# Patient Record
Sex: FEMALE | Race: WHITE | NOT HISPANIC OR LATINO
[De-identification: names, ages, dates, MRNs, and addresses within clinical notes are randomized per-mention and may not be internally consistent; named-entity substitution may affect disease eponyms.]

---

## 2017-09-14 ENCOUNTER — RESULT REVIEW (OUTPATIENT)
Age: 77
End: 2017-09-14

## 2019-10-22 ENCOUNTER — LABORATORY RESULT (OUTPATIENT)
Age: 79
End: 2019-10-22

## 2019-10-22 ENCOUNTER — APPOINTMENT (OUTPATIENT)
Dept: DERMATOLOGY | Facility: CLINIC | Age: 79
End: 2019-10-22
Payer: MEDICARE

## 2019-10-22 VITALS
BODY MASS INDEX: 19.63 KG/M2 | DIASTOLIC BLOOD PRESSURE: 74 MMHG | WEIGHT: 115 LBS | SYSTOLIC BLOOD PRESSURE: 124 MMHG | HEIGHT: 64 IN

## 2019-10-22 DIAGNOSIS — Z78.9 OTHER SPECIFIED HEALTH STATUS: ICD-10-CM

## 2019-10-22 PROCEDURE — 11104 PUNCH BX SKIN SINGLE LESION: CPT

## 2019-10-22 PROCEDURE — 99203 OFFICE O/P NEW LOW 30 MIN: CPT | Mod: 25

## 2019-10-28 ENCOUNTER — APPOINTMENT (OUTPATIENT)
Dept: DERMATOLOGY | Facility: CLINIC | Age: 79
End: 2019-10-28

## 2019-11-05 ENCOUNTER — APPOINTMENT (OUTPATIENT)
Dept: DERMATOLOGY | Facility: CLINIC | Age: 79
End: 2019-11-05
Payer: MEDICARE

## 2019-11-05 DIAGNOSIS — L82.1 OTHER SEBORRHEIC KERATOSIS: ICD-10-CM

## 2019-11-05 PROCEDURE — 99213 OFFICE O/P EST LOW 20 MIN: CPT

## 2019-11-19 ENCOUNTER — APPOINTMENT (OUTPATIENT)
Dept: DERMATOLOGY | Facility: CLINIC | Age: 79
End: 2019-11-19
Payer: MEDICARE

## 2019-11-19 DIAGNOSIS — L91.8 OTHER HYPERTROPHIC DISORDERS OF THE SKIN: ICD-10-CM

## 2019-11-19 PROCEDURE — 17110 DESTRUCTION B9 LES UP TO 14: CPT

## 2019-11-19 PROCEDURE — 99214 OFFICE O/P EST MOD 30 MIN: CPT | Mod: 25

## 2019-12-19 ENCOUNTER — APPOINTMENT (OUTPATIENT)
Dept: DERMATOLOGY | Facility: CLINIC | Age: 79
End: 2019-12-19
Payer: MEDICARE

## 2019-12-19 PROCEDURE — 99213 OFFICE O/P EST LOW 20 MIN: CPT

## 2019-12-19 NOTE — HISTORY OF PRESENT ILLNESS
[FreeTextEntry1] : f/u scabies [de-identified] : 79F here for f/u scabies. Diagnosed at . s/p ivermectin and permethrin with improvement in her itch. Still has mild residual itch around the neck that is improving. Otherwise, no new, evolving, or symptomatic skin lesions.

## 2020-01-31 ENCOUNTER — INPATIENT (INPATIENT)
Facility: HOSPITAL | Age: 80
LOS: 9 days | Discharge: HOME | DRG: 834 | End: 2020-02-10
Payer: COMMERCIAL

## 2020-01-31 VITALS
RESPIRATION RATE: 18 BRPM | OXYGEN SATURATION: 95 % | WEIGHT: 117.95 LBS | HEIGHT: 64 IN | SYSTOLIC BLOOD PRESSURE: 146 MMHG | HEART RATE: 85 BPM | TEMPERATURE: 98 F | DIASTOLIC BLOOD PRESSURE: 78 MMHG

## 2020-01-31 DIAGNOSIS — Z90.710 ACQUIRED ABSENCE OF BOTH CERVIX AND UTERUS: Chronic | ICD-10-CM

## 2020-01-31 DIAGNOSIS — I10 ESSENTIAL (PRIMARY) HYPERTENSION: ICD-10-CM

## 2020-01-31 DIAGNOSIS — C50.919 MALIGNANT NEOPLASM OF UNSPECIFIED SITE OF UNSPECIFIED FEMALE BREAST: ICD-10-CM

## 2020-01-31 DIAGNOSIS — C95.00 ACUTE LEUKEMIA OF UNSPECIFIED CELL TYPE NOT HAVING ACHIEVED REMISSION: ICD-10-CM

## 2020-01-31 LAB
ALBUMIN SERPL ELPH-MCNC: 4 G/DL — SIGNIFICANT CHANGE UP (ref 3.3–5)
ALP SERPL-CCNC: 45 U/L — SIGNIFICANT CHANGE UP (ref 40–120)
ALT FLD-CCNC: 33 U/L — SIGNIFICANT CHANGE UP (ref 10–45)
ANION GAP SERPL CALC-SCNC: 12 MMOL/L — SIGNIFICANT CHANGE UP (ref 5–17)
APPEARANCE UR: CLEAR — SIGNIFICANT CHANGE UP
APTT BLD: 27.9 SEC — SIGNIFICANT CHANGE UP (ref 27.5–36.3)
AST SERPL-CCNC: 32 U/L — SIGNIFICANT CHANGE UP (ref 10–40)
BASOPHILS # BLD AUTO: 0 K/UL — SIGNIFICANT CHANGE UP (ref 0–0.2)
BASOPHILS NFR BLD AUTO: 0 % — SIGNIFICANT CHANGE UP (ref 0–2)
BILIRUB SERPL-MCNC: 0.2 MG/DL — SIGNIFICANT CHANGE UP (ref 0.2–1.2)
BILIRUB UR-MCNC: NEGATIVE — SIGNIFICANT CHANGE UP
BLD GP AB SCN SERPL QL: NEGATIVE — SIGNIFICANT CHANGE UP
BUN SERPL-MCNC: 16 MG/DL — SIGNIFICANT CHANGE UP (ref 7–23)
CALCIUM SERPL-MCNC: 9.9 MG/DL — SIGNIFICANT CHANGE UP (ref 8.4–10.5)
CHLORIDE SERPL-SCNC: 100 MMOL/L — SIGNIFICANT CHANGE UP (ref 96–108)
CO2 SERPL-SCNC: 28 MMOL/L — SIGNIFICANT CHANGE UP (ref 22–31)
COLOR SPEC: SIGNIFICANT CHANGE UP
CREAT SERPL-MCNC: 0.67 MG/DL — SIGNIFICANT CHANGE UP (ref 0.5–1.3)
DIFF PNL FLD: NEGATIVE — SIGNIFICANT CHANGE UP
EOSINOPHIL # BLD AUTO: 1.53 K/UL — HIGH (ref 0–0.5)
EOSINOPHIL NFR BLD AUTO: 6 % — SIGNIFICANT CHANGE UP (ref 0–6)
GAS PNL BLDV: SIGNIFICANT CHANGE UP
GLUCOSE SERPL-MCNC: 117 MG/DL — HIGH (ref 70–99)
GLUCOSE UR QL: NEGATIVE — SIGNIFICANT CHANGE UP
HCT VFR BLD CALC: 35.5 % — SIGNIFICANT CHANGE UP (ref 34.5–45)
HGB BLD-MCNC: 11.7 G/DL — SIGNIFICANT CHANGE UP (ref 11.5–15.5)
INR BLD: 1.2 RATIO — HIGH (ref 0.88–1.16)
KETONES UR-MCNC: SIGNIFICANT CHANGE UP
LDH SERPL L TO P-CCNC: 826 U/L — HIGH (ref 50–242)
LEUKOCYTE ESTERASE UR-ACNC: NEGATIVE — SIGNIFICANT CHANGE UP
LYMPHOCYTES # BLD AUTO: 2.04 K/UL — SIGNIFICANT CHANGE UP (ref 1–3.3)
LYMPHOCYTES # BLD AUTO: 8 % — LOW (ref 13–44)
MCHC RBC-ENTMCNC: 31.1 PG — SIGNIFICANT CHANGE UP (ref 27–34)
MCHC RBC-ENTMCNC: 33 GM/DL — SIGNIFICANT CHANGE UP (ref 32–36)
MCV RBC AUTO: 94.4 FL — SIGNIFICANT CHANGE UP (ref 80–100)
MONOCYTES # BLD AUTO: 1.78 K/UL — HIGH (ref 0–0.9)
MONOCYTES NFR BLD AUTO: 7 % — SIGNIFICANT CHANGE UP (ref 2–14)
NEUTROPHILS # BLD AUTO: 2.04 K/UL — SIGNIFICANT CHANGE UP (ref 1.8–7.4)
NEUTROPHILS NFR BLD AUTO: 8 % — LOW (ref 43–77)
NITRITE UR-MCNC: NEGATIVE — SIGNIFICANT CHANGE UP
PH UR: 7 — SIGNIFICANT CHANGE UP (ref 5–8)
PLATELET # BLD AUTO: 153 K/UL — SIGNIFICANT CHANGE UP (ref 150–400)
POTASSIUM SERPL-MCNC: 3.9 MMOL/L — SIGNIFICANT CHANGE UP (ref 3.5–5.3)
POTASSIUM SERPL-SCNC: 3.9 MMOL/L — SIGNIFICANT CHANGE UP (ref 3.5–5.3)
PROT SERPL-MCNC: 7 G/DL — SIGNIFICANT CHANGE UP (ref 6–8.3)
PROT UR-MCNC: NEGATIVE — SIGNIFICANT CHANGE UP
PROTHROM AB SERPL-ACNC: 13.8 SEC — HIGH (ref 10–12.9)
RBC # BLD: 3.76 M/UL — LOW (ref 3.8–5.2)
RBC # FLD: 18.1 % — HIGH (ref 10.3–14.5)
RH IG SCN BLD-IMP: POSITIVE — SIGNIFICANT CHANGE UP
RH IG SCN BLD-IMP: POSITIVE — SIGNIFICANT CHANGE UP
SODIUM SERPL-SCNC: 140 MMOL/L — SIGNIFICANT CHANGE UP (ref 135–145)
SP GR SPEC: 1.01 — SIGNIFICANT CHANGE UP (ref 1.01–1.02)
URATE SERPL-MCNC: 4.7 MG/DL — SIGNIFICANT CHANGE UP (ref 2.5–7)
UROBILINOGEN FLD QL: NEGATIVE — SIGNIFICANT CHANGE UP
WBC # BLD: 25.44 K/UL — HIGH (ref 3.8–10.5)
WBC # FLD AUTO: 25.44 K/UL — HIGH (ref 3.8–10.5)

## 2020-01-31 PROCEDURE — 88291 CYTO/MOLECULAR REPORT: CPT

## 2020-01-31 PROCEDURE — 71046 X-RAY EXAM CHEST 2 VIEWS: CPT | Mod: 26

## 2020-01-31 PROCEDURE — 99285 EMERGENCY DEPT VISIT HI MDM: CPT

## 2020-01-31 PROCEDURE — 93010 ELECTROCARDIOGRAM REPORT: CPT

## 2020-01-31 RX ORDER — ENOXAPARIN SODIUM 100 MG/ML
40 INJECTION SUBCUTANEOUS AT BEDTIME
Refills: 0 | Status: COMPLETED | OUTPATIENT
Start: 2020-01-31 | End: 2020-02-02

## 2020-01-31 RX ORDER — ACETAMINOPHEN 500 MG
650 TABLET ORAL EVERY 6 HOURS
Refills: 0 | Status: DISCONTINUED | OUTPATIENT
Start: 2020-01-31 | End: 2020-02-10

## 2020-01-31 RX ORDER — AMLODIPINE BESYLATE 2.5 MG/1
5 TABLET ORAL DAILY
Refills: 0 | Status: DISCONTINUED | OUTPATIENT
Start: 2020-01-31 | End: 2020-02-10

## 2020-01-31 RX ORDER — ALLOPURINOL 300 MG
300 TABLET ORAL AT BEDTIME
Refills: 0 | Status: DISCONTINUED | OUTPATIENT
Start: 2020-01-31 | End: 2020-02-10

## 2020-01-31 RX ORDER — SODIUM CHLORIDE 9 MG/ML
1000 INJECTION INTRAMUSCULAR; INTRAVENOUS; SUBCUTANEOUS
Refills: 0 | Status: DISCONTINUED | OUTPATIENT
Start: 2020-01-31 | End: 2020-02-03

## 2020-01-31 RX ORDER — SALIVA SUBSTITUTE COMB NO.11 351 MG
5 POWDER IN PACKET (EA) MUCOUS MEMBRANE
Refills: 0 | Status: DISCONTINUED | OUTPATIENT
Start: 2020-01-31 | End: 2020-02-10

## 2020-01-31 RX ORDER — TRAZODONE HCL 50 MG
150 TABLET ORAL AT BEDTIME
Refills: 0 | Status: DISCONTINUED | OUTPATIENT
Start: 2020-01-31 | End: 2020-02-10

## 2020-01-31 RX ADMIN — ENOXAPARIN SODIUM 40 MILLIGRAM(S): 100 INJECTION SUBCUTANEOUS at 21:35

## 2020-01-31 RX ADMIN — Medication 300 MILLIGRAM(S): at 21:34

## 2020-01-31 RX ADMIN — Medication 5 MILLILITER(S): at 18:34

## 2020-01-31 RX ADMIN — Medication 150 MILLIGRAM(S): at 21:35

## 2020-01-31 RX ADMIN — Medication 5 MILLILITER(S): at 21:35

## 2020-01-31 NOTE — H&P ADULT - HISTORY OF PRESENT ILLNESS
79 F with history of Breast cancer 12 years ago treated with tamoxifen (no chemotherapy), surgery and radiation sent in by oncologist Dr. Blank for rule out leukemia. Patient has been feeling generally fatigued and unwell. 79 F with history of Breast cancer 12 years ago treated with tamoxifen (no chemotherapy), s/p RT and lumpectomy, HTN and surgical hx of hysterectomy sent in by oncologist Dr. Blank for rule out leukemia. Patient has been feeling generally fatigued and sluggish since December. She states that she recently had a case of scabies that was treated at the end of December. Since then she was feeling more tired, and saw her PMD, who did some bloodwork and noticed blasts in her peripheral blood. Pt was told to see Dr. Blank for further workup. Dr. Blank did blood work again which again found blasts in the peripheral blood, at which point she sent pt to the ER for leukemia workup.     Patient otherwise feels well. She has not had any recent fevers, chills, nausea, vomiting, weight loss, chest pain, shortness of breath, abdominal pain, or leg swelling. She has had two days of night sweats, and some loss of appetite. She denies any family hx of malignancy, and her breast ca is in remission currently.

## 2020-01-31 NOTE — ED PROVIDER NOTE - CLINICAL SUMMARY MEDICAL DECISION MAKING FREE TEXT BOX
Attending MD Nicolas:  79F presenting for evaluation of fatigue x weeks, outpatient labs with luekocytosis Attending MD Nicolas:  79F presenting for evaluation of fatigue x weeks, outpatient labs with leukocytosis and 70% blasts, referred to ED for heme malignancy work up. Plan for heme consult, admission for further w/u.

## 2020-01-31 NOTE — ED ADULT NURSE NOTE - OBJECTIVE STATEMENT
78 y/o female a+ox3, denies pmhx, coming from home for fatigue and weakness x months. Pt seen by her PMD outpatient for reported persistent weakness and fatigue with occasional night sweats for past few months. Pt sent to ED by PMD for hemonc consult. Pt denies weight loss, chest pain or discomfort, headache, lightheadedness, dizziness, difficulty breathing, abdominal pain, hematuria, melena, n/v/d, fever or chills. IV established, labs obtained. Pt left in position of comfort, mask provided.  at bedside, will reassess

## 2020-01-31 NOTE — ED PROVIDER NOTE - OBJECTIVE STATEMENT
79 F hx BrCA tx 12y ago with tamoxifen, surgery, radiation, p/w several weeks of feeling generalized fatigue and unlweel. Saw her oncologist Dr Blank who did bloodwork and sent her in to be admitted for BM biopsy given concern for leukemia on outpt labs. Apart from generalized fatigue, not reporting significant pain, fever, dysuria, blood in stool or urine, gait abnormalities or HA.

## 2020-01-31 NOTE — H&P ADULT - ASSESSMENT
79 F with history of Breast cancer 12 years ago treated with tamoxifen (no chemotherapy), s/p RT and lumpectomy, HTN and surgical hx of hysterectomy sent in by oncologist Dr. Blank for rule out leukemia.

## 2020-01-31 NOTE — H&P ADULT - NSHPLABSRESULTS_GEN_ALL_CORE
11.7   25.44 )-----------( 153      ( 2020 12:41 )             35.5         140  |  100  |  16  ----------------------------<  117<H>  3.9   |  28  |  0.67    Ca    9.9      2020 12:41  Phos  3.2       Mg     2.3         TPro  7.0  /  Alb  4.0  /  TBili  0.2  /  DBili  x   /  AST  32  /  ALT  33  /  AlkPhos  45      CAPILLARY BLOOD GLUCOSE        PT/INR - ( 2020 12:41 )   PT: 13.8 sec;   INR: 1.20 ratio         PTT - ( 2020 12:41 )  PTT:27.9 sec  Urinalysis Basic - ( 2020 13:49 )    Color: Light Yellow / Appearance: Clear / S.012 / pH: x  Gluc: x / Ketone: Trace  / Bili: Negative / Urobili: Negative   Blood: x / Protein: Negative / Nitrite: Negative   Leuk Esterase: Negative / RBC: x / WBC x   Sq Epi: x / Non Sq Epi: x / Bacteria: x    Lactate Dehydrogenase, Serum (20 @ 12:41)    Lactate Dehydrogenase, Serum: 826 U/L  Uric Acid, Serum (20 @ 12:41)    Uric Acid, Serum: 4.7 mg/dL

## 2020-01-31 NOTE — H&P ADULT - PROBLEM SELECTOR PLAN 1
Pt with outpatient labs showing blasts in peripheral blood  On admission, pt has leukocytosis to 25K with 71% blasts with concern for AML  No associated anemia or thrombocytopenia at this time     Flow cytometry sent in ER   Peripheral smear reviewed  Start allopurinol 300mg daily   Please check Tumor lysis labs daily   IVF at 100cc/hour  Transfuse for hgb<7, platelets <10 (or <15 if febrile)  Will need bone marrow biopsy on Monday   Plan for PICC/mediport  Will order HIV, Hepatitis labs  MUGA ordered   Normal mental status, no neurological deficits, no concern for leuokostasis  Patient may qualify for [place] clinical trial- will need to discuss with research nurse and consent pt Pt with outpatient labs showing blasts in peripheral blood  On admission, pt has leukocytosis to 25K with 71% blasts with concern for AML  No associated anemia or thrombocytopenia at this time     Flow cytometry sent in ER   Peripheral smear reviewed- many blasts visualized, red cell and platelet morphology normal   Start allopurinol 300mg daily   Please check Tumor lysis labs daily   IVF at 100cc/hour  Transfuse for hgb<7, platelets <10 (or <15 if febrile)  Will need bone marrow biopsy on Monday   Plan for PICC/mediport  Will order HIV, Hepatitis labs  MUGA ordered   Normal mental status, no neurological deficits, no concern for leuokostasis  Patient may qualify for [place] clinical trial- will need to discuss with research nurse and consent pt  Pt's  Nic called this evening and updated on recent findings

## 2020-01-31 NOTE — H&P ADULT - NSHPSOCIALHISTORY_GEN_ALL_CORE
Pt is retired, used to work as a medical technician, lives at home with . No smoking history, drinks wine/liquor daily (1 glass), no drug history

## 2020-01-31 NOTE — H&P ADULT - NSHPREVIEWOFSYSTEMS_GEN_ALL_CORE
REVIEW OF SYSTEMS:    CONSTITUTIONAL: +fatigue, no fevers or chills   EYES/ENT: No visual changes;  No vertigo or throat pain   NECK: No pain or stiffness  RESPIRATORY: No cough, wheezing, hemoptysis; No shortness of breath  CARDIOVASCULAR: No chest pain or palpitations  GASTROINTESTINAL: No abdominal or epigastric pain. No nausea, vomiting, or hematemesis; No diarrhea or constipation. No melena or hematochezia.  GENITOURINARY: No dysuria, frequency or hematuria  NEUROLOGICAL: No numbness or weakness  SKIN: No itching, burning, rashes, or lesions   All other review of systems is negative unless indicated above.

## 2020-01-31 NOTE — H&P ADULT - PROBLEM SELECTOR PLAN 3
Remote history, treated with lumpectomy, RT and Tamoxifen, no chemotherapy  currently in remission   follows with Dr. Blank

## 2020-01-31 NOTE — ED PROVIDER NOTE - ATTENDING CONTRIBUTION TO CARE
Attending MD Nicolas:  I personally have seen and examined this patient.  Resident note reviewed and agree on plan of care and except where noted.  See HPI, PE, and MDM for details.

## 2020-01-31 NOTE — ED ADULT TRIAGE NOTE - CHIEF COMPLAINT QUOTE
weakness, tiredness x several months. Sent by hem/onc MD Blank to be seen by MD Flores. Concerned for Leukemia

## 2020-01-31 NOTE — H&P ADULT - NSHPPHYSICALEXAM_GEN_ALL_CORE
PHYSICAL EXAM:  GENERAL: NAD, well-groomed, well-developed  HEAD:  Atraumatic, Normocephalic  EYES: EOMI, PERRLA, conjunctiva and sclera clear  ENMT: No tonsillar erythema, exudates, or enlargement; Moist mucous membranes, Good dentition, No lesions  NECK: Supple, No JVD, Normal thyroid  CHEST/LUNG: Clear to percussion bilaterally; No rales, rhonchi, wheezing, or rubs  HEART: Regular rate and rhythm; No murmurs, rubs, or gallops  ABDOMEN: Soft, Nontender, Nondistended; Bowel sounds present  VASCULAR:  2+ Peripheral Pulses, No clubbing, cyanosis, or edema  LYMPH: No lymphadenopathy noted  SKIN: No rashes or lesions  NERVOUS SYSTEM:  Alert & Oriented X3, Good concentration; Motor Strength 5/5 B/L upper and lower extremities

## 2020-02-01 DIAGNOSIS — B99.9 UNSPECIFIED INFECTIOUS DISEASE: ICD-10-CM

## 2020-02-01 DIAGNOSIS — Z29.9 ENCOUNTER FOR PROPHYLACTIC MEASURES, UNSPECIFIED: ICD-10-CM

## 2020-02-01 LAB
ALBUMIN SERPL ELPH-MCNC: 3.2 G/DL — LOW (ref 3.3–5)
ALP SERPL-CCNC: 36 U/L — LOW (ref 40–120)
ALT FLD-CCNC: 26 U/L — SIGNIFICANT CHANGE UP (ref 10–45)
ANION GAP SERPL CALC-SCNC: 14 MMOL/L — SIGNIFICANT CHANGE UP (ref 5–17)
AST SERPL-CCNC: 24 U/L — SIGNIFICANT CHANGE UP (ref 10–40)
BASOPHILS # BLD AUTO: 0 K/UL — SIGNIFICANT CHANGE UP (ref 0–0.2)
BASOPHILS NFR BLD AUTO: 0 % — SIGNIFICANT CHANGE UP (ref 0–2)
BILIRUB SERPL-MCNC: 0.2 MG/DL — SIGNIFICANT CHANGE UP (ref 0.2–1.2)
BLASTS # FLD: 68 % — HIGH (ref 0–0)
BUN SERPL-MCNC: 11 MG/DL — SIGNIFICANT CHANGE UP (ref 7–23)
CALCIUM SERPL-MCNC: 8.3 MG/DL — LOW (ref 8.4–10.5)
CHLORIDE SERPL-SCNC: 107 MMOL/L — SIGNIFICANT CHANGE UP (ref 96–108)
CO2 SERPL-SCNC: 23 MMOL/L — SIGNIFICANT CHANGE UP (ref 22–31)
CREAT SERPL-MCNC: 0.59 MG/DL — SIGNIFICANT CHANGE UP (ref 0.5–1.3)
ELLIPTOCYTES BLD QL SMEAR: SLIGHT — SIGNIFICANT CHANGE UP
EOSINOPHIL # BLD AUTO: 2.04 K/UL — HIGH (ref 0–0.5)
EOSINOPHIL NFR BLD AUTO: 9 % — HIGH (ref 0–6)
GLUCOSE SERPL-MCNC: 95 MG/DL — SIGNIFICANT CHANGE UP (ref 70–99)
HAV IGM SER-ACNC: SIGNIFICANT CHANGE UP
HAV IGM SER-ACNC: SIGNIFICANT CHANGE UP
HBV CORE AB SER-ACNC: SIGNIFICANT CHANGE UP
HBV CORE IGM SER-ACNC: SIGNIFICANT CHANGE UP
HBV CORE IGM SER-ACNC: SIGNIFICANT CHANGE UP
HBV SURFACE AB SER-ACNC: SIGNIFICANT CHANGE UP
HBV SURFACE AG SER-ACNC: SIGNIFICANT CHANGE UP
HBV SURFACE AG SER-ACNC: SIGNIFICANT CHANGE UP
HCT VFR BLD CALC: 31.4 % — LOW (ref 34.5–45)
HCV AB S/CO SERPL IA: 0.15 S/CO — SIGNIFICANT CHANGE UP (ref 0–0.99)
HCV AB S/CO SERPL IA: 0.17 S/CO — SIGNIFICANT CHANGE UP (ref 0–0.99)
HCV AB SERPL-IMP: SIGNIFICANT CHANGE UP
HCV AB SERPL-IMP: SIGNIFICANT CHANGE UP
HGB BLD-MCNC: 10 G/DL — LOW (ref 11.5–15.5)
HIV 1+2 AB+HIV1 P24 AG SERPL QL IA: SIGNIFICANT CHANGE UP
LDH SERPL L TO P-CCNC: 684 U/L — HIGH (ref 50–242)
LG PLATELETS BLD QL AUTO: SLIGHT — SIGNIFICANT CHANGE UP
LYMPHOCYTES # BLD AUTO: 12 % — LOW (ref 13–44)
LYMPHOCYTES # BLD AUTO: 2.72 K/UL — SIGNIFICANT CHANGE UP (ref 1–3.3)
MANUAL SMEAR VERIFICATION: SIGNIFICANT CHANGE UP
MCHC RBC-ENTMCNC: 30.1 PG — SIGNIFICANT CHANGE UP (ref 27–34)
MCHC RBC-ENTMCNC: 31.8 GM/DL — LOW (ref 32–36)
MCV RBC AUTO: 94.6 FL — SIGNIFICANT CHANGE UP (ref 80–100)
MONOCYTES # BLD AUTO: 0.68 K/UL — SIGNIFICANT CHANGE UP (ref 0–0.9)
MONOCYTES NFR BLD AUTO: 3 % — SIGNIFICANT CHANGE UP (ref 2–14)
NEUTROPHILS # BLD AUTO: 1.81 K/UL — SIGNIFICANT CHANGE UP (ref 1.8–7.4)
NEUTROPHILS NFR BLD AUTO: 8 % — LOW (ref 43–77)
NRBC # BLD: 0 /100 — SIGNIFICANT CHANGE UP (ref 0–0)
PHOSPHATE SERPL-MCNC: 2.7 MG/DL — SIGNIFICANT CHANGE UP (ref 2.5–4.5)
PLAT MORPH BLD: ABNORMAL
PLATELET # BLD AUTO: 137 K/UL — LOW (ref 150–400)
POIKILOCYTOSIS BLD QL AUTO: SLIGHT — SIGNIFICANT CHANGE UP
POLYCHROMASIA BLD QL SMEAR: SLIGHT — SIGNIFICANT CHANGE UP
POTASSIUM SERPL-MCNC: 3.1 MMOL/L — LOW (ref 3.5–5.3)
POTASSIUM SERPL-SCNC: 3.1 MMOL/L — LOW (ref 3.5–5.3)
PROT SERPL-MCNC: 5.4 G/DL — LOW (ref 6–8.3)
RBC # BLD: 3.32 M/UL — LOW (ref 3.8–5.2)
RBC # FLD: 18 % — HIGH (ref 10.3–14.5)
RBC BLD AUTO: ABNORMAL
SMUDGE CELLS # BLD: PRESENT — SIGNIFICANT CHANGE UP
SODIUM SERPL-SCNC: 144 MMOL/L — SIGNIFICANT CHANGE UP (ref 135–145)
URATE SERPL-MCNC: 3.8 MG/DL — SIGNIFICANT CHANGE UP (ref 2.5–7)
WBC # BLD: 22.66 K/UL — HIGH (ref 3.8–10.5)
WBC # FLD AUTO: 22.66 K/UL — HIGH (ref 3.8–10.5)

## 2020-02-01 PROCEDURE — 99231 SBSQ HOSP IP/OBS SF/LOW 25: CPT

## 2020-02-01 RX ORDER — HYDROCHLOROTHIAZIDE 25 MG
12.5 TABLET ORAL DAILY
Refills: 0 | Status: DISCONTINUED | OUTPATIENT
Start: 2020-02-01 | End: 2020-02-10

## 2020-02-01 RX ORDER — POTASSIUM CHLORIDE 20 MEQ
40 PACKET (EA) ORAL EVERY 4 HOURS
Refills: 0 | Status: COMPLETED | OUTPATIENT
Start: 2020-02-01 | End: 2020-02-01

## 2020-02-01 RX ADMIN — Medication 300 MILLIGRAM(S): at 21:12

## 2020-02-01 RX ADMIN — SODIUM CHLORIDE 100 MILLILITER(S): 9 INJECTION INTRAMUSCULAR; INTRAVENOUS; SUBCUTANEOUS at 17:06

## 2020-02-01 RX ADMIN — Medication 150 MILLIGRAM(S): at 21:11

## 2020-02-01 RX ADMIN — Medication 5 MILLILITER(S): at 17:06

## 2020-02-01 RX ADMIN — AMLODIPINE BESYLATE 5 MILLIGRAM(S): 2.5 TABLET ORAL at 06:22

## 2020-02-01 RX ADMIN — Medication 5 MILLILITER(S): at 06:22

## 2020-02-01 RX ADMIN — Medication 5 MILLILITER(S): at 12:01

## 2020-02-01 RX ADMIN — ENOXAPARIN SODIUM 40 MILLIGRAM(S): 100 INJECTION SUBCUTANEOUS at 21:11

## 2020-02-01 RX ADMIN — Medication 40 MILLIEQUIVALENT(S): at 08:52

## 2020-02-01 RX ADMIN — Medication 5 MILLILITER(S): at 21:11

## 2020-02-01 RX ADMIN — Medication 12.5 MILLIGRAM(S): at 17:48

## 2020-02-01 RX ADMIN — Medication 40 MILLIEQUIVALENT(S): at 12:01

## 2020-02-01 NOTE — PROGRESS NOTE ADULT - SUBJECTIVE AND OBJECTIVE BOX
Diagnosis:    Protocol/Chemo Regimen:    Day:     Pt endorsed:    Review of Systems:     Pain scale:     Diet:     Allergies    No Known Allergies    Intolerances        ANTIMICROBIALS      HEME/ONC MEDICATIONS  enoxaparin Injectable 40 milliGRAM(s) SubCutaneous at bedtime      STANDING MEDICATIONS  allopurinol 300 milliGRAM(s) Oral at bedtime  amLODIPine   Tablet 5 milliGRAM(s) Oral daily  Biotene Dry Mouth Oral Rinse 5 milliLiter(s) Swish and Spit four times a day  potassium chloride    Tablet ER 40 milliEquivalent(s) Oral every 4 hours  sodium chloride 0.9%. 1000 milliLiter(s) IV Continuous <Continuous>  traZODone 150 milliGRAM(s) Oral at bedtime      PRN MEDICATIONS  acetaminophen   Tablet .. 650 milliGRAM(s) Oral every 6 hours PRN        Vital Signs Last 24 Hrs  T(C): 36.6 (01 Feb 2020 05:00), Max: 37.2 (31 Jan 2020 21:14)  T(F): 97.9 (01 Feb 2020 05:00), Max: 98.9 (31 Jan 2020 21:14)  HR: 74 (01 Feb 2020 05:00) (71 - 86)  BP: 166/65 (01 Feb 2020 05:00) (140/63 - 166/65)  BP(mean): --  RR: 18 (01 Feb 2020 05:00) (18 - 18)  SpO2: 94% (01 Feb 2020 05:00) (94% - 98%)    PHYSICAL EXAM  General: NAD  HEENT: clear oropharynx, anicteric sclera, pink conjunctiva  Neck: supple  CV: (+) S1/S2 RRR  Lungs: positive air movement b/l ant lungs, clear to auscultation, no wheezes, no rales  Abdomen: soft, non-tender, non-distended  Ext: no clubbing cyanosis or edema  Skin: no rashes and no petechiae  Neuro: alert and oriented X 3, no focal deficits  Central Line:     RECENT CULTURES:        LABS:                        10.0   22.66 )-----------( 137      ( 01 Feb 2020 07:10 )             31.4         Mean Cell Volume : 94.6 fl  Mean Cell Hemoglobin : 30.1 pg  Mean Cell Hemoglobin Concentration : 31.8 gm/dL  Auto Neutrophil # : x  Auto Lymphocyte # : x  Auto Monocyte # : x  Auto Eosinophil # : x  Auto Basophil # : x  Auto Neutrophil % : x  Auto Lymphocyte % : x  Auto Monocyte % : x  Auto Eosinophil % : x  Auto Basophil % : x      02-01    144  |  107  |  11  ----------------------------<  95  3.1<L>   |  23  |  0.59    Ca    8.3<L>      01 Feb 2020 07:10  Phos  2.7     02-01  Mg     2.3     01-31    TPro  5.4<L>  /  Alb  3.2<L>  /  TBili  0.2  /  DBili  x   /  AST  24  /  ALT  26  /  AlkPhos  36<L>  02-01      Mg --  Phos 2.7  Mg 2.3  Phos 3.2      PT/INR - ( 31 Jan 2020 12:41 )   PT: 13.8 sec;   INR: 1.20 ratio         PTT - ( 31 Jan 2020 12:41 )  PTT:27.9 sec      Uric Acid 3.8      Uric Acid 4.7        RADIOLOGY & ADDITIONAL STUDIES: Diagnosis: Acute leukemia	    Protocol/Chemo Regimen: NA    Day: NA    Pt endorsed: No complaints    Review of Systems: Denies nausea, vomiting, diarrhea, chest pain, shortness of breath, headache    Pain scale: denies    Diet: regular    Allergies    No Known Allergies    Intolerances        ANTIMICROBIALS      HEME/ONC MEDICATIONS  enoxaparin Injectable 40 milliGRAM(s) SubCutaneous at bedtime      STANDING MEDICATIONS  allopurinol 300 milliGRAM(s) Oral at bedtime  amLODIPine   Tablet 5 milliGRAM(s) Oral daily  Biotene Dry Mouth Oral Rinse 5 milliLiter(s) Swish and Spit four times a day  potassium chloride    Tablet ER 40 milliEquivalent(s) Oral every 4 hours  sodium chloride 0.9%. 1000 milliLiter(s) IV Continuous <Continuous>  traZODone 150 milliGRAM(s) Oral at bedtime      PRN MEDICATIONS  acetaminophen   Tablet .. 650 milliGRAM(s) Oral every 6 hours PRN        Vital Signs Last 24 Hrs  T(C): 36.6 (01 Feb 2020 05:00), Max: 37.2 (31 Jan 2020 21:14)  T(F): 97.9 (01 Feb 2020 05:00), Max: 98.9 (31 Jan 2020 21:14)  HR: 74 (01 Feb 2020 05:00) (71 - 86)  BP: 166/65 (01 Feb 2020 05:00) (140/63 - 166/65)  BP(mean): --  RR: 18 (01 Feb 2020 05:00) (18 - 18)  SpO2: 94% (01 Feb 2020 05:00) (94% - 98%)    PHYSICAL EXAM  General: NAD  HEENT: clear oropharynx,   CV: (+) S1/S2 RRR  Lungs: clear to auscultation, no wheezes, no rales  Abdomen: soft, non-tender, non-distended  Ext: no edema  Skin: no rashes and no petechiae  Neuro: alert and oriented X 3, no focal deficits  Central Line: PIV      LABS:                        10.0   22.66 )-----------( 137      ( 01 Feb 2020 07:10 )             31.4         Mean Cell Volume : 94.6 fl  Mean Cell Hemoglobin : 30.1 pg  Mean Cell Hemoglobin Concentration : 31.8 gm/dL  Auto Neutrophil # : x  Auto Lymphocyte # : x  Auto Monocyte # : x  Auto Eosinophil # : x  Auto Basophil # : x  Auto Neutrophil % : x  Auto Lymphocyte % : x  Auto Monocyte % : x  Auto Eosinophil % : x  Auto Basophil % : x      02-01    144  |  107  |  11  ----------------------------<  95  3.1<L>   |  23  |  0.59    Ca    8.3<L>      01 Feb 2020 07:10  Phos  2.7     02-01  Mg     2.3     01-31    TPro  5.4<L>  /  Alb  3.2<L>  /  TBili  0.2  /  DBili  x   /  AST  24  /  ALT  26  /  AlkPhos  36<L>  02-01      Mg --  Phos 2.7  Mg 2.3  Phos 3.2      PT/INR - ( 31 Jan 2020 12:41 )   PT: 13.8 sec;   INR: 1.20 ratio         PTT - ( 31 Jan 2020 12:41 )  PTT:27.9 sec      Uric Acid 3.8      Uric Acid 4.7        RADIOLOGY & ADDITIONAL STUDIES:

## 2020-02-01 NOTE — PROGRESS NOTE ADULT - PROBLEM SELECTOR PLAN 1
Pt with outpatient labs showing blasts in peripheral blood  On admission, pt has leukocytosis to 25K with 71% blasts with concern for AML  No associated anemia or thrombocytopenia at this time     Flow cytometry sent in ER   Peripheral smear reviewed- many blasts visualized, red cell and platelet morphology normal   Start allopurinol 300mg daily   Please check Tumor lysis labs daily   IVF at 100cc/hour  Transfuse for hgb<7, platelets <10 (or <15 if febrile)  Will need bone marrow biopsy on Monday   Plan for PICC/mediport  Will order HIV, Hepatitis labs  MUGA ordered   Normal mental status, no neurological deficits, no concern for leuokostasis  Patient may qualify for [place] clinical trial- will need to discuss with research nurse and consent pt  Pt's  Nic called this evening and updated on recent findings 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  No associated anemia or thrombocytopenia at this time     Flow cytometry sent in ER   Peripheral smear reviewed- many blasts visualized, red cell and platelet morphology normal   Allopurinol 300mg daily   TLS daily  IV hydration  blood and lytes PRN  BM bx Monday   Plan TLC monday  HIV (-)  Hepatitis (-)  FU MUGA   Patient may qualify for [place] clinical trial- will need to discuss with research nurse and consent pt  Pain control  antiemetics  mouthcare  HLA

## 2020-02-01 NOTE — CHART NOTE - NSCHARTNOTEFT_GEN_A_CORE
RN to PA- Patient voices jokes multiple times of suicidal ideation.  Pt was seen at bedside in NAD, asleep.   Patient is A&Ox4, and laughed and denied any suicidal ideation. She said she was joking and has no intention of hurting herself or wanting to die.   Patient has no marks of any scars or marking along wrists b/l.      Vital Signs Last 24 Hrs  T(C): 37.2 (31 Jan 2020 21:14), Max: 37.2 (31 Jan 2020 21:14)  T(F): 98.9 (31 Jan 2020 21:14), Max: 98.9 (31 Jan 2020 21:14)  HR: 71 (31 Jan 2020 21:14) (71 - 86)  BP: 140/63 (31 Jan 2020 21:14) (140/63 - 165/72)  BP(mean): --  RR: 18 (31 Jan 2020 21:14) (18 - 18)  SpO2: 95% (31 Jan 2020 21:14) (95% - 98%)          Assessment & Plan:  HPI:  79 F with history of Breast cancer 12 years ago treated with tamoxifen (no chemotherapy), s/p RT and lumpectomy, HTN and surgical hx of hysterectomy sent in by oncologist Dr. Blank for rule out leukemia.       Plan: Depression  - recommend psyche consult in AM  - nurses scoped room for any items that could be used for self harm  - c/w monitoring patient overnight for any self harm      SHIMA Gomez  [id number] RN to PA- Patient voices jokes multiple times of suicidal ideation.  Pt was seen at bedside in NAD, asleep.   Patient is A&Ox4, and laughed and denied any suicidal ideation. She said she was joking and has no intention of hurting herself or wanting to die.   Patient has no marks of any scars or marking along wrists b/l.      Vital Signs Last 24 Hrs  T(C): 37.2 (31 Jan 2020 21:14), Max: 37.2 (31 Jan 2020 21:14)  T(F): 98.9 (31 Jan 2020 21:14), Max: 98.9 (31 Jan 2020 21:14)  HR: 71 (31 Jan 2020 21:14) (71 - 86)  BP: 140/63 (31 Jan 2020 21:14) (140/63 - 165/72)  BP(mean): --  RR: 18 (31 Jan 2020 21:14) (18 - 18)  SpO2: 95% (31 Jan 2020 21:14) (95% - 98%)          Assessment & Plan:  HPI:  79 F with history of Breast cancer 12 years ago treated with tamoxifen (no chemotherapy), s/p RT and lumpectomy, HTN and surgical hx of hysterectomy sent in by oncologist Dr. Blank for rule out leukemia.       Plan: Depression  - recommend psyche consult in AM  - nurses scoped room for any items that could be used for self harm  - c/w monitoring patient overnight for any self harm  - f/u with day team in       SHIMA Gomez  [id number]

## 2020-02-01 NOTE — PROGRESS NOTE ADULT - PROBLEM SELECTOR PLAN 3
Remote history, treated with lumpectomy, RT and Tamoxifen, no chemotherapy  currently in remission   follows with Dr. Blnak

## 2020-02-02 LAB
ALBUMIN SERPL ELPH-MCNC: 3.3 G/DL — SIGNIFICANT CHANGE UP (ref 3.3–5)
ALP SERPL-CCNC: 36 U/L — LOW (ref 40–120)
ALT FLD-CCNC: 26 U/L — SIGNIFICANT CHANGE UP (ref 10–45)
ANION GAP SERPL CALC-SCNC: 12 MMOL/L — SIGNIFICANT CHANGE UP (ref 5–17)
ANISOCYTOSIS BLD QL: SLIGHT — SIGNIFICANT CHANGE UP
AST SERPL-CCNC: 24 U/L — SIGNIFICANT CHANGE UP (ref 10–40)
BASOPHILS # BLD AUTO: 0 K/UL — SIGNIFICANT CHANGE UP (ref 0–0.2)
BASOPHILS NFR BLD AUTO: 0 % — SIGNIFICANT CHANGE UP (ref 0–2)
BILIRUB SERPL-MCNC: 0.2 MG/DL — SIGNIFICANT CHANGE UP (ref 0.2–1.2)
BLASTS # FLD: 48 % — HIGH (ref 0–0)
BUN SERPL-MCNC: 11 MG/DL — SIGNIFICANT CHANGE UP (ref 7–23)
CALCIUM SERPL-MCNC: 8.5 MG/DL — SIGNIFICANT CHANGE UP (ref 8.4–10.5)
CHLORIDE SERPL-SCNC: 111 MMOL/L — HIGH (ref 96–108)
CO2 SERPL-SCNC: 23 MMOL/L — SIGNIFICANT CHANGE UP (ref 22–31)
CREAT SERPL-MCNC: 0.54 MG/DL — SIGNIFICANT CHANGE UP (ref 0.5–1.3)
DACRYOCYTES BLD QL SMEAR: SLIGHT — SIGNIFICANT CHANGE UP
ELLIPTOCYTES BLD QL SMEAR: SLIGHT — SIGNIFICANT CHANGE UP
EOSINOPHIL # BLD AUTO: 3.69 K/UL — HIGH (ref 0–0.5)
EOSINOPHIL NFR BLD AUTO: 14 % — HIGH (ref 0–6)
GLUCOSE SERPL-MCNC: 106 MG/DL — HIGH (ref 70–99)
HCT VFR BLD CALC: 30 % — LOW (ref 34.5–45)
HGB BLD-MCNC: 10 G/DL — LOW (ref 11.5–15.5)
LDH SERPL L TO P-CCNC: 754 U/L — HIGH (ref 50–242)
LG PLATELETS BLD QL AUTO: SLIGHT — SIGNIFICANT CHANGE UP
LYMPHOCYTES # BLD AUTO: 21 % — SIGNIFICANT CHANGE UP (ref 13–44)
LYMPHOCYTES # BLD AUTO: 5.53 K/UL — HIGH (ref 1–3.3)
MACROCYTES BLD QL: SLIGHT — SIGNIFICANT CHANGE UP
MAGNESIUM SERPL-MCNC: 2.2 MG/DL — SIGNIFICANT CHANGE UP (ref 1.6–2.6)
MANUAL SMEAR VERIFICATION: SIGNIFICANT CHANGE UP
MCHC RBC-ENTMCNC: 31.2 PG — SIGNIFICANT CHANGE UP (ref 27–34)
MCHC RBC-ENTMCNC: 33.3 GM/DL — SIGNIFICANT CHANGE UP (ref 32–36)
MCV RBC AUTO: 93.5 FL — SIGNIFICANT CHANGE UP (ref 80–100)
MICROCYTES BLD QL: SLIGHT — SIGNIFICANT CHANGE UP
MONOCYTES # BLD AUTO: 1.05 K/UL — HIGH (ref 0–0.9)
MONOCYTES NFR BLD AUTO: 4 % — SIGNIFICANT CHANGE UP (ref 2–14)
NEUTROPHILS # BLD AUTO: 3.43 K/UL — SIGNIFICANT CHANGE UP (ref 1.8–7.4)
NEUTROPHILS NFR BLD AUTO: 13 % — LOW (ref 43–77)
NRBC # BLD: 1 /100 — HIGH (ref 0–0)
PHOSPHATE SERPL-MCNC: 2.4 MG/DL — LOW (ref 2.5–4.5)
PLAT MORPH BLD: ABNORMAL
PLATELET # BLD AUTO: 121 K/UL — LOW (ref 150–400)
POIKILOCYTOSIS BLD QL AUTO: SLIGHT — SIGNIFICANT CHANGE UP
POLYCHROMASIA BLD QL SMEAR: SLIGHT — SIGNIFICANT CHANGE UP
POTASSIUM SERPL-MCNC: 3.6 MMOL/L — SIGNIFICANT CHANGE UP (ref 3.5–5.3)
POTASSIUM SERPL-SCNC: 3.6 MMOL/L — SIGNIFICANT CHANGE UP (ref 3.5–5.3)
PROT SERPL-MCNC: 5.6 G/DL — LOW (ref 6–8.3)
RBC # BLD: 3.21 M/UL — LOW (ref 3.8–5.2)
RBC # FLD: 18.3 % — HIGH (ref 10.3–14.5)
RBC BLD AUTO: ABNORMAL
SODIUM SERPL-SCNC: 146 MMOL/L — HIGH (ref 135–145)
URATE SERPL-MCNC: 2.7 MG/DL — SIGNIFICANT CHANGE UP (ref 2.5–7)
WBC # BLD: 26.35 K/UL — HIGH (ref 3.8–10.5)
WBC # FLD AUTO: 26.35 K/UL — HIGH (ref 3.8–10.5)

## 2020-02-02 PROCEDURE — 99231 SBSQ HOSP IP/OBS SF/LOW 25: CPT

## 2020-02-02 RX ORDER — POTASSIUM PHOSPHATE, MONOBASIC POTASSIUM PHOSPHATE, DIBASIC 236; 224 MG/ML; MG/ML
15 INJECTION, SOLUTION INTRAVENOUS ONCE
Refills: 0 | Status: COMPLETED | OUTPATIENT
Start: 2020-02-02 | End: 2020-02-02

## 2020-02-02 RX ADMIN — Medication 300 MILLIGRAM(S): at 21:13

## 2020-02-02 RX ADMIN — Medication 12.5 MILLIGRAM(S): at 06:04

## 2020-02-02 RX ADMIN — Medication 100 MILLIGRAM(S): at 14:41

## 2020-02-02 RX ADMIN — AMLODIPINE BESYLATE 5 MILLIGRAM(S): 2.5 TABLET ORAL at 06:04

## 2020-02-02 RX ADMIN — Medication 5 MILLILITER(S): at 06:04

## 2020-02-02 RX ADMIN — Medication 5 MILLILITER(S): at 21:13

## 2020-02-02 RX ADMIN — SODIUM CHLORIDE 100 MILLILITER(S): 9 INJECTION INTRAMUSCULAR; INTRAVENOUS; SUBCUTANEOUS at 06:04

## 2020-02-02 RX ADMIN — Medication 100 MILLIGRAM(S): at 21:14

## 2020-02-02 RX ADMIN — ENOXAPARIN SODIUM 40 MILLIGRAM(S): 100 INJECTION SUBCUTANEOUS at 21:12

## 2020-02-02 RX ADMIN — POTASSIUM PHOSPHATE, MONOBASIC POTASSIUM PHOSPHATE, DIBASIC 62.5 MILLIMOLE(S): 236; 224 INJECTION, SOLUTION INTRAVENOUS at 10:57

## 2020-02-02 RX ADMIN — Medication 5 MILLILITER(S): at 17:36

## 2020-02-02 RX ADMIN — Medication 5 MILLILITER(S): at 10:56

## 2020-02-02 RX ADMIN — SODIUM CHLORIDE 100 MILLILITER(S): 9 INJECTION INTRAMUSCULAR; INTRAVENOUS; SUBCUTANEOUS at 17:36

## 2020-02-02 RX ADMIN — Medication 100 MILLIGRAM(S): at 09:13

## 2020-02-02 RX ADMIN — Medication 150 MILLIGRAM(S): at 21:13

## 2020-02-02 NOTE — PROGRESS NOTE ADULT - PROBLEM SELECTOR PLAN 1
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  No associated anemia or thrombocytopenia at this time     Flow cytometry sent in ER   Peripheral smear reviewed- many blasts visualized, red cell and platelet morphology normal   Allopurinol 300mg daily   TLS daily  IV hydration  blood and lytes PRN  BM bx Monday   Plan TLC monday  HIV (-)  Hepatitis (-)  FU MUGA   Patient may qualify for [place] clinical trial- will need to discuss with research nurse and consent pt  Pain control  antiemetics  mouthcare  HLA 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  No associated anemia or thrombocytopenia at this time     Flow cytometry sent in ER   Peripheral smear reviewed- many blasts visualized, red cell and platelet morphology normal   Allopurinol 300mg daily   TLS daily  IV hydration  blood and lytes PRN  BM bx Monday   Plan TLC monday  HIV (-)  Hepatitis (-)  FU MUGA   Patient may qualify for [place] clinical trial- will need to discuss with research nurse and consent pt  Pain control  antiemetics  mouthcare  HLA 1/31

## 2020-02-02 NOTE — PROGRESS NOTE ADULT - SUBJECTIVE AND OBJECTIVE BOX
Diagnosis: Acute leukemia	    Protocol/Chemo Regimen: NA    Day: NA    Pt endorsed: No complaints    Review of Systems: Denies nausea, vomiting, diarrhea, chest pain, shortness of breath, headache    Pain scale: denies    Diet: regular    Allergies    No Known Allergies    Intolerances        ANTIMICROBIALS      HEME/ONC MEDICATIONS  enoxaparin Injectable 40 milliGRAM(s) SubCutaneous at bedtime      STANDING MEDICATIONS  allopurinol 300 milliGRAM(s) Oral at bedtime  amLODIPine   Tablet 5 milliGRAM(s) Oral daily  Biotene Dry Mouth Oral Rinse 5 milliLiter(s) Swish and Spit four times a day  hydrochlorothiazide 12.5 milliGRAM(s) Oral daily  sodium chloride 0.9%. 1000 milliLiter(s) IV Continuous <Continuous>  traZODone 150 milliGRAM(s) Oral at bedtime      PRN MEDICATIONS  acetaminophen   Tablet .. 650 milliGRAM(s) Oral every 6 hours PRN        Vital Signs Last 24 Hrs  T(C): 37.2 (02 Feb 2020 05:50), Max: 37.4 (01 Feb 2020 17:18)  T(F): 99 (02 Feb 2020 05:50), Max: 99.4 (01 Feb 2020 17:18)  HR: 73 (02 Feb 2020 05:50) (73 - 89)  BP: 149/70 (02 Feb 2020 05:50) (145/66 - 173/78)  BP(mean): --  RR: 18 (02 Feb 2020 05:50) (16 - 18)  SpO2: 99% (02 Feb 2020 05:50) (95% - 99%)      PHYSICAL EXAM  General: NAD  HEENT: clear oropharynx,   CV: (+) S1/S2 RRR  Lungs: clear to auscultation, no wheezes, no rales  Abdomen: soft, non-tender, non-distended  Ext: no edema  Skin: no rashes and no petechiae  Neuro: alert and oriented X 3, no focal deficits  Central Line: PIV    RECENT CULTURES:        LABS: Diagnosis: Acute leukemia	    Protocol/Chemo Regimen: NA    Day: NA    Pt endorsed: No complaints    Review of Systems: Denies nausea, vomiting, diarrhea, chest pain, shortness of breath, headache    Pain scale: denies    Diet: regular    Allergies    No Known Allergies    Intolerances        ANTIMICROBIALS      HEME/ONC MEDICATIONS  enoxaparin Injectable 40 milliGRAM(s) SubCutaneous at bedtime      STANDING MEDICATIONS  allopurinol 300 milliGRAM(s) Oral at bedtime  amLODIPine   Tablet 5 milliGRAM(s) Oral daily  Biotene Dry Mouth Oral Rinse 5 milliLiter(s) Swish and Spit four times a day  hydrochlorothiazide 12.5 milliGRAM(s) Oral daily  sodium chloride 0.9%. 1000 milliLiter(s) IV Continuous <Continuous>  traZODone 150 milliGRAM(s) Oral at bedtime      PRN MEDICATIONS  acetaminophen   Tablet .. 650 milliGRAM(s) Oral every 6 hours PRN        Vital Signs Last 24 Hrs  T(C): 37.2 (02 Feb 2020 05:50), Max: 37.4 (01 Feb 2020 17:18)  T(F): 99 (02 Feb 2020 05:50), Max: 99.4 (01 Feb 2020 17:18)  HR: 73 (02 Feb 2020 05:50) (73 - 89)  BP: 149/70 (02 Feb 2020 05:50) (145/66 - 173/78)  BP(mean): --  RR: 18 (02 Feb 2020 05:50) (16 - 18)  SpO2: 99% (02 Feb 2020 05:50) (95% - 99%)      PHYSICAL EXAM  General: NAD  HEENT: clear oropharynx,   CV: (+) S1/S2 RRR  Lungs: clear to auscultation, no wheezes, no rales  Abdomen: soft, non-tender, non-distended  Ext: no edema  Skin: no rashes and no petechiae  Neuro: alert and oriented X 3, no focal deficits  Central Line: PIV    RECENT CULTURES:    LABS:    Blood Cultures:                           10.0   26.35 )-----------( 121      ( 02 Feb 2020 07:45 )             30.0         Mean Cell Volume : 93.5 fl  Mean Cell Hemoglobin : 31.2 pg  Mean Cell Hemoglobin Concentration : 33.3 gm/dL  Auto Neutrophil # : 3.43 K/uL  Auto Lymphocyte # : 5.53 K/uL  Auto Monocyte # : 1.05 K/uL  Auto Eosinophil # : 3.69 K/uL  Auto Basophil # : 0.00 K/uL  Auto Neutrophil % : 13.0 %  Auto Lymphocyte % : 21.0 %  Auto Monocyte % : 4.0 %  Auto Eosinophil % : 14.0 %  Auto Basophil % : 0.0 %      02-02    146<H>  |  111<H>  |  11  ----------------------------<  106<H>  3.6   |  23  |  0.54    Ca    8.5      02 Feb 2020 07:45  Phos  2.4     02-02  Mg     2.2     02-02    TPro  5.6<L>  /  Alb  3.3  /  TBili  0.2  /  DBili  x   /  AST  24  /  ALT  26  /  AlkPhos  36<L>  02-02      Mg 2.2  Phos 2.4            Uric Acid 2.7

## 2020-02-03 ENCOUNTER — RESULT REVIEW (OUTPATIENT)
Age: 80
End: 2020-02-03

## 2020-02-03 LAB
ALBUMIN SERPL ELPH-MCNC: 3.2 G/DL — LOW (ref 3.3–5)
ALP SERPL-CCNC: 34 U/L — LOW (ref 40–120)
ALT FLD-CCNC: 28 U/L — SIGNIFICANT CHANGE UP (ref 10–45)
ANION GAP SERPL CALC-SCNC: 12 MMOL/L — SIGNIFICANT CHANGE UP (ref 5–17)
AST SERPL-CCNC: 26 U/L — SIGNIFICANT CHANGE UP (ref 10–40)
BASOPHILS # BLD AUTO: 0 K/UL — SIGNIFICANT CHANGE UP (ref 0–0.2)
BASOPHILS NFR BLD AUTO: 0 % — SIGNIFICANT CHANGE UP (ref 0–2)
BILIRUB SERPL-MCNC: 0.2 MG/DL — SIGNIFICANT CHANGE UP (ref 0.2–1.2)
BLASTS # FLD: 72 % — HIGH (ref 0–0)
BLD GP AB SCN SERPL QL: NEGATIVE — SIGNIFICANT CHANGE UP
BUN SERPL-MCNC: 14 MG/DL — SIGNIFICANT CHANGE UP (ref 7–23)
CALCIUM SERPL-MCNC: 8.5 MG/DL — SIGNIFICANT CHANGE UP (ref 8.4–10.5)
CHLORIDE SERPL-SCNC: 109 MMOL/L — HIGH (ref 96–108)
CHROM ANALY INTERPHASE BLD FISH-IMP: SIGNIFICANT CHANGE UP
CO2 SERPL-SCNC: 23 MMOL/L — SIGNIFICANT CHANGE UP (ref 22–31)
CREAT SERPL-MCNC: 0.63 MG/DL — SIGNIFICANT CHANGE UP (ref 0.5–1.3)
EOSINOPHIL # BLD AUTO: 1.29 K/UL — HIGH (ref 0–0.5)
EOSINOPHIL NFR BLD AUTO: 4 % — SIGNIFICANT CHANGE UP (ref 0–6)
FIBRINOGEN PPP-MCNC: 523 MG/DL — HIGH (ref 350–510)
GLUCOSE SERPL-MCNC: 102 MG/DL — HIGH (ref 70–99)
HCT VFR BLD CALC: 29.2 % — LOW (ref 34.5–45)
HGB BLD-MCNC: 9.8 G/DL — LOW (ref 11.5–15.5)
LDH SERPL L TO P-CCNC: 787 U/L — HIGH (ref 50–242)
LYMPHOCYTES # BLD AUTO: 2.25 K/UL — SIGNIFICANT CHANGE UP (ref 1–3.3)
LYMPHOCYTES # BLD AUTO: 7 % — LOW (ref 13–44)
MAGNESIUM SERPL-MCNC: 2.2 MG/DL — SIGNIFICANT CHANGE UP (ref 1.6–2.6)
MANUAL SMEAR VERIFICATION: SIGNIFICANT CHANGE UP
MCHC RBC-ENTMCNC: 31.3 PG — SIGNIFICANT CHANGE UP (ref 27–34)
MCHC RBC-ENTMCNC: 33.6 GM/DL — SIGNIFICANT CHANGE UP (ref 32–36)
MCV RBC AUTO: 93.3 FL — SIGNIFICANT CHANGE UP (ref 80–100)
MONOCYTES # BLD AUTO: 2.9 K/UL — HIGH (ref 0–0.9)
MONOCYTES NFR BLD AUTO: 9 % — SIGNIFICANT CHANGE UP (ref 2–14)
NEUTROPHILS # BLD AUTO: 1.93 K/UL — SIGNIFICANT CHANGE UP (ref 1.8–7.4)
NEUTROPHILS NFR BLD AUTO: 6 % — LOW (ref 43–77)
NRBC # BLD: 0 /100 — SIGNIFICANT CHANGE UP (ref 0–0)
PHOSPHATE SERPL-MCNC: 2.8 MG/DL — SIGNIFICANT CHANGE UP (ref 2.5–4.5)
PLAT MORPH BLD: NORMAL — SIGNIFICANT CHANGE UP
PLATELET # BLD AUTO: 137 K/UL — LOW (ref 150–400)
POTASSIUM SERPL-MCNC: 3.6 MMOL/L — SIGNIFICANT CHANGE UP (ref 3.5–5.3)
POTASSIUM SERPL-SCNC: 3.6 MMOL/L — SIGNIFICANT CHANGE UP (ref 3.5–5.3)
PROT SERPL-MCNC: 5.5 G/DL — LOW (ref 6–8.3)
RBC # BLD: 3.13 M/UL — LOW (ref 3.8–5.2)
RBC # FLD: 18.6 % — HIGH (ref 10.3–14.5)
RBC BLD AUTO: SIGNIFICANT CHANGE UP
RH IG SCN BLD-IMP: POSITIVE — SIGNIFICANT CHANGE UP
SODIUM SERPL-SCNC: 144 MMOL/L — SIGNIFICANT CHANGE UP (ref 135–145)
URATE SERPL-MCNC: 2.5 MG/DL — SIGNIFICANT CHANGE UP (ref 2.5–7)
VARIANT LYMPHS # BLD: 2 % — SIGNIFICANT CHANGE UP (ref 0–6)
WBC # BLD: 32.17 K/UL — HIGH (ref 3.8–10.5)
WBC # FLD AUTO: 32.17 K/UL — HIGH (ref 3.8–10.5)

## 2020-02-03 PROCEDURE — 88305 TISSUE EXAM BY PATHOLOGIST: CPT | Mod: 26

## 2020-02-03 PROCEDURE — 88319 ENZYME HISTOCHEMISTRY: CPT | Mod: 26

## 2020-02-03 PROCEDURE — 85097 BONE MARROW INTERPRETATION: CPT

## 2020-02-03 PROCEDURE — 88189 FLOWCYTOMETRY/READ 16 & >: CPT

## 2020-02-03 PROCEDURE — 38222 DX BONE MARROW BX & ASPIR: CPT | Mod: AS

## 2020-02-03 PROCEDURE — 88313 SPECIAL STAINS GROUP 2: CPT | Mod: 26

## 2020-02-03 PROCEDURE — 99222 1ST HOSP IP/OBS MODERATE 55: CPT

## 2020-02-03 PROCEDURE — 99233 SBSQ HOSP IP/OBS HIGH 50: CPT | Mod: GC

## 2020-02-03 PROCEDURE — 99223 1ST HOSP IP/OBS HIGH 75: CPT

## 2020-02-03 PROCEDURE — 78472 GATED HEART PLANAR SINGLE: CPT | Mod: 26

## 2020-02-03 PROCEDURE — 88291 CYTO/MOLECULAR REPORT: CPT

## 2020-02-03 RX ORDER — HYDROCORTISONE 1 %
1 OINTMENT (GRAM) TOPICAL
Refills: 0 | Status: DISCONTINUED | OUTPATIENT
Start: 2020-02-03 | End: 2020-02-04

## 2020-02-03 RX ORDER — SODIUM CHLORIDE 9 MG/ML
1000 INJECTION INTRAMUSCULAR; INTRAVENOUS; SUBCUTANEOUS
Refills: 0 | Status: DISCONTINUED | OUTPATIENT
Start: 2020-02-03 | End: 2020-02-07

## 2020-02-03 RX ADMIN — Medication 12.5 MILLIGRAM(S): at 05:15

## 2020-02-03 RX ADMIN — Medication 5 MILLILITER(S): at 21:12

## 2020-02-03 RX ADMIN — SODIUM CHLORIDE 75 MILLILITER(S): 9 INJECTION INTRAMUSCULAR; INTRAVENOUS; SUBCUTANEOUS at 14:02

## 2020-02-03 RX ADMIN — Medication 150 MILLIGRAM(S): at 21:12

## 2020-02-03 RX ADMIN — Medication 100 MILLIGRAM(S): at 21:12

## 2020-02-03 RX ADMIN — Medication 5 MILLILITER(S): at 05:17

## 2020-02-03 RX ADMIN — SODIUM CHLORIDE 75 MILLILITER(S): 9 INJECTION INTRAMUSCULAR; INTRAVENOUS; SUBCUTANEOUS at 17:00

## 2020-02-03 RX ADMIN — Medication 1 APPLICATION(S): at 17:00

## 2020-02-03 RX ADMIN — AMLODIPINE BESYLATE 5 MILLIGRAM(S): 2.5 TABLET ORAL at 05:15

## 2020-02-03 RX ADMIN — Medication 5 MILLILITER(S): at 11:06

## 2020-02-03 RX ADMIN — Medication 300 MILLIGRAM(S): at 21:12

## 2020-02-03 RX ADMIN — SODIUM CHLORIDE 100 MILLILITER(S): 9 INJECTION INTRAMUSCULAR; INTRAVENOUS; SUBCUTANEOUS at 05:15

## 2020-02-03 RX ADMIN — Medication 5 MILLILITER(S): at 17:00

## 2020-02-03 NOTE — CONSULT NOTE ADULT - ASSESSMENT
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   Treated with 2 doses of permethrin and 2 doses of ivermectin.    Anticipated to start chemotherapy  With pruritic rash on the chest and back  Not classic rash for scabies but given interval improvement with worsening reasonable to empirically cover for scabies with Permethrin/Ivermectin.  I would consider Dermatology consult    Overall, Rash, Scabies, AML    --Consider Dermatology Consult  --Can treat empirically with Permethrin 5% cream (from chin down to level of toes) and leave on for 8-14 hours (repeat treatment in 1 week)  --Would also add Ivermectin 200 ug/kg x1 followed by repeat dose in 1 week  --No absolute ID contraindication for PICC line placement into extremity.     I will sign off at this time. Please feel free to contact me with any further questions or concerns.    Isaih Sheets M.D.  [facility] Division of Infectious Disease  8AM-5PM: Pager Number [phone]  After Hours (or if no response): Please contact the Infectious Diseases Office at [phone]     The above assessment and plan were discussed with heme/onc NP

## 2020-02-03 NOTE — CHART NOTE - NSCHARTNOTEFT_GEN_A_CORE
pt has possible scabies  As per ID, suggested to have derm consult  Derm came and  stated pt does not have active scabies. She had scabies, s/p treatment in December, 2019.   No need for additional treatment or contact isolation per derm       Await PICC by RN at bedside, likely on 2/5

## 2020-02-03 NOTE — CONSULT NOTE ADULT - SUBJECTIVE AND OBJECTIVE BOX
Patient is a 79y old  Female who presents with a chief complaint of R/o acute leukemia (03 Feb 2020 07:26)      HPI:  79 F with history of Breast cancer 12 years ago treated with tamoxifen (no chemotherapy), s/p RT and lumpectomy, HTN and surgical hx of hysterectomy sent in by oncologist Dr. Blank for rule out leukemia. Patient has been feeling generally fatigued and sluggish since December. She states that she recently had a case of scabies that was treated at the end of December. Since then she was feeling more tired, and saw her PMD, who did some bloodwork and noticed blasts in her peripheral blood. Pt was told to see Dr. Blank for further workup. Dr. Blank did blood work again which again found blasts in the peripheral blood, at which point she sent pt to the ER for leukemia workup.     Patient otherwise feels well. She has not had any recent fevers, chills, nausea, vomiting, weight loss, chest pain, shortness of breath, abdominal pain, or leg swelling. She has had two days of night sweats, and some loss of appetite. She denies any family hx of malignancy, and her breast ca is in remission currently. (31 Jan 2020 15:18)     U/A on 1/31 with negative leukocyte esterase. HIV test negative      prior hospital charts reviewed [  ]  primary team notes reviewed [  ]  other consultant notes reviewed [  ]    PAST MEDICAL & SURGICAL HISTORY:  Hypertension  Breast cancer  S/P hysterectomy      Allergies  No Known Allergies    ANTIMICROBIALS (past 90 days)  MEDICATIONS  (STANDING):      ANTIMICROBIALS:      OTHER MEDS: MEDICATIONS  (STANDING):  acetaminophen   Tablet .. 650 every 6 hours PRN  allopurinol 300 at bedtime  amLODIPine   Tablet 5 daily  benzonatate 100 three times a day PRN  hydrochlorothiazide 12.5 daily  traZODone 150 at bedtime    SOCIAL HISTORY:   hx smoking  non-smoker    FAMILY HISTORY:  No pertinent family history in first degree relatives    REVIEW OF SYSTEMS  [  ] ROS unobtainable because:    [  ] All other systems negative except as noted below:	    Constitutional:  [ ] fever [ ] chills  [ ] weight loss  [ ] weakness  Skin:  [ ] rash [ ] phlebitis	  Eyes: [ ] icterus [ ] pain  [ ] discharge	  ENMT: [ ] sore throat  [ ] thrush [ ] ulcers [ ] exudates  Respiratory: [ ] dyspnea [ ] hemoptysis [ ] cough [ ] sputum	  Cardiovascular:  [ ] chest pain [ ] palpitations [ ] edema	  Gastrointestinal:  [ ] nausea [ ] vomiting [ ] diarrhea [ ] constipation [ ] pain	  Genitourinary:  [ ] dysuria [ ] frequency [ ] hematuria [ ] discharge [ ] flank pain  [ ] incontinence  Musculoskeletal:  [ ] myalgias [ ] arthralgias [ ] arthritis  [ ] back pain  Neurological:  [ ] headache [ ] seizures  [ ] confusion/altered mental status  Psychiatric:  [ ] anxiety [ ] depression	  Hematology/Lymphatics:  [ ] lymphadenopathy  Endocrine:  [ ] adrenal [ ] thyroid  Allergic/Immunologic:	 [ ] transplant [ ] seasonal    Vital Signs Last 24 Hrs  T(F): 98.7 (02-03-20 @ 13:35), Max: 100.2 (02-02-20 @ 17:45)  Vital Signs Last 24 Hrs  HR: 81 (02-03-20 @ 13:35) (78 - 85)  BP: 146/65 (02-03-20 @ 13:35) (117/65 - 162/72)  RR: 18 (02-03-20 @ 13:35)  SpO2: 95% (02-03-20 @ 13:35) (95% - 96%)  Wt(kg): --    PHYSICAL EXAMINATION:  General: Alert and Awake, NAD  HEENT: PERRL, EOMI, No subconjunctival hemorrhages, Oropharynx Clear, MMM  Neck: Supple, No DONNA  Cardiac: RRR, No M/R/G  Resp: CTAB, No Wh/Rh/Ra  Abdomen: NBS, NT/ND, No HSM, No rigidity or guarding  MSK: No LE edema. No stigmata of IE. No evidence of phlebitis. No evidence of synovitis.  : No feldman  Skin: No rashes or lesions. Skin is warm and dry to the touch.   Neuro: Alert and Awake. CN 2-12 Grossly intact. Moves all four extremities spontaneously.  Psych: Calm, Pleasant, Cooperative                          9.8    32.17 )-----------( 137      ( 03 Feb 2020 07:03 )             29.2     02-03    144  |  109<H>  |  14  ----------------------------<  102<H>  3.6   |  23  |  0.63    Ca    8.5      03 Feb 2020 07:03  Phos  2.8     02-03  Mg     2.2     02-03    TPro  5.5<L>  /  Alb  3.2<L>  /  TBili  0.2  /  DBili  x   /  AST  26  /  ALT  28  /  AlkPhos  34<L>  02-03    MICROBIOLOGY:    HIV-1/2 Antigen/Antibody Screen by CMIA (01.31.20 @ 21:32)    HIV-1/2 Combo Result: Nonreact: The HIV Ag/Ab Combo test performed screens for HIV-1 p24 antigen,  antibodies to HIV-1 (group M and group O), and antibodies to HIV-2. All  specimens repeatedly reactive will reflex to an HIV 1/2 antibody  confirmation and differentiation test. This assay detects p24 antigen  which may be present prior to the development of HIV antibodies,  therefore a reactive result with a negative HIV 1/2 AB Confirmation  should be followed up with HIV-1 RNA, HIV-2 RNA and repeat testing in 4-8  weeks. A nonreactive result does not preclude previous exposure to or  infection with HIV-1 or HIV-2. [facility] prohibits disclosure of this  result to any unauthorized party.    RADIOLOGY:  imaging below personally reviewed    EXAM:  XR CHEST PA LAT 2V                        PROCEDURE DATE:  01/31/2020    IMPRESSION: Clear lungs. Patient is a 79y old  Female who presents with a chief complaint of R/o acute leukemia (03 Feb 2020 07:26)    HPI:    79 F with history of Breast cancer 12 years ago treated with tamoxifen (no chemotherapy), s/p RT and lumpectomy, HTN and surgical hx of hysterectomy sent in by oncologist Dr. Blank for rule out leukemia. Patient has been feeling generally fatigued and sluggish since December. She states that she recently had a case of scabies that was treated at the end of December. Treated with 2 treatments of permethrin and 2 doses of ivermectin.  Since then she was feeling more tired, and saw her PMD, who did some bloodwork and noticed blasts in her peripheral blood. Pt was told to see Dr. Blank for further workup. Dr. Blank did blood work again which again found blasts in the peripheral blood, at which point she sent pt to the ER for leukemia workup.     Patient presented to [facility] and was afebrile, normotensive and not tachycardic. U/A on 1/31 with negative leukocyte esterase. HIV test negative. Notes persistent pruritis on the chest and neck and some spots on the back. Notes that skin lesions on back and chest improved from December after treatment    prior hospital charts reviewed [  ]  primary team notes reviewed [  ]  other consultant notes reviewed [  ]    PAST MEDICAL & SURGICAL HISTORY:  Hypertension  Breast cancer  S/P hysterectomy      Allergies  No Known Allergies    ANTIMICROBIALS (past 90 days)  MEDICATIONS  (STANDING):      ANTIMICROBIALS:      OTHER MEDS: MEDICATIONS  (STANDING):  acetaminophen   Tablet .. 650 every 6 hours PRN  allopurinol 300 at bedtime  amLODIPine   Tablet 5 daily  benzonatate 100 three times a day PRN  hydrochlorothiazide 12.5 daily  traZODone 150 at bedtime    SOCIAL HISTORY:   hx smoking  non-smoker    FAMILY HISTORY:  No pertinent family history in first degree relatives    REVIEW OF SYSTEMS  [  ] ROS unobtainable because:    [  ] All other systems negative except as noted below:	    Constitutional:  [ ] fever [ ] chills  [ ] weight loss  [ ] weakness  Skin:  [ ] rash [ ] phlebitis	  Eyes: [ ] icterus [ ] pain  [ ] discharge	  ENMT: [ ] sore throat  [ ] thrush [ ] ulcers [ ] exudates  Respiratory: [ ] dyspnea [ ] hemoptysis [ ] cough [ ] sputum	  Cardiovascular:  [ ] chest pain [ ] palpitations [ ] edema	  Gastrointestinal:  [ ] nausea [ ] vomiting [ ] diarrhea [ ] constipation [ ] pain	  Genitourinary:  [ ] dysuria [ ] frequency [ ] hematuria [ ] discharge [ ] flank pain  [ ] incontinence  Musculoskeletal:  [ ] myalgias [ ] arthralgias [ ] arthritis  [ ] back pain  Neurological:  [ ] headache [ ] seizures  [ ] confusion/altered mental status  Psychiatric:  [ ] anxiety [ ] depression	  Hematology/Lymphatics:  [ ] lymphadenopathy  Endocrine:  [ ] adrenal [ ] thyroid  Allergic/Immunologic:	 [ ] transplant [ ] seasonal    Vital Signs Last 24 Hrs  T(F): 98.7 (02-03-20 @ 13:35), Max: 100.2 (02-02-20 @ 17:45)  Vital Signs Last 24 Hrs  HR: 81 (02-03-20 @ 13:35) (78 - 85)  BP: 146/65 (02-03-20 @ 13:35) (117/65 - 162/72)  RR: 18 (02-03-20 @ 13:35)  SpO2: 95% (02-03-20 @ 13:35) (95% - 96%)  Wt(kg): --    PHYSICAL EXAMINATION:  General: Alert and Awake, NAD  HEENT: PERRL, EOMI, No subconjunctival hemorrhages, Oropharynx Clear, MMM  Neck: Supple, No DONNA  Cardiac: RRR, No M/R/G  Resp: CTAB, No Wh/Rh/Ra  Abdomen: NBS, NT/ND, No HSM, No rigidity or guarding  MSK: No LE edema. No stigmata of IE. No evidence of phlebitis. No evidence of synovitis.  : No feldman  Skin: No rashes or lesions. Skin is warm and dry to the touch.   Neuro: Alert and Awake. CN 2-12 Grossly intact. Moves all four extremities spontaneously.  Psych: Calm, Pleasant, Cooperative                          9.8    32.17 )-----------( 137      ( 03 Feb 2020 07:03 )             29.2     02-03    144  |  109<H>  |  14  ----------------------------<  102<H>  3.6   |  23  |  0.63    Ca    8.5      03 Feb 2020 07:03  Phos  2.8     02-03  Mg     2.2     02-03    TPro  5.5<L>  /  Alb  3.2<L>  /  TBili  0.2  /  DBili  x   /  AST  26  /  ALT  28  /  AlkPhos  34<L>  02-03    MICROBIOLOGY:    HIV-1/2 Antigen/Antibody Screen by CMIA (01.31.20 @ 21:32)    HIV-1/2 Combo Result: Nonreact: The HIV Ag/Ab Combo test performed screens for HIV-1 p24 antigen,  antibodies to HIV-1 (group M and group O), and antibodies to HIV-2. All  specimens repeatedly reactive will reflex to an HIV 1/2 antibody  confirmation and differentiation test. This assay detects p24 antigen  which may be present prior to the development of HIV antibodies,  therefore a reactive result with a negative HIV 1/2 AB Confirmation  should be followed up with HIV-1 RNA, HIV-2 RNA and repeat testing in 4-8  weeks. A nonreactive result does not preclude previous exposure to or  infection with HIV-1 or HIV-2. [facility] prohibits disclosure of this  result to any unauthorized party.    RADIOLOGY:  imaging below personally reviewed    EXAM:  XR CHEST PA LAT 2V                        PROCEDURE DATE:  01/31/2020    IMPRESSION: Clear lungs. Patient is a 79y old  Female who presents with a chief complaint of R/o acute leukemia (03 Feb 2020 07:26)    HPI:    79 F with history of Breast cancer 12 years ago treated with tamoxifen (no chemotherapy), s/p RT and lumpectomy, HTN and surgical hx of hysterectomy sent in by oncologist Dr. Blank for rule out leukemia. Patient has been feeling generally fatigued and sluggish since December. She states that she recently had a case of scabies that was treated at the end of December. Treated with 2 treatments of permethrin and 2 doses of ivermectin.  Since then she was feeling more tired, and saw her PMD, who did some bloodwork and noticed blasts in her peripheral blood. Pt was told to see Dr. Blank for further workup. Dr. Blank did blood work again which again found blasts in the peripheral blood, at which point she sent pt to the ER for leukemia workup.     Patient presented to [facility] and was afebrile, normotensive and not tachycardic. U/A on 1/31 with negative leukocyte esterase. HIV test negative. Notes persistent pruritis on the chest and neck and some spots on the back. Notes that skin lesions on back and chest improved from December after treatment with permethrin and ivermectin. Notes persistence of rash on chest and some spots on back.     prior hospital charts reviewed [ x ]  primary team notes reviewed [ x ]  other consultant notes reviewed [ x ]    PAST MEDICAL & SURGICAL HISTORY:  Hypertension  Breast cancer  S/P hysterectomy      Allergies  No Known Allergies    ANTIMICROBIALS (past 90 days)  MEDICATIONS  (STANDING):      ANTIMICROBIALS:      OTHER MEDS: MEDICATIONS  (STANDING):  acetaminophen   Tablet .. 650 every 6 hours PRN  allopurinol 300 at bedtime  amLODIPine   Tablet 5 daily  benzonatate 100 three times a day PRN  hydrochlorothiazide 12.5 daily  traZODone 150 at bedtime    SOCIAL HISTORY: no smoking. social etoh use. no drug use. last travel to [place] in 6/2019    FAMILY HISTORY:  No family history of malignancies    REVIEW OF SYSTEMS  [  ] ROS unobtainable because:    [ x ] All other systems negative except as noted below:	    Constitutional:  [ ] fever [ ] chills  [ ] weight loss  [ ] weakness  Skin:  [x ] rash [ ] phlebitis	  Eyes: [ ] icterus [ ] pain  [ ] discharge	  ENMT: [ ] sore throat  [ ] thrush [ ] ulcers [ ] exudates  Respiratory: [ ] dyspnea [ ] hemoptysis [ ] cough [ ] sputum	  Cardiovascular:  [ ] chest pain [ ] palpitations [ ] edema	  Gastrointestinal:  [ ] nausea [ ] vomiting [ ] diarrhea [ ] constipation [ ] pain	  Genitourinary:  [ ] dysuria [ ] frequency [ ] hematuria [ ] discharge [ ] flank pain  [ ] incontinence  Musculoskeletal:  [ ] myalgias [ ] arthralgias [ ] arthritis  [ ] back pain  Neurological:  [ ] headache [ ] seizures  [ ] confusion/altered mental status  Psychiatric:  [ ] anxiety [ ] depression	  Hematology/Lymphatics:  [ ] lymphadenopathy  Endocrine:  [ ] adrenal [ ] thyroid  Allergic/Immunologic:	 [ ] transplant [ ] seasonal    Vital Signs Last 24 Hrs  T(F): 98.7 (02-03-20 @ 13:35), Max: 100.2 (02-02-20 @ 17:45)  Vital Signs Last 24 Hrs  HR: 81 (02-03-20 @ 13:35) (78 - 85)  BP: 146/65 (02-03-20 @ 13:35) (117/65 - 162/72)  RR: 18 (02-03-20 @ 13:35)  SpO2: 95% (02-03-20 @ 13:35) (95% - 96%)  Wt(kg): --    PHYSICAL EXAMINATION:  General: Alert and Awake, NAD  HEENT: PERRL, EOMI, No subconjunctival hemorrhages, Oropharynx Clear, MMM  Neck: Supple, No DONNA  Cardiac: RRR, No M/R/G  Resp: CTAB, No Wh/Rh/Ra  Abdomen: NBS, NT/ND, No HSM, No rigidity or guarding  MSK: No LE edema. No stigmata of IE. No evidence of phlebitis. No evidence of synovitis.  : No feldman  Skin: papular rash on the chest and scattered lesions on the back. Skin is warm and dry to the touch.   Neuro: Alert and Awake. CN 2-12 Grossly intact. Moves all four extremities spontaneously.  Psych: Calm, Pleasant, Cooperative                          9.8    32.17 )-----------( 137      ( 03 Feb 2020 07:03 )             29.2     02-03    144  |  109<H>  |  14  ----------------------------<  102<H>  3.6   |  23  |  0.63    Ca    8.5      03 Feb 2020 07:03  Phos  2.8     02-03  Mg     2.2     02-03    TPro  5.5<L>  /  Alb  3.2<L>  /  TBili  0.2  /  DBili  x   /  AST  26  /  ALT  28  /  AlkPhos  34<L>  02-03    MICROBIOLOGY:    HIV-1/2 Antigen/Antibody Screen by CMIA (01.31.20 @ 21:32)    HIV-1/2 Combo Result: Nonreact: The HIV Ag/Ab Combo test performed screens for HIV-1 p24 antigen,  antibodies to HIV-1 (group M and group O), and antibodies to HIV-2. All  specimens repeatedly reactive will reflex to an HIV 1/2 antibody  confirmation and differentiation test. This assay detects p24 antigen  which may be present prior to the development of HIV antibodies,  therefore a reactive result with a negative HIV 1/2 AB Confirmation  should be followed up with HIV-1 RNA, HIV-2 RNA and repeat testing in 4-8  weeks. A nonreactive result does not preclude previous exposure to or  infection with HIV-1 or HIV-2. [facility] prohibits disclosure of this  result to any unauthorized party.    RADIOLOGY:  imaging below personally reviewed and visualized by me independently    EXAM:  XR CHEST PA LAT 2V                        PROCEDURE DATE:  01/31/2020    IMPRESSION: Clear lungs.

## 2020-02-03 NOTE — PROGRESS NOTE ADULT - ASSESSMENT
79 F with history of Breast cancer 12 years ago treated with tamoxifen (no chemotherapy), s/p RT and lumpectomy, HTN and surgical hx of hysterectomy. Pt was sent in by oncologist Dr. Blank for rule out leukemia. 79 F with history of Breast cancer 12 years ago treated with tamoxifen (no chemotherapy), s/p RT and lumpectomy, HTN and surgical hx of hysterectomy. Pt was sent in by oncologist Dr. Blank for rule out leukemia.  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

## 2020-02-03 NOTE — PROGRESS NOTE ADULT - PROBLEM SELECTOR PLAN 2
Not neutropenic. afebrile   pan culture if febrile Not neutropenic. afebrile   pan culture if febrile  Topical steroid for scabies rashes

## 2020-02-03 NOTE — PROGRESS NOTE ADULT - SUBJECTIVE AND OBJECTIVE BOX
Diagnosis: Acute leukemia	    Protocol/Chemo Regimen: NA    Day: NA    Pt endorsed: No complaints    Review of Systems: Denies nausea, vomiting, diarrhea, chest pain, shortness of breath, headache    Pain scale: denies    Diet: regular    Allergies: No Known Allergies      STANDING MEDICATIONS  allopurinol 300 milliGRAM(s) Oral at bedtime  amLODIPine   Tablet 5 milliGRAM(s) Oral daily  Biotene Dry Mouth Oral Rinse 5 milliLiter(s) Swish and Spit four times a day  hydrochlorothiazide 12.5 milliGRAM(s) Oral daily  sodium chloride 0.9%. 1000 milliLiter(s) IV Continuous <Continuous>  traZODone 150 milliGRAM(s) Oral at bedtime      PRN MEDICATIONS  acetaminophen   Tablet .. 650 milliGRAM(s) Oral every 6 hours PRN  benzonatate 100 milliGRAM(s) Oral three times a day PRN      Vital Signs Last 24 Hrs  T(C): 37.2 (03 Feb 2020 05:08), Max: 37.9 (02 Feb 2020 17:45)  T(F): 98.9 (03 Feb 2020 05:08), Max: 100.2 (02 Feb 2020 17:45)  HR: 78 (03 Feb 2020 05:08) (78 - 95)  BP: 157/65 (03 Feb 2020 05:08) (120/68 - 162/72)  BP(mean): --  RR: 18 (03 Feb 2020 05:08) (16 - 18)  SpO2: 96% (03 Feb 2020 05:08) (95% - 96%)      PHYSICAL EXAM  General: NAD  HEENT: clear oropharynx; no ulcer or erythema   CV: (+) S1/S2 RRR  Lungs: clear to auscultation, no wheezes, no rales  Abdomen: soft,+ BS, non-tender, non-distended  Ext: no edema  Skin: no rashes  Neuro: alert and oriented X 3  PIV: C/D/I        LABS:                        10.0   26.35 )-----------( 121      ( 02 Feb 2020 07:45 )             30.0         Mean Cell Volume : 93.5 fl  Mean Cell Hemoglobin : 31.2 pg  Mean Cell Hemoglobin Concentration : 33.3 gm/dL  Auto Neutrophil # : 3.43 K/uL  Auto Lymphocyte # : 5.53 K/uL  Auto Monocyte # : 1.05 K/uL  Auto Eosinophil # : 3.69 K/uL  Auto Basophil # : 0.00 K/uL  Auto Neutrophil % : 13.0 %  Auto Lymphocyte % : 21.0 %  Auto Monocyte % : 4.0 %  Auto Eosinophil % : 14.0 %  Auto Basophil % : 0.0 %      02-02    146<H>  |  111<H>  |  11  ----------------------------<  106<H>  3.6   |  23  |  0.54    Ca    8.5      02 Feb 2020 07:45  Phos  2.4     02-02  Mg     2.2     02-02    TPro  5.6<L>  /  Alb  3.3  /  TBili  0.2  /  DBili  x   /  AST  24  /  ALT  26  /  AlkPhos  36<L>  02-02      Mg 2.2  Phos 2.4            Uric Acid 2.7      RADIOLOGY & ADDITIONAL STUDIES:    < from: Xray Chest 2 Views PA/Lat (01.31.20 @ 12:17) >  IMPRESSION: Clear lungs. Diagnosis: Acute leukemia	    Protocol/Chemo Regimen: NA    Day: NA    Pt endorsed: No complaints    Review of Systems: Denies nausea, vomiting, diarrhea, chest pain, shortness of breath, headache    Pain scale: denies    Diet: regular    Allergies: No Known Allergies      STANDING MEDICATIONS  allopurinol 300 milliGRAM(s) Oral at bedtime  amLODIPine   Tablet 5 milliGRAM(s) Oral daily  Biotene Dry Mouth Oral Rinse 5 milliLiter(s) Swish and Spit four times a day  hydrochlorothiazide 12.5 milliGRAM(s) Oral daily  sodium chloride 0.9%. 1000 milliLiter(s) IV Continuous <Continuous>  traZODone 150 milliGRAM(s) Oral at bedtime      PRN MEDICATIONS  acetaminophen   Tablet .. 650 milliGRAM(s) Oral every 6 hours PRN  benzonatate 100 milliGRAM(s) Oral three times a day PRN      Vital Signs Last 24 Hrs  T(C): 37.2 (03 Feb 2020 05:08), Max: 37.9 (02 Feb 2020 17:45)  T(F): 98.9 (03 Feb 2020 05:08), Max: 100.2 (02 Feb 2020 17:45)  HR: 78 (03 Feb 2020 05:08) (78 - 95)  BP: 157/65 (03 Feb 2020 05:08) (120/68 - 162/72)  BP(mean): --  RR: 18 (03 Feb 2020 05:08) (16 - 18)  SpO2: 96% (03 Feb 2020 05:08) (95% - 96%)      PHYSICAL EXAM  General: NAD  HEENT: clear oropharynx; no ulcer or erythema   CV: (+) S1/S2 RRR  Lungs: clear to auscultation, no wheezes, no rales  Abdomen: soft,+ BS, non-tender, non-distended  Ext: no edema  Skin: no rashes  Neuro: alert and oriented X 3  PIV: C/D/I        LABS:                        10.0   26.35 )-----------( 121      ( 02 Feb 2020 07:45 )             30.0         Mean Cell Volume : 93.5 fl  Mean Cell Hemoglobin : 31.2 pg  Mean Cell Hemoglobin Concentration : 33.3 gm/dL  Auto Neutrophil # : 3.43 K/uL  Auto Lymphocyte # : 5.53 K/uL  Auto Monocyte # : 1.05 K/uL  Auto Eosinophil # : 3.69 K/uL  Auto Basophil # : 0.00 K/uL  Auto Neutrophil % : 13.0 %  Auto Lymphocyte % : 21.0 %  Auto Monocyte % : 4.0 %  Auto Eosinophil % : 14.0 %  Auto Basophil % : 0.0 %      02-02    146<H>  |  111<H>  |  11  ----------------------------<  106<H>  3.6   |  23  |  0.54    Ca    8.5      02 Feb 2020 07:45  Phos  2.4     02-02  Mg     2.2     02-02    TPro  5.6<L>  /  Alb  3.3  /  TBili  0.2  /  DBili  x   /  AST  24  /  ALT  26  /  AlkPhos  36<L>  02-02      Mg 2.2  Phos 2.4          Uric Acid 2.7      HIV-1/2 Antigen/Antibody Screen by [facility] (01.31.20 @ 21:32)    HIV-1/2 Combo Result: Nonreact    Acute Hepatitis Panel (02.01.20 @ 11:23)    Hepatitis C Virus Interpretation: Nonreact: Hepatitis C AB  S/CO Ratio                        Interpretation  < 1.00                                   Non-Reactive  1.00 - 4.99                         Weakly-Reactive  >= 5.00                                Reactive  Non-Reactive: A person witha non-reactive HCV antibody result is  considered uninfected.  No further action is needed unless recent  infection is suspected.  In these cases, consider repeat testing later to  detect seroconversion..  Weakly-Reactive: HCV antibody test is abnormal, HCV RNA Qualitative test  will follow.  Reactive: HCV antibody test is abnormal, HCV RNA Qualitative test will  follow.  Note: HCV antibody testing is performed on the Abbott  system.    Hepatitis C Virus S/CO Ratio: 0.15 S/CO    Hepatitis B Core IgM Antibody: Nonreact    Hepatitis B Surface Antigen: Nonreact    Hepatitis A IgM Antibody: Nonreact    Hepatitis B Surface Antibody (01.31.20 @ 21:41)    Hepatitis B Surface Antibody: Nonreact    Hepatitis B Core Antibody, Total (01.31.20 @ 21:41)    Hepatitis B Core Antibody, Total: Nonreact        RADIOLOGY & ADDITIONAL STUDIES:    < from: Xray Chest 2 Views PA/Lat (01.31.20 @ 12:17) >  IMPRESSION: Clear lungs. Diagnosis: Acute leukemia	    Protocol/Chemo Regimen: NA    Day: NA    Pt endorsed: itching rashes chest, under breast and back since 9/2019; fatigue; decreased appetite; chronic mild numbness left 2nd and 3rd finger     Review of Systems: Denies nausea, vomiting, diarrhea, chest pain, shortness of breath, headache    Pain scale: denies    Diet: regular enlive BID     Allergies: No Known Allergies      STANDING MEDICATIONS  allopurinol 300 milliGRAM(s) Oral at bedtime  amLODIPine   Tablet 5 milliGRAM(s) Oral daily  Biotene Dry Mouth Oral Rinse 5 milliLiter(s) Swish and Spit four times a day  hydrochlorothiazide 12.5 milliGRAM(s) Oral daily  sodium chloride 0.9%. 1000 milliLiter(s) IV Continuous <Continuous>  traZODone 150 milliGRAM(s) Oral at bedtime      PRN MEDICATIONS  acetaminophen   Tablet .. 650 milliGRAM(s) Oral every 6 hours PRN  benzonatate 100 milliGRAM(s) Oral three times a day PRN      Vital Signs Last 24 Hrs  T(C): 37.2 (03 Feb 2020 05:08), Max: 37.9 (02 Feb 2020 17:45)  T(F): 98.9 (03 Feb 2020 05:08), Max: 100.2 (02 Feb 2020 17:45)  HR: 78 (03 Feb 2020 05:08) (78 - 95)  BP: 157/65 (03 Feb 2020 05:08) (120/68 - 162/72)  BP(mean): --  RR: 18 (03 Feb 2020 05:08) (16 - 18)  SpO2: 96% (03 Feb 2020 05:08) (95% - 96%)      PHYSICAL EXAM  General: NAD  HEENT: clear oropharynx; no ulcer or erythema   CV: (+) S1/S2 RRR  Lungs: clear to auscultation, no wheezes, no rales  Abdomen: soft,+ BS, non-tender, non-distended  Ext: no edema  Skin: scattered erythematous raised rashes upper chest, under breast and back   Neuro: alert and oriented X 3  PIV: C/D/I      LABS:                        9.8    32.17 )-----------( 137      ( 03 Feb 2020 07:03 )             29.2         Mean Cell Volume : 93.3 fl  Mean Cell Hemoglobin : 31.3 pg  Mean Cell Hemoglobin Concentration : 33.6 gm/dL  Auto Neutrophil # : 1.93 K/uL  Auto Lymphocyte # : 2.25 K/uL  Auto Monocyte # : 2.90 K/uL  Auto Eosinophil # : 1.29 K/uL  Auto Basophil # : 0.00 K/uL  Auto Neutrophil % : 6.0 %  Auto Lymphocyte % : 7.0 %  Auto Monocyte % : 9.0 %  Auto Eosinophil % : 4.0 %  Auto Basophil % : 0.0 %      02-03    144  |  109<H>  |  14  ----------------------------<  102<H>  3.6   |  23  |  0.63    Ca    8.5      03 Feb 2020 07:03  Phos  2.8     02-03  Mg     2.2     02-03    TPro  5.5<L>  /  Alb  3.2<L>  /  TBili  0.2  /  DBili  x   /  AST  26  /  ALT  28  /  AlkPhos  34<L>  02-03      Uric Acid 2.5      HIV-1/2 Antigen/Antibody Screen by [facility] (01.31.20 @ 21:32)    HIV-1/2 Combo Result: Nonreact    Acute Hepatitis Panel (02.01.20 @ 11:23)    Hepatitis C Virus Interpretation: Nonreact: Hepatitis C AB  S/CO Ratio                        Interpretation  < 1.00                                   Non-Reactive  1.00 - 4.99                         Weakly-Reactive  >= 5.00                                Reactive  Non-Reactive: A person witha non-reactive HCV antibody result is  considered uninfected.  No further action is needed unless recent  infection is suspected.  In these cases, consider repeat testing later to  detect seroconversion..  Weakly-Reactive: HCV antibody test is abnormal, HCV RNA Qualitative test  will follow.  Reactive: HCV antibody test is abnormal, HCV RNA Qualitative test will  follow.  Note: HCV antibody testing is performed on the Abbott  system.    Hepatitis C Virus S/CO Ratio: 0.15 S/CO    Hepatitis B Core IgM Antibody: Nonreact    Hepatitis B Surface Antigen: Nonreact    Hepatitis A IgM Antibody: Nonreact    Hepatitis B Surface Antibody (01.31.20 @ 21:41)    Hepatitis B Surface Antibody: Nonreact    Hepatitis B Core Antibody, Total (01.31.20 @ 21:41)    Hepatitis B Core Antibody, Total: Nonreact        RADIOLOGY & ADDITIONAL STUDIES:    < from: NM MUGA Scan (02.03.20 @ 08:47) >  IMPRESSION: Normal gated blood pool study.  Normal resting left ventricular ejection fraction of 70 % and normal right and left ventricular wall motion.      < from: Xray Chest 2 Views PA/Lat (01.31.20 @ 12:17) >  IMPRESSION: Clear lungs.

## 2020-02-03 NOTE — CHART NOTE - NSCHARTNOTEFT_GEN_A_CORE
Hematology/Oncology Procedure Note    Bone Marrow Aspiration/Biopsy    Indication:    Bone marrow aspiration and biopsy procedure description, risks, and benefits were discussed in detail with the patient.  All questions were answered.  Informed consent was obtained and time-out performed.      The area of the right posterior iliac crest was prepped and draped using sterile technique. Local anesthetic with  2% Lidocaine.    Bone marrow aspiration and biopsy  was performed using sterile technique  by DR Greco with my assist and supervision. Specimens were obtained.    The procedure was well tolerated and no local bleeding or other complications were observed.  Pressure was applied to the procedure site and a wound dressing was placed.  The patient and nursing staff were advised that the patient is to lie flat for 30 minutes post procedure. Tylenol may be used if no contraindications for pain at the procedure site.

## 2020-02-03 NOTE — PROGRESS NOTE ADULT - PROBLEM SELECTOR PLAN 1
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  No associated anemia or thrombocytopenia at this time     Flow cytometry sent in ER   Peripheral smear reviewed- many blasts visualized, red cell and platelet morphology normal   Allopurinol 300mg daily   TLS daily  IV hydration  Monitor CBC/diff/lytes, transfuse and or replete lytes PRN  Monitor weight and I and O's, diuresis PRN   BM bx Monday   Plan TLC Monday  HIV (-)  Hepatitis (-)  FU MUGA   Patient may qualify for [place] clinical trial- will need to discuss with research nurse and consent pt  Pain control  [employer]iem[employer]  mouth care  HLA 1/31 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  No associated anemia or thrombocytopenia at this time     PB flow cytometry on 1/30 reveal myeloblasts 72%, HLA-DR, CD33, 34 +,  c/w AML,   Peripheral smear reviewed- many blasts visualized, red cell and platelet morphology normal   Allopurinol 300mg daily   TLS daily  IV hydration  Monitor CBC/diff/lytes, transfuse and or replete lytes PRN  Monitor weight and I and O's, diuresis PRN   BM bx Monday   Plan IR port, pending date   HIV (-)  Hepatitis (-)  MUGA 70%  Plan Dacogen with Veneloclax   [employer]  mouth care  HLA sent on 1/31

## 2020-02-03 NOTE — CONSULT NOTE ADULT - ASSESSMENT
Orders in, please book labs and EPP   1) Eczema  -No evidence of scabies or other infestation. No cutaneous contraindication to chemotherapy.  -start clobetasol ointment BID to affected area for 1-2 weeks. 1) Eczema  -No evidence of scabies or other infestation. No cutaneous contraindication to chemotherapy.  -start clobetasol ointment BID to affected area for 1-2 weeks.  [place] emollient use reviewed  Will continue to follow with you

## 2020-02-03 NOTE — CONSULT NOTE ADULT - SUBJECTIVE AND OBJECTIVE BOX
HPI:    79 F with history of Breast cancer 12 years ago treated with tamoxifen (no chemotherapy), s/p RT and lumpectomy, HTN and surgical hx of hysterectomy sent in by oncologist Dr. Blank for rule out leukemia. Patient has been feeling generally fatigued and sluggish since December. She states that she recently had a case of scabies that was treated at the end of December. Since then she was feeling more tired, and saw her PMD, who did some bloodwork and noticed blasts in her peripheral blood. Pt was told to see Dr. Blank for further workup. Dr. Blank did blood work again which again found blasts in the peripheral blood, at which point she sent pt to the ER for leukemia workup.     Dermatology is consulted for somewhat itchy pink rash on chest and back x uncertain duration. Would like us to r/o scabies. Patient is not on any antibiotics.     PAST MEDICAL & SURGICAL HISTORY:  Hypertension  Breast cancer  S/P hysterectomy      Review of Systems:  Constitutional: denies fevers, chills  HEENT: odynophagia or dysphagia  Cardiovascular: denies palpitations  Respiratory: denies SOB, wheezing  Gastrointestinal: denies N/V/D, abdominal pain  : denies dysuria  MSK: denies weakness, joint pain  Skin: denies new rashes or masses unless otherwise specified in hpi    MEDICATIONS  (STANDING):  allopurinol 300 milliGRAM(s) Oral at bedtime  amLODIPine   Tablet 5 milliGRAM(s) Oral daily  Biotene Dry Mouth Oral Rinse 5 milliLiter(s) Swish and Spit four times a day  hydrochlorothiazide 12.5 milliGRAM(s) Oral daily  hydrocortisone 2.5% Lotion 1 Application(s) Topical two times a day  sodium chloride 0.9%. 1000 milliLiter(s) (75 mL/Hr) IV Continuous <Continuous>  traZODone 150 milliGRAM(s) Oral at bedtime    MEDICATIONS  (PRN):  acetaminophen   Tablet .. 650 milliGRAM(s) Oral every 6 hours PRN Temp greater or equal to 38C (100.4F), Mild Pain (1 - 3)  benzonatate 100 milliGRAM(s) Oral three times a day PRN Cough      Allergies    No Known Allergies    Intolerances        SOCIAL HISTORY: denies drug use    FAMILY HISTORY:  No pertinent family history in first degree relatives      Vital Signs Last 24 Hrs  T(C): 37.1 (03 Feb 2020 13:35), Max: 37.9 (02 Feb 2020 17:45)  T(F): 98.7 (03 Feb 2020 13:35), Max: 100.2 (02 Feb 2020 17:45)  HR: 81 (03 Feb 2020 13:35) (78 - 85)  BP: 146/65 (03 Feb 2020 13:35) (117/65 - 162/72)  BP(mean): --  RR: 18 (03 Feb 2020 13:35) (16 - 18)  SpO2: 95% (03 Feb 2020 13:35) (95% - 96%)    Physical Exam:  The patient was alert and oriented X 3, well nourished, and in no apparent distress. Oropharynx showed no ulcerations. There was no visible lymphadenopathy. Conjunctiva were non-injected. There was no clubbing or edema of extremities.    The scalp, hair, face, eyebrows, lips, oropharynx , neck, chest, back, buttocks, extremities X 4, hands, feet, nails were examined. There was no hyperhidrosis or bromhidrosis.     The following lesions are noted:     PE: Scattered eczematous papules and plaques on upper chest and back    LABS:                        9.8    32.17 )-----------( 137      ( 03 Feb 2020 07:03 )             29.2     02-03    144  |  109<H>  |  14  ----------------------------<  102<H>  3.6   |  23  |  0.63    Ca    8.5      03 Feb 2020 07:03  Phos  2.8     02-03  Mg     2.2     02-03    TPro  5.5<L>  /  Alb  3.2<L>  /  TBili  0.2  /  DBili  x   /  AST  26  /  ALT  28  /  AlkPhos  34<L>  02-03      RADIOLOGY & ADDITIONAL STUDIES: no relevant studies HPI:    79 F with history of Breast cancer 12 years ago treated with tamoxifen (no chemotherapy), s/p RT and lumpectomy, HTN and surgical hx of hysterectomy sent in by oncologist Dr. Blank for rule out leukemia. Patient has been feeling generally fatigued and sluggish since December. She states that she recently had a case of scabies that was treated at the end of December. Since then she was feeling more tired, and saw her PMD, who did some bloodwork and noticed blasts in her peripheral blood. Pt was told to see Dr. Blank for further workup. Dr. Blank did blood work again which again found blasts in the peripheral blood, at which point she sent pt to the ER for leukemia workup.     Dermatology is consulted for somewhat itchy pink rash on chest and back x 4 days. Would like us to r/o scabies. Patient is not on any antibiotics.     PAST MEDICAL & SURGICAL HISTORY:  Hypertension  Breast cancer  S/P hysterectomy      Review of Systems:  Constitutional: denies fevers, chills  HEENT: odynophagia or dysphagia  Cardiovascular: denies palpitations  Respiratory: denies SOB, wheezing  Gastrointestinal: denies N/V/D, abdominal pain  : denies dysuria  MSK: denies weakness, joint pain  Skin: denies new rashes or masses unless otherwise specified in hpi    MEDICATIONS  (STANDING):  allopurinol 300 milliGRAM(s) Oral at bedtime  amLODIPine   Tablet 5 milliGRAM(s) Oral daily  Biotene Dry Mouth Oral Rinse 5 milliLiter(s) Swish and Spit four times a day  hydrochlorothiazide 12.5 milliGRAM(s) Oral daily  hydrocortisone 2.5% Lotion 1 Application(s) Topical two times a day  sodium chloride 0.9%. 1000 milliLiter(s) (75 mL/Hr) IV Continuous <Continuous>  traZODone 150 milliGRAM(s) Oral at bedtime    MEDICATIONS  (PRN):  acetaminophen   Tablet .. 650 milliGRAM(s) Oral every 6 hours PRN Temp greater or equal to 38C (100.4F), Mild Pain (1 - 3)  benzonatate 100 milliGRAM(s) Oral three times a day PRN Cough      Allergies    No Known Allergies    Intolerances        SOCIAL HISTORY: denies drug use    FAMILY HISTORY:  No pertinent family history in first degree relatives      Vital Signs Last 24 Hrs  T(C): 37.1 (03 Feb 2020 13:35), Max: 37.9 (02 Feb 2020 17:45)  T(F): 98.7 (03 Feb 2020 13:35), Max: 100.2 (02 Feb 2020 17:45)  HR: 81 (03 Feb 2020 13:35) (78 - 85)  BP: 146/65 (03 Feb 2020 13:35) (117/65 - 162/72)  BP(mean): --  RR: 18 (03 Feb 2020 13:35) (16 - 18)  SpO2: 95% (03 Feb 2020 13:35) (95% - 96%)    Physical Exam:  The patient was alert and oriented X 3, well nourished, and in no apparent distress. Oropharynx showed no ulcerations. There was no visible lymphadenopathy. Conjunctiva were non-injected. There was no clubbing or edema of extremities.    The scalp, hair, face, eyebrows, lips, oropharynx , neck, chest, back, buttocks, extremities X 4, hands, feet, nails were examined. There was no hyperhidrosis or bromhidrosis.     The following lesions are noted:     PE: Scattered eczematous papules and plaques on upper chest and back  Axilla, webspaces, and abdomen clear    LABS:                        9.8    32.17 )-----------( 137      ( 03 Feb 2020 07:03 )             29.2     02-03    144  |  109<H>  |  14  ----------------------------<  102<H>  3.6   |  23  |  0.63    Ca    8.5      03 Feb 2020 07:03  Phos  2.8     02-03  Mg     2.2     02-03    TPro  5.5<L>  /  Alb  3.2<L>  /  TBili  0.2  /  DBili  x   /  AST  26  /  ALT  28  /  AlkPhos  34<L>  02-03      RADIOLOGY & ADDITIONAL STUDIES: no relevant studies

## 2020-02-04 DIAGNOSIS — R74.0 NONSPECIFIC ELEVATION OF LEVELS OF TRANSAMINASE AND LACTIC ACID DEHYDROGENASE [LDH]: ICD-10-CM

## 2020-02-04 PROBLEM — C50.919 MALIGNANT NEOPLASM OF UNSPECIFIED SITE OF UNSPECIFIED FEMALE BREAST: Chronic | Status: ACTIVE | Noted: 2020-01-31

## 2020-02-04 PROBLEM — I10 ESSENTIAL (PRIMARY) HYPERTENSION: Chronic | Status: ACTIVE | Noted: 2020-01-31

## 2020-02-04 LAB
ALBUMIN SERPL ELPH-MCNC: 3.1 G/DL — LOW (ref 3.3–5)
ALBUMIN SERPL ELPH-MCNC: 3.4 G/DL — SIGNIFICANT CHANGE UP (ref 3.3–5)
ALP SERPL-CCNC: 39 U/L — LOW (ref 40–120)
ALP SERPL-CCNC: 41 U/L — SIGNIFICANT CHANGE UP (ref 40–120)
ALT FLD-CCNC: 57 U/L — HIGH (ref 10–45)
ALT FLD-CCNC: 61 U/L — HIGH (ref 10–45)
ANION GAP SERPL CALC-SCNC: 10 MMOL/L — SIGNIFICANT CHANGE UP (ref 5–17)
ANION GAP SERPL CALC-SCNC: 11 MMOL/L — SIGNIFICANT CHANGE UP (ref 5–17)
AST SERPL-CCNC: 49 U/L — HIGH (ref 10–40)
AST SERPL-CCNC: 53 U/L — HIGH (ref 10–40)
BASOPHILS # BLD AUTO: 0 K/UL — SIGNIFICANT CHANGE UP (ref 0–0.2)
BASOPHILS NFR BLD AUTO: 0 % — SIGNIFICANT CHANGE UP (ref 0–2)
BILIRUB SERPL-MCNC: 0.2 MG/DL — SIGNIFICANT CHANGE UP (ref 0.2–1.2)
BILIRUB SERPL-MCNC: 0.2 MG/DL — SIGNIFICANT CHANGE UP (ref 0.2–1.2)
BLASTS # FLD: 74 % — HIGH (ref 0–0)
BUN SERPL-MCNC: 14 MG/DL — SIGNIFICANT CHANGE UP (ref 7–23)
BUN SERPL-MCNC: 19 MG/DL — SIGNIFICANT CHANGE UP (ref 7–23)
CALCIUM SERPL-MCNC: 8.6 MG/DL — SIGNIFICANT CHANGE UP (ref 8.4–10.5)
CALCIUM SERPL-MCNC: 8.7 MG/DL — SIGNIFICANT CHANGE UP (ref 8.4–10.5)
CHLORIDE SERPL-SCNC: 106 MMOL/L — SIGNIFICANT CHANGE UP (ref 96–108)
CHLORIDE SERPL-SCNC: 113 MMOL/L — HIGH (ref 96–108)
CO2 SERPL-SCNC: 22 MMOL/L — SIGNIFICANT CHANGE UP (ref 22–31)
CO2 SERPL-SCNC: 26 MMOL/L — SIGNIFICANT CHANGE UP (ref 22–31)
CREAT SERPL-MCNC: 0.58 MG/DL — SIGNIFICANT CHANGE UP (ref 0.5–1.3)
CREAT SERPL-MCNC: 0.71 MG/DL — SIGNIFICANT CHANGE UP (ref 0.5–1.3)
EOSINOPHIL # BLD AUTO: 0.91 K/UL — HIGH (ref 0–0.5)
EOSINOPHIL NFR BLD AUTO: 3 % — SIGNIFICANT CHANGE UP (ref 0–6)
G6PD RBC-CCNC: 14.1 U/G HGB — SIGNIFICANT CHANGE UP (ref 7–20.5)
GLUCOSE SERPL-MCNC: 107 MG/DL — HIGH (ref 70–99)
GLUCOSE SERPL-MCNC: 117 MG/DL — HIGH (ref 70–99)
HCT VFR BLD CALC: 31.7 % — LOW (ref 34.5–45)
HEMATOPATHOLOGY REPORT: SIGNIFICANT CHANGE UP
HGB BLD-MCNC: 10.3 G/DL — LOW (ref 11.5–15.5)
HLX FLT3 FINAL REPORT: SIGNIFICANT CHANGE UP
LDH SERPL L TO P-CCNC: 947 U/L — HIGH (ref 50–242)
LDH SERPL L TO P-CCNC: 966 U/L — HIGH (ref 50–242)
LYMPHOCYTES # BLD AUTO: 13 % — SIGNIFICANT CHANGE UP (ref 13–44)
LYMPHOCYTES # BLD AUTO: 3.93 K/UL — HIGH (ref 1–3.3)
MAGNESIUM SERPL-MCNC: 2.2 MG/DL — SIGNIFICANT CHANGE UP (ref 1.6–2.6)
MAGNESIUM SERPL-MCNC: 2.3 MG/DL — SIGNIFICANT CHANGE UP (ref 1.6–2.6)
MANUAL SMEAR VERIFICATION: SIGNIFICANT CHANGE UP
MCHC RBC-ENTMCNC: 30.7 PG — SIGNIFICANT CHANGE UP (ref 27–34)
MCHC RBC-ENTMCNC: 32.5 GM/DL — SIGNIFICANT CHANGE UP (ref 32–36)
MCV RBC AUTO: 94.3 FL — SIGNIFICANT CHANGE UP (ref 80–100)
MONOCYTES # BLD AUTO: 1.81 K/UL — HIGH (ref 0–0.9)
MONOCYTES NFR BLD AUTO: 6 % — SIGNIFICANT CHANGE UP (ref 2–14)
NEUTROPHILS # BLD AUTO: 1.21 K/UL — LOW (ref 1.8–7.4)
NEUTROPHILS NFR BLD AUTO: 4 % — LOW (ref 43–77)
NRBC # BLD: 0 /100 — SIGNIFICANT CHANGE UP (ref 0–0)
PHOSPHATE SERPL-MCNC: 3.1 MG/DL — SIGNIFICANT CHANGE UP (ref 2.5–4.5)
PHOSPHATE SERPL-MCNC: 4.2 MG/DL — SIGNIFICANT CHANGE UP (ref 2.5–4.5)
PLAT MORPH BLD: NORMAL — SIGNIFICANT CHANGE UP
PLATELET # BLD AUTO: 142 K/UL — LOW (ref 150–400)
POTASSIUM SERPL-MCNC: 3.8 MMOL/L — SIGNIFICANT CHANGE UP (ref 3.5–5.3)
POTASSIUM SERPL-MCNC: 4.3 MMOL/L — SIGNIFICANT CHANGE UP (ref 3.5–5.3)
POTASSIUM SERPL-SCNC: 3.8 MMOL/L — SIGNIFICANT CHANGE UP (ref 3.5–5.3)
POTASSIUM SERPL-SCNC: 4.3 MMOL/L — SIGNIFICANT CHANGE UP (ref 3.5–5.3)
PROT SERPL-MCNC: 5.7 G/DL — LOW (ref 6–8.3)
PROT SERPL-MCNC: 5.9 G/DL — LOW (ref 6–8.3)
RBC # BLD: 3.36 M/UL — LOW (ref 3.8–5.2)
RBC # FLD: 18.4 % — HIGH (ref 10.3–14.5)
RBC BLD AUTO: SIGNIFICANT CHANGE UP
SODIUM SERPL-SCNC: 142 MMOL/L — SIGNIFICANT CHANGE UP (ref 135–145)
SODIUM SERPL-SCNC: 146 MMOL/L — HIGH (ref 135–145)
TM INTERPRETATION: SIGNIFICANT CHANGE UP
URATE SERPL-MCNC: 2.2 MG/DL — LOW (ref 2.5–7)
URATE SERPL-MCNC: 2.8 MG/DL — SIGNIFICANT CHANGE UP (ref 2.5–7)
WBC # BLD: 30.23 K/UL — HIGH (ref 3.8–10.5)
WBC # FLD AUTO: 30.23 K/UL — HIGH (ref 3.8–10.5)

## 2020-02-04 PROCEDURE — 99232 SBSQ HOSP IP/OBS MODERATE 35: CPT | Mod: GC

## 2020-02-04 PROCEDURE — 71045 X-RAY EXAM CHEST 1 VIEW: CPT | Mod: 26

## 2020-02-04 RX ORDER — ONDANSETRON 8 MG/1
1 TABLET, FILM COATED ORAL
Qty: 90 | Refills: 0
Start: 2020-02-04

## 2020-02-04 RX ORDER — CHLORHEXIDINE GLUCONATE 213 G/1000ML
1 SOLUTION TOPICAL
Refills: 0 | Status: DISCONTINUED | OUTPATIENT
Start: 2020-02-04 | End: 2020-02-10

## 2020-02-04 RX ORDER — ONDANSETRON 8 MG/1
8 TABLET, FILM COATED ORAL EVERY 24 HOURS
Refills: 0 | Status: COMPLETED | OUTPATIENT
Start: 2020-02-04 | End: 2020-02-08

## 2020-02-04 RX ORDER — VENETOCLAX 100 MG/1
4 TABLET, FILM COATED ORAL
Qty: 120 | Refills: 3
Start: 2020-02-04 | End: 2020-06-02

## 2020-02-04 RX ORDER — SODIUM CHLORIDE 9 MG/ML
10 INJECTION INTRAMUSCULAR; INTRAVENOUS; SUBCUTANEOUS
Refills: 0 | Status: DISCONTINUED | OUTPATIENT
Start: 2020-02-04 | End: 2020-02-10

## 2020-02-04 RX ORDER — VENETOCLAX 100 MG/1
100 TABLET, FILM COATED ORAL ONCE
Refills: 0 | Status: COMPLETED | OUTPATIENT
Start: 2020-02-05 | End: 2020-02-05

## 2020-02-04 RX ORDER — ENOXAPARIN SODIUM 100 MG/ML
40 INJECTION SUBCUTANEOUS AT BEDTIME
Refills: 0 | Status: DISCONTINUED | OUTPATIENT
Start: 2020-02-04 | End: 2020-02-05

## 2020-02-04 RX ORDER — VENETOCLAX 100 MG/1
50 TABLET, FILM COATED ORAL ONCE
Refills: 0 | Status: COMPLETED | OUTPATIENT
Start: 2020-02-04 | End: 2020-02-04

## 2020-02-04 RX ORDER — VENETOCLAX 100 MG/1
400 TABLET, FILM COATED ORAL
Refills: 0 | Status: DISCONTINUED | OUTPATIENT
Start: 2020-02-07 | End: 2020-02-07

## 2020-02-04 RX ORDER — VENETOCLAX 100 MG/1
400 TABLET, FILM COATED ORAL ONCE
Refills: 0 | Status: COMPLETED | OUTPATIENT
Start: 2020-02-06 | End: 2020-02-06

## 2020-02-04 RX ORDER — METOCLOPRAMIDE HCL 10 MG
1 TABLET ORAL
Qty: 120 | Refills: 3
Start: 2020-02-04 | End: 2020-06-02

## 2020-02-04 RX ORDER — DECITABINE 50 MG/20ML
31 INJECTION, POWDER, LYOPHILIZED, FOR SOLUTION INTRAVENOUS EVERY 24 HOURS
Refills: 0 | Status: COMPLETED | OUTPATIENT
Start: 2020-02-04 | End: 2020-02-08

## 2020-02-04 RX ORDER — ALLOPURINOL 300 MG
1 TABLET ORAL
Qty: 30 | Refills: 0
Start: 2020-02-04 | End: 2020-03-04

## 2020-02-04 RX ADMIN — Medication 1 APPLICATION(S): at 05:57

## 2020-02-04 RX ADMIN — VENETOCLAX 50 MILLIGRAM(S): 100 TABLET, FILM COATED ORAL at 18:03

## 2020-02-04 RX ADMIN — ONDANSETRON 8 MILLIGRAM(S): 8 TABLET, FILM COATED ORAL at 16:30

## 2020-02-04 RX ADMIN — SODIUM CHLORIDE 75 MILLILITER(S): 9 INJECTION INTRAMUSCULAR; INTRAVENOUS; SUBCUTANEOUS at 13:53

## 2020-02-04 RX ADMIN — Medication 5 MILLILITER(S): at 13:53

## 2020-02-04 RX ADMIN — Medication 5 MILLILITER(S): at 18:04

## 2020-02-04 RX ADMIN — Medication 1 APPLICATION(S): at 18:19

## 2020-02-04 RX ADMIN — Medication 150 MILLIGRAM(S): at 21:32

## 2020-02-04 RX ADMIN — CHLORHEXIDINE GLUCONATE 1 APPLICATION(S): 213 SOLUTION TOPICAL at 06:00

## 2020-02-04 RX ADMIN — Medication 300 MILLIGRAM(S): at 21:32

## 2020-02-04 RX ADMIN — Medication 100 MILLIGRAM(S): at 06:02

## 2020-02-04 RX ADMIN — ENOXAPARIN SODIUM 40 MILLIGRAM(S): 100 INJECTION SUBCUTANEOUS at 21:32

## 2020-02-04 RX ADMIN — DECITABINE 56.2 MILLIGRAM(S): 50 INJECTION, POWDER, LYOPHILIZED, FOR SOLUTION INTRAVENOUS at 16:32

## 2020-02-04 RX ADMIN — AMLODIPINE BESYLATE 5 MILLIGRAM(S): 2.5 TABLET ORAL at 05:56

## 2020-02-04 RX ADMIN — Medication 5 MILLILITER(S): at 05:56

## 2020-02-04 RX ADMIN — SODIUM CHLORIDE 75 MILLILITER(S): 9 INJECTION INTRAMUSCULAR; INTRAVENOUS; SUBCUTANEOUS at 05:57

## 2020-02-04 RX ADMIN — Medication 5 MILLILITER(S): at 21:32

## 2020-02-04 RX ADMIN — Medication 12.5 MILLIGRAM(S): at 05:56

## 2020-02-04 NOTE — ADVANCED PRACTICE NURSE CONSULT - ASSESSMENT
Arrived to patient's room, with pt A&Ox4, in bed, awake, with no c/o. R. DL DL PICC assessed and remains WDL. No redness, bleeding, swelling present. Flushed with no difficulty and positive blood return present. Today's labs reviewed by Dr. Lindsey and team. Pt. was premedicated with 8mg zofran IVP as per chemo order. Dacogen 20mg/m2 = 31mg IV started at 16:32 infusing over 1 hour.

## 2020-02-04 NOTE — PROGRESS NOTE ADULT - ASSESSMENT
79 F with history of Breast cancer 12 years ago treated with tamoxifen (no chemotherapy), s/p RT and lumpectomy, HTN and surgical hx of hysterectomy. Pt was sent in by oncologist Dr. Blank for rule out leukemia.  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

## 2020-02-04 NOTE — PROGRESS NOTE ADULT - PROBLEM SELECTOR PLAN 6
VTE ppx until plt <50   after access obtained.  Encourage ambulation             Contact info [phone] VTE ppx until plt <50   Encourage ambulation             Contact info [phone]

## 2020-02-04 NOTE — PROGRESS NOTE ADULT - PROBLEM SELECTOR PLAN 5
VTE ppx until plt <50   after access obtained.  Encourage ambulation             Contact info [id number] Remote history, treated with lumpectomy, RT and Tamoxifen, no chemotherapy  currently in remission   follows with Dr. Blank

## 2020-02-04 NOTE — PROGRESS NOTE ADULT - SUBJECTIVE AND OBJECTIVE BOX
Diagnosis: Acute leukemia	    Protocol/Chemo Regimen: NA    Day: NA    Pt endorsed: itching rashes chest, under breast and back since 9/2019; fatigue; decreased appetite; chronic mild numbness left 2nd and 3rd finger     Review of Systems: Denies nausea, vomiting, diarrhea, chest pain, shortness of breath, headache    Pain scale: denies    Diet: regular, Enlive BID     Allergies: No Known Allergies      STANDING MEDICATIONS  allopurinol 300 milliGRAM(s) Oral at bedtime  amLODIPine   Tablet 5 milliGRAM(s) Oral daily  Biotene Dry Mouth Oral Rinse 5 milliLiter(s) Swish and Spit four times a day  hydrochlorothiazide 12.5 milliGRAM(s) Oral daily  hydrocortisone 2.5% Lotion 1 Application(s) Topical two times a day  sodium chloride 0.9%. 1000 milliLiter(s) IV Continuous <Continuous>  traZODone 150 milliGRAM(s) Oral at bedtime      PRN MEDICATIONS  acetaminophen   Tablet .. 650 milliGRAM(s) Oral every 6 hours PRN  benzonatate 100 milliGRAM(s) Oral three times a day PRN      Vital Signs Last 24 Hrs  T(C): 37.4 (04 Feb 2020 04:41), Max: 37.4 (04 Feb 2020 04:41)  T(F): 99.4 (04 Feb 2020 04:41), Max: 99.4 (04 Feb 2020 04:41)  HR: 80 (04 Feb 2020 04:41) (80 - 86)  BP: 154/69 (04 Feb 2020 04:41) (117/65 - 157/70)  BP(mean): --  RR: 18 (04 Feb 2020 04:41) (18 - 18)  SpO2: 93% (04 Feb 2020 04:41) (93% - 96%)      PHYSICAL EXAM  General: NAD  HEENT: clear oropharynx; no ulcer or erythema   CV: (+) S1/S2 RRR  Lungs: clear to auscultation, no wheezes, no rales  Abdomen: soft,+ BS, non-tender, non-distended  Ext: no edema  Skin: scattered erythematous raised rashes upper chest, under breast and back   Neuro: alert and oriented X 3  PIV: C/D/I      LABS:                        10.3   x     )-----------( 142      ( 04 Feb 2020 06:36 )             31.7         Mean Cell Volume : 94.3 fl  Mean Cell Hemoglobin : 30.7 pg  Mean Cell Hemoglobin Concentration : 32.5 gm/dL  Auto Neutrophil # : x  Auto Lymphocyte # : x  Auto Monocyte # : x  Auto Eosinophil # : x  Auto Basophil # : x  Auto Neutrophil % : x  Auto Lymphocyte % : x  Auto Monocyte % : x  Auto Eosinophil % : x  Auto Basophil % : x      02-03    144  |  109<H>  |  14  ----------------------------<  102<H>  3.6   |  23  |  0.63    Ca    8.5      03 Feb 2020 07:03  Phos  2.8     02-03  Mg     2.2     02-03    TPro  5.5<L>  /  Alb  3.2<L>  /  TBili  0.2  /  DBili  x   /  AST  26  /  ALT  28  /  AlkPhos  34<L>  02-03        RADIOLOGY & ADDITIONAL STUDIES:    < from: NM MUGA Scan (02.03.20 @ 08:47) >  IMPRESSION: Normal gated blood pool study.  Normal resting left ventricular ejection fraction of 70 % and normal right and left ventricular wall motion.    < from: Xray Chest 2 Views PA/Lat (01.31.20 @ 12:17) >  IMPRESSION: Clear lungs. Diagnosis: Acute leukemia	    Protocol/Chemo Regimen: NA    Day: NA    Pt endorsed: mild  itching rashes chest, under breast and back since 9/2019; fatigue; decreased appetite; chronic mild numbness left 2nd and 3rd finger;  dry cough since 1/31; chronic stanley pain     Review of Systems: Denies nausea, vomiting, diarrhea, chest pain, shortness of breath, headache    Pain scale: 2/10 back     Diet: regular, Enlive BID     Allergies: No Known Allergies      STANDING MEDICATIONS  allopurinol 300 milliGRAM(s) Oral at bedtime  amLODIPine   Tablet 5 milliGRAM(s) Oral daily  Biotene Dry Mouth Oral Rinse 5 milliLiter(s) Swish and Spit four times a day  hydrochlorothiazide 12.5 milliGRAM(s) Oral daily  hydrocortisone 2.5% Lotion 1 Application(s) Topical two times a day  sodium chloride 0.9%. 1000 milliLiter(s) IV Continuous <Continuous>  traZODone 150 milliGRAM(s) Oral at bedtime      PRN MEDICATIONS  acetaminophen   Tablet .. 650 milliGRAM(s) Oral every 6 hours PRN  benzonatate 100 milliGRAM(s) Oral three times a day PRN      Vital Signs Last 24 Hrs  T(C): 37.4 (04 Feb 2020 04:41), Max: 37.4 (04 Feb 2020 04:41)  T(F): 99.4 (04 Feb 2020 04:41), Max: 99.4 (04 Feb 2020 04:41)  HR: 80 (04 Feb 2020 04:41) (80 - 86)  BP: 154/69 (04 Feb 2020 04:41) (117/65 - 157/70)  BP(mean): --  RR: 18 (04 Feb 2020 04:41) (18 - 18)  SpO2: 93% (04 Feb 2020 04:41) (93% - 96%)      PHYSICAL EXAM  General: NAD  HEENT: clear oropharynx; no ulcer or erythema   CV: (+) S1/S2 RRR  Lungs: clear to auscultation, no wheezes, no rales  Abdomen: soft,+ BS, non-tender, non-distended  Ext: no edema  Skin: scattered erythematous raised rashes upper chest, under abd, upper arms and upper back   Neuro: alert and oriented X 3  PIV: C/D/I      LABS:                        10.3   30.23 )-----------( 142      ( 04 Feb 2020 06:36 )             31.7         Mean Cell Volume : 94.3 fl  Mean Cell Hemoglobin : 30.7 pg  Mean Cell Hemoglobin Concentration : 32.5 gm/dL  Auto Neutrophil # : 1.21 K/uL  Auto Lymphocyte # : 3.93 K/uL  Auto Monocyte # : 1.81 K/uL  Auto Eosinophil # : 0.91 K/uL  Auto Basophil # : 0.00 K/uL  Auto Neutrophil % : 4.0 %  Auto Lymphocyte % : 13.0 %  Auto Monocyte % : 6.0 %  Auto Eosinophil % : 3.0 %  Auto Basophil % : 0.0 %      02-04    146<H>  |  113<H>  |  14  ----------------------------<  107<H>  3.8   |  22  |  0.58    Ca    8.7      04 Feb 2020 06:36  Phos  3.1     02-04  Mg     2.3     02-04    TPro  5.9<L>  /  Alb  3.4  /  TBili  0.2  /  DBili  x   /  AST  49<H>  /  ALT  57<H>  /  AlkPhos  39<L>  02-04        Uric Acid 2.2    Cytogenetics Test (01.31.20 @ 14:21)    FISH Interpretation:   Fluorescence in situ Hybridization (FISH) Report  _______________________________________________________________    Lab Accession Number: [id number]  Indication: Leukemia  Date Collected: 01/31/2020 13:00  Date Received: 01/31/2020 14:21  Date Reported: 02/03/2020 14:50  Specimen Type: Peripheral Blood Smear  Test Requested: FISH Analysis  ________________________________________________________________  FISH Result: NORMAL FISH - PML/YIFAN        RADIOLOGY & ADDITIONAL STUDIES:    < from: NM MUGA Scan (02.03.20 @ 08:47) >  IMPRESSION: Normal gated blood pool study.  Normal resting left ventricular ejection fraction of 70 % and normal right and left ventricular wall motion.    < from: Xray Chest 2 Views PA/Lat (01.31.20 @ 12:17) >  IMPRESSION: Clear lungs. Diagnosis: AML 	    Protocol/Chemo Regimen: Dacogen/Venetoclax    Day: 1    Pt endorsed: mild  itching rashes chest, under breast and back since 9/2019; fatigue; decreased appetite; chronic mild numbness left 2nd and 3rd finger;  dry cough since 1/31; chronic back pain     Review of Systems: Denies nausea, vomiting, diarrhea, chest pain, shortness of breath, headache    Pain scale: 2/10 back     Diet: regular, Enlive BID     Allergies: No Known Allergies      STANDING MEDICATIONS  allopurinol 300 milliGRAM(s) Oral at bedtime  amLODIPine   Tablet 5 milliGRAM(s) Oral daily  Biotene Dry Mouth Oral Rinse 5 milliLiter(s) Swish and Spit four times a day  hydrochlorothiazide 12.5 milliGRAM(s) Oral daily  hydrocortisone 2.5% Lotion 1 Application(s) Topical two times a day  sodium chloride 0.9%. 1000 milliLiter(s) IV Continuous <Continuous>  traZODone 150 milliGRAM(s) Oral at bedtime      PRN MEDICATIONS  acetaminophen   Tablet .. 650 milliGRAM(s) Oral every 6 hours PRN  benzonatate 100 milliGRAM(s) Oral three times a day PRN      Vital Signs Last 24 Hrs  T(C): 37.4 (04 Feb 2020 04:41), Max: 37.4 (04 Feb 2020 04:41)  T(F): 99.4 (04 Feb 2020 04:41), Max: 99.4 (04 Feb 2020 04:41)  HR: 80 (04 Feb 2020 04:41) (80 - 86)  BP: 154/69 (04 Feb 2020 04:41) (117/65 - 157/70)  BP(mean): --  RR: 18 (04 Feb 2020 04:41) (18 - 18)  SpO2: 93% (04 Feb 2020 04:41) (93% - 96%)      PHYSICAL EXAM  General: NAD  HEENT: clear oropharynx; no ulcer or erythema   CV: (+) S1/S2 RRR  Lungs: clear to auscultation, no wheezes, no rales  Abdomen: soft,+ BS, non-tender, non-distended  Ext: no edema  Skin: scattered erythematous raised rashes upper chest, under abd, upper arms and upper back   Neuro: alert and oriented X 3  PIV: C/D/I      LABS:                        10.3   30.23 )-----------( 142      ( 04 Feb 2020 06:36 )             31.7         Mean Cell Volume : 94.3 fl  Mean Cell Hemoglobin : 30.7 pg  Mean Cell Hemoglobin Concentration : 32.5 gm/dL  Auto Neutrophil # : 1.21 K/uL  Auto Lymphocyte # : 3.93 K/uL  Auto Monocyte # : 1.81 K/uL  Auto Eosinophil # : 0.91 K/uL  Auto Basophil # : 0.00 K/uL  Auto Neutrophil % : 4.0 %  Auto Lymphocyte % : 13.0 %  Auto Monocyte % : 6.0 %  Auto Eosinophil % : 3.0 %  Auto Basophil % : 0.0 %      02-04    146<H>  |  113<H>  |  14  ----------------------------<  107<H>  3.8   |  22  |  0.58    Ca    8.7      04 Feb 2020 06:36  Phos  3.1     02-04  Mg     2.3     02-04    TPro  5.9<L>  /  Alb  3.4  /  TBili  0.2  /  DBili  x   /  AST  49<H>  /  ALT  57<H>  /  AlkPhos  39<L>  02-04        Uric Acid 2.2      Cytogenetics Test (01.31.20 @ 14:21)    FISH Interpretation:   Fluorescence in situ Hybridization (FISH) Report  _______________________________________________________________    Lab Accession Number: [id number]  Indication: Leukemia  Date Collected: 01/31/2020 13:00  Date Received: 01/31/2020 14:21  Date Reported: 02/03/2020 14:50  Specimen Type: Peripheral Blood Smear  Test Requested: FISH Analysis  ________________________________________________________________  FISH Result: NORMAL FISH - PML/YIFAN      RADIOLOGY & ADDITIONAL STUDIES:    < from: Xray Chest 1 View- PORTABLE-Urgent (02.04.20 @ 11:48) >  Left PICC tip in the SVC.  The lungs are clear. There is no pleural effusion.   There is no pneumothorax.  The cardiac silhouette size cannot be accurately assessed on this radiograph.  There is no acute abnormality of the visualized osseous structures.  IMPRESSION:  Clear lungs.     < from: NM MUGA Scan (02.03.20 @ 08:47) >  IMPRESSION: Normal gated blood pool study.  Normal resting left ventricular ejection fraction of 70 % and normal right and left ventricular wall motion.

## 2020-02-04 NOTE — PROGRESS NOTE ADULT - PROBLEM SELECTOR PLAN 1
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  No associated anemia or thrombocytopenia at this time     PB flow cytometry on 1/30 reveal myeloblasts 72%, HLA-DR, CD33, 34 +,  c/w AML,   Peripheral smear reviewed- many blasts visualized, red cell and platelet morphology normal   Allopurinol 300mg daily   TLS daily  IV hydration  Monitor CBC/diff/lytes, transfuse and or replete lytes PRN  Monitor weight and I and O's, diuresis PRN   BM bx on 2/3, fu result    Plan PICC by RN at bedside   HIV (-)  Hepatitis (-)  MUGA 70%  Plan Dacogen with Venetoclax  antiemetics  mouth care  HLA sent on 1/31 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  No associated anemia or thrombocytopenia at this time     Peripheral smear reviewed- many blasts visualized, red cell and platelet morphology normal   PB flow cytometry on 1/30 reveal myeloblasts 72%, HLA-DR, CD33, 34 +,  c/w AML,   PB FISH normal   Allopurinol 300mg daily   TLS daily  IV hydration  Monitor CBC/diff/lytes, transfuse and or replete lytes PRN  Monitor weight and I and O's, diuresis PRN   BM bx on 2/3, fu result    Plan PICC by RN at bedside   HIV (-)  Hepatitis (-)  MUGA 70%  Plan Dacogen with Venetoclax  antiemetics  mouth care  HLA sent on 1/31 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  No associated anemia or thrombocytopenia at this time     Peripheral smear reviewed- many blasts visualized, red cell and platelet morphology normal   PB flow cytometry on 1/30 reveal myeloblasts 72%, HLA-DR, CD33, 34 +,  c/w AML,   PB FISH normal   Allopurinol 300mg daily   TLS q8h once chemo started   IV hydration  Monitor CBC/diff/lytes, transfuse and or replete lytes PRN  Monitor weight and I and O's, diuresis PRN   BM bx on 2/3, fu result    s/p PICC by RN at bedside, placement confirmed by CXR   HIV (-)  Hepatitis (-)  G6PD normal   MUGA 70%  Plan Dacogen with Venetoclax today  antiemetics  mouth care  HLA sent on 1/31

## 2020-02-04 NOTE — PROGRESS NOTE ADULT - PROBLEM SELECTOR PLAN 2
Not neutropenic. afebrile   pan culture if febrile  Topical steroid for itching  rashes Not neutropenic. afebrile   pan culture if febrile  Topical steroid for itching  rashes/eczema Not neutropenic. afebrile   pan culture if febrile  clobetasol oint BID  for itching  rashes/eczema

## 2020-02-04 NOTE — PROGRESS NOTE ADULT - ATTENDING COMMENTS
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 Dacogen/Venetoclax. Risks/benefits d/w pt, will give Dacogen/Venetoclax with Venetoclax titration and monitoring for tumor lysis syndrome  - BM bx done on 2/3 -f/u cytogenetics and [facility]  -monitor for tumor lysis  -monitor for fevers  -PICC line placed 2/3 -will need home care for it

## 2020-02-05 ENCOUNTER — TRANSCRIPTION ENCOUNTER (OUTPATIENT)
Age: 80
End: 2020-02-05

## 2020-02-05 DIAGNOSIS — R79.1 ABNORMAL COAGULATION PROFILE: ICD-10-CM

## 2020-02-05 DIAGNOSIS — R07.89 OTHER CHEST PAIN: ICD-10-CM

## 2020-02-05 LAB
ALBUMIN SERPL ELPH-MCNC: 2.9 G/DL — LOW (ref 3.3–5)
ALBUMIN SERPL ELPH-MCNC: 3 G/DL — LOW (ref 3.3–5)
ALBUMIN SERPL ELPH-MCNC: 3.2 G/DL — LOW (ref 3.3–5)
ALP SERPL-CCNC: 37 U/L — LOW (ref 40–120)
ALP SERPL-CCNC: 37 U/L — LOW (ref 40–120)
ALP SERPL-CCNC: 39 U/L — LOW (ref 40–120)
ALT FLD-CCNC: 51 U/L — HIGH (ref 10–45)
ALT FLD-CCNC: 56 U/L — HIGH (ref 10–45)
ALT FLD-CCNC: 57 U/L — HIGH (ref 10–45)
ANION GAP SERPL CALC-SCNC: 14 MMOL/L — SIGNIFICANT CHANGE UP (ref 5–17)
ANION GAP SERPL CALC-SCNC: 9 MMOL/L — SIGNIFICANT CHANGE UP (ref 5–17)
ANION GAP SERPL CALC-SCNC: 9 MMOL/L — SIGNIFICANT CHANGE UP (ref 5–17)
APTT BLD: 37.9 SEC — HIGH (ref 27.5–36.3)
AST SERPL-CCNC: 50 U/L — HIGH (ref 10–40)
AST SERPL-CCNC: 58 U/L — HIGH (ref 10–40)
AST SERPL-CCNC: 69 U/L — HIGH (ref 10–40)
BASOPHILS # BLD AUTO: 0 K/UL — SIGNIFICANT CHANGE UP (ref 0–0.2)
BASOPHILS NFR BLD AUTO: 0 % — SIGNIFICANT CHANGE UP (ref 0–2)
BILIRUB SERPL-MCNC: 0.2 MG/DL — SIGNIFICANT CHANGE UP (ref 0.2–1.2)
BILIRUB SERPL-MCNC: 0.3 MG/DL — SIGNIFICANT CHANGE UP (ref 0.2–1.2)
BILIRUB SERPL-MCNC: 0.3 MG/DL — SIGNIFICANT CHANGE UP (ref 0.2–1.2)
BUN SERPL-MCNC: 18 MG/DL — SIGNIFICANT CHANGE UP (ref 7–23)
BUN SERPL-MCNC: 21 MG/DL — SIGNIFICANT CHANGE UP (ref 7–23)
BUN SERPL-MCNC: 22 MG/DL — SIGNIFICANT CHANGE UP (ref 7–23)
CALCIUM SERPL-MCNC: 8 MG/DL — LOW (ref 8.4–10.5)
CALCIUM SERPL-MCNC: 8.4 MG/DL — SIGNIFICANT CHANGE UP (ref 8.4–10.5)
CALCIUM SERPL-MCNC: 8.5 MG/DL — SIGNIFICANT CHANGE UP (ref 8.4–10.5)
CHLORIDE SERPL-SCNC: 105 MMOL/L — SIGNIFICANT CHANGE UP (ref 96–108)
CHLORIDE SERPL-SCNC: 108 MMOL/L — SIGNIFICANT CHANGE UP (ref 96–108)
CHLORIDE SERPL-SCNC: 110 MMOL/L — HIGH (ref 96–108)
CHROM ANALY OVERALL INTERP SPEC-IMP: SIGNIFICANT CHANGE UP
CK MB CFR SERPL CALC: 1.9 NG/ML — SIGNIFICANT CHANGE UP (ref 0–3.8)
CK SERPL-CCNC: 64 U/L — SIGNIFICANT CHANGE UP (ref 25–170)
CO2 SERPL-SCNC: 24 MMOL/L — SIGNIFICANT CHANGE UP (ref 22–31)
CO2 SERPL-SCNC: 25 MMOL/L — SIGNIFICANT CHANGE UP (ref 22–31)
CO2 SERPL-SCNC: 26 MMOL/L — SIGNIFICANT CHANGE UP (ref 22–31)
CREAT SERPL-MCNC: 0.6 MG/DL — SIGNIFICANT CHANGE UP (ref 0.5–1.3)
CREAT SERPL-MCNC: 0.66 MG/DL — SIGNIFICANT CHANGE UP (ref 0.5–1.3)
CREAT SERPL-MCNC: 0.7 MG/DL — SIGNIFICANT CHANGE UP (ref 0.5–1.3)
D DIMER BLD IA.RAPID-MCNC: HIGH NG/ML DDU
EOSINOPHIL # BLD AUTO: 1.15 K/UL — HIGH (ref 0–0.5)
EOSINOPHIL NFR BLD AUTO: 16 % — HIGH (ref 0–6)
FIBRINOGEN PPP-MCNC: 466 MG/DL — SIGNIFICANT CHANGE UP (ref 350–510)
GLUCOSE SERPL-MCNC: 120 MG/DL — HIGH (ref 70–99)
GLUCOSE SERPL-MCNC: 126 MG/DL — HIGH (ref 70–99)
GLUCOSE SERPL-MCNC: 159 MG/DL — HIGH (ref 70–99)
HCT VFR BLD CALC: 23.8 % — LOW (ref 34.5–45)
HCT VFR BLD CALC: 25.9 % — LOW (ref 34.5–45)
HCT VFR BLD CALC: 26.5 % — LOW (ref 34.5–45)
HCT VFR BLD CALC: 29 % — LOW (ref 34.5–45)
HGB BLD-MCNC: 7.8 G/DL — LOW (ref 11.5–15.5)
HGB BLD-MCNC: 8.4 G/DL — LOW (ref 11.5–15.5)
HGB BLD-MCNC: 8.6 G/DL — LOW (ref 11.5–15.5)
HGB BLD-MCNC: 9.5 G/DL — LOW (ref 11.5–15.5)
INR BLD: 1.59 RATIO — HIGH (ref 0.88–1.16)
LDH SERPL L TO P-CCNC: 1383 U/L — HIGH (ref 50–242)
LDH SERPL L TO P-CCNC: 1672 U/L — HIGH (ref 50–242)
LDH SERPL L TO P-CCNC: 2025 U/L — HIGH (ref 50–242)
LYMPHOCYTES # BLD AUTO: 1.94 K/UL — SIGNIFICANT CHANGE UP (ref 1–3.3)
LYMPHOCYTES # BLD AUTO: 27 % — SIGNIFICANT CHANGE UP (ref 13–44)
MAGNESIUM SERPL-MCNC: 2.3 MG/DL — SIGNIFICANT CHANGE UP (ref 1.6–2.6)
MCHC RBC-ENTMCNC: 30.2 PG — SIGNIFICANT CHANGE UP (ref 27–34)
MCHC RBC-ENTMCNC: 30.4 PG — SIGNIFICANT CHANGE UP (ref 27–34)
MCHC RBC-ENTMCNC: 30.6 PG — SIGNIFICANT CHANGE UP (ref 27–34)
MCHC RBC-ENTMCNC: 30.7 PG — SIGNIFICANT CHANGE UP (ref 27–34)
MCHC RBC-ENTMCNC: 32.4 GM/DL — SIGNIFICANT CHANGE UP (ref 32–36)
MCHC RBC-ENTMCNC: 32.5 GM/DL — SIGNIFICANT CHANGE UP (ref 32–36)
MCHC RBC-ENTMCNC: 32.8 GM/DL — SIGNIFICANT CHANGE UP (ref 32–36)
MCHC RBC-ENTMCNC: 32.8 GM/DL — SIGNIFICANT CHANGE UP (ref 32–36)
MCV RBC AUTO: 93 FL — SIGNIFICANT CHANGE UP (ref 80–100)
MCV RBC AUTO: 93.5 FL — SIGNIFICANT CHANGE UP (ref 80–100)
MCV RBC AUTO: 93.7 FL — SIGNIFICANT CHANGE UP (ref 80–100)
MCV RBC AUTO: 93.8 FL — SIGNIFICANT CHANGE UP (ref 80–100)
MONOCYTES # BLD AUTO: 0.5 K/UL — SIGNIFICANT CHANGE UP (ref 0–0.9)
MONOCYTES NFR BLD AUTO: 7 % — SIGNIFICANT CHANGE UP (ref 2–14)
NEUTROPHILS # BLD AUTO: 1.15 K/UL — LOW (ref 1.8–7.4)
NEUTROPHILS NFR BLD AUTO: 16 % — LOW (ref 43–77)
NRBC # BLD: 0 /100 WBCS — SIGNIFICANT CHANGE UP (ref 0–0)
PHOSPHATE SERPL-MCNC: 3.8 MG/DL — SIGNIFICANT CHANGE UP (ref 2.5–4.5)
PHOSPHATE SERPL-MCNC: 4.2 MG/DL — SIGNIFICANT CHANGE UP (ref 2.5–4.5)
PHOSPHATE SERPL-MCNC: 4.4 MG/DL — SIGNIFICANT CHANGE UP (ref 2.5–4.5)
PLATELET # BLD AUTO: 117 K/UL — LOW (ref 150–400)
PLATELET # BLD AUTO: 121 K/UL — LOW (ref 150–400)
PLATELET # BLD AUTO: 144 K/UL — LOW (ref 150–400)
POTASSIUM SERPL-MCNC: 3.3 MMOL/L — LOW (ref 3.5–5.3)
POTASSIUM SERPL-MCNC: 3.8 MMOL/L — SIGNIFICANT CHANGE UP (ref 3.5–5.3)
POTASSIUM SERPL-MCNC: 4 MMOL/L — SIGNIFICANT CHANGE UP (ref 3.5–5.3)
POTASSIUM SERPL-SCNC: 3.3 MMOL/L — LOW (ref 3.5–5.3)
POTASSIUM SERPL-SCNC: 3.8 MMOL/L — SIGNIFICANT CHANGE UP (ref 3.5–5.3)
POTASSIUM SERPL-SCNC: 4 MMOL/L — SIGNIFICANT CHANGE UP (ref 3.5–5.3)
PROT SERPL-MCNC: 5 G/DL — LOW (ref 6–8.3)
PROT SERPL-MCNC: 5.1 G/DL — LOW (ref 6–8.3)
PROT SERPL-MCNC: 5.5 G/DL — LOW (ref 6–8.3)
PROTHROM AB SERPL-ACNC: 18.6 SEC — HIGH (ref 10–12.9)
RAPID RVP RESULT: SIGNIFICANT CHANGE UP
RBC # BLD: 2.54 M/UL — LOW (ref 3.8–5.2)
RBC # BLD: 2.76 M/UL — LOW (ref 3.8–5.2)
RBC # BLD: 2.85 M/UL — LOW (ref 3.8–5.2)
RBC # BLD: 3.1 M/UL — LOW (ref 3.8–5.2)
RBC # FLD: 18.1 % — HIGH (ref 10.3–14.5)
RBC # FLD: 18.3 % — HIGH (ref 10.3–14.5)
RBC # FLD: 18.4 % — HIGH (ref 10.3–14.5)
RBC # FLD: 18.5 % — HIGH (ref 10.3–14.5)
SODIUM SERPL-SCNC: 143 MMOL/L — SIGNIFICANT CHANGE UP (ref 135–145)
SODIUM SERPL-SCNC: 143 MMOL/L — SIGNIFICANT CHANGE UP (ref 135–145)
SODIUM SERPL-SCNC: 144 MMOL/L — SIGNIFICANT CHANGE UP (ref 135–145)
TROPONIN T, HIGH SENSITIVITY RESULT: 18 NG/L — SIGNIFICANT CHANGE UP (ref 0–51)
URATE SERPL-MCNC: 2.7 MG/DL — SIGNIFICANT CHANGE UP (ref 2.5–7)
URATE SERPL-MCNC: 3.9 MG/DL — SIGNIFICANT CHANGE UP (ref 2.5–7)
URATE SERPL-MCNC: 4 MG/DL — SIGNIFICANT CHANGE UP (ref 2.5–7)
WBC # BLD: 23.39 K/UL — HIGH (ref 3.8–10.5)
WBC # BLD: 5.35 K/UL — SIGNIFICANT CHANGE UP (ref 3.8–10.5)
WBC # BLD: 7.19 K/UL — SIGNIFICANT CHANGE UP (ref 3.8–10.5)
WBC # BLD: 9.83 K/UL — SIGNIFICANT CHANGE UP (ref 3.8–10.5)
WBC # FLD AUTO: 23.39 K/UL — HIGH (ref 3.8–10.5)
WBC # FLD AUTO: 5.35 K/UL — SIGNIFICANT CHANGE UP (ref 3.8–10.5)
WBC # FLD AUTO: 7.19 K/UL — SIGNIFICANT CHANGE UP (ref 3.8–10.5)
WBC # FLD AUTO: 9.83 K/UL — SIGNIFICANT CHANGE UP (ref 3.8–10.5)

## 2020-02-05 PROCEDURE — 93010 ELECTROCARDIOGRAM REPORT: CPT

## 2020-02-05 PROCEDURE — 71045 X-RAY EXAM CHEST 1 VIEW: CPT | Mod: 26

## 2020-02-05 PROCEDURE — 99232 SBSQ HOSP IP/OBS MODERATE 35: CPT | Mod: GC

## 2020-02-05 RX ORDER — ENOXAPARIN SODIUM 100 MG/ML
40 INJECTION SUBCUTANEOUS AT BEDTIME
Refills: 0 | Status: DISCONTINUED | OUTPATIENT
Start: 2020-02-05 | End: 2020-02-07

## 2020-02-05 RX ORDER — AMLODIPINE BESYLATE 2.5 MG/1
1 TABLET ORAL
Qty: 0 | Refills: 0 | DISCHARGE
Start: 2020-02-05

## 2020-02-05 RX ORDER — SENNA PLUS 8.6 MG/1
1 TABLET ORAL ONCE
Refills: 0 | Status: COMPLETED | OUTPATIENT
Start: 2020-02-05 | End: 2020-02-05

## 2020-02-05 RX ORDER — LORATADINE 10 MG/1
10 TABLET ORAL DAILY
Refills: 0 | Status: DISCONTINUED | OUTPATIENT
Start: 2020-02-05 | End: 2020-02-10

## 2020-02-05 RX ORDER — PHYTONADIONE (VIT K1) 5 MG
10 TABLET ORAL DAILY
Refills: 0 | Status: COMPLETED | OUTPATIENT
Start: 2020-02-05 | End: 2020-02-07

## 2020-02-05 RX ORDER — AMLODIPINE BESYLATE 2.5 MG/1
1 TABLET ORAL
Qty: 0 | Refills: 0 | DISCHARGE

## 2020-02-05 RX ORDER — TRAZODONE HCL 50 MG
1 TABLET ORAL
Qty: 0 | Refills: 0 | DISCHARGE

## 2020-02-05 RX ORDER — TRAZODONE HCL 50 MG
1 TABLET ORAL
Qty: 0 | Refills: 0 | DISCHARGE
Start: 2020-02-05

## 2020-02-05 RX ADMIN — Medication 100 MILLIGRAM(S): at 21:56

## 2020-02-05 RX ADMIN — VENETOCLAX 100 MILLIGRAM(S): 100 TABLET, FILM COATED ORAL at 16:57

## 2020-02-05 RX ADMIN — Medication 5 MILLILITER(S): at 06:03

## 2020-02-05 RX ADMIN — Medication 10 MILLIGRAM(S): at 12:46

## 2020-02-05 RX ADMIN — Medication 100 MILLIGRAM(S): at 13:18

## 2020-02-05 RX ADMIN — Medication 1 APPLICATION(S): at 06:03

## 2020-02-05 RX ADMIN — ENOXAPARIN SODIUM 40 MILLIGRAM(S): 100 INJECTION SUBCUTANEOUS at 21:57

## 2020-02-05 RX ADMIN — LORATADINE 10 MILLIGRAM(S): 10 TABLET ORAL at 12:44

## 2020-02-05 RX ADMIN — CHLORHEXIDINE GLUCONATE 1 APPLICATION(S): 213 SOLUTION TOPICAL at 06:00

## 2020-02-05 RX ADMIN — Medication 5 MILLILITER(S): at 12:43

## 2020-02-05 RX ADMIN — Medication 5 MILLIGRAM(S): at 21:57

## 2020-02-05 RX ADMIN — ONDANSETRON 8 MILLIGRAM(S): 8 TABLET, FILM COATED ORAL at 16:35

## 2020-02-05 RX ADMIN — Medication 150 MILLIGRAM(S): at 21:57

## 2020-02-05 RX ADMIN — Medication 5 MILLILITER(S): at 17:01

## 2020-02-05 RX ADMIN — DECITABINE 56.2 MILLIGRAM(S): 50 INJECTION, POWDER, LYOPHILIZED, FOR SOLUTION INTRAVENOUS at 17:13

## 2020-02-05 RX ADMIN — Medication 100 MILLIGRAM(S): at 07:43

## 2020-02-05 RX ADMIN — Medication 1 APPLICATION(S): at 17:00

## 2020-02-05 RX ADMIN — Medication 300 MILLIGRAM(S): at 21:58

## 2020-02-05 RX ADMIN — SENNA PLUS 1 TABLET(S): 8.6 TABLET ORAL at 21:56

## 2020-02-05 RX ADMIN — Medication 100 MILLIGRAM(S): at 00:35

## 2020-02-05 RX ADMIN — SODIUM CHLORIDE 75 MILLILITER(S): 9 INJECTION INTRAMUSCULAR; INTRAVENOUS; SUBCUTANEOUS at 10:19

## 2020-02-05 RX ADMIN — AMLODIPINE BESYLATE 5 MILLIGRAM(S): 2.5 TABLET ORAL at 10:17

## 2020-02-05 RX ADMIN — Medication 12.5 MILLIGRAM(S): at 06:02

## 2020-02-05 NOTE — PROGRESS NOTE ADULT - PROBLEM SELECTOR PLAN 5
c/o chest pressure overnight in setting of cough  EKG, ST with old LBBB, no acute changes  cardiac enzyme  negative  Continue tessalon pearls for cough

## 2020-02-05 NOTE — DISCHARGE NOTE PROVIDER - CARE PROVIDER_API CALL
Itzel Lindsey)  Hematology; Internal Medicine; Medical Oncology  [facility] Medicine[facility] On[facility], [address] [place] [address]  Phone: [phone]  Fax: [phone]  Follow Up Time:

## 2020-02-05 NOTE — DISCHARGE NOTE PROVIDER - NSDCMRMEDTOKEN_GEN_ALL_CORE_FT
alendronate 70 mg oral tablet: 1 tab(s) orally once a week  allopurinol 300 mg oral tablet: 1 tab(s) orally once a day (at bedtime) duration per provider   amLODIPine 5 mg oral tablet: 1 tab(s) orally once a day  ondansetron 8 mg oral tablet: 1 tab(s) orally 3 times a day, As Needed nausea  power picc heparin 10 units per ml 3: 3 milliliter(s) intravenously once a day   power PICC with normal saline 10 ml in each lumen weekly : 10 milliliter(s) intravenous once a week   Reglan 10 mg oral tablet: 1 tab(s) orally every 6 hours, As Needed nausea   traZODone 150 mg oral tablet: 1 tab(s) orally once a day (at bedtime)  venetoclax 100 mg oral tablet: 4 tab(s) orally once a day alendronate 70 mg oral tablet: 1 tab(s) orally once a week  allopurinol 300 mg oral tablet: 1 tab(s) orally once a day (at bedtime) duration per provider   amLODIPine 5 mg oral tablet: 1 tab(s) orally once a day  clobetasol 0.05% topical ointment: 1 application topically 2 times a day  ondansetron 8 mg oral tablet: 1 tab(s) orally 3 times a day, As Needed nausea  power picc heparin 10 units per ml 3: 3 milliliter(s) intravenously once a day   power PICC with normal saline 10 ml in each lumen weekly : 10 milliliter(s) intravenous once a week   Reglan 10 mg oral tablet: 1 tab(s) orally every 6 hours, As Needed nausea   traZODone 150 mg oral tablet: 1 tab(s) orally once a day (at bedtime)  venetoclax 100 mg oral tablet: 4 tab(s) orally once a day alendronate 70 mg oral tablet: 1 tab(s) orally once a week  allopurinol 300 mg oral tablet: 1 tab(s) orally once a day (at bedtime) duration per provider   amLODIPine 5 mg oral tablet: 1 tab(s) orally once a day  clobetasol 0.05% topical ointment: 1 application topically 2 times a day  fluconazole 200 mg oral tablet: 1 tab(s) orally once a day  hydroCHLOROthiazide 12.5 mg oral capsule: 1 cap(s) orally once a day  levoFLOXacin 500 mg oral tablet: 1 tab(s) orally every 24 hours  ondansetron 8 mg oral tablet: 1 tab(s) orally 3 times a day, As Needed nausea  power picc heparin 10 units per ml 3: 3 milliliter(s) intravenously once a day   power PICC with normal saline 10 ml in each lumen weekly : 10 milliliter(s) intravenous once a week   Reglan 10 mg oral tablet: 1 tab(s) orally every 6 hours, As Needed nausea   traZODone 150 mg oral tablet: 1 tab(s) orally once a day (at bedtime)  venetoclax 100 mg oral tablet: 4 tab(s) orally once a day alendronate 70 mg oral tablet: 1 tab(s) orally once a week  allopurinol 300 mg oral tablet: 1 tab(s) orally once a day (at bedtime) duration per provider   amLODIPine 5 mg oral tablet: 1 tab(s) orally once a day  clobetasol 0.05% topical ointment: 1 application topically 2 times a day  fluconazole 200 mg oral tablet: 1 tab(s) orally once a day MDD:1  hydroCHLOROthiazide 12.5 mg oral capsule: 1 cap(s) orally once a day  levoFLOXacin 500 mg oral tablet: 1 tab(s) orally every 24 hours MDD:1  ondansetron 8 mg oral tablet: 1 tab(s) orally 3 times a day, As Needed nausea  power picc heparin 10 units per ml 3: 3 milliliter(s) intravenously once a day   power PICC with normal saline 10 ml in each lumen weekly : 10 milliliter(s) intravenous once a week   Reglan 10 mg oral tablet: 1 tab(s) orally every 6 hours, As Needed nausea   traZODone 150 mg oral tablet: 1 tab(s) orally once a day (at bedtime)  venetoclax 100 mg oral tablet: 4 tab(s) orally once a day alendronate 70 mg oral tablet: 1 tab(s) orally once a week  allopurinol 300 mg oral tablet: 1 tab(s) orally once a day (at bedtime) duration per provider   amLODIPine 5 mg oral tablet: 1 tab(s) orally once a day  clobetasol 0.05% topical ointment: 1 application topically 2 times a day  fluconazole 200 mg oral tablet: 1 tab(s) orally once a day MDD:1  hydroCHLOROthiazide 12.5 mg oral capsule: 1 cap(s) orally once a day  levoFLOXacin 500 mg oral tablet: 1 tab(s) orally every 24 hours MDD:1  ondansetron 8 mg oral tablet: 1 tab(s) orally 3 times a day, As Needed nausea  power picc heparin 10 units per ml 3: 3 milliliter(s) intravenously once a day   power PICC with normal saline 10 ml in each lumen weekly : 10 milliliter(s) intravenous once a week   Reglan 10 mg oral tablet: 1 tab(s) orally every 6 hours, As Needed nausea   traZODone 150 mg oral tablet: 1 tab(s) orally once a day (at bedtime)  venetoclax 100 mg oral tablet: 2 tab(s) orally once a day

## 2020-02-05 NOTE — ADVANCED PRACTICE NURSE CONSULT - REASON FOR CONSULT
Chemotherapy Notes :                                                                                                                                                                                                                                                                                                     Day 2/5 Dacogen IV

## 2020-02-05 NOTE — PROGRESS NOTE ADULT - SUBJECTIVE AND OBJECTIVE BOX
Diagnosis: AML 	    Protocol/Chemo Regimen: Dacogen/Venetoclax    Day: 2    Pt endorsed:     Review of Systems: Denies nausea, vomiting, diarrhea, chest pain, shortness of breath, headache    Pain scale:     Diet: regular, Enlive BID     Allergies: No Known Allergies    ANTIMICROBIALS      HEME/ONC MEDICATIONS  decitabine IVPB (eMAR) 31 milliGRAM(s) IV Intermittent every 24 hours  enoxaparin Injectable 40 milliGRAM(s) SubCutaneous at bedtime  venetoclax 100 milliGRAM(s) Oral once      STANDING MEDICATIONS  allopurinol 300 milliGRAM(s) Oral at bedtime  amLODIPine   Tablet 5 milliGRAM(s) Oral daily  Biotene Dry Mouth Oral Rinse 5 milliLiter(s) Swish and Spit four times a day  chlorhexidine 2% Cloths 1 Application(s) Topical <User Schedule>  clobetasol 0.05% Ointment 1 Application(s) Topical two times a day  hydrochlorothiazide 12.5 milliGRAM(s) Oral daily  ondansetron Injectable 8 milliGRAM(s) IV Push every 24 hours  sodium chloride 0.9%. 1000 milliLiter(s) IV Continuous <Continuous>  traZODone 150 milliGRAM(s) Oral at bedtime      PRN MEDICATIONS  acetaminophen   Tablet .. 650 milliGRAM(s) Oral every 6 hours PRN  benzonatate 100 milliGRAM(s) Oral three times a day PRN  sodium chloride 0.9% lock flush 10 milliLiter(s) IV Push every 1 hour PRN        Vital Signs Last 24 Hrs  T(C): 36.5 (05 Feb 2020 05:48), Max: 37.9 (04 Feb 2020 21:36)  T(F): 97.7 (05 Feb 2020 05:48), Max: 100.2 (04 Feb 2020 21:36)  HR: 67 (05 Feb 2020 05:48) (67 - 99)  BP: 105/61 (05 Feb 2020 05:48) (105/61 - 141/69)  BP(mean): --  RR: 18 (05 Feb 2020 05:48) (18 - 18)  SpO2: 91% (05 Feb 2020 05:48) (91% - 95%)    PHYSICAL EXAM  General: NAD  HEENT: PERRLA, EOMOI, clear oropharynx, anicteric sclera, pink conjunctiva  Neck: supple  CV: (+) S1/S2 RRR  Lungs: clear to auscultation, no wheezes or rales  Abdomen: soft, non-tender, non-distended (+) BS  Ext: no clubbing, cyanosis or edema  Skin: no rashes and no petechiae  Neuro: alert and oriented X 3, no focal deficits  Central Line: PICC c/d/i       LABS:                        9.5    23.39 )-----------( 144      ( 05 Feb 2020 00:29 )             29.0         Mean Cell Volume : 93.5 fl  Mean Cell Hemoglobin : 30.6 pg  Mean Cell Hemoglobin Concentration : 32.8 gm/dL  Auto Neutrophil # : x  Auto Lymphocyte # : x  Auto Monocyte # : x  Auto Eosinophil # : x  Auto Basophil # : x  Auto Neutrophil % : x  Auto Lymphocyte % : x  Auto Monocyte % : x  Auto Eosinophil % : x  Auto Basophil % : x      02-05    144  |  110<H>  |  18  ----------------------------<  120<H>  4.0   |  25  |  0.70    Ca    8.0<L>      05 Feb 2020 06:48  Phos  4.4     02-05  Mg     2.3     02-05    TPro  5.0<L>  /  Alb  3.0<L>  /  TBili  0.2  /  DBili  x   /  AST  58<H>  /  ALT  57<H>  /  AlkPhos  37<L>  02-05      Mg 2.3  Phos 4.4  Mg 2.2  Phos 4.2          LDH --  Uric Acid 2.7      Uric Acid 2.8 Diagnosis: AML 	    Protocol/Chemo Regimen: Dacogen/Venetoclax    Day: 2    Pt endorsed: continues to have itching, better with cream     Review of Systems: Denies nausea, vomiting, diarrhea, chest pain, shortness of breath, headache    Pain scale:     Diet: regular, Enlive BID     Allergies: No Known Allergies    ANTIMICROBIALS      HEME/ONC MEDICATIONS  decitabine IVPB (eMAR) 31 milliGRAM(s) IV Intermittent every 24 hours  enoxaparin Injectable 40 milliGRAM(s) SubCutaneous at bedtime  venetoclax 100 milliGRAM(s) Oral once      STANDING MEDICATIONS  allopurinol 300 milliGRAM(s) Oral at bedtime  amLODIPine   Tablet 5 milliGRAM(s) Oral daily  Biotene Dry Mouth Oral Rinse 5 milliLiter(s) Swish and Spit four times a day  chlorhexidine 2% Cloths 1 Application(s) Topical <User Schedule>  clobetasol 0.05% Ointment 1 Application(s) Topical two times a day  hydrochlorothiazide 12.5 milliGRAM(s) Oral daily  ondansetron Injectable 8 milliGRAM(s) IV Push every 24 hours  sodium chloride 0.9%. 1000 milliLiter(s) IV Continuous <Continuous>  traZODone 150 milliGRAM(s) Oral at bedtime      PRN MEDICATIONS  acetaminophen   Tablet .. 650 milliGRAM(s) Oral every 6 hours PRN  benzonatate 100 milliGRAM(s) Oral three times a day PRN  sodium chloride 0.9% lock flush 10 milliLiter(s) IV Push every 1 hour PRN        Vital Signs Last 24 Hrs  T(C): 36.5 (05 Feb 2020 05:48), Max: 37.9 (04 Feb 2020 21:36)  T(F): 97.7 (05 Feb 2020 05:48), Max: 100.2 (04 Feb 2020 21:36)  HR: 67 (05 Feb 2020 05:48) (67 - 99)  BP: 105/61 (05 Feb 2020 05:48) (105/61 - 141/69)  BP(mean): --  RR: 18 (05 Feb 2020 05:48) (18 - 18)  SpO2: 91% (05 Feb 2020 05:48) (91% - 95%)    PHYSICAL EXAM  General: NAD  HEENT: PERRLA, EOMOI, clear oropharynx, anicteric sclera, pink conjunctiva  Neck: supple  CV: (+) S1/S2 RRR  Lungs: clear to auscultation, no wheezes or rales  Abdomen: soft, non-tender, non-distended (+) BS  Ext: no clubbing, cyanosis or edema  Skin: scattered raised rash to upper chest, under abd, upper arms and upper back   Neuro: alert and oriented X 3, no focal deficits  Central Line: PICC c/d/i       LABS:                        9.5    23.39 )-----------( 144      ( 05 Feb 2020 00:29 )             29.0         Mean Cell Volume : 93.5 fl  Mean Cell Hemoglobin : 30.6 pg  Mean Cell Hemoglobin Concentration : 32.8 gm/dL  Auto Neutrophil # : x  Auto Lymphocyte # : x  Auto Monocyte # : x  Auto Eosinophil # : x  Auto Basophil # : x  Auto Neutrophil % : x  Auto Lymphocyte % : x  Auto Monocyte % : x  Auto Eosinophil % : x  Auto Basophil % : x      02-05    144  |  110<H>  |  18  ----------------------------<  120<H>  4.0   |  25  |  0.70    Ca    8.0<L>      05 Feb 2020 06:48  Phos  4.4     02-05  Mg     2.3     02-05    TPro  5.0<L>  /  Alb  3.0<L>  /  TBili  0.2  /  DBili  x   /  AST  58<H>  /  ALT  57<H>  /  AlkPhos  37<L>  02-05      Mg 2.3  Phos 4.4  Mg 2.2  Phos 4.2          LDH --  Uric Acid 2.7      Uric Acid 2.8

## 2020-02-05 NOTE — PROGRESS NOTE ADULT - ASSESSMENT
79 F with history of Breast cancer 12 years ago treated with tamoxifen (no chemotherapy), s/p RT and lumpectomy, HTN and surgical hx of hysterectomy. Pt was sent in by oncologist Dr. Blank for rule out leukemia.  PB flow cytometry on 1/30 reveal myeloblasts 72%, HLA-DR, CD33, 34 +,  c/w AML, FLT 3+.

## 2020-02-05 NOTE — PROGRESS NOTE ADULT - PROBLEM SELECTOR PLAN 6
Remote history, treated with lumpectomy, RT and Tamoxifen, no chemotherapy  currently in remission   follows with Dr. Blank Vitamin K 10mg x 3 days (2/5-2/7)  Monitor

## 2020-02-05 NOTE — CHART NOTE - NSCHARTNOTEFT_GEN_A_CORE
CC: Chest pressure    HPI: Asked by RN to evaluate patient for chest pressure. Patient seen and examined at the bedside. Patient states chest pressure occurs with an intermittent dry cough that has been present since admission (01/31/2020). Denies any chest pain or palpitations.. Denies fevers/chills, weakness, diaphoresis, headaches, dizziness, syncope, chest pain, palpitations, dyspnea, abdominal pain, N/V/D.     Vital Signs Last 24 Hrs  T(C): 37.9 (04 Feb 2020 21:36), Max: 37.9 (04 Feb 2020 21:36)  T(F): 100.2 (04 Feb 2020 21:36), Max: 100.2 (04 Feb 2020 21:36)  HR: 80 (04 Feb 2020 21:36) (69 - 85)  BP: 123/76 (04 Feb 2020 21:36) (119/67 - 154/69)  RR: 18 (04 Feb 2020 21:36) (18 - 18)  SpO2: 93% (04 Feb 2020 21:36) (93% - 95%)    PHYSICAL EXAM:  General: NAD, A&Ox3  Respiratory: Lungs clear to auscultation bilaterally. No wheezes, rales, rhonchi. Normal respiratory effort.   Cardiovascular: S1, S2 present. Regular rate and rhythm. No murmurs, rubs, or gallops  Gastrointestinal: BS x4 normoactive. Soft, non-tender, non-distended.   Extremities: 2+ peripheral pulses. No edema, cyanosis.    A/P  HPI:  79 F with history of Breast cancer 12 years ago treated with tamoxifen (no chemotherapy), s/p RT and lumpectomy, HTN and surgical hx of hysterectomy sent in by oncologist Dr. Blank for rule out leukemia. Patient has been feeling generally fatigued and sluggish since December. She states that she recently had a case of scabies that was treated at the end of December. Since then she was feeling more tired, and saw her PMD, who did some bloodwork and noticed blasts in her peripheral blood. Pt was told to see Dr. Blank for further workup. Dr. Blank did blood work again which again found blasts in the peripheral blood, at which point she sent pt to the ER for leukemia workup.     Patient otherwise feels well. She has not had any recent fevers, chills, nausea, vomiting, weight loss, chest pain, shortness of breath, abdominal pain, or leg swelling. She has had two days of night sweats, and some loss of appetite. She denies any family hx of malignancy, and her breast ca is in remission currently. (31 Jan 2020 15:18)      #  -Repeat vitals were stable  -Discussed with RN  -Will continue to monitor  -Will endorse to AM team      Cristina Ferris PA-C  # CC: Chest pressure    HPI: Asked by RN to evaluate patient for chest pressure. Patient seen and examined at the bedside. Patient states chest pressure occurs with an intermittent dry cough that has been present since admission (01/31/2020). Chest pressure is mainly located in the mid-sternal region, and denies any radiation to the neck, jaws, or back. Denies any chest pain, palpitations, dyspnea, or paresthesias. Denies fevers/chills, weakness, diaphoresis, headaches, dizziness, syncope, abdominal pain, N/V/D.     Vital Signs Last 24 Hrs  T(C): 37.9 (04 Feb 2020 21:36), Max: 37.9 (04 Feb 2020 21:36)  T(F): 100.2 (04 Feb 2020 21:36), Max: 100.2 (04 Feb 2020 21:36)  HR: 80 (04 Feb 2020 21:36) (69 - 85)  BP: 123/76 (04 Feb 2020 21:36) (119/67 - 154/69)  RR: 18 (04 Feb 2020 21:36) (18 - 18)  SpO2: 93% (04 Feb 2020 21:36) (93% - 95%)    PHYSICAL EXAM:  General: NAD, A&Ox3  Respiratory: Lungs clear to auscultation bilaterally. No wheezes, rales, rhonchi. Normal respiratory effort.   Cardiovascular: S1, S2 present. Regular rate and rhythm. No murmurs, rubs, or gallops  Gastrointestinal: BS x4 normoactive. Soft, non-tender, non-distended.   Extremities: 2+ peripheral pulses. No edema, cyanosis.    A/P  79 F with history of Breast cancer 12 years ago treated with tamoxifen (no chemotherapy), s/p RT and lumpectomy, HTN and surgical hx of hysterectomy. Pt was sent in by oncologist Dr. Blank for rule out leukemia.  PB flow cytometry on 1/30 reveal myeloblasts 72%, HLA-DR, CD33, 34 +,  c/w AML,   Now, patient with chest pressure that occurs with an intermittent dry cough that has been present since admission (01/31/2020). Chest pressure is mainly located in the mid-sternal region, and denies any radiation to the neck, jaws, or back. Denies any chest pain, palpitations, dyspnea, or palpitations.     #Chest pressure, likely 2/2 cough  -Repeat vitals were stable  -STAT EKG shows sinus tachycardia (HR: 101) with chronic left bundle branch block. No acute changes when compared with previous EKGs  -F/u STAT cardiac enzymes  -F/u STAT RVP    -Tessalon perle 100 mg PO given as scheduled   -Discussed with RN  -Will continue to monitor  -Will endorse to AM team      ELEANOR BecerraC  #[id number] CC: Chest pressure    HPI: Asked by RN to evaluate patient for chest pressure. Patient seen and examined at the bedside. Patient states chest pressure occurs with an intermittent dry cough that has been present since admission (01/31/2020). Chest pressure is mainly located in the mid-sternal region, and denies any radiation to the neck, jaws, or back. Denies any chest pain, palpitations, dyspnea, or paresthesias. Denies fevers/chills, weakness, diaphoresis, headaches, dizziness, syncope, abdominal pain, N/V/D.     Vital Signs Last 24 Hrs  T(C): 37.9 (04 Feb 2020 21:36), Max: 37.9 (04 Feb 2020 21:36)  T(F): 100.2 (04 Feb 2020 21:36), Max: 100.2 (04 Feb 2020 21:36)  HR: 80 (04 Feb 2020 21:36) (69 - 85)  BP: 123/76 (04 Feb 2020 21:36) (119/67 - 154/69)  RR: 18 (04 Feb 2020 21:36) (18 - 18)  SpO2: 93% (04 Feb 2020 21:36) (93% - 95%)    PHYSICAL EXAM:  General: NAD, A&Ox3  Respiratory: Lungs clear to auscultation bilaterally. No wheezes, rales, rhonchi. Normal respiratory effort.   Cardiovascular: S1, S2 present. Regular rate and rhythm. No murmurs, rubs, or gallops  Gastrointestinal: BS x4 normoactive. Soft, non-tender, non-distended.   Extremities: 2+ peripheral pulses. No edema, cyanosis.    A/P  79 F with history of Breast cancer 12 years ago treated with tamoxifen (no chemotherapy), s/p RT and lumpectomy, HTN and surgical hx of hysterectomy. Pt was sent in by oncologist Dr. Blank for rule out leukemia.  PB flow cytometry on 1/30 reveal myeloblasts 72%, HLA-DR, CD33, 34 +,  c/w AML,   Now, patient with chest pressure that occurs with an intermittent dry cough that has been present since admission (01/31/2020). Chest pressure is mainly located in the mid-sternal region, and denies any radiation to the neck, jaws, or back. Denies any chest pain, palpitations, dyspnea, or palpitations.     #Chest pressure, likely 2/2 cough  -Repeat vitals were stable  -STAT EKG shows sinus tachycardia (HR: 101) with chronic left bundle branch block. No acute changes when compared with previous EKGs  -F/u STAT cardiac enzymes  -F/u STAT RVP    -Tessalon perle 100 mg PO given as scheduled   -Discussed with RN  -Will continue to monitor  -Will endorse to AM team      Cristina Ferris PA-C  #[id number]        **ADDENDUM @ 01:23**  CARDIAC MARKERS ( 04 Feb 2020 22:54 )  x     / x     / 64 U/L / x     / 1.9 ng/mL    -Troponins neg (18)   -Cardiac markers WNL  -Will continue to monitor  -Will endorse to AM team      Cristina Ferris PA-C  #[id number] CC: Chest pressure    HPI: Asked by RN to evaluate patient for chest pressure. Patient seen and examined at the bedside. Patient states chest pressure occurs with an intermittent dry cough that has been present since admission (01/31/2020). Chest pressure is mainly located in the mid-sternal region, and denies any radiation to the neck, jaws, or back. Denies any chest pain, palpitations, dyspnea, or paresthesias. Denies fevers/chills, weakness, diaphoresis, headaches, dizziness, syncope, abdominal pain, N/V/D.     Vital Signs Last 24 Hrs  T(C): 37.9 (04 Feb 2020 21:36), Max: 37.9 (04 Feb 2020 21:36)  T(F): 100.2 (04 Feb 2020 21:36), Max: 100.2 (04 Feb 2020 21:36)  HR: 80 (04 Feb 2020 21:36) (69 - 85)  BP: 123/76 (04 Feb 2020 21:36) (119/67 - 154/69)  RR: 18 (04 Feb 2020 21:36) (18 - 18)  SpO2: 93% (04 Feb 2020 21:36) (93% - 95%)    PHYSICAL EXAM:  General: NAD, A&Ox3  Respiratory: Lungs clear to auscultation bilaterally. No wheezes, rales, rhonchi. Normal respiratory effort.   Cardiovascular: S1, S2 present. Regular rate and rhythm. No murmurs, rubs, or gallops  Gastrointestinal: BS x4 normoactive. Soft, non-tender, non-distended.   Extremities: 2+ peripheral pulses. No edema, cyanosis.    A/P  79 F with history of Breast cancer 12 years ago treated with tamoxifen (no chemotherapy), s/p RT and lumpectomy, HTN and surgical hx of hysterectomy. Pt was sent in by oncologist Dr. Blank for rule out leukemia.  PB flow cytometry on 1/30 reveal myeloblasts 72%, HLA-DR, CD33, 34 +,  c/w AML,   Now, patient with chest pressure that occurs with an intermittent dry cough that has been present since admission (01/31/2020). Chest pressure is mainly located in the mid-sternal region, and denies any radiation to the neck, jaws, or back. Denies any chest pain, palpitations, dyspnea, or palpitations.     #Chest pressure, likely 2/2 cough  -Repeat vitals were stable  -CXR (02/04) shows clear lungs  -STAT EKG shows sinus tachycardia (HR: 101) with chronic left bundle branch block. No acute changes when compared with previous EKGs  -F/u STAT cardiac enzymes  -F/u STAT RVP    -Tessalon perle 100 mg PO given as scheduled   -Discussed with RN  -Will continue to monitor  -Will endorse to AM team      Cristina Ferris PA-C  #[id number]        **ADDENDUM @ 01:23**  CARDIAC MARKERS ( 04 Feb 2020 22:54 )  x     / x     / 64 U/L / x     / 1.9 ng/mL    -Troponins neg (18)   -Cardiac markers WNL  -Will continue to monitor  -Will endorse to AM team      Cristina Ferris PA-C  #[id number]

## 2020-02-05 NOTE — DISCHARGE NOTE PROVIDER - NSDCFUSCHEDAPPT_GEN_ALL_CORE_FT
ALEX STOCK ; 02/11/2020 ; [facility] ALEX STOCK ; 02/11/2020 ; [facility]  ALEX STOCK ; 02/11/2020 ; [facility]  ALEX STOCK ; 02/11/2020 ; [facility]  ALEX STOCK ; 02/14/2020 ; [facility]

## 2020-02-05 NOTE — PROGRESS NOTE ADULT - ATTENDING COMMENTS
78yo F with newly dx'ed AML (CD33+), here to start treatment    -d/w pt options of myeloablative chemo vs. Dacogen/Venetoclax. Risks/benefits d/w pt, will give Dacogen/Venetoclax with Venetoclax titration and monitoring for tumor lysis syndrome  - BM bx done on 2/3 -f/u cytogenetics and [facility]  -monitor for tumor lysis  -monitor for fevers  -PICC line placed 2/3 -will need home care for it 78yo F with newly dx'ed AML (CD33+, FLT3 +), started Dacogen/Venetoclax C1 day +1    -Dacogen/Venetoclax given with Venetoclax titration and monitoring for tumor lysis syndrome  - BM bx done on 2/3 -f/u cytogenetics and [facility]  -monitor for tumor lysis  -monitor for fevers  -PICC line placed 2/3  -d/c home on 2/8 if stable, with outpt f/u

## 2020-02-05 NOTE — PROGRESS NOTE ADULT - PROBLEM SELECTOR PLAN 1
Continue Dacogen and venotoclax  PB flow cytometry on 1/30 reveal myeloblasts 72%, HLA-DR, CD33, 34 +,  c/w AML,   PB FISH normal   Allopurinol 300mg daily   TLS q8h   IV hydration  Monitor CBC/diff/lytes, transfuse and or replete lytes PRN  Monitor weight and I and O's, diuresis PRN   BM bx on 2/3, fu result    MUGA 70%  antiemetics  mouth care  HLA sent on 1/31

## 2020-02-05 NOTE — DISCHARGE NOTE PROVIDER - NSDCCPCAREPLAN_GEN_ALL_CORE_FT
PRINCIPAL DISCHARGE DIAGNOSIS  Diagnosis: Acute leukemia  Assessment and Plan of Treatment: Maintain counts, remain infection free. Notify MD or report to ER for fever greater or equal to 100.4, persistant nausea,vomiting, diarrhea, bleeding

## 2020-02-05 NOTE — PROGRESS NOTE ADULT - PROBLEM SELECTOR PLAN 7
VTE ppx until plt <50   Encourage ambulation             Contact info [id number] Remote history, treated with lumpectomy, RT and Tamoxifen, no chemotherapy  currently in remission   follows with Dr. Blank

## 2020-02-05 NOTE — ADVANCED PRACTICE NURSE CONSULT - ASSESSMENT
Patient's alert & oriented x 4,resting in bed,denies any  discomfort,,verbalized understanding re- chemo TX.,labs today checked & reviewed by Dr. Lindsey during rounds,W/ L arm  DL PICC,dressing intact,redness & swelling noted on site ,seen by PICC line Nurse,dressing changed ,flushed ports w/ NS ,w/ + blood return,flushes easily,w/ order  to Primary Nurse to go ahead w/ IVF & chemo TX.,Zofran 8 mg IVP given,chemo TX. checked & verified w/ other RN, Decitabine 20 mg/m2=31 mg IV to infuse over 1 hour given @ 1713 pm.Primary Nurse aware re- chemo TX.,will follow.

## 2020-02-05 NOTE — DISCHARGE NOTE PROVIDER - NSDCFUADDAPPT_GEN_ALL_CORE_FT
To [facility] on Tuesday 2/11 at 10:30am to see Dr. Lindsey  To [facility] on Tuesday 2/11 for platelet check and possible platelet transfusion at _____  To [facility] on Friday 2/14 for platelet check and possible transfusion at ______ To [facility] on Tuesday 2/11 at 10:30am to see Dr. Lindsey  To [facility] on Tuesday 2/11 for platelet check and possible platelet transfusion at 11:30 am.  To [facility] on Friday 2/14 for platelet check and possible transfusion at 3:30 PM.

## 2020-02-05 NOTE — DISCHARGE NOTE PROVIDER - HOSPITAL COURSE
79 F with history of Breast cancer 12 years ago treated with tamoxifen (no chemotherapy), s/p RT and lumpectomy, HTN and surgical hx of hysterectomy. Pt was sent in by oncologist Dr. Blank for rule out leukemia.  PB flow cytometry on 1/30 reveal myeloblasts 72%, HLA-DR, CD33, 34 +,  c/w AML, FLT 3+.      Tolerated chemotherapy, follow up appointments made. 79 F with history of Breast cancer 12 years ago treated with tamoxifen (no chemotherapy), s/p RT and lumpectomy, HTN and surgical hx of hysterectomy. Pt was sent in by oncologist Dr. Blank for rule out leukemia.  PB flow cytometry on 1/30 reveal myeloblasts 72%, HLA-DR, CD33, 34 +,  Bone marrow bx c/w AML, FLT 3+.      On 2/4, pt started chemo with Dacogen/Venetoclax(with Venetoclax titration).Tumor lysis syndrome labs were monitored.     Pt became neutropenic on 2/6, Levaquin orally added empirically.     Tolerated chemotherapy, follow up appointments made. 79 F with history of Breast cancer 12 years ago treated with tamoxifen (no chemotherapy), s/p RT and lumpectomy, HTN and surgical hx of hysterectomy. Pt was sent in by oncologist Dr. Blank for rule out leukemia.  PB flow cytometry on 1/30 reveal myeloblasts 72%, HLA-DR, CD33, 34 +,  Bone marrow bx c/w AML, FLT 3+.      On 2/4, pt started chemo with Dacogen/Venetoclax(with Venetoclax titration).Tumor lysis syndrome labs were monitored. Pt became neutropenic on 2/6, Levaquin orally added empirically.     Tolerated chemotherapy, follow up appointments made.

## 2020-02-06 ENCOUNTER — OUTPATIENT (OUTPATIENT)
Dept: OUTPATIENT SERVICES | Facility: HOSPITAL | Age: 80
LOS: 1 days | Discharge: HOME | End: 2020-02-06

## 2020-02-06 ENCOUNTER — TRANSCRIPTION ENCOUNTER (OUTPATIENT)
Age: 80
End: 2020-02-06

## 2020-02-06 DIAGNOSIS — C92.00 ACUTE MYELOBLASTIC LEUKEMIA, NOT HAVING ACHIEVED REMISSION: ICD-10-CM

## 2020-02-06 DIAGNOSIS — Z90.710 ACQUIRED ABSENCE OF BOTH CERVIX AND UTERUS: Chronic | ICD-10-CM

## 2020-02-06 LAB
ALBUMIN SERPL ELPH-MCNC: 3 G/DL — LOW (ref 3.3–5)
ALBUMIN SERPL ELPH-MCNC: 3.1 G/DL — LOW (ref 3.3–5)
ALP SERPL-CCNC: 34 U/L — LOW (ref 40–120)
ALP SERPL-CCNC: 35 U/L — LOW (ref 40–120)
ALT FLD-CCNC: 43 U/L — SIGNIFICANT CHANGE UP (ref 10–45)
ALT FLD-CCNC: 50 U/L — HIGH (ref 10–45)
ANION GAP SERPL CALC-SCNC: 12 MMOL/L — SIGNIFICANT CHANGE UP (ref 5–17)
ANION GAP SERPL CALC-SCNC: 12 MMOL/L — SIGNIFICANT CHANGE UP (ref 5–17)
ANION GAP SERPL CALC-SCNC: 7 MMOL/L — SIGNIFICANT CHANGE UP (ref 5–17)
APTT BLD: 29.4 SEC — SIGNIFICANT CHANGE UP (ref 27.5–36.3)
AST SERPL-CCNC: 30 U/L — SIGNIFICANT CHANGE UP (ref 10–40)
AST SERPL-CCNC: 42 U/L — HIGH (ref 10–40)
BASE EXCESS BLDV CALC-SCNC: 3.1 MMOL/L — HIGH (ref -2–2)
BASOPHILS # BLD AUTO: 0 K/UL — SIGNIFICANT CHANGE UP (ref 0–0.2)
BASOPHILS NFR BLD AUTO: 0 % — SIGNIFICANT CHANGE UP (ref 0–2)
BILIRUB SERPL-MCNC: 0.3 MG/DL — SIGNIFICANT CHANGE UP (ref 0.2–1.2)
BILIRUB SERPL-MCNC: 0.3 MG/DL — SIGNIFICANT CHANGE UP (ref 0.2–1.2)
BLD GP AB SCN SERPL QL: NEGATIVE — SIGNIFICANT CHANGE UP
BUN SERPL-MCNC: 16 MG/DL — SIGNIFICANT CHANGE UP (ref 7–23)
BUN SERPL-MCNC: 16 MG/DL — SIGNIFICANT CHANGE UP (ref 7–23)
BUN SERPL-MCNC: 17 MG/DL — SIGNIFICANT CHANGE UP (ref 7–23)
CA-I SERPL-SCNC: 1.19 MMOL/L — SIGNIFICANT CHANGE UP (ref 1.12–1.3)
CALCIUM SERPL-MCNC: 8.1 MG/DL — LOW (ref 8.4–10.5)
CALCIUM SERPL-MCNC: 8.2 MG/DL — LOW (ref 8.4–10.5)
CALCIUM SERPL-MCNC: 8.7 MG/DL — SIGNIFICANT CHANGE UP (ref 8.4–10.5)
CHLORIDE BLDV-SCNC: 104 MMOL/L — SIGNIFICANT CHANGE UP (ref 96–108)
CHLORIDE SERPL-SCNC: 105 MMOL/L — SIGNIFICANT CHANGE UP (ref 96–108)
CHLORIDE SERPL-SCNC: 106 MMOL/L — SIGNIFICANT CHANGE UP (ref 96–108)
CHLORIDE SERPL-SCNC: 110 MMOL/L — HIGH (ref 96–108)
CK MB CFR SERPL CALC: 3.1 NG/ML — SIGNIFICANT CHANGE UP (ref 0–3.8)
CK SERPL-CCNC: 68 U/L — SIGNIFICANT CHANGE UP (ref 25–170)
CO2 BLDV-SCNC: 28 MMOL/L — SIGNIFICANT CHANGE UP (ref 22–30)
CO2 SERPL-SCNC: 25 MMOL/L — SIGNIFICANT CHANGE UP (ref 22–31)
CO2 SERPL-SCNC: 25 MMOL/L — SIGNIFICANT CHANGE UP (ref 22–31)
CO2 SERPL-SCNC: 27 MMOL/L — SIGNIFICANT CHANGE UP (ref 22–31)
CREAT SERPL-MCNC: 0.51 MG/DL — SIGNIFICANT CHANGE UP (ref 0.5–1.3)
CREAT SERPL-MCNC: 0.55 MG/DL — SIGNIFICANT CHANGE UP (ref 0.5–1.3)
CREAT SERPL-MCNC: 0.62 MG/DL — SIGNIFICANT CHANGE UP (ref 0.5–1.3)
D DIMER BLD IA.RAPID-MCNC: HIGH NG/ML DDU
EOSINOPHIL # BLD AUTO: 0.23 K/UL — SIGNIFICANT CHANGE UP (ref 0–0.5)
EOSINOPHIL NFR BLD AUTO: 7 % — HIGH (ref 0–6)
FIBRINOGEN PPP-MCNC: 448 MG/DL — SIGNIFICANT CHANGE UP (ref 350–510)
GAS PNL BLDV: 139 MMOL/L — SIGNIFICANT CHANGE UP (ref 135–145)
GAS PNL BLDV: SIGNIFICANT CHANGE UP
GAS PNL BLDV: SIGNIFICANT CHANGE UP
GLUCOSE BLDV-MCNC: 110 MG/DL — HIGH (ref 70–99)
GLUCOSE SERPL-MCNC: 105 MG/DL — HIGH (ref 70–99)
GLUCOSE SERPL-MCNC: 112 MG/DL — HIGH (ref 70–99)
GLUCOSE SERPL-MCNC: 151 MG/DL — HIGH (ref 70–99)
HCO3 BLDV-SCNC: 27 MMOL/L — SIGNIFICANT CHANGE UP (ref 21–29)
HCT VFR BLD CALC: 21.5 % — LOW (ref 34.5–45)
HCT VFR BLD CALC: 24.3 % — LOW (ref 34.5–45)
HCT VFR BLD CALC: 24.3 % — LOW (ref 34.5–45)
HCT VFR BLDA CALC: 23 % — LOW (ref 39–50)
HGB BLD CALC-MCNC: 7.4 G/DL — LOW (ref 11.5–15.5)
HGB BLD-MCNC: 7.1 G/DL — LOW (ref 11.5–15.5)
HGB BLD-MCNC: 7.9 G/DL — LOW (ref 11.5–15.5)
HGB BLD-MCNC: 8 G/DL — LOW (ref 11.5–15.5)
INR BLD: 1.24 RATIO — HIGH (ref 0.88–1.16)
LACTATE BLDV-MCNC: 0.7 MMOL/L — SIGNIFICANT CHANGE UP (ref 0.7–2)
LDH SERPL L TO P-CCNC: 1427 U/L — HIGH (ref 50–242)
LYMPHOCYTES # BLD AUTO: 1.1 K/UL — SIGNIFICANT CHANGE UP (ref 1–3.3)
LYMPHOCYTES # BLD AUTO: 33 % — SIGNIFICANT CHANGE UP (ref 13–44)
MAGNESIUM SERPL-MCNC: 2.1 MG/DL — SIGNIFICANT CHANGE UP (ref 1.6–2.6)
MAGNESIUM SERPL-MCNC: 2.2 MG/DL — SIGNIFICANT CHANGE UP (ref 1.6–2.6)
MAGNESIUM SERPL-MCNC: 2.4 MG/DL — SIGNIFICANT CHANGE UP (ref 1.6–2.6)
MCHC RBC-ENTMCNC: 30.5 PG — SIGNIFICANT CHANGE UP (ref 27–34)
MCHC RBC-ENTMCNC: 31 PG — SIGNIFICANT CHANGE UP (ref 27–34)
MCHC RBC-ENTMCNC: 31.1 PG — SIGNIFICANT CHANGE UP (ref 27–34)
MCHC RBC-ENTMCNC: 32.5 GM/DL — SIGNIFICANT CHANGE UP (ref 32–36)
MCHC RBC-ENTMCNC: 32.9 GM/DL — SIGNIFICANT CHANGE UP (ref 32–36)
MCHC RBC-ENTMCNC: 33 GM/DL — SIGNIFICANT CHANGE UP (ref 32–36)
MCV RBC AUTO: 93.8 FL — SIGNIFICANT CHANGE UP (ref 80–100)
MCV RBC AUTO: 93.9 FL — SIGNIFICANT CHANGE UP (ref 80–100)
MCV RBC AUTO: 94.6 FL — SIGNIFICANT CHANGE UP (ref 80–100)
MONOCYTES # BLD AUTO: 0.47 K/UL — SIGNIFICANT CHANGE UP (ref 0–0.9)
MONOCYTES NFR BLD AUTO: 14 % — SIGNIFICANT CHANGE UP (ref 2–14)
NEUTROPHILS # BLD AUTO: 0.67 K/UL — LOW (ref 1.8–7.4)
NEUTROPHILS NFR BLD AUTO: 20 % — LOW (ref 43–77)
NRBC # BLD: 0 /100 WBCS — SIGNIFICANT CHANGE UP (ref 0–0)
NRBC # BLD: 0 /100 WBCS — SIGNIFICANT CHANGE UP (ref 0–0)
OTHER CELLS CSF MANUAL: 10 ML/DL — LOW (ref 18–22)
PCO2 BLDV: 41 MMHG — SIGNIFICANT CHANGE UP (ref 35–50)
PH BLDV: 7.43 — SIGNIFICANT CHANGE UP (ref 7.35–7.45)
PHOSPHATE SERPL-MCNC: 3.4 MG/DL — SIGNIFICANT CHANGE UP (ref 2.5–4.5)
PHOSPHATE SERPL-MCNC: 3.4 MG/DL — SIGNIFICANT CHANGE UP (ref 2.5–4.5)
PHOSPHATE SERPL-MCNC: 3.6 MG/DL — SIGNIFICANT CHANGE UP (ref 2.5–4.5)
PLATELET # BLD AUTO: 67 K/UL — LOW (ref 150–400)
PLATELET # BLD AUTO: 69 K/UL — LOW (ref 150–400)
PLATELET # BLD AUTO: 80 K/UL — LOW (ref 150–400)
PLATELET # BLD AUTO: 98 K/UL — LOW (ref 150–400)
PO2 BLDV: 89 MMHG — HIGH (ref 25–45)
POTASSIUM BLDV-SCNC: 3.5 MMOL/L — SIGNIFICANT CHANGE UP (ref 3.5–5.3)
POTASSIUM SERPL-MCNC: 3.6 MMOL/L — SIGNIFICANT CHANGE UP (ref 3.5–5.3)
POTASSIUM SERPL-MCNC: 3.6 MMOL/L — SIGNIFICANT CHANGE UP (ref 3.5–5.3)
POTASSIUM SERPL-MCNC: 3.7 MMOL/L — SIGNIFICANT CHANGE UP (ref 3.5–5.3)
POTASSIUM SERPL-SCNC: 3.6 MMOL/L — SIGNIFICANT CHANGE UP (ref 3.5–5.3)
POTASSIUM SERPL-SCNC: 3.6 MMOL/L — SIGNIFICANT CHANGE UP (ref 3.5–5.3)
POTASSIUM SERPL-SCNC: 3.7 MMOL/L — SIGNIFICANT CHANGE UP (ref 3.5–5.3)
PROT SERPL-MCNC: 5 G/DL — LOW (ref 6–8.3)
PROT SERPL-MCNC: 5.4 G/DL — LOW (ref 6–8.3)
PROTHROM AB SERPL-ACNC: 14.2 SEC — HIGH (ref 10–12.9)
RBC # BLD: 2.29 M/UL — LOW (ref 3.8–5.2)
RBC # BLD: 2.57 M/UL — LOW (ref 3.8–5.2)
RBC # BLD: 2.59 M/UL — LOW (ref 3.8–5.2)
RBC # FLD: 17.9 % — HIGH (ref 10.3–14.5)
RBC # FLD: 18.1 % — HIGH (ref 10.3–14.5)
RBC # FLD: 18.6 % — HIGH (ref 10.3–14.5)
RH IG SCN BLD-IMP: POSITIVE — SIGNIFICANT CHANGE UP
SAO2 % BLDV: 98 % — HIGH (ref 67–88)
SODIUM SERPL-SCNC: 142 MMOL/L — SIGNIFICANT CHANGE UP (ref 135–145)
SODIUM SERPL-SCNC: 143 MMOL/L — SIGNIFICANT CHANGE UP (ref 135–145)
SODIUM SERPL-SCNC: 144 MMOL/L — SIGNIFICANT CHANGE UP (ref 135–145)
TROPONIN T, HIGH SENSITIVITY RESULT: 22 NG/L — SIGNIFICANT CHANGE UP (ref 0–51)
URATE SERPL-MCNC: 2.9 MG/DL — SIGNIFICANT CHANGE UP (ref 2.5–7)
URATE SERPL-MCNC: 3.2 MG/DL — SIGNIFICANT CHANGE UP (ref 2.5–7)
WBC # BLD: 1.72 K/UL — LOW (ref 3.8–10.5)
WBC # BLD: 2.72 K/UL — LOW (ref 3.8–10.5)
WBC # BLD: 3.33 K/UL — LOW (ref 3.8–10.5)
WBC # FLD AUTO: 1.72 K/UL — LOW (ref 3.8–10.5)
WBC # FLD AUTO: 2.72 K/UL — LOW (ref 3.8–10.5)
WBC # FLD AUTO: 3.33 K/UL — LOW (ref 3.8–10.5)

## 2020-02-06 PROCEDURE — 99232 SBSQ HOSP IP/OBS MODERATE 35: CPT | Mod: GC

## 2020-02-06 PROCEDURE — 93010 ELECTROCARDIOGRAM REPORT: CPT

## 2020-02-06 PROCEDURE — 71045 X-RAY EXAM CHEST 1 VIEW: CPT | Mod: 26

## 2020-02-06 RX ORDER — SENNA PLUS 8.6 MG/1
2 TABLET ORAL AT BEDTIME
Refills: 0 | Status: DISCONTINUED | OUTPATIENT
Start: 2020-02-06 | End: 2020-02-10

## 2020-02-06 RX ORDER — FLUTICASONE PROPIONATE 50 MCG
1 SPRAY, SUSPENSION NASAL
Refills: 0 | Status: DISCONTINUED | OUTPATIENT
Start: 2020-02-06 | End: 2020-02-10

## 2020-02-06 RX ORDER — POLYETHYLENE GLYCOL 3350 17 G/17G
17 POWDER, FOR SOLUTION ORAL DAILY
Refills: 0 | Status: DISCONTINUED | OUTPATIENT
Start: 2020-02-06 | End: 2020-02-10

## 2020-02-06 RX ADMIN — POLYETHYLENE GLYCOL 3350 17 GRAM(S): 17 POWDER, FOR SOLUTION ORAL at 12:01

## 2020-02-06 RX ADMIN — SENNA PLUS 2 TABLET(S): 8.6 TABLET ORAL at 21:43

## 2020-02-06 RX ADMIN — CHLORHEXIDINE GLUCONATE 1 APPLICATION(S): 213 SOLUTION TOPICAL at 06:43

## 2020-02-06 RX ADMIN — Medication 5 MILLILITER(S): at 00:00

## 2020-02-06 RX ADMIN — Medication 5 MILLILITER(S): at 05:59

## 2020-02-06 RX ADMIN — Medication 300 MILLIGRAM(S): at 21:31

## 2020-02-06 RX ADMIN — SODIUM CHLORIDE 75 MILLILITER(S): 9 INJECTION INTRAMUSCULAR; INTRAVENOUS; SUBCUTANEOUS at 12:01

## 2020-02-06 RX ADMIN — Medication 5 MILLILITER(S): at 12:00

## 2020-02-06 RX ADMIN — LORATADINE 10 MILLIGRAM(S): 10 TABLET ORAL at 06:00

## 2020-02-06 RX ADMIN — Medication 5 MILLILITER(S): at 17:48

## 2020-02-06 RX ADMIN — ONDANSETRON 8 MILLIGRAM(S): 8 TABLET, FILM COATED ORAL at 15:50

## 2020-02-06 RX ADMIN — Medication 12.5 MILLIGRAM(S): at 05:59

## 2020-02-06 RX ADMIN — AMLODIPINE BESYLATE 5 MILLIGRAM(S): 2.5 TABLET ORAL at 06:00

## 2020-02-06 RX ADMIN — Medication 1 APPLICATION(S): at 17:49

## 2020-02-06 RX ADMIN — DECITABINE 56.2 MILLIGRAM(S): 50 INJECTION, POWDER, LYOPHILIZED, FOR SOLUTION INTRAVENOUS at 16:26

## 2020-02-06 RX ADMIN — Medication 1 SPRAY(S): at 17:48

## 2020-02-06 RX ADMIN — Medication 100 MILLIGRAM(S): at 14:01

## 2020-02-06 RX ADMIN — Medication 150 MILLIGRAM(S): at 21:31

## 2020-02-06 RX ADMIN — Medication 10 MILLIGRAM(S): at 12:01

## 2020-02-06 RX ADMIN — VENETOCLAX 400 MILLIGRAM(S): 100 TABLET, FILM COATED ORAL at 12:01

## 2020-02-06 RX ADMIN — ENOXAPARIN SODIUM 40 MILLIGRAM(S): 100 INJECTION SUBCUTANEOUS at 21:31

## 2020-02-06 RX ADMIN — Medication 1 APPLICATION(S): at 05:59

## 2020-02-06 RX ADMIN — Medication 100 MILLIGRAM(S): at 21:31

## 2020-02-06 NOTE — PROGRESS NOTE ADULT - SUBJECTIVE AND OBJECTIVE BOX
Diagnosis: AML 	    Protocol/Chemo Regimen: Dacogen/Venetoclax    Day: 3    Pt endorsed: continues to have itching, better with cream     Review of Systems: Denies nausea, vomiting, diarrhea, chest pain, shortness of breath, headache    Pain scale:     Diet: regular, Enlive BID     Allergies: No Known Allergies    HEME/ONC MEDICATIONS  decitabine IVPB (eMAR) 31 milliGRAM(s) IV Intermittent every 24 hours  enoxaparin Injectable 40 milliGRAM(s) SubCutaneous at bedtime  venetoclax 400 milliGRAM(s) Oral once      STANDING MEDICATIONS  allopurinol 300 milliGRAM(s) Oral at bedtime  amLODIPine   Tablet 5 milliGRAM(s) Oral daily  benzonatate 100 milliGRAM(s) Oral three times a day  Biotene Dry Mouth Oral Rinse 5 milliLiter(s) Swish and Spit four times a day  chlorhexidine 2% Cloths 1 Application(s) Topical <User Schedule>  clobetasol 0.05% Ointment 1 Application(s) Topical two times a day  hydrochlorothiazide 12.5 milliGRAM(s) Oral daily  ondansetron Injectable 8 milliGRAM(s) IV Push every 24 hours  phytonadione   Solution 10 milliGRAM(s) Oral daily  sodium chloride 0.9%. 1000 milliLiter(s) IV Continuous <Continuous>  traZODone 150 milliGRAM(s) Oral at bedtime      PRN MEDICATIONS  acetaminophen   Tablet .. 650 milliGRAM(s) Oral every 6 hours PRN  loratadine 10 milliGRAM(s) Oral daily PRN  sodium chloride 0.9% lock flush 10 milliLiter(s) IV Push every 1 hour PRN      Vital Signs Last 24 Hrs  T(C): 37.4 (06 Feb 2020 05:56), Max: 37.9 (05 Feb 2020 09:50)  T(F): 99.3 (06 Feb 2020 05:56), Max: 100.2 (05 Feb 2020 09:50)  HR: 80 (06 Feb 2020 05:56) (70 - 80)  BP: 150/69 (06 Feb 2020 05:56) (120/56 - 150/69)  BP(mean): --  RR: 18 (06 Feb 2020 05:56) (18 - 18)  SpO2: 96% (06 Feb 2020 05:56) (92% - 96%)      PHYSICAL EXAM  General: NAD  HEENT: clear oropharynx, anicteric sclera. no ulcer or erythema  Neck: no palpable LN or masses   CV: (+) S1/S2 RRR  Lungs: clear to auscultation, no wheezes or rales  Abdomen: soft, non-tender, non-distended (+) BS  Ext: no  edema  Skin: scattered raised rash to upper chest, under abd, upper arms and upper back   Neuro: alert and oriented X 3,  Central Line: PICC c/d/i         LABS:                        8.0    3.33  )-----------( x        ( 06 Feb 2020 06:58 )             24.3         Mean Cell Volume : 94.6 fl  Mean Cell Hemoglobin : 31.1 pg  Mean Cell Hemoglobin Concentration : 32.9 gm/dL  Auto Neutrophil # : x  Auto Lymphocyte # : x  Auto Monocyte # : x  Auto Eosinophil # : x  Auto Basophil # : x  Auto Neutrophil % : x  Auto Lymphocyte % : x  Auto Monocyte % : x  Auto Eosinophil % : x  Auto Basophil % : x      02-05    143  |  108  |  22  ----------------------------<  126<H>  3.8   |  26  |  0.66    Ca    8.4      05 Feb 2020 22:18  Phos  3.8     02-05  Mg     2.3     02-05    TPro  5.1<L>  /  Alb  2.9<L>  /  TBili  0.3  /  DBili  x   /  AST  50<H>  /  ALT  51<H>  /  AlkPhos  37<L>  02-05      Mg 2.3  Phos 3.8  Mg 2.3  Phos 4.2      PT/INR - ( 05 Feb 2020 06:48 )   PT: 18.6 sec;   INR: 1.59 ratio         PTT - ( 05 Feb 2020 06:48 )  PTT:37.9 sec    LDH 1672  Uric Acid 4.0    LDH 2025  Uric Acid 3.9        FLT3 ITD MUTATION ANALYSIS (02.03.20 @ 14:02)    HLX FLT3 Final Report:   FLT3 ITD Mutation Analysis Final Report    Accession Number: [id number]  Date Collected: 02/03/2020 11:00  Date Received: 02/03/2020 14:02  Date Reported: 02/04/2020 16:09    Specimen: Bone Marrow  _________________________________________________________    RESULTS:POSITIVE    Cytogenetics Test (02.03.20 @ 13:24)    Chromosome Interpretation:   Chromosome Analysis (ONC) Report  _______________________________________________________________  Accession Number: [id number]  Indication: Rule out AML  Date Collected: 02/03/2020 11:00  Date Received: 02/03/2020 13:24  Date Reported: 02/05/2020 17:39  Specimen Type: Bone Marrow  Test Requested: Chromosome Analysis  ________________________________________________________________  Metaphases Counted:           20  Culture Duration:                   24 and 48 hours  Metaphases Analyzed:        20  Number of Cultures:              2  Metaphases Karyotyped:      4  Banding Technique:             GTG  Band Resolution:                  300 - 350  Preparation Quality:             Adequate  ________________________________________________________________  Result: Normal female karyotype    Karyotype: 46,XX20  ________________________________________________________________  Findings and Interpretation:  No consistent numerical or structural chromosome abnormalities were observed inthe cells analyzed.    Hematopathology Report (02.03.20 @ 12:34)    Hematopathology Report:   ACCESSION No:  [id number]  Final Diagnosis  1, 2. Bone marrow biopsy and bone marrow aspirate  - Acute myeloid leukemia    See note and description.    Diagnostic note:  Megakarocytes are normal in number with dysplastic morphology.  Correlation with cytogenetic/FISH/molecular studies is necessary  for definitive classification.  Comprehensive report with results of pending ancillary studies to  follow.    Dr. Lindsey was notified of the diagnosis on 02/04/20.    Ancillary studies  Bone marrow aspirate iron stain: No spicules are present to  evaluate for iron stores and there are insufficient nRBC to  evaluate for ring sideroblasts.    Flow cytometry:  Myeloblasts (77% of cells), positive for HLA-DR,  partial CD34, , CD33, CD13, partial CD15, CD7, , CD38,  CD71; negative for CD4, CD11b, CD56.    Cytochemistry: Myeloperoxidase positive    Cytogenetics:  Pending    FISH ([id number], peripheral blood, 1/31/2020  NORMAL FISH - PML/YIFAN  Probe(s) and Location(s): PML/YIFAN (15q24.1/17q21.2)    Molecular  FLT3 ITD Mutation Analysis Final Report: POSITIVE    Bone Marrow Aspirate Differential: (200 Cells).  Type            %    Normal*  Blast                85%  0-3  Neutrophil and  Precursors        5%   33-63  Eosinophil           6%   1-5  Basophil        0%   0-1  Pronormoblast        1%   0-2  Normoblast           2%   15-25  Monocyte        0%   0-2  Lymphocyte           0%   10-15  Plasma cell          1%   0-1  *Adult Range    < from: 12 Lead ECG (01.31.20 @ 12:33) >  QTC Calculation(Bezet) 488 ms  Diagnosis Line NORMAL SINUS RHYTHM  LEFT AXIS DEVIATION  LEFT BUNDLE BRANCH BLOCK      RADIOLOGY & ADDITIONAL STUDIES:    < from: Xray Chest 1 View- PORTABLE-Urgent (02.04.20 @ 11:48) >  Left PICC tip in the SVC.  The lungs are clear. There is no pleural effusion.   There is no pneumothorax.  The cardiac silhouette size cannot be accurately assessed on this radiograph.  There is no acute abnormality of the visualized osseous structures. Diagnosis: AML 	    Protocol/Chemo Regimen: Dacogen/Venetoclax    Day: 3    Pt endorsed: post induced dry cough better on Claritin;  rashes, better with cream; mild hard stool today; chronic numbness of fingers     Review of Systems: Denies nausea, vomiting, diarrhea, chest pain, shortness of breath, headache    Pain scale:     Diet: regular, Enlive BID     Allergies: No Known Allergies    HEME/ONC MEDICATIONS  decitabine IVPB (eMAR) 31 milliGRAM(s) IV Intermittent every 24 hours  enoxaparin Injectable 40 milliGRAM(s) SubCutaneous at bedtime  venetoclax 400 milliGRAM(s) Oral once      STANDING MEDICATIONS  allopurinol 300 milliGRAM(s) Oral at bedtime  amLODIPine   Tablet 5 milliGRAM(s) Oral daily  benzonatate 100 milliGRAM(s) Oral three times a day  Biotene Dry Mouth Oral Rinse 5 milliLiter(s) Swish and Spit four times a day  chlorhexidine 2% Cloths 1 Application(s) Topical <User Schedule>  clobetasol 0.05% Ointment 1 Application(s) Topical two times a day  hydrochlorothiazide 12.5 milliGRAM(s) Oral daily  ondansetron Injectable 8 milliGRAM(s) IV Push every 24 hours  phytonadione   Solution 10 milliGRAM(s) Oral daily  sodium chloride 0.9%. 1000 milliLiter(s) IV Continuous <Continuous>  traZODone 150 milliGRAM(s) Oral at bedtime  Levaquin 500 mg po qd       PRN MEDICATIONS  acetaminophen   Tablet .. 650 milliGRAM(s) Oral every 6 hours PRN  loratadine 10 milliGRAM(s) Oral daily PRN  sodium chloride 0.9% lock flush 10 milliLiter(s) IV Push every 1 hour PRN      Vital Signs Last 24 Hrs  T(C): 37.4 (06 Feb 2020 05:56), Max: 37.9 (05 Feb 2020 09:50)  T(F): 99.3 (06 Feb 2020 05:56), Max: 100.2 (05 Feb 2020 09:50)  HR: 80 (06 Feb 2020 05:56) (70 - 80)  BP: 150/69 (06 Feb 2020 05:56) (120/56 - 150/69)  BP(mean): --  RR: 18 (06 Feb 2020 05:56) (18 - 18)  SpO2: 96% (06 Feb 2020 05:56) (92% - 96%)      PHYSICAL EXAM  General: NAD  HEENT: clear oropharynx, anicteric sclera. no ulcer or erythema  Neck: no palpable LN or masses   CV: (+) S1/S2 RRR  Lungs: clear to auscultation, no wheezes or rales  Abdomen: soft, non-tender, non-distended (+) BS  Ext: no  edema  Skin: scattered raised rash to upper chest, under abd, upper arms and upper back   Neuro: alert and oriented X 3,  Central Line: PICC c/d/i         LABS:                        8.0    3.33  )-----------( 69       ( 06 Feb 2020 06:58 )             24.3         Mean Cell Volume : 94.6 fl  Mean Cell Hemoglobin : 31.1 pg  Mean Cell Hemoglobin Concentration : 32.9 gm/dL  Auto Neutrophil # : 0.67 K/uL  Auto Lymphocyte # : 1.10 K/uL  Auto Monocyte # : 0.47 K/uL  Auto Eosinophil # : 0.23 K/uL  Auto Basophil # : 0.00 K/uL  Auto Neutrophil % : 20.0 %  Auto Lymphocyte % : 33.0 %  Auto Monocyte % : 14.0 %  Auto Eosinophil % : 7.0 %  Auto Basophil % : 0.0 %      02-06    144  |  110<H>  |  17  ----------------------------<  105<H>  3.7   |  27  |  0.62    Ca    8.2<L>      06 Feb 2020 07:04  Phos  3.4     02-06  Mg     2.4     02-06    TPro  5.0<L>  /  Alb  3.0<L>  /  TBili  0.3  /  DBili  x   /  AST  42<H>  /  ALT  50<H>  /  AlkPhos  34<L>  02-06    PT/INR - ( 06 Feb 2020 07:05 )   PT: 14.2 sec;   INR: 1.24 ratio         PTT - ( 06 Feb 2020 07:05 )  PTT:29.4 sec    LDH 1427  Uric Acid 3.2    Rapid Respiratory Viral Panel (02.05.20 @ 00:53)    [facility]    FLT3 ITD MUTATION ANALYSIS (02.03.20 @ 14:02)    HLX FLT3 Final Report:   FLT3 ITD Mutation Analysis Final Report    Accession Number: [id number]  Date Collected: 02/03/2020 11:00  Date Received: 02/03/2020 14:02  Date Reported: 02/04/2020 16:09    Specimen: Bone Marrow  _________________________________________________________    RESULTS:POSITIVE    Cytogenetics Test (02.03.20 @ 13:24)    Chromosome Interpretation:   Chromosome Analysis (ONC) Report  _______________________________________________________________  Accession Number: [id number]  Indication: Rule out AML  Date Collected: 02/03/2020 11:00  Date Received: 02/03/2020 13:24  Date Reported: 02/05/2020 17:39  Specimen Type: Bone Marrow  Test Requested: Chromosome Analysis  ________________________________________________________________  Metaphases Counted:           20  Culture Duration:                   24 and 48 hours  Metaphases Analyzed:        20  Number of Cultures:              2  Metaphases Karyotyped:      4  Banding Technique:             GTG  Band Resolution:                  300 - 350  Preparation Quality:             Adequate  ________________________________________________________________  Result: Normal female karyotype    Karyotype: 46,XX20  ________________________________________________________________  Findings and Interpretation:  No consistent numerical or structural chromosome abnormalities were observed inthe cells analyzed.    Hematopathology Report (02.03.20 @ 12:34)    Hematopathology Report:   ACCESSION No:  [id number]  Final Diagnosis  1, 2. Bone marrow biopsy and bone marrow aspirate  - Acute myeloid leukemia    See note and description.    Diagnostic note:  Megakarocytes are normal in number with dysplastic morphology.  Correlation with cytogenetic/FISH/molecular studies is necessary  for definitive classification.  Comprehensive report with results of pending ancillary studies to  follow.    Dr. Lindsey was notified of the diagnosis on 02/04/20.    Ancillary studies  Bone marrow aspirate iron stain: No spicules are present to  evaluate for iron stores and there are insufficient nRBC to  evaluate for ring sideroblasts.    Flow cytometry:  Myeloblasts (77% of cells), positive for HLA-DR,  partial CD34, , CD33, CD13, partial CD15, CD7, , CD38,  CD71; negative for CD4, CD11b, CD56.    Cytochemistry: Myeloperoxidase positive    Cytogenetics:  Pending    FISH ([id number], peripheral blood, 1/31/2020  NORMAL FISH - PML/YIFAN  Probe(s) and Location(s): PML/YIFAN (15q24.1/17q21.2)    Molecular  FLT3 ITD Mutation Analysis Final Report: POSITIVE    Bone Marrow Aspirate Differential: (200 Cells).  Type            %    Normal*  Blast                85%  0-3  Neutrophil and  Precursors        5%   33-63  Eosinophil           6%   1-5  Basophil        0%   0-1  Pronormoblast        1%   0-2  Normoblast           2%   15-25  Monocyte        0%   0-2  Lymphocyte           0%   10-15  Plasma cell          1%   0-1  *Adult Range    < from: 12 Lead ECG (01.31.20 @ 12:33) >  QTC Calculation(Bezet) 488 ms  Diagnosis Line NORMAL SINUS RHYTHM  LEFT AXIS DEVIATION  LEFT BUNDLE BRANCH BLOCK      RADIOLOGY & ADDITIONAL STUDIES:    < from: Xray Chest 1 View- PORTABLE-Urgent (02.04.20 @ 11:48) >  Left PICC tip in the SVC.  The lungs are clear. There is no pleural effusion.   There is no pneumothorax.  The cardiac silhouette size cannot be accurately assessed on this radiograph.  There is no acute abnormality of the visualized osseous structures.

## 2020-02-06 NOTE — PROGRESS NOTE ADULT - ATTENDING COMMENTS
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 Start Levofloxacin  -PICC line placed 2/3  -d/c home on 2/10 after Venetoclax dosing, with outpt f/u

## 2020-02-06 NOTE — PROGRESS NOTE ADULT - PROBLEM SELECTOR PLAN 1
Continue Dacogen and venotoclax  PB flow cytometry on 1/30 reveal myeloblasts 72%, HLA-DR, CD33, 34 +,  c/w AML,   PB FISH normal   BM bx on 2/3 c/w AML, FLT3+  Allopurinol 300mg daily   IV hydration  Monitor CBC/diff/lytes, TLS q12H,  transfuse and or replete lytes PRN  Monitor weight and I and O's, diuresis PRN   antiemetics, mouth care  HLA sent on 1/31 Continue Dacogen and venotoclax  PB flow cytometry on 1/30 reveal myeloblasts 72%, HLA-DR, CD33, 34 +,  c/w AML,   PB FISH normal   BM bx on 2/3 c/w AML, FLT3+  Allopurinol 300mg daily   IV hydration  Monitor CBC/diff/lytes, TLS q12H,  transfuse and or replete lytes PRN  Monitor weight and I and O's, diuresis PRN   antiemetics, mouth care  HLA sent on 1/31  Plan dc home on Monday 2/10

## 2020-02-06 NOTE — PROVIDER CONTACT NOTE (OTHER) - ASSESSMENT
Call from patient. Patient states that for past week she is sleeping all the time and oversleeping for work, doesn't hear her alarms. Goes to bed around 9pm and gets up around 1pm. Yesterday noticed that her hands are swollen an can't feel knuckles.  Also,
Have her come in and talk with Dr. Raymon Spivey about what is going on. When you are awake, go for a brisk walk, get exercise, get moving, drink lots of water. Eat healthy meals, avoid alcohol or other sedatives.    If the swelling gets a lot worse or you the numb
Patient advised. Verbalized understanding.  Made appt with Dr Fiona Bassett for 7/17    Future Appointments  Date Time Provider Bijal Woodall   7/17/2018 1:00 PM Elisabeth Raphael [facility] [place]
Pt is always tired and is getting to much sleep at the same time. Pt Hasn't  taken her sleeping meds in 3 months. She  Has also noticed that her feet are swollen and her knuckles hurt.  Please call back
Vital signs stable. Room swept and found clean.
pt a&o4, VSS, neuro check performed and WDL. pt complains of waking up with tingling in her left hand. Pt has PICC line in Left arm. Pt says "they hit my nerve when they placed the PICC so I think it is that".  Tingling does not radiate. PICC site WDL. patient able to life arms with no problem. Has sensation in left hand
vitals stable

## 2020-02-06 NOTE — PROGRESS NOTE ADULT - PROBLEM SELECTOR PLAN 2
Not neutropenic. afebrile   pan culture if febrile  clobetasol oint BID  for itching  rashes/eczema neutropenic. afebrile   pan culture if febrile  clobetasol oint BID  for itching  rashes/eczema Neutropenic. afebrile   2/6 added Levaquin empirically  pan culture if febrile  If fever, switch Levaquin to Cefepime   clobetasol oint BID  for itching  rashes/eczema  Added Flonase for post nasal drip

## 2020-02-06 NOTE — DISCHARGE NOTE NURSING/CASE MANAGEMENT/SOCIAL WORK - PATIENT PORTAL LINK FT
You can access the FollowMyHealth Patient Portal offered by [facility] by registering at the following website: [email or web address]. By joining [facility]’s FollowMyHealth portal, you will also be able to view your health information using other applications (apps) compatible with our system.

## 2020-02-06 NOTE — PROGRESS NOTE ADULT - ASSESSMENT
79 F with history of Breast cancer 12 years ago treated with tamoxifen (no chemotherapy), s/p RT and lumpectomy, HTN and surgical hx of hysterectomy. Pt was sent in by oncologist Dr. Blank for rule out leukemia.  PB flow cytometry on 1/30 reveal myeloblasts 72%, HLA-DR, CD33, 34 +,  bone marrow bx c/w AML, FLT 3+.

## 2020-02-06 NOTE — DIETITIAN INITIAL EVALUATION ADULT. - ADD RECOMMEND
1. Continue to encourage po intake and obtain/honor food preferences as able-RD provided alternative cold menu items list and reviewed ordering procedure, 2. Monitor PO intake, diet tolerance, weight trends, labs, and skin integrity

## 2020-02-06 NOTE — PROGRESS NOTE ADULT - PROBLEM SELECTOR PLAN 8
VTE ppx until plt <50   Encourage ambulation             Contact info [id number] VTE ppx until plt <50   Encourage ambulation   2/6 added Miralax for constipation             Contact info [id number]

## 2020-02-06 NOTE — ADVANCED PRACTICE NURSE CONSULT - ASSESSMENT
Patient's alert & oriented x 4,resting in bed,denies any  discomfort,,verbalized understanding re- chemo TX.,labs today checked & reviewed by Dr. Lindsey during rounds,W/ L arm  DL PICC,dressing intact,redness & swelling noted on site ,w/ + blood return,flushes easily,,Zofran 8 mg IVP given,chemo TX. checked & verified w/ other RN, Decitabine 20 mg/m2=31 mg IV to infuse over 1 hour given @ 1626 pm.Primary Nurse aware re- chemo TX.,will follow.

## 2020-02-06 NOTE — DIETITIAN INITIAL EVALUATION ADULT. - PROBLEM SELECTOR PLAN 1
Pt with outpatient labs showing blasts in peripheral blood  On admission, pt has leukocytosis to 25K with 71% blasts with concern for AML  No associated anemia or thrombocytopenia at this time     Flow cytometry sent in ER   Peripheral smear reviewed- many blasts visualized, red cell and platelet morphology normal   Start allopurinol 300mg daily   Please check Tumor lysis labs daily   IVF at 100cc/hour  Transfuse for hgb<7, platelets <10 (or <15 if febrile)  Will need bone marrow biopsy on Monday   Plan for PICC/mediport  Will order HIV, Hepatitis labs  MUGA ordered   Normal mental status, no neurological deficits, no concern for leuokostasis  Patient may qualify for [employer] clinical trial- will need to discuss with research nurse and consent pt  Pt's  Nic called this evening and updated on recent findings

## 2020-02-06 NOTE — DISCHARGE NOTE NURSING/CASE MANAGEMENT/SOCIAL WORK - NSDCFUADDAPPT_GEN_ALL_CORE_FT
To [facility] on Tuesday 2/11 at 10:30am to see Dr. Lindsey  To [facility] on Tuesday 2/11 for platelet check and possible platelet transfusion at _____  To [facility] on Friday 2/14 for platelet check and possible transfusion at ______

## 2020-02-06 NOTE — DISCHARGE NOTE NURSING/CASE MANAGEMENT/SOCIAL WORK - NSDCPEWEB_GEN_ALL_CORE
[facility] for Tobacco Control website --- [email or web address] --- [email or web address]

## 2020-02-06 NOTE — CHART NOTE - NSCHARTNOTEFT_GEN_A_CORE
Called by RN to evaluate Pt. hypoxia  .     .  Pt seen and evaluated at bedside.  Denies headache, dizziness, visual disturbance, chest pain, cough, SOB, palpitations, abdominal pain, N/V/D , dysuria.   Offers no complaints at present time.      Vital Signs Last 24 Hrs  T(C): 37.4 (06 Feb 2020 22:09), Max: 38 (06 Feb 2020 17:43)  T(F): 99.3 (06 Feb 2020 22:09), Max: 100.4 (06 Feb 2020 17:43)  HR: 79 (06 Feb 2020 22:32) (76 - 82)  BP: 109/62 (06 Feb 2020 22:32) (100/62 - 150/69)  BP(mean): --  RR: 18 (06 Feb 2020 22:32) (18 - 19)  SpO2: 94% (06 Feb 2020 22:32) (90% - 96%)    Labs:                        7.9    2.72  )-----------( 80       ( 06 Feb 2020 16:20 )             24.3       02-06    142  |  105  |  16  ----------------------------<  151<H>  3.6   |  25  |  0.51    Ca    8.7      06 Feb 2020 16:20  Phos  3.6     02-06  Mg     2.2     02-06    TPro  5.4<L>  /  Alb  3.1<L>  /  TBili  0.3  /  DBili  x   /  AST  30  /  ALT  43  /  AlkPhos  35<L>  02-06      Antimicrobials:  MEDICATIONS  (STANDING):  allopurinol 300 milliGRAM(s) Oral at bedtime  amLODIPine   Tablet 5 milliGRAM(s) Oral daily  benzonatate 100 milliGRAM(s) Oral three times a day  Biotene Dry Mouth Oral Rinse 5 milliLiter(s) Swish and Spit four times a day  chlorhexidine 2% Cloths 1 Application(s) Topical <User Schedule>  clobetasol 0.05% Ointment 1 Application(s) Topical two times a day  decitabine IVPB (eMAR) 31 milliGRAM(s) IV Intermittent every 24 hours  enoxaparin Injectable 40 milliGRAM(s) SubCutaneous at bedtime  fluticasone propionate 50 MICROgram(s)/spray Nasal Spray 1 Spray(s) Both Nostrils two times a day  hydrochlorothiazide 12.5 milliGRAM(s) Oral daily  levoFLOXacin  Tablet 500 milliGRAM(s) Oral every 24 hours  ondansetron Injectable 8 milliGRAM(s) IV Push every 24 hours  phytonadione   Solution 10 milliGRAM(s) Oral daily  polyethylene glycol 3350 17 Gram(s) Oral daily  senna 2 Tablet(s) Oral at bedtime  sodium chloride 0.9%. 1000 milliLiter(s) (75 mL/Hr) IV Continuous <Continuous>  traZODone 150 milliGRAM(s) Oral at bedtime        PE:    General: Alert & Oriented x 3, non toxic, in no acute distress  Neuro: non focal  Cardiac: S1, S2, tachycardic  Pulm: Lungs CTA  GI: Abd soft, NT/ND, + bowel sounds x 4 quadrants  Ext: no edema, + pedal pulses BL  Skin: intact      AP:     1.  Neutropenic fever       - continue with current antimicrobials       - continue antipyretic/cooling measures      - continue IV hydration      - BC x 2      - UA/CS      - f/u panculture results      - cxr      - continue to closely monitor      - f/u with primary team in AM    Angelina Chang, ANP x Called by RN to evaluate Pt. hypoxia  .     .  Pt seen and evaluated at bedside.  Denies headache, dizziness, visual disturbance, chest pain, cough, SOB, palpitations, abdominal pain, N/V/D , dysuria.   Offers no complaints at present time.      Vital Signs Last 24 Hrs  T(C): 37.4 (06 Feb 2020 22:09), Max: 38 (06 Feb 2020 17:43)  T(F): 99.3 (06 Feb 2020 22:09), Max: 100.4 (06 Feb 2020 17:43)  HR: 79 (06 Feb 2020 22:32) (76 - 82)  BP: 109/62 (06 Feb 2020 22:32) (100/62 - 150/69)  BP(mean): --  RR: 18 (06 Feb 2020 22:32) (18 - 19)  SpO2: 94% (06 Feb 2020 22:32) (90% - 96%)    Labs:                        7.9    2.72  )-----------( 80       ( 06 Feb 2020 16:20 )             24.3       02-06    142  |  105  |  16  ----------------------------<  151<H>  3.6   |  25  |  0.51    Ca    8.7      06 Feb 2020 16:20  Phos  3.6     02-06  Mg     2.2     02-06    TPro  5.4<L>  /  Alb  3.1<L>  /  TBili  0.3  /  DBili  x   /  AST  30  /  ALT  43  /  AlkPhos  35<L>  02-06      Antimicrobials:  MEDICATIONS  (STANDING):  allopurinol 300 milliGRAM(s) Oral at bedtime  amLODIPine   Tablet 5 milliGRAM(s) Oral daily  benzonatate 100 milliGRAM(s) Oral three times a day  Biotene Dry Mouth Oral Rinse 5 milliLiter(s) Swish and Spit four times a day  chlorhexidine 2% Cloths 1 Application(s) Topical <User Schedule>  clobetasol 0.05% Ointment 1 Application(s) Topical two times a day  decitabine IVPB (eMAR) 31 milliGRAM(s) IV Intermittent every 24 hours  enoxaparin Injectable 40 milliGRAM(s) SubCutaneous at bedtime  fluticasone propionate 50 MICROgram(s)/spray Nasal Spray 1 Spray(s) Both Nostrils two times a day  hydrochlorothiazide 12.5 milliGRAM(s) Oral daily  levoFLOXacin  Tablet 500 milliGRAM(s) Oral every 24 hours  ondansetron Injectable 8 milliGRAM(s) IV Push every 24 hours  phytonadione   Solution 10 milliGRAM(s) Oral daily  polyethylene glycol 3350 17 Gram(s) Oral daily  senna 2 Tablet(s) Oral at bedtime  sodium chloride 0.9%. 1000 milliLiter(s) (75 mL/Hr) IV Continuous <Continuous>  traZODone 150 milliGRAM(s) Oral at bedtime        PE:    General: Alert & Oriented x 3, non toxic, in no acute distress  Neuro: non focal  Cardiac: S1, S2, tachycardic  Pulm: Lungs CTA  GI: Abd soft, NT/ND, + bowel sounds x 4 quadrants  Ext: no edema, + pedal pulses BL  Skin: intact    0200 - spoke to radiology - no acute changes compared to previous cxr.  AP:     1.  Neutropenic fever       - continue with current antimicrobials       - continue antipyretic/cooling measures      - continue IV hydration      - BC x 2      - UA/CS      - f/u panculture results      - cxr      - continue to closely monitor      - f/u with primary team in AM    AUGUSTINE Beltrán x Called by RN to evaluate Pt. for O2 sat=89% found while taking  vital signs.  Pt seen and evaluated at bedside.  A&O x 3, non-toxic in no acute distress.  Pt. states "I feel a little short of breath.  It started earlier in the day.".  Denies headache, dizziness, visual disturbance, chest pain, cough, palpitations, abdominal pain, N/V/D , dysuria.   Denies hx of respiratory illness. Endorses cardiac hx of left bundle branch block.      Vital Signs Last 24 Hrs  T(C): 37.4 (06 Feb 2020 22:09), Max: 38 (06 Feb 2020 17:43)  T(F): 99.3 (06 Feb 2020 22:09), Max: 100.4 (06 Feb 2020 17:43)  HR: 79 (06 Feb 2020 22:32) (76 - 82)  BP: 109/62 (06 Feb 2020 22:32) (100/62 - 150/69)  BP(mean): --  RR: 18 (06 Feb 2020 22:32) (18 - 19)  SpO2: 94% (06 Feb 2020 22:32) (90% - 96%)    Labs:                        7.9    2.72  )-----------( 80       ( 06 Feb 2020 16:20 )             24.3       02-06    142  |  105  |  16  ----------------------------<  151<H>  3.6   |  25  |  0.51    Ca    8.7      06 Feb 2020 16:20  Phos  3.6     02-06  Mg     2.2     02-06    TPro  5.4<L>  /  Alb  3.1<L>  /  TBili  0.3  /  DBili  x   /  AST  30  /  ALT  43  /  AlkPhos  35<L>  02-06    Chest xray 2/5/2020 IMPRESSION:    Clear lungs.     PICC line in expected location        MEDICATIONS  (STANDING):  allopurinol 300 milliGRAM(s) Oral at bedtime  amLODIPine   Tablet 5 milliGRAM(s) Oral daily  benzonatate 100 milliGRAM(s) Oral three times a day  Biotene Dry Mouth Oral Rinse 5 milliLiter(s) Swish and Spit four times a day  chlorhexidine 2% Cloths 1 Application(s) Topical <User Schedule>  clobetasol 0.05% Ointment 1 Application(s) Topical two times a day  decitabine IVPB (eMAR) 31 milliGRAM(s) IV Intermittent every 24 hours  enoxaparin Injectable 40 milliGRAM(s) SubCutaneous at bedtime  fluticasone propionate 50 MICROgram(s)/spray Nasal Spray 1 Spray(s) Both Nostrils two times a day  hydrochlorothiazide 12.5 milliGRAM(s) Oral daily  levoFLOXacin  Tablet 500 milliGRAM(s) Oral every 24 hours  ondansetron Injectable 8 milliGRAM(s) IV Push every 24 hours  phytonadione   Solution 10 milliGRAM(s) Oral daily  polyethylene glycol 3350 17 Gram(s) Oral daily  senna 2 Tablet(s) Oral at bedtime  sodium chloride 0.9%. 1000 milliLiter(s) (75 mL/Hr) IV Continuous <Continuous>  traZODone 150 milliGRAM(s) Oral at bedtime        PE:    General: Alert & Oriented x 3, non toxic, in no acute distress  Neuro: non focal  Cardiac: S1, S2, RRR  Pulm: Lungs CTA  GI: Abd soft, NT/ND, + bowel sounds x 4 quadrants  Ext: no edema, + pedal pulses BL  Skin: intact    AP: 79 F with history of Breast cancer 12 years ago treated with tamoxifen (no chemotherapy), s/p RT and lumpectomy, HTN and surgical hx of hysterectomy. Pt was sent in by oncologist Dr. Blank for rule out leukemia.  PB flow cytometry on 1/30 reveal myeloblasts 72%, HLA-DR, CD33, 34 +,  bone marrow bx c/w AML, FLT 3+.  Now with hypoxia.    1.    Hypoxia       -o2 2l nc        - EKG -stat  - no acute changes compared to previous       - chest xray urgent - spoke to radiology.  Preliminary reading "no acute changes compared to previous cxr".      -  Cardiac enzymes - WNL      - CBC, BMP, MG , Phos      - VBG        - continue to closely monitor      - f/u with primary team in     Angelina Chang, ANP x [phone]

## 2020-02-06 NOTE — DIETITIAN INITIAL EVALUATION ADULT. - REASON INDICATOR FOR ASSESSMENT
Pt seen for length of stay, information obtained from patient.     Pt is a 79 year old female with PMH of breast cancer (in remission) and HTN now with newly diagnosed FLT3 + AML receiving treatment with dacogen and venetoclax

## 2020-02-06 NOTE — ADVANCED PRACTICE NURSE CONSULT - REASON FOR CONSULT
Chemotherapy Notes :                                                                                                                                                                                                                                                                                                     Day 3/5 Dacogen IV

## 2020-02-06 NOTE — DIETITIAN INITIAL EVALUATION ADULT. - ENERGY INTAKE
Pt reports intake had been decreased for the first few days of admission however pt stated her appetite has improved over the past couple of days and has been able to consume >75% of meals, pt will supplement with ensure enlive 1 daily/Good (>75%)

## 2020-02-06 NOTE — PROVIDER CONTACT NOTE (OTHER) - ACTION/TREATMENT ORDERED:
SHIMA Ferris notified. Examined pt at bedside. Will continue to monitor
PA went to speak with Pt in person
Psych eval, possible 1:1, awaiting assessment

## 2020-02-06 NOTE — DISCHARGE NOTE NURSING/CASE MANAGEMENT/SOCIAL WORK - NSDCPEEMAIL_GEN_ALL_CORE
[facility] email [email or web address]

## 2020-02-06 NOTE — PROGRESS NOTE ADULT - PROBLEM SELECTOR PLAN 5
2/4 c/o chest pressure  in setting of cough  EKG, ST with old LBBB, no acute changes  2/4 cardiac enzyme  negative  Continue tessalon pearls for cough

## 2020-02-06 NOTE — DIETITIAN INITIAL EVALUATION ADULT. - PHYSICAL APPEARANCE
No overt signs of muscle mass or body fat loss/well nourished/other (specify) Ht: 5'4", Wt: 117.9 lbs, BMI: 20.2 kg/m2, IBW: 120 lbs +/- 10%, %IBW: 98%  No edema, Skin intact per nursing flowsheets

## 2020-02-07 DIAGNOSIS — L30.9 DERMATITIS, UNSPECIFIED: ICD-10-CM

## 2020-02-07 DIAGNOSIS — C92.00 ACUTE MYELOBLASTIC LEUKEMIA, NOT HAVING ACHIEVED REMISSION: ICD-10-CM

## 2020-02-07 DIAGNOSIS — R09.02 HYPOXEMIA: ICD-10-CM

## 2020-02-07 LAB
ALBUMIN SERPL ELPH-MCNC: 2.8 G/DL — LOW (ref 3.3–5)
ALBUMIN SERPL ELPH-MCNC: 2.9 G/DL — LOW (ref 3.3–5)
ALP SERPL-CCNC: 33 U/L — LOW (ref 40–120)
ALP SERPL-CCNC: 34 U/L — LOW (ref 40–120)
ALT FLD-CCNC: 38 U/L — SIGNIFICANT CHANGE UP (ref 10–45)
ALT FLD-CCNC: 38 U/L — SIGNIFICANT CHANGE UP (ref 10–45)
ANION GAP SERPL CALC-SCNC: 10 MMOL/L — SIGNIFICANT CHANGE UP (ref 5–17)
ANION GAP SERPL CALC-SCNC: 8 MMOL/L — SIGNIFICANT CHANGE UP (ref 5–17)
APTT BLD: 31.2 SEC — SIGNIFICANT CHANGE UP (ref 27.5–36.3)
AST SERPL-CCNC: 22 U/L — SIGNIFICANT CHANGE UP (ref 10–40)
AST SERPL-CCNC: 25 U/L — SIGNIFICANT CHANGE UP (ref 10–40)
BASOPHILS # BLD AUTO: 0 K/UL — SIGNIFICANT CHANGE UP (ref 0–0.2)
BASOPHILS NFR BLD AUTO: 0 % — SIGNIFICANT CHANGE UP (ref 0–2)
BILIRUB SERPL-MCNC: 0.4 MG/DL — SIGNIFICANT CHANGE UP (ref 0.2–1.2)
BILIRUB SERPL-MCNC: 0.5 MG/DL — SIGNIFICANT CHANGE UP (ref 0.2–1.2)
BUN SERPL-MCNC: 12 MG/DL — SIGNIFICANT CHANGE UP (ref 7–23)
BUN SERPL-MCNC: 16 MG/DL — SIGNIFICANT CHANGE UP (ref 7–23)
CALCIUM SERPL-MCNC: 8.1 MG/DL — LOW (ref 8.4–10.5)
CALCIUM SERPL-MCNC: 8.1 MG/DL — LOW (ref 8.4–10.5)
CHLORIDE SERPL-SCNC: 104 MMOL/L — SIGNIFICANT CHANGE UP (ref 96–108)
CHLORIDE SERPL-SCNC: 109 MMOL/L — HIGH (ref 96–108)
CO2 SERPL-SCNC: 26 MMOL/L — SIGNIFICANT CHANGE UP (ref 22–31)
CO2 SERPL-SCNC: 26 MMOL/L — SIGNIFICANT CHANGE UP (ref 22–31)
CREAT SERPL-MCNC: 0.49 MG/DL — LOW (ref 0.5–1.3)
CREAT SERPL-MCNC: 0.5 MG/DL — SIGNIFICANT CHANGE UP (ref 0.5–1.3)
D DIMER BLD IA.RAPID-MCNC: 1145 NG/ML DDU — HIGH
EOSINOPHIL # BLD AUTO: 0.2 K/UL — SIGNIFICANT CHANGE UP (ref 0–0.5)
EOSINOPHIL NFR BLD AUTO: 20 % — HIGH (ref 0–6)
FIBRINOGEN PPP-MCNC: 444 MG/DL — SIGNIFICANT CHANGE UP (ref 350–510)
GLUCOSE SERPL-MCNC: 101 MG/DL — HIGH (ref 70–99)
GLUCOSE SERPL-MCNC: 107 MG/DL — HIGH (ref 70–99)
HCT VFR BLD CALC: 22.7 % — LOW (ref 34.5–45)
HCT VFR BLD CALC: 25.7 % — LOW (ref 34.5–45)
HGB BLD-MCNC: 7.5 G/DL — LOW (ref 11.5–15.5)
HGB BLD-MCNC: 8.6 G/DL — LOW (ref 11.5–15.5)
INR BLD: 1.25 RATIO — HIGH (ref 0.88–1.16)
LDH SERPL L TO P-CCNC: 964 U/L — HIGH (ref 50–242)
LYMPHOCYTES # BLD AUTO: 0.38 K/UL — LOW (ref 1–3.3)
LYMPHOCYTES # BLD AUTO: 38 % — SIGNIFICANT CHANGE UP (ref 13–44)
MAGNESIUM SERPL-MCNC: 2 MG/DL — SIGNIFICANT CHANGE UP (ref 1.6–2.6)
MAGNESIUM SERPL-MCNC: 2.3 MG/DL — SIGNIFICANT CHANGE UP (ref 1.6–2.6)
MCHC RBC-ENTMCNC: 30.7 PG — SIGNIFICANT CHANGE UP (ref 27–34)
MCHC RBC-ENTMCNC: 31.4 PG — SIGNIFICANT CHANGE UP (ref 27–34)
MCHC RBC-ENTMCNC: 33 GM/DL — SIGNIFICANT CHANGE UP (ref 32–36)
MCHC RBC-ENTMCNC: 33.5 GM/DL — SIGNIFICANT CHANGE UP (ref 32–36)
MCV RBC AUTO: 91.8 FL — SIGNIFICANT CHANGE UP (ref 80–100)
MCV RBC AUTO: 95 FL — SIGNIFICANT CHANGE UP (ref 80–100)
MONOCYTES # BLD AUTO: 0.1 K/UL — SIGNIFICANT CHANGE UP (ref 0–0.9)
MONOCYTES NFR BLD AUTO: 10 % — SIGNIFICANT CHANGE UP (ref 2–14)
NEUTROPHILS # BLD AUTO: 0.2 K/UL — LOW (ref 1.8–7.4)
NEUTROPHILS NFR BLD AUTO: 20 % — LOW (ref 43–77)
NRBC # BLD: 0 /100 WBCS — SIGNIFICANT CHANGE UP (ref 0–0)
PHOSPHATE SERPL-MCNC: 3.2 MG/DL — SIGNIFICANT CHANGE UP (ref 2.5–4.5)
PHOSPHATE SERPL-MCNC: 4.6 MG/DL — HIGH (ref 2.5–4.5)
PLATELET # BLD AUTO: 50 K/UL — LOW (ref 150–400)
PLATELET # BLD AUTO: 54 K/UL — LOW (ref 150–400)
POTASSIUM SERPL-MCNC: 3.6 MMOL/L — SIGNIFICANT CHANGE UP (ref 3.5–5.3)
POTASSIUM SERPL-MCNC: 3.9 MMOL/L — SIGNIFICANT CHANGE UP (ref 3.5–5.3)
POTASSIUM SERPL-SCNC: 3.6 MMOL/L — SIGNIFICANT CHANGE UP (ref 3.5–5.3)
POTASSIUM SERPL-SCNC: 3.9 MMOL/L — SIGNIFICANT CHANGE UP (ref 3.5–5.3)
PROT SERPL-MCNC: 4.7 G/DL — LOW (ref 6–8.3)
PROT SERPL-MCNC: 5.1 G/DL — LOW (ref 6–8.3)
PROTHROM AB SERPL-ACNC: 14.5 SEC — HIGH (ref 10–12.9)
RBC # BLD: 2.39 M/UL — LOW (ref 3.8–5.2)
RBC # BLD: 2.8 M/UL — LOW (ref 3.8–5.2)
RBC # FLD: 17.2 % — HIGH (ref 10.3–14.5)
RBC # FLD: 17.9 % — HIGH (ref 10.3–14.5)
SODIUM SERPL-SCNC: 140 MMOL/L — SIGNIFICANT CHANGE UP (ref 135–145)
SODIUM SERPL-SCNC: 143 MMOL/L — SIGNIFICANT CHANGE UP (ref 135–145)
URATE SERPL-MCNC: 2.4 MG/DL — LOW (ref 2.5–7)
URATE SERPL-MCNC: 2.5 MG/DL — SIGNIFICANT CHANGE UP (ref 2.5–7)
WBC # BLD: 1.01 K/UL — CRITICAL LOW (ref 3.8–10.5)
WBC # BLD: 1.17 K/UL — LOW (ref 3.8–10.5)
WBC # FLD AUTO: 1.01 K/UL — CRITICAL LOW (ref 3.8–10.5)
WBC # FLD AUTO: 1.17 K/UL — LOW (ref 3.8–10.5)

## 2020-02-07 PROCEDURE — 99231 SBSQ HOSP IP/OBS SF/LOW 25: CPT | Mod: GC

## 2020-02-07 RX ORDER — VENETOCLAX 100 MG/1
200 TABLET, FILM COATED ORAL
Refills: 0 | Status: DISCONTINUED | OUTPATIENT
Start: 2020-02-07 | End: 2020-02-10

## 2020-02-07 RX ORDER — CALCIUM ACETATE 667 MG
667 TABLET ORAL
Refills: 0 | Status: COMPLETED | OUTPATIENT
Start: 2020-02-07 | End: 2020-02-07

## 2020-02-07 RX ORDER — FUROSEMIDE 40 MG
20 TABLET ORAL ONCE
Refills: 0 | Status: COMPLETED | OUTPATIENT
Start: 2020-02-07 | End: 2020-02-07

## 2020-02-07 RX ORDER — PSYLLIUM SEED (WITH DEXTROSE)
1 POWDER (GRAM) ORAL DAILY
Refills: 0 | Status: DISCONTINUED | OUTPATIENT
Start: 2020-02-07 | End: 2020-02-10

## 2020-02-07 RX ORDER — FLUCONAZOLE 150 MG/1
200 TABLET ORAL DAILY
Refills: 0 | Status: DISCONTINUED | OUTPATIENT
Start: 2020-02-07 | End: 2020-02-10

## 2020-02-07 RX ORDER — SODIUM CHLORIDE 9 MG/ML
1000 INJECTION INTRAMUSCULAR; INTRAVENOUS; SUBCUTANEOUS
Refills: 0 | Status: DISCONTINUED | OUTPATIENT
Start: 2020-02-07 | End: 2020-02-10

## 2020-02-07 RX ADMIN — Medication 10 MILLIGRAM(S): at 11:39

## 2020-02-07 RX ADMIN — Medication 1 PACKET(S): at 11:39

## 2020-02-07 RX ADMIN — Medication 12.5 MILLIGRAM(S): at 05:18

## 2020-02-07 RX ADMIN — Medication 1 SPRAY(S): at 17:09

## 2020-02-07 RX ADMIN — Medication 300 MILLIGRAM(S): at 21:40

## 2020-02-07 RX ADMIN — CHLORHEXIDINE GLUCONATE 1 APPLICATION(S): 213 SOLUTION TOPICAL at 11:38

## 2020-02-07 RX ADMIN — SODIUM CHLORIDE 50 MILLILITER(S): 9 INJECTION INTRAMUSCULAR; INTRAVENOUS; SUBCUTANEOUS at 18:56

## 2020-02-07 RX ADMIN — AMLODIPINE BESYLATE 5 MILLIGRAM(S): 2.5 TABLET ORAL at 05:18

## 2020-02-07 RX ADMIN — ONDANSETRON 8 MILLIGRAM(S): 8 TABLET, FILM COATED ORAL at 15:31

## 2020-02-07 RX ADMIN — Medication 5 MILLILITER(S): at 11:38

## 2020-02-07 RX ADMIN — Medication 100 MILLIGRAM(S): at 21:40

## 2020-02-07 RX ADMIN — Medication 5 MILLILITER(S): at 17:09

## 2020-02-07 RX ADMIN — Medication 1 APPLICATION(S): at 05:18

## 2020-02-07 RX ADMIN — Medication 1 APPLICATION(S): at 17:10

## 2020-02-07 RX ADMIN — Medication 5 MILLILITER(S): at 05:18

## 2020-02-07 RX ADMIN — Medication 1 SPRAY(S): at 05:18

## 2020-02-07 RX ADMIN — Medication 5 MILLILITER(S): at 23:23

## 2020-02-07 RX ADMIN — Medication 150 MILLIGRAM(S): at 21:40

## 2020-02-07 RX ADMIN — FLUCONAZOLE 200 MILLIGRAM(S): 150 TABLET ORAL at 11:38

## 2020-02-07 RX ADMIN — Medication 100 MILLIGRAM(S): at 13:48

## 2020-02-07 RX ADMIN — SODIUM CHLORIDE 75 MILLILITER(S): 9 INJECTION INTRAMUSCULAR; INTRAVENOUS; SUBCUTANEOUS at 05:19

## 2020-02-07 RX ADMIN — Medication 20 MILLIGRAM(S): at 13:48

## 2020-02-07 RX ADMIN — Medication 667 MILLIGRAM(S): at 18:55

## 2020-02-07 RX ADMIN — DECITABINE 56.2 MILLIGRAM(S): 50 INJECTION, POWDER, LYOPHILIZED, FOR SOLUTION INTRAVENOUS at 16:00

## 2020-02-07 RX ADMIN — Medication 100 MILLIGRAM(S): at 05:17

## 2020-02-07 RX ADMIN — VENETOCLAX 200 MILLIGRAM(S): 100 TABLET, FILM COATED ORAL at 11:40

## 2020-02-07 NOTE — PROGRESS NOTE ADULT - PROBLEM SELECTOR PLAN 8
VTE ppx until plt <50   Encourage ambulation   2/6 added Miralax for constipation             Contact info [phone]

## 2020-02-07 NOTE — ADVANCED PRACTICE NURSE CONSULT - ASSESSMENT
Patient's alert & oriented x 4,resting in bed,denies any  discomfort,,verbalized understanding re- chemo TX.,labs today checked & reviewed by Dr. Lindsey during rounds,W/ L arm  DL PICC,dressing intact,redness & swelling noted on site ,w/ + blood return,flushes easily,,Zofran 8 mg IVP given,chemo TX. checked & verified w/ other RN, Decitabine 20 mg/m2=31 mg IV to infuse over 1 hour given @ 1600 pm.Primary Nurse aware re- chemo TX.,will follow.

## 2020-02-07 NOTE — PROGRESS NOTE ADULT - ATTENDING COMMENTS
80yo F with newly dx'ed AML (CD33+, FLT3 +), started Dacogen/Venetoclax C1 day +2    -Dacogen/Venetoclax given with Venetoclax titration and monitoring for tumor lysis syndrome  - BM bx done on 2/3 -f/u cytogenetics and [facility] [place] [facility]  -monitor for tumor lysis  -monitor for fevers. Start Levofloxacin  -PICC line placed 2/3  -d/c home on 2/10 after Venetoclax dosing, with outpt f/u 80yo F with newly dx'ed AML (CD33+, FLT3 +), started Dacogen/Venetoclax C1 day +3    -Dacogen/Venetoclax given with Venetoclax titration and monitoring for tumor lysis syndrome. Starting full dose Venetoclax today, 400mg po daily. NOT on antifungals yet  -pt had SOB last night -weight increased, will diurese and monitor. CXR -pulm edema  - BM bx done on 2/3 -f/u cytogenetics and [facility]  -monitor for tumor lysis  -monitor for fevers. Cont Levofloxacin  -monitor counts, transfuse PRN. May need transfusion before home d/c  -PICC line placed 2/3  -d/c home on 2/10 after Venetoclax dosing, has outpt f/u on 2/11 80yo F with newly dx'ed AML (CD33+, FLT3 +), started Dacogen/Venetoclax C1 day +3    -Dacogen/Venetoclax given with Venetoclax titration and monitoring for tumor lysis syndrome. Supposed to start full dose Venetoclax today, 400mg po daily, BUT pt's ANC<500, thus will start antifungal Diflucan 200mg po daily and CAP Venetoclax at 200mg po daily  -pt had SOB last night -weight increased, will diurese and monitor. CXR -pulm edema  - BM bx done on 2/3 -f/u cytogenetics and [facility]  -monitor for tumor lysis  -monitor for fevers. Cont Levofloxacin. Add Diflucan today  -monitor counts, transfuse PRN. May need transfusion before home d/c  -PICC line placed 2/3  -d/c home on 2/10 after Venetoclax dosing, has outpt f/u on 2/11

## 2020-02-07 NOTE — PROGRESS NOTE ADULT - PROBLEM SELECTOR PLAN 5
2/4 c/o chest pressure  in setting of cough  EKG, ST with old LBBB, no acute changes  2/4 cardiac enzyme  negative  Continue tessalon pearls for cough Remote history, treated with lumpectomy, RT and Tamoxifen, no chemotherapy  currently in remission   follows with Dr. Blank Currently in remission, no longer taking Tamoxifen Continue Amlodipine and HCTZ  Monitor BP

## 2020-02-07 NOTE — PROGRESS NOTE ADULT - PROBLEM SELECTOR PLAN 1
Continue Dacogen and venotoclax  PB flow cytometry on 1/30 reveal myeloblasts 72%, HLA-DR, CD33, 34 +,  c/w AML,   PB FISH normal   BM bx on 2/3 c/w AML, FLT3+  Allopurinol 300mg daily   IV hydration  Monitor CBC/diff/lytes, TLS q12H,  transfuse and or replete lytes PRN  Monitor weight and I and O's, diuresis PRN   antiemetics, mouth care  HLA sent on 1/31  Plan dc home on Monday 2/10 FLT 3 (+)  Continue Cycle 1 of Dacogen/Venetoclax (dose capped at 200mg 2/2 Azole)  Monitor CBC/Lytes and transfuse/replete PRN  TLS labs q 12  Strict Is and Os/Daily weights/Mouth Care  Allopurinol   IVF   Antiemetics FLT 3 (+)  Continue Cycle 1 of Dacogen/Venetoclax (dose capped at 200mg 2/2 Azole)  Monitor CBC/Lytes and transfuse/replete PRN  Anemia: 1 unit PRBCs  TLS labs q 12  Strict Is and Os/Daily weights/Mouth Care  Allopurinol   IVF   Antiemetics

## 2020-02-07 NOTE — ADVANCED PRACTICE NURSE CONSULT - REASON FOR CONSULT
Chemotherapy Notes :                                                                                                                                                                                                                                                                                                     Day 4/5 Dacogen IV

## 2020-02-07 NOTE — PROGRESS NOTE ADULT - SUBJECTIVE AND OBJECTIVE BOX
Diagnosis:    Protocol/Chemo Regimen:    Day:     Pt endorsed:    Review of Systems:     Pain scale:     Diet:     Allergies    No Known Allergies    Intolerances        ANTIMICROBIALS  levoFLOXacin  Tablet 500 milliGRAM(s) Oral every 24 hours      HEME/ONC MEDICATIONS  decitabine IVPB (eMAR) 31 milliGRAM(s) IV Intermittent every 24 hours  enoxaparin Injectable 40 milliGRAM(s) SubCutaneous at bedtime  venetoclax 400 milliGRAM(s) Oral <User Schedule>      STANDING MEDICATIONS  allopurinol 300 milliGRAM(s) Oral at bedtime  amLODIPine   Tablet 5 milliGRAM(s) Oral daily  benzonatate 100 milliGRAM(s) Oral three times a day  Biotene Dry Mouth Oral Rinse 5 milliLiter(s) Swish and Spit four times a day  chlorhexidine 2% Cloths 1 Application(s) Topical <User Schedule>  clobetasol 0.05% Ointment 1 Application(s) Topical two times a day  fluticasone propionate 50 MICROgram(s)/spray Nasal Spray 1 Spray(s) Both Nostrils two times a day  hydrochlorothiazide 12.5 milliGRAM(s) Oral daily  ondansetron Injectable 8 milliGRAM(s) IV Push every 24 hours  phytonadione   Solution 10 milliGRAM(s) Oral daily  polyethylene glycol 3350 17 Gram(s) Oral daily  psyllium Powder 1 Packet(s) Oral daily  senna 2 Tablet(s) Oral at bedtime  sodium chloride 0.9%. 1000 milliLiter(s) IV Continuous <Continuous>  traZODone 150 milliGRAM(s) Oral at bedtime      PRN MEDICATIONS  acetaminophen   Tablet .. 650 milliGRAM(s) Oral every 6 hours PRN  loratadine 10 milliGRAM(s) Oral daily PRN  sodium chloride 0.9% lock flush 10 milliLiter(s) IV Push every 1 hour PRN        Vital Signs Last 24 Hrs  T(C): 36.9 (07 Feb 2020 05:14), Max: 38 (06 Feb 2020 17:43)  T(F): 98.4 (07 Feb 2020 05:14), Max: 100.4 (06 Feb 2020 17:43)  HR: 72 (07 Feb 2020 05:14) (72 - 82)  BP: 125/64 (07 Feb 2020 05:14) (100/62 - 140/77)  BP(mean): --  RR: 18 (07 Feb 2020 05:14) (18 - 19)  SpO2: 93% (07 Feb 2020 05:14) (90% - 94%)    PHYSICAL EXAM  General: NAD  HEENT: PERRLA, EOMI, clear oropharynx  Neck: supple  CV: (+) S1/S2 RRR  Lungs: clear to auscultation, no wheezes or rales  Abdomen: soft, non-tender, non-distended (+) BS  Ext: no edema  Skin: no rashes   Neuro: alert and oriented X 3, no focal deficits  Central Line:     RECENT CULTURES:        LABS:                        7.5    1.01  )-----------( 50       ( 07 Feb 2020 06:57 )             22.7         Mean Cell Volume : 95.0 fl  Mean Cell Hemoglobin : 31.4 pg  Mean Cell Hemoglobin Concentration : 33.0 gm/dL  Auto Neutrophil # : x  Auto Lymphocyte # : x  Auto Monocyte # : x  Auto Eosinophil # : x  Auto Basophil # : x  Auto Neutrophil % : x  Auto Lymphocyte % : x  Auto Monocyte % : x  Auto Eosinophil % : x  Auto Basophil % : x      02-07    143  |  109<H>  |  12  ----------------------------<  107<H>  3.6   |  26  |  0.50    Ca    8.1<L>      07 Feb 2020 06:57  Phos  3.2     02-07  Mg     2.3     02-07    TPro  4.7<L>  /  Alb  2.8<L>  /  TBili  0.4  /  DBili  x   /  AST  25  /  ALT  38  /  AlkPhos  33<L>  02-07      Mg 2.3  Phos 3.2      PT/INR - ( 07 Feb 2020 06:57 )   PT: 14.5 sec;   INR: 1.25 ratio         PTT - ( 07 Feb 2020 06:57 )  PTT:31.2 sec      Uric Acid 2.5        RADIOLOGY & ADDITIONAL STUDIES: Diagnosis: AML 	FLT 3 (+)     Protocol/Chemo Regimen: Cycle 1 of Dacogen/Venetoclax (dose capped at 200mg 2/2 Azole)    Day: 4    Pt endorsed: had an episode of shortness of breath overnight but feeling better this am    Review of Systems: Patient denies headache, dizziness, visual changes, chest pain, palpitations, abdominal pain, nausea, vomiting, diarrhea or dysuria.    Pain scale: 0    Diet: Regular    Allergies: No Known Allergies    ANTIMICROBIALS  levoFLOXacin  Tablet 500 milliGRAM(s) Oral every 24 hours      HEME/ONC MEDICATIONS  decitabine IVPB (eMAR) 31 milliGRAM(s) IV Intermittent every 24 hours  enoxaparin Injectable 40 milliGRAM(s) SubCutaneous at bedtime  venetoclax 400 milliGRAM(s) Oral <User Schedule>      STANDING MEDICATIONS  allopurinol 300 milliGRAM(s) Oral at bedtime  amLODIPine   Tablet 5 milliGRAM(s) Oral daily  benzonatate 100 milliGRAM(s) Oral three times a day  Biotene Dry Mouth Oral Rinse 5 milliLiter(s) Swish and Spit four times a day  chlorhexidine 2% Cloths 1 Application(s) Topical <User Schedule>  clobetasol 0.05% Ointment 1 Application(s) Topical two times a day  fluticasone propionate 50 MICROgram(s)/spray Nasal Spray 1 Spray(s) Both Nostrils two times a day  hydrochlorothiazide 12.5 milliGRAM(s) Oral daily  ondansetron Injectable 8 milliGRAM(s) IV Push every 24 hours  phytonadione   Solution 10 milliGRAM(s) Oral daily  polyethylene glycol 3350 17 Gram(s) Oral daily  psyllium Powder 1 Packet(s) Oral daily  senna 2 Tablet(s) Oral at bedtime  sodium chloride 0.9%. 1000 milliLiter(s) IV Continuous <Continuous>  traZODone 150 milliGRAM(s) Oral at bedtime      PRN MEDICATIONS  acetaminophen   Tablet .. 650 milliGRAM(s) Oral every 6 hours PRN  loratadine 10 milliGRAM(s) Oral daily PRN  sodium chloride 0.9% lock flush 10 milliLiter(s) IV Push every 1 hour PRN        Vital Signs Last 24 Hrs  T(C): 36.9 (07 Feb 2020 05:14), Max: 38 (06 Feb 2020 17:43)  T(F): 98.4 (07 Feb 2020 05:14), Max: 100.4 (06 Feb 2020 17:43)  HR: 72 (07 Feb 2020 05:14) (72 - 82)  BP: 125/64 (07 Feb 2020 05:14) (100/62 - 140/77)  BP(mean): --  RR: 18 (07 Feb 2020 05:14) (18 - 19)  SpO2: 93% (07 Feb 2020 05:14) (90% - 94%)      PHYSICAL EXAM  General: NAD  HEENT: PERRLA, EOMI, clear oropharynx  Neck: supple  CV: (+) S1/S2 RRR  Lungs: clear to auscultation, no wheezes or rales  Abdomen: soft, non-tender, non-distended (+) BS  Ext: no edema  Neuro: alert and oriented X 3, no focal deficits  Central Line: C/D/I        LABS:                        7.5    1.01  )-----------( 50       ( 07 Feb 2020 06:57 )             22.7         Mean Cell Volume : 95.0 fl  Mean Cell Hemoglobin : 31.4 pg  Mean Cell Hemoglobin Concentration : 33.0 gm/dL  Auto Neutrophil # : x  Auto Lymphocyte # : x  Auto Monocyte # : x  Auto Eosinophil # : x  Auto Basophil # : x  Auto Neutrophil % : x  Auto Lymphocyte % : x  Auto Monocyte % : x  Auto Eosinophil % : x  Auto Basophil % : x      02-07    143  |  109<H>  |  12  ----------------------------<  107<H>  3.6   |  26  |  0.50    Ca    8.1<L>      07 Feb 2020 06:57  Phos  3.2     02-07  Mg     2.3     02-07    TPro  4.7<L>  /  Alb  2.8<L>  /  TBili  0.4  /  DBili  x   /  AST  25  /  ALT  38  /  AlkPhos  33<L>  02-07      Mg 2.3  Phos 3.2      PT/INR - ( 07 Feb 2020 06:57 )   PT: 14.5 sec;   INR: 1.25 ratio         PTT - ( 07 Feb 2020 06:57 )  PTT:31.2 sec      Uric Acid 2.5        RADIOLOGY & ADDITIONAL STUDIES:    EXAM:  XR CHEST PORTABLE URGENT 1V                        PROCEDURE DATE:  02/06/2020    Impression: Unchanged trace left effusion and mild pulmonary edema

## 2020-02-07 NOTE — PROGRESS NOTE ADULT - PROBLEM SELECTOR PLAN 3
Monitor  mild AST/ALT elevation Continue with home medication of Amlodipine  Monitor BP Continue Clobetasol oint BID for 1-2 weeks (d/c between 2/11 and 2/18)  Derm following Overnight patient had desat down to 89/90% normalizing on 2L NC  Cause likely multifactorial and due to mild pulmonary edema and L effusion seen on CXR with increased weight since admission coupled with anemia  Given 1 unit PRBCs with Lasix after and then will repeat O2 sat and oxygen requirements

## 2020-02-07 NOTE — PROGRESS NOTE ADULT - PROBLEM SELECTOR PLAN 7
Remote history, treated with lumpectomy, RT and Tamoxifen, no chemotherapy  currently in remission   follows with Dr. Blank No pharmacologic ppx 2/2 thrombocytopenia  Encourage ambulation     Contact Information [phone]

## 2020-02-07 NOTE — PROGRESS NOTE ADULT - PROBLEM SELECTOR PLAN 2
Neutropenic. afebrile   2/6 added Levaquin empirically  pan culture if febrile  If fever, switch Levaquin to Cefepime   clobetasol oint BID  for itching  rashes/eczema  Added Flonase for post nasal drip The patient is neutropenic., afebrile   If febrile Pan Cx, CXR and change Levaquin to Cefepime  Continue Levaquin and Diflucan

## 2020-02-07 NOTE — PROGRESS NOTE ADULT - PROBLEM SELECTOR PLAN 4
Continue with home medication of Amlodipine  Monitor BP Vitamin K 10mg x 3 days (2/5-2/7)  Monitor Continue Amlodipine and HCTZ  Monitor BP Continue Clobetasol oint BID for 1-2 weeks (d/c between 2/11 and 2/18)  Derm following

## 2020-02-07 NOTE — PROGRESS NOTE ADULT - ASSESSMENT
79 F with history of Breast cancer 12 years ago treated with tamoxifen (no chemotherapy), s/p RT and lumpectomy, HTN and surgical hx of hysterectomy. Pt was sent in by oncologist Dr. Blank for rule out leukemia.  PB flow cytometry on 1/30 reveal myeloblasts 72%, HLA-DR, CD33, 34 +,  bone marrow bx c/w AML, FLT 3+. This is a 80 yo F with PMHx of Breast CA (dx 12 years ago treated with Tamoxifen, lumpectomy and RT), HTN admitted for management of newly diagnosed AML currently receiving Cycle 1 of Dacogen/Venetoclax (dose capped at 200mg 2/2 Azole). The patients hospital course has been complicated by episode of hypoxemia likely 2/2 anemia and volume overload). The patient has pancytopenia 2/2 disease and/or disease.

## 2020-02-07 NOTE — PROGRESS NOTE ADULT - PROBLEM SELECTOR PLAN 6
Vitamin K 10mg x 3 days (2/5-2/7)  Monitor VTE ppx until plt <50   Encourage ambulation   2/6 added Miralax for constipation             Contact info [id number] No pharmacologic ppx 2/2 thrombocytopenia  Encourage ambulation     Contact Information [phone] Currently in remission, no longer taking Tamoxifen

## 2020-02-08 LAB
ALBUMIN SERPL ELPH-MCNC: 2.7 G/DL — LOW (ref 3.3–5)
ALP SERPL-CCNC: 32 U/L — LOW (ref 40–120)
ALT FLD-CCNC: 36 U/L — SIGNIFICANT CHANGE UP (ref 10–45)
ANION GAP SERPL CALC-SCNC: 8 MMOL/L — SIGNIFICANT CHANGE UP (ref 5–17)
AST SERPL-CCNC: 22 U/L — SIGNIFICANT CHANGE UP (ref 10–40)
BASOPHILS # BLD AUTO: 0 K/UL — SIGNIFICANT CHANGE UP (ref 0–0.2)
BASOPHILS NFR BLD AUTO: 0 % — SIGNIFICANT CHANGE UP (ref 0–2)
BILIRUB SERPL-MCNC: 0.6 MG/DL — SIGNIFICANT CHANGE UP (ref 0.2–1.2)
BUN SERPL-MCNC: 13 MG/DL — SIGNIFICANT CHANGE UP (ref 7–23)
CALCIUM SERPL-MCNC: 8 MG/DL — LOW (ref 8.4–10.5)
CHLORIDE SERPL-SCNC: 107 MMOL/L — SIGNIFICANT CHANGE UP (ref 96–108)
CO2 SERPL-SCNC: 28 MMOL/L — SIGNIFICANT CHANGE UP (ref 22–31)
CREAT SERPL-MCNC: 0.55 MG/DL — SIGNIFICANT CHANGE UP (ref 0.5–1.3)
EOSINOPHIL # BLD AUTO: 0 K/UL — SIGNIFICANT CHANGE UP (ref 0–0.5)
EOSINOPHIL NFR BLD AUTO: 0 % — SIGNIFICANT CHANGE UP (ref 0–6)
GLUCOSE SERPL-MCNC: 98 MG/DL — SIGNIFICANT CHANGE UP (ref 70–99)
HCT VFR BLD CALC: 25.8 % — LOW (ref 34.5–45)
HGB BLD-MCNC: 8.5 G/DL — LOW (ref 11.5–15.5)
IMM GRANULOCYTES NFR BLD AUTO: 6.1 % — HIGH (ref 0–1.5)
LDH SERPL L TO P-CCNC: 711 U/L — HIGH (ref 50–242)
LYMPHOCYTES # BLD AUTO: 0.48 K/UL — LOW (ref 1–3.3)
LYMPHOCYTES # BLD AUTO: 48.5 % — HIGH (ref 13–44)
MAGNESIUM SERPL-MCNC: 2.2 MG/DL — SIGNIFICANT CHANGE UP (ref 1.6–2.6)
MCHC RBC-ENTMCNC: 30.4 PG — SIGNIFICANT CHANGE UP (ref 27–34)
MCHC RBC-ENTMCNC: 32.9 GM/DL — SIGNIFICANT CHANGE UP (ref 32–36)
MCV RBC AUTO: 92.1 FL — SIGNIFICANT CHANGE UP (ref 80–100)
MONOCYTES # BLD AUTO: 0.1 K/UL — SIGNIFICANT CHANGE UP (ref 0–0.9)
MONOCYTES NFR BLD AUTO: 10.1 % — SIGNIFICANT CHANGE UP (ref 2–14)
NEUTROPHILS # BLD AUTO: 0.35 K/UL — LOW (ref 1.8–7.4)
NEUTROPHILS NFR BLD AUTO: 35.3 % — LOW (ref 43–77)
NRBC # BLD: 0 /100 WBCS — SIGNIFICANT CHANGE UP (ref 0–0)
PHOSPHATE SERPL-MCNC: 3.4 MG/DL — SIGNIFICANT CHANGE UP (ref 2.5–4.5)
PLATELET # BLD AUTO: 44 K/UL — LOW (ref 150–400)
POTASSIUM SERPL-MCNC: 3.6 MMOL/L — SIGNIFICANT CHANGE UP (ref 3.5–5.3)
POTASSIUM SERPL-SCNC: 3.6 MMOL/L — SIGNIFICANT CHANGE UP (ref 3.5–5.3)
PROT SERPL-MCNC: 4.9 G/DL — LOW (ref 6–8.3)
RBC # BLD: 2.8 M/UL — LOW (ref 3.8–5.2)
RBC # FLD: 17.2 % — HIGH (ref 10.3–14.5)
SODIUM SERPL-SCNC: 143 MMOL/L — SIGNIFICANT CHANGE UP (ref 135–145)
URATE SERPL-MCNC: 2.2 MG/DL — LOW (ref 2.5–7)
WBC # BLD: 0.99 K/UL — CRITICAL LOW (ref 3.8–10.5)
WBC # FLD AUTO: 0.99 K/UL — CRITICAL LOW (ref 3.8–10.5)

## 2020-02-08 PROCEDURE — 99232 SBSQ HOSP IP/OBS MODERATE 35: CPT

## 2020-02-08 RX ADMIN — SENNA PLUS 2 TABLET(S): 8.6 TABLET ORAL at 22:19

## 2020-02-08 RX ADMIN — Medication 300 MILLIGRAM(S): at 22:18

## 2020-02-08 RX ADMIN — Medication 12.5 MILLIGRAM(S): at 06:04

## 2020-02-08 RX ADMIN — Medication 5 MILLILITER(S): at 12:03

## 2020-02-08 RX ADMIN — SODIUM CHLORIDE 50 MILLILITER(S): 9 INJECTION INTRAMUSCULAR; INTRAVENOUS; SUBCUTANEOUS at 06:04

## 2020-02-08 RX ADMIN — Medication 100 MILLIGRAM(S): at 22:18

## 2020-02-08 RX ADMIN — ONDANSETRON 8 MILLIGRAM(S): 8 TABLET, FILM COATED ORAL at 15:34

## 2020-02-08 RX ADMIN — FLUCONAZOLE 200 MILLIGRAM(S): 150 TABLET ORAL at 12:03

## 2020-02-08 RX ADMIN — SODIUM CHLORIDE 50 MILLILITER(S): 9 INJECTION INTRAMUSCULAR; INTRAVENOUS; SUBCUTANEOUS at 17:30

## 2020-02-08 RX ADMIN — Medication 100 MILLIGRAM(S): at 06:04

## 2020-02-08 RX ADMIN — AMLODIPINE BESYLATE 5 MILLIGRAM(S): 2.5 TABLET ORAL at 06:04

## 2020-02-08 RX ADMIN — Medication 150 MILLIGRAM(S): at 22:18

## 2020-02-08 RX ADMIN — Medication 5 MILLILITER(S): at 23:03

## 2020-02-08 RX ADMIN — DECITABINE 56.2 MILLIGRAM(S): 50 INJECTION, POWDER, LYOPHILIZED, FOR SOLUTION INTRAVENOUS at 16:14

## 2020-02-08 RX ADMIN — Medication 1 APPLICATION(S): at 06:05

## 2020-02-08 RX ADMIN — Medication 5 MILLILITER(S): at 06:05

## 2020-02-08 RX ADMIN — Medication 100 MILLIGRAM(S): at 15:34

## 2020-02-08 RX ADMIN — VENETOCLAX 200 MILLIGRAM(S): 100 TABLET, FILM COATED ORAL at 12:03

## 2020-02-08 RX ADMIN — Medication 1 SPRAY(S): at 17:30

## 2020-02-08 RX ADMIN — Medication 5 MILLILITER(S): at 17:30

## 2020-02-08 RX ADMIN — Medication 1 SPRAY(S): at 06:05

## 2020-02-08 RX ADMIN — CHLORHEXIDINE GLUCONATE 1 APPLICATION(S): 213 SOLUTION TOPICAL at 12:03

## 2020-02-08 NOTE — PROGRESS NOTE ADULT - PROBLEM SELECTOR PLAN 2
The patient is neutropenic, afebrile   If febrile Pan Cx, CXR and change Levaquin to Cefepime  Continue Levaquin and Diflucan

## 2020-02-08 NOTE — PROGRESS NOTE ADULT - SUBJECTIVE AND OBJECTIVE BOX
Diagnosis: AML 	FLT 3 (+)     Protocol/Chemo Regimen: Cycle 1 of Dacogen/Venetoclax (dose capped at 200mg 2/2 Azole)    Day: 5    Pt endorsed: poor appetite    Review of Systems: Patient denies headache, dizziness, visual changes, chest pain, palpitations, abdominal pain, nausea, vomiting, diarrhea or dysuria.    Pain scale: 0    Diet: Regular    Allergies: No Known Allergies    ANTIMICROBIALS  levoFLOXacin  Tablet 500 milliGRAM(s) Oral every 24 hours      HEME/ONC MEDICATIONS  decitabine IVPB (eMAR) 31 milliGRAM(s) IV Intermittent every 24 hours  enoxaparin Injectable 40 milliGRAM(s) SubCutaneous at bedtime  venetoclax 400 milliGRAM(s) Oral <User Schedule>      STANDING MEDICATIONS  allopurinol 300 milliGRAM(s) Oral at bedtime  amLODIPine   Tablet 5 milliGRAM(s) Oral daily  benzonatate 100 milliGRAM(s) Oral three times a day  Biotene Dry Mouth Oral Rinse 5 milliLiter(s) Swish and Spit four times a day  chlorhexidine 2% Cloths 1 Application(s) Topical <User Schedule>  clobetasol 0.05% Ointment 1 Application(s) Topical two times a day  fluticasone propionate 50 MICROgram(s)/spray Nasal Spray 1 Spray(s) Both Nostrils two times a day  hydrochlorothiazide 12.5 milliGRAM(s) Oral daily  ondansetron Injectable 8 milliGRAM(s) IV Push every 24 hours  phytonadione   Solution 10 milliGRAM(s) Oral daily  polyethylene glycol 3350 17 Gram(s) Oral daily  psyllium Powder 1 Packet(s) Oral daily  senna 2 Tablet(s) Oral at bedtime  sodium chloride 0.9%. 1000 milliLiter(s) IV Continuous <Continuous>  traZODone 150 milliGRAM(s) Oral at bedtime      PRN MEDICATIONS  acetaminophen   Tablet .. 650 milliGRAM(s) Oral every 6 hours PRN  loratadine 10 milliGRAM(s) Oral daily PRN  sodium chloride 0.9% lock flush 10 milliLiter(s) IV Push every 1 hour PRN      Vital Signs Last 24 Hrs  T(C): 37.3 (08 Feb 2020 09:28), Max: 37.6 (08 Feb 2020 01:42)  T(F): 99.1 (08 Feb 2020 09:28), Max: 99.6 (08 Feb 2020 01:42)  HR: 85 (08 Feb 2020 11:22) (73 - 85)  BP: 120/66 (08 Feb 2020 09:28) (120/66 - 143/80)  BP(mean): --  RR: 18 (08 Feb 2020 09:28) (18 - 18)  SpO2: 94% (08 Feb 2020 11:22) (94% - 98%)      PHYSICAL EXAM  General: NAD  HEENT: PERRLA, EOMI, clear oropharynx  Neck: supple  CV: (+) S1/S2 RRR  Lungs: clear to auscultation, no wheezes or rales  Abdomen: soft, non-tender, non-distended (+) BS  Ext: no edema  Neuro: alert and oriented X 3, no focal deficits  Central Line: C/D/I        LABS:                                 8.5    0.99  )-----------( 44       ( 08 Feb 2020 06:36 )             25.8         Mean Cell Volume : 92.1 fl  Mean Cell Hemoglobin : 30.4 pg  Mean Cell Hemoglobin Concentration : 32.9 gm/dL  Auto Neutrophil # : 0.35 K/uL  Auto Lymphocyte # : 0.48 K/uL  Auto Monocyte # : 0.10 K/uL  Auto Eosinophil # : 0.00 K/uL  Auto Basophil # : 0.00 K/uL  Auto Neutrophil % : 35.3 %  Auto Lymphocyte % : 48.5 %  Auto Monocyte % : 10.1 %  Auto Eosinophil % : 0.0 %  Auto Basophil % : 0.0 %      02-08    143  |  107  |  13  ----------------------------<  98  3.6   |  28  |  0.55    Ca    8.0<L>      08 Feb 2020 06:36  Phos  3.4     02-08  Mg     2.2     02-08    TPro  4.9<L>  /  Alb  2.7<L>  /  TBili  0.6  /  DBili  x   /  AST  22  /  ALT  36  /  AlkPhos  32<L>  02-08      Mg 2.2  Phos 3.4      Uric Acid 2.2      PT/INR - ( 07 Feb 2020 06:57 )   PT: 14.5 sec;   INR: 1.25 ratio         PTT - ( 07 Feb 2020 06:57 )  PTT:31.2 sec      RADIOLOGY & ADDITIONAL STUDIES:    EXAM:  XR CHEST PORTABLE URGENT 1V                        PROCEDURE DATE:  02/06/2020    Impression: Unchanged trace left effusion and mild pulmonary edema

## 2020-02-08 NOTE — PROGRESS NOTE ADULT - PROBLEM SELECTOR PLAN 7
No pharmacologic ppx 2/2 thrombocytopenia  Encourage ambulation     Contact Information [phone]

## 2020-02-08 NOTE — PROGRESS NOTE ADULT - ASSESSMENT
This is a 78 yo F with PMHx of Breast CA (dx 12 years ago treated with Tamoxifen, lumpectomy and RT), HTN admitted for management of newly diagnosed AML currently receiving Cycle 1 of Dacogen/Venetoclax (dose capped at 200mg 2/2 Azole). The patients hospital course has been complicated by episode of hypoxemia likely 2/2 anemia and volume overload). The patient has pancytopenia 2/2 disease and/or disease.

## 2020-02-08 NOTE — PROGRESS NOTE ADULT - ATTENDING COMMENTS
80yo F with newly dx'ed AML (CD33+, FLT3 +), started Dacogen/Venetoclax C1 day +3    -Dacogen/Venetoclax given with Venetoclax titration and monitoring for tumor lysis syndrome. Supposed to start full dose Venetoclax today, 400mg po daily, BUT pt's ANC<500, thus will start antifungal Diflucan 200mg po daily and CAP Venetoclax at 200mg po daily  -pt had SOB last night -weight increased, will diurese and monitor. CXR -pulm edema  - BM bx done on 2/3 -f/u cytogenetics and [facility]  -monitor for tumor lysis  -monitor for fevers. Cont Levofloxacin. Add Diflucan today  -monitor counts, transfuse PRN. May need transfusion before home d/c  -PICC line placed 2/3  -d/c home on 2/10 after Venetoclax dosing, has outpt f/u on 2/11 80yo F with newly dx'ed AML (CD33+, FLT3 +), started Dacogen/Venetoclax C1 day +5    -Dacogen/Venetoclax given with Venetoclax titration and monitoring for tumor lysis syndrome. Supposed to start full dose Venetoclax today, 400mg po daily, BUT pt's ANC<500, thus will start antifungal Diflucan 200mg po daily and CAP Venetoclax at 200mg po daily  -pt had SOB last night -weight increased, will diurese and monitor. CXR -pulm edema  - BM bx done on 2/3 -f/u cytogenetics and [facility]  -monitor for tumor lysis  -monitor for fevers. Cont Levofloxacin. Add Diflucan today  -monitor counts, receives transfusions PRN. May receive transfusion before home d/c, depending on labs  -PICC line placed 2/3  -d/c home on 2/10 after Venetoclax dosing, has outpt f/u on 2/11

## 2020-02-08 NOTE — PROGRESS NOTE ADULT - PROBLEM SELECTOR PLAN 1
FLT 3 (+)  Continue Cycle 1 of Dacogen/Venetoclax (dose capped at 200mg 2/2 Azole)  Monitor CBC/Lytes and transfuse/replete PRN  TLS labs q 12  Strict Is and Os/Daily weights/Mouth Care  Allopurinol   IVF   Antiemetics FLT 3 (+)  Continue Cycle 1 of Dacogen/Venetoclax (dose capped at 200mg 2/2 Azole)  Monitor CBC/Lytes and transfuse/replete PRN  Strict Is and Os/Daily weights/Mouth Care  Allopurinol   IVF   Antiemetics

## 2020-02-08 NOTE — PHYSICAL THERAPY INITIAL EVALUATION ADULT - PERTINENT HX OF CURRENT PROBLEM, REHAB EVAL
79 F with history of Breast cancer 12 years ago treated with tamoxifen (no chemotherapy), s/p RT and lumpectomy, HTN and surgical hx of hysterectomy sent in by oncologist Dr. Blank for rule out leukemia. Lab shows decreased h/h, CXR: Unchanged trace left effusion and mild pulmonary edema.

## 2020-02-08 NOTE — ADVANCED PRACTICE NURSE CONSULT - ASSESSMENT
Labs today WBC .99 HGB 8.5 HCT 25.8 PLTS 44 CR. 0.55  TBILI. 0.6Pt found in bed, alert and oriented x4, v/s stable afebrile, n/c offered.  Right picc line in place. Site without redness or swelling. Lumen previously accessed with + blood return flushes well with NS. Pt premedicated with  Zofran 8 mg IVSS prior to chemotherapy. Chemotherapy verified by 2 RNs prior to administration. At 1618 treated with dacogen 20mg/m2= 31mg ivss over 1 hour. Pt remains in bed with n/c offered. Primary RN aware of treatment plan.

## 2020-02-08 NOTE — PROGRESS NOTE ADULT - PROBLEM SELECTOR PLAN 3
2/7 HAd episode of hypoxia and desat down to 89/90% normalizing on 2L NC  Cause likely multifactorial and due to mild pulmonary edema and L effusion seen on CXR with increased weight since admission coupled with anemia  Given 1 unit PRBCs with Lasix after and then will repeat O2 sat and oxygen requirements now resolved and patient ambulating on RA

## 2020-02-09 LAB
ALBUMIN SERPL ELPH-MCNC: 2.8 G/DL — LOW (ref 3.3–5)
ALP SERPL-CCNC: 34 U/L — LOW (ref 40–120)
ALT FLD-CCNC: 51 U/L — HIGH (ref 10–45)
ANION GAP SERPL CALC-SCNC: 9 MMOL/L — SIGNIFICANT CHANGE UP (ref 5–17)
AST SERPL-CCNC: 35 U/L — SIGNIFICANT CHANGE UP (ref 10–40)
BASOPHILS # BLD AUTO: 0 K/UL — SIGNIFICANT CHANGE UP (ref 0–0.2)
BASOPHILS NFR BLD AUTO: 0 % — SIGNIFICANT CHANGE UP (ref 0–2)
BILIRUB SERPL-MCNC: 0.5 MG/DL — SIGNIFICANT CHANGE UP (ref 0.2–1.2)
BUN SERPL-MCNC: 13 MG/DL — SIGNIFICANT CHANGE UP (ref 7–23)
CALCIUM SERPL-MCNC: 8.3 MG/DL — LOW (ref 8.4–10.5)
CHLORIDE SERPL-SCNC: 106 MMOL/L — SIGNIFICANT CHANGE UP (ref 96–108)
CO2 SERPL-SCNC: 26 MMOL/L — SIGNIFICANT CHANGE UP (ref 22–31)
CREAT SERPL-MCNC: 0.58 MG/DL — SIGNIFICANT CHANGE UP (ref 0.5–1.3)
EOSINOPHIL # BLD AUTO: 0.2 K/UL — SIGNIFICANT CHANGE UP (ref 0–0.5)
EOSINOPHIL NFR BLD AUTO: 27.4 % — HIGH (ref 0–6)
GLUCOSE SERPL-MCNC: 119 MG/DL — HIGH (ref 70–99)
HCT VFR BLD CALC: 25.3 % — LOW (ref 34.5–45)
HGB BLD-MCNC: 8.5 G/DL — LOW (ref 11.5–15.5)
IMM GRANULOCYTES NFR BLD AUTO: 4.1 % — HIGH (ref 0–1.5)
LDH SERPL L TO P-CCNC: 598 U/L — HIGH (ref 50–242)
LYMPHOCYTES # BLD AUTO: 0.26 K/UL — LOW (ref 1–3.3)
LYMPHOCYTES # BLD AUTO: 35.6 % — SIGNIFICANT CHANGE UP (ref 13–44)
MAGNESIUM SERPL-MCNC: 2.3 MG/DL — SIGNIFICANT CHANGE UP (ref 1.6–2.6)
MCHC RBC-ENTMCNC: 31 PG — SIGNIFICANT CHANGE UP (ref 27–34)
MCHC RBC-ENTMCNC: 33.6 GM/DL — SIGNIFICANT CHANGE UP (ref 32–36)
MCV RBC AUTO: 92.3 FL — SIGNIFICANT CHANGE UP (ref 80–100)
MONOCYTES # BLD AUTO: 0.05 K/UL — SIGNIFICANT CHANGE UP (ref 0–0.9)
MONOCYTES NFR BLD AUTO: 6.8 % — SIGNIFICANT CHANGE UP (ref 2–14)
NEUTROPHILS # BLD AUTO: 0.19 K/UL — LOW (ref 1.8–7.4)
NEUTROPHILS NFR BLD AUTO: 26.1 % — LOW (ref 43–77)
NRBC # BLD: 1 /100 WBCS — HIGH (ref 0–0)
PHOSPHATE SERPL-MCNC: 3 MG/DL — SIGNIFICANT CHANGE UP (ref 2.5–4.5)
PLATELET # BLD AUTO: 36 K/UL — LOW (ref 150–400)
POTASSIUM SERPL-MCNC: 3.6 MMOL/L — SIGNIFICANT CHANGE UP (ref 3.5–5.3)
POTASSIUM SERPL-SCNC: 3.6 MMOL/L — SIGNIFICANT CHANGE UP (ref 3.5–5.3)
PROT SERPL-MCNC: 4.9 G/DL — LOW (ref 6–8.3)
RBC # BLD: 2.74 M/UL — LOW (ref 3.8–5.2)
RBC # FLD: 16.5 % — HIGH (ref 10.3–14.5)
SODIUM SERPL-SCNC: 141 MMOL/L — SIGNIFICANT CHANGE UP (ref 135–145)
URATE SERPL-MCNC: 2.1 MG/DL — LOW (ref 2.5–7)
WBC # BLD: 0.73 K/UL — CRITICAL LOW (ref 3.8–10.5)
WBC # FLD AUTO: 0.73 K/UL — CRITICAL LOW (ref 3.8–10.5)

## 2020-02-09 PROCEDURE — 99231 SBSQ HOSP IP/OBS SF/LOW 25: CPT

## 2020-02-09 RX ADMIN — FLUCONAZOLE 200 MILLIGRAM(S): 150 TABLET ORAL at 12:20

## 2020-02-09 RX ADMIN — Medication 1 APPLICATION(S): at 18:25

## 2020-02-09 RX ADMIN — Medication 100 MILLIGRAM(S): at 06:27

## 2020-02-09 RX ADMIN — Medication 5 MILLILITER(S): at 12:19

## 2020-02-09 RX ADMIN — Medication 1 SPRAY(S): at 18:25

## 2020-02-09 RX ADMIN — Medication 300 MILLIGRAM(S): at 22:22

## 2020-02-09 RX ADMIN — Medication 150 MILLIGRAM(S): at 22:22

## 2020-02-09 RX ADMIN — Medication 100 MILLIGRAM(S): at 18:24

## 2020-02-09 RX ADMIN — Medication 1 APPLICATION(S): at 06:28

## 2020-02-09 RX ADMIN — Medication 1 SPRAY(S): at 06:27

## 2020-02-09 RX ADMIN — CHLORHEXIDINE GLUCONATE 1 APPLICATION(S): 213 SOLUTION TOPICAL at 06:26

## 2020-02-09 RX ADMIN — Medication 100 MILLIGRAM(S): at 23:25

## 2020-02-09 RX ADMIN — AMLODIPINE BESYLATE 5 MILLIGRAM(S): 2.5 TABLET ORAL at 06:27

## 2020-02-09 RX ADMIN — SENNA PLUS 2 TABLET(S): 8.6 TABLET ORAL at 22:21

## 2020-02-09 RX ADMIN — VENETOCLAX 200 MILLIGRAM(S): 100 TABLET, FILM COATED ORAL at 12:20

## 2020-02-09 RX ADMIN — Medication 5 MILLILITER(S): at 06:27

## 2020-02-09 RX ADMIN — Medication 12.5 MILLIGRAM(S): at 06:27

## 2020-02-09 RX ADMIN — Medication 5 MILLILITER(S): at 23:26

## 2020-02-09 RX ADMIN — SODIUM CHLORIDE 50 MILLILITER(S): 9 INJECTION INTRAMUSCULAR; INTRAVENOUS; SUBCUTANEOUS at 06:26

## 2020-02-09 RX ADMIN — Medication 5 MILLILITER(S): at 18:25

## 2020-02-09 NOTE — PROGRESS NOTE ADULT - ASSESSMENT
This is a 80 yo F with PMHx of Breast CA (dx 12 years ago treated with Tamoxifen, lumpectomy and RT), HTN admitted for management of newly diagnosed AML now status post Cycle 1 of Dacogen x 5 days with continuation of daily Venetoclax (dose capped at 200mg 2/2 Azole). The patients hospital course has been complicated by episode of hypoxemia likely 2/2 anemia and volume overload. The patient has pancytopenia 2/2 chemotherapy and/or disease.

## 2020-02-09 NOTE — PROGRESS NOTE ADULT - PROBLEM SELECTOR PLAN 3
2/7 HAd episode of hypoxia and desat down to 89/90% normalizing on 2L NC  Cause likely multifactorial and due to mild pulmonary edema and L effusion seen on CXR with increased weight since admission coupled with anemia  Given 1 unit PRBCs with Lasix after and then will repeat O2 sat and oxygen requirements now resolved and patient ambulating on RA 2/7 Patient had episode of hypoxia and desat down to 89/90% normalizing on 2L NC  Cause likely multifactorial and due to mild pulmonary edema and L effusion seen on CXR with increased weight since admission coupled with anemia  Given 1 unit PRBCs with Lasix after and then will repeat O2 sat and oxygen requirements now resolved and patient ambulating on RA

## 2020-02-09 NOTE — PROGRESS NOTE ADULT - ATTENDING COMMENTS
80yo F with newly dx'ed AML (CD33+, FLT3 +), started Dacogen/Venetoclax C1 day +5    -Dacogen/Venetoclax given with Venetoclax titration and monitoring for tumor lysis syndrome. Supposed to start full dose Venetoclax today, 400mg po daily, BUT pt's ANC<500, thus will start antifungal Diflucan 200mg po daily and CAP Venetoclax at 200mg po daily  -pt had SOB last night -weight increased, will diurese and monitor. CXR -pulm edema  - BM bx done on 2/3 -f/u cytogenetics and [facility]  -monitor for tumor lysis  -monitor for fevers. Cont Levofloxacin. Add Diflucan today  -monitor counts, receives transfusions PRN. May receive transfusion before home d/c, depending on labs  -PICC line placed 2/3  -d/c home on 2/10 after Venetoclax dosing, has outpt f/u on 2/11 78yo F with newly dx'ed AML (CD33+, FLT3 +), started Dacogen/Venetoclax C1 day +6    -Dacogen/Venetoclax given with Venetoclax titration and monitoring for tumor lysis syndrome. Supposed to start full dose Venetoclax today, 400mg po daily, BUT pt's ANC<500, thus will start antifungal Diflucan 200mg po daily and CAP Venetoclax at 200mg po daily  -pt had SOB last night -weight increased, will diurese and monitor. CXR -pulm edema  - BM bx done on 2/3 -f/u cytogenetics and [facility]  -monitor for tumor lysis  -monitor for fevers. Cont Levofloxacin. Add Diflucan today  -monitor counts, receives transfusions PRN. May receive transfusion before home d/c, depending on labs  -PICC line placed 2/3  -d/c home on 2/10 after Venetoclax dosing, has outpt f/u on 2/11

## 2020-02-09 NOTE — PROGRESS NOTE ADULT - PROBLEM SELECTOR PLAN 7
No pharmacologic ppx 2/2 thrombocytopenia  Encourage ambulation     Contact Information [phone]

## 2020-02-09 NOTE — PROGRESS NOTE ADULT - PROBLEM SELECTOR PLAN 1
FLT 3 (+)  Status post Cycle 1 of Dacogen x 5 days with continuation of daily Venetoclax (dose capped at 200mg 2/2 Azole)  Monitor CBC/Lytes and transfuse/replete PRN  Strict Is and Os/Daily weights/Mouth Care  Allopurinol   IVF   Antiemetics

## 2020-02-10 ENCOUNTER — OUTPATIENT (OUTPATIENT)
Dept: OUTPATIENT SERVICES | Facility: HOSPITAL | Age: 80
LOS: 1 days | End: 2020-02-10
Payer: COMMERCIAL

## 2020-02-10 VITALS
SYSTOLIC BLOOD PRESSURE: 125 MMHG | DIASTOLIC BLOOD PRESSURE: 77 MMHG | OXYGEN SATURATION: 98 % | TEMPERATURE: 98 F | HEART RATE: 75 BPM | RESPIRATION RATE: 18 BRPM

## 2020-02-10 DIAGNOSIS — Z90.710 ACQUIRED ABSENCE OF BOTH CERVIX AND UTERUS: Chronic | ICD-10-CM

## 2020-02-10 DIAGNOSIS — C92.00 ACUTE MYELOBLASTIC LEUKEMIA, NOT HAVING ACHIEVED REMISSION: ICD-10-CM

## 2020-02-10 LAB
ALBUMIN SERPL ELPH-MCNC: 2.9 G/DL — LOW (ref 3.3–5)
ALP SERPL-CCNC: 37 U/L — LOW (ref 40–120)
ALT FLD-CCNC: 46 U/L — HIGH (ref 10–45)
ANION GAP SERPL CALC-SCNC: 9 MMOL/L — SIGNIFICANT CHANGE UP (ref 5–17)
APTT BLD: 30.4 SEC — SIGNIFICANT CHANGE UP (ref 27.5–36.3)
AST SERPL-CCNC: 25 U/L — SIGNIFICANT CHANGE UP (ref 10–40)
BASOPHILS # BLD AUTO: 0 K/UL — SIGNIFICANT CHANGE UP (ref 0–0.2)
BASOPHILS NFR BLD AUTO: 0 % — SIGNIFICANT CHANGE UP (ref 0–2)
BILIRUB SERPL-MCNC: 0.5 MG/DL — SIGNIFICANT CHANGE UP (ref 0.2–1.2)
BLD GP AB SCN SERPL QL: NEGATIVE — SIGNIFICANT CHANGE UP
BUN SERPL-MCNC: 10 MG/DL — SIGNIFICANT CHANGE UP (ref 7–23)
CALCIUM SERPL-MCNC: 8.5 MG/DL — SIGNIFICANT CHANGE UP (ref 8.4–10.5)
CHLORIDE SERPL-SCNC: 110 MMOL/L — HIGH (ref 96–108)
CO2 SERPL-SCNC: 25 MMOL/L — SIGNIFICANT CHANGE UP (ref 22–31)
CREAT SERPL-MCNC: 0.56 MG/DL — SIGNIFICANT CHANGE UP (ref 0.5–1.3)
EOSINOPHIL # BLD AUTO: 0.01 K/UL — SIGNIFICANT CHANGE UP (ref 0–0.5)
EOSINOPHIL NFR BLD AUTO: 1.7 % — SIGNIFICANT CHANGE UP (ref 0–6)
FIBRINOGEN PPP-MCNC: 531 MG/DL — HIGH (ref 350–510)
GLUCOSE SERPL-MCNC: 100 MG/DL — HIGH (ref 70–99)
HCT VFR BLD CALC: 25.8 % — LOW (ref 34.5–45)
HGB BLD-MCNC: 8.4 G/DL — LOW (ref 11.5–15.5)
IMM GRANULOCYTES NFR BLD AUTO: 1.7 % — HIGH (ref 0–1.5)
INR BLD: 1.25 RATIO — HIGH (ref 0.88–1.16)
LDH SERPL L TO P-CCNC: 541 U/L — HIGH (ref 50–242)
LYMPHOCYTES # BLD AUTO: 0.25 K/UL — LOW (ref 1–3.3)
LYMPHOCYTES # BLD AUTO: 42.4 % — SIGNIFICANT CHANGE UP (ref 13–44)
MAGNESIUM SERPL-MCNC: 2.3 MG/DL — SIGNIFICANT CHANGE UP (ref 1.6–2.6)
MCHC RBC-ENTMCNC: 30.7 PG — SIGNIFICANT CHANGE UP (ref 27–34)
MCHC RBC-ENTMCNC: 32.6 GM/DL — SIGNIFICANT CHANGE UP (ref 32–36)
MCV RBC AUTO: 94.2 FL — SIGNIFICANT CHANGE UP (ref 80–100)
MONOCYTES # BLD AUTO: 0.02 K/UL — SIGNIFICANT CHANGE UP (ref 0–0.9)
MONOCYTES NFR BLD AUTO: 3.4 % — SIGNIFICANT CHANGE UP (ref 2–14)
NEUTROPHILS # BLD AUTO: 0.3 K/UL — LOW (ref 1.8–7.4)
NEUTROPHILS NFR BLD AUTO: 50.8 % — SIGNIFICANT CHANGE UP (ref 43–77)
NRBC # BLD: 0 /100 WBCS — SIGNIFICANT CHANGE UP (ref 0–0)
PHOSPHATE SERPL-MCNC: 3.1 MG/DL — SIGNIFICANT CHANGE UP (ref 2.5–4.5)
PLATELET # BLD AUTO: 37 K/UL — LOW (ref 150–400)
POTASSIUM SERPL-MCNC: 3.6 MMOL/L — SIGNIFICANT CHANGE UP (ref 3.5–5.3)
POTASSIUM SERPL-SCNC: 3.6 MMOL/L — SIGNIFICANT CHANGE UP (ref 3.5–5.3)
PROT SERPL-MCNC: 5.4 G/DL — LOW (ref 6–8.3)
PROTHROM AB SERPL-ACNC: 14.3 SEC — HIGH (ref 10–12.9)
RBC # BLD: 2.74 M/UL — LOW (ref 3.8–5.2)
RBC # FLD: 16.1 % — HIGH (ref 10.3–14.5)
RH IG SCN BLD-IMP: POSITIVE — SIGNIFICANT CHANGE UP
SODIUM SERPL-SCNC: 144 MMOL/L — SIGNIFICANT CHANGE UP (ref 135–145)
URATE SERPL-MCNC: 2 MG/DL — LOW (ref 2.5–7)
WBC # BLD: 0.59 K/UL — CRITICAL LOW (ref 3.8–10.5)
WBC # FLD AUTO: 0.59 K/UL — CRITICAL LOW (ref 3.8–10.5)

## 2020-02-10 PROCEDURE — 84132 ASSAY OF SERUM POTASSIUM: CPT

## 2020-02-10 PROCEDURE — 71046 X-RAY EXAM CHEST 2 VIEWS: CPT

## 2020-02-10 PROCEDURE — 87389 HIV-1 AG W/HIV-1&-2 AB AG IA: CPT

## 2020-02-10 PROCEDURE — 71045 X-RAY EXAM CHEST 1 VIEW: CPT

## 2020-02-10 PROCEDURE — 88237 TISSUE CULTURE BONE MARROW: CPT

## 2020-02-10 PROCEDURE — 82955 ASSAY OF G6PD ENZYME: CPT

## 2020-02-10 PROCEDURE — 82550 ASSAY OF CK (CPK): CPT

## 2020-02-10 PROCEDURE — 88185 FLOWCYTOMETRY/TC ADD-ON: CPT

## 2020-02-10 PROCEDURE — 82947 ASSAY GLUCOSE BLOOD QUANT: CPT

## 2020-02-10 PROCEDURE — 97162 PT EVAL MOD COMPLEX 30 MIN: CPT

## 2020-02-10 PROCEDURE — 82803 BLOOD GASES ANY COMBINATION: CPT

## 2020-02-10 PROCEDURE — 88184 FLOWCYTOMETRY/ TC 1 MARKER: CPT

## 2020-02-10 PROCEDURE — 36430 TRANSFUSION BLD/BLD COMPNT: CPT

## 2020-02-10 PROCEDURE — 84484 ASSAY OF TROPONIN QUANT: CPT

## 2020-02-10 PROCEDURE — 85610 PROTHROMBIN TIME: CPT

## 2020-02-10 PROCEDURE — 80074 ACUTE HEPATITIS PANEL: CPT

## 2020-02-10 PROCEDURE — 85379 FIBRIN DEGRADATION QUANT: CPT

## 2020-02-10 PROCEDURE — 93005 ELECTROCARDIOGRAM TRACING: CPT

## 2020-02-10 PROCEDURE — 87581 M.PNEUMON DNA AMP PROBE: CPT

## 2020-02-10 PROCEDURE — 81373 HLA I TYPING 1 LOCUS LR: CPT

## 2020-02-10 PROCEDURE — 88264 CHROMOSOME ANALYSIS 20-25: CPT

## 2020-02-10 PROCEDURE — 82553 CREATINE MB FRACTION: CPT

## 2020-02-10 PROCEDURE — 85097 BONE MARROW INTERPRETATION: CPT

## 2020-02-10 PROCEDURE — 85014 HEMATOCRIT: CPT

## 2020-02-10 PROCEDURE — 81003 URINALYSIS AUTO W/O SCOPE: CPT

## 2020-02-10 PROCEDURE — 88319 ENZYME HISTOCHEMISTRY: CPT

## 2020-02-10 PROCEDURE — 86706 HEP B SURFACE ANTIBODY: CPT

## 2020-02-10 PROCEDURE — 87633 RESP VIRUS 12-25 TARGETS: CPT

## 2020-02-10 PROCEDURE — 85027 COMPLETE CBC AUTOMATED: CPT

## 2020-02-10 PROCEDURE — 81379 HLA I TYPING COMPLETE HR: CPT

## 2020-02-10 PROCEDURE — 83605 ASSAY OF LACTIC ACID: CPT

## 2020-02-10 PROCEDURE — 85730 THROMBOPLASTIN TIME PARTIAL: CPT

## 2020-02-10 PROCEDURE — A9560: CPT

## 2020-02-10 PROCEDURE — 84100 ASSAY OF PHOSPHORUS: CPT

## 2020-02-10 PROCEDURE — 83615 LACTATE (LD) (LDH) ENZYME: CPT

## 2020-02-10 PROCEDURE — 81382 HLA II TYPING 1 LOC HR: CPT

## 2020-02-10 PROCEDURE — 86900 BLOOD TYPING SEROLOGIC ABO: CPT

## 2020-02-10 PROCEDURE — 87798 DETECT AGENT NOS DNA AMP: CPT

## 2020-02-10 PROCEDURE — 88271 CYTOGENETICS DNA PROBE: CPT

## 2020-02-10 PROCEDURE — 86901 BLOOD TYPING SEROLOGIC RH(D): CPT

## 2020-02-10 PROCEDURE — 99285 EMERGENCY DEPT VISIT HI MDM: CPT

## 2020-02-10 PROCEDURE — 88313 SPECIAL STAINS GROUP 2: CPT

## 2020-02-10 PROCEDURE — 86923 COMPATIBILITY TEST ELECTRIC: CPT

## 2020-02-10 PROCEDURE — 78472 GATED HEART PLANAR SINGLE: CPT

## 2020-02-10 PROCEDURE — 88275 CYTOGENETICS 100-300: CPT

## 2020-02-10 PROCEDURE — 86704 HEP B CORE ANTIBODY TOTAL: CPT

## 2020-02-10 PROCEDURE — 84295 ASSAY OF SERUM SODIUM: CPT

## 2020-02-10 PROCEDURE — 84550 ASSAY OF BLOOD/URIC ACID: CPT

## 2020-02-10 PROCEDURE — 80048 BASIC METABOLIC PNL TOTAL CA: CPT

## 2020-02-10 PROCEDURE — 86850 RBC ANTIBODY SCREEN: CPT

## 2020-02-10 PROCEDURE — 87486 CHLMYD PNEUM DNA AMP PROBE: CPT

## 2020-02-10 PROCEDURE — 82435 ASSAY OF BLOOD CHLORIDE: CPT

## 2020-02-10 PROCEDURE — P9040: CPT

## 2020-02-10 PROCEDURE — 99238 HOSP IP/OBS DSCHRG MGMT 30/<: CPT

## 2020-02-10 PROCEDURE — 36415 COLL VENOUS BLD VENIPUNCTURE: CPT

## 2020-02-10 PROCEDURE — 85384 FIBRINOGEN ACTIVITY: CPT

## 2020-02-10 PROCEDURE — 88305 TISSUE EXAM BY PATHOLOGIST: CPT

## 2020-02-10 PROCEDURE — 88280 CHROMOSOME KARYOTYPE STUDY: CPT

## 2020-02-10 PROCEDURE — 80053 COMPREHEN METABOLIC PANEL: CPT

## 2020-02-10 PROCEDURE — 81245 FLT3 GENE: CPT

## 2020-02-10 PROCEDURE — C1751: CPT

## 2020-02-10 PROCEDURE — 83735 ASSAY OF MAGNESIUM: CPT

## 2020-02-10 PROCEDURE — 82330 ASSAY OF CALCIUM: CPT

## 2020-02-10 PROCEDURE — 87205 SMEAR GRAM STAIN: CPT

## 2020-02-10 PROCEDURE — 94640 AIRWAY INHALATION TREATMENT: CPT

## 2020-02-10 PROCEDURE — 36569 INSJ PICC 5 YR+ W/O IMAGING: CPT

## 2020-02-10 RX ORDER — PHYTONADIONE (VIT K1) 5 MG
10 TABLET ORAL ONCE
Refills: 0 | Status: COMPLETED | OUTPATIENT
Start: 2020-02-10 | End: 2020-02-10

## 2020-02-10 RX ORDER — FLUCONAZOLE 150 MG/1
1 TABLET ORAL
Qty: 30 | Refills: 0
Start: 2020-02-10 | End: 2020-03-10

## 2020-02-10 RX ORDER — VENETOCLAX 100 MG/1
2 TABLET, FILM COATED ORAL
Qty: 60 | Refills: 3
Start: 2020-02-10 | End: 2020-06-08

## 2020-02-10 RX ORDER — FLUCONAZOLE 150 MG/1
1 TABLET ORAL
Qty: 0 | Refills: 0 | DISCHARGE
Start: 2020-02-10

## 2020-02-10 RX ADMIN — FLUCONAZOLE 200 MILLIGRAM(S): 150 TABLET ORAL at 11:52

## 2020-02-10 RX ADMIN — VENETOCLAX 200 MILLIGRAM(S): 100 TABLET, FILM COATED ORAL at 11:52

## 2020-02-10 RX ADMIN — AMLODIPINE BESYLATE 5 MILLIGRAM(S): 2.5 TABLET ORAL at 06:10

## 2020-02-10 RX ADMIN — CHLORHEXIDINE GLUCONATE 1 APPLICATION(S): 213 SOLUTION TOPICAL at 06:10

## 2020-02-10 RX ADMIN — Medication 1 APPLICATION(S): at 06:12

## 2020-02-10 RX ADMIN — Medication 1 SPRAY(S): at 06:10

## 2020-02-10 RX ADMIN — SODIUM CHLORIDE 50 MILLILITER(S): 9 INJECTION INTRAMUSCULAR; INTRAVENOUS; SUBCUTANEOUS at 06:11

## 2020-02-10 RX ADMIN — Medication 12.5 MILLIGRAM(S): at 06:10

## 2020-02-10 RX ADMIN — Medication 5 MILLILITER(S): at 11:52

## 2020-02-10 RX ADMIN — Medication 100 MILLIGRAM(S): at 06:10

## 2020-02-10 RX ADMIN — Medication 10 MILLIGRAM(S): at 09:20

## 2020-02-10 RX ADMIN — Medication 5 MILLILITER(S): at 06:10

## 2020-02-10 NOTE — PROGRESS NOTE ADULT - PROBLEM SELECTOR PLAN 7
No pharmacologic ppx 2/2 thrombocytopenia  Encourage ambulation     Contact Information [phone]

## 2020-02-10 NOTE — PROGRESS NOTE ADULT - PROBLEM SELECTOR PLAN 3
2/7 Patient had episode of hypoxia and desat down to 89/90% normalizing on 2L NC  Cause likely multifactorial and due to mild pulmonary edema and L effusion seen on CXR with increased weight since admission coupled with anemia  Given 1 unit PRBCs with Lasix after and then will repeat O2 sat and oxygen requirements now resolved and patient ambulating on RA

## 2020-02-10 NOTE — PROGRESS NOTE ADULT - SUBJECTIVE AND OBJECTIVE BOX
Diagnosis:    Protocol/Chemo Regimen:    Day:     Pt endorsed:    Review of Systems:     Pain scale:     Diet:     Allergies    No Known Allergies    Intolerances        ANTIMICROBIALS  fluconAZOLE   Tablet 200 milliGRAM(s) Oral daily  levoFLOXacin  Tablet 500 milliGRAM(s) Oral every 24 hours      HEME/ONC MEDICATIONS  venetoclax 200 milliGRAM(s) Oral <User Schedule>      STANDING MEDICATIONS  allopurinol 300 milliGRAM(s) Oral at bedtime  amLODIPine   Tablet 5 milliGRAM(s) Oral daily  benzonatate 100 milliGRAM(s) Oral three times a day  Biotene Dry Mouth Oral Rinse 5 milliLiter(s) Swish and Spit four times a day  chlorhexidine 2% Cloths 1 Application(s) Topical <User Schedule>  clobetasol 0.05% Ointment 1 Application(s) Topical two times a day  fluticasone propionate 50 MICROgram(s)/spray Nasal Spray 1 Spray(s) Both Nostrils two times a day  hydrochlorothiazide 12.5 milliGRAM(s) Oral daily  polyethylene glycol 3350 17 Gram(s) Oral daily  psyllium Powder 1 Packet(s) Oral daily  senna 2 Tablet(s) Oral at bedtime  sodium chloride 0.9%. 1000 milliLiter(s) IV Continuous <Continuous>  traZODone 150 milliGRAM(s) Oral at bedtime      PRN MEDICATIONS  acetaminophen   Tablet .. 650 milliGRAM(s) Oral every 6 hours PRN  loratadine 10 milliGRAM(s) Oral daily PRN  sodium chloride 0.9% lock flush 10 milliLiter(s) IV Push every 1 hour PRN        Vital Signs Last 24 Hrs  T(C): 37.3 (10 Feb 2020 05:54), Max: 37.8 (09 Feb 2020 13:14)  T(F): 99.2 (10 Feb 2020 05:54), Max: 100 (09 Feb 2020 13:14)  HR: 74 (10 Feb 2020 05:54) (74 - 76)  BP: 123/58 (10 Feb 2020 05:54) (117/75 - 144/75)  BP(mean): --  RR: 18 (10 Feb 2020 05:54) (18 - 18)  SpO2: 94% (10 Feb 2020 05:54) (94% - 97%)    PHYSICAL EXAM  General: NAD  HEENT: PERRLA, EOMOI, clear oropharynx, anicteric sclera, pink conjunctiva  Neck: supple  CV: (+) S1/S2 RRR  Lungs: clear to auscultation, no wheezes or rales  Abdomen: soft, non-tender, non-distended (+) BS  Ext: no clubbing, cyanosis or edema  Skin: no rashes and no petechiae  Neuro: alert and oriented X 3, no focal deficits  Central Line:     RECENT CULTURES:        LABS:                        8.4    x     )-----------( x        ( 10 Feb 2020 07:07 )             25.8         Mean Cell Volume : 94.2 fl  Mean Cell Hemoglobin : 30.7 pg  Mean Cell Hemoglobin Concentration : 32.6 gm/dL  Auto Neutrophil # : x  Auto Lymphocyte # : x  Auto Monocyte # : x  Auto Eosinophil # : x  Auto Basophil # : x  Auto Neutrophil % : x  Auto Lymphocyte % : x  Auto Monocyte % : x  Auto Eosinophil % : x  Auto Basophil % : x      02-09    141  |  106  |  13  ----------------------------<  119<H>  3.6   |  26  |  0.58    Ca    8.3<L>      09 Feb 2020 06:36  Phos  3.0     02-09  Mg     2.3     02-09    TPro  4.9<L>  /  Alb  2.8<L>  /  TBili  0.5  /  DBili  x   /  AST  35  /  ALT  51<H>  /  AlkPhos  34<L>  02-09                  RADIOLOGY & ADDITIONAL STUDIES: Diagnosis: AML 	FLT 3 (+)     Protocol/Chemo Regimen: Status post Cycle 1 of Dacogen x 5 days with continuation of daily Venetoclax (dose capped at 200mg 2/2 Azole)    Day: 7    Pt endorsed:  No complaints reported this morning.    Review of Systems: Patient denies headache, dizziness, visual changes, chest pain, palpitations, abdominal pain, nausea, vomiting, diarrhea or dysuria.    Pain scale: 0    Diet: Regular    Allergies: No Known Allergies    ANTIMICROBIALS  fluconAZOLE   Tablet 200 milliGRAM(s) Oral daily  levoFLOXacin  Tablet 500 milliGRAM(s) Oral every 24 hours    HEME/ONC MEDICATIONS  venetoclax 200 milliGRAM(s) Oral <User Schedule>    STANDING MEDICATIONS  allopurinol 300 milliGRAM(s) Oral at bedtime  amLODIPine   Tablet 5 milliGRAM(s) Oral daily  benzonatate 100 milliGRAM(s) Oral three times a day  Biotene Dry Mouth Oral Rinse 5 milliLiter(s) Swish and Spit four times a day  chlorhexidine 2% Cloths 1 Application(s) Topical <User Schedule>  clobetasol 0.05% Ointment 1 Application(s) Topical two times a day  fluticasone propionate 50 MICROgram(s)/spray Nasal Spray 1 Spray(s) Both Nostrils two times a day  hydrochlorothiazide 12.5 milliGRAM(s) Oral daily  polyethylene glycol 3350 17 Gram(s) Oral daily  psyllium Powder 1 Packet(s) Oral daily  senna 2 Tablet(s) Oral at bedtime  sodium chloride 0.9%. 1000 milliLiter(s) IV Continuous <Continuous>  traZODone 150 milliGRAM(s) Oral at bedtime    PRN MEDICATIONS  acetaminophen   Tablet .. 650 milliGRAM(s) Oral every 6 hours PRN  loratadine 10 milliGRAM(s) Oral daily PRN  sodium chloride 0.9% lock flush 10 milliLiter(s) IV Push every 1 hour PRN    Vital Signs Last 24 Hrs  T(C): 37.3 (10 Feb 2020 05:54), Max: 37.8 (09 Feb 2020 13:14)  T(F): 99.2 (10 Feb 2020 05:54), Max: 100 (09 Feb 2020 13:14)  HR: 74 (10 Feb 2020 05:54) (74 - 76)  BP: 123/58 (10 Feb 2020 05:54) (117/75 - 144/75)  BP(mean): --  RR: 18 (10 Feb 2020 05:54) (18 - 18)  SpO2: 94% (10 Feb 2020 05:54) (94% - 97%)    PHYSICAL EXAM  General: NAD  HEENT: PERRLA, EOMI, clear oropharynx  Neck: supple  CV: (+) S1/S2 RRR  Lungs: clear to auscultation, no wheezes or rales  Abdomen: soft, non-tender, non-distended (+) BS  Ext: no edema  Neuro: alert and oriented X 3, no focal deficits  Central Line: C/D/I    LABS:                8.4    0.59  )-----------( 37       ( 10 Feb 2020 07:07 )             25.8     Mean Cell Volume : 94.2 fl  Mean Cell Hemoglobin : 30.7 pg  Mean Cell Hemoglobin Concentration : 32.6 gm/dL  Auto Neutrophil # : 0.30 K/uL  Auto Lymphocyte # : 0.25 K/uL  Auto Monocyte # : 0.02 K/uL  Auto Eosinophil # : 0.01 K/uL  Auto Basophil # : 0.00 K/uL  Auto Neutrophil % : 50.8 %  Auto Lymphocyte % : 42.4 %  Auto Monocyte % : 3.4 %  Auto Eosinophil % : 1.7 %  Auto Basophil % : 0.0 %    02-10    144  |  110<H>  |  10  ----------------------------<  100<H>  3.6   |  25  |  0.56    Ca    8.5      10 Feb 2020 07:07  Phos  3.1     02-10  Mg     2.3     02-10    TPro  5.4<L>  /  Alb  2.9<L>  /  TBili  0.5  /  DBili  x   /  AST  25  /  ALT  46<H>  /  AlkPhos  37<L>  02-10  Mg 2.3  Phos 3.1  PT/INR - ( 10 Feb 2020 07:07 )   PT: 14.3 sec;   INR: 1.25 ratio    PTT - ( 10 Feb 2020 07:07 )  PTT:30.4 sec    Uric Acid 2.0    RADIOLOGY & ADDITIONAL STUDIES:  Xray Chest 1 View- PORTABLE-Urgent (02.06.20 @ 23:49) >  Unchanged trace left effusion and mild pulmonary edema

## 2020-02-10 NOTE — PROGRESS NOTE ADULT - ATTENDING COMMENTS
80yo F with newly dx'ed AML (CD33+, FLT3 +), started Dacogen/Venetoclax C1 day +6    -Dacogen/Venetoclax given with Venetoclax titration and monitoring for tumor lysis syndrome. Supposed to start full dose Venetoclax today, 400mg po daily, BUT pt's ANC<500, thus will start antifungal Diflucan 200mg po daily and CAP Venetoclax at 200mg po daily  -pt had SOB last night -weight increased, will diurese and monitor. CXR -pulm edema  - BM bx done on 2/3 -f/u cytogenetics and [facility]  -monitor for tumor lysis  -monitor for fevers. Cont Levofloxacin. Add Diflucan today  -monitor counts, receives transfusions PRN. May receive transfusion before home d/c, depending on labs  -PICC line placed 2/3  -d/c home on 2/10 after Venetoclax dosing, has outpt f/u on 2/11 78yo F with newly dx'ed AML (CD33+, FLT3 +), started Dacogen/Venetoclax C1 day +7    -Dacogen/Venetoclax given with Venetoclax titration and monitoring for tumor lysis syndrome. On antifungal Diflucan 200mg po daily and CAP Venetoclax at 200mg po daily  - BM bx done on 2/3 -f/u cytogenetics and [facility]  -monitor for tumor lysis  -monitor for fevers. Cont Levofloxacin. Add Diflucan today  -monitor counts, receives transfusions PRN. May receive transfusion before home d/c, depending on labs  -PICC line placed 2/3  -d/c home on 2/10 after Venetoclax dosing, has outpt f/u on 2/11

## 2020-02-10 NOTE — PROGRESS NOTE ADULT - PROBLEM SELECTOR PLAN 1
FLT 3 (+)  Status post Cycle 1 of Dacogen x 5 days with continuation of daily Venetoclax (dose capped at 200mg 2/2 Azole)  Monitor CBC/Lytes and transfuse/replete PRN  Strict Is and Os/Daily weights/Mouth Care  Allopurinol   IVF   Antiemetics FLT 3 (+)  Status post Cycle 1 of Dacogen x 5 days with continuation of daily Venetoclax (dose capped at 200mg 2/2 Azole)  Monitor CBC/Lytes and transfuse/replete PRN  Strict Is and Os/Daily weights/Mouth Care  Allopurinol   IVF   Antiemetics  Discharge home today.

## 2020-02-10 NOTE — PROGRESS NOTE ADULT - ASSESSMENT
This is a 78 yo F with PMHx of Breast CA (dx 12 years ago treated with Tamoxifen, lumpectomy and RT), HTN admitted for management of newly diagnosed AML now status post Cycle 1 of Dacogen x 5 days with continuation of daily Venetoclax (dose capped at 200mg 2/2 Azole). The patients hospital course has been complicated by episode of hypoxemia likely 2/2 anemia and volume overload. The patient has pancytopenia 2/2 chemotherapy and/or disease.

## 2020-02-11 ENCOUNTER — RESULT REVIEW (OUTPATIENT)
Age: 80
End: 2020-02-11

## 2020-02-11 ENCOUNTER — APPOINTMENT (OUTPATIENT)
Dept: HEMATOLOGY ONCOLOGY | Facility: CLINIC | Age: 80
End: 2020-02-11
Payer: MEDICARE

## 2020-02-11 ENCOUNTER — APPOINTMENT (OUTPATIENT)
Dept: INFUSION THERAPY | Facility: HOSPITAL | Age: 80
End: 2020-02-11

## 2020-02-11 VITALS
SYSTOLIC BLOOD PRESSURE: 170 MMHG | HEART RATE: 82 BPM | DIASTOLIC BLOOD PRESSURE: 94 MMHG | BODY MASS INDEX: 20.2 KG/M2 | TEMPERATURE: 97.8 F | OXYGEN SATURATION: 99 % | RESPIRATION RATE: 16 BRPM | HEIGHT: 64.96 IN | WEIGHT: 121.25 LBS

## 2020-02-11 DIAGNOSIS — M81.8 OTHER OSTEOPOROSIS W/OUT CURRENT PATHOLOGICAL FRACTURE: ICD-10-CM

## 2020-02-11 DIAGNOSIS — Z87.898 PERSONAL HISTORY OF OTHER SPECIFIED CONDITIONS: ICD-10-CM

## 2020-02-11 DIAGNOSIS — Z87.2 PERSONAL HISTORY OF DISEASES OF THE SKIN AND SUBCUTANEOUS TISSUE: ICD-10-CM

## 2020-02-11 DIAGNOSIS — Z85.3 PERSONAL HISTORY OF MALIGNANT NEOPLASM OF BREAST: ICD-10-CM

## 2020-02-11 DIAGNOSIS — Z86.19 PERSONAL HISTORY OF OTHER INFECTIOUS AND PARASITIC DISEASES: ICD-10-CM

## 2020-02-11 DIAGNOSIS — Z86.018 PERSONAL HISTORY OF OTHER BENIGN NEOPLASM: ICD-10-CM

## 2020-02-11 LAB
BASOPHILS # BLD AUTO: 0 K/UL — SIGNIFICANT CHANGE UP (ref 0–0.2)
EOSINOPHIL # BLD AUTO: 0.1 K/UL — SIGNIFICANT CHANGE UP (ref 0–0.5)
EOSINOPHIL NFR BLD AUTO: 16 % — HIGH (ref 0–6)
HCT VFR BLD CALC: 30 % — LOW (ref 34.5–45)
HGB BLD-MCNC: 10.2 G/DL — LOW (ref 11.5–15.5)
LYMPHOCYTES # BLD AUTO: 0.6 K/UL — LOW (ref 1–3.3)
LYMPHOCYTES # BLD AUTO: 51 % — HIGH (ref 13–44)
MCHC RBC-ENTMCNC: 32.3 PG — SIGNIFICANT CHANGE UP (ref 27–34)
MCHC RBC-ENTMCNC: 33.8 G/DL — SIGNIFICANT CHANGE UP (ref 32–36)
MCV RBC AUTO: 95.3 FL — SIGNIFICANT CHANGE UP (ref 80–100)
MONOCYTES # BLD AUTO: 0 K/UL — SIGNIFICANT CHANGE UP (ref 0–0.9)
MONOCYTES NFR BLD AUTO: 2 % — SIGNIFICANT CHANGE UP (ref 2–14)
NEUTROPHILS # BLD AUTO: 0.4 K/UL — LOW (ref 1.8–7.4)
NEUTROPHILS NFR BLD AUTO: 31 % — LOW (ref 43–77)
PLAT MORPH BLD: NORMAL — SIGNIFICANT CHANGE UP
PLATELET # BLD AUTO: 48 K/UL — LOW (ref 150–400)
RBC # BLD: 3.15 M/UL — LOW (ref 3.8–5.2)
RBC # FLD: 15.5 % — HIGH (ref 10.3–14.5)
RBC BLD AUTO: SIGNIFICANT CHANGE UP
WBC # BLD: 1.1 K/UL — LOW (ref 3.8–10.5)
WBC # FLD AUTO: 1.1 K/UL — LOW (ref 3.8–10.5)

## 2020-02-11 PROCEDURE — 99215 OFFICE O/P EST HI 40 MIN: CPT

## 2020-02-11 RX ORDER — PERMETHRIN 50 MG/G
5 CREAM TOPICAL
Qty: 2 | Refills: 1 | Status: DISCONTINUED | COMMUNITY
Start: 2019-11-19 | End: 2020-02-11

## 2020-02-11 RX ORDER — AMLODIPINE BESYLATE AND BENAZEPRIL HYDROCHLORIDE 10; 20 MG/1; MG/1
10-20 CAPSULE ORAL
Refills: 0 | Status: DISCONTINUED | COMMUNITY
End: 2020-02-11

## 2020-02-11 RX ORDER — CLOBETASOL PROPIONATE 0.5 MG/G
0.05 OINTMENT TOPICAL
Qty: 1 | Refills: 1 | Status: DISCONTINUED | COMMUNITY
Start: 2019-11-05 | End: 2020-02-11

## 2020-02-11 RX ORDER — ALENDRONATE SODIUM 70 MG/1
70 TABLET ORAL
Refills: 0 | Status: DISCONTINUED | COMMUNITY
Start: 2020-02-11 | End: 2020-02-11

## 2020-02-11 RX ORDER — TRIAMCINOLONE ACETONIDE 1 MG/G
0.1 OINTMENT TOPICAL
Qty: 1 | Refills: 1 | Status: DISCONTINUED | COMMUNITY
Start: 2019-11-19 | End: 2020-02-11

## 2020-02-11 RX ORDER — ATENOLOL 50 MG/1
TABLET ORAL
Refills: 0 | Status: DISCONTINUED | COMMUNITY
End: 2020-02-11

## 2020-02-11 RX ORDER — ASPIRIN 325 MG/1
TABLET, FILM COATED ORAL
Refills: 0 | Status: DISCONTINUED | COMMUNITY
End: 2020-02-11

## 2020-02-11 RX ORDER — CETIRIZINE HYDROCHLORIDE 5 MG/1
5 TABLET ORAL
Qty: 30 | Refills: 2 | Status: DISCONTINUED | COMMUNITY
Start: 2019-11-05 | End: 2020-02-11

## 2020-02-11 RX ORDER — PREDNISONE 20 MG/1
20 TABLET ORAL DAILY
Qty: 28 | Refills: 0 | Status: DISCONTINUED | COMMUNITY
Start: 2019-11-05 | End: 2020-02-11

## 2020-02-11 RX ORDER — IVERMECTIN 3 MG/1
3 TABLET ORAL
Qty: 18 | Refills: 0 | Status: DISCONTINUED | COMMUNITY
Start: 2019-11-19 | End: 2020-02-11

## 2020-02-11 NOTE — RESULTS/DATA
[FreeTextEntry1] : Today's CBC (On 2/11/20) wbc 1.1  Hb 10.2 plt 48\par \par The peripheral smear was reviewed. It showed decreased neutrophils, no blasts, dysplastic neutrophils. Red cells normocytic. Platelets -large platelets noted\par \par The previous medical records were reviewed\par LABS\par On 2/10/20 wbc 0.59 Hb 8.4 plt 37\par On 1/31/20 wbc 25.4 with 74% blasts, Hb 11.7 plt 153\par \par PATHOLOGY \par ON 2/3/20 BM bx\par Final Diagnosis\par 1, 2. Bone marrow biopsy and bone marrow aspirate\par - Acute myeloid leukemia\par \par See note and description.\par \par Diagnostic note:\par Megakarocytes are normal in number with dysplastic morphology.\par Correlation with cytogenetic/FISH/molecular studies is necessary\par for definitive classification.\par Comprehensive report with results of pending ancillary studies to\par follow.\par \par Dr. Lindsey was notified of the diagnosis on 02/04/20.\par \par Ancillary studies\par Bone marrow aspirate iron stain: No spicules are present to\par evaluate for iron stores and there are insufficient nRBC to\par evaluate for ring sideroblasts.\par \par Flow cytometry:  Myeloblasts (77% of cells), positive for HLA-DR,\par partial CD34, , CD33, CD13, partial CD15, CD7, , CD38,\par CD71; negative for CD4, CD11b, CD56.\par \par Cytochemistry: Myeloperoxidase positive\par \par Cytogenetics:  Pending\par \par FISH ([id number], peripheral blood, 1/31/2020\par NORMAL FISH - PML/YIFAN\par Probe(s) and Location(s): PML/YIFAN (15q24.1/17q21.2)\par \par Molecular\par FLT3 ITD Mutation Analysis Final Report: POSITIVE\par \par

## 2020-02-11 NOTE — HISTORY OF PRESENT ILLNESS
[de-identified] : Ms. 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 She has a history of Breast cancer 12 years ago treated with tamoxifen (no chemotherapy), s/p RT and lumpectomy, HTN and surgical hx of hysterectomy, was sent in by oncologist Dr. Blank on 1/31/20 for rule out leukemia. Patient had been feeling generally fatigued and sluggish since December 2019. She was feeling more tired, and saw her PMD, who did some blood work and noticed blasts in her peripheral blood. Pt was told to see Dr. Blank for further workup. Dr. Blank did blood work again which again found blasts in the peripheral blood, at which point she sent pt to the ER for leukemia workup. 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 She was started on 2/4/20 on Cycle 1 of Dacogen + Venetoclax (50 mg on day 1, 100 mg on day 2, 200 mg on day 3 and onward when starting Diflucan). She tolerated it well other than some SOB -CXR on 2/7/20 : mild pulmonary edema and L pleural effusion, treated with diuretics. She was discharged home on 2/11/20. \par  [de-identified] : The patient is here for AML newly dx'ed follow up -s/p C1 Dacogen/Venetoclax day 1 =2/4/20.

## 2020-02-11 NOTE — CONSULT LETTER
[Dear  ___] : Dear  [unfilled], [Consult Letter:] : I had the pleasure of evaluating your patient, [unfilled]. [Please see my note below.] : Please see my note below. [Sincerely,] : Sincerely, [Consult Closing:] : Thank you very much for allowing me to participate in the care of this patient.  If you have any questions, please do not hesitate to contact me. [DrMichael  ___] : Dr. PINTO

## 2020-02-11 NOTE — PROGRESS NOTE ADULT - PROBLEM SELECTOR PLAN 1
FLT 3 (+)  Status post Cycle 1 of Dacogen x 5 days with continuation of daily Venetoclax (dose capped at 200mg 2/2 Azole)  Monitor CBC/Lytes and transfuse/replete PRN  Strict Is and Os/Daily weights/Mouth Care  Allopurinol   IVF   Antiemetics  Discharge home today.

## 2020-02-11 NOTE — PROGRESS NOTE ADULT - SUBJECTIVE AND OBJECTIVE BOX
Diagnosis:    Protocol/Chemo Regimen:    Day:     Pt endorsed:    Review of Systems:     Pain scale:     Diet:     Allergies    No Known Allergies    Intolerances        ANTIMICROBIALS      HEME/ONC MEDICATIONS      STANDING MEDICATIONS      PRN MEDICATIONS        Vital Signs Last 24 Hrs  T(C): 36.6 (10 Feb 2020 13:34), Max: 37 (10 Feb 2020 09:18)  T(F): 97.8 (10 Feb 2020 13:34), Max: 98.6 (10 Feb 2020 09:18)  HR: 75 (10 Feb 2020 13:34) (75 - 76)  BP: 125/77 (10 Feb 2020 13:34) (116/54 - 125/77)  BP(mean): --  RR: 18 (10 Feb 2020 13:34) (18 - 18)  SpO2: 98% (10 Feb 2020 13:34) (95% - 98%)    PHYSICAL EXAM  General: NAD  HEENT: PERRLA, EOMOI, clear oropharynx, anicteric sclera, pink conjunctiva  Neck: supple  CV: (+) S1/S2 RRR  Lungs: clear to auscultation, no wheezes or rales  Abdomen: soft, non-tender, non-distended (+) BS  Ext: no clubbing, cyanosis or edema  Skin: no rashes and no petechiae  Neuro: alert and oriented X 3, no focal deficits  Central Line:     RECENT CULTURES:        LABS:                        8.4    0.59  )-----------( 37       ( 10 Feb 2020 07:07 )             25.8         Mean Cell Volume : 94.2 fl  Mean Cell Hemoglobin : 30.7 pg  Mean Cell Hemoglobin Concentration : 32.6 gm/dL  Auto Neutrophil # : 0.30 K/uL  Auto Lymphocyte # : 0.25 K/uL  Auto Monocyte # : 0.02 K/uL  Auto Eosinophil # : 0.01 K/uL  Auto Basophil # : 0.00 K/uL  Auto Neutrophil % : 50.8 %  Auto Lymphocyte % : 42.4 %  Auto Monocyte % : 3.4 %  Auto Eosinophil % : 1.7 %  Auto Basophil % : 0.0 %      02-10    144  |  110<H>  |  10  ----------------------------<  100<H>  3.6   |  25  |  0.56    Ca    8.5      10 Feb 2020 07:07  Phos  3.1     02-10  Mg     2.3     02-10    TPro  5.4<L>  /  Alb  2.9<L>  /  TBili  0.5  /  DBili  x   /  AST  25  /  ALT  46<H>  /  AlkPhos  37<L>  02-10          PT/INR - ( 10 Feb 2020 07:07 )   PT: 14.3 sec;   INR: 1.25 ratio         PTT - ( 10 Feb 2020 07:07 )  PTT:30.4 sec        RADIOLOGY & ADDITIONAL STUDIES:

## 2020-02-11 NOTE — PROGRESS NOTE ADULT - PROBLEM SELECTOR PLAN 7
No pharmacologic ppx 2/2 thrombocytopenia  Encourage ambulation     Contact Information [phone]

## 2020-02-11 NOTE — PROGRESS NOTE ADULT - REASON FOR ADMISSION
R/o acute leukemia

## 2020-02-11 NOTE — PROGRESS NOTE ADULT - ATTENDING COMMENTS
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 On antifungal Diflucan 200mg po daily and CAP Venetoclax at 200mg po daily  - BM bx done on 2/3 -f/u cytogenetics and [facility]  -monitor for tumor lysis  -monitor for fevers. Cont Levofloxacin. Add Diflucan today  -monitor counts, receives transfusions PRN. May receive transfusion before home d/c, depending on labs  -PICC line placed 2/3  -d/c home on 2/10 after Venetoclax dosing, has outpt f/u on 2/11

## 2020-02-11 NOTE — PROGRESS NOTE ADULT - PROBLEM SELECTOR PROBLEM 2
Infectious disease
Hypertension
Hypertension
Infectious disease

## 2020-02-11 NOTE — PHYSICAL EXAM
[Ambulatory and capable of all self care but unable to carry out any work activities] : Status 2- Ambulatory and capable of all self care but unable to carry out any work activities. Up and about more than 50% of waking hours [Normal] : grossly intact [Ulcers] : no ulcers [de-identified] : L PICC line -no inflammation

## 2020-02-11 NOTE — REVIEW OF SYSTEMS
[Negative] : Heme/Lymph [Night Sweats] : night sweats [Fatigue] : fatigue [Recent Change In Weight] : ~T recent weight change [Incontinence] : incontinence [Joint Pain] : joint pain [Anxiety] : anxiety [Fever] : no fever [Eye Pain] : no eye pain [Chills] : no chills [Red Eyes] : eyes not red [Dry Eyes] : no dryness of the eyes [Vision Problems] : no vision problems [Dysphagia] : no dysphagia [Nosebleeds] : no nosebleeds [Loss of Hearing] : no loss of hearing [Hoarseness] : no hoarseness [Odynophagia] : no odynophagia [Mucosal Pain] : no mucosal pain [Chest Pain] : no chest pain [Leg Claudication] : no intermittent leg claudication [Palpitations] : no palpitations [Lower Ext Edema] : no lower extremity edema [Shortness Of Breath] : no shortness of breath [Wheezing] : no wheezing [Cough] : no cough [Abdominal Pain] : no abdominal pain [SOB on Exertion] : no shortness of breath during exertion [Vomiting] : no vomiting [Constipation] : no constipation [Diarrhea] : no diarrhea [Vaginal Discharge] : no vaginal discharge [Dysuria] : no dysuria [Dysmenorrhea/Abn Vaginal Bleeding] : no dysmenorrhea/abnormal vaginal bleeding [Muscle Pain] : no muscle pain [Joint Stiffness] : no joint stiffness [Skin Rash] : no skin rash [Skin Wound] : no skin wound [Confused] : no confusion [Dizziness] : no dizziness [Suicidal] : not suicidal [Fainting] : no fainting [Difficulty Walking] : no difficulty walking [Proptosis] : no proptosis [Insomnia] : no insomnia [Hot Flashes] : no hot flashes [Muscle Weakness] : no muscle weakness [Easy Bleeding] : no tendency for easy bleeding [Deepening Of The Voice] : no deepening of the voice [Easy Bruising] : no tendency for easy bruising [Swollen Glands] : no swollen glands [FreeTextEntry7] : no BRBPR. Colonoscopy -done 1 yr ago, normal [FreeTextEntry6] : can go up 1 flight of stairs [FreeTextEntry2] : intermittent night sweats. Gained 2 lbs; usually maintains weight [de-identified] : no panic attacks [FreeTextEntry9] : lower back pain chronic -s/p compressed vertebrae [FreeTextEntry8] : no PAP -s/p LUCITA. Mammogram March 2020 -normal [FreeTextEntry1] : seasonal allergies

## 2020-02-11 NOTE — ASSESSMENT
[FreeTextEntry1] : 80 yo F with hx breast CA s/p XRT/tamoxifen, now with AML normal cytogenetics FLT-3 ITD positive\par s/p C1 Dacogen/Venetoclax starting 2/4/20\par \par -pt finished 5 days Dacogen 20mg/m2 IV -next C2 due to start on 3/3/20 -will schedule.\par \par -Venetoclax daily, dose 200mg given patient is taking antifungal Diflucan 200mg po daily which increases Venetoclax level -will continue for at least 21 days, then check BM bx to assess if Venetoclax can be interrupted (as long as blasts<5% in marrow). BM bx to be scheduled with NP for 2/21 or  2/24/20 -no need to check cytogenetics/FISH\par \par -pancytopenia likely due to chemotherapy -cont prophylaxis Levaquin and Diflucan as long as ANC<=500\par \par -platelet transfusions 2x/wk to be scheduled for Tue/Fridays while counts are low. Patient instructed to go to ED if febrile given need for IV antibiotics for neutropenic fever\par \par -BM bx done on 2/3/20 showed normal cytogenetics. [facility] molecular studies in progress (expected to result on 2/18/20). In house assay for FLT-3 ITD positive, which confers an adverse prognosis in AML - d/w patient and family BMT transplant -pt active prior to admission, good PS status; she is not interested at this time\par \par -discussed with patient diagnosis, prognosis and plan of care and answered all of their questions\par \par -follow up after BM bx

## 2020-02-12 LAB — CHROM ANALY INTERPHASE BLD FISH-IMP: SIGNIFICANT CHANGE UP

## 2020-02-14 ENCOUNTER — APPOINTMENT (OUTPATIENT)
Dept: INFUSION THERAPY | Facility: HOSPITAL | Age: 80
End: 2020-02-14

## 2020-02-18 ENCOUNTER — RESULT REVIEW (OUTPATIENT)
Age: 80
End: 2020-02-18

## 2020-02-18 ENCOUNTER — APPOINTMENT (OUTPATIENT)
Dept: INFUSION THERAPY | Facility: HOSPITAL | Age: 80
End: 2020-02-18

## 2020-02-18 LAB
HCT VFR BLD CALC: 26.9 % — LOW (ref 34.5–45)
HGB BLD-MCNC: 9.3 G/DL — LOW (ref 11.5–15.5)
MCHC RBC-ENTMCNC: 32.7 PG — SIGNIFICANT CHANGE UP (ref 27–34)
MCHC RBC-ENTMCNC: 34.5 G/DL — SIGNIFICANT CHANGE UP (ref 32–36)
MCV RBC AUTO: 95 FL — SIGNIFICANT CHANGE UP (ref 80–100)
PLATELET # BLD AUTO: 24 K/UL — LOW (ref 150–400)
RBC # BLD: 2.83 M/UL — LOW (ref 3.8–5.2)
RBC # FLD: 14.8 % — HIGH (ref 10.3–14.5)
WBC # BLD: 0.4 K/UL — CRITICAL LOW (ref 3.8–10.5)
WBC # FLD AUTO: 0.4 K/UL — CRITICAL LOW (ref 3.8–10.5)

## 2020-02-21 ENCOUNTER — RESULT REVIEW (OUTPATIENT)
Age: 80
End: 2020-02-21

## 2020-02-21 ENCOUNTER — APPOINTMENT (OUTPATIENT)
Dept: INFUSION THERAPY | Facility: HOSPITAL | Age: 80
End: 2020-02-21

## 2020-02-21 DIAGNOSIS — D64.89 OTHER SPECIFIED ANEMIAS: ICD-10-CM

## 2020-02-21 LAB
HCT VFR BLD CALC: 25.1 % — LOW (ref 34.5–45)
HGB BLD-MCNC: 8.7 G/DL — LOW (ref 11.5–15.5)
MCHC RBC-ENTMCNC: 32.9 PG — SIGNIFICANT CHANGE UP (ref 27–34)
MCHC RBC-ENTMCNC: 34.6 G/DL — SIGNIFICANT CHANGE UP (ref 32–36)
MCV RBC AUTO: 94.9 FL — SIGNIFICANT CHANGE UP (ref 80–100)
PLATELET # BLD AUTO: 21 K/UL — LOW (ref 150–400)
RBC # BLD: 2.65 M/UL — LOW (ref 3.8–5.2)
RBC # FLD: 14.7 % — HIGH (ref 10.3–14.5)
WBC # BLD: 0.4 K/UL — CRITICAL LOW (ref 3.8–10.5)
WBC # FLD AUTO: 0.4 K/UL — CRITICAL LOW (ref 3.8–10.5)

## 2020-02-25 ENCOUNTER — RESULT REVIEW (OUTPATIENT)
Age: 80
End: 2020-02-25

## 2020-02-25 ENCOUNTER — APPOINTMENT (OUTPATIENT)
Dept: INFUSION THERAPY | Facility: HOSPITAL | Age: 80
End: 2020-02-25
Payer: MEDICARE

## 2020-02-25 LAB
BLD GP AB SCN SERPL QL: NEGATIVE — SIGNIFICANT CHANGE UP
DATE OF TRANSF RX: SIGNIFICANT CHANGE UP
HCT VFR BLD CALC: 23.3 % — LOW (ref 34.5–45)
HGB BLD-MCNC: 7.9 G/DL — LOW (ref 11.5–15.5)
MCHC RBC-ENTMCNC: 32.4 PG — SIGNIFICANT CHANGE UP (ref 27–34)
MCHC RBC-ENTMCNC: 34 G/DL — SIGNIFICANT CHANGE UP (ref 32–36)
MCV RBC AUTO: 95.2 FL — SIGNIFICANT CHANGE UP (ref 80–100)
PLATELET # BLD AUTO: 20 K/UL — CRITICAL LOW (ref 150–400)
RBC # BLD: 2.45 M/UL — LOW (ref 3.8–5.2)
RBC # FLD: 14.8 % — HIGH (ref 10.3–14.5)
RH IG SCN BLD-IMP: POSITIVE — SIGNIFICANT CHANGE UP
WBC # BLD: 0.5 K/UL — CRITICAL LOW (ref 3.8–10.5)
WBC # FLD AUTO: 0.5 K/UL — CRITICAL LOW (ref 3.8–10.5)

## 2020-02-25 PROCEDURE — 99213 OFFICE O/P EST LOW 20 MIN: CPT

## 2020-02-25 PROCEDURE — 86078 PHYS BLOOD BANK SERV REACTJ: CPT

## 2020-02-26 ENCOUNTER — APPOINTMENT (OUTPATIENT)
Dept: INFUSION THERAPY | Facility: HOSPITAL | Age: 80
End: 2020-02-26

## 2020-02-27 DIAGNOSIS — Z51.89 ENCOUNTER FOR OTHER SPECIFIED AFTERCARE: ICD-10-CM

## 2020-02-28 ENCOUNTER — APPOINTMENT (OUTPATIENT)
Dept: INFUSION THERAPY | Facility: HOSPITAL | Age: 80
End: 2020-02-28

## 2020-02-28 ENCOUNTER — RESULT REVIEW (OUTPATIENT)
Age: 80
End: 2020-02-28

## 2020-02-28 LAB
HCT VFR BLD CALC: 31.5 % — LOW (ref 34.5–45)
HGB BLD-MCNC: 11.1 G/DL — LOW (ref 11.5–15.5)
MCHC RBC-ENTMCNC: 31.8 PG — SIGNIFICANT CHANGE UP (ref 27–34)
MCHC RBC-ENTMCNC: 35.3 G/DL — SIGNIFICANT CHANGE UP (ref 32–36)
MCV RBC AUTO: 90.1 FL — SIGNIFICANT CHANGE UP (ref 80–100)
PLATELET # BLD AUTO: 25 K/UL — LOW (ref 150–400)
RBC # BLD: 3.5 M/UL — LOW (ref 3.8–5.2)
RBC # FLD: 16.6 % — HIGH (ref 10.3–14.5)
WBC # BLD: 0.5 K/UL — CRITICAL LOW (ref 3.8–10.5)
WBC # FLD AUTO: 0.5 K/UL — CRITICAL LOW (ref 3.8–10.5)

## 2020-03-02 ENCOUNTER — RESULT REVIEW (OUTPATIENT)
Age: 80
End: 2020-03-02

## 2020-03-02 ENCOUNTER — APPOINTMENT (OUTPATIENT)
Dept: HEMATOLOGY ONCOLOGY | Facility: CLINIC | Age: 80
End: 2020-03-02
Payer: MEDICARE

## 2020-03-02 ENCOUNTER — APPOINTMENT (OUTPATIENT)
Dept: INFUSION THERAPY | Facility: HOSPITAL | Age: 80
End: 2020-03-02

## 2020-03-02 ENCOUNTER — LABORATORY RESULT (OUTPATIENT)
Age: 80
End: 2020-03-02

## 2020-03-02 VITALS
TEMPERATURE: 98.5 F | DIASTOLIC BLOOD PRESSURE: 82 MMHG | RESPIRATION RATE: 19 BRPM | BODY MASS INDEX: 20.28 KG/M2 | WEIGHT: 121.69 LBS | HEART RATE: 122 BPM | SYSTOLIC BLOOD PRESSURE: 143 MMHG | OXYGEN SATURATION: 91 %

## 2020-03-02 VITALS — DIASTOLIC BLOOD PRESSURE: 87 MMHG | HEART RATE: 83 BPM | OXYGEN SATURATION: 96 % | SYSTOLIC BLOOD PRESSURE: 147 MMHG

## 2020-03-02 LAB
HCT VFR BLD CALC: 28.1 % — LOW (ref 34.5–45)
HGB BLD-MCNC: 9.8 G/DL — LOW (ref 11.5–15.5)
MCHC RBC-ENTMCNC: 31.7 PG — SIGNIFICANT CHANGE UP (ref 27–34)
MCHC RBC-ENTMCNC: 35 G/DL — SIGNIFICANT CHANGE UP (ref 32–36)
MCV RBC AUTO: 90.5 FL — SIGNIFICANT CHANGE UP (ref 80–100)
PLATELET # BLD AUTO: 9 K/UL — CRITICAL LOW (ref 150–400)
RBC # BLD: 3.1 M/UL — LOW (ref 3.8–5.2)
RBC # FLD: 16.3 % — HIGH (ref 10.3–14.5)
WBC # BLD: 0.5 K/UL — CRITICAL LOW (ref 3.8–10.5)
WBC # FLD AUTO: 0.5 K/UL — CRITICAL LOW (ref 3.8–10.5)

## 2020-03-02 PROCEDURE — 85097 BONE MARROW INTERPRETATION: CPT

## 2020-03-02 PROCEDURE — 88360 TUMOR IMMUNOHISTOCHEM/MANUAL: CPT | Mod: 26

## 2020-03-02 PROCEDURE — 38222 DX BONE MARROW BX & ASPIR: CPT | Mod: LT

## 2020-03-02 PROCEDURE — 88341 IMHCHEM/IMCYTCHM EA ADD ANTB: CPT | Mod: 26,59

## 2020-03-02 PROCEDURE — 88342 IMHCHEM/IMCYTCHM 1ST ANTB: CPT | Mod: 26,59

## 2020-03-02 PROCEDURE — 86900 BLOOD TYPING SEROLOGIC ABO: CPT

## 2020-03-02 PROCEDURE — 86850 RBC ANTIBODY SCREEN: CPT

## 2020-03-02 PROCEDURE — 88341 IMHCHEM/IMCYTCHM EA ADD ANTB: CPT

## 2020-03-02 PROCEDURE — 88305 TISSUE EXAM BY PATHOLOGIST: CPT | Mod: 26

## 2020-03-02 PROCEDURE — 88313 SPECIAL STAINS GROUP 2: CPT | Mod: 26

## 2020-03-02 PROCEDURE — 86923 COMPATIBILITY TEST ELECTRIC: CPT

## 2020-03-02 PROCEDURE — 88305 TISSUE EXAM BY PATHOLOGIST: CPT

## 2020-03-02 PROCEDURE — 88313 SPECIAL STAINS GROUP 2: CPT

## 2020-03-02 PROCEDURE — 88360 TUMOR IMMUNOHISTOCHEM/MANUAL: CPT

## 2020-03-02 PROCEDURE — 88342 IMHCHEM/IMCYTCHM 1ST ANTB: CPT

## 2020-03-02 PROCEDURE — 86901 BLOOD TYPING SEROLOGIC RH(D): CPT

## 2020-03-02 NOTE — REASON FOR VISIT
[Bone Marrow Biopsy] : bone marrow biopsy [Bone Marrow Aspiration] : bone marrow aspiration [Spouse] : spouse [FreeTextEntry2] : 80 yo femal w/ AML FLT-3 ITD positive s/p C1 Dacoge/Venetoclax, BMBx to assess if Venetoclax can be interrupted.  [TWNoteComboBox1] : [place] citizen

## 2020-03-02 NOTE — PROCEDURE
[Bone Marrow Biopsy] : bone marrow biopsy [Patient] : the patient [Patient identification verified] : patient identification verified [Procedure verified and consent obtained] : procedure verified and consent obtained [Correct positioning] : correct positioning [Prone] : prone [The left posterior iliac crest was prepped with betadine and draped, using sterile technique.] : The left posterior iliac crest was prepped with betadine and draped, using sterile technique. [] : The patient was instructed to remove the bandage the following AM. The patient may bathe. Acetaminophen may be taken for discomfort, as per package directions.If there are any other problems, the patient was instructed to call the office. The patient verbalized understanding, and is aware of the office contact numbers. [FreeTextEntry1] : 80 yo femal w/ AML FLT-3 ITD positive s/p C1 Dacoge/Venetoclax, BMBx to assess if Venetoclax can be interrupted.  [FreeTextEntry2] : CBC prior to procedure\par WBC 0.5 \par Hgb  9.8   Hct 28.1\par Plt 9\par BM Bx and aspiration was performed by SHIMA Wright. Patient c/o involuntary shivering, vital sign stable. Pt denied any other symptoms, after close observation for 10 minutes she stopped shivering. Plt today is low 9, plate transfusion is planned after the BMBx. \par

## 2020-03-03 ENCOUNTER — LABORATORY RESULT (OUTPATIENT)
Age: 80
End: 2020-03-03

## 2020-03-03 ENCOUNTER — RESULT REVIEW (OUTPATIENT)
Age: 80
End: 2020-03-03

## 2020-03-03 ENCOUNTER — APPOINTMENT (OUTPATIENT)
Dept: INFUSION THERAPY | Facility: HOSPITAL | Age: 80
End: 2020-03-03

## 2020-03-03 DIAGNOSIS — R11.2 NAUSEA WITH VOMITING, UNSPECIFIED: ICD-10-CM

## 2020-03-03 LAB
HCT VFR BLD CALC: 25.8 % — LOW (ref 34.5–45)
HGB BLD-MCNC: 8.8 G/DL — LOW (ref 11.5–15.5)
MCHC RBC-ENTMCNC: 31.4 PG — SIGNIFICANT CHANGE UP (ref 27–34)
MCHC RBC-ENTMCNC: 34.2 GM/DL — SIGNIFICANT CHANGE UP (ref 32–36)
MCV RBC AUTO: 91.9 FL — SIGNIFICANT CHANGE UP (ref 80–100)
PLATELET # BLD AUTO: 58 K/UL — LOW (ref 150–400)
RBC # BLD: 2.81 M/UL — LOW (ref 3.8–5.2)
RBC # FLD: 16.1 % — HIGH (ref 10.3–14.5)
WBC # BLD: 0.39 K/UL — CRITICAL LOW (ref 3.8–10.5)
WBC # FLD AUTO: 0.39 K/UL — CRITICAL LOW (ref 3.8–10.5)

## 2020-03-04 ENCOUNTER — RESULT REVIEW (OUTPATIENT)
Age: 80
End: 2020-03-04

## 2020-03-04 ENCOUNTER — APPOINTMENT (OUTPATIENT)
Dept: INFUSION THERAPY | Facility: HOSPITAL | Age: 80
End: 2020-03-04

## 2020-03-04 LAB
BASOPHILS # BLD AUTO: 0 K/UL — SIGNIFICANT CHANGE UP (ref 0–0.2)
BASOPHILS NFR BLD AUTO: 0 % — SIGNIFICANT CHANGE UP (ref 0–2)
EOSINOPHIL # BLD AUTO: 0 K/UL — SIGNIFICANT CHANGE UP (ref 0–0.5)
EOSINOPHIL NFR BLD AUTO: 0 % — SIGNIFICANT CHANGE UP (ref 0–6)
HCT VFR BLD CALC: 25.2 % — LOW (ref 34.5–45)
HGB BLD-MCNC: 8.8 G/DL — LOW (ref 11.5–15.5)
LYMPHOCYTES # BLD AUTO: 0.55 K/UL — LOW (ref 1–3.3)
LYMPHOCYTES # BLD AUTO: 90 % — HIGH (ref 13–44)
MCHC RBC-ENTMCNC: 30.9 PG — SIGNIFICANT CHANGE UP (ref 27–34)
MCHC RBC-ENTMCNC: 34.9 GM/DL — SIGNIFICANT CHANGE UP (ref 32–36)
MCV RBC AUTO: 88.4 FL — SIGNIFICANT CHANGE UP (ref 80–100)
MONOCYTES # BLD AUTO: 0 K/UL — SIGNIFICANT CHANGE UP (ref 0–0.9)
MONOCYTES NFR BLD AUTO: 0 % — LOW (ref 2–14)
NEUTROPHILS # BLD AUTO: 0.06 K/UL — LOW (ref 1.8–7.4)
NEUTROPHILS NFR BLD AUTO: 10 % — LOW (ref 43–77)
NRBC # BLD: 0 /100 WBCS — SIGNIFICANT CHANGE UP (ref 0–0)
NRBC # BLD: 4 — SIGNIFICANT CHANGE UP
NRBC # BLD: SIGNIFICANT CHANGE UP /100 WBCS (ref 0–0)
PLAT MORPH BLD: NORMAL — SIGNIFICANT CHANGE UP
PLATELET # BLD AUTO: 42 K/UL — LOW (ref 150–400)
RBC # BLD: 2.85 M/UL — LOW (ref 3.8–5.2)
RBC # FLD: 16.3 % — HIGH (ref 10.3–14.5)
RBC BLD AUTO: SIGNIFICANT CHANGE UP
WBC # BLD: 0.61 K/UL — CRITICAL LOW (ref 3.8–10.5)
WBC # FLD AUTO: 0.61 K/UL — CRITICAL LOW (ref 3.8–10.5)

## 2020-03-05 ENCOUNTER — APPOINTMENT (OUTPATIENT)
Dept: HEMATOLOGY ONCOLOGY | Facility: CLINIC | Age: 80
End: 2020-03-05

## 2020-03-05 ENCOUNTER — APPOINTMENT (OUTPATIENT)
Dept: INFUSION THERAPY | Facility: HOSPITAL | Age: 80
End: 2020-03-05

## 2020-03-05 DIAGNOSIS — Z51.11 ENCOUNTER FOR ANTINEOPLASTIC CHEMOTHERAPY: ICD-10-CM

## 2020-03-05 LAB — HEMATOPATHOLOGY REPORT: SIGNIFICANT CHANGE UP

## 2020-03-06 ENCOUNTER — APPOINTMENT (OUTPATIENT)
Dept: INFUSION THERAPY | Facility: HOSPITAL | Age: 80
End: 2020-03-06

## 2020-03-07 ENCOUNTER — APPOINTMENT (OUTPATIENT)
Dept: INFUSION THERAPY | Facility: HOSPITAL | Age: 80
End: 2020-03-07

## 2020-03-07 ENCOUNTER — OUTPATIENT (OUTPATIENT)
Dept: OUTPATIENT SERVICES | Facility: HOSPITAL | Age: 80
LOS: 1 days | Discharge: HOME | End: 2020-03-07

## 2020-03-07 DIAGNOSIS — Z90.710 ACQUIRED ABSENCE OF BOTH CERVIX AND UTERUS: Chronic | ICD-10-CM

## 2020-03-11 ENCOUNTER — APPOINTMENT (OUTPATIENT)
Dept: INFUSION THERAPY | Facility: HOSPITAL | Age: 80
End: 2020-03-11

## 2020-03-11 ENCOUNTER — APPOINTMENT (OUTPATIENT)
Dept: HEMATOLOGY ONCOLOGY | Facility: CLINIC | Age: 80
End: 2020-03-11
Payer: MEDICARE

## 2020-03-11 ENCOUNTER — RESULT REVIEW (OUTPATIENT)
Age: 80
End: 2020-03-11

## 2020-03-11 ENCOUNTER — OUTPATIENT (OUTPATIENT)
Dept: OUTPATIENT SERVICES | Facility: HOSPITAL | Age: 80
LOS: 1 days | End: 2020-03-11
Payer: COMMERCIAL

## 2020-03-11 VITALS
HEART RATE: 79 BPM | BODY MASS INDEX: 19.65 KG/M2 | SYSTOLIC BLOOD PRESSURE: 162 MMHG | DIASTOLIC BLOOD PRESSURE: 86 MMHG | RESPIRATION RATE: 17 BRPM | WEIGHT: 117.95 LBS | TEMPERATURE: 97.7 F | OXYGEN SATURATION: 100 %

## 2020-03-11 DIAGNOSIS — C92.00 ACUTE MYELOBLASTIC LEUKEMIA, NOT HAVING ACHIEVED REMISSION: ICD-10-CM

## 2020-03-11 DIAGNOSIS — Z90.710 ACQUIRED ABSENCE OF BOTH CERVIX AND UTERUS: Chronic | ICD-10-CM

## 2020-03-11 LAB
BLD GP AB SCN SERPL QL: NEGATIVE — SIGNIFICANT CHANGE UP
HCT VFR BLD CALC: 23.1 % — LOW (ref 34.5–45)
HGB BLD-MCNC: 7.8 G/DL — LOW (ref 11.5–15.5)
MCHC RBC-ENTMCNC: 30.7 PG — SIGNIFICANT CHANGE UP (ref 27–34)
MCHC RBC-ENTMCNC: 33.8 GM/DL — SIGNIFICANT CHANGE UP (ref 32–36)
MCV RBC AUTO: 90.9 FL — SIGNIFICANT CHANGE UP (ref 80–100)
NRBC # BLD: 0 /100 WBCS — SIGNIFICANT CHANGE UP (ref 0–0)
PLATELET # BLD AUTO: 9 K/UL — CRITICAL LOW (ref 150–400)
PLATELET # BLD MANUAL: 43 K/UL — LOW (ref 150–400)
RBC # BLD: 2.54 M/UL — LOW (ref 3.8–5.2)
RBC # FLD: 16.4 % — HIGH (ref 10.3–14.5)
RH IG SCN BLD-IMP: POSITIVE — SIGNIFICANT CHANGE UP
WBC # BLD: 0.29 K/UL — CRITICAL LOW (ref 3.8–10.5)
WBC # FLD AUTO: 0.29 K/UL — CRITICAL LOW (ref 3.8–10.5)

## 2020-03-11 PROCEDURE — 99214 OFFICE O/P EST MOD 30 MIN: CPT

## 2020-03-11 NOTE — CONSULT LETTER
[Dear  ___] : Dear  [unfilled], [Consult Letter:] : I had the pleasure of evaluating your patient, [unfilled]. [Please see my note below.] : Please see my note below. [Consult Closing:] : Thank you very much for allowing me to participate in the care of this patient.  If you have any questions, please do not hesitate to contact me. [Sincerely,] : Sincerely, [DrMichael  ___] : Dr. PINTO

## 2020-03-12 DIAGNOSIS — Z51.89 ENCOUNTER FOR OTHER SPECIFIED AFTERCARE: ICD-10-CM

## 2020-03-12 DIAGNOSIS — D64.9 ANEMIA, UNSPECIFIED: ICD-10-CM

## 2020-03-12 DIAGNOSIS — C92.00 ACUTE MYELOBLASTIC LEUKEMIA, NOT HAVING ACHIEVED REMISSION: ICD-10-CM

## 2020-03-12 PROCEDURE — 86900 BLOOD TYPING SEROLOGIC ABO: CPT

## 2020-03-12 PROCEDURE — 86923 COMPATIBILITY TEST ELECTRIC: CPT

## 2020-03-12 PROCEDURE — 86901 BLOOD TYPING SEROLOGIC RH(D): CPT

## 2020-03-12 PROCEDURE — 86850 RBC ANTIBODY SCREEN: CPT

## 2020-03-12 NOTE — ASSESSMENT
[FreeTextEntry1] : 78 yo F with hx breast CA s/p XRT/tamoxifen, now with AML normal cytogenetics FLT-3 ITD positive\par s/p C1 Dacogen/Venetoclax starting 2/4/20\par \par -pt finished 5 days Dacogen 20mg/m2 IV -had 1 days of C2.\par \par -Venetoclax daily, dose 200mg given patient is taking antifungal Diflucan 200mg po daily which increases Venetoclax level -will continue for at least 21 days, then check BM bx to assess if Venetoclax can be interrupted (as long as blasts<5% in marrow). BM bx to be scheduled with NP for 2/21 or  2/24/20 -no need to check cytogenetics/FISH\par \par -pancytopenia likely due to chemotherapy -cont prophylaxis Levaquin and Diflucan as long as ANC<=500.  Scripts sent.  Pt advised that they can dc Allopurinol. \par \par -platelet transfusions 3x/wk to be scheduled for Mon/Tue/Fridays while counts are low. Patient instructed to go to ED if febrile given need for IV antibiotics for neutropenic fever\par \par -BM bx done on 2/3/20 showed normal cytogenetics. [facility] molecular studies in progress (expected to result on 2/18/20). In house assay for FLT-3 ITD positive, which confers an adverse prognosis in AML - d/w patient and family BMT transplant -pt active prior to admission, good PS status; she is not interested at this time\par \par -pt had repeat BM bx on 3/2/20 which showed- \par Predominantly hypocellular fibrotic-appearing biopsy with chemotherapeutic effect, focal erythropoiesis, markedly\par diminished myelopoiesis, normal megakaryocyte number with enlarged morphology, and increased iron stores, and acellular aspirate (history of AML, s/p treatment with dacogen and venetoclox).  Due to hypocellular marrow, pt advised to hold Dacogen and Venetoclax until BM recovery.  Will monitor.\par \par -Plts 9 today, Hgb 7.8.  Pt set up for plt transfusion today and PRBC's tomorrow.  Type and cross ordered.\par \par -platelet transfusions 3x/wk to be scheduled while counts are low. Patient instructed to go to ED if febrile given need for IV antibiotics for neutropenic fever.\par \par -pt has follow up with Dr Lindsey in one week.\par  \par

## 2020-03-12 NOTE — REVIEW OF SYSTEMS
[Fatigue] : fatigue [Recent Change In Weight] : ~T recent weight change [Incontinence] : incontinence [Joint Pain] : joint pain [Anxiety] : anxiety [Negative] : Heme/Lymph [SOB on Exertion] : shortness of breath during exertion [Fever] : no fever [Chills] : no chills [Night Sweats] : no night sweats [Eye Pain] : no eye pain [Red Eyes] : eyes not red [Dry Eyes] : no dryness of the eyes [Vision Problems] : no vision problems [Dysphagia] : no dysphagia [Loss of Hearing] : no loss of hearing [Nosebleeds] : no nosebleeds [Hoarseness] : no hoarseness [Odynophagia] : no odynophagia [Mucosal Pain] : no mucosal pain [Chest Pain] : no chest pain [Palpitations] : no palpitations [Leg Claudication] : no intermittent leg claudication [Lower Ext Edema] : no lower extremity edema [Shortness Of Breath] : no shortness of breath [Wheezing] : no wheezing [Cough] : no cough [Abdominal Pain] : no abdominal pain [Vomiting] : no vomiting [Constipation] : no constipation [Diarrhea] : no diarrhea [Dysuria] : no dysuria [Vaginal Discharge] : no vaginal discharge [Dysmenorrhea/Abn Vaginal Bleeding] : no dysmenorrhea/abnormal vaginal bleeding [Joint Stiffness] : no joint stiffness [Muscle Pain] : no muscle pain [Skin Rash] : no skin rash [Skin Wound] : no skin wound [Confused] : no confusion [Dizziness] : no dizziness [Fainting] : no fainting [Difficulty Walking] : no difficulty walking [Suicidal] : not suicidal [Insomnia] : no insomnia [Proptosis] : no proptosis [Hot Flashes] : no hot flashes [Muscle Weakness] : no muscle weakness [Deepening Of The Voice] : no deepening of the voice [Easy Bleeding] : no tendency for easy bleeding [Easy Bruising] : no tendency for easy bruising [Swollen Glands] : no swollen glands [FreeTextEntry2] : lost 4 lbs [FreeTextEntry6] : can go up 1 flight of stairs [FreeTextEntry7] : no BRBPR. Colonoscopy -done 1 yr ago, normal [FreeTextEntry8] : no PAP -s/p LUCITA. Mammogram March 2020 -normal [FreeTextEntry9] : lower back pain chronic -s/p compressed vertebrae [de-identified] : no panic attacks [FreeTextEntry1] : seasonal allergies

## 2020-03-12 NOTE — RESULTS/DATA
[FreeTextEntry1] : Today's CBC (3/11/19) wbc 0.3 Hb 7.8 plt 9\par \par (On 2/11/20) wbc 1.1  Hb 10.2 plt 48\par \par The peripheral smear was reviewed. It showed decreased neutrophils, no blasts, dysplastic neutrophils. Red cells normocytic. Platelets -large platelets noted\par \par The previous medical records were reviewed\par LABS\par On 2/10/20 wbc 0.59 Hb 8.4 plt 37\par On 1/31/20 wbc 25.4 with 74% blasts, Hb 11.7 plt 153\par \par PATHOLOGY \par \par BM bx done on \par ON 2/3/20 BM bx\par Final Diagnosis\par 1, 2. Bone marrow biopsy and bone marrow aspirate\par - Acute myeloid leukemia\par \par See note and description.\par \par Diagnostic note:\par Megakarocytes are normal in number with dysplastic morphology.\par Correlation with cytogenetic/FISH/molecular studies is necessary\par for definitive classification.\par Comprehensive report with results of pending ancillary studies to\par follow.\par \par Dr. Lindsey was notified of the diagnosis on 02/04/20.\par \par Ancillary studies\par Bone marrow aspirate iron stain: No spicules are present to\par evaluate for iron stores and there are insufficient nRBC to\par evaluate for ring sideroblasts.\par \par Flow cytometry:  Myeloblasts (77% of cells), positive for HLA-DR,\par partial CD34, , CD33, CD13, partial CD15, CD7, , CD38,\par CD71; negative for CD4, CD11b, CD56.\par \par Cytochemistry: Myeloperoxidase positive\par \par Cytogenetics:  Pending\par \par FISH ([id number], peripheral blood, 1/31/2020\par NORMAL FISH - PML/YIFAN\par Probe(s) and Location(s): PML/YIFAN (15q24.1/17q21.2)\par \par Molecular\par FLT3 ITD Mutation Analysis Final Report: POSITIVE\par \par

## 2020-03-12 NOTE — HISTORY OF PRESENT ILLNESS
[de-identified] : Ms. 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 She has a history of Breast cancer 12 years ago treated with tamoxifen (no chemotherapy), s/p RT and lumpectomy, HTN and surgical hx of hysterectomy, was sent in by oncologist Dr. Blank on 1/31/20 for rule out leukemia. Patient had been feeling generally fatigued and sluggish since December 2019. She was feeling more tired, and saw her PMD, who did some blood work and noticed blasts in her peripheral blood. Pt was told to see Dr. Blank for further workup. Dr. Blank did blood work again which again found blasts in the peripheral blood, at which point she sent pt to the ER for leukemia workup. 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 She was started on 2/4/20 on Cycle 1 of Dacogen + Venetoclax (50 mg on day 1, 100 mg on day 2, 200 mg on day 3 and onward when starting Diflucan). She tolerated it well other than some SOB -CXR on 2/7/20 : mild pulmonary edema and L pleural effusion, treated with diuretics. She was discharged home on 2/11/20. \par  [de-identified] : The patient is here for AML newly dx'ed follow up -s/p C1 Dacogen/Venetoclax day 1 =2/4/20.   \par \par Pt reports fatigue, weakness and WALTERS.  Has been holding Dacogen and Venetoclax since 3/4, received only C2 day 1.   Pt denies fevers, chills, night sweats, any bleeding episodes.  Lost 4 pounds.

## 2020-03-12 NOTE — PHYSICAL EXAM
[Ambulatory and capable of all self care but unable to carry out any work activities] : Status 2- Ambulatory and capable of all self care but unable to carry out any work activities. Up and about more than 50% of waking hours [Normal] : affect appropriate [Ulcers] : no ulcers [de-identified] : L PICC line -no inflammation

## 2020-03-13 ENCOUNTER — RESULT REVIEW (OUTPATIENT)
Age: 80
End: 2020-03-13

## 2020-03-13 ENCOUNTER — APPOINTMENT (OUTPATIENT)
Dept: INFUSION THERAPY | Facility: HOSPITAL | Age: 80
End: 2020-03-13

## 2020-03-13 LAB
HCT VFR BLD CALC: 21.1 % — LOW (ref 34.5–45)
HGB BLD-MCNC: 7.2 G/DL — LOW (ref 11.5–15.5)
MCHC RBC-ENTMCNC: 30.3 PG — SIGNIFICANT CHANGE UP (ref 27–34)
MCHC RBC-ENTMCNC: 34.1 GM/DL — SIGNIFICANT CHANGE UP (ref 32–36)
MCV RBC AUTO: 88.7 FL — SIGNIFICANT CHANGE UP (ref 80–100)
NRBC # BLD: 0 /100 WBCS — SIGNIFICANT CHANGE UP (ref 0–0)
PLATELET # BLD AUTO: 29 K/UL — LOW (ref 150–400)
RBC # BLD: 2.38 M/UL — LOW (ref 3.8–5.2)
RBC # FLD: 15.6 % — HIGH (ref 10.3–14.5)
WBC # BLD: 0.27 K/UL — CRITICAL LOW (ref 3.8–10.5)
WBC # FLD AUTO: 0.27 K/UL — CRITICAL LOW (ref 3.8–10.5)

## 2020-03-15 DIAGNOSIS — R50.9 FEVER, UNSPECIFIED: ICD-10-CM

## 2020-03-16 ENCOUNTER — RESULT REVIEW (OUTPATIENT)
Age: 80
End: 2020-03-16

## 2020-03-16 ENCOUNTER — LABORATORY RESULT (OUTPATIENT)
Age: 80
End: 2020-03-16

## 2020-03-16 ENCOUNTER — TRANSCRIPTION ENCOUNTER (OUTPATIENT)
Age: 80
End: 2020-03-16

## 2020-03-16 ENCOUNTER — APPOINTMENT (OUTPATIENT)
Dept: INFUSION THERAPY | Facility: HOSPITAL | Age: 80
End: 2020-03-16

## 2020-03-16 LAB
ANISOCYTOSIS BLD QL: SLIGHT — SIGNIFICANT CHANGE UP
BASOPHILS # BLD AUTO: 0 K/UL — SIGNIFICANT CHANGE UP (ref 0–0.2)
BASOPHILS NFR BLD AUTO: 0 % — SIGNIFICANT CHANGE UP (ref 0–2)
EOSINOPHIL # BLD AUTO: 0.01 K/UL — SIGNIFICANT CHANGE UP (ref 0–0.5)
EOSINOPHIL NFR BLD AUTO: 1 % — SIGNIFICANT CHANGE UP (ref 0–6)
HCT VFR BLD CALC: 27.4 % — LOW (ref 34.5–45)
HGB BLD-MCNC: 9.5 G/DL — LOW (ref 11.5–15.5)
LYMPHOCYTES # BLD AUTO: 0.35 K/UL — LOW (ref 1–3.3)
LYMPHOCYTES # BLD AUTO: 37 % — SIGNIFICANT CHANGE UP (ref 13–44)
MACROCYTES BLD QL: SLIGHT — SIGNIFICANT CHANGE UP
MCHC RBC-ENTMCNC: 30.3 PG — SIGNIFICANT CHANGE UP (ref 27–34)
MCHC RBC-ENTMCNC: 34.7 GM/DL — SIGNIFICANT CHANGE UP (ref 32–36)
MCV RBC AUTO: 87.3 FL — SIGNIFICANT CHANGE UP (ref 80–100)
MICROCYTES BLD QL: SLIGHT — SIGNIFICANT CHANGE UP
MONOCYTES # BLD AUTO: 0.07 K/UL — SIGNIFICANT CHANGE UP (ref 0–0.9)
MONOCYTES NFR BLD AUTO: 7 % — SIGNIFICANT CHANGE UP (ref 2–14)
NEUTROPHILS # BLD AUTO: 0.52 K/UL — LOW (ref 1.8–7.4)
NEUTROPHILS NFR BLD AUTO: 55 % — SIGNIFICANT CHANGE UP (ref 43–77)
NRBC # BLD: 0 — SIGNIFICANT CHANGE UP
NRBC # BLD: SIGNIFICANT CHANGE UP /100 WBCS (ref 0–0)
PLAT MORPH BLD: NORMAL — SIGNIFICANT CHANGE UP
PLATELET # BLD AUTO: 41 K/UL — LOW (ref 150–400)
RBC # BLD: 3.14 M/UL — LOW (ref 3.8–5.2)
RBC # FLD: 15.8 % — HIGH (ref 10.3–14.5)
RBC BLD AUTO: ABNORMAL
WBC # BLD: 0.95 K/UL — CRITICAL LOW (ref 3.8–10.5)
WBC # FLD AUTO: 0.95 K/UL — CRITICAL LOW (ref 3.8–10.5)

## 2020-03-17 ENCOUNTER — APPOINTMENT (OUTPATIENT)
Dept: HEMATOLOGY ONCOLOGY | Facility: CLINIC | Age: 80
End: 2020-03-17
Payer: MEDICARE

## 2020-03-17 ENCOUNTER — RESULT REVIEW (OUTPATIENT)
Age: 80
End: 2020-03-17

## 2020-03-17 VITALS
SYSTOLIC BLOOD PRESSURE: 166 MMHG | HEART RATE: 73 BPM | TEMPERATURE: 98.2 F | DIASTOLIC BLOOD PRESSURE: 125 MMHG | OXYGEN SATURATION: 98 % | RESPIRATION RATE: 15 BRPM | WEIGHT: 119.05 LBS | BODY MASS INDEX: 19.83 KG/M2

## 2020-03-17 VITALS — DIASTOLIC BLOOD PRESSURE: 93 MMHG | SYSTOLIC BLOOD PRESSURE: 154 MMHG

## 2020-03-17 LAB
ANISOCYTOSIS BLD QL: SLIGHT — SIGNIFICANT CHANGE UP
BASOPHILS # BLD AUTO: 0 K/UL — SIGNIFICANT CHANGE UP (ref 0–0.2)
BASOPHILS NFR BLD AUTO: 0 % — SIGNIFICANT CHANGE UP (ref 0–2)
EOSINOPHIL # BLD AUTO: 0.02 K/UL — SIGNIFICANT CHANGE UP (ref 0–0.5)
EOSINOPHIL NFR BLD AUTO: 2 % — SIGNIFICANT CHANGE UP (ref 0–6)
HCT VFR BLD CALC: 30.8 % — LOW (ref 34.5–45)
HGB BLD-MCNC: 10.2 G/DL — LOW (ref 11.5–15.5)
LYMPHOCYTES # BLD AUTO: 0.27 K/UL — LOW (ref 1–3.3)
LYMPHOCYTES # BLD AUTO: 32 % — SIGNIFICANT CHANGE UP (ref 13–44)
MACROCYTES BLD QL: SLIGHT — SIGNIFICANT CHANGE UP
MCHC RBC-ENTMCNC: 30.1 PG — SIGNIFICANT CHANGE UP (ref 27–34)
MCHC RBC-ENTMCNC: 33.1 GM/DL — SIGNIFICANT CHANGE UP (ref 32–36)
MCV RBC AUTO: 90.9 FL — SIGNIFICANT CHANGE UP (ref 80–100)
MICROCYTES BLD QL: SLIGHT — SIGNIFICANT CHANGE UP
MONOCYTES # BLD AUTO: 0.03 K/UL — SIGNIFICANT CHANGE UP (ref 0–0.9)
MONOCYTES NFR BLD AUTO: 4 % — SIGNIFICANT CHANGE UP (ref 2–14)
NEUTROPHILS # BLD AUTO: 0.53 K/UL — LOW (ref 1.8–7.4)
NEUTROPHILS NFR BLD AUTO: 62 % — SIGNIFICANT CHANGE UP (ref 43–77)
NRBC # BLD: 0 — SIGNIFICANT CHANGE UP
NRBC # BLD: SIGNIFICANT CHANGE UP /100 WBCS (ref 0–0)
PLAT MORPH BLD: NORMAL — SIGNIFICANT CHANGE UP
PLATELET # BLD AUTO: 57 K/UL — LOW (ref 150–400)
RBC # BLD: 3.39 M/UL — LOW (ref 3.8–5.2)
RBC # FLD: 16 % — HIGH (ref 10.3–14.5)
RBC BLD AUTO: ABNORMAL
TOXIC GRANULES BLD QL SMEAR: PRESENT — SIGNIFICANT CHANGE UP
WBC # BLD: 0.85 K/UL — CRITICAL LOW (ref 3.8–10.5)
WBC # FLD AUTO: 0.85 K/UL — CRITICAL LOW (ref 3.8–10.5)

## 2020-03-17 PROCEDURE — 99214 OFFICE O/P EST MOD 30 MIN: CPT

## 2020-03-18 NOTE — HISTORY OF PRESENT ILLNESS
[de-identified] : Ms. 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 She has a history of Breast cancer 12 years ago treated with tamoxifen (no chemotherapy), s/p RT and lumpectomy, HTN and surgical hx of hysterectomy, was sent in by oncologist Dr. Blank on 1/31/20 for rule out leukemia. Patient had been feeling generally fatigued and sluggish since December 2019. She was feeling more tired, and saw her PMD, who did some blood work and noticed blasts in her peripheral blood. Pt was told to see Dr. Blank for further workup. Dr. Blank did blood work again which again found blasts in the peripheral blood, at which point she sent pt to the ER for leukemia workup. 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 She was started on 2/4/20 on Cycle 1 of Dacogen + Venetoclax (50 mg on day 1, 100 mg on day 2, 200 mg on day 3 and onward when starting Diflucan). She tolerated it well other than some SOB -CXR on 2/7/20 : mild pulmonary edema and L pleural effusion, treated with diuretics. She was discharged home on 2/11/20. \par  [de-identified] : The patient is here for AML newly dx'ed follow up -s/p C1 Dacogen/Venetoclax day 1 =2/4/20.   \par \par Pt reports fatigue, weakness.  Has been holding Dacogen and Venetoclax since 3/4, received only C2 day 1.   Pt denies chills, night sweats, any bleeding episodes.  .  Had fever of 100.4 over weekend for one occurence-took Tylenol with no return of fever.   Reports fair appetite, also constipation.

## 2020-03-18 NOTE — PHYSICAL EXAM
[Ambulatory and capable of all self care but unable to carry out any work activities] : Status 2- Ambulatory and capable of all self care but unable to carry out any work activities. Up and about more than 50% of waking hours [Normal] : affect appropriate [Ulcers] : no ulcers [de-identified] : L PICC line -no inflammation

## 2020-03-18 NOTE — RESULTS/DATA
[FreeTextEntry1] : Today's CBC (3/17/20) wbc 0.85 Hb 10.2  plt 57\par \par 3/11/20) wbc 0.3 Hb 7.8 plt 9\par (On 2/11/20) wbc 1.1  Hb 10.2 plt 48\par \par The peripheral smear was reviewed. It showed decreased neutrophils, no blasts, dysplastic neutrophils. Red cells normocytic. Platelets -large platelets noted\par \par The previous medical records were reviewed\par LABS\par On 2/10/20 wbc 0.59 Hb 8.4 plt 37\par On 1/31/20 wbc 25.4 with 74% blasts, Hb 11.7 plt 153\par \par PATHOLOGY \par \par BM bx done on \par ON 2/3/20 BM bx\par Final Diagnosis\par 1, 2. Bone marrow biopsy and bone marrow aspirate\par - Acute myeloid leukemia\par \par See note and description.\par \par Diagnostic note:\par Megakarocytes are normal in number with dysplastic morphology.\par Correlation with cytogenetic/FISH/molecular studies is necessary\par for definitive classification.\par Comprehensive report with results of pending ancillary studies to\par follow.\par \par Dr. Lindsey was notified of the diagnosis on 02/04/20.\par \par Ancillary studies\par Bone marrow aspirate iron stain: No spicules are present to\par evaluate for iron stores and there are insufficient nRBC to\par evaluate for ring sideroblasts.\par \par Flow cytometry:  Myeloblasts (77% of cells), positive for HLA-DR,\par partial CD34, , CD33, CD13, partial CD15, CD7, , CD38,\par CD71; negative for CD4, CD11b, CD56.\par \par Cytochemistry: Myeloperoxidase positive\par \par Cytogenetics:  Pending\par \par FISH ([id number], peripheral blood, 1/31/2020\par NORMAL FISH - PML/YIFAN\par Probe(s) and Location(s): PML/YIFAN (15q24.1/17q21.2)\par \par Molecular\par FLT3 ITD Mutation Analysis Final Report: POSITIVE\par \par

## 2020-03-18 NOTE — REVIEW OF SYSTEMS
[Fatigue] : fatigue [Recent Change In Weight] : ~T recent weight change [SOB on Exertion] : shortness of breath during exertion [Incontinence] : incontinence [Joint Pain] : joint pain [Anxiety] : anxiety [Negative] : Heme/Lymph [Constipation] : constipation [Fever] : no fever [Chills] : no chills [Night Sweats] : no night sweats [Eye Pain] : no eye pain [Red Eyes] : eyes not red [Dry Eyes] : no dryness of the eyes [Vision Problems] : no vision problems [Dysphagia] : no dysphagia [Loss of Hearing] : no loss of hearing [Nosebleeds] : no nosebleeds [Hoarseness] : no hoarseness [Odynophagia] : no odynophagia [Mucosal Pain] : no mucosal pain [Chest Pain] : no chest pain [Palpitations] : no palpitations [Leg Claudication] : no intermittent leg claudication [Lower Ext Edema] : no lower extremity edema [Shortness Of Breath] : no shortness of breath [Wheezing] : no wheezing [Cough] : no cough [Abdominal Pain] : no abdominal pain [Vomiting] : no vomiting [Diarrhea] : no diarrhea [Dysuria] : no dysuria [Vaginal Discharge] : no vaginal discharge [Dysmenorrhea/Abn Vaginal Bleeding] : no dysmenorrhea/abnormal vaginal bleeding [Joint Stiffness] : no joint stiffness [Muscle Pain] : no muscle pain [Skin Rash] : no skin rash [Skin Wound] : no skin wound [Confused] : no confusion [Dizziness] : no dizziness [Fainting] : no fainting [Difficulty Walking] : no difficulty walking [Suicidal] : not suicidal [Insomnia] : no insomnia [Proptosis] : no proptosis [Hot Flashes] : no hot flashes [Muscle Weakness] : no muscle weakness [Deepening Of The Voice] : no deepening of the voice [Easy Bleeding] : no tendency for easy bleeding [Easy Bruising] : no tendency for easy bruising [Swollen Glands] : no swollen glands [FreeTextEntry2] : weight is now stable, had fever Saturday night of 100.4 [FreeTextEntry6] : can go up 1 flight of stairs [FreeTextEntry7] : no BRBPR. Colonoscopy -done 1 yr ago, normal; constipation [FreeTextEntry8] : no PAP -s/p LUCITA. Mammogram March 2020 -normal [FreeTextEntry9] : lower back pain chronic -s/p compressed vertebrae [de-identified] : no panic attacks [FreeTextEntry1] : seasonal allergies

## 2020-03-18 NOTE — ASSESSMENT
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 BM bx to be scheduled with NP for 2/21 or  2/24/20 -no need to check cytogenetics/FISH\par \par -pancytopenia likely due to chemotherapy -cont prophylaxis Levaquin and Diflucan as long as ANC<=500.  Scripts sent.  Pt advised that she can dc Allopurinol. \par \par -BM bx done on 2/3/20 showed normal cytogenetics. [facility] molecular studies in progress (expected to result on 2/18/20). In house assay for FLT-3 ITD positive, which confers an adverse prognosis in AML - d/w patient and family BMT transplant -pt active prior to admission, good PS status; she is not interested at this time\par \par -pt had repeat BM bx on 3/2/20 which showed- \par Predominantly hypocellular fibrotic-appearing biopsy with chemotherapeutic effect, focal erythropoiesis, markedly\par diminished myelopoiesis, normal megakaryocyte number with enlarged morphology, and increased iron stores, and acellular aspirate (history of AML, s/p treatment with dacogen and venetoclox).  Due to hypocellular marrow, pt advised to hold Dacogen and Venetoclax until BM recovery.  Will monitor.\par \par -platelet transfusions 3x/wk to be scheduled while counts are low. Patient instructed to go to ED if febrile given need for IV antibiotics for neutropenic fever.\par \par -constipation-pt taking Mirallax and Colace once a day.  Advised pt to increase Colace to 3 x daily and add Senokot.  Also to increase fluid intake.\par \par -follow up in one week.\par  \par \par \par

## 2020-03-19 ENCOUNTER — RESULT REVIEW (OUTPATIENT)
Age: 80
End: 2020-03-19

## 2020-03-19 ENCOUNTER — APPOINTMENT (OUTPATIENT)
Dept: INFUSION THERAPY | Facility: HOSPITAL | Age: 80
End: 2020-03-19

## 2020-03-19 LAB
BASOPHILS # BLD AUTO: 0 K/UL — SIGNIFICANT CHANGE UP (ref 0–0.2)
BASOPHILS NFR BLD AUTO: 0 % — SIGNIFICANT CHANGE UP (ref 0–2)
EOSINOPHIL # BLD AUTO: 0 K/UL — SIGNIFICANT CHANGE UP (ref 0–0.5)
EOSINOPHIL NFR BLD AUTO: 0 % — SIGNIFICANT CHANGE UP (ref 0–6)
HCT VFR BLD CALC: 29.1 % — LOW (ref 34.5–45)
HGB BLD-MCNC: 9.7 G/DL — LOW (ref 11.5–15.5)
LYMPHOCYTES # BLD AUTO: 0.48 K/UL — LOW (ref 1–3.3)
LYMPHOCYTES # BLD AUTO: 33 % — SIGNIFICANT CHANGE UP (ref 13–44)
MCHC RBC-ENTMCNC: 30.2 PG — SIGNIFICANT CHANGE UP (ref 27–34)
MCHC RBC-ENTMCNC: 33.3 GM/DL — SIGNIFICANT CHANGE UP (ref 32–36)
MCV RBC AUTO: 90.7 FL — SIGNIFICANT CHANGE UP (ref 80–100)
MONOCYTES # BLD AUTO: 0.12 K/UL — SIGNIFICANT CHANGE UP (ref 0–0.9)
MONOCYTES NFR BLD AUTO: 8 % — SIGNIFICANT CHANGE UP (ref 2–14)
NEUTROPHILS # BLD AUTO: 0.86 K/UL — LOW (ref 1.8–7.4)
NEUTROPHILS NFR BLD AUTO: 59 % — SIGNIFICANT CHANGE UP (ref 43–77)
NRBC # BLD: 0 — SIGNIFICANT CHANGE UP
NRBC # BLD: SIGNIFICANT CHANGE UP /100 WBCS (ref 0–0)
PLAT MORPH BLD: NORMAL — SIGNIFICANT CHANGE UP
PLATELET # BLD AUTO: 148 K/UL — LOW (ref 150–400)
RBC # BLD: 3.21 M/UL — LOW (ref 3.8–5.2)
RBC # FLD: 16.2 % — HIGH (ref 10.3–14.5)
RBC BLD AUTO: SIGNIFICANT CHANGE UP
WBC # BLD: 1.45 K/UL — LOW (ref 3.8–10.5)
WBC # FLD AUTO: 1.45 K/UL — LOW (ref 3.8–10.5)

## 2020-03-23 ENCOUNTER — RESULT REVIEW (OUTPATIENT)
Age: 80
End: 2020-03-23

## 2020-03-23 ENCOUNTER — APPOINTMENT (OUTPATIENT)
Dept: INFUSION THERAPY | Facility: HOSPITAL | Age: 80
End: 2020-03-23

## 2020-03-23 LAB
BACTERIA BLD CULT: NORMAL
BASOPHILS # BLD AUTO: 0 K/UL — SIGNIFICANT CHANGE UP (ref 0–0.2)
BASOPHILS NFR BLD AUTO: 0 % — SIGNIFICANT CHANGE UP (ref 0–2)
EOSINOPHIL # BLD AUTO: 0.02 K/UL — SIGNIFICANT CHANGE UP (ref 0–0.5)
EOSINOPHIL NFR BLD AUTO: 1 % — SIGNIFICANT CHANGE UP (ref 0–6)
HCT VFR BLD CALC: 30.1 % — LOW (ref 34.5–45)
HGB BLD-MCNC: 10.4 G/DL — LOW (ref 11.5–15.5)
LYMPHOCYTES # BLD AUTO: 0.29 K/UL — LOW (ref 1–3.3)
LYMPHOCYTES # BLD AUTO: 17 % — SIGNIFICANT CHANGE UP (ref 13–44)
MCHC RBC-ENTMCNC: 30.9 PG — SIGNIFICANT CHANGE UP (ref 27–34)
MCHC RBC-ENTMCNC: 34.6 GM/DL — SIGNIFICANT CHANGE UP (ref 32–36)
MCV RBC AUTO: 89.3 FL — SIGNIFICANT CHANGE UP (ref 80–100)
MONOCYTES # BLD AUTO: 0.12 K/UL — SIGNIFICANT CHANGE UP (ref 0–0.9)
MONOCYTES NFR BLD AUTO: 7 % — SIGNIFICANT CHANGE UP (ref 2–14)
NEUTROPHILS # BLD AUTO: 1.28 K/UL — LOW (ref 1.8–7.4)
NEUTROPHILS NFR BLD AUTO: 74 % — SIGNIFICANT CHANGE UP (ref 43–77)
NEUTS BAND # BLD: 1 % — SIGNIFICANT CHANGE UP (ref 0–8)
NRBC # BLD: 0 — SIGNIFICANT CHANGE UP
NRBC # BLD: SIGNIFICANT CHANGE UP /100 WBCS (ref 0–0)
PLAT MORPH BLD: NORMAL — SIGNIFICANT CHANGE UP
PLATELET # BLD AUTO: 467 K/UL — HIGH (ref 150–400)
RBC # BLD: 3.37 M/UL — LOW (ref 3.8–5.2)
RBC # FLD: 16.9 % — HIGH (ref 10.3–14.5)
RBC BLD AUTO: SIGNIFICANT CHANGE UP
WBC # BLD: 1.7 K/UL — LOW (ref 3.8–10.5)
WBC # FLD AUTO: 1.7 K/UL — LOW (ref 3.8–10.5)

## 2020-03-26 ENCOUNTER — APPOINTMENT (OUTPATIENT)
Dept: INFUSION THERAPY | Facility: HOSPITAL | Age: 80
End: 2020-03-26

## 2020-03-31 ENCOUNTER — LABORATORY RESULT (OUTPATIENT)
Age: 80
End: 2020-03-31

## 2020-03-31 ENCOUNTER — RESULT REVIEW (OUTPATIENT)
Age: 80
End: 2020-03-31

## 2020-03-31 ENCOUNTER — APPOINTMENT (OUTPATIENT)
Dept: HEMATOLOGY ONCOLOGY | Facility: CLINIC | Age: 80
End: 2020-03-31
Payer: MEDICARE

## 2020-03-31 ENCOUNTER — APPOINTMENT (OUTPATIENT)
Dept: INFUSION THERAPY | Facility: HOSPITAL | Age: 80
End: 2020-03-31

## 2020-03-31 VITALS
TEMPERATURE: 98 F | RESPIRATION RATE: 14 BRPM | SYSTOLIC BLOOD PRESSURE: 112 MMHG | OXYGEN SATURATION: 98 % | HEART RATE: 74 BPM | WEIGHT: 117.95 LBS | DIASTOLIC BLOOD PRESSURE: 72 MMHG | BODY MASS INDEX: 19.65 KG/M2

## 2020-03-31 LAB
ALBUMIN SERPL ELPH-MCNC: 4 G/DL
ALP BLD-CCNC: 54 U/L
ALT SERPL-CCNC: 19 U/L
ANION GAP SERPL CALC-SCNC: 13 MMOL/L
AST SERPL-CCNC: 20 U/L
BASOPHILS # BLD AUTO: 0.02 K/UL — SIGNIFICANT CHANGE UP (ref 0–0.2)
BASOPHILS NFR BLD AUTO: 0.5 % — SIGNIFICANT CHANGE UP (ref 0–2)
BILIRUB SERPL-MCNC: 0.3 MG/DL
BUN SERPL-MCNC: 20 MG/DL
CALCIUM SERPL-MCNC: 9.9 MG/DL
CHLORIDE SERPL-SCNC: 104 MMOL/L
CO2 SERPL-SCNC: 27 MMOL/L
CREAT SERPL-MCNC: 0.68 MG/DL
EOSINOPHIL # BLD AUTO: 0.11 K/UL — SIGNIFICANT CHANGE UP (ref 0–0.5)
EOSINOPHIL NFR BLD AUTO: 2.6 % — SIGNIFICANT CHANGE UP (ref 0–6)
GLUCOSE SERPL-MCNC: 89 MG/DL
HCT VFR BLD CALC: 37.5 % — SIGNIFICANT CHANGE UP (ref 34.5–45)
HGB BLD-MCNC: 11.8 G/DL — SIGNIFICANT CHANGE UP (ref 11.5–15.5)
IMM GRANULOCYTES NFR BLD AUTO: 0.5 % — SIGNIFICANT CHANGE UP (ref 0–1.5)
LDH SERPL-CCNC: 201 U/L
LYMPHOCYTES # BLD AUTO: 0.84 K/UL — LOW (ref 1–3.3)
LYMPHOCYTES # BLD AUTO: 19.7 % — SIGNIFICANT CHANGE UP (ref 13–44)
MCHC RBC-ENTMCNC: 29.7 PG — SIGNIFICANT CHANGE UP (ref 27–34)
MCHC RBC-ENTMCNC: 31.5 GM/DL — LOW (ref 32–36)
MCV RBC AUTO: 94.5 FL — SIGNIFICANT CHANGE UP (ref 80–100)
MONOCYTES # BLD AUTO: 0.45 K/UL — SIGNIFICANT CHANGE UP (ref 0–0.9)
MONOCYTES NFR BLD AUTO: 10.5 % — SIGNIFICANT CHANGE UP (ref 2–14)
NEUTROPHILS # BLD AUTO: 2.83 K/UL — SIGNIFICANT CHANGE UP (ref 1.8–7.4)
NEUTROPHILS NFR BLD AUTO: 66.2 % — SIGNIFICANT CHANGE UP (ref 43–77)
NRBC # BLD: 0 /100 WBCS — SIGNIFICANT CHANGE UP (ref 0–0)
PLATELET # BLD AUTO: 395 K/UL — SIGNIFICANT CHANGE UP (ref 150–400)
POTASSIUM SERPL-SCNC: 4 MMOL/L
PROT SERPL-MCNC: 6.4 G/DL
RBC # BLD: 3.97 M/UL — SIGNIFICANT CHANGE UP (ref 3.8–5.2)
RBC # FLD: 17.9 % — HIGH (ref 10.3–14.5)
SODIUM SERPL-SCNC: 143 MMOL/L
WBC # BLD: 4.27 K/UL — SIGNIFICANT CHANGE UP (ref 3.8–10.5)
WBC # FLD AUTO: 4.27 K/UL — SIGNIFICANT CHANGE UP (ref 3.8–10.5)

## 2020-03-31 PROCEDURE — 99214 OFFICE O/P EST MOD 30 MIN: CPT

## 2020-03-31 RX ORDER — ALLOPURINOL 300 MG/1
300 TABLET ORAL DAILY
Refills: 0 | Status: DISCONTINUED | COMMUNITY
Start: 2020-02-11 | End: 2020-03-31

## 2020-03-31 RX ORDER — LEVOFLOXACIN 500 MG/1
500 TABLET, FILM COATED ORAL
Qty: 60 | Refills: 0 | Status: DISCONTINUED | COMMUNITY
Start: 2020-02-11 | End: 2020-03-31

## 2020-03-31 NOTE — RESULTS/DATA
[FreeTextEntry1] : Today's CBC (On 3/31/20) wbc 4. 3 Hb 11.8 plt 395 ANC 2800\par \par On 3/17/20) wbc 0.85 Hb 10.2  plt 57\par On 3/11/20) wbc 0.3 Hb 7.8 plt 9\par (On 2/11/20) wbc 1.1  Hb 10.2 plt 48\par On 2/10/20 wbc 0.59 Hb 8.4 plt 37\par On 1/31/20 wbc 25.4 with 74% blasts, Hb 11.7 plt 153\par \par PATHOLOGY \par \par BM bx done on \par ON 2/3/20 BM bx\par Final Diagnosis\par 1, 2. Bone marrow biopsy and bone marrow aspirate\par - Acute myeloid leukemia\par \par See note and description.\par \par Diagnostic note:\par Megakarocytes are normal in number with dysplastic morphology.\par Correlation with cytogenetic/FISH/molecular studies is necessary\par for definitive classification.\par Comprehensive report with results of pending ancillary studies to\par follow.\par \par Dr. Lindsey was notified of the diagnosis on 02/04/20.\par \par Ancillary studies\par Bone marrow aspirate iron stain: No spicules are present to\par evaluate for iron stores and there are insufficient nRBC to\par evaluate for ring sideroblasts.\par \par Flow cytometry:  Myeloblasts (77% of cells), positive for HLA-DR,\par partial CD34, , CD33, CD13, partial CD15, CD7, , CD38,\par CD71; negative for CD4, CD11b, CD56.\par \par Cytochemistry: Myeloperoxidase positive\par \par Cytogenetics:  Pending\par \par FISH ([id number], peripheral blood, 1/31/2020\par NORMAL FISH - PML/YIFAN\par Probe(s) and Location(s): PML/YIFAN (15q24.1/17q21.2)\par \par Molecular\par FLT3 ITD Mutation Analysis Final Report: POSITIVE\par \par

## 2020-03-31 NOTE — REVIEW OF SYSTEMS
[Fatigue] : fatigue [Incontinence] : incontinence [Joint Pain] : joint pain [Anxiety] : anxiety [Negative] : Heme/Lymph [Fever] : no fever [Chills] : no chills [Night Sweats] : no night sweats [Recent Change In Weight] : ~T no recent weight change [Eye Pain] : no eye pain [Red Eyes] : eyes not red [Dry Eyes] : no dryness of the eyes [Vision Problems] : no vision problems [Dysphagia] : no dysphagia [Loss of Hearing] : no loss of hearing [Nosebleeds] : no nosebleeds [Hoarseness] : no hoarseness [Odynophagia] : no odynophagia [Mucosal Pain] : no mucosal pain [Chest Pain] : no chest pain [Palpitations] : no palpitations [Leg Claudication] : no intermittent leg claudication [Lower Ext Edema] : no lower extremity edema [Shortness Of Breath] : no shortness of breath [Wheezing] : no wheezing [Cough] : no cough [SOB on Exertion] : no shortness of breath during exertion [Abdominal Pain] : no abdominal pain [Vomiting] : no vomiting [Constipation] : no constipation [Diarrhea] : no diarrhea [Dysuria] : no dysuria [Vaginal Discharge] : no vaginal discharge [Dysmenorrhea/Abn Vaginal Bleeding] : no dysmenorrhea/abnormal vaginal bleeding [Joint Stiffness] : no joint stiffness [Muscle Pain] : no muscle pain [Skin Rash] : no skin rash [Skin Wound] : no skin wound [Confused] : no confusion [Dizziness] : no dizziness [Fainting] : no fainting [Difficulty Walking] : no difficulty walking [Suicidal] : not suicidal [Insomnia] : no insomnia [Proptosis] : no proptosis [Hot Flashes] : no hot flashes [Muscle Weakness] : no muscle weakness [Deepening Of The Voice] : no deepening of the voice [Easy Bleeding] : no tendency for easy bleeding [Easy Bruising] : no tendency for easy bruising [Swollen Glands] : no swollen glands [FreeTextEntry6] : can go up 1 flight of stairs [FreeTextEntry7] : no BRBPR. Colonoscopy -done 1 yr ago, normal; constipation [FreeTextEntry8] : no PAP -s/p LUCITA. Mammogram March 2020 -normal [FreeTextEntry9] : lower back pain chronic -s/p compressed vertebrae [de-identified] : no panic attacks [FreeTextEntry1] : seasonal allergies

## 2020-03-31 NOTE — HISTORY OF PRESENT ILLNESS
[de-identified] : Ms. 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 She has a history of Breast cancer 12 years ago treated with tamoxifen (no chemotherapy), s/p RT and lumpectomy, HTN and surgical hx of hysterectomy, was sent in by oncologist Dr. Blank on 1/31/20 for rule out leukemia. Patient had been feeling generally fatigued and sluggish since December 2019. She was feeling more tired, and saw her PMD, who did some blood work and noticed blasts in her peripheral blood. Pt was told to see Dr. Blank for further workup. Dr. Blank did blood work again which again found blasts in the peripheral blood, at which point she sent pt to the ER for leukemia workup. 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 She was started on 2/4/20 on Cycle 1 of Dacogen + Venetoclax (50 mg on day 1, 100 mg on day 2, 200 mg on day 3 and onward when starting Diflucan). She tolerated it well other than some SOB -CXR on 2/7/20 : mild pulmonary edema and L pleural effusion, treated with diuretics. She was discharged home on 2/11/20. \par  [de-identified] : The patient is here for AML newly dx'ed follow up -s/p C1 Dacogen/Venetoclax day 1 =2/4/20.   She had a BM bx to eval response on 3/2/20 -showed no blasts. Pt given C2 days 1-3 on 3/2, 3/4 and 3/5, after which her Venetoclax was held for count recovery. She has no fevers, no cough, no back pains. c/o fatigue and mental 'fogginess'. \par

## 2020-03-31 NOTE — ASSESSMENT
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 Counts are good, but given risk of neutropenia post chemo and increased morbidity/mortality if pt contracts COVID-19 during that time, will monitor CBC closely and delay tx for the next 2 weeks. Will check CBC On 4/8; if counts are dropping, may resume Dacogen/Venetoclax that week. If counts stable, will start C3 Dacogen + Venetoclax (10 days) on 4/13/20\par \par -Venetoclax daily, dose 200mg to be for 10 days starting day 1 of C3\par \par -hold Levaquin and Diflucan given ANC>1000; restart when ANC<1000\par \par -BM bx done on 2/3/20 showed normal cytogenetics. [facility] molecular studies in progress (expected to result on 2/18/20). In house assay for FLT-3 ITD positive, which confers an adverse prognosis in AML - d/w patient and family BMT transplant -pt active prior to admission, good PS status; she is not interested at this time\par \par -pt had repeat BM bx on 3/2/20 which showed- \par Predominantly hypocellular fibrotic-appearing biopsy with chemotherapeutic effect, focal erythropoiesis, markedly\par diminished myelopoiesis, normal megakaryocyte number with enlarged morphology, and increased iron stores, and acellular aspirate (history of AML, s/p treatment with dacogen and venetoclox).  Due to hypocellular marrow, pt  Dacogen and Venetoclax held until BM recovery.  \par \par -pt has PICC line; would change to Port after COVID-19 incidence decreases and will do it when counts are recovered\par \par -follow up on 4/20, will need to get possible plt 2x/wk, Mon/Thurs\par \par

## 2020-03-31 NOTE — PHYSICAL EXAM
[Normal] : no JVD, no calf tenderness, venous stasis changes, varices [Ulcers] : no ulcers [de-identified] : L PICC line -no inflammation

## 2020-04-01 ENCOUNTER — APPOINTMENT (OUTPATIENT)
Dept: INFUSION THERAPY | Facility: HOSPITAL | Age: 80
End: 2020-04-01

## 2020-04-02 ENCOUNTER — OUTPATIENT (OUTPATIENT)
Dept: OUTPATIENT SERVICES | Facility: HOSPITAL | Age: 80
LOS: 1 days | Discharge: HOME | End: 2020-04-02

## 2020-04-02 ENCOUNTER — APPOINTMENT (OUTPATIENT)
Dept: INFUSION THERAPY | Facility: HOSPITAL | Age: 80
End: 2020-04-02

## 2020-04-02 DIAGNOSIS — Z90.710 ACQUIRED ABSENCE OF BOTH CERVIX AND UTERUS: Chronic | ICD-10-CM

## 2020-04-02 DIAGNOSIS — C92.00 ACUTE MYELOBLASTIC LEUKEMIA, NOT HAVING ACHIEVED REMISSION: ICD-10-CM

## 2020-04-03 ENCOUNTER — APPOINTMENT (OUTPATIENT)
Dept: INFUSION THERAPY | Facility: HOSPITAL | Age: 80
End: 2020-04-03

## 2020-04-04 ENCOUNTER — APPOINTMENT (OUTPATIENT)
Dept: INFUSION THERAPY | Facility: HOSPITAL | Age: 80
End: 2020-04-04

## 2020-04-06 ENCOUNTER — RESULT REVIEW (OUTPATIENT)
Age: 80
End: 2020-04-06

## 2020-04-06 ENCOUNTER — APPOINTMENT (OUTPATIENT)
Dept: HEMATOLOGY ONCOLOGY | Facility: CLINIC | Age: 80
End: 2020-04-06

## 2020-04-06 LAB
BASOPHILS # BLD AUTO: 0.03 K/UL — SIGNIFICANT CHANGE UP (ref 0–0.2)
BASOPHILS NFR BLD AUTO: 0.5 % — SIGNIFICANT CHANGE UP (ref 0–2)
EOSINOPHIL # BLD AUTO: 0.59 K/UL — HIGH (ref 0–0.5)
EOSINOPHIL NFR BLD AUTO: 9.9 % — HIGH (ref 0–6)
HCT VFR BLD CALC: 35.2 % — SIGNIFICANT CHANGE UP (ref 34.5–45)
HGB BLD-MCNC: 11.8 G/DL — SIGNIFICANT CHANGE UP (ref 11.5–15.5)
IMM GRANULOCYTES NFR BLD AUTO: 1 % — SIGNIFICANT CHANGE UP (ref 0–1.5)
LYMPHOCYTES # BLD AUTO: 0.87 K/UL — LOW (ref 1–3.3)
LYMPHOCYTES # BLD AUTO: 14.6 % — SIGNIFICANT CHANGE UP (ref 13–44)
MCHC RBC-ENTMCNC: 30.1 PG — SIGNIFICANT CHANGE UP (ref 27–34)
MCHC RBC-ENTMCNC: 33.5 GM/DL — SIGNIFICANT CHANGE UP (ref 32–36)
MCV RBC AUTO: 89.8 FL — SIGNIFICANT CHANGE UP (ref 80–100)
MONOCYTES # BLD AUTO: 0.66 K/UL — SIGNIFICANT CHANGE UP (ref 0–0.9)
MONOCYTES NFR BLD AUTO: 11.1 % — SIGNIFICANT CHANGE UP (ref 2–14)
NEUTROPHILS # BLD AUTO: 3.75 K/UL — SIGNIFICANT CHANGE UP (ref 1.8–7.4)
NEUTROPHILS NFR BLD AUTO: 62.9 % — SIGNIFICANT CHANGE UP (ref 43–77)
NRBC # BLD: 0 /100 WBCS — SIGNIFICANT CHANGE UP (ref 0–0)
PLATELET # BLD AUTO: 193 K/UL — SIGNIFICANT CHANGE UP (ref 150–400)
RBC # BLD: 3.92 M/UL — SIGNIFICANT CHANGE UP (ref 3.8–5.2)
RBC # FLD: 17.2 % — HIGH (ref 10.3–14.5)
WBC # BLD: 5.96 K/UL — SIGNIFICANT CHANGE UP (ref 3.8–10.5)
WBC # FLD AUTO: 5.96 K/UL — SIGNIFICANT CHANGE UP (ref 3.8–10.5)

## 2020-04-08 ENCOUNTER — RESULT REVIEW (OUTPATIENT)
Age: 80
End: 2020-04-08

## 2020-04-08 ENCOUNTER — APPOINTMENT (OUTPATIENT)
Dept: HEMATOLOGY ONCOLOGY | Facility: CLINIC | Age: 80
End: 2020-04-08

## 2020-04-08 LAB
BASOPHILS # BLD AUTO: 0.03 K/UL — SIGNIFICANT CHANGE UP (ref 0–0.2)
BASOPHILS NFR BLD AUTO: 0.6 % — SIGNIFICANT CHANGE UP (ref 0–2)
EOSINOPHIL # BLD AUTO: 0.89 K/UL — HIGH (ref 0–0.5)
EOSINOPHIL NFR BLD AUTO: 17.8 % — HIGH (ref 0–6)
HCT VFR BLD CALC: 37.4 % — SIGNIFICANT CHANGE UP (ref 34.5–45)
HGB BLD-MCNC: 12.2 G/DL — SIGNIFICANT CHANGE UP (ref 11.5–15.5)
IMM GRANULOCYTES NFR BLD AUTO: 1.2 % — SIGNIFICANT CHANGE UP (ref 0–1.5)
LYMPHOCYTES # BLD AUTO: 0.89 K/UL — LOW (ref 1–3.3)
LYMPHOCYTES # BLD AUTO: 17.8 % — SIGNIFICANT CHANGE UP (ref 13–44)
MCHC RBC-ENTMCNC: 30.3 PG — SIGNIFICANT CHANGE UP (ref 27–34)
MCHC RBC-ENTMCNC: 32.6 GM/DL — SIGNIFICANT CHANGE UP (ref 32–36)
MCV RBC AUTO: 93 FL — SIGNIFICANT CHANGE UP (ref 80–100)
MONOCYTES # BLD AUTO: 0.59 K/UL — SIGNIFICANT CHANGE UP (ref 0–0.9)
MONOCYTES NFR BLD AUTO: 11.8 % — SIGNIFICANT CHANGE UP (ref 2–14)
NEUTROPHILS # BLD AUTO: 2.53 K/UL — SIGNIFICANT CHANGE UP (ref 1.8–7.4)
NEUTROPHILS NFR BLD AUTO: 50.8 % — SIGNIFICANT CHANGE UP (ref 43–77)
NRBC # BLD: 0 /100 WBCS — SIGNIFICANT CHANGE UP (ref 0–0)
PLATELET # BLD AUTO: 182 K/UL — SIGNIFICANT CHANGE UP (ref 150–400)
RBC # BLD: 4.02 M/UL — SIGNIFICANT CHANGE UP (ref 3.8–5.2)
RBC # FLD: 17.2 % — HIGH (ref 10.3–14.5)
WBC # BLD: 4.99 K/UL — SIGNIFICANT CHANGE UP (ref 3.8–10.5)
WBC # FLD AUTO: 4.99 K/UL — SIGNIFICANT CHANGE UP (ref 3.8–10.5)

## 2020-04-13 ENCOUNTER — LABORATORY RESULT (OUTPATIENT)
Age: 80
End: 2020-04-13

## 2020-04-13 ENCOUNTER — RESULT REVIEW (OUTPATIENT)
Age: 80
End: 2020-04-13

## 2020-04-13 ENCOUNTER — APPOINTMENT (OUTPATIENT)
Dept: INFUSION THERAPY | Facility: HOSPITAL | Age: 80
End: 2020-04-13

## 2020-04-13 LAB
BASOPHILS # BLD AUTO: 0.08 K/UL — SIGNIFICANT CHANGE UP (ref 0–0.2)
BASOPHILS NFR BLD AUTO: 1 % — SIGNIFICANT CHANGE UP (ref 0–2)
EOSINOPHIL # BLD AUTO: 1.43 K/UL — HIGH (ref 0–0.5)
EOSINOPHIL NFR BLD AUTO: 17 % — HIGH (ref 0–6)
HCT VFR BLD CALC: 36.8 % — SIGNIFICANT CHANGE UP (ref 34.5–45)
HGB BLD-MCNC: 12.5 G/DL — SIGNIFICANT CHANGE UP (ref 11.5–15.5)
LYMPHOCYTES # BLD AUTO: 1.01 K/UL — SIGNIFICANT CHANGE UP (ref 1–3.3)
LYMPHOCYTES # BLD AUTO: 12 % — LOW (ref 13–44)
MCHC RBC-ENTMCNC: 30.8 PG — SIGNIFICANT CHANGE UP (ref 27–34)
MCHC RBC-ENTMCNC: 34 GM/DL — SIGNIFICANT CHANGE UP (ref 32–36)
MCV RBC AUTO: 90.6 FL — SIGNIFICANT CHANGE UP (ref 80–100)
MONOCYTES # BLD AUTO: 0.42 K/UL — SIGNIFICANT CHANGE UP (ref 0–0.9)
MONOCYTES NFR BLD AUTO: 5 % — SIGNIFICANT CHANGE UP (ref 2–14)
NEUTROPHILS # BLD AUTO: 5.45 K/UL — SIGNIFICANT CHANGE UP (ref 1.8–7.4)
NEUTROPHILS NFR BLD AUTO: 65 % — SIGNIFICANT CHANGE UP (ref 43–77)
NRBC # BLD: 0 /100 — SIGNIFICANT CHANGE UP (ref 0–0)
NRBC # BLD: SIGNIFICANT CHANGE UP /100 WBCS (ref 0–0)
PLAT MORPH BLD: NORMAL — SIGNIFICANT CHANGE UP
PLATELET # BLD AUTO: 158 K/UL — SIGNIFICANT CHANGE UP (ref 150–400)
RBC # BLD: 4.06 M/UL — SIGNIFICANT CHANGE UP (ref 3.8–5.2)
RBC # FLD: 16.9 % — HIGH (ref 10.3–14.5)
RBC BLD AUTO: SIGNIFICANT CHANGE UP
WBC # BLD: 8.39 K/UL — SIGNIFICANT CHANGE UP (ref 3.8–10.5)
WBC # FLD AUTO: 8.39 K/UL — SIGNIFICANT CHANGE UP (ref 3.8–10.5)

## 2020-04-14 ENCOUNTER — APPOINTMENT (OUTPATIENT)
Dept: INFUSION THERAPY | Facility: HOSPITAL | Age: 80
End: 2020-04-14

## 2020-04-14 DIAGNOSIS — Z51.11 ENCOUNTER FOR ANTINEOPLASTIC CHEMOTHERAPY: ICD-10-CM

## 2020-04-15 ENCOUNTER — RESULT REVIEW (OUTPATIENT)
Age: 80
End: 2020-04-15

## 2020-04-15 ENCOUNTER — APPOINTMENT (OUTPATIENT)
Dept: INFUSION THERAPY | Facility: HOSPITAL | Age: 80
End: 2020-04-15

## 2020-04-15 LAB
BASOPHILS # BLD AUTO: 0.03 K/UL — SIGNIFICANT CHANGE UP (ref 0–0.2)
BASOPHILS NFR BLD AUTO: 0.4 % — SIGNIFICANT CHANGE UP (ref 0–2)
EOSINOPHIL # BLD AUTO: 1.67 K/UL — HIGH (ref 0–0.5)
EOSINOPHIL NFR BLD AUTO: 21.5 % — HIGH (ref 0–6)
HCT VFR BLD CALC: 37.8 % — SIGNIFICANT CHANGE UP (ref 34.5–45)
HGB BLD-MCNC: 12.3 G/DL — SIGNIFICANT CHANGE UP (ref 11.5–15.5)
IMM GRANULOCYTES NFR BLD AUTO: 0.5 % — SIGNIFICANT CHANGE UP (ref 0–1.5)
LYMPHOCYTES # BLD AUTO: 0.88 K/UL — LOW (ref 1–3.3)
LYMPHOCYTES # BLD AUTO: 11.3 % — LOW (ref 13–44)
MCHC RBC-ENTMCNC: 30.1 PG — SIGNIFICANT CHANGE UP (ref 27–34)
MCHC RBC-ENTMCNC: 32.5 GM/DL — SIGNIFICANT CHANGE UP (ref 32–36)
MCV RBC AUTO: 92.6 FL — SIGNIFICANT CHANGE UP (ref 80–100)
MONOCYTES # BLD AUTO: 0.48 K/UL — SIGNIFICANT CHANGE UP (ref 0–0.9)
MONOCYTES NFR BLD AUTO: 6.2 % — SIGNIFICANT CHANGE UP (ref 2–14)
NEUTROPHILS # BLD AUTO: 4.68 K/UL — SIGNIFICANT CHANGE UP (ref 1.8–7.4)
NEUTROPHILS NFR BLD AUTO: 60.1 % — SIGNIFICANT CHANGE UP (ref 43–77)
NRBC # BLD: 0 /100 WBCS — SIGNIFICANT CHANGE UP (ref 0–0)
PLATELET # BLD AUTO: 149 K/UL — LOW (ref 150–400)
RBC # BLD: 4.08 M/UL — SIGNIFICANT CHANGE UP (ref 3.8–5.2)
RBC # FLD: 17 % — HIGH (ref 10.3–14.5)
WBC # BLD: 7.78 K/UL — SIGNIFICANT CHANGE UP (ref 3.8–10.5)
WBC # FLD AUTO: 7.78 K/UL — SIGNIFICANT CHANGE UP (ref 3.8–10.5)

## 2020-04-16 ENCOUNTER — APPOINTMENT (OUTPATIENT)
Dept: INFUSION THERAPY | Facility: HOSPITAL | Age: 80
End: 2020-04-16

## 2020-04-16 DIAGNOSIS — R21 RASH AND OTHER NONSPECIFIC SKIN ERUPTION: ICD-10-CM

## 2020-04-16 RX ORDER — HYDROCORTISONE 10 MG/G
1 CREAM TOPICAL
Qty: 1 | Refills: 0 | Status: COMPLETED | COMMUNITY
Start: 2020-04-16 | End: 2020-04-17

## 2020-04-17 ENCOUNTER — APPOINTMENT (OUTPATIENT)
Dept: INFUSION THERAPY | Facility: HOSPITAL | Age: 80
End: 2020-04-17

## 2020-04-17 ENCOUNTER — OUTPATIENT (OUTPATIENT)
Dept: OUTPATIENT SERVICES | Facility: HOSPITAL | Age: 80
LOS: 1 days | End: 2020-04-17

## 2020-04-17 DIAGNOSIS — Z90.710 ACQUIRED ABSENCE OF BOTH CERVIX AND UTERUS: Chronic | ICD-10-CM

## 2020-04-17 DIAGNOSIS — C92.00 ACUTE MYELOBLASTIC LEUKEMIA, NOT HAVING ACHIEVED REMISSION: ICD-10-CM

## 2020-04-20 ENCOUNTER — RESULT REVIEW (OUTPATIENT)
Age: 80
End: 2020-04-20

## 2020-04-20 ENCOUNTER — APPOINTMENT (OUTPATIENT)
Dept: HEMATOLOGY ONCOLOGY | Facility: CLINIC | Age: 80
End: 2020-04-20
Payer: MEDICARE

## 2020-04-20 ENCOUNTER — APPOINTMENT (OUTPATIENT)
Dept: INFUSION THERAPY | Facility: HOSPITAL | Age: 80
End: 2020-04-20

## 2020-04-20 LAB
BASOPHILS # BLD AUTO: 0.01 K/UL — SIGNIFICANT CHANGE UP (ref 0–0.2)
BASOPHILS NFR BLD AUTO: 0.2 % — SIGNIFICANT CHANGE UP (ref 0–2)
EOSINOPHIL # BLD AUTO: 0.91 K/UL — HIGH (ref 0–0.5)
EOSINOPHIL NFR BLD AUTO: 17 % — HIGH (ref 0–6)
HCT VFR BLD CALC: 35.2 % — SIGNIFICANT CHANGE UP (ref 34.5–45)
HGB BLD-MCNC: 11.7 G/DL — SIGNIFICANT CHANGE UP (ref 11.5–15.5)
IMM GRANULOCYTES NFR BLD AUTO: 1.5 % — SIGNIFICANT CHANGE UP (ref 0–1.5)
LYMPHOCYTES # BLD AUTO: 0.72 K/UL — LOW (ref 1–3.3)
LYMPHOCYTES # BLD AUTO: 13.4 % — SIGNIFICANT CHANGE UP (ref 13–44)
MCHC RBC-ENTMCNC: 30.7 PG — SIGNIFICANT CHANGE UP (ref 27–34)
MCHC RBC-ENTMCNC: 33.2 GM/DL — SIGNIFICANT CHANGE UP (ref 32–36)
MCV RBC AUTO: 92.4 FL — SIGNIFICANT CHANGE UP (ref 80–100)
MONOCYTES # BLD AUTO: 0.24 K/UL — SIGNIFICANT CHANGE UP (ref 0–0.9)
MONOCYTES NFR BLD AUTO: 4.5 % — SIGNIFICANT CHANGE UP (ref 2–14)
NEUTROPHILS # BLD AUTO: 3.4 K/UL — SIGNIFICANT CHANGE UP (ref 1.8–7.4)
NEUTROPHILS NFR BLD AUTO: 63.4 % — SIGNIFICANT CHANGE UP (ref 43–77)
NRBC # BLD: 0 /100 WBCS — SIGNIFICANT CHANGE UP (ref 0–0)
PLATELET # BLD AUTO: 124 K/UL — LOW (ref 150–400)
RBC # BLD: 3.81 M/UL — SIGNIFICANT CHANGE UP (ref 3.8–5.2)
RBC # FLD: 16.6 % — HIGH (ref 10.3–14.5)
WBC # BLD: 5.36 K/UL — SIGNIFICANT CHANGE UP (ref 3.8–10.5)
WBC # FLD AUTO: 5.36 K/UL — SIGNIFICANT CHANGE UP (ref 3.8–10.5)

## 2020-04-20 PROCEDURE — 99214 OFFICE O/P EST MOD 30 MIN: CPT | Mod: 95

## 2020-04-20 NOTE — RESULTS/DATA
[FreeTextEntry1] : Today's CBC (4/15/20) wbc 7.78 Hb 12.3 plt  149 ANC 4680\par \par (On 3/31/20) wbc 4. 3 Hb 11.8 plt 395 ANC 2800\par On 3/17/20) wbc 0.85 Hb 10.2  plt 57\par On 3/11/20) wbc 0.3 Hb 7.8 plt 9\par (On 2/11/20) wbc 1.1  Hb 10.2 plt 48\par On 2/10/20 wbc 0.59 Hb 8.4 plt 37\par On 1/31/20 wbc 25.4 with 74% blasts, Hb 11.7 plt 153\par \par PATHOLOGY \par \par BM bx done on \par ON 2/3/20 BM bx\par Final Diagnosis\par 1, 2. Bone marrow biopsy and bone marrow aspirate\par - Acute myeloid leukemia\par \par See note and description.\par \par Diagnostic note:\par Megakarocytes are normal in number with dysplastic morphology.\par Correlation with cytogenetic/FISH/molecular studies is necessary\par for definitive classification.\par Comprehensive report with results of pending ancillary studies to\par follow.\par \par Dr. Lindsey was notified of the diagnosis on 02/04/20.\par \par Ancillary studies\par Bone marrow aspirate iron stain: No spicules are present to\par evaluate for iron stores and there are insufficient nRBC to\par evaluate for ring sideroblasts.\par \par Flow cytometry:  Myeloblasts (77% of cells), positive for HLA-DR,\par partial CD34, , CD33, CD13, partial CD15, CD7, , CD38,\par CD71; negative for CD4, CD11b, CD56.\par \par Cytochemistry: Myeloperoxidase positive\par \par Cytogenetics:  Pending\par \par FISH ([id number], peripheral blood, 1/31/2020\par NORMAL FISH - PML/YIFAN\par Probe(s) and Location(s): PML/YIFAN (15q24.1/17q21.2)\par \par Molecular\par FLT3 ITD Mutation Analysis Final Report: POSITIVE\par \par

## 2020-04-20 NOTE — REVIEW OF SYSTEMS
[Fever] : no fever [Chills] : no chills [Night Sweats] : no night sweats [Fatigue] : no fatigue [Recent Change In Weight] : ~T no recent weight change [Eye Pain] : no eye pain [Red Eyes] : eyes not red [Dry Eyes] : no dryness of the eyes [Vision Problems] : no vision problems [Dysphagia] : no dysphagia [Loss of Hearing] : no loss of hearing [Nosebleeds] : no nosebleeds [Hoarseness] : no hoarseness [Odynophagia] : no odynophagia [Mucosal Pain] : no mucosal pain [Chest Pain] : no chest pain [Palpitations] : no palpitations [Leg Claudication] : no intermittent leg claudication [Lower Ext Edema] : no lower extremity edema [Shortness Of Breath] : no shortness of breath [Cough] : no cough [Wheezing] : no wheezing [Abdominal Pain] : no abdominal pain [SOB on Exertion] : no shortness of breath during exertion [Vomiting] : no vomiting [Dysuria] : no dysuria [Constipation] : no constipation [Diarrhea] : no diarrhea [Vaginal Discharge] : no vaginal discharge [Dysmenorrhea/Abn Vaginal Bleeding] : no dysmenorrhea/abnormal vaginal bleeding [Joint Stiffness] : no joint stiffness [Skin Wound] : no skin wound [Skin Rash] : no skin rash [Muscle Pain] : no muscle pain [Dizziness] : no dizziness [Confused] : no confusion [Suicidal] : not suicidal [Insomnia] : no insomnia [Difficulty Walking] : no difficulty walking [Fainting] : no fainting [Hot Flashes] : no hot flashes [Muscle Weakness] : no muscle weakness [Proptosis] : no proptosis [Easy Bruising] : no tendency for easy bruising [Deepening Of The Voice] : no deepening of the voice [Easy Bleeding] : no tendency for easy bleeding [Swollen Glands] : no swollen glands [FreeTextEntry7] : no BRBPR. Colonoscopy -done 1 yr ago, normal; constipation but moving bowels [FreeTextEntry6] : can go up 1 flight of stairs [FreeTextEntry8] : no PAP -s/p LUCITA. Mammogram March 2020 -normal [de-identified] : no panic attacks [FreeTextEntry9] : lower back pain chronic -s/p compressed vertebrae [FreeTextEntry1] : seasonal allergies

## 2020-04-20 NOTE — HISTORY OF PRESENT ILLNESS
[FreeTextEntry2] : Esmer Muñoz [de-identified] : Ms. 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 She has a history of Breast cancer 12 years ago treated with tamoxifen (no chemotherapy), s/p RT and lumpectomy, HTN and surgical hx of hysterectomy, was sent in by oncologist Dr. Blank on 1/31/20 for rule out leukemia. Patient had been feeling generally fatigued and sluggish since December 2019. She was feeling more tired, and saw her PMD, who did some blood work and noticed blasts in her peripheral blood. Pt was told to see Dr. Blank for further workup. Dr. Blank did blood work again which again found blasts in the peripheral blood, at which point she sent pt to the ER for leukemia workup. 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 She was started on 2/4/20 on Cycle 1 of Dacogen + Venetoclax (50 mg on day 1, 100 mg on day 2, 200 mg on day 3 and onward when starting Diflucan). She tolerated it well other than some SOB -CXR on 2/7/20 : mild pulmonary edema and L pleural effusion, treated with diuretics. She was discharged home on 2/11/20. \par  [de-identified] : The patient is here for AML follow up.    She had a BM bx to eval response on 3/2/20 -showed no blasts. Pt given C2 days 1-3 on 3/2, 3/4 and 3/5, after which her Venetoclax was held for count recovery. She has no fevers, no cough, no back pains.  Denies N/V,  constipation.  Pt c/o mental 'fogginess'.  S/p C3 Dacogen/Venetoclax day 1 =4/13/20; is taking Venetoclax.  \par \par Verbal consent obtained from pt for Telehealth visit today.    \par

## 2020-04-20 NOTE — ASSESSMENT
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 Counts are good, but given risk of neutropenia post chemo and increased morbidity/mortality if pt contracts COVID-19 during that time, will monitor CBC closely and delay tx for the next 2 weeks. Will check CBC On 4/8; if counts are dropping, may resume Dacogen/Venetoclax that week. If counts stable, will start C3 Dacogen + Venetoclax (10 days) on 4/13/20\par \par -Venetoclax daily, dose 200mg to be for 10 days starting day 1 of C3\par \par -hold Levaquin and Diflucan given ANC>1000; restart when ANC<1000\par \par -BM bx done on 2/3/20 showed normal cytogenetics. [facility] molecular studies in progress (expected to result on 2/18/20). In house assay for FLT-3 ITD positive, which confers an adverse prognosis in AML - d/w patient and family BMT transplant -pt active prior to admission, good PS status; she is not interested at this time\par \par -pt had repeat BM bx on 3/2/20 which showed- \par Predominantly hypocellular fibrotic-appearing biopsy with chemotherapeutic effect, focal erythropoiesis, markedly\par diminished myelopoiesis, normal megakaryocyte number with enlarged morphology, and increased iron stores, and acellular aspirate (history of AML, s/p treatment with dacogen and venetoclox).  Due to hypocellular marrow, pt  Dacogen and Venetoclax held until BM recovery.  \par \par -pt has PICC line; would change to Port after COVID-19 incidence decreases and will do it when counts are recovered\par \par -will need to get possible plt 2x/wk, Mon/Thurs-appts made\par \par -advised pt of importance of proper precautions during COVID pandemic \par \par

## 2020-04-23 ENCOUNTER — RESULT REVIEW (OUTPATIENT)
Age: 80
End: 2020-04-23

## 2020-04-23 ENCOUNTER — APPOINTMENT (OUTPATIENT)
Dept: INFUSION THERAPY | Facility: HOSPITAL | Age: 80
End: 2020-04-23

## 2020-04-23 DIAGNOSIS — T82.598A OTHER MECHANICAL COMPLICATION OF OTHER CARDIAC AND VASCULAR DEVICES AND IMPLANTS, INITIAL ENCOUNTER: ICD-10-CM

## 2020-04-23 LAB
BASOPHILS # BLD AUTO: 0 K/UL — SIGNIFICANT CHANGE UP (ref 0–0.2)
BASOPHILS NFR BLD AUTO: 0 % — SIGNIFICANT CHANGE UP (ref 0–2)
EOSINOPHIL # BLD AUTO: 0.32 K/UL — SIGNIFICANT CHANGE UP (ref 0–0.5)
EOSINOPHIL NFR BLD AUTO: 7 % — HIGH (ref 0–6)
HCT VFR BLD CALC: 33.7 % — LOW (ref 34.5–45)
HGB BLD-MCNC: 11.2 G/DL — LOW (ref 11.5–15.5)
LYMPHOCYTES # BLD AUTO: 0.72 K/UL — LOW (ref 1–3.3)
LYMPHOCYTES # BLD AUTO: 16 % — SIGNIFICANT CHANGE UP (ref 13–44)
MCHC RBC-ENTMCNC: 30.7 PG — SIGNIFICANT CHANGE UP (ref 27–34)
MCHC RBC-ENTMCNC: 33.2 GM/DL — SIGNIFICANT CHANGE UP (ref 32–36)
MCV RBC AUTO: 92.3 FL — SIGNIFICANT CHANGE UP (ref 80–100)
MONOCYTES # BLD AUTO: 0.09 K/UL — SIGNIFICANT CHANGE UP (ref 0–0.9)
MONOCYTES NFR BLD AUTO: 2 % — SIGNIFICANT CHANGE UP (ref 2–14)
NEUTROPHILS # BLD AUTO: 3.38 K/UL — SIGNIFICANT CHANGE UP (ref 1.8–7.4)
NEUTROPHILS NFR BLD AUTO: 75 % — SIGNIFICANT CHANGE UP (ref 43–77)
NRBC # BLD: 0 /100 — SIGNIFICANT CHANGE UP (ref 0–0)
NRBC # BLD: SIGNIFICANT CHANGE UP /100 WBCS (ref 0–0)
PLAT MORPH BLD: NORMAL — SIGNIFICANT CHANGE UP
PLATELET # BLD AUTO: 93 K/UL — LOW (ref 150–400)
RBC # BLD: 3.65 M/UL — LOW (ref 3.8–5.2)
RBC # FLD: 16.4 % — HIGH (ref 10.3–14.5)
RBC BLD AUTO: SIGNIFICANT CHANGE UP
WBC # BLD: 4.5 K/UL — SIGNIFICANT CHANGE UP (ref 3.8–10.5)
WBC # FLD AUTO: 4.5 K/UL — SIGNIFICANT CHANGE UP (ref 3.8–10.5)

## 2020-04-27 ENCOUNTER — RESULT REVIEW (OUTPATIENT)
Age: 80
End: 2020-04-27

## 2020-04-27 ENCOUNTER — APPOINTMENT (OUTPATIENT)
Dept: INFUSION THERAPY | Facility: HOSPITAL | Age: 80
End: 2020-04-27

## 2020-04-27 LAB
BASOPHILS # BLD AUTO: 0.01 K/UL — SIGNIFICANT CHANGE UP (ref 0–0.2)
BASOPHILS NFR BLD AUTO: 0.5 % — SIGNIFICANT CHANGE UP (ref 0–2)
EOSINOPHIL # BLD AUTO: 0.14 K/UL — SIGNIFICANT CHANGE UP (ref 0–0.5)
EOSINOPHIL NFR BLD AUTO: 7 % — HIGH (ref 0–6)
HCT VFR BLD CALC: 34.2 % — LOW (ref 34.5–45)
HGB BLD-MCNC: 11.4 G/DL — LOW (ref 11.5–15.5)
IMM GRANULOCYTES NFR BLD AUTO: 4 % — HIGH (ref 0–1.5)
LYMPHOCYTES # BLD AUTO: 0.56 K/UL — LOW (ref 1–3.3)
LYMPHOCYTES # BLD AUTO: 28 % — SIGNIFICANT CHANGE UP (ref 13–44)
MCHC RBC-ENTMCNC: 30.9 PG — SIGNIFICANT CHANGE UP (ref 27–34)
MCHC RBC-ENTMCNC: 33.3 GM/DL — SIGNIFICANT CHANGE UP (ref 32–36)
MCV RBC AUTO: 92.7 FL — SIGNIFICANT CHANGE UP (ref 80–100)
MONOCYTES # BLD AUTO: 0.16 K/UL — SIGNIFICANT CHANGE UP (ref 0–0.9)
MONOCYTES NFR BLD AUTO: 8 % — SIGNIFICANT CHANGE UP (ref 2–14)
NEUTROPHILS # BLD AUTO: 1.05 K/UL — LOW (ref 1.8–7.4)
NEUTROPHILS NFR BLD AUTO: 52.5 % — SIGNIFICANT CHANGE UP (ref 43–77)
NRBC # BLD: 0 /100 WBCS — SIGNIFICANT CHANGE UP (ref 0–0)
PLATELET # BLD AUTO: 78 K/UL — LOW (ref 150–400)
RBC # BLD: 3.69 M/UL — LOW (ref 3.8–5.2)
RBC # FLD: 17.2 % — HIGH (ref 10.3–14.5)
WBC # BLD: 2 K/UL — LOW (ref 3.8–10.5)
WBC # FLD AUTO: 2 K/UL — LOW (ref 3.8–10.5)

## 2020-04-30 ENCOUNTER — APPOINTMENT (OUTPATIENT)
Dept: INFUSION THERAPY | Facility: HOSPITAL | Age: 80
End: 2020-04-30

## 2020-04-30 ENCOUNTER — RESULT REVIEW (OUTPATIENT)
Age: 80
End: 2020-04-30

## 2020-04-30 LAB
BASOPHILS # BLD AUTO: 0 K/UL — SIGNIFICANT CHANGE UP (ref 0–0.2)
BASOPHILS NFR BLD AUTO: 0 % — SIGNIFICANT CHANGE UP (ref 0–2)
EOSINOPHIL # BLD AUTO: 0.1 K/UL — SIGNIFICANT CHANGE UP (ref 0–0.5)
EOSINOPHIL NFR BLD AUTO: 6 % — SIGNIFICANT CHANGE UP (ref 0–6)
HCT VFR BLD CALC: 35.4 % — SIGNIFICANT CHANGE UP (ref 34.5–45)
HGB BLD-MCNC: 11.6 G/DL — SIGNIFICANT CHANGE UP (ref 11.5–15.5)
LYMPHOCYTES # BLD AUTO: 0.54 K/UL — LOW (ref 1–3.3)
LYMPHOCYTES # BLD AUTO: 31 % — SIGNIFICANT CHANGE UP (ref 13–44)
MCHC RBC-ENTMCNC: 30.5 PG — SIGNIFICANT CHANGE UP (ref 27–34)
MCHC RBC-ENTMCNC: 32.8 GM/DL — SIGNIFICANT CHANGE UP (ref 32–36)
MCV RBC AUTO: 93.2 FL — SIGNIFICANT CHANGE UP (ref 80–100)
MONOCYTES # BLD AUTO: 0.22 K/UL — SIGNIFICANT CHANGE UP (ref 0–0.9)
MONOCYTES NFR BLD AUTO: 13 % — SIGNIFICANT CHANGE UP (ref 2–14)
NEUTROPHILS # BLD AUTO: 0.87 K/UL — LOW (ref 1.8–7.4)
NEUTROPHILS NFR BLD AUTO: 50 % — SIGNIFICANT CHANGE UP (ref 43–77)
NRBC # BLD: 1 /100 — HIGH (ref 0–0)
NRBC # BLD: SIGNIFICANT CHANGE UP /100 WBCS (ref 0–0)
PLAT MORPH BLD: NORMAL — SIGNIFICANT CHANGE UP
PLATELET # BLD AUTO: 87 K/UL — LOW (ref 150–400)
RBC # BLD: 3.8 M/UL — SIGNIFICANT CHANGE UP (ref 3.8–5.2)
RBC # FLD: 17.6 % — HIGH (ref 10.3–14.5)
RBC BLD AUTO: SIGNIFICANT CHANGE UP
WBC # BLD: 1.73 K/UL — LOW (ref 3.8–10.5)
WBC # FLD AUTO: 1.73 K/UL — LOW (ref 3.8–10.5)

## 2020-05-07 ENCOUNTER — OUTPATIENT (OUTPATIENT)
Dept: OUTPATIENT SERVICES | Facility: HOSPITAL | Age: 80
LOS: 1 days | Discharge: HOME | End: 2020-05-07

## 2020-05-07 DIAGNOSIS — Z90.710 ACQUIRED ABSENCE OF BOTH CERVIX AND UTERUS: Chronic | ICD-10-CM

## 2020-05-07 DIAGNOSIS — C92.00 ACUTE MYELOBLASTIC LEUKEMIA, NOT HAVING ACHIEVED REMISSION: ICD-10-CM

## 2020-05-12 ENCOUNTER — APPOINTMENT (OUTPATIENT)
Dept: HEMATOLOGY ONCOLOGY | Facility: CLINIC | Age: 80
End: 2020-05-12
Payer: MEDICARE

## 2020-05-12 ENCOUNTER — RESULT REVIEW (OUTPATIENT)
Age: 80
End: 2020-05-12

## 2020-05-12 VITALS
BODY MASS INDEX: 19.65 KG/M2 | OXYGEN SATURATION: 98 % | TEMPERATURE: 98 F | HEART RATE: 71 BPM | SYSTOLIC BLOOD PRESSURE: 157 MMHG | WEIGHT: 117.95 LBS | RESPIRATION RATE: 14 BRPM | DIASTOLIC BLOOD PRESSURE: 98 MMHG

## 2020-05-12 LAB
BASOPHILS # BLD AUTO: 0 K/UL — SIGNIFICANT CHANGE UP (ref 0–0.2)
BASOPHILS NFR BLD AUTO: 0 % — SIGNIFICANT CHANGE UP (ref 0–2)
EOSINOPHIL # BLD AUTO: 0.02 K/UL — SIGNIFICANT CHANGE UP (ref 0–0.5)
EOSINOPHIL NFR BLD AUTO: 2 % — SIGNIFICANT CHANGE UP (ref 0–6)
HCT VFR BLD CALC: 39.6 % — SIGNIFICANT CHANGE UP (ref 34.5–45)
HGB BLD-MCNC: 13.1 G/DL — SIGNIFICANT CHANGE UP (ref 11.5–15.5)
LYMPHOCYTES # BLD AUTO: 0.64 K/UL — LOW (ref 1–3.3)
LYMPHOCYTES # BLD AUTO: 58 % — HIGH (ref 13–44)
MCHC RBC-ENTMCNC: 31.3 PG — SIGNIFICANT CHANGE UP (ref 27–34)
MCHC RBC-ENTMCNC: 33.1 GM/DL — SIGNIFICANT CHANGE UP (ref 32–36)
MCV RBC AUTO: 94.7 FL — SIGNIFICANT CHANGE UP (ref 80–100)
MONOCYTES # BLD AUTO: 0.15 K/UL — SIGNIFICANT CHANGE UP (ref 0–0.9)
MONOCYTES NFR BLD AUTO: 14 % — SIGNIFICANT CHANGE UP (ref 2–14)
NEUTROPHILS # BLD AUTO: 0.29 K/UL — LOW (ref 1.8–7.4)
NEUTROPHILS NFR BLD AUTO: 26 % — LOW (ref 43–77)
NRBC # BLD: 0 /100 — SIGNIFICANT CHANGE UP (ref 0–0)
NRBC # BLD: SIGNIFICANT CHANGE UP /100 WBCS (ref 0–0)
PLAT MORPH BLD: NORMAL — SIGNIFICANT CHANGE UP
PLATELET # BLD AUTO: 211 K/UL — SIGNIFICANT CHANGE UP (ref 150–400)
RBC # BLD: 4.18 M/UL — SIGNIFICANT CHANGE UP (ref 3.8–5.2)
RBC # FLD: 17.2 % — HIGH (ref 10.3–14.5)
RBC BLD AUTO: SIGNIFICANT CHANGE UP
WBC # BLD: 1.1 K/UL — LOW (ref 3.8–10.5)
WBC # FLD AUTO: 1.1 K/UL — LOW (ref 3.8–10.5)

## 2020-05-12 PROCEDURE — 99214 OFFICE O/P EST MOD 30 MIN: CPT

## 2020-05-12 NOTE — PHYSICAL EXAM
[Restricted in physically strenuous activity but ambulatory and able to carry out work of a light or sedentary nature] : Status 1- Restricted in physically strenuous activity but ambulatory and able to carry out work of a light or sedentary nature, e.g., light house work, office work [Ulcers] : no ulcers [Mucositis] : no mucositis [Thrush] : no thrush [Normal] : grossly intact [de-identified] : L PICC line -no inflammation

## 2020-05-12 NOTE — HISTORY OF PRESENT ILLNESS
[Medical Office: ([facility])___] : at the medical office located in  [Home] : at home, [unfilled] , at the time of the visit. [Patient] : the patient [Spouse] : spouse [Self] : self [FreeTextEntry2] : Esmer Muñoz [de-identified] : Ms. 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 She has a history of Breast cancer 12 years ago treated with tamoxifen (no chemotherapy), s/p RT and lumpectomy, HTN and surgical hx of hysterectomy, was sent in by oncologist Dr. Blank on 1/31/20 for rule out leukemia. Patient had been feeling generally fatigued and sluggish since December 2019. She was feeling more tired, and saw her PMD, who did some blood work and noticed blasts in her peripheral blood. Pt was told to see Dr. Blank for further workup. Dr. Blank did blood work again which again found blasts in the peripheral blood, at which point she sent pt to the ER for leukemia workup. 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 She was started on 2/4/20 on Cycle 1 of Dacogen + Venetoclax (50 mg on day 1, 100 mg on day 2, 200 mg on day 3 and onward when starting Diflucan). She tolerated it well other than some SOB -CXR on 2/7/20 : mild pulmonary edema and L pleural effusion, treated with diuretics. She was discharged home on 2/11/20. \par  [de-identified] : The patient is here for AML follow up.    She had a BM bx to eval response on 3/2/20 -showed no blasts. Pt given C2 days 1-3 on 3/2, 3/4 and 3/5, after which her Venetoclax was held for count recovery. S/p C3 Dacogen/Venetoclax day 1 =4/13/20; is taking Venetoclax.  \par \par The patient was due for chemotherapy starting today, 5/11/20. She feels well, has no complaints. She lives with her , Nerissa. They are both well, no complaints, they have not been sick. \par

## 2020-05-12 NOTE — RESULTS/DATA
[FreeTextEntry1] : Today's CBC (On 5/12/20) wbc 1.1  Hb 13.1plt 211\par \par On 4/15/20) wbc 7.78 Hb 12.3 plt  149 ANC 4680\par On 3/31/20) wbc 4. 3 Hb 11.8 plt 395 ANC 2800\par On 3/17/20) wbc 0.85 Hb 10.2  plt 57\par On 3/11/20) wbc 0.3 Hb 7.8 plt 9\par (On 2/11/20) wbc 1.1  Hb 10.2 plt 48\par On 2/10/20 wbc 0.59 Hb 8.4 plt 37\par On 1/31/20 wbc 25.4 with 74% blasts, Hb 11.7 plt 153\par \par PATHOLOGY \par \par BM bx done on \par ON 2/3/20 BM bx\par Final Diagnosis\par 1, 2. Bone marrow biopsy and bone marrow aspirate\par - Acute myeloid leukemia\par \par See note and description.\par \par Diagnostic note:\par Megakarocytes are normal in number with dysplastic morphology.\par Correlation with cytogenetic/FISH/molecular studies is necessary\par for definitive classification.\par Comprehensive report with results of pending ancillary studies to\par follow.\par \par Dr. Lindsey was notified of the diagnosis on 02/04/20.\par \par Ancillary studies\par Bone marrow aspirate iron stain: No spicules are present to\par evaluate for iron stores and there are insufficient nRBC to\par evaluate for ring sideroblasts.\par \par Flow cytometry:  Myeloblasts (77% of cells), positive for HLA-DR,\par partial CD34, , CD33, CD13, partial CD15, CD7, , CD38,\par CD71; negative for CD4, CD11b, CD56.\par \par Cytochemistry: Myeloperoxidase positive\par \par Cytogenetics:  Pending\par \par FISH ([id number], peripheral blood, 1/31/2020\par NORMAL FISH - PML/YIFAN\par Probe(s) and Location(s): PML/YIFAN (15q24.1/17q21.2)\par \par Molecular\par FLT3 ITD Mutation Analysis Final Report: POSITIVE\par \par

## 2020-05-12 NOTE — ASSESSMENT
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 May also give OncPro if counts continue to drop, will check CBC on 4th day of treatment\par \par -Venetoclax daily, dose 200mg to be for 10 days starting day 1 of C4\par \par -cont Levaquin and Diflucan given ANC<1000\par \par -BM bx done on 2/3/20 showed normal cytogenetics. [facility] molecular studies in progress (expected to result on 2/18/20). In house assay for FLT-3 ITD positive, which confers an adverse prognosis in AML - d/w patient and family BMT transplant -pt active prior to admission, good PS status; she is not interested at this time\par \par -pt has PICC line; would change to Port after COVID-19 incidence decreases and will do it when counts are recovered\par \par -platelet transfusions q2 wks -to [facility] scheduled for possible plt on 6/1 and 6/29\par \par -advised pt of importance of proper precautions during COVID pandemic \par \par -follow up via tele visit in 4 wks with Nya Chavez\par \par \par

## 2020-05-12 NOTE — CONSULT LETTER
[Please see my note below.] : Please see my note below. [Consult Closing:] : Thank you very much for allowing me to participate in the care of this patient.  If you have any questions, please do not hesitate to contact me. [Sincerely,] : Sincerely, [DrMichael  ___] : Dr. PINTO

## 2020-05-12 NOTE — REVIEW OF SYSTEMS
[Incontinence] : incontinence [Joint Pain] : joint pain [Anxiety] : anxiety [Negative] : Heme/Lymph [Fever] : no fever [Chills] : no chills [Night Sweats] : no night sweats [Fatigue] : no fatigue [Recent Change In Weight] : ~T no recent weight change [Eye Pain] : no eye pain [Red Eyes] : eyes not red [Vision Problems] : no vision problems [Dry Eyes] : no dryness of the eyes [Dysphagia] : no dysphagia [Loss of Hearing] : no loss of hearing [Nosebleeds] : no nosebleeds [Hoarseness] : no hoarseness [Odynophagia] : no odynophagia [Mucosal Pain] : no mucosal pain [Chest Pain] : no chest pain [Leg Claudication] : no intermittent leg claudication [Palpitations] : no palpitations [Shortness Of Breath] : no shortness of breath [Lower Ext Edema] : no lower extremity edema [Wheezing] : no wheezing [Cough] : no cough [Abdominal Pain] : no abdominal pain [Vomiting] : no vomiting [SOB on Exertion] : no shortness of breath during exertion [Constipation] : no constipation [Diarrhea] : no diarrhea [Dysuria] : no dysuria [Vaginal Discharge] : no vaginal discharge [Muscle Pain] : no muscle pain [Dysmenorrhea/Abn Vaginal Bleeding] : no dysmenorrhea/abnormal vaginal bleeding [Joint Stiffness] : no joint stiffness [Skin Rash] : no skin rash [Skin Wound] : no skin wound [Confused] : no confusion [Dizziness] : no dizziness [Difficulty Walking] : no difficulty walking [Fainting] : no fainting [Suicidal] : not suicidal [Insomnia] : no insomnia [Proptosis] : no proptosis [Hot Flashes] : no hot flashes [Muscle Weakness] : no muscle weakness [Deepening Of The Voice] : no deepening of the voice [Swollen Glands] : no swollen glands [Easy Bleeding] : no tendency for easy bleeding [Easy Bruising] : no tendency for easy bruising [FreeTextEntry6] : can go up 1 flight of stairs [FreeTextEntry7] : no BRBPR. Colonoscopy -done 1 yr ago, normal; constipation but moving bowels [FreeTextEntry8] : no PAP -s/p LUCITA. Mammogram March 2020 -normal [FreeTextEntry9] : lower back pain chronic -s/p compressed vertebrae [de-identified] : no panic attacks [FreeTextEntry1] : seasonal allergies

## 2020-05-18 ENCOUNTER — LABORATORY RESULT (OUTPATIENT)
Age: 80
End: 2020-05-18

## 2020-05-18 ENCOUNTER — RESULT REVIEW (OUTPATIENT)
Age: 80
End: 2020-05-18

## 2020-05-18 ENCOUNTER — APPOINTMENT (OUTPATIENT)
Dept: INFUSION THERAPY | Facility: HOSPITAL | Age: 80
End: 2020-05-18

## 2020-05-18 LAB
BASOPHILS # BLD AUTO: 0.01 K/UL — SIGNIFICANT CHANGE UP (ref 0–0.2)
BASOPHILS NFR BLD AUTO: 0.3 % — SIGNIFICANT CHANGE UP (ref 0–2)
EOSINOPHIL # BLD AUTO: 0.08 K/UL — SIGNIFICANT CHANGE UP (ref 0–0.5)
EOSINOPHIL NFR BLD AUTO: 2.2 % — SIGNIFICANT CHANGE UP (ref 0–6)
HCT VFR BLD CALC: 39.4 % — SIGNIFICANT CHANGE UP (ref 34.5–45)
HGB BLD-MCNC: 13.1 G/DL — SIGNIFICANT CHANGE UP (ref 11.5–15.5)
IMM GRANULOCYTES NFR BLD AUTO: 3.3 % — HIGH (ref 0–1.5)
LYMPHOCYTES # BLD AUTO: 0.7 K/UL — LOW (ref 1–3.3)
LYMPHOCYTES # BLD AUTO: 19.3 % — SIGNIFICANT CHANGE UP (ref 13–44)
MCHC RBC-ENTMCNC: 31.1 PG — SIGNIFICANT CHANGE UP (ref 27–34)
MCHC RBC-ENTMCNC: 33.2 GM/DL — SIGNIFICANT CHANGE UP (ref 32–36)
MCV RBC AUTO: 93.6 FL — SIGNIFICANT CHANGE UP (ref 80–100)
MONOCYTES # BLD AUTO: 0.54 K/UL — SIGNIFICANT CHANGE UP (ref 0–0.9)
MONOCYTES NFR BLD AUTO: 14.9 % — HIGH (ref 2–14)
NEUTROPHILS # BLD AUTO: 2.17 K/UL — SIGNIFICANT CHANGE UP (ref 1.8–7.4)
NEUTROPHILS NFR BLD AUTO: 60 % — SIGNIFICANT CHANGE UP (ref 43–77)
NRBC # BLD: 0 /100 WBCS — SIGNIFICANT CHANGE UP (ref 0–0)
PLATELET # BLD AUTO: 205 K/UL — SIGNIFICANT CHANGE UP (ref 150–400)
RBC # BLD: 4.21 M/UL — SIGNIFICANT CHANGE UP (ref 3.8–5.2)
RBC # FLD: 16.3 % — HIGH (ref 10.3–14.5)
WBC # BLD: 3.62 K/UL — LOW (ref 3.8–10.5)
WBC # FLD AUTO: 3.62 K/UL — LOW (ref 3.8–10.5)

## 2020-05-19 ENCOUNTER — APPOINTMENT (OUTPATIENT)
Dept: INFUSION THERAPY | Facility: HOSPITAL | Age: 80
End: 2020-05-19

## 2020-05-19 DIAGNOSIS — Z51.11 ENCOUNTER FOR ANTINEOPLASTIC CHEMOTHERAPY: ICD-10-CM

## 2020-05-20 ENCOUNTER — APPOINTMENT (OUTPATIENT)
Dept: INFUSION THERAPY | Facility: HOSPITAL | Age: 80
End: 2020-05-20

## 2020-05-21 ENCOUNTER — RESULT REVIEW (OUTPATIENT)
Age: 80
End: 2020-05-21

## 2020-05-21 ENCOUNTER — APPOINTMENT (OUTPATIENT)
Dept: INFUSION THERAPY | Facility: HOSPITAL | Age: 80
End: 2020-05-21

## 2020-05-21 LAB
BASOPHILS # BLD AUTO: 0 K/UL — SIGNIFICANT CHANGE UP (ref 0–0.2)
BASOPHILS NFR BLD AUTO: 0 % — SIGNIFICANT CHANGE UP (ref 0–2)
EOSINOPHIL # BLD AUTO: 0.07 K/UL — SIGNIFICANT CHANGE UP (ref 0–0.5)
EOSINOPHIL NFR BLD AUTO: 2.1 % — SIGNIFICANT CHANGE UP (ref 0–6)
HCT VFR BLD CALC: 37.5 % — SIGNIFICANT CHANGE UP (ref 34.5–45)
HGB BLD-MCNC: 12.1 G/DL — SIGNIFICANT CHANGE UP (ref 11.5–15.5)
IMM GRANULOCYTES NFR BLD AUTO: 0.6 % — SIGNIFICANT CHANGE UP (ref 0–1.5)
LYMPHOCYTES # BLD AUTO: 0.62 K/UL — LOW (ref 1–3.3)
LYMPHOCYTES # BLD AUTO: 18.8 % — SIGNIFICANT CHANGE UP (ref 13–44)
MCHC RBC-ENTMCNC: 30.7 PG — SIGNIFICANT CHANGE UP (ref 27–34)
MCHC RBC-ENTMCNC: 32.3 GM/DL — SIGNIFICANT CHANGE UP (ref 32–36)
MCV RBC AUTO: 95.2 FL — SIGNIFICANT CHANGE UP (ref 80–100)
MONOCYTES # BLD AUTO: 0.28 K/UL — SIGNIFICANT CHANGE UP (ref 0–0.9)
MONOCYTES NFR BLD AUTO: 8.5 % — SIGNIFICANT CHANGE UP (ref 2–14)
NEUTROPHILS # BLD AUTO: 2.3 K/UL — SIGNIFICANT CHANGE UP (ref 1.8–7.4)
NEUTROPHILS NFR BLD AUTO: 70 % — SIGNIFICANT CHANGE UP (ref 43–77)
NRBC # BLD: 0 /100 WBCS — SIGNIFICANT CHANGE UP (ref 0–0)
PLATELET # BLD AUTO: 160 K/UL — SIGNIFICANT CHANGE UP (ref 150–400)
RBC # BLD: 3.94 M/UL — SIGNIFICANT CHANGE UP (ref 3.8–5.2)
RBC # FLD: 16.2 % — HIGH (ref 10.3–14.5)
WBC # BLD: 3.29 K/UL — LOW (ref 3.8–10.5)
WBC # FLD AUTO: 3.29 K/UL — LOW (ref 3.8–10.5)

## 2020-05-22 ENCOUNTER — APPOINTMENT (OUTPATIENT)
Dept: INFUSION THERAPY | Facility: HOSPITAL | Age: 80
End: 2020-05-22

## 2020-05-26 ENCOUNTER — APPOINTMENT (OUTPATIENT)
Dept: HEMATOLOGY ONCOLOGY | Facility: CLINIC | Age: 80
End: 2020-05-26
Payer: MEDICARE

## 2020-05-26 PROCEDURE — 99202 OFFICE O/P NEW SF 15 MIN: CPT | Mod: 95

## 2020-05-26 RX ORDER — LEVOFLOXACIN 500 MG/1
500 TABLET, FILM COATED ORAL
Qty: 60 | Refills: 0 | Status: DISCONTINUED | COMMUNITY
Start: 2020-03-11 | End: 2020-05-26

## 2020-05-26 RX ORDER — FLUCONAZOLE 200 MG/1
200 TABLET ORAL
Qty: 60 | Refills: 0 | Status: DISCONTINUED | COMMUNITY
Start: 2020-03-11 | End: 2020-05-26

## 2020-05-26 RX ORDER — LEVOFLOXACIN 500 MG/1
500 TABLET, FILM COATED ORAL
Qty: 30 | Refills: 4 | Status: DISCONTINUED | COMMUNITY
Start: 2020-04-30 | End: 2020-05-26

## 2020-05-26 NOTE — ASSESSMENT
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 \par \par -resumed Dacogen/Venetoclax C4 (10 days Venetoclax) on 5/18/20.\par \par -Venetoclax daily, dosed at 200mg  for 10 days starting day 1 of C4\par \par -asked them to HOLD Levaquin and Diflucan given ANC>1000\par \par -BM bx done on 2/3/20 showed normal cytogenetics. [facility] molecular studies in progress (expected to result on 2/18/20). In house assay for FLT-3 ITD positive, which confers an adverse prognosis in AML - d/w patient and family BMT transplant -pt active prior to admission, good PS status; she is not interested at this time\par \par -pt has PICC line; would change to Port after COVID-19 incidence decreases and will do it when counts are recovered\par \par -platelet transfusions q2 wks -to be scheduled for possible plt on 6/1 and 6/29\par \par -advised pt of importance of proper precautions during COVID pandemic \par \par -follow up monthly\par \par \par

## 2020-05-26 NOTE — CONSULT LETTER
[Please see my note below.] : Please see my note below. [Consult Closing:] : Thank you very much for allowing me to participate in the care of this patient.  If you have any questions, please do not hesitate to contact me. [DrMichael  ___] : Dr. PINTO [Sincerely,] : Sincerely,

## 2020-05-26 NOTE — HISTORY OF PRESENT ILLNESS
[Home] : at home, [unfilled] , at the time of the visit. [Medical Office: ([facility])___] : at the medical office located in  [Verbal consent obtained from patient] : the patient, [unfilled] [de-identified] : Ms. 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 She has a history of Breast cancer 12 years ago treated with tamoxifen (no chemotherapy), s/p RT and lumpectomy, HTN and surgical hx of hysterectomy, was sent in by oncologist Dr. Blank on 1/31/20 for rule out leukemia. Patient had been feeling generally fatigued and sluggish since December 2019. She was feeling more tired, and saw her PMD, who did some blood work and noticed blasts in her peripheral blood. Pt was told to see Dr. Blank for further workup. Dr. Blank did blood work again which again found blasts in the peripheral blood, at which point she sent pt to the ER for leukemia workup. 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 She was started on 2/4/20 on Cycle 1 of Dacogen + Venetoclax (50 mg on day 1, 100 mg on day 2, 200 mg on day 3 and onward when starting Diflucan). She tolerated it well other than some SOB -CXR on 2/7/20 : mild pulmonary edema and L pleural effusion, treated with diuretics. She was discharged home on 2/11/20. \par  [de-identified] : The patient is here for AML follow up.    She had a BM bx to eval response on 3/2/20 -showed no blasts. Pt given C2 days 1-3 on 3/2, 3/4 and 3/5, after which her Venetoclax was held for count recovery. S/p C3 Dacogen/Venetoclax day 1 =4/13/20; is taking Venetoclax.  \par \par Patient seen via Telehealth today in order to minimize risk of nash COVID-19 infection. Verbal consent given on 5/26/20 at 1pm by patient. She lives with her , Nerissa. They are both well, no complaints, they have not been sick. She was given C4 day 1 Dacogen/Venetoclax starting on 5/18/20. Blood counts have been normal. She reports she felt very tired yesterday, but feels better today. She has tolerated the chemo other than fatigue. She is finishing Venetoclax 10 days tomorrow. \par

## 2020-05-26 NOTE — REVIEW OF SYSTEMS
[Incontinence] : incontinence [Joint Pain] : joint pain [Anxiety] : anxiety [Negative] : Endocrine [Fever] : no fever [Chills] : no chills [Night Sweats] : no night sweats [Recent Change In Weight] : ~T no recent weight change [Fatigue] : no fatigue [Eye Pain] : no eye pain [Dry Eyes] : no dryness of the eyes [Red Eyes] : eyes not red [Vision Problems] : no vision problems [Dysphagia] : no dysphagia [Loss of Hearing] : no loss of hearing [Nosebleeds] : no nosebleeds [Hoarseness] : no hoarseness [Odynophagia] : no odynophagia [Chest Pain] : no chest pain [Mucosal Pain] : no mucosal pain [Palpitations] : no palpitations [Leg Claudication] : no intermittent leg claudication [Lower Ext Edema] : no lower extremity edema [Shortness Of Breath] : no shortness of breath [Cough] : no cough [Wheezing] : no wheezing [SOB on Exertion] : no shortness of breath during exertion [Vomiting] : no vomiting [Abdominal Pain] : no abdominal pain [Diarrhea] : no diarrhea [Constipation] : no constipation [Vaginal Discharge] : no vaginal discharge [Dysuria] : no dysuria [Joint Stiffness] : no joint stiffness [Muscle Pain] : no muscle pain [Dysmenorrhea/Abn Vaginal Bleeding] : no dysmenorrhea/abnormal vaginal bleeding [Skin Rash] : no skin rash [Confused] : no confusion [Skin Wound] : no skin wound [Dizziness] : no dizziness [Difficulty Walking] : no difficulty walking [Fainting] : no fainting [Insomnia] : no insomnia [Suicidal] : not suicidal [Proptosis] : no proptosis [Muscle Weakness] : no muscle weakness [Hot Flashes] : no hot flashes [Deepening Of The Voice] : no deepening of the voice [Easy Bruising] : no tendency for easy bruising [Easy Bleeding] : no tendency for easy bleeding [Swollen Glands] : no swollen glands [FreeTextEntry6] : can go up 1 flight of stairs [FreeTextEntry8] : no PAP -s/p LUCITA. Mammogram March 2020 -normal [FreeTextEntry9] : lower back pain chronic -s/p compressed vertebrae [FreeTextEntry7] : no BRBPR. Colonoscopy -done 1 yr ago, normal; constipation but moving bowels [de-identified] : no panic attacks [FreeTextEntry1] : seasonal allergies

## 2020-05-26 NOTE — RESULTS/DATA
[FreeTextEntry1] : On 5/12/20) wbc 1.1  Hb 13.1plt 211\par On 4/15/20) wbc 7.78 Hb 12.3 plt  149 ANC 4680\par On 3/31/20) wbc 4. 3 Hb 11.8 plt 395 ANC 2800\par On 3/17/20) wbc 0.85 Hb 10.2  plt 57\par On 3/11/20) wbc 0.3 Hb 7.8 plt 9\par (On 2/11/20) wbc 1.1  Hb 10.2 plt 48\par On 2/10/20 wbc 0.59 Hb 8.4 plt 37\par On 1/31/20 wbc 25.4 with 74% blasts, Hb 11.7 plt 153\par \par PATHOLOGY \par \par BM bx done on \par ON 2/3/20 BM bx\par Final Diagnosis\par 1, 2. Bone marrow biopsy and bone marrow aspirate\par - Acute myeloid leukemia\par \par See note and description.\par \par Diagnostic note:\par Megakarocytes are normal in number with dysplastic morphology.\par Correlation with cytogenetic/FISH/molecular studies is necessary\par for definitive classification.\par Comprehensive report with results of pending ancillary studies to\par follow.\par \par Dr. Lindsey was notified of the diagnosis on 02/04/20.\par \par Ancillary studies\par Bone marrow aspirate iron stain: No spicules are present to\par evaluate for iron stores and there are insufficient nRBC to\par evaluate for ring sideroblasts.\par \par Flow cytometry:  Myeloblasts (77% of cells), positive for HLA-DR,\par partial CD34, , CD33, CD13, partial CD15, CD7, , CD38,\par CD71; negative for CD4, CD11b, CD56.\par \par Cytochemistry: Myeloperoxidase positive\par \par Cytogenetics:  Pending\par \par FISH ([id number], peripheral blood, 1/31/2020\par NORMAL FISH - PML/YIFAN\par Probe(s) and Location(s): PML/YIFAN (15q24.1/17q21.2)\par \par Molecular\par FLT3 ITD Mutation Analysis Final Report: POSITIVE\par \par

## 2020-05-28 ENCOUNTER — OUTPATIENT (OUTPATIENT)
Dept: OUTPATIENT SERVICES | Facility: HOSPITAL | Age: 80
LOS: 1 days | End: 2020-05-28

## 2020-05-28 DIAGNOSIS — Z90.710 ACQUIRED ABSENCE OF BOTH CERVIX AND UTERUS: Chronic | ICD-10-CM

## 2020-05-28 DIAGNOSIS — C92.00 ACUTE MYELOBLASTIC LEUKEMIA, NOT HAVING ACHIEVED REMISSION: ICD-10-CM

## 2020-05-30 DIAGNOSIS — Z01.818 ENCOUNTER FOR OTHER PREPROCEDURAL EXAMINATION: ICD-10-CM

## 2020-05-31 ENCOUNTER — APPOINTMENT (OUTPATIENT)
Dept: DISASTER EMERGENCY | Facility: CLINIC | Age: 80
End: 2020-05-31

## 2020-05-31 LAB — SARS-COV-2 N GENE NPH QL NAA+PROBE: NOT DETECTED

## 2020-06-01 ENCOUNTER — APPOINTMENT (OUTPATIENT)
Dept: INFUSION THERAPY | Facility: HOSPITAL | Age: 80
End: 2020-06-01

## 2020-06-01 ENCOUNTER — RESULT REVIEW (OUTPATIENT)
Age: 80
End: 2020-06-01

## 2020-06-01 LAB
BASOPHILS # BLD AUTO: 0 K/UL — SIGNIFICANT CHANGE UP (ref 0–0.2)
BASOPHILS NFR BLD AUTO: 0 % — SIGNIFICANT CHANGE UP (ref 0–2)
EOSINOPHIL # BLD AUTO: 0.08 K/UL — SIGNIFICANT CHANGE UP (ref 0–0.5)
EOSINOPHIL NFR BLD AUTO: 4 % — SIGNIFICANT CHANGE UP (ref 0–6)
HCT VFR BLD CALC: 37.9 % — SIGNIFICANT CHANGE UP (ref 34.5–45)
HGB BLD-MCNC: 13.1 G/DL — SIGNIFICANT CHANGE UP (ref 11.5–15.5)
LYMPHOCYTES # BLD AUTO: 0.68 K/UL — LOW (ref 1–3.3)
LYMPHOCYTES # BLD AUTO: 32 % — SIGNIFICANT CHANGE UP (ref 13–44)
MCHC RBC-ENTMCNC: 31.3 PG — SIGNIFICANT CHANGE UP (ref 27–34)
MCHC RBC-ENTMCNC: 34.6 GM/DL — SIGNIFICANT CHANGE UP (ref 32–36)
MCV RBC AUTO: 90.7 FL — SIGNIFICANT CHANGE UP (ref 80–100)
MONOCYTES # BLD AUTO: 0.21 K/UL — SIGNIFICANT CHANGE UP (ref 0–0.9)
MONOCYTES NFR BLD AUTO: 10 % — SIGNIFICANT CHANGE UP (ref 2–14)
NEUTROPHILS # BLD AUTO: 1.14 K/UL — LOW (ref 1.8–7.4)
NEUTROPHILS NFR BLD AUTO: 54 % — SIGNIFICANT CHANGE UP (ref 43–77)
NRBC # BLD: 0 /100 — SIGNIFICANT CHANGE UP (ref 0–0)
NRBC # BLD: SIGNIFICANT CHANGE UP /100 WBCS (ref 0–0)
PLAT MORPH BLD: NORMAL — SIGNIFICANT CHANGE UP
PLATELET # BLD AUTO: 65 K/UL — LOW (ref 150–400)
RBC # BLD: 4.18 M/UL — SIGNIFICANT CHANGE UP (ref 3.8–5.2)
RBC # FLD: 15.9 % — HIGH (ref 10.3–14.5)
RBC BLD AUTO: SIGNIFICANT CHANGE UP
WBC # BLD: 2.12 K/UL — LOW (ref 3.8–10.5)
WBC # FLD AUTO: 2.12 K/UL — LOW (ref 3.8–10.5)

## 2020-06-09 ENCOUNTER — OUTPATIENT (OUTPATIENT)
Dept: OUTPATIENT SERVICES | Facility: HOSPITAL | Age: 80
LOS: 1 days | Discharge: HOME | End: 2020-06-09

## 2020-06-09 DIAGNOSIS — Z90.710 ACQUIRED ABSENCE OF BOTH CERVIX AND UTERUS: Chronic | ICD-10-CM

## 2020-06-09 DIAGNOSIS — C92.00 ACUTE MYELOBLASTIC LEUKEMIA, NOT HAVING ACHIEVED REMISSION: ICD-10-CM

## 2020-06-10 ENCOUNTER — APPOINTMENT (OUTPATIENT)
Dept: HEMATOLOGY ONCOLOGY | Facility: CLINIC | Age: 80
End: 2020-06-10

## 2020-06-15 ENCOUNTER — RESULT REVIEW (OUTPATIENT)
Age: 80
End: 2020-06-15

## 2020-06-15 ENCOUNTER — LABORATORY RESULT (OUTPATIENT)
Age: 80
End: 2020-06-15

## 2020-06-15 ENCOUNTER — APPOINTMENT (OUTPATIENT)
Dept: HEMATOLOGY ONCOLOGY | Facility: CLINIC | Age: 80
End: 2020-06-15
Payer: MEDICARE

## 2020-06-15 ENCOUNTER — APPOINTMENT (OUTPATIENT)
Dept: INFUSION THERAPY | Facility: HOSPITAL | Age: 80
End: 2020-06-15

## 2020-06-15 VITALS
BODY MASS INDEX: 19.98 KG/M2 | WEIGHT: 119.93 LBS | TEMPERATURE: 99.5 F | SYSTOLIC BLOOD PRESSURE: 157 MMHG | DIASTOLIC BLOOD PRESSURE: 87 MMHG | HEART RATE: 64 BPM | RESPIRATION RATE: 16 BRPM | OXYGEN SATURATION: 98 %

## 2020-06-15 LAB
BASOPHILS # BLD AUTO: 0 K/UL — SIGNIFICANT CHANGE UP (ref 0–0.2)
BASOPHILS NFR BLD AUTO: 0 % — SIGNIFICANT CHANGE UP (ref 0–2)
EOSINOPHIL # BLD AUTO: 0 K/UL — SIGNIFICANT CHANGE UP (ref 0–0.5)
EOSINOPHIL NFR BLD AUTO: 0 % — SIGNIFICANT CHANGE UP (ref 0–6)
HCT VFR BLD CALC: 38.7 % — SIGNIFICANT CHANGE UP (ref 34.5–45)
HGB BLD-MCNC: 13.1 G/DL — SIGNIFICANT CHANGE UP (ref 11.5–15.5)
LYMPHOCYTES # BLD AUTO: 0.63 K/UL — LOW (ref 1–3.3)
LYMPHOCYTES # BLD AUTO: 64 % — HIGH (ref 13–44)
MCHC RBC-ENTMCNC: 31.3 PG — SIGNIFICANT CHANGE UP (ref 27–34)
MCHC RBC-ENTMCNC: 33.9 GM/DL — SIGNIFICANT CHANGE UP (ref 32–36)
MCV RBC AUTO: 92.4 FL — SIGNIFICANT CHANGE UP (ref 80–100)
MONOCYTES # BLD AUTO: 0.08 K/UL — SIGNIFICANT CHANGE UP (ref 0–0.9)
MONOCYTES NFR BLD AUTO: 8 % — SIGNIFICANT CHANGE UP (ref 2–14)
NEUTROPHILS # BLD AUTO: 0.28 K/UL — LOW (ref 1.8–7.4)
NEUTROPHILS NFR BLD AUTO: 28 % — LOW (ref 43–77)
NRBC # BLD: 0 /100 — SIGNIFICANT CHANGE UP (ref 0–0)
NRBC # BLD: SIGNIFICANT CHANGE UP /100 WBCS (ref 0–0)
PLAT MORPH BLD: NORMAL — SIGNIFICANT CHANGE UP
PLATELET # BLD AUTO: 171 K/UL — SIGNIFICANT CHANGE UP (ref 150–400)
RBC # BLD: 4.19 M/UL — SIGNIFICANT CHANGE UP (ref 3.8–5.2)
RBC # FLD: 16.6 % — HIGH (ref 10.3–14.5)
RBC BLD AUTO: SIGNIFICANT CHANGE UP
WBC # BLD: 0.99 K/UL — CRITICAL LOW (ref 3.8–10.5)
WBC # FLD AUTO: 0.99 K/UL — CRITICAL LOW (ref 3.8–10.5)

## 2020-06-15 PROCEDURE — 99214 OFFICE O/P EST MOD 30 MIN: CPT

## 2020-06-16 ENCOUNTER — APPOINTMENT (OUTPATIENT)
Dept: INFUSION THERAPY | Facility: HOSPITAL | Age: 80
End: 2020-06-16

## 2020-06-17 ENCOUNTER — APPOINTMENT (OUTPATIENT)
Dept: INFUSION THERAPY | Facility: HOSPITAL | Age: 80
End: 2020-06-17

## 2020-06-18 ENCOUNTER — APPOINTMENT (OUTPATIENT)
Dept: INFUSION THERAPY | Facility: HOSPITAL | Age: 80
End: 2020-06-18

## 2020-06-19 ENCOUNTER — APPOINTMENT (OUTPATIENT)
Dept: INFUSION THERAPY | Facility: HOSPITAL | Age: 80
End: 2020-06-19

## 2020-06-22 ENCOUNTER — LABORATORY RESULT (OUTPATIENT)
Age: 80
End: 2020-06-22

## 2020-06-22 ENCOUNTER — APPOINTMENT (OUTPATIENT)
Dept: HEMATOLOGY ONCOLOGY | Facility: CLINIC | Age: 80
End: 2020-06-22

## 2020-06-22 ENCOUNTER — RESULT REVIEW (OUTPATIENT)
Age: 80
End: 2020-06-22

## 2020-06-22 ENCOUNTER — APPOINTMENT (OUTPATIENT)
Dept: INFUSION THERAPY | Facility: HOSPITAL | Age: 80
End: 2020-06-22

## 2020-06-22 LAB
BASOPHILS # BLD AUTO: 0.01 K/UL — SIGNIFICANT CHANGE UP (ref 0–0.2)
BASOPHILS NFR BLD AUTO: 0.3 % — SIGNIFICANT CHANGE UP (ref 0–2)
EOSINOPHIL # BLD AUTO: 0.07 K/UL — SIGNIFICANT CHANGE UP (ref 0–0.5)
EOSINOPHIL NFR BLD AUTO: 2.4 % — SIGNIFICANT CHANGE UP (ref 0–6)
HCT VFR BLD CALC: 41.2 % — SIGNIFICANT CHANGE UP (ref 34.5–45)
HGB BLD-MCNC: 14.3 G/DL — SIGNIFICANT CHANGE UP (ref 11.5–15.5)
IMM GRANULOCYTES NFR BLD AUTO: 1.7 % — HIGH (ref 0–1.5)
LYMPHOCYTES # BLD AUTO: 0.82 K/UL — LOW (ref 1–3.3)
LYMPHOCYTES # BLD AUTO: 28.1 % — SIGNIFICANT CHANGE UP (ref 13–44)
MCHC RBC-ENTMCNC: 31 PG — SIGNIFICANT CHANGE UP (ref 27–34)
MCHC RBC-ENTMCNC: 34.7 GM/DL — SIGNIFICANT CHANGE UP (ref 32–36)
MCV RBC AUTO: 89.2 FL — SIGNIFICANT CHANGE UP (ref 80–100)
MONOCYTES # BLD AUTO: 0.52 K/UL — SIGNIFICANT CHANGE UP (ref 0–0.9)
MONOCYTES NFR BLD AUTO: 17.8 % — HIGH (ref 2–14)
NEUTROPHILS # BLD AUTO: 1.45 K/UL — LOW (ref 1.8–7.4)
NEUTROPHILS NFR BLD AUTO: 49.7 % — SIGNIFICANT CHANGE UP (ref 43–77)
NRBC # BLD: 1 /100 WBCS — HIGH (ref 0–0)
PLATELET # BLD AUTO: 147 K/UL — LOW (ref 150–400)
RBC # BLD: 4.62 M/UL — SIGNIFICANT CHANGE UP (ref 3.8–5.2)
RBC # FLD: 16.4 % — HIGH (ref 10.3–14.5)
WBC # BLD: 2.92 K/UL — LOW (ref 3.8–10.5)
WBC # FLD AUTO: 2.92 K/UL — LOW (ref 3.8–10.5)

## 2020-06-23 ENCOUNTER — APPOINTMENT (OUTPATIENT)
Dept: INFUSION THERAPY | Facility: HOSPITAL | Age: 80
End: 2020-06-23

## 2020-06-23 DIAGNOSIS — Z51.11 ENCOUNTER FOR ANTINEOPLASTIC CHEMOTHERAPY: ICD-10-CM

## 2020-06-24 ENCOUNTER — APPOINTMENT (OUTPATIENT)
Dept: INFUSION THERAPY | Facility: HOSPITAL | Age: 80
End: 2020-06-24

## 2020-06-25 ENCOUNTER — RESULT REVIEW (OUTPATIENT)
Age: 80
End: 2020-06-25

## 2020-06-25 ENCOUNTER — APPOINTMENT (OUTPATIENT)
Dept: INFUSION THERAPY | Facility: HOSPITAL | Age: 80
End: 2020-06-25

## 2020-06-25 LAB
ANISOCYTOSIS BLD QL: SLIGHT — SIGNIFICANT CHANGE UP
BASOPHILS # BLD AUTO: 0 K/UL — SIGNIFICANT CHANGE UP (ref 0–0.2)
BASOPHILS NFR BLD AUTO: 0 % — SIGNIFICANT CHANGE UP (ref 0–2)
ELLIPTOCYTES BLD QL SMEAR: SLIGHT — SIGNIFICANT CHANGE UP
EOSINOPHIL # BLD AUTO: 0.17 K/UL — SIGNIFICANT CHANGE UP (ref 0–0.5)
EOSINOPHIL NFR BLD AUTO: 5 % — SIGNIFICANT CHANGE UP (ref 0–6)
HCT VFR BLD CALC: 37.8 % — SIGNIFICANT CHANGE UP (ref 34.5–45)
HGB BLD-MCNC: 13.2 G/DL — SIGNIFICANT CHANGE UP (ref 11.5–15.5)
HYPOCHROMIA BLD QL: SLIGHT — SIGNIFICANT CHANGE UP
LYMPHOCYTES # BLD AUTO: 0.67 K/UL — LOW (ref 1–3.3)
LYMPHOCYTES # BLD AUTO: 20 % — SIGNIFICANT CHANGE UP (ref 13–44)
MACROCYTES BLD QL: SLIGHT — SIGNIFICANT CHANGE UP
MCHC RBC-ENTMCNC: 31.4 PG — SIGNIFICANT CHANGE UP (ref 27–34)
MCHC RBC-ENTMCNC: 34.9 GM/DL — SIGNIFICANT CHANGE UP (ref 32–36)
MCV RBC AUTO: 89.8 FL — SIGNIFICANT CHANGE UP (ref 80–100)
MICROCYTES BLD QL: SLIGHT — SIGNIFICANT CHANGE UP
MONOCYTES # BLD AUTO: 0.4 K/UL — SIGNIFICANT CHANGE UP (ref 0–0.9)
MONOCYTES NFR BLD AUTO: 12 % — SIGNIFICANT CHANGE UP (ref 2–14)
NEUTROPHILS # BLD AUTO: 2.12 K/UL — SIGNIFICANT CHANGE UP (ref 1.8–7.4)
NEUTROPHILS NFR BLD AUTO: 58 % — SIGNIFICANT CHANGE UP (ref 43–77)
NEUTS BAND # BLD: 5 % — SIGNIFICANT CHANGE UP (ref 0–8)
NRBC # BLD: 0 /100 — SIGNIFICANT CHANGE UP (ref 0–0)
NRBC # BLD: SIGNIFICANT CHANGE UP /100 WBCS (ref 0–0)
PLAT MORPH BLD: NORMAL — SIGNIFICANT CHANGE UP
PLATELET # BLD AUTO: 103 K/UL — LOW (ref 150–400)
POIKILOCYTOSIS BLD QL AUTO: SLIGHT — SIGNIFICANT CHANGE UP
POLYCHROMASIA BLD QL SMEAR: SLIGHT — SIGNIFICANT CHANGE UP
RBC # BLD: 4.21 M/UL — SIGNIFICANT CHANGE UP (ref 3.8–5.2)
RBC # FLD: 16.4 % — HIGH (ref 10.3–14.5)
RBC BLD AUTO: ABNORMAL
WBC # BLD: 3.37 K/UL — LOW (ref 3.8–10.5)
WBC # FLD AUTO: 3.37 K/UL — LOW (ref 3.8–10.5)

## 2020-06-26 ENCOUNTER — APPOINTMENT (OUTPATIENT)
Dept: INFUSION THERAPY | Facility: HOSPITAL | Age: 80
End: 2020-06-26

## 2020-06-29 ENCOUNTER — RESULT REVIEW (OUTPATIENT)
Age: 80
End: 2020-06-29

## 2020-06-29 ENCOUNTER — APPOINTMENT (OUTPATIENT)
Dept: INFUSION THERAPY | Facility: HOSPITAL | Age: 80
End: 2020-06-29

## 2020-06-29 ENCOUNTER — OUTPATIENT (OUTPATIENT)
Dept: OUTPATIENT SERVICES | Facility: HOSPITAL | Age: 80
LOS: 1 days | End: 2020-06-29

## 2020-06-29 DIAGNOSIS — C92.00 ACUTE MYELOBLASTIC LEUKEMIA, NOT HAVING ACHIEVED REMISSION: ICD-10-CM

## 2020-06-29 DIAGNOSIS — Z90.710 ACQUIRED ABSENCE OF BOTH CERVIX AND UTERUS: Chronic | ICD-10-CM

## 2020-06-29 LAB
BASOPHILS # BLD AUTO: 0 K/UL — SIGNIFICANT CHANGE UP (ref 0–0.2)
BASOPHILS NFR BLD AUTO: 0 % — SIGNIFICANT CHANGE UP (ref 0–2)
EOSINOPHIL # BLD AUTO: 0.03 K/UL — SIGNIFICANT CHANGE UP (ref 0–0.5)
EOSINOPHIL NFR BLD AUTO: 1 % — SIGNIFICANT CHANGE UP (ref 0–6)
HCT VFR BLD CALC: 38.3 % — SIGNIFICANT CHANGE UP (ref 34.5–45)
HGB BLD-MCNC: 13 G/DL — SIGNIFICANT CHANGE UP (ref 11.5–15.5)
IMM GRANULOCYTES NFR BLD AUTO: 0.6 % — SIGNIFICANT CHANGE UP (ref 0–1.5)
LYMPHOCYTES # BLD AUTO: 0.65 K/UL — LOW (ref 1–3.3)
LYMPHOCYTES # BLD AUTO: 21 % — SIGNIFICANT CHANGE UP (ref 13–44)
MCHC RBC-ENTMCNC: 31 PG — SIGNIFICANT CHANGE UP (ref 27–34)
MCHC RBC-ENTMCNC: 33.9 GM/DL — SIGNIFICANT CHANGE UP (ref 32–36)
MCV RBC AUTO: 91.4 FL — SIGNIFICANT CHANGE UP (ref 80–100)
MONOCYTES # BLD AUTO: 0.19 K/UL — SIGNIFICANT CHANGE UP (ref 0–0.9)
MONOCYTES NFR BLD AUTO: 6.1 % — SIGNIFICANT CHANGE UP (ref 2–14)
NEUTROPHILS # BLD AUTO: 2.2 K/UL — SIGNIFICANT CHANGE UP (ref 1.8–7.4)
NEUTROPHILS NFR BLD AUTO: 71.3 % — SIGNIFICANT CHANGE UP (ref 43–77)
NRBC # BLD: 0 /100 WBCS — SIGNIFICANT CHANGE UP (ref 0–0)
PLATELET # BLD AUTO: 73 K/UL — LOW (ref 150–400)
RBC # BLD: 4.19 M/UL — SIGNIFICANT CHANGE UP (ref 3.8–5.2)
RBC # FLD: 16.7 % — HIGH (ref 10.3–14.5)
WBC # BLD: 3.09 K/UL — LOW (ref 3.8–10.5)
WBC # FLD AUTO: 3.09 K/UL — LOW (ref 3.8–10.5)

## 2020-07-11 ENCOUNTER — OUTPATIENT (OUTPATIENT)
Dept: OUTPATIENT SERVICES | Facility: HOSPITAL | Age: 80
LOS: 1 days | Discharge: HOME | End: 2020-07-11

## 2020-07-11 DIAGNOSIS — Z90.710 ACQUIRED ABSENCE OF BOTH CERVIX AND UTERUS: Chronic | ICD-10-CM

## 2020-07-11 DIAGNOSIS — C92.00 ACUTE MYELOBLASTIC LEUKEMIA, NOT HAVING ACHIEVED REMISSION: ICD-10-CM

## 2020-07-14 NOTE — ASSESSMENT
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  Treatment will be postponed for one week-to start on 6/22.  Pt to restart Levaquin today.\par \par -BM bx done on 2/3/20 showed normal cytogenetics. In house assay for FLT-3 ITD positive, which confers an adverse prognosis in AML - d/w patient and family BMT transplant -pt active prior to admission, good PS status; she is not interested at this time\par \par -pt has PICC line; would change to Port after COVID-19 incidence decreases and will do it when counts are recovered\par \par -platelet transfusions q2 wks -to be scheduled for possible plt on 6/1 and 6/29\par \par -advised pt of importance of proper precautions during COVID pandemic \par \par -pt very emotional today-states she doesn't know if she wants to continue treatment-emotional support provided.  Pt does not want referral to support services at this time.\par \par -follow up monthly\par \par \par

## 2020-07-14 NOTE — RESULTS/DATA
[FreeTextEntry1] : On 6/15/20 wbc 0.99 Hgb 13.1  plt 171\par \par On 5/12/20) wbc 1.1  Hb 13.1plt 211\par On 4/15/20) wbc 7.78 Hb 12.3 plt  149 ANC 4680\par On 3/31/20) wbc 4. 3 Hb 11.8 plt 395 ANC 2800\par On 3/17/20) wbc 0.85 Hb 10.2  plt 57\par On 3/11/20) wbc 0.3 Hb 7.8 plt 9\par (On 2/11/20) wbc 1.1  Hb 10.2 plt 48\par On 2/10/20 wbc 0.59 Hb 8.4 plt 37\par On 1/31/20 wbc 25.4 with 74% blasts, Hb 11.7 plt 153\par \par PATHOLOGY \par \par BM bx done on \par ON 2/3/20 BM bx\par Final Diagnosis\par 1, 2. Bone marrow biopsy and bone marrow aspirate\par - Acute myeloid leukemia\par \par See note and description.\par \par Diagnostic note:\par Megakarocytes are normal in number with dysplastic morphology.\par Correlation with cytogenetic/FISH/molecular studies is necessary\par for definitive classification.\par Comprehensive report with results of pending ancillary studies to\par follow.\par \par Dr. Lindsey was notified of the diagnosis on 02/04/20.\par \par Ancillary studies\par Bone marrow aspirate iron stain: No spicules are present to\par evaluate for iron stores and there are insufficient nRBC to\par evaluate for ring sideroblasts.\par \par Flow cytometry:  Myeloblasts (77% of cells), positive for HLA-DR,\par partial CD34, , CD33, CD13, partial CD15, CD7, , CD38,\par CD71; negative for CD4, CD11b, CD56.\par \par Cytochemistry: Myeloperoxidase positive\par \par Cytogenetics:  Pending\par \par FISH ([id number], peripheral blood, 1/31/2020\par NORMAL FISH - PML/YIFAN\par Probe(s) and Location(s): PML/YIFAN (15q24.1/17q21.2)\par \par Molecular\par FLT3 ITD Mutation Analysis Final Report: POSITIVE\par \par

## 2020-07-14 NOTE — REVIEW OF SYSTEMS
[Chills] : no chills [Fever] : no fever [Recent Change In Weight] : ~T no recent weight change [Night Sweats] : no night sweats [Eye Pain] : no eye pain [Red Eyes] : eyes not red [Vision Problems] : no vision problems [Dysphagia] : no dysphagia [Dry Eyes] : no dryness of the eyes [Loss of Hearing] : no loss of hearing [Nosebleeds] : no nosebleeds [Odynophagia] : no odynophagia [Hoarseness] : no hoarseness [Mucosal Pain] : no mucosal pain [Chest Pain] : no chest pain [Palpitations] : no palpitations [Shortness Of Breath] : no shortness of breath [Lower Ext Edema] : no lower extremity edema [Leg Claudication] : no intermittent leg claudication [Cough] : no cough [Wheezing] : no wheezing [SOB on Exertion] : no shortness of breath during exertion [Abdominal Pain] : no abdominal pain [Constipation] : no constipation [Diarrhea] : no diarrhea [Vomiting] : no vomiting [Vaginal Discharge] : no vaginal discharge [Dysuria] : no dysuria [Joint Stiffness] : no joint stiffness [Dysmenorrhea/Abn Vaginal Bleeding] : no dysmenorrhea/abnormal vaginal bleeding [Muscle Pain] : no muscle pain [Skin Rash] : no skin rash [Skin Wound] : no skin wound [Dizziness] : no dizziness [Confused] : no confusion [Difficulty Walking] : no difficulty walking [Fainting] : no fainting [Proptosis] : no proptosis [Suicidal] : not suicidal [Insomnia] : no insomnia [Deepening Of The Voice] : no deepening of the voice [Muscle Weakness] : no muscle weakness [Hot Flashes] : no hot flashes [Easy Bleeding] : no tendency for easy bleeding [Easy Bruising] : no tendency for easy bruising [Swollen Glands] : no swollen glands [FreeTextEntry7] : no BRBPR. Colonoscopy -done 1 yr ago, normal; constipation but moving bowels [FreeTextEntry6] : can go up 1 flight of stairs [FreeTextEntry9] : lower back pain chronic -s/p compressed vertebrae [FreeTextEntry8] : no PAP -s/p LUCITA. Mammogram March 2020 -normal [FreeTextEntry1] : seasonal allergies [de-identified] : no panic attacks

## 2020-07-14 NOTE — HISTORY OF PRESENT ILLNESS
[de-identified] : Ms. 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 She has a history of Breast cancer 12 years ago treated with tamoxifen (no chemotherapy), s/p RT and lumpectomy, HTN and surgical hx of hysterectomy, was sent in by oncologist Dr. Blank on 1/31/20 for rule out leukemia. Patient had been feeling generally fatigued and sluggish since December 2019. She was feeling more tired, and saw her PMD, who did some blood work and noticed blasts in her peripheral blood. Pt was told to see Dr. Blank for further workup. Dr. Blank did blood work again which again found blasts in the peripheral blood, at which point she sent pt to the ER for leukemia workup. 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 She was started on 2/4/20 on Cycle 1 of Dacogen + Venetoclax (50 mg on day 1, 100 mg on day 2, 200 mg on day 3 and onward when starting Diflucan). She tolerated it well other than some SOB -CXR on 2/7/20 : mild pulmonary edema and L pleural effusion, treated with diuretics. She was discharged home on 2/11/20. \par  [de-identified] : The patient is here for AML follow up.    She had a BM bx to eval response on 3/2/20 -showed no blasts. Pt given C2 days 1-3 on 3/2, 3/4 and 3/5, after which her Venetoclax was held for count recovery. S/p C3 Dacogen/Venetoclax day 1 =4/13/20; is taking Venetoclax.  \par \par Patient seen via Telehealth today in order to minimize risk of nash COVID-19 infection. Verbal consent given on 5/26/20 at 1pm by patient. She lives with her , Nerissa. They are both well, no complaints, they have not been sick. She was given C4 day 1 Dacogen/Venetoclax starting on 5/18/20. Blood counts have been normal. She reports she felt very tired yesterday, but feels better today. She has tolerated the chemo other than fatigue. She is finishing Venetoclax 10 days tomorrow.   \par \par Pt states she is very tired for last 2 days.  Denies fevers, chills, night sweats.  Pt also reports feeling depressed.   \par

## 2020-07-15 ENCOUNTER — RESULT REVIEW (OUTPATIENT)
Age: 80
End: 2020-07-15

## 2020-07-15 ENCOUNTER — APPOINTMENT (OUTPATIENT)
Dept: HEMATOLOGY ONCOLOGY | Facility: CLINIC | Age: 80
End: 2020-07-15
Payer: MEDICARE

## 2020-07-15 VITALS
BODY MASS INDEX: 20.64 KG/M2 | TEMPERATURE: 98.9 F | WEIGHT: 123.9 LBS | DIASTOLIC BLOOD PRESSURE: 73 MMHG | RESPIRATION RATE: 18 BRPM | SYSTOLIC BLOOD PRESSURE: 138 MMHG | OXYGEN SATURATION: 97 % | HEART RATE: 64 BPM

## 2020-07-15 DIAGNOSIS — G47.00 INSOMNIA, UNSPECIFIED: ICD-10-CM

## 2020-07-15 LAB
ALBUMIN SERPL ELPH-MCNC: 4.1 G/DL
ALP BLD-CCNC: 57 U/L
ALT SERPL-CCNC: 34 U/L
ANION GAP SERPL CALC-SCNC: 11 MMOL/L
AST SERPL-CCNC: 30 U/L
BASOPHILS # BLD AUTO: 0 K/UL — SIGNIFICANT CHANGE UP (ref 0–0.2)
BASOPHILS NFR BLD AUTO: 0 % — SIGNIFICANT CHANGE UP (ref 0–2)
BILIRUB SERPL-MCNC: 0.3 MG/DL
BUN SERPL-MCNC: 20 MG/DL
CALCIUM SERPL-MCNC: 9.4 MG/DL
CHLORIDE SERPL-SCNC: 100 MMOL/L
CO2 SERPL-SCNC: 32 MMOL/L
CREAT SERPL-MCNC: 0.81 MG/DL
EOSINOPHIL # BLD AUTO: 0 K/UL — SIGNIFICANT CHANGE UP (ref 0–0.5)
EOSINOPHIL NFR BLD AUTO: 0 % — SIGNIFICANT CHANGE UP (ref 0–6)
GLUCOSE SERPL-MCNC: 97 MG/DL
HCT VFR BLD CALC: 35.9 % — SIGNIFICANT CHANGE UP (ref 34.5–45)
HGB BLD-MCNC: 11.9 G/DL — SIGNIFICANT CHANGE UP (ref 11.5–15.5)
LDH SERPL-CCNC: 167 U/L
LYMPHOCYTES # BLD AUTO: 0.53 K/UL — LOW (ref 1–3.3)
LYMPHOCYTES # BLD AUTO: 82 % — HIGH (ref 13–44)
MCHC RBC-ENTMCNC: 31.2 PG — SIGNIFICANT CHANGE UP (ref 27–34)
MCHC RBC-ENTMCNC: 33.1 GM/DL — SIGNIFICANT CHANGE UP (ref 32–36)
MCV RBC AUTO: 94 FL — SIGNIFICANT CHANGE UP (ref 80–100)
MONOCYTES # BLD AUTO: 0.01 K/UL — SIGNIFICANT CHANGE UP (ref 0–0.9)
MONOCYTES NFR BLD AUTO: 2 % — SIGNIFICANT CHANGE UP (ref 2–14)
NEUTROPHILS # BLD AUTO: 0.1 K/UL — LOW (ref 1.8–7.4)
NEUTROPHILS NFR BLD AUTO: 16 % — LOW (ref 43–77)
NRBC # BLD: 0 /100 — SIGNIFICANT CHANGE UP (ref 0–0)
NRBC # BLD: SIGNIFICANT CHANGE UP /100 WBCS (ref 0–0)
PLAT MORPH BLD: NORMAL — SIGNIFICANT CHANGE UP
PLATELET # BLD AUTO: 87 K/UL — LOW (ref 150–400)
POTASSIUM SERPL-SCNC: 3.8 MMOL/L
PROT SERPL-MCNC: 6.4 G/DL
RBC # BLD: 3.82 M/UL — SIGNIFICANT CHANGE UP (ref 3.8–5.2)
RBC # FLD: 17.7 % — HIGH (ref 10.3–14.5)
RBC BLD AUTO: SIGNIFICANT CHANGE UP
SODIUM SERPL-SCNC: 143 MMOL/L
WBC # BLD: 0.65 K/UL — CRITICAL LOW (ref 3.8–10.5)
WBC # FLD AUTO: 0.65 K/UL — CRITICAL LOW (ref 3.8–10.5)

## 2020-07-15 PROCEDURE — 99215 OFFICE O/P EST HI 40 MIN: CPT

## 2020-07-15 RX ORDER — TRAZODONE HYDROCHLORIDE 150 MG/1
150 TABLET ORAL AT BEDTIME
Qty: 30 | Refills: 3 | Status: ACTIVE | COMMUNITY
Start: 1900-01-01 | End: 1900-01-01

## 2020-07-15 NOTE — CONSULT LETTER
[Consult Closing:] : Thank you very much for allowing me to participate in the care of this patient.  If you have any questions, please do not hesitate to contact me. [Please see my note below.] : Please see my note below. [Sincerely,] : Sincerely, [DrMichael  ___] : Dr. PINTO

## 2020-07-15 NOTE — PHYSICAL EXAM
[Restricted in physically strenuous activity but ambulatory and able to carry out work of a light or sedentary nature] : Status 1- Restricted in physically strenuous activity but ambulatory and able to carry out work of a light or sedentary nature, e.g., light house work, office work [Normal] : affect appropriate [Mucositis] : no mucositis [Ulcers] : no ulcers [Thrush] : no thrush [de-identified] : L PICC -no inflammation

## 2020-07-15 NOTE — ASSESSMENT
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 Counts are low -so will give chemo q5wks, schedule C6 to start on 7/27/20. Will also give OncPro on day 5 of chemo\par \par -pt needs COVID PCR testing to be done next week prior to treatment scheduled for 7/27\par \par -Venetoclax daily, dosed at 200mg  for 10 days \par \par -ANC<1000 -will continue Levaquin for prophylaxis.  \par \par -BM bx done on 2/3/20 showed normal cytogenetics. In house assay for FLT-3 ITD positive, which confers an adverse prognosis in AML - d/w patient and family BMT transplant -pt active prior to admission, good PS status; she is not interested at this time\par \par -pt has PICC line; will arrange [facility] for 7/28; will get Pt/INR and aPTT on 7/27 when pt comes in for chemo\par \par -follow up monthly; next appointment will be BM bx with NP to assess disease status -check cytogenetics/FISh for AML and [facility] molecular studies\par \par \par

## 2020-07-15 NOTE — REVIEW OF SYSTEMS
[Fatigue] : fatigue [Incontinence] : incontinence [Joint Pain] : joint pain [Anxiety] : anxiety [Negative] : Heme/Lymph [Fever] : no fever [Chills] : no chills [Night Sweats] : no night sweats [Recent Change In Weight] : ~T no recent weight change [Red Eyes] : eyes not red [Eye Pain] : no eye pain [Dysphagia] : no dysphagia [Dry Eyes] : no dryness of the eyes [Vision Problems] : no vision problems [Nosebleeds] : no nosebleeds [Loss of Hearing] : no loss of hearing [Hoarseness] : no hoarseness [Mucosal Pain] : no mucosal pain [Odynophagia] : no odynophagia [Palpitations] : no palpitations [Chest Pain] : no chest pain [Leg Claudication] : no intermittent leg claudication [Shortness Of Breath] : no shortness of breath [Lower Ext Edema] : no lower extremity edema [SOB on Exertion] : no shortness of breath during exertion [Cough] : no cough [Wheezing] : no wheezing [Abdominal Pain] : no abdominal pain [Vomiting] : no vomiting [Constipation] : no constipation [Diarrhea] : no diarrhea [Dysuria] : no dysuria [Vaginal Discharge] : no vaginal discharge [Dysmenorrhea/Abn Vaginal Bleeding] : no dysmenorrhea/abnormal vaginal bleeding [Joint Stiffness] : no joint stiffness [Muscle Pain] : no muscle pain [Skin Rash] : no skin rash [Skin Wound] : no skin wound [Confused] : no confusion [Fainting] : no fainting [Dizziness] : no dizziness [Difficulty Walking] : no difficulty walking [Suicidal] : not suicidal [Insomnia] : no insomnia [Proptosis] : no proptosis [Muscle Weakness] : no muscle weakness [Deepening Of The Voice] : no deepening of the voice [Hot Flashes] : no hot flashes [Easy Bleeding] : no tendency for easy bleeding [Easy Bruising] : no tendency for easy bruising [Swollen Glands] : no swollen glands [FreeTextEntry8] : no PAP -s/p LUCITA. Mammogram March 2020 -normal [FreeTextEntry6] : can go up 1 flight of stairs [FreeTextEntry7] : no BRBPR. Colonoscopy -done 1 yr ago, normal; constipation but moving bowels [de-identified] : no panic attacks [FreeTextEntry9] : lower back pain chronic -s/p compressed vertebrae [FreeTextEntry1] : seasonal allergies

## 2020-07-15 NOTE — RESULTS/DATA
[FreeTextEntry1] : On 6/15/20 wbc 0.99 Hgb 13.1  plt 171\par \par On 5/12/20) wbc 1.1  Hb 13.1plt 211\par On 4/15/20) wbc 7.78 Hb 12.3 plt  149 ANC 4680\par On 3/31/20) wbc 4. 3 Hb 11.8 plt 395 ANC 2800\par On 3/17/20) wbc 0.85 Hb 10.2  plt 57\par On 3/11/20) wbc 0.3 Hb 7.8 plt 9\par (On 2/11/20) wbc 1.1  Hb 10.2 plt 48\par On 2/10/20 wbc 0.59 Hb 8.4 plt 37\par On 1/31/20 wbc 25.4 with 74% blasts, Hb 11.7 plt 153\par \par \par \par

## 2020-07-15 NOTE — HISTORY OF PRESENT ILLNESS
[de-identified] : Ms. 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 She has a history of Breast cancer 12 years ago treated with tamoxifen (no chemotherapy), s/p RT and lumpectomy, HTN and surgical hx of hysterectomy, was sent in by oncologist Dr. Blank on 1/31/20 for rule out leukemia. Patient had been feeling generally fatigued and sluggish since December 2019. She was feeling more tired, and saw her PMD, who did some blood work and noticed blasts in her peripheral blood. Pt was told to see Dr. Blank for further workup. Dr. Blank did blood work again which again found blasts in the peripheral blood, at which point she sent pt to the ER for leukemia workup. 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 She was started on 2/4/20 on Cycle 1 of Dacogen + Venetoclax (50 mg on day 1, 100 mg on day 2, 200 mg on day 3 and onward when starting Diflucan). She tolerated it well other than some SOB -CXR on 2/7/20 : mild pulmonary edema and L pleural effusion, treated with diuretics. She was discharged home on 2/11/20. \par  [de-identified] : The patient is here for AML follow up.    She had a BM bx to eval response on 3/2/20 -showed no blasts. Pt given C2 days 1-3 on 3/2, 3/4 and 3/5, after which her Venetoclax was held for count recovery. \par \par C5 day 1 Dacogen/Venetoclax was given starting on 6/22/20. She is here for f/u. She has fatigue, but it generally is at the end of the day. She is still somewhat depressed, but according to his  (who is on the phone), she has been ok. No fevers, no cough. COVID negative -last done 5/31/20.

## 2020-07-20 ENCOUNTER — APPOINTMENT (OUTPATIENT)
Dept: DISASTER EMERGENCY | Facility: CLINIC | Age: 80
End: 2020-07-20

## 2020-07-25 ENCOUNTER — LABORATORY RESULT (OUTPATIENT)
Age: 80
End: 2020-07-25

## 2020-07-25 ENCOUNTER — APPOINTMENT (OUTPATIENT)
Dept: DISASTER EMERGENCY | Facility: CLINIC | Age: 80
End: 2020-07-25

## 2020-07-27 ENCOUNTER — RESULT REVIEW (OUTPATIENT)
Age: 80
End: 2020-07-27

## 2020-07-27 ENCOUNTER — LABORATORY RESULT (OUTPATIENT)
Age: 80
End: 2020-07-27

## 2020-07-27 ENCOUNTER — APPOINTMENT (OUTPATIENT)
Dept: INFUSION THERAPY | Facility: HOSPITAL | Age: 80
End: 2020-07-27

## 2020-07-27 LAB
BASOPHILS # BLD AUTO: 0.02 K/UL — SIGNIFICANT CHANGE UP (ref 0–0.2)
BASOPHILS NFR BLD AUTO: 0.7 % — SIGNIFICANT CHANGE UP (ref 0–2)
EOSINOPHIL # BLD AUTO: 0.08 K/UL — SIGNIFICANT CHANGE UP (ref 0–0.5)
EOSINOPHIL NFR BLD AUTO: 2.6 % — SIGNIFICANT CHANGE UP (ref 0–6)
HCT VFR BLD CALC: 36.1 % — SIGNIFICANT CHANGE UP (ref 34.5–45)
HGB BLD-MCNC: 12.3 G/DL — SIGNIFICANT CHANGE UP (ref 11.5–15.5)
IMM GRANULOCYTES NFR BLD AUTO: 3.6 % — HIGH (ref 0–1.5)
LYMPHOCYTES # BLD AUTO: 0.76 K/UL — LOW (ref 1–3.3)
LYMPHOCYTES # BLD AUTO: 24.8 % — SIGNIFICANT CHANGE UP (ref 13–44)
MCHC RBC-ENTMCNC: 31.8 PG — SIGNIFICANT CHANGE UP (ref 27–34)
MCHC RBC-ENTMCNC: 34.1 GM/DL — SIGNIFICANT CHANGE UP (ref 32–36)
MCV RBC AUTO: 93.3 FL — SIGNIFICANT CHANGE UP (ref 80–100)
MONOCYTES # BLD AUTO: 0.62 K/UL — SIGNIFICANT CHANGE UP (ref 0–0.9)
MONOCYTES NFR BLD AUTO: 20.3 % — HIGH (ref 2–14)
NEUTROPHILS # BLD AUTO: 1.47 K/UL — LOW (ref 1.8–7.4)
NEUTROPHILS NFR BLD AUTO: 48 % — SIGNIFICANT CHANGE UP (ref 43–77)
NRBC # BLD: 0 /100 WBCS — SIGNIFICANT CHANGE UP (ref 0–0)
PLATELET # BLD AUTO: 145 K/UL — LOW (ref 150–400)
RBC # BLD: 3.87 M/UL — SIGNIFICANT CHANGE UP (ref 3.8–5.2)
RBC # FLD: 18.5 % — HIGH (ref 10.3–14.5)
WBC # BLD: 3.06 K/UL — LOW (ref 3.8–10.5)
WBC # FLD AUTO: 3.06 K/UL — LOW (ref 3.8–10.5)

## 2020-07-28 ENCOUNTER — APPOINTMENT (OUTPATIENT)
Dept: INFUSION THERAPY | Facility: HOSPITAL | Age: 80
End: 2020-07-28

## 2020-07-28 ENCOUNTER — RESULT REVIEW (OUTPATIENT)
Age: 80
End: 2020-07-28

## 2020-07-28 ENCOUNTER — OUTPATIENT (OUTPATIENT)
Dept: OUTPATIENT SERVICES | Facility: HOSPITAL | Age: 80
LOS: 1 days | End: 2020-07-28
Payer: COMMERCIAL

## 2020-07-28 DIAGNOSIS — D61.810 ANTINEOPLASTIC CHEMOTHERAPY INDUCED PANCYTOPENIA: ICD-10-CM

## 2020-07-28 DIAGNOSIS — R11.2 NAUSEA WITH VOMITING, UNSPECIFIED: ICD-10-CM

## 2020-07-28 DIAGNOSIS — Z90.710 ACQUIRED ABSENCE OF BOTH CERVIX AND UTERUS: Chronic | ICD-10-CM

## 2020-07-28 DIAGNOSIS — Z51.11 ENCOUNTER FOR ANTINEOPLASTIC CHEMOTHERAPY: ICD-10-CM

## 2020-07-28 DIAGNOSIS — T45.1X5A ADVERSE EFFECT OF ANTINEOPLASTIC AND IMMUNOSUPPRESSIVE DRUGS, INITIAL ENCOUNTER: ICD-10-CM

## 2020-07-28 DIAGNOSIS — C95.00 ACUTE LEUKEMIA OF UNSPECIFIED CELL TYPE NOT HAVING ACHIEVED REMISSION: ICD-10-CM

## 2020-07-28 PROCEDURE — 36561 INSERT TUNNELED CV CATH: CPT

## 2020-07-28 PROCEDURE — 77001 FLUOROGUIDE FOR VEIN DEVICE: CPT

## 2020-07-28 PROCEDURE — C1769: CPT

## 2020-07-28 PROCEDURE — C1894: CPT

## 2020-07-28 PROCEDURE — 76937 US GUIDE VASCULAR ACCESS: CPT

## 2020-07-28 PROCEDURE — 77001 FLUOROGUIDE FOR VEIN DEVICE: CPT | Mod: 26

## 2020-07-28 PROCEDURE — C1788: CPT

## 2020-07-28 PROCEDURE — 76937 US GUIDE VASCULAR ACCESS: CPT | Mod: 26

## 2020-07-28 NOTE — PRE-ANESTHESIA EVALUATION ADULT - NSANTHPMHFT_GEN_ALL_CORE
80F hx of HTN, breast CA 12 years ago tx w/ tamoxifen + lumpectomy +RT, newly dx w/ AML. L sided PICC line.

## 2020-07-29 ENCOUNTER — APPOINTMENT (OUTPATIENT)
Dept: INFUSION THERAPY | Facility: HOSPITAL | Age: 80
End: 2020-07-29

## 2020-07-30 ENCOUNTER — RESULT REVIEW (OUTPATIENT)
Age: 80
End: 2020-07-30

## 2020-07-30 ENCOUNTER — APPOINTMENT (OUTPATIENT)
Dept: INFUSION THERAPY | Facility: HOSPITAL | Age: 80
End: 2020-07-30

## 2020-07-30 LAB
BASOPHILS # BLD AUTO: 0.01 K/UL — SIGNIFICANT CHANGE UP (ref 0–0.2)
BASOPHILS NFR BLD AUTO: 0.3 % — SIGNIFICANT CHANGE UP (ref 0–2)
EOSINOPHIL # BLD AUTO: 0.06 K/UL — SIGNIFICANT CHANGE UP (ref 0–0.5)
EOSINOPHIL NFR BLD AUTO: 1.9 % — SIGNIFICANT CHANGE UP (ref 0–6)
HCT VFR BLD CALC: 34.6 % — SIGNIFICANT CHANGE UP (ref 34.5–45)
HGB BLD-MCNC: 11.7 G/DL — SIGNIFICANT CHANGE UP (ref 11.5–15.5)
IMM GRANULOCYTES NFR BLD AUTO: 1.2 % — SIGNIFICANT CHANGE UP (ref 0–1.5)
LYMPHOCYTES # BLD AUTO: 0.71 K/UL — LOW (ref 1–3.3)
LYMPHOCYTES # BLD AUTO: 22.1 % — SIGNIFICANT CHANGE UP (ref 13–44)
MCHC RBC-ENTMCNC: 31.8 PG — SIGNIFICANT CHANGE UP (ref 27–34)
MCHC RBC-ENTMCNC: 33.8 GM/DL — SIGNIFICANT CHANGE UP (ref 32–36)
MCV RBC AUTO: 94 FL — SIGNIFICANT CHANGE UP (ref 80–100)
MONOCYTES # BLD AUTO: 0.43 K/UL — SIGNIFICANT CHANGE UP (ref 0–0.9)
MONOCYTES NFR BLD AUTO: 13.4 % — SIGNIFICANT CHANGE UP (ref 2–14)
NEUTROPHILS # BLD AUTO: 1.96 K/UL — SIGNIFICANT CHANGE UP (ref 1.8–7.4)
NEUTROPHILS NFR BLD AUTO: 61.1 % — SIGNIFICANT CHANGE UP (ref 43–77)
NRBC # BLD: 0 /100 WBCS — SIGNIFICANT CHANGE UP (ref 0–0)
PLATELET # BLD AUTO: 113 K/UL — LOW (ref 150–400)
RBC # BLD: 3.68 M/UL — LOW (ref 3.8–5.2)
RBC # FLD: 18.7 % — HIGH (ref 10.3–14.5)
WBC # BLD: 3.21 K/UL — LOW (ref 3.8–10.5)
WBC # FLD AUTO: 3.21 K/UL — LOW (ref 3.8–10.5)

## 2020-07-31 ENCOUNTER — APPOINTMENT (OUTPATIENT)
Dept: INFUSION THERAPY | Facility: HOSPITAL | Age: 80
End: 2020-07-31

## 2020-08-10 DIAGNOSIS — Z45.2 ENCOUNTER FOR ADJUSTMENT AND MANAGEMENT OF VASCULAR ACCESS DEVICE: ICD-10-CM

## 2020-08-10 DIAGNOSIS — C85.90 NON-HODGKIN LYMPHOMA, UNSPECIFIED, UNSPECIFIED SITE: ICD-10-CM

## 2020-08-11 ENCOUNTER — RESULT REVIEW (OUTPATIENT)
Age: 80
End: 2020-08-11

## 2020-08-11 ENCOUNTER — OUTPATIENT (OUTPATIENT)
Dept: OUTPATIENT SERVICES | Facility: HOSPITAL | Age: 80
LOS: 1 days | End: 2020-08-11
Payer: COMMERCIAL

## 2020-08-11 ENCOUNTER — APPOINTMENT (OUTPATIENT)
Dept: HEMATOLOGY ONCOLOGY | Facility: CLINIC | Age: 80
End: 2020-08-11

## 2020-08-11 DIAGNOSIS — C92.00 ACUTE MYELOBLASTIC LEUKEMIA, NOT HAVING ACHIEVED REMISSION: ICD-10-CM

## 2020-08-11 DIAGNOSIS — Z90.710 ACQUIRED ABSENCE OF BOTH CERVIX AND UTERUS: Chronic | ICD-10-CM

## 2020-08-11 DIAGNOSIS — Z00.00 ENCOUNTER FOR GENERAL ADULT MEDICAL EXAMINATION W/OUT ABNORMAL FINDINGS: ICD-10-CM

## 2020-08-11 LAB
ANISOCYTOSIS BLD QL: SLIGHT — SIGNIFICANT CHANGE UP
BASOPHILS # BLD AUTO: 0 K/UL — SIGNIFICANT CHANGE UP (ref 0–0.2)
BASOPHILS NFR BLD AUTO: 0 % — SIGNIFICANT CHANGE UP (ref 0–2)
BLD GP AB SCN SERPL QL: NEGATIVE — SIGNIFICANT CHANGE UP
ELLIPTOCYTES BLD QL SMEAR: SLIGHT — SIGNIFICANT CHANGE UP
EOSINOPHIL # BLD AUTO: 0 K/UL — SIGNIFICANT CHANGE UP (ref 0–0.5)
EOSINOPHIL NFR BLD AUTO: 0 % — SIGNIFICANT CHANGE UP (ref 0–6)
HCT VFR BLD CALC: 29.9 % — LOW (ref 34.5–45)
HGB BLD-MCNC: 10.1 G/DL — LOW (ref 11.5–15.5)
HYPOCHROMIA BLD QL: SLIGHT — SIGNIFICANT CHANGE UP
LYMPHOCYTES # BLD AUTO: 0.46 K/UL — LOW (ref 1–3.3)
LYMPHOCYTES # BLD AUTO: 70 % — HIGH (ref 13–44)
MACROCYTES BLD QL: SLIGHT — SIGNIFICANT CHANGE UP
MCHC RBC-ENTMCNC: 32.6 PG — SIGNIFICANT CHANGE UP (ref 27–34)
MCHC RBC-ENTMCNC: 33.8 GM/DL — SIGNIFICANT CHANGE UP (ref 32–36)
MCV RBC AUTO: 96.5 FL — SIGNIFICANT CHANGE UP (ref 80–100)
MICROCYTES BLD QL: SLIGHT — SIGNIFICANT CHANGE UP
MONOCYTES # BLD AUTO: 0.01 K/UL — SIGNIFICANT CHANGE UP (ref 0–0.9)
MONOCYTES NFR BLD AUTO: 2 % — SIGNIFICANT CHANGE UP (ref 2–14)
NEUTROPHILS # BLD AUTO: 0.18 K/UL — LOW (ref 1.8–7.4)
NEUTROPHILS NFR BLD AUTO: 28 % — LOW (ref 43–77)
NRBC # BLD: 0 /100 — SIGNIFICANT CHANGE UP (ref 0–0)
NRBC # BLD: SIGNIFICANT CHANGE UP /100 WBCS (ref 0–0)
PLAT MORPH BLD: NORMAL — SIGNIFICANT CHANGE UP
PLATELET # BLD AUTO: 17 K/UL — CRITICAL LOW (ref 150–400)
POIKILOCYTOSIS BLD QL AUTO: SLIGHT — SIGNIFICANT CHANGE UP
POLYCHROMASIA BLD QL SMEAR: SLIGHT — SIGNIFICANT CHANGE UP
RBC # BLD: 3.1 M/UL — LOW (ref 3.8–5.2)
RBC # FLD: 18.9 % — HIGH (ref 10.3–14.5)
RBC BLD AUTO: ABNORMAL
RH IG SCN BLD-IMP: POSITIVE — SIGNIFICANT CHANGE UP
WBC # BLD: 0.66 K/UL — CRITICAL LOW (ref 3.8–10.5)
WBC # FLD AUTO: 0.66 K/UL — CRITICAL LOW (ref 3.8–10.5)

## 2020-08-11 PROCEDURE — 86850 RBC ANTIBODY SCREEN: CPT

## 2020-08-11 PROCEDURE — 86901 BLOOD TYPING SEROLOGIC RH(D): CPT

## 2020-08-11 PROCEDURE — 86900 BLOOD TYPING SEROLOGIC ABO: CPT

## 2020-08-12 ENCOUNTER — OUTPATIENT (OUTPATIENT)
Dept: OUTPATIENT SERVICES | Facility: HOSPITAL | Age: 80
LOS: 1 days | Discharge: HOME | End: 2020-08-12

## 2020-08-12 ENCOUNTER — APPOINTMENT (OUTPATIENT)
Dept: INFUSION THERAPY | Facility: HOSPITAL | Age: 80
End: 2020-08-12

## 2020-08-12 DIAGNOSIS — C92.00 ACUTE MYELOBLASTIC LEUKEMIA, NOT HAVING ACHIEVED REMISSION: ICD-10-CM

## 2020-08-12 DIAGNOSIS — Z90.710 ACQUIRED ABSENCE OF BOTH CERVIX AND UTERUS: Chronic | ICD-10-CM

## 2020-08-12 LAB
ALBUMIN SERPL ELPH-MCNC: 4.1 G/DL
ALP BLD-CCNC: 61 U/L
ALT SERPL-CCNC: 17 U/L
ANION GAP SERPL CALC-SCNC: 11 MMOL/L
AST SERPL-CCNC: 14 U/L
BILIRUB SERPL-MCNC: 0.4 MG/DL
BUN SERPL-MCNC: 21 MG/DL
CALCIUM SERPL-MCNC: 9.4 MG/DL
CHLORIDE SERPL-SCNC: 102 MMOL/L
CO2 SERPL-SCNC: 30 MMOL/L
CREAT SERPL-MCNC: 0.81 MG/DL
GLUCOSE SERPL-MCNC: 96 MG/DL
LDH SERPL-CCNC: 133 U/L
MAGNESIUM SERPL-MCNC: 2.4 MG/DL
PHOSPHATE SERPL-MCNC: 3.5 MG/DL
POTASSIUM SERPL-SCNC: 3.9 MMOL/L
PROT SERPL-MCNC: 6.6 G/DL
SODIUM SERPL-SCNC: 143 MMOL/L
URATE SERPL-MCNC: 4.1 MG/DL

## 2020-08-13 ENCOUNTER — RESULT REVIEW (OUTPATIENT)
Age: 80
End: 2020-08-13

## 2020-08-13 ENCOUNTER — APPOINTMENT (OUTPATIENT)
Dept: INFUSION THERAPY | Facility: HOSPITAL | Age: 80
End: 2020-08-13

## 2020-08-13 LAB
ANISOCYTOSIS BLD QL: SLIGHT — SIGNIFICANT CHANGE UP
BASOPHILS # BLD AUTO: 0 K/UL — SIGNIFICANT CHANGE UP (ref 0–0.2)
BASOPHILS NFR BLD AUTO: 0 % — SIGNIFICANT CHANGE UP (ref 0–2)
ELLIPTOCYTES BLD QL SMEAR: SLIGHT — SIGNIFICANT CHANGE UP
EOSINOPHIL # BLD AUTO: 0 K/UL — SIGNIFICANT CHANGE UP (ref 0–0.5)
EOSINOPHIL NFR BLD AUTO: 0 % — SIGNIFICANT CHANGE UP (ref 0–6)
HCT VFR BLD CALC: 28.1 % — LOW (ref 34.5–45)
HGB BLD-MCNC: 9.6 G/DL — LOW (ref 11.5–15.5)
HYPOCHROMIA BLD QL: SLIGHT — SIGNIFICANT CHANGE UP
LYMPHOCYTES # BLD AUTO: 0.58 K/UL — LOW (ref 1–3.3)
LYMPHOCYTES # BLD AUTO: 74 % — HIGH (ref 13–44)
MACROCYTES BLD QL: SLIGHT — SIGNIFICANT CHANGE UP
MCHC RBC-ENTMCNC: 32.4 PG — SIGNIFICANT CHANGE UP (ref 27–34)
MCHC RBC-ENTMCNC: 34.2 GM/DL — SIGNIFICANT CHANGE UP (ref 32–36)
MCV RBC AUTO: 94.9 FL — SIGNIFICANT CHANGE UP (ref 80–100)
MICROCYTES BLD QL: SLIGHT — SIGNIFICANT CHANGE UP
MONOCYTES # BLD AUTO: 0.03 K/UL — SIGNIFICANT CHANGE UP (ref 0–0.9)
MONOCYTES NFR BLD AUTO: 4 % — SIGNIFICANT CHANGE UP (ref 2–14)
NEUTROPHILS # BLD AUTO: 0.17 K/UL — LOW (ref 1.8–7.4)
NEUTROPHILS NFR BLD AUTO: 22 % — LOW (ref 43–77)
NRBC # BLD: 0 /100 — SIGNIFICANT CHANGE UP (ref 0–0)
NRBC # BLD: SIGNIFICANT CHANGE UP /100 WBCS (ref 0–0)
PLAT MORPH BLD: NORMAL — SIGNIFICANT CHANGE UP
PLATELET # BLD AUTO: 25 K/UL — LOW (ref 150–400)
POIKILOCYTOSIS BLD QL AUTO: SLIGHT — SIGNIFICANT CHANGE UP
POLYCHROMASIA BLD QL SMEAR: SLIGHT — SIGNIFICANT CHANGE UP
RBC # BLD: 2.96 M/UL — LOW (ref 3.8–5.2)
RBC # FLD: 18.5 % — HIGH (ref 10.3–14.5)
RBC BLD AUTO: ABNORMAL
SARS-COV-2 N GENE NPH QL NAA+PROBE: NOT DETECTED
WBC # BLD: 0.78 K/UL — CRITICAL LOW (ref 3.8–10.5)
WBC # FLD AUTO: 0.78 K/UL — CRITICAL LOW (ref 3.8–10.5)

## 2020-08-17 ENCOUNTER — RESULT REVIEW (OUTPATIENT)
Age: 80
End: 2020-08-17

## 2020-08-17 ENCOUNTER — APPOINTMENT (OUTPATIENT)
Dept: INFUSION THERAPY | Facility: HOSPITAL | Age: 80
End: 2020-08-17

## 2020-08-17 LAB
ANISOCYTOSIS BLD QL: SLIGHT — SIGNIFICANT CHANGE UP
BASOPHILS # BLD AUTO: 0 K/UL — SIGNIFICANT CHANGE UP (ref 0–0.2)
BASOPHILS NFR BLD AUTO: 0 % — SIGNIFICANT CHANGE UP (ref 0–2)
EOSINOPHIL # BLD AUTO: 0 K/UL — SIGNIFICANT CHANGE UP (ref 0–0.5)
EOSINOPHIL NFR BLD AUTO: 0 % — SIGNIFICANT CHANGE UP (ref 0–6)
HCT VFR BLD CALC: 29.2 % — LOW (ref 34.5–45)
HGB BLD-MCNC: 9.9 G/DL — LOW (ref 11.5–15.5)
HYPOCHROMIA BLD QL: SLIGHT — SIGNIFICANT CHANGE UP
LYMPHOCYTES # BLD AUTO: 0.7 K/UL — LOW (ref 1–3.3)
LYMPHOCYTES # BLD AUTO: 84 % — HIGH (ref 13–44)
MACROCYTES BLD QL: SLIGHT — SIGNIFICANT CHANGE UP
MCHC RBC-ENTMCNC: 32.9 PG — SIGNIFICANT CHANGE UP (ref 27–34)
MCHC RBC-ENTMCNC: 33.9 GM/DL — SIGNIFICANT CHANGE UP (ref 32–36)
MCV RBC AUTO: 97 FL — SIGNIFICANT CHANGE UP (ref 80–100)
MICROCYTES BLD QL: SLIGHT — SIGNIFICANT CHANGE UP
MONOCYTES # BLD AUTO: 0.02 K/UL — SIGNIFICANT CHANGE UP (ref 0–0.9)
MONOCYTES NFR BLD AUTO: 2 % — SIGNIFICANT CHANGE UP (ref 2–14)
NEUTROPHILS # BLD AUTO: 0.12 K/UL — LOW (ref 1.8–7.4)
NEUTROPHILS NFR BLD AUTO: 14 % — LOW (ref 43–77)
NRBC # BLD: 0 /100 — SIGNIFICANT CHANGE UP (ref 0–0)
NRBC # BLD: SIGNIFICANT CHANGE UP /100 WBCS (ref 0–0)
OVALOCYTES BLD QL SMEAR: SLIGHT — SIGNIFICANT CHANGE UP
PLAT MORPH BLD: NORMAL — SIGNIFICANT CHANGE UP
PLATELET # BLD AUTO: 66 K/UL — LOW (ref 150–400)
POIKILOCYTOSIS BLD QL AUTO: SLIGHT — SIGNIFICANT CHANGE UP
POLYCHROMASIA BLD QL SMEAR: SLIGHT — SIGNIFICANT CHANGE UP
RBC # BLD: 3.01 M/UL — LOW (ref 3.8–5.2)
RBC # FLD: 19.8 % — HIGH (ref 10.3–14.5)
RBC BLD AUTO: ABNORMAL
WBC # BLD: 0.83 K/UL — CRITICAL LOW (ref 3.8–10.5)
WBC # FLD AUTO: 0.83 K/UL — CRITICAL LOW (ref 3.8–10.5)

## 2020-08-20 ENCOUNTER — RESULT REVIEW (OUTPATIENT)
Age: 80
End: 2020-08-20

## 2020-08-20 ENCOUNTER — APPOINTMENT (OUTPATIENT)
Dept: INFUSION THERAPY | Facility: HOSPITAL | Age: 80
End: 2020-08-20

## 2020-08-20 LAB
ANISOCYTOSIS BLD QL: SLIGHT — SIGNIFICANT CHANGE UP
BASOPHILS # BLD AUTO: 0 K/UL — SIGNIFICANT CHANGE UP (ref 0–0.2)
BASOPHILS NFR BLD AUTO: 0 % — SIGNIFICANT CHANGE UP (ref 0–2)
EOSINOPHIL # BLD AUTO: 0.03 K/UL — SIGNIFICANT CHANGE UP (ref 0–0.5)
EOSINOPHIL NFR BLD AUTO: 4 % — SIGNIFICANT CHANGE UP (ref 0–6)
HCT VFR BLD CALC: 27.5 % — LOW (ref 34.5–45)
HGB BLD-MCNC: 9.5 G/DL — LOW (ref 11.5–15.5)
HYPOCHROMIA BLD QL: SLIGHT — SIGNIFICANT CHANGE UP
LYMPHOCYTES # BLD AUTO: 0.54 K/UL — LOW (ref 1–3.3)
LYMPHOCYTES # BLD AUTO: 62 % — HIGH (ref 13–44)
MACROCYTES BLD QL: SLIGHT — SIGNIFICANT CHANGE UP
MCHC RBC-ENTMCNC: 33.8 PG — SIGNIFICANT CHANGE UP (ref 27–34)
MCHC RBC-ENTMCNC: 34.5 GM/DL — SIGNIFICANT CHANGE UP (ref 32–36)
MCV RBC AUTO: 97.9 FL — SIGNIFICANT CHANGE UP (ref 80–100)
MICROCYTES BLD QL: SLIGHT — SIGNIFICANT CHANGE UP
MONOCYTES # BLD AUTO: 0.03 K/UL — SIGNIFICANT CHANGE UP (ref 0–0.9)
MONOCYTES NFR BLD AUTO: 4 % — SIGNIFICANT CHANGE UP (ref 2–14)
NEUTROPHILS # BLD AUTO: 0.26 K/UL — LOW (ref 1.8–7.4)
NEUTROPHILS NFR BLD AUTO: 30 % — LOW (ref 43–77)
NRBC # BLD: 0 /100 — SIGNIFICANT CHANGE UP (ref 0–0)
NRBC # BLD: SIGNIFICANT CHANGE UP /100 WBCS (ref 0–0)
PLAT MORPH BLD: NORMAL — SIGNIFICANT CHANGE UP
PLATELET # BLD AUTO: 130 K/UL — LOW (ref 150–400)
POIKILOCYTOSIS BLD QL AUTO: SLIGHT — SIGNIFICANT CHANGE UP
POLYCHROMASIA BLD QL SMEAR: SLIGHT — SIGNIFICANT CHANGE UP
RBC # BLD: 2.81 M/UL — LOW (ref 3.8–5.2)
RBC # FLD: 20.3 % — HIGH (ref 10.3–14.5)
RBC BLD AUTO: ABNORMAL
WBC # BLD: 0.87 K/UL — CRITICAL LOW (ref 3.8–10.5)
WBC # FLD AUTO: 0.87 K/UL — CRITICAL LOW (ref 3.8–10.5)

## 2020-08-24 ENCOUNTER — RESULT REVIEW (OUTPATIENT)
Age: 80
End: 2020-08-24

## 2020-08-24 ENCOUNTER — APPOINTMENT (OUTPATIENT)
Dept: INFUSION THERAPY | Facility: HOSPITAL | Age: 80
End: 2020-08-24

## 2020-08-24 LAB
ANISOCYTOSIS BLD QL: SLIGHT — SIGNIFICANT CHANGE UP
BASOPHILS # BLD AUTO: 0.01 K/UL — SIGNIFICANT CHANGE UP (ref 0–0.2)
BASOPHILS NFR BLD AUTO: 1 % — SIGNIFICANT CHANGE UP (ref 0–2)
ELLIPTOCYTES BLD QL SMEAR: SLIGHT — SIGNIFICANT CHANGE UP
EOSINOPHIL # BLD AUTO: 0 K/UL — SIGNIFICANT CHANGE UP (ref 0–0.5)
EOSINOPHIL NFR BLD AUTO: 0 % — SIGNIFICANT CHANGE UP (ref 0–6)
HCT VFR BLD CALC: 29.6 % — LOW (ref 34.5–45)
HGB BLD-MCNC: 9.8 G/DL — LOW (ref 11.5–15.5)
HYPOCHROMIA BLD QL: SLIGHT — SIGNIFICANT CHANGE UP
LYMPHOCYTES # BLD AUTO: 0.56 K/UL — LOW (ref 1–3.3)
LYMPHOCYTES # BLD AUTO: 40 % — SIGNIFICANT CHANGE UP (ref 13–44)
MACROCYTES BLD QL: SLIGHT — SIGNIFICANT CHANGE UP
MCHC RBC-ENTMCNC: 33.1 GM/DL — SIGNIFICANT CHANGE UP (ref 32–36)
MCHC RBC-ENTMCNC: 33.2 PG — SIGNIFICANT CHANGE UP (ref 27–34)
MCV RBC AUTO: 100.3 FL — HIGH (ref 80–100)
MICROCYTES BLD QL: SLIGHT — SIGNIFICANT CHANGE UP
MONOCYTES # BLD AUTO: 0.04 K/UL — SIGNIFICANT CHANGE UP (ref 0–0.9)
MONOCYTES NFR BLD AUTO: 3 % — SIGNIFICANT CHANGE UP (ref 2–14)
NEUTROPHILS # BLD AUTO: 0.78 K/UL — LOW (ref 1.8–7.4)
NEUTROPHILS NFR BLD AUTO: 56 % — SIGNIFICANT CHANGE UP (ref 43–77)
NRBC # BLD: 0 /100 — SIGNIFICANT CHANGE UP (ref 0–0)
NRBC # BLD: SIGNIFICANT CHANGE UP /100 WBCS (ref 0–0)
PLAT MORPH BLD: NORMAL — SIGNIFICANT CHANGE UP
PLATELET # BLD AUTO: 181 K/UL — SIGNIFICANT CHANGE UP (ref 150–400)
POIKILOCYTOSIS BLD QL AUTO: SLIGHT — SIGNIFICANT CHANGE UP
POLYCHROMASIA BLD QL SMEAR: SLIGHT — SIGNIFICANT CHANGE UP
RBC # BLD: 2.95 M/UL — LOW (ref 3.8–5.2)
RBC # FLD: 20.8 % — HIGH (ref 10.3–14.5)
RBC BLD AUTO: ABNORMAL
SCHISTOCYTES BLD QL AUTO: SLIGHT — SIGNIFICANT CHANGE UP
WBC # BLD: 1.4 K/UL — LOW (ref 3.8–10.5)
WBC # FLD AUTO: 1.4 K/UL — LOW (ref 3.8–10.5)

## 2020-08-31 ENCOUNTER — LABORATORY RESULT (OUTPATIENT)
Age: 80
End: 2020-08-31

## 2020-08-31 ENCOUNTER — RESULT REVIEW (OUTPATIENT)
Age: 80
End: 2020-08-31

## 2020-08-31 ENCOUNTER — APPOINTMENT (OUTPATIENT)
Dept: INFUSION THERAPY | Facility: HOSPITAL | Age: 80
End: 2020-08-31

## 2020-08-31 DIAGNOSIS — Z51.11 ENCOUNTER FOR ANTINEOPLASTIC CHEMOTHERAPY: ICD-10-CM

## 2020-08-31 LAB
BASOPHILS # BLD AUTO: 0.02 K/UL — SIGNIFICANT CHANGE UP (ref 0–0.2)
BASOPHILS NFR BLD AUTO: 0.6 % — SIGNIFICANT CHANGE UP (ref 0–2)
EOSINOPHIL # BLD AUTO: 0.06 K/UL — SIGNIFICANT CHANGE UP (ref 0–0.5)
EOSINOPHIL NFR BLD AUTO: 1.8 % — SIGNIFICANT CHANGE UP (ref 0–6)
HCT VFR BLD CALC: 34.7 % — SIGNIFICANT CHANGE UP (ref 34.5–45)
HGB BLD-MCNC: 12 G/DL — SIGNIFICANT CHANGE UP (ref 11.5–15.5)
IMM GRANULOCYTES NFR BLD AUTO: 1.2 % — SIGNIFICANT CHANGE UP (ref 0–1.5)
LYMPHOCYTES # BLD AUTO: 0.95 K/UL — LOW (ref 1–3.3)
LYMPHOCYTES # BLD AUTO: 27.9 % — SIGNIFICANT CHANGE UP (ref 13–44)
MCHC RBC-ENTMCNC: 33.5 PG — SIGNIFICANT CHANGE UP (ref 27–34)
MCHC RBC-ENTMCNC: 34.6 GM/DL — SIGNIFICANT CHANGE UP (ref 32–36)
MCV RBC AUTO: 96.9 FL — SIGNIFICANT CHANGE UP (ref 80–100)
MONOCYTES # BLD AUTO: 0.49 K/UL — SIGNIFICANT CHANGE UP (ref 0–0.9)
MONOCYTES NFR BLD AUTO: 14.4 % — HIGH (ref 2–14)
NEUTROPHILS # BLD AUTO: 1.85 K/UL — SIGNIFICANT CHANGE UP (ref 1.8–7.4)
NEUTROPHILS NFR BLD AUTO: 54.1 % — SIGNIFICANT CHANGE UP (ref 43–77)
NRBC # BLD: 0 /100 WBCS — SIGNIFICANT CHANGE UP (ref 0–0)
PLATELET # BLD AUTO: 149 K/UL — LOW (ref 150–400)
RBC # BLD: 3.58 M/UL — LOW (ref 3.8–5.2)
RBC # FLD: 19.5 % — HIGH (ref 10.3–14.5)
WBC # BLD: 3.41 K/UL — LOW (ref 3.8–10.5)
WBC # FLD AUTO: 3.41 K/UL — LOW (ref 3.8–10.5)

## 2020-09-01 ENCOUNTER — APPOINTMENT (OUTPATIENT)
Dept: INFUSION THERAPY | Facility: HOSPITAL | Age: 80
End: 2020-09-01

## 2020-09-02 ENCOUNTER — APPOINTMENT (OUTPATIENT)
Dept: INFUSION THERAPY | Facility: HOSPITAL | Age: 80
End: 2020-09-02

## 2020-09-03 ENCOUNTER — RESULT REVIEW (OUTPATIENT)
Age: 80
End: 2020-09-03

## 2020-09-03 ENCOUNTER — APPOINTMENT (OUTPATIENT)
Dept: INFUSION THERAPY | Facility: HOSPITAL | Age: 80
End: 2020-09-03

## 2020-09-03 LAB
BASOPHILS # BLD AUTO: 0.01 K/UL — SIGNIFICANT CHANGE UP (ref 0–0.2)
BASOPHILS NFR BLD AUTO: 0.3 % — SIGNIFICANT CHANGE UP (ref 0–2)
EOSINOPHIL # BLD AUTO: 0.06 K/UL — SIGNIFICANT CHANGE UP (ref 0–0.5)
EOSINOPHIL NFR BLD AUTO: 1.9 % — SIGNIFICANT CHANGE UP (ref 0–6)
HCT VFR BLD CALC: 31.9 % — LOW (ref 34.5–45)
HGB BLD-MCNC: 10.7 G/DL — LOW (ref 11.5–15.5)
IMM GRANULOCYTES NFR BLD AUTO: 0.6 % — SIGNIFICANT CHANGE UP (ref 0–1.5)
LYMPHOCYTES # BLD AUTO: 0.73 K/UL — LOW (ref 1–3.3)
LYMPHOCYTES # BLD AUTO: 22.8 % — SIGNIFICANT CHANGE UP (ref 13–44)
MCHC RBC-ENTMCNC: 33.5 GM/DL — SIGNIFICANT CHANGE UP (ref 32–36)
MCHC RBC-ENTMCNC: 34 PG — SIGNIFICANT CHANGE UP (ref 27–34)
MCV RBC AUTO: 101.3 FL — HIGH (ref 80–100)
MONOCYTES # BLD AUTO: 0.35 K/UL — SIGNIFICANT CHANGE UP (ref 0–0.9)
MONOCYTES NFR BLD AUTO: 10.9 % — SIGNIFICANT CHANGE UP (ref 2–14)
NEUTROPHILS # BLD AUTO: 2.03 K/UL — SIGNIFICANT CHANGE UP (ref 1.8–7.4)
NEUTROPHILS NFR BLD AUTO: 63.5 % — SIGNIFICANT CHANGE UP (ref 43–77)
NRBC # BLD: 0 /100 WBCS — SIGNIFICANT CHANGE UP (ref 0–0)
PLATELET # BLD AUTO: 111 K/UL — LOW (ref 150–400)
RBC # BLD: 3.15 M/UL — LOW (ref 3.8–5.2)
RBC # FLD: 19.6 % — HIGH (ref 10.3–14.5)
WBC # BLD: 3.2 K/UL — LOW (ref 3.8–10.5)
WBC # FLD AUTO: 3.2 K/UL — LOW (ref 3.8–10.5)

## 2020-09-04 ENCOUNTER — APPOINTMENT (OUTPATIENT)
Dept: HEMATOLOGY ONCOLOGY | Facility: CLINIC | Age: 80
End: 2020-09-04
Payer: MEDICARE

## 2020-09-04 ENCOUNTER — APPOINTMENT (OUTPATIENT)
Dept: INFUSION THERAPY | Facility: HOSPITAL | Age: 80
End: 2020-09-04

## 2020-09-04 DIAGNOSIS — T78.40XA ALLERGY, UNSPECIFIED, INITIAL ENCOUNTER: ICD-10-CM

## 2020-09-04 PROCEDURE — 99214 OFFICE O/P EST MOD 30 MIN: CPT

## 2020-09-06 DIAGNOSIS — Z51.89 ENCOUNTER FOR OTHER SPECIFIED AFTERCARE: ICD-10-CM

## 2020-09-10 PROBLEM — T78.40XA HYPERSENSITIVITY REACTION, INITIAL ENCOUNTER: Status: ACTIVE | Noted: 2020-09-10

## 2020-09-22 ENCOUNTER — OUTPATIENT (OUTPATIENT)
Dept: OUTPATIENT SERVICES | Facility: HOSPITAL | Age: 80
LOS: 1 days | Discharge: HOME | End: 2020-09-22

## 2020-09-22 DIAGNOSIS — C92.00 ACUTE MYELOBLASTIC LEUKEMIA, NOT HAVING ACHIEVED REMISSION: ICD-10-CM

## 2020-09-22 DIAGNOSIS — Z90.710 ACQUIRED ABSENCE OF BOTH CERVIX AND UTERUS: Chronic | ICD-10-CM

## 2020-09-25 ENCOUNTER — APPOINTMENT (OUTPATIENT)
Dept: INFUSION THERAPY | Facility: HOSPITAL | Age: 80
End: 2020-09-25

## 2020-09-25 ENCOUNTER — APPOINTMENT (OUTPATIENT)
Dept: DISASTER EMERGENCY | Facility: CLINIC | Age: 80
End: 2020-09-25

## 2020-09-28 ENCOUNTER — RESULT REVIEW (OUTPATIENT)
Age: 80
End: 2020-09-28

## 2020-09-28 ENCOUNTER — LABORATORY RESULT (OUTPATIENT)
Age: 80
End: 2020-09-28

## 2020-09-28 ENCOUNTER — APPOINTMENT (OUTPATIENT)
Dept: INFUSION THERAPY | Facility: HOSPITAL | Age: 80
End: 2020-09-28

## 2020-09-28 ENCOUNTER — APPOINTMENT (OUTPATIENT)
Dept: HEMATOLOGY ONCOLOGY | Facility: CLINIC | Age: 80
End: 2020-09-28
Payer: MEDICARE

## 2020-09-28 VITALS
HEART RATE: 79 BPM | BODY MASS INDEX: 21.19 KG/M2 | HEIGHT: 64.57 IN | WEIGHT: 125.64 LBS | RESPIRATION RATE: 16 BRPM | TEMPERATURE: 98.1 F | OXYGEN SATURATION: 96 % | SYSTOLIC BLOOD PRESSURE: 130 MMHG | DIASTOLIC BLOOD PRESSURE: 76 MMHG

## 2020-09-28 LAB
ALBUMIN SERPL ELPH-MCNC: 4 G/DL
ALP BLD-CCNC: 59 U/L
ALT SERPL-CCNC: 10 U/L
ANION GAP SERPL CALC-SCNC: 14 MMOL/L
AST SERPL-CCNC: 17 U/L
BASOPHILS # BLD AUTO: 0.02 K/UL — SIGNIFICANT CHANGE UP (ref 0–0.2)
BASOPHILS NFR BLD AUTO: 1 % — SIGNIFICANT CHANGE UP (ref 0–2)
BILIRUB SERPL-MCNC: 0.3 MG/DL
BUN SERPL-MCNC: 20 MG/DL
CALCIUM SERPL-MCNC: 9.3 MG/DL
CHLORIDE SERPL-SCNC: 103 MMOL/L
CO2 SERPL-SCNC: 28 MMOL/L
CREAT SERPL-MCNC: 0.63 MG/DL
EOSINOPHIL # BLD AUTO: 0.03 K/UL — SIGNIFICANT CHANGE UP (ref 0–0.5)
EOSINOPHIL NFR BLD AUTO: 2 % — SIGNIFICANT CHANGE UP (ref 0–6)
GLUCOSE SERPL-MCNC: 105 MG/DL
HCT VFR BLD CALC: 30.9 % — LOW (ref 34.5–45)
HGB BLD-MCNC: 10 G/DL — LOW (ref 11.5–15.5)
LDH SERPL-CCNC: 186 U/L
LYMPHOCYTES # BLD AUTO: 0.75 K/UL — LOW (ref 1–3.3)
LYMPHOCYTES # BLD AUTO: 48 % — HIGH (ref 13–44)
MAGNESIUM SERPL-MCNC: 2.3 MG/DL
MCHC RBC-ENTMCNC: 32.4 G/DL — SIGNIFICANT CHANGE UP (ref 32–36)
MCHC RBC-ENTMCNC: 34.7 PG — HIGH (ref 27–34)
MCV RBC AUTO: 107.3 FL — HIGH (ref 80–100)
MONOCYTES # BLD AUTO: 0.05 K/UL — SIGNIFICANT CHANGE UP (ref 0–0.9)
MONOCYTES NFR BLD AUTO: 3 % — SIGNIFICANT CHANGE UP (ref 2–14)
NEUTROPHILS # BLD AUTO: 0.72 K/UL — LOW (ref 1.8–7.4)
NEUTROPHILS NFR BLD AUTO: 46 % — SIGNIFICANT CHANGE UP (ref 43–77)
NRBC # BLD: 0 /100 — SIGNIFICANT CHANGE UP (ref 0–0)
NRBC # BLD: SIGNIFICANT CHANGE UP /100 WBCS (ref 0–0)
PHOSPHATE SERPL-MCNC: 3.1 MG/DL
PLAT MORPH BLD: NORMAL — SIGNIFICANT CHANGE UP
PLATELET # BLD AUTO: 169 K/UL — SIGNIFICANT CHANGE UP (ref 150–400)
POTASSIUM SERPL-SCNC: 3.6 MMOL/L
PROT SERPL-MCNC: 6.1 G/DL
RBC # BLD: 2.88 M/UL — LOW (ref 3.8–5.2)
RBC # FLD: 19 % — HIGH (ref 10.3–14.5)
RBC BLD AUTO: SIGNIFICANT CHANGE UP
SARS-COV-2 N GENE NPH QL NAA+PROBE: NOT DETECTED
SODIUM SERPL-SCNC: 144 MMOL/L
TOXIC GRANULES BLD QL SMEAR: PRESENT — SIGNIFICANT CHANGE UP
URATE SERPL-MCNC: 4.6 MG/DL
WBC # BLD: 1.56 K/UL — LOW (ref 3.8–10.5)
WBC # FLD AUTO: 1.56 K/UL — LOW (ref 3.8–10.5)

## 2020-09-28 PROCEDURE — 99214 OFFICE O/P EST MOD 30 MIN: CPT

## 2020-09-28 NOTE — RESULTS/DATA
[FreeTextEntry1] : Today's CBC (On 9/28/20) wbc 1.56  Hb 10 plt 169\par \par On 6/15/20 wbc 0.99 Hgb 13.1  plt 171\par On 5/12/20) wbc 1.1  Hb 13.1plt 211\par On 4/15/20) wbc 7.78 Hb 12.3 plt  149 ANC 4680\par On 3/31/20) wbc 4. 3 Hb 11.8 plt 395 ANC 2800\par On 3/17/20) wbc 0.85 Hb 10.2  plt 57\par On 3/11/20) wbc 0.3 Hb 7.8 plt 9\par (On 2/11/20) wbc 1.1  Hb 10.2 plt 48\par On 2/10/20 wbc 0.59 Hb 8.4 plt 37\par On 1/31/20 wbc 25.4 with 74% blasts, Hb 11.7 plt 153\par \par \par \par

## 2020-09-28 NOTE — REVIEW OF SYSTEMS
[Fatigue] : fatigue [Incontinence] : incontinence [Joint Pain] : joint pain [Anxiety] : anxiety [Negative] : Heme/Lymph [Fever] : no fever [Chills] : no chills [Night Sweats] : no night sweats [Recent Change In Weight] : ~T no recent weight change [Eye Pain] : no eye pain [Red Eyes] : eyes not red [Dry Eyes] : no dryness of the eyes [Vision Problems] : no vision problems [Dysphagia] : no dysphagia [Loss of Hearing] : no loss of hearing [Nosebleeds] : no nosebleeds [Hoarseness] : no hoarseness [Odynophagia] : no odynophagia [Mucosal Pain] : no mucosal pain [Chest Pain] : no chest pain [Palpitations] : no palpitations [Leg Claudication] : no intermittent leg claudication [Lower Ext Edema] : no lower extremity edema [Shortness Of Breath] : no shortness of breath [Wheezing] : no wheezing [Cough] : no cough [SOB on Exertion] : no shortness of breath during exertion [Abdominal Pain] : no abdominal pain [Vomiting] : no vomiting [Constipation] : no constipation [Diarrhea] : no diarrhea [Dysuria] : no dysuria [Vaginal Discharge] : no vaginal discharge [Dysmenorrhea/Abn Vaginal Bleeding] : no dysmenorrhea/abnormal vaginal bleeding [Joint Stiffness] : no joint stiffness [Muscle Pain] : no muscle pain [Skin Rash] : no skin rash [Skin Wound] : no skin wound [Confused] : no confusion [Dizziness] : no dizziness [Fainting] : no fainting [Difficulty Walking] : no difficulty walking [Suicidal] : not suicidal [Insomnia] : no insomnia [Proptosis] : no proptosis [Hot Flashes] : no hot flashes [Muscle Weakness] : no muscle weakness [Deepening Of The Voice] : no deepening of the voice [Easy Bleeding] : no tendency for easy bleeding [Easy Bruising] : no tendency for easy bruising [Swollen Glands] : no swollen glands [FreeTextEntry6] : can go up 1 flight of stairs [FreeTextEntry7] : no BRBPR. Colonoscopy -done 1 yr ago, normal; constipation but moving bowels [FreeTextEntry8] : no PAP -s/p LUCITA. Mammogram March 2020 -normal [FreeTextEntry9] : lower back pain chronic -s/p compressed vertebrae [de-identified] : no panic attacks [FreeTextEntry1] : seasonal allergies

## 2020-09-28 NOTE — HISTORY OF PRESENT ILLNESS
[de-identified] : Ms. 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 She has a history of Breast cancer 12 years ago treated with tamoxifen (no chemotherapy), s/p RT and lumpectomy, HTN and surgical hx of hysterectomy, was sent in by oncologist Dr. Blank on 1/31/20 for rule out leukemia. Patient had been feeling generally fatigued and sluggish since December 2019. She was feeling more tired, and saw her PMD, who did some blood work and noticed blasts in her peripheral blood. Pt was told to see Dr. Blank for further workup. Dr. Blank did blood work again which again found blasts in the peripheral blood, at which point she sent pt to the ER for leukemia workup. 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 She was started on 2/4/20 on Cycle 1 of Dacogen + Venetoclax (50 mg on day 1, 100 mg on day 2, 200 mg on day 3 and onward when starting Diflucan). She tolerated it well other than some SOB -CXR on 2/7/20 : mild pulmonary edema and L pleural effusion, treated with diuretics. She was discharged home on 2/11/20. \par  [de-identified] : The patient is here for AML follow up.    She had a BM bx to eval response on 3/2/20 -showed no blasts. Pt given C2 days 1-3 on 3/2, 3/4 and 3/5, after which her Venetoclax was held for count recovery. \par \par C7 day 1 Dacogen/Venetoclax was given starting on 8/31/20. She is here for f/u. She has fatigue, but it generally is at the end of the day. No fevers. She was supposed to start C8 Dacogen/Venetoclax

## 2020-09-28 NOTE — ASSESSMENT
[FreeTextEntry1] : 79 yo F with hx breast CA s/p XRT/tamoxifen, now with AML normal cytogenetics FLT-3 ITD positive\par s/p C1 Dacogen/Venetoclax starting 2/4/20 --> BM bx 3/2/20 showed NO blasts\par \par -Dacogen/Venetoclax C8 (10 days Venetoclax) was scheduled for today, but she will need to do q5wks, along with OncPro (day 5). ANC <1000 today -chemo rescheduled to start on 10/5. C9 to start on 11/9/20. Pt is on Levaquin/Fluconazole, continue for now, refills sent\par \par -Venetoclax daily, dosed at 200mg  for 10 days \par \par -ANC<1000 -will continue Levaquin/Fluconazole for prophylaxis.  \par \par -BM bx done on 2/3/20 showed normal cytogenetics. In house assay for FLT-3 ITD positive, which confers an adverse prognosis in AML - d/w patient and family BMT transplant -pt active prior to admission, good PS status; she is not interested at this time\par \par -patient has Port -looks good\par \par -follow up monthly; patient was supposed  to have BM bx q6 months, will schedule with NP to assess disease status -check cytogenetics/FISh for AML and Bioreference molecular studies. Patient informed today that I will be leaving [facility], and my office with call to facilitate transfer of care and follow up appointments with Dr. Goldberg\par \par \par \par

## 2020-10-05 ENCOUNTER — LABORATORY RESULT (OUTPATIENT)
Age: 80
End: 2020-10-05

## 2020-10-05 ENCOUNTER — RESULT REVIEW (OUTPATIENT)
Age: 80
End: 2020-10-05

## 2020-10-05 ENCOUNTER — APPOINTMENT (OUTPATIENT)
Dept: INFUSION THERAPY | Facility: HOSPITAL | Age: 80
End: 2020-10-05

## 2020-10-05 DIAGNOSIS — Z51.11 ENCOUNTER FOR ANTINEOPLASTIC CHEMOTHERAPY: ICD-10-CM

## 2020-10-05 LAB
BASOPHILS # BLD AUTO: 0.01 K/UL — SIGNIFICANT CHANGE UP (ref 0–0.2)
BASOPHILS NFR BLD AUTO: 0.4 % — SIGNIFICANT CHANGE UP (ref 0–2)
EOSINOPHIL # BLD AUTO: 0.04 K/UL — SIGNIFICANT CHANGE UP (ref 0–0.5)
EOSINOPHIL NFR BLD AUTO: 1.6 % — SIGNIFICANT CHANGE UP (ref 0–6)
HCT VFR BLD CALC: 33.1 % — LOW (ref 34.5–45)
HGB BLD-MCNC: 11 G/DL — LOW (ref 11.5–15.5)
IMM GRANULOCYTES NFR BLD AUTO: 1.6 % — HIGH (ref 0–1.5)
LYMPHOCYTES # BLD AUTO: 0.76 K/UL — LOW (ref 1–3.3)
LYMPHOCYTES # BLD AUTO: 31.3 % — SIGNIFICANT CHANGE UP (ref 13–44)
MCHC RBC-ENTMCNC: 33.2 G/DL — SIGNIFICANT CHANGE UP (ref 32–36)
MCHC RBC-ENTMCNC: 34.7 PG — HIGH (ref 27–34)
MCV RBC AUTO: 104.4 FL — HIGH (ref 80–100)
MONOCYTES # BLD AUTO: 0.43 K/UL — SIGNIFICANT CHANGE UP (ref 0–0.9)
MONOCYTES NFR BLD AUTO: 17.7 % — HIGH (ref 2–14)
NEUTROPHILS # BLD AUTO: 1.15 K/UL — LOW (ref 1.8–7.4)
NEUTROPHILS NFR BLD AUTO: 47.4 % — SIGNIFICANT CHANGE UP (ref 43–77)
NRBC # BLD: 0 /100 WBCS — SIGNIFICANT CHANGE UP (ref 0–0)
PLATELET # BLD AUTO: 141 K/UL — LOW (ref 150–400)
RBC # BLD: 3.17 M/UL — LOW (ref 3.8–5.2)
RBC # FLD: 18.3 % — HIGH (ref 10.3–14.5)
WBC # BLD: 2.43 K/UL — LOW (ref 3.8–10.5)
WBC # FLD AUTO: 2.43 K/UL — LOW (ref 3.8–10.5)

## 2020-10-06 ENCOUNTER — APPOINTMENT (OUTPATIENT)
Dept: INFUSION THERAPY | Facility: HOSPITAL | Age: 80
End: 2020-10-06

## 2020-10-07 ENCOUNTER — APPOINTMENT (OUTPATIENT)
Dept: INFUSION THERAPY | Facility: HOSPITAL | Age: 80
End: 2020-10-07

## 2020-10-08 ENCOUNTER — RESULT REVIEW (OUTPATIENT)
Age: 80
End: 2020-10-08

## 2020-10-08 ENCOUNTER — APPOINTMENT (OUTPATIENT)
Dept: INFUSION THERAPY | Facility: HOSPITAL | Age: 80
End: 2020-10-08

## 2020-10-08 LAB
BASOPHILS # BLD AUTO: 0 K/UL — SIGNIFICANT CHANGE UP (ref 0–0.2)
BASOPHILS NFR BLD AUTO: 0 % — SIGNIFICANT CHANGE UP (ref 0–2)
EOSINOPHIL # BLD AUTO: 0 K/UL — SIGNIFICANT CHANGE UP (ref 0–0.5)
EOSINOPHIL NFR BLD AUTO: 0 % — SIGNIFICANT CHANGE UP (ref 0–6)
HCT VFR BLD CALC: 35.2 % — SIGNIFICANT CHANGE UP (ref 34.5–45)
HGB BLD-MCNC: 11.5 G/DL — SIGNIFICANT CHANGE UP (ref 11.5–15.5)
LYMPHOCYTES # BLD AUTO: 0.85 K/UL — LOW (ref 1–3.3)
LYMPHOCYTES # BLD AUTO: 23 % — SIGNIFICANT CHANGE UP (ref 13–44)
MCHC RBC-ENTMCNC: 32.7 G/DL — SIGNIFICANT CHANGE UP (ref 32–36)
MCHC RBC-ENTMCNC: 34.1 PG — HIGH (ref 27–34)
MCV RBC AUTO: 104.5 FL — HIGH (ref 80–100)
MONOCYTES # BLD AUTO: 0.41 K/UL — SIGNIFICANT CHANGE UP (ref 0–0.9)
MONOCYTES NFR BLD AUTO: 11 % — SIGNIFICANT CHANGE UP (ref 2–14)
NEUTROPHILS # BLD AUTO: 2.44 K/UL — SIGNIFICANT CHANGE UP (ref 1.8–7.4)
NEUTROPHILS NFR BLD AUTO: 66 % — SIGNIFICANT CHANGE UP (ref 43–77)
NRBC # BLD: 1 /100 — HIGH (ref 0–0)
NRBC # BLD: SIGNIFICANT CHANGE UP /100 WBCS (ref 0–0)
PLAT MORPH BLD: NORMAL — SIGNIFICANT CHANGE UP
PLATELET # BLD AUTO: 135 K/UL — LOW (ref 150–400)
RBC # BLD: 3.37 M/UL — LOW (ref 3.8–5.2)
RBC # FLD: 18.5 % — HIGH (ref 10.3–14.5)
RBC BLD AUTO: SIGNIFICANT CHANGE UP
WBC # BLD: 3.69 K/UL — LOW (ref 3.8–10.5)
WBC # FLD AUTO: 3.69 K/UL — LOW (ref 3.8–10.5)

## 2020-10-09 ENCOUNTER — APPOINTMENT (OUTPATIENT)
Dept: INFUSION THERAPY | Facility: HOSPITAL | Age: 80
End: 2020-10-09

## 2020-10-13 ENCOUNTER — OUTPATIENT (OUTPATIENT)
Dept: OUTPATIENT SERVICES | Facility: HOSPITAL | Age: 80
LOS: 1 days | End: 2020-10-13
Payer: COMMERCIAL

## 2020-10-13 DIAGNOSIS — Z90.710 ACQUIRED ABSENCE OF BOTH CERVIX AND UTERUS: Chronic | ICD-10-CM

## 2020-10-13 DIAGNOSIS — D64.9 ANEMIA, UNSPECIFIED: ICD-10-CM

## 2020-10-14 ENCOUNTER — RESULT REVIEW (OUTPATIENT)
Age: 80
End: 2020-10-14

## 2020-10-14 ENCOUNTER — APPOINTMENT (OUTPATIENT)
Dept: HEMATOLOGY ONCOLOGY | Facility: CLINIC | Age: 80
End: 2020-10-14
Payer: MEDICARE

## 2020-10-14 VITALS
TEMPERATURE: 97.4 F | HEART RATE: 81 BPM | WEIGHT: 126 LBS | SYSTOLIC BLOOD PRESSURE: 135 MMHG | OXYGEN SATURATION: 97 % | BODY MASS INDEX: 21.25 KG/M2 | RESPIRATION RATE: 16 BRPM | DIASTOLIC BLOOD PRESSURE: 76 MMHG

## 2020-10-14 LAB
BASOPHILS # BLD AUTO: 0 K/UL — SIGNIFICANT CHANGE UP (ref 0–0.2)
BASOPHILS NFR BLD AUTO: 0 % — SIGNIFICANT CHANGE UP (ref 0–2)
DACRYOCYTES BLD QL SMEAR: SLIGHT — SIGNIFICANT CHANGE UP
EOSINOPHIL # BLD AUTO: 0 K/UL — SIGNIFICANT CHANGE UP (ref 0–0.5)
EOSINOPHIL NFR BLD AUTO: 0 % — SIGNIFICANT CHANGE UP (ref 0–6)
HCT VFR BLD CALC: 33.2 % — LOW (ref 34.5–45)
HGB BLD-MCNC: 10.9 G/DL — LOW (ref 11.5–15.5)
LYMPHOCYTES # BLD AUTO: 0.39 K/UL — LOW (ref 1–3.3)
LYMPHOCYTES # BLD AUTO: 9 % — LOW (ref 13–44)
MCHC RBC-ENTMCNC: 32.8 G/DL — SIGNIFICANT CHANGE UP (ref 32–36)
MCHC RBC-ENTMCNC: 34.9 PG — HIGH (ref 27–34)
MCV RBC AUTO: 106.4 FL — HIGH (ref 80–100)
MONOCYTES # BLD AUTO: 0.66 K/UL — SIGNIFICANT CHANGE UP (ref 0–0.9)
MONOCYTES NFR BLD AUTO: 15 % — HIGH (ref 2–14)
NEUTROPHILS # BLD AUTO: 3.32 K/UL — SIGNIFICANT CHANGE UP (ref 1.8–7.4)
NEUTROPHILS NFR BLD AUTO: 76 % — SIGNIFICANT CHANGE UP (ref 43–77)
NRBC # BLD: 0 /100 — SIGNIFICANT CHANGE UP (ref 0–0)
NRBC # BLD: SIGNIFICANT CHANGE UP /100 WBCS (ref 0–0)
OVALOCYTES BLD QL SMEAR: SLIGHT — SIGNIFICANT CHANGE UP
PLAT MORPH BLD: NORMAL — SIGNIFICANT CHANGE UP
PLATELET # BLD AUTO: 43 K/UL — LOW (ref 150–400)
RBC # BLD: 3.12 M/UL — LOW (ref 3.8–5.2)
RBC # FLD: 18.2 % — HIGH (ref 10.3–14.5)
RBC BLD AUTO: ABNORMAL
WBC # BLD: 4.37 K/UL — SIGNIFICANT CHANGE UP (ref 3.8–10.5)
WBC # FLD AUTO: 4.37 K/UL — SIGNIFICANT CHANGE UP (ref 3.8–10.5)

## 2020-10-14 PROCEDURE — 38222 DX BONE MARROW BX & ASPIR: CPT | Mod: RT

## 2020-10-14 PROCEDURE — 88313 SPECIAL STAINS GROUP 2: CPT | Mod: 26

## 2020-10-14 PROCEDURE — 88189 FLOWCYTOMETRY/READ 16 & >: CPT | Mod: 59

## 2020-10-14 PROCEDURE — 88341 IMHCHEM/IMCYTCHM EA ADD ANTB: CPT | Mod: 26

## 2020-10-14 PROCEDURE — 85097 BONE MARROW INTERPRETATION: CPT

## 2020-10-14 PROCEDURE — 88342 IMHCHEM/IMCYTCHM 1ST ANTB: CPT | Mod: 26

## 2020-10-14 PROCEDURE — 88305 TISSUE EXAM BY PATHOLOGIST: CPT | Mod: 26

## 2020-10-14 RX ORDER — LEVOFLOXACIN 500 MG/1
500 TABLET, FILM COATED ORAL
Qty: 30 | Refills: 4 | Status: DISCONTINUED | COMMUNITY
Start: 2020-07-15 | End: 2020-10-14

## 2020-10-14 RX ORDER — FLUCONAZOLE 200 MG/1
200 TABLET ORAL
Qty: 30 | Refills: 4 | Status: DISCONTINUED | COMMUNITY
Start: 2020-02-11 | End: 2020-10-14

## 2020-10-14 NOTE — REASON FOR VISIT
[Bone Marrow Biopsy] : bone marrow biopsy [Bone Marrow Aspiration] : bone marrow aspiration [FreeTextEntry2] : AML s/p C8 of Dacogen and Venclexta to evaluate for response

## 2020-10-14 NOTE — PROCEDURE
[Bone Marrow Biopsy] : bone marrow biopsy [Bone Marrow Aspiration] : bone marrow aspiration  [Patient] : the patient [Verbal Consent Obtained] : verbal consent was obtained prior to the procedure [Patient identification verified] : patient identification verified [Procedure verified and consent obtained] : procedure verified and consent obtained [Laterality verified and correct site marked] : laterality verified and correct site marked [Correct positioning] : correct positioning [Correct implant and/ or special equipment obtained] : correct impact and/ or special equipment obtained [Prone] : prone [Superior iliac spine was identified] : the superior iliac spine was identified. [Lidocaine was injected and into the periosteum overlying the site.] : Lidocaine was injected and into the periosteum overlying the site. [Aspirate] : aspirate [Cytogenetics] : cytogenetics [FISH] : FISH [PCR] : PCR [Biopsy] : biopsy [Flow Cytometry] : flow cytometry [Cultures] : ~T culture ~C was performed [] : The patient was instructed to remove the bandage the following AM. The patient may bathe. Acetaminophen may be taken for discomfort, as per package directions.If there are any other problems, the patient was instructed to call the office. The patient verbalized understanding, and is aware of the office contact numbers. [Right] : site: right [The right posterior iliac crest was prepped with betadine and draped, using sterile technique.] : The right posterior iliac crest was prepped with betadine and draped, using sterile technique. [FreeTextEntry1] : AML s/p C8 of Dacogen and Venclexta to evaluate for response [FreeTextEntry2] : WBC: 4.37\par Hgb: 10.9\par Hct: 33.2\par PLT: 43K\par \par In addition to above specimens, Bioreference and FLT 3 molecular studies requested. Bone marrow biopsy and aspiration completed. Pt tolerate procedure well.

## 2020-10-15 ENCOUNTER — RESULT REVIEW (OUTPATIENT)
Age: 80
End: 2020-10-15

## 2020-10-15 DIAGNOSIS — Z51.89 ENCOUNTER FOR OTHER SPECIFIED AFTERCARE: ICD-10-CM

## 2020-10-17 ENCOUNTER — RESULT REVIEW (OUTPATIENT)
Age: 80
End: 2020-10-17

## 2020-10-17 ENCOUNTER — APPOINTMENT (OUTPATIENT)
Dept: INFUSION THERAPY | Facility: HOSPITAL | Age: 80
End: 2020-10-17

## 2020-10-17 LAB
BASOPHILS # BLD AUTO: 0 K/UL — SIGNIFICANT CHANGE UP (ref 0–0.2)
BASOPHILS NFR BLD AUTO: 0 % — SIGNIFICANT CHANGE UP (ref 0–2)
EOSINOPHIL # BLD AUTO: 0.01 K/UL — SIGNIFICANT CHANGE UP (ref 0–0.5)
EOSINOPHIL NFR BLD AUTO: 1 % — SIGNIFICANT CHANGE UP (ref 0–6)
HCT VFR BLD CALC: 30.7 % — LOW (ref 34.5–45)
HGB BLD-MCNC: 10.4 G/DL — LOW (ref 11.5–15.5)
LYMPHOCYTES # BLD AUTO: 0.61 K/UL — LOW (ref 1–3.3)
LYMPHOCYTES # BLD AUTO: 43 % — SIGNIFICANT CHANGE UP (ref 13–44)
MCHC RBC-ENTMCNC: 33.9 G/DL — SIGNIFICANT CHANGE UP (ref 32–36)
MCHC RBC-ENTMCNC: 34.9 PG — HIGH (ref 27–34)
MCV RBC AUTO: 103 FL — HIGH (ref 80–100)
MONOCYTES # BLD AUTO: 0.17 K/UL — SIGNIFICANT CHANGE UP (ref 0–0.9)
MONOCYTES NFR BLD AUTO: 12 % — SIGNIFICANT CHANGE UP (ref 2–14)
NEUTROPHILS # BLD AUTO: 0.61 K/UL — LOW (ref 1.8–7.4)
NEUTROPHILS NFR BLD AUTO: 43 % — SIGNIFICANT CHANGE UP (ref 43–77)
NRBC # BLD: 0 /100 — SIGNIFICANT CHANGE UP (ref 0–0)
NRBC # BLD: SIGNIFICANT CHANGE UP /100 WBCS (ref 0–0)
PLAT MORPH BLD: NORMAL — SIGNIFICANT CHANGE UP
PLATELET # BLD AUTO: 19 K/UL — CRITICAL LOW (ref 150–400)
PLATELET # BLD MANUAL: 63 K/UL — LOW (ref 150–400)
RBC # BLD: 2.98 M/UL — LOW (ref 3.8–5.2)
RBC # FLD: 17.6 % — HIGH (ref 10.3–14.5)
RBC BLD AUTO: SIGNIFICANT CHANGE UP
VARIANT LYMPHS # BLD: 1 % — SIGNIFICANT CHANGE UP (ref 0–6)
WBC # BLD: 1.43 K/UL — LOW (ref 3.8–10.5)
WBC # FLD AUTO: 1.43 K/UL — LOW (ref 3.8–10.5)

## 2020-10-17 PROCEDURE — 87205 SMEAR GRAM STAIN: CPT

## 2020-10-17 PROCEDURE — 88313 SPECIAL STAINS GROUP 2: CPT

## 2020-10-17 PROCEDURE — 85097 BONE MARROW INTERPRETATION: CPT

## 2020-10-17 PROCEDURE — 88237 TISSUE CULTURE BONE MARROW: CPT

## 2020-10-17 PROCEDURE — 86901 BLOOD TYPING SEROLOGIC RH(D): CPT

## 2020-10-17 PROCEDURE — 86850 RBC ANTIBODY SCREEN: CPT

## 2020-10-17 PROCEDURE — 88305 TISSUE EXAM BY PATHOLOGIST: CPT

## 2020-10-17 PROCEDURE — 81245 FLT3 GENE: CPT

## 2020-10-17 PROCEDURE — 88184 FLOWCYTOMETRY/ TC 1 MARKER: CPT

## 2020-10-17 PROCEDURE — 88185 FLOWCYTOMETRY/TC ADD-ON: CPT

## 2020-10-17 PROCEDURE — 88341 IMHCHEM/IMCYTCHM EA ADD ANTB: CPT

## 2020-10-17 PROCEDURE — 88264 CHROMOSOME ANALYSIS 20-25: CPT

## 2020-10-17 PROCEDURE — 88280 CHROMOSOME KARYOTYPE STUDY: CPT

## 2020-10-17 PROCEDURE — 86900 BLOOD TYPING SEROLOGIC ABO: CPT

## 2020-10-19 DIAGNOSIS — R11.2 NAUSEA WITH VOMITING, UNSPECIFIED: ICD-10-CM

## 2020-10-19 LAB — HLX FLT3 FINAL REPORT: SIGNIFICANT CHANGE UP

## 2020-10-20 ENCOUNTER — RESULT REVIEW (OUTPATIENT)
Age: 80
End: 2020-10-20

## 2020-10-20 ENCOUNTER — APPOINTMENT (OUTPATIENT)
Dept: INFUSION THERAPY | Facility: HOSPITAL | Age: 80
End: 2020-10-20

## 2020-10-20 LAB
BASOPHILS # BLD AUTO: 0 K/UL — SIGNIFICANT CHANGE UP (ref 0–0.2)
BASOPHILS NFR BLD AUTO: 0 % — SIGNIFICANT CHANGE UP (ref 0–2)
EOSINOPHIL # BLD AUTO: 0.01 K/UL — SIGNIFICANT CHANGE UP (ref 0–0.5)
EOSINOPHIL NFR BLD AUTO: 1 % — SIGNIFICANT CHANGE UP (ref 0–6)
HCT VFR BLD CALC: 30 % — LOW (ref 34.5–45)
HGB BLD-MCNC: 10.5 G/DL — LOW (ref 11.5–15.5)
LYMPHOCYTES # BLD AUTO: 0.62 K/UL — LOW (ref 1–3.3)
LYMPHOCYTES # BLD AUTO: 76 % — HIGH (ref 13–44)
MCHC RBC-ENTMCNC: 35 G/DL — SIGNIFICANT CHANGE UP (ref 32–36)
MCHC RBC-ENTMCNC: 35.4 PG — HIGH (ref 27–34)
MCV RBC AUTO: 101 FL — HIGH (ref 80–100)
MONOCYTES # BLD AUTO: 0.02 K/UL — SIGNIFICANT CHANGE UP (ref 0–0.9)
MONOCYTES NFR BLD AUTO: 2 % — SIGNIFICANT CHANGE UP (ref 2–14)
MYELOCYTES NFR BLD: 1 % — HIGH (ref 0–0)
NEUTROPHILS # BLD AUTO: 0.15 K/UL — LOW (ref 1.8–7.4)
NEUTROPHILS NFR BLD AUTO: 18 % — LOW (ref 43–77)
NRBC # BLD: 0 /100 — SIGNIFICANT CHANGE UP (ref 0–0)
NRBC # BLD: SIGNIFICANT CHANGE UP /100 WBCS (ref 0–0)
PLAT MORPH BLD: NORMAL — SIGNIFICANT CHANGE UP
PLATELET # BLD AUTO: 30 K/UL — LOW (ref 150–400)
RBC # BLD: 2.97 M/UL — LOW (ref 3.8–5.2)
RBC # FLD: 17.8 % — HIGH (ref 10.3–14.5)
RBC BLD AUTO: SIGNIFICANT CHANGE UP
VARIANT LYMPHS # BLD: 2 % — SIGNIFICANT CHANGE UP (ref 0–6)
WBC # BLD: 0.81 K/UL — CRITICAL LOW (ref 3.8–10.5)
WBC # FLD AUTO: 0.81 K/UL — CRITICAL LOW (ref 3.8–10.5)

## 2020-10-21 ENCOUNTER — OUTPATIENT (OUTPATIENT)
Dept: OUTPATIENT SERVICES | Facility: HOSPITAL | Age: 80
LOS: 1 days | Discharge: HOME | End: 2020-10-21

## 2020-10-21 DIAGNOSIS — C91.00 ACUTE LYMPHOBLASTIC LEUKEMIA NOT HAVING ACHIEVED REMISSION: ICD-10-CM

## 2020-10-21 DIAGNOSIS — Z90.710 ACQUIRED ABSENCE OF BOTH CERVIX AND UTERUS: Chronic | ICD-10-CM

## 2020-10-21 LAB
SARS-COV-2 RNA SPEC QL NAA+PROBE: SIGNIFICANT CHANGE UP
TM INTERPRETATION: SIGNIFICANT CHANGE UP

## 2020-10-23 ENCOUNTER — RESULT REVIEW (OUTPATIENT)
Age: 80
End: 2020-10-23

## 2020-10-23 ENCOUNTER — APPOINTMENT (OUTPATIENT)
Dept: INFUSION THERAPY | Facility: HOSPITAL | Age: 80
End: 2020-10-23

## 2020-10-23 LAB
ANISOCYTOSIS BLD QL: SLIGHT — SIGNIFICANT CHANGE UP
BASOPHILS # BLD AUTO: 0.01 K/UL — SIGNIFICANT CHANGE UP (ref 0–0.2)
BASOPHILS NFR BLD AUTO: 1 % — SIGNIFICANT CHANGE UP (ref 0–2)
EOSINOPHIL # BLD AUTO: 0.01 K/UL — SIGNIFICANT CHANGE UP (ref 0–0.5)
EOSINOPHIL NFR BLD AUTO: 2 % — SIGNIFICANT CHANGE UP (ref 0–6)
HCT VFR BLD CALC: 31 % — LOW (ref 34.5–45)
HGB BLD-MCNC: 10.4 G/DL — LOW (ref 11.5–15.5)
LYMPHOCYTES # BLD AUTO: 0.5 K/UL — LOW (ref 1–3.3)
LYMPHOCYTES # BLD AUTO: 80 % — HIGH (ref 13–44)
MCHC RBC-ENTMCNC: 33.5 G/DL — SIGNIFICANT CHANGE UP (ref 32–36)
MCHC RBC-ENTMCNC: 34.6 PG — HIGH (ref 27–34)
MCV RBC AUTO: 103 FL — HIGH (ref 80–100)
MONOCYTES # BLD AUTO: 0.03 K/UL — SIGNIFICANT CHANGE UP (ref 0–0.9)
MONOCYTES NFR BLD AUTO: 4 % — SIGNIFICANT CHANGE UP (ref 2–14)
NEUTROPHILS # BLD AUTO: 0.08 K/UL — LOW (ref 1.8–7.4)
NEUTROPHILS NFR BLD AUTO: 13 % — LOW (ref 43–77)
NRBC # BLD: 1 /100 — HIGH (ref 0–0)
NRBC # BLD: SIGNIFICANT CHANGE UP /100 WBCS (ref 0–0)
PLAT MORPH BLD: NORMAL — SIGNIFICANT CHANGE UP
PLATELET # BLD AUTO: 30 K/UL — LOW (ref 150–400)
RBC # BLD: 3.01 M/UL — LOW (ref 3.8–5.2)
RBC # FLD: 18.1 % — HIGH (ref 10.3–14.5)
RBC BLD AUTO: SIGNIFICANT CHANGE UP
WBC # BLD: 0.63 K/UL — CRITICAL LOW (ref 3.8–10.5)
WBC # FLD AUTO: 0.63 K/UL — CRITICAL LOW (ref 3.8–10.5)

## 2020-10-28 ENCOUNTER — RESULT REVIEW (OUTPATIENT)
Age: 80
End: 2020-10-28

## 2020-10-28 ENCOUNTER — APPOINTMENT (OUTPATIENT)
Dept: INFUSION THERAPY | Facility: HOSPITAL | Age: 80
End: 2020-10-28

## 2020-10-28 LAB
ANISOCYTOSIS BLD QL: SLIGHT — SIGNIFICANT CHANGE UP
BASOPHILS # BLD AUTO: 0 K/UL — SIGNIFICANT CHANGE UP (ref 0–0.2)
BASOPHILS NFR BLD AUTO: 0 % — SIGNIFICANT CHANGE UP (ref 0–2)
CHROM ANALY OVERALL INTERP SPEC-IMP: SIGNIFICANT CHANGE UP
EOSINOPHIL # BLD AUTO: 0.02 K/UL — SIGNIFICANT CHANGE UP (ref 0–0.5)
EOSINOPHIL NFR BLD AUTO: 2 % — SIGNIFICANT CHANGE UP (ref 0–6)
HCT VFR BLD CALC: 30.2 % — LOW (ref 34.5–45)
HGB BLD-MCNC: 10.2 G/DL — LOW (ref 11.5–15.5)
LYMPHOCYTES # BLD AUTO: 0.49 K/UL — LOW (ref 1–3.3)
LYMPHOCYTES # BLD AUTO: 60 % — HIGH (ref 13–44)
MCHC RBC-ENTMCNC: 33.8 G/DL — SIGNIFICANT CHANGE UP (ref 32–36)
MCHC RBC-ENTMCNC: 34.9 PG — HIGH (ref 27–34)
MCV RBC AUTO: 103.4 FL — HIGH (ref 80–100)
MONOCYTES # BLD AUTO: 0.09 K/UL — SIGNIFICANT CHANGE UP (ref 0–0.9)
MONOCYTES NFR BLD AUTO: 11 % — SIGNIFICANT CHANGE UP (ref 2–14)
NEUTROPHILS # BLD AUTO: 0.19 K/UL — LOW (ref 1.8–7.4)
NEUTROPHILS NFR BLD AUTO: 23 % — LOW (ref 43–77)
NRBC # BLD: 0 /100 — SIGNIFICANT CHANGE UP (ref 0–0)
NRBC # BLD: SIGNIFICANT CHANGE UP /100 WBCS (ref 0–0)
PLAT MORPH BLD: NORMAL — SIGNIFICANT CHANGE UP
PLATELET # BLD AUTO: 86 K/UL — LOW (ref 150–400)
RBC # BLD: 2.92 M/UL — LOW (ref 3.8–5.2)
RBC # FLD: 18.1 % — HIGH (ref 10.3–14.5)
RBC BLD AUTO: SIGNIFICANT CHANGE UP
VARIANT LYMPHS # BLD: 4 % — SIGNIFICANT CHANGE UP (ref 0–6)
WBC # BLD: 0.81 K/UL — CRITICAL LOW (ref 3.8–10.5)
WBC # FLD AUTO: 0.81 K/UL — CRITICAL LOW (ref 3.8–10.5)

## 2020-11-06 ENCOUNTER — RESULT REVIEW (OUTPATIENT)
Age: 80
End: 2020-11-06

## 2020-11-06 ENCOUNTER — APPOINTMENT (OUTPATIENT)
Dept: HEMATOLOGY ONCOLOGY | Facility: CLINIC | Age: 80
End: 2020-11-06
Payer: MEDICARE

## 2020-11-06 VITALS
BODY MASS INDEX: 21.53 KG/M2 | WEIGHT: 126.1 LBS | HEART RATE: 75 BPM | RESPIRATION RATE: 16 BRPM | TEMPERATURE: 97.4 F | DIASTOLIC BLOOD PRESSURE: 81 MMHG | SYSTOLIC BLOOD PRESSURE: 144 MMHG | OXYGEN SATURATION: 99 % | HEIGHT: 64.17 IN

## 2020-11-06 LAB
BASOPHILS # BLD AUTO: 0.02 K/UL — SIGNIFICANT CHANGE UP (ref 0–0.2)
BASOPHILS NFR BLD AUTO: 1 % — SIGNIFICANT CHANGE UP (ref 0–2)
EOSINOPHIL # BLD AUTO: 0.02 K/UL — SIGNIFICANT CHANGE UP (ref 0–0.5)
EOSINOPHIL NFR BLD AUTO: 1 % — SIGNIFICANT CHANGE UP (ref 0–6)
HCT VFR BLD CALC: 32.8 % — LOW (ref 34.5–45)
HGB BLD-MCNC: 10.7 G/DL — LOW (ref 11.5–15.5)
LYMPHOCYTES # BLD AUTO: 0.53 K/UL — LOW (ref 1–3.3)
LYMPHOCYTES # BLD AUTO: 23 % — SIGNIFICANT CHANGE UP (ref 13–44)
MCHC RBC-ENTMCNC: 32.6 G/DL — SIGNIFICANT CHANGE UP (ref 32–36)
MCHC RBC-ENTMCNC: 33.9 PG — SIGNIFICANT CHANGE UP (ref 27–34)
MCV RBC AUTO: 103.8 FL — HIGH (ref 80–100)
MONOCYTES # BLD AUTO: 0.18 K/UL — SIGNIFICANT CHANGE UP (ref 0–0.9)
MONOCYTES NFR BLD AUTO: 8 % — SIGNIFICANT CHANGE UP (ref 2–14)
NEUTROPHILS # BLD AUTO: 1.53 K/UL — LOW (ref 1.8–7.4)
NEUTROPHILS NFR BLD AUTO: 66 % — SIGNIFICANT CHANGE UP (ref 43–77)
NEUTS BAND # BLD: 1 % — SIGNIFICANT CHANGE UP (ref 0–8)
NRBC # BLD: 0 /100 — SIGNIFICANT CHANGE UP (ref 0–0)
NRBC # BLD: SIGNIFICANT CHANGE UP /100 WBCS (ref 0–0)
PLAT MORPH BLD: NORMAL — SIGNIFICANT CHANGE UP
PLATELET # BLD AUTO: 151 K/UL — SIGNIFICANT CHANGE UP (ref 150–400)
RBC # BLD: 3.16 M/UL — LOW (ref 3.8–5.2)
RBC # FLD: 17.9 % — HIGH (ref 10.3–14.5)
RBC BLD AUTO: SIGNIFICANT CHANGE UP
WBC # BLD: 2.29 K/UL — LOW (ref 3.8–10.5)
WBC # FLD AUTO: 2.29 K/UL — LOW (ref 3.8–10.5)

## 2020-11-06 PROCEDURE — 99072 ADDL SUPL MATRL&STAF TM PHE: CPT

## 2020-11-06 PROCEDURE — 99215 OFFICE O/P EST HI 40 MIN: CPT

## 2020-11-06 NOTE — HISTORY OF PRESENT ILLNESS
[Disease:__________________________] : Disease: [unfilled] [Therapy: ___] : Therapy: [unfilled] [Cycle: ___] : Cycle: [unfilled] [de-identified] : Ms. 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 She has a history of Breast cancer 12 years ago treated with tamoxifen (no chemotherapy), s/p RT and lumpectomy, HTN and surgical hx of hysterectomy, was sent in by oncologist Dr. Blank on 1/31/20 for rule out leukemia. Patient had been feeling generally fatigued and sluggish since December 2019. She was feeling more tired, and saw her PMD, who did some blood work and noticed blasts in her peripheral blood. Pt was told to see Dr. Blank for further workup. Dr. Blank did blood work again which again found blasts in the peripheral blood, at which point she sent pt to the ER for leukemia workup. 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 She was started on 2/4/20 on Cycle 1 of Dacogen + Venetoclax (50 mg on day 1, 100 mg on day 2, 200 mg on day 3 and onward when starting Diflucan). She tolerated it well other than some SOB -CXR on 2/7/20 : mild pulmonary edema and L pleural effusion, treated with diuretics. She was discharged home on 2/11/20. \par \par She had a BM bx to eval response on 3/2/20 -showed no blasts. Pt given C2 days 1-3 on 3/2, 3/4 and 3/5, after which her Venetoclax was held for count recovery. She has now completed up through cycle 8 of dacogen/Venclexta (10 days each cycle). Repeat bone marrow biopsy showing no evidence of leukemia and hypocellular trilineage hematopoiesis with 10% bone marrow cellularity. Venclexta held since then. \par  [de-identified] : Disseminated [de-identified] : 1, 2. Bone marrow biopsy and bone marrow aspirate\par - Acute myeloid leukemia with mutated NPM1\par - FLT 3-ITD positive\par \par Diagnostic Note:\par Megakarocytes are normal in number with dysplastic morphology.\par Please note findings of a normal female karyotype, negative MDS\par FISH panel. and somatic mutations of FLT3 FLT3-ITD\par ([id number]), [id number]*12, and CEBPA F106fs*1.\par Based on the additional findings, the classification of AML has\par changed to acute myeloid leukemia with mutated NPM1.\par  [de-identified] : Patient appears extremely anxious in the office today. She kept repeating throughout interview today that she has "had enough of this" and she "doesn't think she can do it anymore". On questioning, she reports that she thinks she is depressed and that she no longer enjoys doing things she typically does because of her situation. She reports diffuse body aches, most severe and bothersome in her lower back where she knows she has a pinched nerve. Ambulation is fine. She reports she took Aleve this AM with good relief, but is weary of taking it consistently. She reports persistent fatigue especially on exertion, but no dyspnea or chest pain. Appetite is good.

## 2020-11-06 NOTE — RESULTS/DATA
[FreeTextEntry1] : On 11/6/20 wbc 2.29 Hb 10.7, Plt 151\par On 10/28/20 wbc 0.81, Hb 10.2, Plt 86\par On 9/28/20 wbc 1.56  Hb 10 plt 169\par On 6/15/20 wbc 0.99 Hgb 13.1  plt 171\par On 5/12/20) wbc 1.1  Hb 13.1plt 211\par On 4/15/20) wbc 7.78 Hb 12.3 plt  149 ANC 4680\par On 3/31/20) wbc 4. 3 Hb 11.8 plt 395 ANC 2800\par On 3/17/20) wbc 0.85 Hb 10.2  plt 57\par On 3/11/20) wbc 0.3 Hb 7.8 plt 9\par (On 2/11/20) wbc 1.1  Hb 10.2 plt 48\par On 2/10/20 wbc 0.59 Hb 8.4 plt 37\par On 1/31/20 wbc 25.4 with 74% blasts, Hb 11.7 plt 153\par \par \par \par

## 2020-11-06 NOTE — REVIEW OF SYSTEMS
[Fatigue] : fatigue [Incontinence] : incontinence [Joint Pain] : joint pain [Anxiety] : anxiety [Negative] : Heme/Lymph [Fever] : no fever [Chills] : no chills [Night Sweats] : no night sweats [Recent Change In Weight] : ~T no recent weight change [Eye Pain] : no eye pain [Red Eyes] : eyes not red [Dry Eyes] : no dryness of the eyes [Vision Problems] : no vision problems [Dysphagia] : no dysphagia [Loss of Hearing] : no loss of hearing [Nosebleeds] : no nosebleeds [Hoarseness] : no hoarseness [Odynophagia] : no odynophagia [Mucosal Pain] : no mucosal pain [Chest Pain] : no chest pain [Palpitations] : no palpitations [Leg Claudication] : no intermittent leg claudication [Lower Ext Edema] : no lower extremity edema [Shortness Of Breath] : no shortness of breath [Wheezing] : no wheezing [Cough] : no cough [SOB on Exertion] : no shortness of breath during exertion [Abdominal Pain] : no abdominal pain [Vomiting] : no vomiting [Constipation] : no constipation [Diarrhea] : no diarrhea [Dysuria] : no dysuria [Vaginal Discharge] : no vaginal discharge [Dysmenorrhea/Abn Vaginal Bleeding] : no dysmenorrhea/abnormal vaginal bleeding [Joint Stiffness] : no joint stiffness [Muscle Pain] : no muscle pain [Skin Rash] : no skin rash [Skin Wound] : no skin wound [Confused] : no confusion [Dizziness] : no dizziness [Fainting] : no fainting [Difficulty Walking] : no difficulty walking [Suicidal] : not suicidal [Insomnia] : no insomnia [Proptosis] : no proptosis [Hot Flashes] : no hot flashes [Muscle Weakness] : no muscle weakness [Deepening Of The Voice] : no deepening of the voice [Easy Bleeding] : no tendency for easy bleeding [Easy Bruising] : no tendency for easy bruising [Swollen Glands] : no swollen glands [FreeTextEntry6] : can go up 1 flight of stairs - exertional fatigue but not dyspnea - ball of phlegm feeling but cant cough it up.  [FreeTextEntry7] : no BRBPR. Colonoscopy -done 1 yr ago, normal;  [FreeTextEntry8] : urinary urgency.  [FreeTextEntry9] : lower back pain chronic -s/p compressed vertebrae - worsening.  [de-identified] : no panic attacks - uses trazodone for sleep and she sleeps fine.  [FreeTextEntry1] : seasonal allergies

## 2020-11-06 NOTE — PHYSICAL EXAM
[Restricted in physically strenuous activity but ambulatory and able to carry out work of a light or sedentary nature] : Status 1- Restricted in physically strenuous activity but ambulatory and able to carry out work of a light or sedentary nature, e.g., light house work, office work [Normal] : affect appropriate [Thin] : thin [Ulcers] : no ulcers [Mucositis] : no mucositis [Thrush] : no thrush [de-identified] : L sided Port -no inflammation. Minimal LE edema b/l, R>L

## 2020-11-09 ENCOUNTER — LABORATORY RESULT (OUTPATIENT)
Age: 80
End: 2020-11-09

## 2020-11-09 ENCOUNTER — RESULT REVIEW (OUTPATIENT)
Age: 80
End: 2020-11-09

## 2020-11-09 ENCOUNTER — APPOINTMENT (OUTPATIENT)
Dept: INFUSION THERAPY | Facility: HOSPITAL | Age: 80
End: 2020-11-09

## 2020-11-09 DIAGNOSIS — Z51.11 ENCOUNTER FOR ANTINEOPLASTIC CHEMOTHERAPY: ICD-10-CM

## 2020-11-09 LAB
BASOPHILS # BLD AUTO: 0 K/UL — SIGNIFICANT CHANGE UP (ref 0–0.2)
BASOPHILS NFR BLD AUTO: 0 % — SIGNIFICANT CHANGE UP (ref 0–2)
EOSINOPHIL # BLD AUTO: 0.01 K/UL — SIGNIFICANT CHANGE UP (ref 0–0.5)
EOSINOPHIL NFR BLD AUTO: 0.5 % — SIGNIFICANT CHANGE UP (ref 0–6)
HCT VFR BLD CALC: 29.1 % — LOW (ref 34.5–45)
HGB BLD-MCNC: 9.6 G/DL — LOW (ref 11.5–15.5)
IMM GRANULOCYTES NFR BLD AUTO: 0.5 % — SIGNIFICANT CHANGE UP (ref 0–1.5)
LYMPHOCYTES # BLD AUTO: 0.54 K/UL — LOW (ref 1–3.3)
LYMPHOCYTES # BLD AUTO: 27.4 % — SIGNIFICANT CHANGE UP (ref 13–44)
MCHC RBC-ENTMCNC: 33 G/DL — SIGNIFICANT CHANGE UP (ref 32–36)
MCHC RBC-ENTMCNC: 33.9 PG — SIGNIFICANT CHANGE UP (ref 27–34)
MCV RBC AUTO: 102.8 FL — HIGH (ref 80–100)
MONOCYTES # BLD AUTO: 0.27 K/UL — SIGNIFICANT CHANGE UP (ref 0–0.9)
MONOCYTES NFR BLD AUTO: 13.7 % — SIGNIFICANT CHANGE UP (ref 2–14)
NEUTROPHILS # BLD AUTO: 1.14 K/UL — LOW (ref 1.8–7.4)
NEUTROPHILS NFR BLD AUTO: 57.9 % — SIGNIFICANT CHANGE UP (ref 43–77)
NRBC # BLD: 0 /100 WBCS — SIGNIFICANT CHANGE UP (ref 0–0)
PLATELET # BLD AUTO: 126 K/UL — LOW (ref 150–400)
RBC # BLD: 2.83 M/UL — LOW (ref 3.8–5.2)
RBC # FLD: 17.5 % — HIGH (ref 10.3–14.5)
WBC # BLD: 1.97 K/UL — LOW (ref 3.8–10.5)
WBC # FLD AUTO: 1.97 K/UL — LOW (ref 3.8–10.5)

## 2020-11-10 ENCOUNTER — APPOINTMENT (OUTPATIENT)
Dept: INFUSION THERAPY | Facility: HOSPITAL | Age: 80
End: 2020-11-10

## 2020-11-11 ENCOUNTER — APPOINTMENT (OUTPATIENT)
Dept: INFUSION THERAPY | Facility: HOSPITAL | Age: 80
End: 2020-11-11

## 2020-11-11 LAB
ALBUMIN SERPL ELPH-MCNC: 4.1 G/DL
ALP BLD-CCNC: 67 U/L
ALT SERPL-CCNC: 19 U/L
ANION GAP SERPL CALC-SCNC: 14 MMOL/L
AST SERPL-CCNC: 19 U/L
BILIRUB SERPL-MCNC: 0.2 MG/DL
BUN SERPL-MCNC: 17 MG/DL
CALCIUM SERPL-MCNC: 9.3 MG/DL
CHLORIDE SERPL-SCNC: 100 MMOL/L
CO2 SERPL-SCNC: 28 MMOL/L
CREAT SERPL-MCNC: 0.6 MG/DL
GLUCOSE SERPL-MCNC: 117 MG/DL
LDH SERPL-CCNC: 187 U/L
POTASSIUM SERPL-SCNC: 3.7 MMOL/L
PROT SERPL-MCNC: 6.3 G/DL
SARS-COV-2 N GENE NPH QL NAA+PROBE: NOT DETECTED
SODIUM SERPL-SCNC: 141 MMOL/L
URATE SERPL-MCNC: 4.3 MG/DL

## 2020-11-12 ENCOUNTER — RESULT REVIEW (OUTPATIENT)
Age: 80
End: 2020-11-12

## 2020-11-12 ENCOUNTER — APPOINTMENT (OUTPATIENT)
Dept: INFUSION THERAPY | Facility: HOSPITAL | Age: 80
End: 2020-11-12

## 2020-11-12 LAB
ANISOCYTOSIS BLD QL: SLIGHT — SIGNIFICANT CHANGE UP
BASOPHILS # BLD AUTO: 0 K/UL — SIGNIFICANT CHANGE UP (ref 0–0.2)
BASOPHILS NFR BLD AUTO: 0 % — SIGNIFICANT CHANGE UP (ref 0–2)
EOSINOPHIL # BLD AUTO: 0 K/UL — SIGNIFICANT CHANGE UP (ref 0–0.5)
EOSINOPHIL NFR BLD AUTO: 0 % — SIGNIFICANT CHANGE UP (ref 0–6)
HCT VFR BLD CALC: 30.6 % — LOW (ref 34.5–45)
HGB BLD-MCNC: 10 G/DL — LOW (ref 11.5–15.5)
LYMPHOCYTES # BLD AUTO: 0.44 K/UL — LOW (ref 1–3.3)
LYMPHOCYTES # BLD AUTO: 21 % — SIGNIFICANT CHANGE UP (ref 13–44)
MCHC RBC-ENTMCNC: 32.7 G/DL — SIGNIFICANT CHANGE UP (ref 32–36)
MCHC RBC-ENTMCNC: 33.9 PG — SIGNIFICANT CHANGE UP (ref 27–34)
MCV RBC AUTO: 103.7 FL — HIGH (ref 80–100)
MICROCYTES BLD QL: SLIGHT — SIGNIFICANT CHANGE UP
MONOCYTES # BLD AUTO: 0.31 K/UL — SIGNIFICANT CHANGE UP (ref 0–0.9)
MONOCYTES NFR BLD AUTO: 15 % — HIGH (ref 2–14)
NEUTROPHILS # BLD AUTO: 1.34 K/UL — LOW (ref 1.8–7.4)
NEUTROPHILS NFR BLD AUTO: 64 % — SIGNIFICANT CHANGE UP (ref 43–77)
NRBC # BLD: 0 /100 — SIGNIFICANT CHANGE UP (ref 0–0)
NRBC # BLD: SIGNIFICANT CHANGE UP /100 WBCS (ref 0–0)
PLAT MORPH BLD: NORMAL — SIGNIFICANT CHANGE UP
PLATELET # BLD AUTO: 102 K/UL — LOW (ref 150–400)
POIKILOCYTOSIS BLD QL AUTO: SLIGHT — SIGNIFICANT CHANGE UP
RBC # BLD: 2.95 M/UL — LOW (ref 3.8–5.2)
RBC # FLD: 17.3 % — HIGH (ref 10.3–14.5)
RBC BLD AUTO: ABNORMAL
WBC # BLD: 2.09 K/UL — LOW (ref 3.8–10.5)
WBC # FLD AUTO: 2.09 K/UL — LOW (ref 3.8–10.5)

## 2020-11-13 ENCOUNTER — NON-APPOINTMENT (OUTPATIENT)
Age: 80
End: 2020-11-13

## 2020-11-13 ENCOUNTER — RESULT REVIEW (OUTPATIENT)
Age: 80
End: 2020-11-13

## 2020-11-13 ENCOUNTER — APPOINTMENT (OUTPATIENT)
Dept: INFUSION THERAPY | Facility: HOSPITAL | Age: 80
End: 2020-11-13

## 2020-11-13 LAB
BASOPHILS # BLD AUTO: 0 K/UL — SIGNIFICANT CHANGE UP (ref 0–0.2)
BASOPHILS NFR BLD AUTO: 0 % — SIGNIFICANT CHANGE UP (ref 0–2)
EOSINOPHIL # BLD AUTO: 0.02 K/UL — SIGNIFICANT CHANGE UP (ref 0–0.5)
EOSINOPHIL NFR BLD AUTO: 1 % — SIGNIFICANT CHANGE UP (ref 0–6)
HCT VFR BLD CALC: 29.3 % — LOW (ref 34.5–45)
HGB BLD-MCNC: 9.6 G/DL — LOW (ref 11.5–15.5)
LYMPHOCYTES # BLD AUTO: 0.52 K/UL — LOW (ref 1–3.3)
LYMPHOCYTES # BLD AUTO: 28 % — SIGNIFICANT CHANGE UP (ref 13–44)
MCHC RBC-ENTMCNC: 32.8 G/DL — SIGNIFICANT CHANGE UP (ref 32–36)
MCHC RBC-ENTMCNC: 34.4 PG — HIGH (ref 27–34)
MCV RBC AUTO: 105 FL — HIGH (ref 80–100)
MONOCYTES # BLD AUTO: 0.3 K/UL — SIGNIFICANT CHANGE UP (ref 0–0.9)
MONOCYTES NFR BLD AUTO: 16 % — HIGH (ref 2–14)
NEUTROPHILS # BLD AUTO: 1.03 K/UL — LOW (ref 1.8–7.4)
NEUTROPHILS NFR BLD AUTO: 55 % — SIGNIFICANT CHANGE UP (ref 43–77)
NRBC # BLD: 0 /100 — SIGNIFICANT CHANGE UP (ref 0–0)
NRBC # BLD: SIGNIFICANT CHANGE UP /100 WBCS (ref 0–0)
PLAT MORPH BLD: NORMAL — SIGNIFICANT CHANGE UP
PLATELET # BLD AUTO: 122 K/UL — LOW (ref 150–400)
RBC # BLD: 2.79 M/UL — LOW (ref 3.8–5.2)
RBC # FLD: 17.3 % — HIGH (ref 10.3–14.5)
RBC BLD AUTO: SIGNIFICANT CHANGE UP
WBC # BLD: 1.87 K/UL — LOW (ref 3.8–10.5)
WBC # FLD AUTO: 1.87 K/UL — LOW (ref 3.8–10.5)

## 2020-11-14 DIAGNOSIS — R11.2 NAUSEA WITH VOMITING, UNSPECIFIED: ICD-10-CM

## 2020-11-14 DIAGNOSIS — Z51.89 ENCOUNTER FOR OTHER SPECIFIED AFTERCARE: ICD-10-CM

## 2020-11-20 ENCOUNTER — APPOINTMENT (OUTPATIENT)
Dept: HEMATOLOGY ONCOLOGY | Facility: CLINIC | Age: 80
End: 2020-11-20

## 2020-11-20 ENCOUNTER — RESULT REVIEW (OUTPATIENT)
Age: 80
End: 2020-11-20

## 2020-11-20 LAB
BASOPHILS # BLD AUTO: 0.03 K/UL — SIGNIFICANT CHANGE UP (ref 0–0.2)
BASOPHILS NFR BLD AUTO: 2 % — SIGNIFICANT CHANGE UP (ref 0–2)
EOSINOPHIL # BLD AUTO: 0.01 K/UL — SIGNIFICANT CHANGE UP (ref 0–0.5)
EOSINOPHIL NFR BLD AUTO: 1 % — SIGNIFICANT CHANGE UP (ref 0–6)
HCT VFR BLD CALC: 29.7 % — LOW (ref 34.5–45)
HGB BLD-MCNC: 10.6 G/DL — LOW (ref 11.5–15.5)
LYMPHOCYTES # BLD AUTO: 0.51 K/UL — LOW (ref 1–3.3)
LYMPHOCYTES # BLD AUTO: 35 % — SIGNIFICANT CHANGE UP (ref 13–44)
MCHC RBC-ENTMCNC: 35.2 PG — HIGH (ref 27–34)
MCHC RBC-ENTMCNC: 35.7 G/DL — SIGNIFICANT CHANGE UP (ref 32–36)
MCV RBC AUTO: 98.7 FL — SIGNIFICANT CHANGE UP (ref 80–100)
MONOCYTES # BLD AUTO: 0.12 K/UL — SIGNIFICANT CHANGE UP (ref 0–0.9)
MONOCYTES NFR BLD AUTO: 8 % — SIGNIFICANT CHANGE UP (ref 2–14)
MYELOCYTES NFR BLD: 1 % — HIGH (ref 0–0)
NEUTROPHILS # BLD AUTO: 0.77 K/UL — LOW (ref 1.8–7.4)
NEUTROPHILS NFR BLD AUTO: 53 % — SIGNIFICANT CHANGE UP (ref 43–77)
NRBC # BLD: 0 /100 — SIGNIFICANT CHANGE UP (ref 0–0)
NRBC # BLD: SIGNIFICANT CHANGE UP /100 WBCS (ref 0–0)
PLAT MORPH BLD: NORMAL — SIGNIFICANT CHANGE UP
PLATELET # BLD AUTO: 16 K/UL — CRITICAL LOW (ref 150–400)
RBC # BLD: 3.01 M/UL — LOW (ref 3.8–5.2)
RBC # FLD: 17.2 % — HIGH (ref 10.3–14.5)
RBC BLD AUTO: SIGNIFICANT CHANGE UP
WBC # BLD: 1.46 K/UL — LOW (ref 3.8–10.5)
WBC # FLD AUTO: 1.46 K/UL — LOW (ref 3.8–10.5)

## 2020-11-23 ENCOUNTER — OUTPATIENT (OUTPATIENT)
Dept: OUTPATIENT SERVICES | Facility: HOSPITAL | Age: 80
LOS: 1 days | Discharge: HOME | End: 2020-11-23

## 2020-11-23 DIAGNOSIS — C92.00 ACUTE MYELOBLASTIC LEUKEMIA, NOT HAVING ACHIEVED REMISSION: ICD-10-CM

## 2020-11-23 DIAGNOSIS — Z90.710 ACQUIRED ABSENCE OF BOTH CERVIX AND UTERUS: Chronic | ICD-10-CM

## 2020-11-24 ENCOUNTER — RESULT REVIEW (OUTPATIENT)
Age: 80
End: 2020-11-24

## 2020-11-24 ENCOUNTER — APPOINTMENT (OUTPATIENT)
Dept: INFUSION THERAPY | Facility: HOSPITAL | Age: 80
End: 2020-11-24

## 2020-11-24 LAB
ANISOCYTOSIS BLD QL: SLIGHT — SIGNIFICANT CHANGE UP
BASOPHILS # BLD AUTO: 0 K/UL — SIGNIFICANT CHANGE UP (ref 0–0.2)
BASOPHILS NFR BLD AUTO: 0 % — SIGNIFICANT CHANGE UP (ref 0–2)
ELLIPTOCYTES BLD QL SMEAR: SLIGHT — SIGNIFICANT CHANGE UP
EOSINOPHIL # BLD AUTO: 0 K/UL — SIGNIFICANT CHANGE UP (ref 0–0.5)
EOSINOPHIL NFR BLD AUTO: 0 % — SIGNIFICANT CHANGE UP (ref 0–6)
HCT VFR BLD CALC: 28.9 % — LOW (ref 34.5–45)
HGB BLD-MCNC: 9.7 G/DL — LOW (ref 11.5–15.5)
LYMPHOCYTES # BLD AUTO: 0.63 K/UL — LOW (ref 1–3.3)
LYMPHOCYTES # BLD AUTO: 84 % — HIGH (ref 13–44)
MACROCYTES BLD QL: SLIGHT — SIGNIFICANT CHANGE UP
MCHC RBC-ENTMCNC: 33.6 G/DL — SIGNIFICANT CHANGE UP (ref 32–36)
MCHC RBC-ENTMCNC: 33.7 PG — SIGNIFICANT CHANGE UP (ref 27–34)
MCV RBC AUTO: 100.3 FL — HIGH (ref 80–100)
MICROCYTES BLD QL: SLIGHT — SIGNIFICANT CHANGE UP
MONOCYTES # BLD AUTO: 0.02 K/UL — SIGNIFICANT CHANGE UP (ref 0–0.9)
MONOCYTES NFR BLD AUTO: 2 % — SIGNIFICANT CHANGE UP (ref 2–14)
NEUTROPHILS # BLD AUTO: 0.11 K/UL — LOW (ref 1.8–7.4)
NEUTROPHILS NFR BLD AUTO: 14 % — LOW (ref 43–77)
NRBC # BLD: 0 /100 — SIGNIFICANT CHANGE UP (ref 0–0)
NRBC # BLD: SIGNIFICANT CHANGE UP /100 WBCS (ref 0–0)
OVALOCYTES BLD QL SMEAR: SLIGHT — SIGNIFICANT CHANGE UP
PLAT MORPH BLD: NORMAL — SIGNIFICANT CHANGE UP
PLATELET # BLD AUTO: 10 K/UL — CRITICAL LOW (ref 150–400)
PLATELET # BLD MANUAL: 56 K/UL — LOW (ref 150–400)
POIKILOCYTOSIS BLD QL AUTO: SLIGHT — SIGNIFICANT CHANGE UP
RBC # BLD: 2.88 M/UL — LOW (ref 3.8–5.2)
RBC # FLD: 17.1 % — HIGH (ref 10.3–14.5)
RBC BLD AUTO: ABNORMAL
SCHISTOCYTES BLD QL AUTO: SLIGHT — SIGNIFICANT CHANGE UP
WBC # BLD: 0.75 K/UL — CRITICAL LOW (ref 3.8–10.5)
WBC # FLD AUTO: 0.75 K/UL — CRITICAL LOW (ref 3.8–10.5)

## 2020-11-27 ENCOUNTER — RESULT REVIEW (OUTPATIENT)
Age: 80
End: 2020-11-27

## 2020-11-27 ENCOUNTER — APPOINTMENT (OUTPATIENT)
Dept: INFUSION THERAPY | Facility: HOSPITAL | Age: 80
End: 2020-11-27

## 2020-11-27 LAB
ANISOCYTOSIS BLD QL: SLIGHT — SIGNIFICANT CHANGE UP
BASOPHILS # BLD AUTO: 0 K/UL — SIGNIFICANT CHANGE UP (ref 0–0.2)
BASOPHILS NFR BLD AUTO: 0 % — SIGNIFICANT CHANGE UP (ref 0–2)
EOSINOPHIL # BLD AUTO: 0.01 K/UL — SIGNIFICANT CHANGE UP (ref 0–0.5)
EOSINOPHIL NFR BLD AUTO: 1 % — SIGNIFICANT CHANGE UP (ref 0–6)
HCT VFR BLD CALC: 28 % — LOW (ref 34.5–45)
HGB BLD-MCNC: 9.2 G/DL — LOW (ref 11.5–15.5)
LYMPHOCYTES # BLD AUTO: 0.58 K/UL — LOW (ref 1–3.3)
LYMPHOCYTES # BLD AUTO: 92 % — HIGH (ref 13–44)
MCHC RBC-ENTMCNC: 32.9 G/DL — SIGNIFICANT CHANGE UP (ref 32–36)
MCHC RBC-ENTMCNC: 34.1 PG — HIGH (ref 27–34)
MCV RBC AUTO: 103.7 FL — HIGH (ref 80–100)
MONOCYTES # BLD AUTO: 0.01 K/UL — SIGNIFICANT CHANGE UP (ref 0–0.9)
MONOCYTES NFR BLD AUTO: 2 % — SIGNIFICANT CHANGE UP (ref 2–14)
NEUTROPHILS # BLD AUTO: 0.02 K/UL — LOW (ref 1.8–7.4)
NEUTROPHILS NFR BLD AUTO: 3 % — LOW (ref 43–77)
NRBC # BLD: 0 /100 — SIGNIFICANT CHANGE UP (ref 0–0)
NRBC # BLD: SIGNIFICANT CHANGE UP /100 WBCS (ref 0–0)
PLAT MORPH BLD: NORMAL — SIGNIFICANT CHANGE UP
PLATELET # BLD AUTO: 39 K/UL — LOW (ref 150–400)
POIKILOCYTOSIS BLD QL AUTO: SLIGHT — SIGNIFICANT CHANGE UP
RBC # BLD: 2.7 M/UL — LOW (ref 3.8–5.2)
RBC # FLD: 18.2 % — HIGH (ref 10.3–14.5)
RBC BLD AUTO: ABNORMAL
VARIANT LYMPHS # BLD: 2 % — SIGNIFICANT CHANGE UP (ref 0–6)
WBC # BLD: 0.63 K/UL — CRITICAL LOW (ref 3.8–10.5)
WBC # FLD AUTO: 0.63 K/UL — CRITICAL LOW (ref 3.8–10.5)

## 2020-11-30 ENCOUNTER — RESULT REVIEW (OUTPATIENT)
Age: 80
End: 2020-11-30

## 2020-11-30 ENCOUNTER — APPOINTMENT (OUTPATIENT)
Dept: INFUSION THERAPY | Facility: HOSPITAL | Age: 80
End: 2020-11-30

## 2020-11-30 LAB
ANISOCYTOSIS BLD QL: SLIGHT — SIGNIFICANT CHANGE UP
BASOPHILS # BLD AUTO: 0 K/UL — SIGNIFICANT CHANGE UP (ref 0–0.2)
BASOPHILS NFR BLD AUTO: 0 % — SIGNIFICANT CHANGE UP (ref 0–2)
ELLIPTOCYTES BLD QL SMEAR: SLIGHT — SIGNIFICANT CHANGE UP
EOSINOPHIL # BLD AUTO: 0 K/UL — SIGNIFICANT CHANGE UP (ref 0–0.5)
EOSINOPHIL NFR BLD AUTO: 0 % — SIGNIFICANT CHANGE UP (ref 0–6)
HCT VFR BLD CALC: 28.5 % — LOW (ref 34.5–45)
HGB BLD-MCNC: 9.6 G/DL — LOW (ref 11.5–15.5)
LYMPHOCYTES # BLD AUTO: 0.57 K/UL — LOW (ref 1–3.3)
LYMPHOCYTES # BLD AUTO: 92 % — HIGH (ref 13–44)
MCHC RBC-ENTMCNC: 33.7 G/DL — SIGNIFICANT CHANGE UP (ref 32–36)
MCHC RBC-ENTMCNC: 34.9 PG — HIGH (ref 27–34)
MCV RBC AUTO: 103.6 FL — HIGH (ref 80–100)
MONOCYTES # BLD AUTO: 0.01 K/UL — SIGNIFICANT CHANGE UP (ref 0–0.9)
MONOCYTES NFR BLD AUTO: 2 % — SIGNIFICANT CHANGE UP (ref 2–14)
NEUTROPHILS # BLD AUTO: 0.04 K/UL — LOW (ref 1.8–7.4)
NEUTROPHILS NFR BLD AUTO: 6 % — LOW (ref 43–77)
NRBC # BLD: 0 /100 — SIGNIFICANT CHANGE UP (ref 0–0)
NRBC # BLD: SIGNIFICANT CHANGE UP /100 WBCS (ref 0–0)
PLAT MORPH BLD: NORMAL — SIGNIFICANT CHANGE UP
PLATELET # BLD AUTO: 49 K/UL — LOW (ref 150–400)
POIKILOCYTOSIS BLD QL AUTO: SLIGHT — SIGNIFICANT CHANGE UP
RBC # BLD: 2.75 M/UL — LOW (ref 3.8–5.2)
RBC # FLD: 18.4 % — HIGH (ref 10.3–14.5)
RBC BLD AUTO: ABNORMAL
WBC # BLD: 0.62 K/UL — CRITICAL LOW (ref 3.8–10.5)
WBC # FLD AUTO: 0.62 K/UL — CRITICAL LOW (ref 3.8–10.5)

## 2020-12-03 ENCOUNTER — RESULT REVIEW (OUTPATIENT)
Age: 80
End: 2020-12-03

## 2020-12-03 ENCOUNTER — APPOINTMENT (OUTPATIENT)
Dept: HEMATOLOGY ONCOLOGY | Facility: CLINIC | Age: 80
End: 2020-12-03
Payer: MEDICARE

## 2020-12-03 ENCOUNTER — APPOINTMENT (OUTPATIENT)
Dept: INFUSION THERAPY | Facility: HOSPITAL | Age: 80
End: 2020-12-03

## 2020-12-03 ENCOUNTER — APPOINTMENT (OUTPATIENT)
Dept: HEMATOLOGY ONCOLOGY | Facility: CLINIC | Age: 80
End: 2020-12-03

## 2020-12-03 VITALS
DIASTOLIC BLOOD PRESSURE: 78 MMHG | RESPIRATION RATE: 15 BRPM | OXYGEN SATURATION: 99 % | SYSTOLIC BLOOD PRESSURE: 138 MMHG | WEIGHT: 126.3 LBS | HEART RATE: 88 BPM | HEIGHT: 64.17 IN | TEMPERATURE: 97.6 F | BODY MASS INDEX: 21.56 KG/M2

## 2020-12-03 LAB
BASOPHILS # BLD AUTO: 0 K/UL — SIGNIFICANT CHANGE UP (ref 0–0.2)
BASOPHILS NFR BLD AUTO: 0 % — SIGNIFICANT CHANGE UP (ref 0–2)
ELLIPTOCYTES BLD QL SMEAR: SLIGHT — SIGNIFICANT CHANGE UP
EOSINOPHIL # BLD AUTO: 0 K/UL — SIGNIFICANT CHANGE UP (ref 0–0.5)
EOSINOPHIL NFR BLD AUTO: 0 % — SIGNIFICANT CHANGE UP (ref 0–6)
HCT VFR BLD CALC: 29 % — LOW (ref 34.5–45)
HGB BLD-MCNC: 9.6 G/DL — LOW (ref 11.5–15.5)
LYMPHOCYTES # BLD AUTO: 0.51 K/UL — LOW (ref 1–3.3)
LYMPHOCYTES # BLD AUTO: 64 % — HIGH (ref 13–44)
MCHC RBC-ENTMCNC: 33.1 G/DL — SIGNIFICANT CHANGE UP (ref 32–36)
MCHC RBC-ENTMCNC: 34.7 PG — HIGH (ref 27–34)
MCV RBC AUTO: 104.7 FL — HIGH (ref 80–100)
MONOCYTES # BLD AUTO: 0.03 K/UL — SIGNIFICANT CHANGE UP (ref 0–0.9)
MONOCYTES NFR BLD AUTO: 4 % — SIGNIFICANT CHANGE UP (ref 2–14)
NEUTROPHILS # BLD AUTO: 0.25 K/UL — LOW (ref 1.8–7.4)
NEUTROPHILS NFR BLD AUTO: 32 % — LOW (ref 43–77)
NRBC # BLD: 0 /100 — SIGNIFICANT CHANGE UP (ref 0–0)
NRBC # BLD: SIGNIFICANT CHANGE UP /100 WBCS (ref 0–0)
PLAT MORPH BLD: NORMAL — SIGNIFICANT CHANGE UP
PLATELET # BLD AUTO: 117 K/UL — LOW (ref 150–400)
RBC # BLD: 2.77 M/UL — LOW (ref 3.8–5.2)
RBC # FLD: 18.4 % — HIGH (ref 10.3–14.5)
RBC BLD AUTO: SIGNIFICANT CHANGE UP
WBC # BLD: 0.79 K/UL — CRITICAL LOW (ref 3.8–10.5)
WBC # FLD AUTO: 0.79 K/UL — CRITICAL LOW (ref 3.8–10.5)

## 2020-12-03 PROCEDURE — 99213 OFFICE O/P EST LOW 20 MIN: CPT

## 2020-12-03 PROCEDURE — 99072 ADDL SUPL MATRL&STAF TM PHE: CPT

## 2020-12-07 ENCOUNTER — LABORATORY RESULT (OUTPATIENT)
Age: 80
End: 2020-12-07

## 2020-12-07 ENCOUNTER — RESULT REVIEW (OUTPATIENT)
Age: 80
End: 2020-12-07

## 2020-12-07 ENCOUNTER — APPOINTMENT (OUTPATIENT)
Dept: INFUSION THERAPY | Facility: HOSPITAL | Age: 80
End: 2020-12-07

## 2020-12-07 LAB
BASOPHILS # BLD AUTO: 0 K/UL — SIGNIFICANT CHANGE UP (ref 0–0.2)
BASOPHILS NFR BLD AUTO: 0 % — SIGNIFICANT CHANGE UP (ref 0–2)
EOSINOPHIL # BLD AUTO: 0 K/UL — SIGNIFICANT CHANGE UP (ref 0–0.5)
EOSINOPHIL NFR BLD AUTO: 0 % — SIGNIFICANT CHANGE UP (ref 0–6)
HCT VFR BLD CALC: 29.1 % — LOW (ref 34.5–45)
HGB BLD-MCNC: 9.5 G/DL — LOW (ref 11.5–15.5)
LYMPHOCYTES # BLD AUTO: 0.59 K/UL — LOW (ref 1–3.3)
LYMPHOCYTES # BLD AUTO: 45 % — HIGH (ref 13–44)
MCHC RBC-ENTMCNC: 32.6 G/DL — SIGNIFICANT CHANGE UP (ref 32–36)
MCHC RBC-ENTMCNC: 35.1 PG — HIGH (ref 27–34)
MCV RBC AUTO: 107.4 FL — HIGH (ref 80–100)
MONOCYTES # BLD AUTO: 0.03 K/UL — SIGNIFICANT CHANGE UP (ref 0–0.9)
MONOCYTES NFR BLD AUTO: 2 % — SIGNIFICANT CHANGE UP (ref 2–14)
NEUTROPHILS # BLD AUTO: 0.69 K/UL — LOW (ref 1.8–7.4)
NEUTROPHILS NFR BLD AUTO: 53 % — SIGNIFICANT CHANGE UP (ref 43–77)
NRBC # BLD: 1 /100 — HIGH (ref 0–0)
NRBC # BLD: SIGNIFICANT CHANGE UP /100 WBCS (ref 0–0)
PLAT MORPH BLD: NORMAL — SIGNIFICANT CHANGE UP
PLATELET # BLD AUTO: 141 K/UL — LOW (ref 150–400)
RBC # BLD: 2.71 M/UL — LOW (ref 3.8–5.2)
RBC # FLD: 18.4 % — HIGH (ref 10.3–14.5)
RBC BLD AUTO: SIGNIFICANT CHANGE UP
WBC # BLD: 1.3 K/UL — LOW (ref 3.8–10.5)
WBC # FLD AUTO: 1.3 K/UL — LOW (ref 3.8–10.5)

## 2020-12-08 NOTE — PHYSICAL EXAM
[Restricted in physically strenuous activity but ambulatory and able to carry out work of a light or sedentary nature] : Status 1- Restricted in physically strenuous activity but ambulatory and able to carry out work of a light or sedentary nature, e.g., light house work, office work [Thin] : thin [Normal] : affect appropriate [Ulcers] : no ulcers [Mucositis] : no mucositis [Thrush] : no thrush [de-identified] : L sided Port -no inflammation.

## 2020-12-08 NOTE — REVIEW OF SYSTEMS
[Fatigue] : fatigue [Incontinence] : incontinence [Joint Pain] : joint pain [Anxiety] : anxiety [Negative] : Heme/Lymph [Fever] : no fever [Chills] : no chills [Night Sweats] : no night sweats [Recent Change In Weight] : ~T no recent weight change [Eye Pain] : no eye pain [Red Eyes] : eyes not red [Dry Eyes] : no dryness of the eyes [Vision Problems] : no vision problems [Dysphagia] : no dysphagia [Loss of Hearing] : no loss of hearing [Nosebleeds] : no nosebleeds [Hoarseness] : no hoarseness [Odynophagia] : no odynophagia [Mucosal Pain] : no mucosal pain [Chest Pain] : no chest pain [Palpitations] : no palpitations [Leg Claudication] : no intermittent leg claudication [Lower Ext Edema] : no lower extremity edema [Shortness Of Breath] : no shortness of breath [Wheezing] : no wheezing [Cough] : no cough [SOB on Exertion] : no shortness of breath during exertion [Abdominal Pain] : no abdominal pain [Vomiting] : no vomiting [Constipation] : no constipation [Diarrhea] : no diarrhea [Dysuria] : no dysuria [Vaginal Discharge] : no vaginal discharge [Dysmenorrhea/Abn Vaginal Bleeding] : no dysmenorrhea/abnormal vaginal bleeding [Joint Stiffness] : no joint stiffness [Muscle Pain] : no muscle pain [Skin Rash] : no skin rash [Skin Wound] : no skin wound [Confused] : no confusion [Dizziness] : no dizziness [Fainting] : no fainting [Difficulty Walking] : no difficulty walking [Suicidal] : not suicidal [Insomnia] : no insomnia [Proptosis] : no proptosis [Hot Flashes] : no hot flashes [Muscle Weakness] : no muscle weakness [Deepening Of The Voice] : no deepening of the voice [Easy Bleeding] : no tendency for easy bleeding [Easy Bruising] : no tendency for easy bruising [Swollen Glands] : no swollen glands [FreeTextEntry6] : can go up 1 flight of stairs - exertional fatigue but not dyspnea  [FreeTextEntry7] : no BRBPR. Colonoscopy -done > 1 yr ago, normal;  [FreeTextEntry9] : lower back pain chronic -s/p compressed vertebrae [de-identified] :  uses trazodone for sleep and she sleeps fine.  [FreeTextEntry1] : seasonal allergies

## 2020-12-08 NOTE — RESULTS/DATA
[FreeTextEntry1] : On 12/3/20 wbc 0.79 Hb 9.6, Plt 117\par On 11/6/20 wbc 2.29 Hb 10.7, Plt 151\par On 10/28/20 wbc 0.81, Hb 10.2, Plt 86\par On 9/28/20 wbc 1.56  Hb 10 plt 169\par On 6/15/20 wbc 0.99 Hgb 13.1  plt 171\par On 5/12/20) wbc 1.1  Hb 13.1plt 211\par On 4/15/20) wbc 7.78 Hb 12.3 plt  149 ANC 4680\par On 3/31/20) wbc 4. 3 Hb 11.8 plt 395 ANC 2800\par On 3/17/20) wbc 0.85 Hb 10.2  plt 57\par On 3/11/20) wbc 0.3 Hb 7.8 plt 9\par (On 2/11/20) wbc 1.1  Hb 10.2 plt 48\par On 2/10/20 wbc 0.59 Hb 8.4 plt 37\par On 1/31/20 wbc 25.4 with 74% blasts, Hb 11.7 plt 153\par \par \par \par

## 2020-12-08 NOTE — ASSESSMENT
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 Now s/p 9 total cycles complicated by pancytopenia persistently. \par -Patient's CBC improving s/p holding Venclexta and bone marrow biopsy after cycle 8 showing evidence of persistent complete remission. \par -Will continue with dacogen/venetoclax q5wks given troubles with cytopenias, along with OncPro (day 5). C9 started on 11/9/20. Pt is on Levaquin/Fluconazole.\par -Venetoclax daily, dosed at 200mg  for 10 days with each cycle, ended 11/19 for cycle 9. \par -BM bx done on 2/3/20 showed normal cytogenetics. In house assay for FLT-3 ITD positive, which confers an adverse prognosis in AML - previously had been d/w patient and family about BMT transplant -pt active prior to admission, good PS status; however, she was not interested. \par -patient has Port -looks good\par -patient has severe lower back pain related to DDD and apparent pinched nerve- advised her against taking NSAIDs due to platelet dysfunction. On low dose gabapentin 100 mg QHS, may have anxiolytic effect as well. \par -Previously offered patient psychological referral given reported depression, but she is refusing. Emotional support provided. \par -Follow up monthly

## 2020-12-08 NOTE — HISTORY OF PRESENT ILLNESS
[Disease:__________________________] : Disease: [unfilled] [Therapy: ___] : Therapy: [unfilled] [Cycle: ___] : Cycle: [unfilled] [Day: ___] : Day: [unfilled] [de-identified] : Ms. 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 She has a history of Breast cancer 12 years ago treated with tamoxifen (no chemotherapy), s/p RT and lumpectomy, HTN and surgical hx of hysterectomy, was sent in by oncologist Dr. Blank on 1/31/20 for rule out leukemia. Patient had been feeling generally fatigued and sluggish since December 2019. She was feeling more tired, and saw her PMD, who did some blood work and noticed blasts in her peripheral blood. Pt was told to see Dr. Blank for further workup. Dr. Blank did blood work again which again found blasts in the peripheral blood, at which point she sent pt to the ER for leukemia workup. 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 She was started on 2/4/20 on Cycle 1 of Dacogen + Venetoclax (50 mg on day 1, 100 mg on day 2, 200 mg on day 3 and onward when starting Diflucan). She tolerated it well other than some SOB -CXR on 2/7/20 : mild pulmonary edema and L pleural effusion, treated with diuretics. She was discharged home on 2/11/20. \par \par She had a BM bx to eval response on 3/2/20 -showed no blasts. Pt given C2 days 1-3 on 3/2, 3/4 and 3/5, after which her Venetoclax was held for count recovery. She has now completed up through cycle 8 of dacogen/Venclexta (10 days each cycle). Repeat bone marrow biopsy showing no evidence of leukemia and hypocellular trilineage hematopoiesis with 10% bone marrow cellularity. Venclexta was held and counts recovered, she is now in cycle 9. \par  [de-identified] : Disseminated [de-identified] : 1, 2. Bone marrow biopsy and bone marrow aspirate\par - Acute myeloid leukemia with mutated NPM1\par - FLT 3-ITD positive\par \par Diagnostic Note:\par Megakarocytes are normal in number with dysplastic morphology.\par Please note findings of a normal female karyotype, negative MDS\par FISH panel. and somatic mutations of FLT3 FLT3-ITD\par ([id number]), [id number]*12, and CEBPA F106fs*1.\par Based on the additional findings, the classification of AML has\par changed to acute myeloid leukemia with mutated NPM1.\par  [de-identified] : Patient reports persistent anxiety, but otherwise she is feeling well at this time. She denies any bleeding, fevers, or dyspnea on exertion. She denies any fatigue. She has had cytopenias but has not required transfusions at this time.

## 2020-12-10 ENCOUNTER — APPOINTMENT (OUTPATIENT)
Dept: INFUSION THERAPY | Facility: HOSPITAL | Age: 80
End: 2020-12-10

## 2020-12-11 LAB
ALBUMIN SERPL ELPH-MCNC: 4 G/DL
ALBUMIN SERPL ELPH-MCNC: 4.1 G/DL
ALP BLD-CCNC: 66 U/L
ALP BLD-CCNC: 71 U/L
ALT SERPL-CCNC: 11 U/L
ALT SERPL-CCNC: 19 U/L
ANION GAP SERPL CALC-SCNC: 11 MMOL/L
ANION GAP SERPL CALC-SCNC: 11 MMOL/L
AST SERPL-CCNC: 15 U/L
AST SERPL-CCNC: 18 U/L
BILIRUB SERPL-MCNC: 0.2 MG/DL
BILIRUB SERPL-MCNC: 0.3 MG/DL
BUN SERPL-MCNC: 22 MG/DL
BUN SERPL-MCNC: 25 MG/DL
CALCIUM SERPL-MCNC: 9.3 MG/DL
CALCIUM SERPL-MCNC: 9.4 MG/DL
CHLORIDE SERPL-SCNC: 101 MMOL/L
CHLORIDE SERPL-SCNC: 102 MMOL/L
CO2 SERPL-SCNC: 28 MMOL/L
CO2 SERPL-SCNC: 29 MMOL/L
CREAT SERPL-MCNC: 0.61 MG/DL
CREAT SERPL-MCNC: 0.81 MG/DL
GLUCOSE SERPL-MCNC: 112 MG/DL
GLUCOSE SERPL-MCNC: 120 MG/DL
LDH SERPL-CCNC: 152 U/L
POTASSIUM SERPL-SCNC: 3.8 MMOL/L
POTASSIUM SERPL-SCNC: 4.1 MMOL/L
PROT SERPL-MCNC: 6.4 G/DL
PROT SERPL-MCNC: 6.6 G/DL
SODIUM SERPL-SCNC: 140 MMOL/L
SODIUM SERPL-SCNC: 141 MMOL/L
URATE SERPL-MCNC: 3.9 MG/DL

## 2020-12-14 ENCOUNTER — LABORATORY RESULT (OUTPATIENT)
Age: 80
End: 2020-12-14

## 2020-12-14 ENCOUNTER — APPOINTMENT (OUTPATIENT)
Dept: INFUSION THERAPY | Facility: HOSPITAL | Age: 80
End: 2020-12-14

## 2020-12-14 ENCOUNTER — RESULT REVIEW (OUTPATIENT)
Age: 80
End: 2020-12-14

## 2020-12-14 DIAGNOSIS — Z51.11 ENCOUNTER FOR ANTINEOPLASTIC CHEMOTHERAPY: ICD-10-CM

## 2020-12-14 LAB
BASOPHILS # BLD AUTO: 0.02 K/UL — SIGNIFICANT CHANGE UP (ref 0–0.2)
BASOPHILS NFR BLD AUTO: 0.7 % — SIGNIFICANT CHANGE UP (ref 0–2)
EOSINOPHIL # BLD AUTO: 0.01 K/UL — SIGNIFICANT CHANGE UP (ref 0–0.5)
EOSINOPHIL NFR BLD AUTO: 0.4 % — SIGNIFICANT CHANGE UP (ref 0–6)
HCT VFR BLD CALC: 30.9 % — LOW (ref 34.5–45)
HGB BLD-MCNC: 10.3 G/DL — LOW (ref 11.5–15.5)
IMM GRANULOCYTES NFR BLD AUTO: 0.4 % — SIGNIFICANT CHANGE UP (ref 0–1.5)
LYMPHOCYTES # BLD AUTO: 0.66 K/UL — LOW (ref 1–3.3)
LYMPHOCYTES # BLD AUTO: 24 % — SIGNIFICANT CHANGE UP (ref 13–44)
MCHC RBC-ENTMCNC: 33.3 G/DL — SIGNIFICANT CHANGE UP (ref 32–36)
MCHC RBC-ENTMCNC: 34.8 PG — HIGH (ref 27–34)
MCV RBC AUTO: 104.4 FL — HIGH (ref 80–100)
MONOCYTES # BLD AUTO: 0.34 K/UL — SIGNIFICANT CHANGE UP (ref 0–0.9)
MONOCYTES NFR BLD AUTO: 12.4 % — SIGNIFICANT CHANGE UP (ref 2–14)
NEUTROPHILS # BLD AUTO: 1.71 K/UL — LOW (ref 1.8–7.4)
NEUTROPHILS NFR BLD AUTO: 62.1 % — SIGNIFICANT CHANGE UP (ref 43–77)
NRBC # BLD: 0 /100 WBCS — SIGNIFICANT CHANGE UP (ref 0–0)
PLATELET # BLD AUTO: 121 K/UL — LOW (ref 150–400)
RBC # BLD: 2.96 M/UL — LOW (ref 3.8–5.2)
RBC # FLD: 18.5 % — HIGH (ref 10.3–14.5)
WBC # BLD: 2.75 K/UL — LOW (ref 3.8–10.5)
WBC # FLD AUTO: 2.75 K/UL — LOW (ref 3.8–10.5)

## 2020-12-15 ENCOUNTER — APPOINTMENT (OUTPATIENT)
Dept: INFUSION THERAPY | Facility: HOSPITAL | Age: 80
End: 2020-12-15

## 2020-12-15 ENCOUNTER — NON-APPOINTMENT (OUTPATIENT)
Age: 80
End: 2020-12-15

## 2020-12-16 ENCOUNTER — APPOINTMENT (OUTPATIENT)
Dept: INFUSION THERAPY | Facility: HOSPITAL | Age: 80
End: 2020-12-16

## 2020-12-18 ENCOUNTER — APPOINTMENT (OUTPATIENT)
Dept: INFUSION THERAPY | Facility: HOSPITAL | Age: 80
End: 2020-12-18

## 2020-12-18 ENCOUNTER — LABORATORY RESULT (OUTPATIENT)
Age: 80
End: 2020-12-18

## 2020-12-18 ENCOUNTER — RESULT REVIEW (OUTPATIENT)
Age: 80
End: 2020-12-18

## 2020-12-18 LAB
BASOPHILS # BLD AUTO: 0 K/UL — SIGNIFICANT CHANGE UP (ref 0–0.2)
BASOPHILS NFR BLD AUTO: 0 % — SIGNIFICANT CHANGE UP (ref 0–2)
EOSINOPHIL # BLD AUTO: 0.01 K/UL — SIGNIFICANT CHANGE UP (ref 0–0.5)
EOSINOPHIL NFR BLD AUTO: 0.4 % — SIGNIFICANT CHANGE UP (ref 0–6)
HCT VFR BLD CALC: 30.8 % — LOW (ref 34.5–45)
HGB BLD-MCNC: 10.5 G/DL — LOW (ref 11.5–15.5)
IMM GRANULOCYTES NFR BLD AUTO: 1.1 % — SIGNIFICANT CHANGE UP (ref 0–1.5)
LYMPHOCYTES # BLD AUTO: 0.45 K/UL — LOW (ref 1–3.3)
LYMPHOCYTES # BLD AUTO: 17.2 % — SIGNIFICANT CHANGE UP (ref 13–44)
MCHC RBC-ENTMCNC: 34.1 G/DL — SIGNIFICANT CHANGE UP (ref 32–36)
MCHC RBC-ENTMCNC: 35 PG — HIGH (ref 27–34)
MCV RBC AUTO: 102.7 FL — HIGH (ref 80–100)
MONOCYTES # BLD AUTO: 0.32 K/UL — SIGNIFICANT CHANGE UP (ref 0–0.9)
MONOCYTES NFR BLD AUTO: 12.3 % — SIGNIFICANT CHANGE UP (ref 2–14)
NEUTROPHILS # BLD AUTO: 1.8 K/UL — SIGNIFICANT CHANGE UP (ref 1.8–7.4)
NEUTROPHILS NFR BLD AUTO: 69 % — SIGNIFICANT CHANGE UP (ref 43–77)
NRBC # BLD: 0 /100 WBCS — SIGNIFICANT CHANGE UP (ref 0–0)
PLATELET # BLD AUTO: 108 K/UL — LOW (ref 150–400)
RBC # BLD: 3 M/UL — LOW (ref 3.8–5.2)
RBC # FLD: 18.3 % — HIGH (ref 10.3–14.5)
WBC # BLD: 2.61 K/UL — LOW (ref 3.8–10.5)
WBC # FLD AUTO: 2.61 K/UL — LOW (ref 3.8–10.5)

## 2020-12-19 ENCOUNTER — APPOINTMENT (OUTPATIENT)
Dept: INFUSION THERAPY | Facility: HOSPITAL | Age: 80
End: 2020-12-19

## 2020-12-21 ENCOUNTER — RESULT REVIEW (OUTPATIENT)
Age: 80
End: 2020-12-21

## 2020-12-21 ENCOUNTER — APPOINTMENT (OUTPATIENT)
Dept: INFUSION THERAPY | Facility: HOSPITAL | Age: 80
End: 2020-12-21

## 2020-12-21 LAB
ANISOCYTOSIS BLD QL: SLIGHT — SIGNIFICANT CHANGE UP
BASOPHILS # BLD AUTO: 0 K/UL — SIGNIFICANT CHANGE UP (ref 0–0.2)
BASOPHILS NFR BLD AUTO: 0 % — SIGNIFICANT CHANGE UP (ref 0–2)
ELLIPTOCYTES BLD QL SMEAR: SLIGHT — SIGNIFICANT CHANGE UP
EOSINOPHIL # BLD AUTO: 0 K/UL — SIGNIFICANT CHANGE UP (ref 0–0.5)
EOSINOPHIL NFR BLD AUTO: 0 % — SIGNIFICANT CHANGE UP (ref 0–6)
HCT VFR BLD CALC: 31.3 % — LOW (ref 34.5–45)
HGB BLD-MCNC: 10.1 G/DL — LOW (ref 11.5–15.5)
HYPOCHROMIA BLD QL: SLIGHT — SIGNIFICANT CHANGE UP
LYMPHOCYTES # BLD AUTO: 0.7 K/UL — LOW (ref 1–3.3)
LYMPHOCYTES # BLD AUTO: 13 % — SIGNIFICANT CHANGE UP (ref 13–44)
MCHC RBC-ENTMCNC: 32.3 G/DL — SIGNIFICANT CHANGE UP (ref 32–36)
MCHC RBC-ENTMCNC: 34.1 PG — HIGH (ref 27–34)
MCV RBC AUTO: 105.7 FL — HIGH (ref 80–100)
MONOCYTES # BLD AUTO: 0.11 K/UL — SIGNIFICANT CHANGE UP (ref 0–0.9)
MONOCYTES NFR BLD AUTO: 2 % — SIGNIFICANT CHANGE UP (ref 2–14)
NEUTROPHILS # BLD AUTO: 4.55 K/UL — SIGNIFICANT CHANGE UP (ref 1.8–7.4)
NEUTROPHILS NFR BLD AUTO: 85 % — HIGH (ref 43–77)
NRBC # BLD: 0 /100 — SIGNIFICANT CHANGE UP (ref 0–0)
NRBC # BLD: SIGNIFICANT CHANGE UP /100 WBCS (ref 0–0)
OVALOCYTES BLD QL SMEAR: SLIGHT — SIGNIFICANT CHANGE UP
PLAT MORPH BLD: NORMAL — SIGNIFICANT CHANGE UP
PLATELET # BLD AUTO: 83 K/UL — LOW (ref 150–400)
POIKILOCYTOSIS BLD QL AUTO: SLIGHT — SIGNIFICANT CHANGE UP
POLYCHROMASIA BLD QL SMEAR: SLIGHT — SIGNIFICANT CHANGE UP
RBC # BLD: 2.96 M/UL — LOW (ref 3.8–5.2)
RBC # FLD: 18.6 % — HIGH (ref 10.3–14.5)
RBC BLD AUTO: ABNORMAL
WBC # BLD: 5.35 K/UL — SIGNIFICANT CHANGE UP (ref 3.8–10.5)
WBC # FLD AUTO: 5.35 K/UL — SIGNIFICANT CHANGE UP (ref 3.8–10.5)

## 2020-12-22 ENCOUNTER — OUTPATIENT (OUTPATIENT)
Dept: OUTPATIENT SERVICES | Facility: HOSPITAL | Age: 80
LOS: 1 days | Discharge: HOME | End: 2020-12-22

## 2020-12-22 DIAGNOSIS — C92.00 ACUTE MYELOBLASTIC LEUKEMIA, NOT HAVING ACHIEVED REMISSION: ICD-10-CM

## 2020-12-22 DIAGNOSIS — Z90.710 ACQUIRED ABSENCE OF BOTH CERVIX AND UTERUS: Chronic | ICD-10-CM

## 2020-12-22 DIAGNOSIS — Z51.89 ENCOUNTER FOR OTHER SPECIFIED AFTERCARE: ICD-10-CM

## 2020-12-26 ENCOUNTER — RESULT REVIEW (OUTPATIENT)
Age: 80
End: 2020-12-26

## 2020-12-26 ENCOUNTER — APPOINTMENT (OUTPATIENT)
Dept: INFUSION THERAPY | Facility: HOSPITAL | Age: 80
End: 2020-12-26

## 2020-12-26 LAB
ANISOCYTOSIS BLD QL: SLIGHT — SIGNIFICANT CHANGE UP
BASOPHILS # BLD AUTO: 0 K/UL — SIGNIFICANT CHANGE UP (ref 0–0.2)
BASOPHILS NFR BLD AUTO: 0 % — SIGNIFICANT CHANGE UP (ref 0–2)
ELLIPTOCYTES BLD QL SMEAR: SLIGHT — SIGNIFICANT CHANGE UP
EOSINOPHIL # BLD AUTO: 0 K/UL — SIGNIFICANT CHANGE UP (ref 0–0.5)
EOSINOPHIL NFR BLD AUTO: 0 % — SIGNIFICANT CHANGE UP (ref 0–6)
HCT VFR BLD CALC: 29.9 % — LOW (ref 34.5–45)
HGB BLD-MCNC: 10 G/DL — LOW (ref 11.5–15.5)
HYPOCHROMIA BLD QL: SLIGHT — SIGNIFICANT CHANGE UP
LYMPHOCYTES # BLD AUTO: 0.59 K/UL — LOW (ref 1–3.3)
LYMPHOCYTES # BLD AUTO: 42 % — SIGNIFICANT CHANGE UP (ref 13–44)
MCHC RBC-ENTMCNC: 33.4 G/DL — SIGNIFICANT CHANGE UP (ref 32–36)
MCHC RBC-ENTMCNC: 34.6 PG — HIGH (ref 27–34)
MCV RBC AUTO: 103.5 FL — HIGH (ref 80–100)
MONOCYTES # BLD AUTO: 0.17 K/UL — SIGNIFICANT CHANGE UP (ref 0–0.9)
MONOCYTES NFR BLD AUTO: 12 % — SIGNIFICANT CHANGE UP (ref 2–14)
NEUTROPHILS # BLD AUTO: 0.64 K/UL — LOW (ref 1.8–7.4)
NEUTROPHILS NFR BLD AUTO: 46 % — SIGNIFICANT CHANGE UP (ref 43–77)
NRBC # BLD: 0 /100 — SIGNIFICANT CHANGE UP (ref 0–0)
NRBC # BLD: SIGNIFICANT CHANGE UP /100 WBCS (ref 0–0)
OVALOCYTES BLD QL SMEAR: SLIGHT — SIGNIFICANT CHANGE UP
PLAT MORPH BLD: NORMAL — SIGNIFICANT CHANGE UP
PLATELET # BLD AUTO: 20 K/UL — CRITICAL LOW (ref 150–400)
POIKILOCYTOSIS BLD QL AUTO: SLIGHT — SIGNIFICANT CHANGE UP
POLYCHROMASIA BLD QL SMEAR: SLIGHT — SIGNIFICANT CHANGE UP
RBC # BLD: 2.89 M/UL — LOW (ref 3.8–5.2)
RBC # FLD: 17.9 % — HIGH (ref 10.3–14.5)
RBC BLD AUTO: ABNORMAL
WBC # BLD: 1.4 K/UL — LOW (ref 3.8–10.5)
WBC # FLD AUTO: 1.4 K/UL — LOW (ref 3.8–10.5)

## 2020-12-28 ENCOUNTER — RESULT REVIEW (OUTPATIENT)
Age: 80
End: 2020-12-28

## 2020-12-28 ENCOUNTER — APPOINTMENT (OUTPATIENT)
Dept: INFUSION THERAPY | Facility: HOSPITAL | Age: 80
End: 2020-12-28

## 2020-12-28 LAB
ANISOCYTOSIS BLD QL: SLIGHT — SIGNIFICANT CHANGE UP
BASOPHILS # BLD AUTO: 0 K/UL — SIGNIFICANT CHANGE UP (ref 0–0.2)
BASOPHILS NFR BLD AUTO: 0 % — SIGNIFICANT CHANGE UP (ref 0–2)
ELLIPTOCYTES BLD QL SMEAR: SLIGHT — SIGNIFICANT CHANGE UP
EOSINOPHIL # BLD AUTO: 0 K/UL — SIGNIFICANT CHANGE UP (ref 0–0.5)
EOSINOPHIL NFR BLD AUTO: 0 % — SIGNIFICANT CHANGE UP (ref 0–6)
HCT VFR BLD CALC: 28.5 % — LOW (ref 34.5–45)
HGB BLD-MCNC: 9.6 G/DL — LOW (ref 11.5–15.5)
LYMPHOCYTES # BLD AUTO: 0.45 K/UL — LOW (ref 1–3.3)
LYMPHOCYTES # BLD AUTO: 79 % — HIGH (ref 13–44)
MCHC RBC-ENTMCNC: 33.7 G/DL — SIGNIFICANT CHANGE UP (ref 32–36)
MCHC RBC-ENTMCNC: 34.4 PG — HIGH (ref 27–34)
MCV RBC AUTO: 102.2 FL — HIGH (ref 80–100)
MONOCYTES # BLD AUTO: 0.01 K/UL — SIGNIFICANT CHANGE UP (ref 0–0.9)
MONOCYTES NFR BLD AUTO: 1 % — LOW (ref 2–14)
NEUTROPHILS # BLD AUTO: 0.11 K/UL — LOW (ref 1.8–7.4)
NEUTROPHILS NFR BLD AUTO: 20 % — LOW (ref 43–77)
NRBC # BLD: 0 /100 — SIGNIFICANT CHANGE UP (ref 0–0)
NRBC # BLD: SIGNIFICANT CHANGE UP /100 WBCS (ref 0–0)
PLAT MORPH BLD: NORMAL — SIGNIFICANT CHANGE UP
PLATELET # BLD AUTO: 13 K/UL — CRITICAL LOW (ref 150–400)
POIKILOCYTOSIS BLD QL AUTO: SLIGHT — SIGNIFICANT CHANGE UP
RBC # BLD: 2.79 M/UL — LOW (ref 3.8–5.2)
RBC # FLD: 17.8 % — HIGH (ref 10.3–14.5)
RBC BLD AUTO: ABNORMAL
SCHISTOCYTES BLD QL AUTO: SLIGHT — SIGNIFICANT CHANGE UP
WBC # BLD: 0.57 K/UL — CRITICAL LOW (ref 3.8–10.5)
WBC # FLD AUTO: 0.57 K/UL — CRITICAL LOW (ref 3.8–10.5)

## 2020-12-31 ENCOUNTER — RESULT REVIEW (OUTPATIENT)
Age: 80
End: 2020-12-31

## 2020-12-31 ENCOUNTER — APPOINTMENT (OUTPATIENT)
Dept: INFUSION THERAPY | Facility: HOSPITAL | Age: 80
End: 2020-12-31

## 2020-12-31 LAB
ANISOCYTOSIS BLD QL: SLIGHT — SIGNIFICANT CHANGE UP
BASOPHILS # BLD AUTO: 0 K/UL — SIGNIFICANT CHANGE UP (ref 0–0.2)
BASOPHILS NFR BLD AUTO: 0 % — SIGNIFICANT CHANGE UP (ref 0–2)
ELLIPTOCYTES BLD QL SMEAR: SLIGHT — SIGNIFICANT CHANGE UP
EOSINOPHIL # BLD AUTO: 0.03 K/UL — SIGNIFICANT CHANGE UP (ref 0–0.5)
EOSINOPHIL NFR BLD AUTO: 6 % — SIGNIFICANT CHANGE UP (ref 0–6)
HCT VFR BLD CALC: 27.8 % — LOW (ref 34.5–45)
HGB BLD-MCNC: 9.2 G/DL — LOW (ref 11.5–15.5)
LYMPHOCYTES # BLD AUTO: 0.42 K/UL — LOW (ref 1–3.3)
LYMPHOCYTES # BLD AUTO: 72 % — HIGH (ref 13–44)
MCHC RBC-ENTMCNC: 33.1 G/DL — SIGNIFICANT CHANGE UP (ref 32–36)
MCHC RBC-ENTMCNC: 34.1 PG — HIGH (ref 27–34)
MCV RBC AUTO: 103 FL — HIGH (ref 80–100)
MONOCYTES # BLD AUTO: 0.06 K/UL — SIGNIFICANT CHANGE UP (ref 0–0.9)
MONOCYTES NFR BLD AUTO: 10 % — SIGNIFICANT CHANGE UP (ref 2–14)
NEUTROPHILS # BLD AUTO: 0.07 K/UL — LOW (ref 1.8–7.4)
NEUTROPHILS NFR BLD AUTO: 12 % — LOW (ref 43–77)
NRBC # BLD: 0 /100 — SIGNIFICANT CHANGE UP (ref 0–0)
NRBC # BLD: SIGNIFICANT CHANGE UP /100 WBCS (ref 0–0)
PLAT MORPH BLD: NORMAL — SIGNIFICANT CHANGE UP
PLATELET # BLD AUTO: 49 K/UL — LOW (ref 150–400)
POIKILOCYTOSIS BLD QL AUTO: SLIGHT — SIGNIFICANT CHANGE UP
RBC # BLD: 2.7 M/UL — LOW (ref 3.8–5.2)
RBC # FLD: 18.2 % — HIGH (ref 10.3–14.5)
RBC BLD AUTO: ABNORMAL
SCHISTOCYTES BLD QL AUTO: SLIGHT — SIGNIFICANT CHANGE UP
WBC # BLD: 0.58 K/UL — CRITICAL LOW (ref 3.8–10.5)
WBC # FLD AUTO: 0.58 K/UL — CRITICAL LOW (ref 3.8–10.5)

## 2021-01-01 ENCOUNTER — NON-APPOINTMENT (OUTPATIENT)
Age: 81
End: 2021-01-01

## 2021-01-01 ENCOUNTER — LABORATORY RESULT (OUTPATIENT)
Age: 81
End: 2021-01-01

## 2021-01-01 ENCOUNTER — EMERGENCY (EMERGENCY)
Facility: HOSPITAL | Age: 81
LOS: 1 days | Discharge: HOME | End: 2021-01-01
Attending: EMERGENCY MEDICINE | Admitting: EMERGENCY MEDICINE
Payer: COMMERCIAL

## 2021-01-01 VITALS
SYSTOLIC BLOOD PRESSURE: 150 MMHG | OXYGEN SATURATION: 98 % | HEART RATE: 78 BPM | RESPIRATION RATE: 18 BRPM | TEMPERATURE: 98 F | WEIGHT: 132.06 LBS | DIASTOLIC BLOOD PRESSURE: 52 MMHG | HEIGHT: 64 IN

## 2021-01-01 VITALS
SYSTOLIC BLOOD PRESSURE: 174 MMHG | DIASTOLIC BLOOD PRESSURE: 74 MMHG | OXYGEN SATURATION: 99 % | TEMPERATURE: 98 F | HEART RATE: 65 BPM | RESPIRATION RATE: 18 BRPM

## 2021-01-01 DIAGNOSIS — Z90.710 ACQUIRED ABSENCE OF BOTH CERVIX AND UTERUS: Chronic | ICD-10-CM

## 2021-01-01 LAB
ABO RH CONFIRMATION: SIGNIFICANT CHANGE UP
ALBUMIN SERPL ELPH-MCNC: 3 G/DL — LOW (ref 3.3–5)
ALP SERPL-CCNC: 62 U/L — SIGNIFICANT CHANGE UP (ref 40–120)
ALT FLD-CCNC: 13 U/L — SIGNIFICANT CHANGE UP (ref 12–78)
ANION GAP SERPL CALC-SCNC: 7 MMOL/L — SIGNIFICANT CHANGE UP (ref 5–17)
APTT BLD: 27.1 SEC — LOW (ref 27.5–35.5)
AST SERPL-CCNC: 11 U/L — LOW (ref 15–37)
BASOPHILS # BLD AUTO: 0 K/UL — SIGNIFICANT CHANGE UP (ref 0–0.2)
BASOPHILS NFR BLD AUTO: 0 % — SIGNIFICANT CHANGE UP (ref 0–2)
BILIRUB SERPL-MCNC: 0.4 MG/DL — SIGNIFICANT CHANGE UP (ref 0.2–1.2)
BUN SERPL-MCNC: 12 MG/DL — SIGNIFICANT CHANGE UP (ref 7–23)
CALCIUM SERPL-MCNC: 8.6 MG/DL — SIGNIFICANT CHANGE UP (ref 8.5–10.1)
CHLORIDE SERPL-SCNC: 110 MMOL/L — HIGH (ref 96–108)
CO2 SERPL-SCNC: 26 MMOL/L — SIGNIFICANT CHANGE UP (ref 22–31)
CREAT SERPL-MCNC: 0.5 MG/DL — SIGNIFICANT CHANGE UP (ref 0.5–1.3)
EOSINOPHIL # BLD AUTO: 0 K/UL — SIGNIFICANT CHANGE UP (ref 0–0.5)
EOSINOPHIL NFR BLD AUTO: 0 % — SIGNIFICANT CHANGE UP (ref 0–6)
GLUCOSE SERPL-MCNC: 105 MG/DL — HIGH (ref 70–99)
HCT VFR BLD CALC: 26.2 % — LOW (ref 34.5–45)
HGB BLD-MCNC: 8.8 G/DL — LOW (ref 11.5–15.5)
INR BLD: 1.23 RATIO — HIGH (ref 0.88–1.16)
LYMPHOCYTES # BLD AUTO: 0.29 K/UL — LOW (ref 1–3.3)
LYMPHOCYTES # BLD AUTO: 72 % — HIGH (ref 13–44)
MCHC RBC-ENTMCNC: 32.4 PG — SIGNIFICANT CHANGE UP (ref 27–34)
MCHC RBC-ENTMCNC: 33.6 GM/DL — SIGNIFICANT CHANGE UP (ref 32–36)
MCV RBC AUTO: 96.3 FL — SIGNIFICANT CHANGE UP (ref 80–100)
MONOCYTES # BLD AUTO: 0.01 K/UL — SIGNIFICANT CHANGE UP (ref 0–0.9)
MONOCYTES NFR BLD AUTO: 2 % — SIGNIFICANT CHANGE UP (ref 2–14)
NEUTROPHILS # BLD AUTO: 0.05 K/UL — LOW (ref 1.8–7.4)
NEUTROPHILS NFR BLD AUTO: 12 % — LOW (ref 43–77)
NRBC # BLD: SIGNIFICANT CHANGE UP /100 WBCS (ref 0–0)
PLATELET # BLD AUTO: 8 K/UL — CRITICAL LOW (ref 150–400)
POTASSIUM SERPL-MCNC: 3.7 MMOL/L — SIGNIFICANT CHANGE UP (ref 3.5–5.3)
POTASSIUM SERPL-SCNC: 3.7 MMOL/L — SIGNIFICANT CHANGE UP (ref 3.5–5.3)
PROT SERPL-MCNC: 6.2 G/DL — SIGNIFICANT CHANGE UP (ref 6–8.3)
PROTHROM AB SERPL-ACNC: 14.3 SEC — HIGH (ref 10.6–13.6)
RBC # BLD: 2.72 M/UL — LOW (ref 3.8–5.2)
RBC # FLD: 20.9 % — HIGH (ref 10.3–14.5)
SODIUM SERPL-SCNC: 143 MMOL/L — SIGNIFICANT CHANGE UP (ref 135–145)
WBC # BLD: 0.4 K/UL — CRITICAL LOW (ref 3.8–10.5)
WBC # FLD AUTO: 0.4 K/UL — CRITICAL LOW (ref 3.8–10.5)

## 2021-01-01 PROCEDURE — 99285 EMERGENCY DEPT VISIT HI MDM: CPT | Mod: 25

## 2021-01-01 PROCEDURE — 86901 BLOOD TYPING SEROLOGIC RH(D): CPT

## 2021-01-01 PROCEDURE — 85610 PROTHROMBIN TIME: CPT

## 2021-01-01 PROCEDURE — 86850 RBC ANTIBODY SCREEN: CPT

## 2021-01-01 PROCEDURE — P9037: CPT

## 2021-01-01 PROCEDURE — 36415 COLL VENOUS BLD VENIPUNCTURE: CPT

## 2021-01-01 PROCEDURE — 85025 COMPLETE CBC W/AUTO DIFF WBC: CPT

## 2021-01-01 PROCEDURE — P9100: CPT

## 2021-01-01 PROCEDURE — 85730 THROMBOPLASTIN TIME PARTIAL: CPT

## 2021-01-01 PROCEDURE — 80053 COMPREHEN METABOLIC PANEL: CPT

## 2021-01-01 PROCEDURE — 86900 BLOOD TYPING SEROLOGIC ABO: CPT

## 2021-01-01 PROCEDURE — 99283 EMERGENCY DEPT VISIT LOW MDM: CPT | Mod: 25

## 2021-01-01 PROCEDURE — 99284 EMERGENCY DEPT VISIT MOD MDM: CPT

## 2021-01-01 PROCEDURE — 36430 TRANSFUSION BLD/BLD COMPNT: CPT

## 2021-01-04 ENCOUNTER — RESULT REVIEW (OUTPATIENT)
Age: 81
End: 2021-01-04

## 2021-01-04 ENCOUNTER — APPOINTMENT (OUTPATIENT)
Dept: INFUSION THERAPY | Facility: HOSPITAL | Age: 81
End: 2021-01-04

## 2021-01-04 LAB
ANISOCYTOSIS BLD QL: SLIGHT — SIGNIFICANT CHANGE UP
BASOPHILS # BLD AUTO: 0 K/UL — SIGNIFICANT CHANGE UP (ref 0–0.2)
BASOPHILS NFR BLD AUTO: 0 % — SIGNIFICANT CHANGE UP (ref 0–2)
EOSINOPHIL # BLD AUTO: 0 K/UL — SIGNIFICANT CHANGE UP (ref 0–0.5)
EOSINOPHIL NFR BLD AUTO: 0 % — SIGNIFICANT CHANGE UP (ref 0–6)
HCT VFR BLD CALC: 27.4 % — LOW (ref 34.5–45)
HGB BLD-MCNC: 9.4 G/DL — LOW (ref 11.5–15.5)
LYMPHOCYTES # BLD AUTO: 0.5 K/UL — LOW (ref 1–3.3)
LYMPHOCYTES # BLD AUTO: 70 % — HIGH (ref 13–44)
MCHC RBC-ENTMCNC: 34.3 G/DL — SIGNIFICANT CHANGE UP (ref 32–36)
MCHC RBC-ENTMCNC: 34.4 PG — HIGH (ref 27–34)
MCV RBC AUTO: 100.4 FL — HIGH (ref 80–100)
MICROCYTES BLD QL: SLIGHT — SIGNIFICANT CHANGE UP
MONOCYTES # BLD AUTO: 0.1 K/UL — SIGNIFICANT CHANGE UP (ref 0–0.9)
MONOCYTES NFR BLD AUTO: 14 % — SIGNIFICANT CHANGE UP (ref 2–14)
NEUTROPHILS # BLD AUTO: 0.11 K/UL — LOW (ref 1.8–7.4)
NEUTROPHILS NFR BLD AUTO: 16 % — LOW (ref 43–77)
NRBC # BLD: 0 /100 — SIGNIFICANT CHANGE UP (ref 0–0)
NRBC # BLD: SIGNIFICANT CHANGE UP /100 WBCS (ref 0–0)
PLAT MORPH BLD: NORMAL — SIGNIFICANT CHANGE UP
PLATELET # BLD AUTO: 49 K/UL — LOW (ref 150–400)
POIKILOCYTOSIS BLD QL AUTO: SLIGHT — SIGNIFICANT CHANGE UP
RBC # BLD: 2.73 M/UL — LOW (ref 3.8–5.2)
RBC # FLD: 17.4 % — HIGH (ref 10.3–14.5)
RBC BLD AUTO: SIGNIFICANT CHANGE UP
WBC # BLD: 0.71 K/UL — CRITICAL LOW (ref 3.8–10.5)
WBC # FLD AUTO: 0.71 K/UL — CRITICAL LOW (ref 3.8–10.5)

## 2021-01-06 ENCOUNTER — LABORATORY RESULT (OUTPATIENT)
Age: 81
End: 2021-01-06

## 2021-01-06 ENCOUNTER — APPOINTMENT (OUTPATIENT)
Dept: DISASTER EMERGENCY | Facility: CLINIC | Age: 81
End: 2021-01-06

## 2021-01-07 ENCOUNTER — APPOINTMENT (OUTPATIENT)
Dept: INFUSION THERAPY | Facility: HOSPITAL | Age: 81
End: 2021-01-07

## 2021-01-07 ENCOUNTER — RESULT REVIEW (OUTPATIENT)
Age: 81
End: 2021-01-07

## 2021-01-07 ENCOUNTER — NON-APPOINTMENT (OUTPATIENT)
Age: 81
End: 2021-01-07

## 2021-01-07 LAB
ANISOCYTOSIS BLD QL: SLIGHT — SIGNIFICANT CHANGE UP
BASOPHILS # BLD AUTO: 0 K/UL — SIGNIFICANT CHANGE UP (ref 0–0.2)
BASOPHILS NFR BLD AUTO: 0 % — SIGNIFICANT CHANGE UP (ref 0–2)
ELLIPTOCYTES BLD QL SMEAR: SLIGHT — SIGNIFICANT CHANGE UP
EOSINOPHIL # BLD AUTO: 0.01 K/UL — SIGNIFICANT CHANGE UP (ref 0–0.5)
EOSINOPHIL NFR BLD AUTO: 2 % — SIGNIFICANT CHANGE UP (ref 0–6)
HCT VFR BLD CALC: 27.6 % — LOW (ref 34.5–45)
HGB BLD-MCNC: 8.8 G/DL — LOW (ref 11.5–15.5)
HYPOCHROMIA BLD QL: SLIGHT — SIGNIFICANT CHANGE UP
LYMPHOCYTES # BLD AUTO: 0.51 K/UL — LOW (ref 1–3.3)
LYMPHOCYTES # BLD AUTO: 76 % — HIGH (ref 13–44)
MACROCYTES BLD QL: SLIGHT — SIGNIFICANT CHANGE UP
MCHC RBC-ENTMCNC: 31.9 G/DL — LOW (ref 32–36)
MCHC RBC-ENTMCNC: 33.8 PG — SIGNIFICANT CHANGE UP (ref 27–34)
MCV RBC AUTO: 106.2 FL — HIGH (ref 80–100)
MICROCYTES BLD QL: SLIGHT — SIGNIFICANT CHANGE UP
MONOCYTES # BLD AUTO: 0.03 K/UL — SIGNIFICANT CHANGE UP (ref 0–0.9)
MONOCYTES NFR BLD AUTO: 4 % — SIGNIFICANT CHANGE UP (ref 2–14)
NEUTROPHILS # BLD AUTO: 0.12 K/UL — LOW (ref 1.8–7.4)
NEUTROPHILS NFR BLD AUTO: 18 % — LOW (ref 43–77)
NRBC # BLD: 2 /100 — HIGH (ref 0–0)
NRBC # BLD: SIGNIFICANT CHANGE UP /100 WBCS (ref 0–0)
OVALOCYTES BLD QL SMEAR: SLIGHT — SIGNIFICANT CHANGE UP
PLAT MORPH BLD: NORMAL — SIGNIFICANT CHANGE UP
PLATELET # BLD AUTO: 99 K/UL — LOW (ref 150–400)
POIKILOCYTOSIS BLD QL AUTO: SLIGHT — SIGNIFICANT CHANGE UP
RBC # BLD: 2.6 M/UL — LOW (ref 3.8–5.2)
RBC # FLD: 17.9 % — HIGH (ref 10.3–14.5)
RBC BLD AUTO: ABNORMAL
WBC # BLD: 0.67 K/UL — CRITICAL LOW (ref 3.8–10.5)
WBC # FLD AUTO: 0.67 K/UL — CRITICAL LOW (ref 3.8–10.5)

## 2021-01-13 LAB
ALBUMIN SERPL ELPH-MCNC: 3.8 G/DL
ALP BLD-CCNC: 66 U/L
ALT SERPL-CCNC: 10 U/L
ANION GAP SERPL CALC-SCNC: 13 MMOL/L
AST SERPL-CCNC: 10 U/L
BILIRUB SERPL-MCNC: 0.2 MG/DL
BUN SERPL-MCNC: 16 MG/DL
CALCIUM SERPL-MCNC: 9 MG/DL
CHLORIDE SERPL-SCNC: 101 MMOL/L
CO2 SERPL-SCNC: 25 MMOL/L
CREAT SERPL-MCNC: 0.81 MG/DL
GLUCOSE SERPL-MCNC: 90 MG/DL
POTASSIUM SERPL-SCNC: 3.1 MMOL/L
PROT SERPL-MCNC: 6.2 G/DL
SODIUM SERPL-SCNC: 140 MMOL/L

## 2021-01-14 ENCOUNTER — OUTPATIENT (OUTPATIENT)
Dept: OUTPATIENT SERVICES | Facility: HOSPITAL | Age: 81
LOS: 1 days | Discharge: HOME | End: 2021-01-14

## 2021-01-14 DIAGNOSIS — Z90.710 ACQUIRED ABSENCE OF BOTH CERVIX AND UTERUS: Chronic | ICD-10-CM

## 2021-01-14 DIAGNOSIS — C92.00 ACUTE MYELOBLASTIC LEUKEMIA, NOT HAVING ACHIEVED REMISSION: ICD-10-CM

## 2021-01-20 ENCOUNTER — LABORATORY RESULT (OUTPATIENT)
Age: 81
End: 2021-01-20

## 2021-01-20 ENCOUNTER — RESULT REVIEW (OUTPATIENT)
Age: 81
End: 2021-01-20

## 2021-01-20 ENCOUNTER — APPOINTMENT (OUTPATIENT)
Dept: INFUSION THERAPY | Facility: HOSPITAL | Age: 81
End: 2021-01-20

## 2021-01-20 DIAGNOSIS — Z51.11 ENCOUNTER FOR ANTINEOPLASTIC CHEMOTHERAPY: ICD-10-CM

## 2021-01-20 LAB
BASOPHILS # BLD AUTO: 0.01 K/UL — SIGNIFICANT CHANGE UP (ref 0–0.2)
BASOPHILS NFR BLD AUTO: 0.3 % — SIGNIFICANT CHANGE UP (ref 0–2)
EOSINOPHIL # BLD AUTO: 0.02 K/UL — SIGNIFICANT CHANGE UP (ref 0–0.5)
EOSINOPHIL NFR BLD AUTO: 0.5 % — SIGNIFICANT CHANGE UP (ref 0–6)
HCT VFR BLD CALC: 28.2 % — LOW (ref 34.5–45)
HGB BLD-MCNC: 9.5 G/DL — LOW (ref 11.5–15.5)
IMM GRANULOCYTES NFR BLD AUTO: 1.8 % — HIGH (ref 0–1.5)
LYMPHOCYTES # BLD AUTO: 0.72 K/UL — LOW (ref 1–3.3)
LYMPHOCYTES # BLD AUTO: 18.8 % — SIGNIFICANT CHANGE UP (ref 13–44)
MCHC RBC-ENTMCNC: 33.7 G/DL — SIGNIFICANT CHANGE UP (ref 32–36)
MCHC RBC-ENTMCNC: 34.3 PG — HIGH (ref 27–34)
MCV RBC AUTO: 101.8 FL — HIGH (ref 80–100)
MONOCYTES # BLD AUTO: 0.63 K/UL — SIGNIFICANT CHANGE UP (ref 0–0.9)
MONOCYTES NFR BLD AUTO: 16.5 % — HIGH (ref 2–14)
NEUTROPHILS # BLD AUTO: 2.37 K/UL — SIGNIFICANT CHANGE UP (ref 1.8–7.4)
NEUTROPHILS NFR BLD AUTO: 62.1 % — SIGNIFICANT CHANGE UP (ref 43–77)
NRBC # BLD: 0 /100 WBCS — SIGNIFICANT CHANGE UP (ref 0–0)
PLATELET # BLD AUTO: 107 K/UL — LOW (ref 150–400)
RBC # BLD: 2.77 M/UL — LOW (ref 3.8–5.2)
RBC # FLD: 17.9 % — HIGH (ref 10.3–14.5)
WBC # BLD: 3.82 K/UL — SIGNIFICANT CHANGE UP (ref 3.8–10.5)
WBC # FLD AUTO: 3.82 K/UL — SIGNIFICANT CHANGE UP (ref 3.8–10.5)

## 2021-01-21 ENCOUNTER — APPOINTMENT (OUTPATIENT)
Dept: HEMATOLOGY ONCOLOGY | Facility: CLINIC | Age: 81
End: 2021-01-21
Payer: MEDICARE

## 2021-01-21 ENCOUNTER — APPOINTMENT (OUTPATIENT)
Dept: INFUSION THERAPY | Facility: HOSPITAL | Age: 81
End: 2021-01-21

## 2021-01-21 VITALS
SYSTOLIC BLOOD PRESSURE: 137 MMHG | DIASTOLIC BLOOD PRESSURE: 83 MMHG | HEART RATE: 69 BPM | RESPIRATION RATE: 16 BRPM | OXYGEN SATURATION: 100 % | TEMPERATURE: 97.9 F

## 2021-01-21 PROCEDURE — 99072 ADDL SUPL MATRL&STAF TM PHE: CPT

## 2021-01-21 PROCEDURE — 99214 OFFICE O/P EST MOD 30 MIN: CPT

## 2021-01-22 ENCOUNTER — APPOINTMENT (OUTPATIENT)
Dept: INFUSION THERAPY | Facility: HOSPITAL | Age: 81
End: 2021-01-22

## 2021-01-22 ENCOUNTER — NON-APPOINTMENT (OUTPATIENT)
Age: 81
End: 2021-01-22

## 2021-01-23 ENCOUNTER — RESULT REVIEW (OUTPATIENT)
Age: 81
End: 2021-01-23

## 2021-01-23 ENCOUNTER — APPOINTMENT (OUTPATIENT)
Dept: INFUSION THERAPY | Facility: HOSPITAL | Age: 81
End: 2021-01-23

## 2021-01-23 LAB
ANISOCYTOSIS BLD QL: SLIGHT — SIGNIFICANT CHANGE UP
BASOPHILS # BLD AUTO: 0 K/UL — SIGNIFICANT CHANGE UP (ref 0–0.2)
BASOPHILS NFR BLD AUTO: 0 % — SIGNIFICANT CHANGE UP (ref 0–2)
EOSINOPHIL # BLD AUTO: 0.02 K/UL — SIGNIFICANT CHANGE UP (ref 0–0.5)
EOSINOPHIL NFR BLD AUTO: 1 % — SIGNIFICANT CHANGE UP (ref 0–6)
HCT VFR BLD CALC: 28.4 % — LOW (ref 34.5–45)
HGB BLD-MCNC: 9.3 G/DL — LOW (ref 11.5–15.5)
HYPOCHROMIA BLD QL: SLIGHT — SIGNIFICANT CHANGE UP
LYMPHOCYTES # BLD AUTO: 0.68 K/UL — LOW (ref 1–3.3)
LYMPHOCYTES # BLD AUTO: 34 % — SIGNIFICANT CHANGE UP (ref 13–44)
MCHC RBC-ENTMCNC: 32.7 G/DL — SIGNIFICANT CHANGE UP (ref 32–36)
MCHC RBC-ENTMCNC: 34.3 PG — HIGH (ref 27–34)
MCV RBC AUTO: 104.8 FL — HIGH (ref 80–100)
MONOCYTES # BLD AUTO: 0.28 K/UL — SIGNIFICANT CHANGE UP (ref 0–0.9)
MONOCYTES NFR BLD AUTO: 14 % — SIGNIFICANT CHANGE UP (ref 2–14)
NEUTROPHILS # BLD AUTO: 1.03 K/UL — LOW (ref 1.8–7.4)
NEUTROPHILS NFR BLD AUTO: 49 % — SIGNIFICANT CHANGE UP (ref 43–77)
NEUTS BAND # BLD: 2 % — SIGNIFICANT CHANGE UP (ref 0–8)
NRBC # BLD: 1 /100 — HIGH (ref 0–0)
NRBC # BLD: SIGNIFICANT CHANGE UP /100 WBCS (ref 0–0)
OVALOCYTES BLD QL SMEAR: SIGNIFICANT CHANGE UP
PLAT MORPH BLD: NORMAL — SIGNIFICANT CHANGE UP
PLATELET # BLD AUTO: 124 K/UL — LOW (ref 150–400)
POIKILOCYTOSIS BLD QL AUTO: SIGNIFICANT CHANGE UP
POLYCHROMASIA BLD QL SMEAR: SLIGHT — SIGNIFICANT CHANGE UP
RBC # BLD: 2.71 M/UL — LOW (ref 3.8–5.2)
RBC # FLD: 17.9 % — HIGH (ref 10.3–14.5)
RBC BLD AUTO: ABNORMAL
SCHISTOCYTES BLD QL AUTO: SLIGHT — SIGNIFICANT CHANGE UP
WBC # BLD: 2.01 K/UL — LOW (ref 3.8–10.5)
WBC # FLD AUTO: 2.01 K/UL — LOW (ref 3.8–10.5)

## 2021-01-24 NOTE — ASSESSMENT
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 Now s/p 10 total cycles and currently receiving cycle 11. \par -Patient's CBC improved s/p holding Venclexta and bone marrow biopsy after cycle 8 showing evidence of persistent complete remission. \par -Will continue with dacogen/venetoclax q5wks given troubles with cytopenias, along with OncPro (day 5). \par -Venetoclax daily, dosed at 200mg  for 10 days with each cycle. \par -BM bx done on 2/3/20 showed normal cytogenetics. In house assay for FLT-3 ITD positive, which confers an adverse prognosis in AML - previously had been d/w patient and family about BMT transplant -pt active prior to admission, good PS status; however, she was not interested. \par -patient has Port -looks good\par -patient has severe lower back pain related to DDD and apparent pinched nerve- advised her against taking NSAIDs due to platelet dysfunction. On low dose gabapentin 100 mg QHS, may have anxiolytic effect as well. \par -Previously offered patient psychological referral given reported depression, but she is refusing. Emotional support provided. \par -Follow up monthly

## 2021-01-24 NOTE — RESULTS/DATA
[FreeTextEntry1] : On 1/20/2020 wbc 3.82 Hb 9.5 Plt 107\par On 12/3/20 wbc 0.79 Hb 9.6, Plt 117\par On 11/6/20 wbc 2.29 Hb 10.7, Plt 151\par On 10/28/20 wbc 0.81, Hb 10.2, Plt 86\par On 9/28/20 wbc 1.56  Hb 10 plt 169\par On 6/15/20 wbc 0.99 Hgb 13.1  plt 171\par On 5/12/20) wbc 1.1  Hb 13.1plt 211\par On 4/15/20) wbc 7.78 Hb 12.3 plt  149 ANC 4680\par On 3/31/20) wbc 4. 3 Hb 11.8 plt 395 ANC 2800\par On 3/17/20) wbc 0.85 Hb 10.2  plt 57\par On 3/11/20) wbc 0.3 Hb 7.8 plt 9\par (On 2/11/20) wbc 1.1  Hb 10.2 plt 48\par On 2/10/20 wbc 0.59 Hb 8.4 plt 37\par On 1/31/20 wbc 25.4 with 74% blasts, Hb 11.7 plt 153\par \par \par \par

## 2021-01-24 NOTE — REVIEW OF SYSTEMS
[Fever] : no fever [Chills] : no chills [Night Sweats] : no night sweats [Recent Change In Weight] : ~T no recent weight change [Eye Pain] : no eye pain [Red Eyes] : eyes not red [Dry Eyes] : no dryness of the eyes [Vision Problems] : no vision problems [Dysphagia] : no dysphagia [Loss of Hearing] : no loss of hearing [Nosebleeds] : no nosebleeds [Hoarseness] : no hoarseness [Odynophagia] : no odynophagia [Mucosal Pain] : no mucosal pain [Chest Pain] : no chest pain [Leg Claudication] : no intermittent leg claudication [Palpitations] : no palpitations [Lower Ext Edema] : no lower extremity edema [Shortness Of Breath] : no shortness of breath [Wheezing] : no wheezing [Cough] : no cough [SOB on Exertion] : no shortness of breath during exertion [Vomiting] : no vomiting [Abdominal Pain] : no abdominal pain [Constipation] : no constipation [Diarrhea] : no diarrhea [Dysuria] : no dysuria [Vaginal Discharge] : no vaginal discharge [Dysmenorrhea/Abn Vaginal Bleeding] : no dysmenorrhea/abnormal vaginal bleeding [Muscle Pain] : no muscle pain [Joint Stiffness] : no joint stiffness [Skin Rash] : no skin rash [Skin Wound] : no skin wound [Confused] : no confusion [Dizziness] : no dizziness [Fainting] : no fainting [Difficulty Walking] : no difficulty walking [Suicidal] : not suicidal [Insomnia] : no insomnia [Proptosis] : no proptosis [Hot Flashes] : no hot flashes [Muscle Weakness] : no muscle weakness [Deepening Of The Voice] : no deepening of the voice [Easy Bleeding] : no tendency for easy bleeding [Easy Bruising] : no tendency for easy bruising [Swollen Glands] : no swollen glands [FreeTextEntry6] : can go up 1 flight of stairs - exertional fatigue but not dyspnea  [FreeTextEntry9] : lower back pain chronic -s/p compressed vertebrae [FreeTextEntry7] : no BRBPR. Colonoscopy -done > 1 yr ago, normal;  [de-identified] :  uses trazodone for sleep and she sleeps fine.  [FreeTextEntry1] : seasonal allergies

## 2021-01-24 NOTE — HISTORY OF PRESENT ILLNESS
[de-identified] : Ms. 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 She has a history of Breast cancer 12 years ago treated with tamoxifen (no chemotherapy), s/p RT and lumpectomy, HTN and surgical hx of hysterectomy, was sent in by oncologist Dr. Blank on 1/31/20 for rule out leukemia. Patient had been feeling generally fatigued and sluggish since December 2019. She was feeling more tired, and saw her PMD, who did some blood work and noticed blasts in her peripheral blood. Pt was told to see Dr. Blank for further workup. Dr. Blank did blood work again which again found blasts in the peripheral blood, at which point she sent pt to the ER for leukemia workup. 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 She was started on 2/4/20 on Cycle 1 of Dacogen + Venetoclax (50 mg on day 1, 100 mg on day 2, 200 mg on day 3 and onward when starting Diflucan). She tolerated it well other than some SOB -CXR on 2/7/20 : mild pulmonary edema and L pleural effusion, treated with diuretics. She was discharged home on 2/11/20. \par \par She had a BM bx to eval response on 3/2/20 -showed no blasts. Pt given C2 days 1-3 on 3/2, 3/4 and 3/5, after which her Venetoclax was held for count recovery. She has now completed up through cycle 8 of dacogen/Venclexta (10 days each cycle). Repeat bone marrow biopsy showing no evidence of leukemia and hypocellular trilineage hematopoiesis with 10% bone marrow cellularity. Venclexta was held and counts recovered, she is now in cycle 11. \par  [de-identified] : Disseminated [de-identified] : 1, 2. Bone marrow biopsy and bone marrow aspirate\par - Acute myeloid leukemia with mutated NPM1\par - FLT 3-ITD positive\par \par Diagnostic Note:\par Megakarocytes are normal in number with dysplastic morphology.\par Please note findings of a normal female karyotype, negative MDS\par FISH panel. and somatic mutations of FLT3 FLT3-ITD\par ([id number]), [id number]*12, and CEBPA F106fs*1.\par Based on the additional findings, the classification of AML has\par changed to acute myeloid leukemia with mutated NPM1.\par  [de-identified] : Patient had weakness last week, was found to have low potassium levels and was given supplementation. She has improved since then. She reports an OK appetite, no pain symptoms or dyspnea/chest pain. She does report mild night sweats occasionally, but not to the point of having to change clothes or sheets. She reports that she has mild swelling in her RLE, but barely noticeable to her. No RLE pain.

## 2021-01-24 NOTE — PHYSICAL EXAM
[Ulcers] : no ulcers [Mucositis] : no mucositis [Thrush] : no thrush [de-identified] : L sided Port -no inflammation. Trace edema RLE.

## 2021-01-25 ENCOUNTER — RESULT REVIEW (OUTPATIENT)
Age: 81
End: 2021-01-25

## 2021-01-25 ENCOUNTER — APPOINTMENT (OUTPATIENT)
Dept: INFUSION THERAPY | Facility: HOSPITAL | Age: 81
End: 2021-01-25

## 2021-01-25 DIAGNOSIS — Z51.89 ENCOUNTER FOR OTHER SPECIFIED AFTERCARE: ICD-10-CM

## 2021-01-25 LAB
BASOPHILS # BLD AUTO: 0.01 K/UL — SIGNIFICANT CHANGE UP (ref 0–0.2)
BASOPHILS NFR BLD AUTO: 0.4 % — SIGNIFICANT CHANGE UP (ref 0–2)
EOSINOPHIL # BLD AUTO: 0.01 K/UL — SIGNIFICANT CHANGE UP (ref 0–0.5)
EOSINOPHIL NFR BLD AUTO: 0.4 % — SIGNIFICANT CHANGE UP (ref 0–6)
HCT VFR BLD CALC: 28.6 % — LOW (ref 34.5–45)
HGB BLD-MCNC: 9.6 G/DL — LOW (ref 11.5–15.5)
IMM GRANULOCYTES NFR BLD AUTO: 0.9 % — SIGNIFICANT CHANGE UP (ref 0–1.5)
LYMPHOCYTES # BLD AUTO: 0.47 K/UL — LOW (ref 1–3.3)
LYMPHOCYTES # BLD AUTO: 20.2 % — SIGNIFICANT CHANGE UP (ref 13–44)
MCHC RBC-ENTMCNC: 33.6 G/DL — SIGNIFICANT CHANGE UP (ref 32–36)
MCHC RBC-ENTMCNC: 34.2 PG — HIGH (ref 27–34)
MCV RBC AUTO: 101.8 FL — HIGH (ref 80–100)
MONOCYTES # BLD AUTO: 0.3 K/UL — SIGNIFICANT CHANGE UP (ref 0–0.9)
MONOCYTES NFR BLD AUTO: 12.9 % — SIGNIFICANT CHANGE UP (ref 2–14)
NEUTROPHILS # BLD AUTO: 1.52 K/UL — LOW (ref 1.8–7.4)
NEUTROPHILS NFR BLD AUTO: 65.2 % — SIGNIFICANT CHANGE UP (ref 43–77)
NRBC # BLD: 0 /100 WBCS — SIGNIFICANT CHANGE UP (ref 0–0)
PLATELET # BLD AUTO: 113 K/UL — LOW (ref 150–400)
RBC # BLD: 2.81 M/UL — LOW (ref 3.8–5.2)
RBC # FLD: 17.7 % — HIGH (ref 10.3–14.5)
WBC # BLD: 2.33 K/UL — LOW (ref 3.8–10.5)
WBC # FLD AUTO: 2.33 K/UL — LOW (ref 3.8–10.5)

## 2021-01-29 ENCOUNTER — NON-APPOINTMENT (OUTPATIENT)
Age: 81
End: 2021-01-29

## 2021-01-29 ENCOUNTER — OUTPATIENT (OUTPATIENT)
Dept: OUTPATIENT SERVICES | Facility: HOSPITAL | Age: 81
LOS: 1 days | End: 2021-01-29

## 2021-01-29 ENCOUNTER — APPOINTMENT (OUTPATIENT)
Dept: INFUSION THERAPY | Facility: HOSPITAL | Age: 81
End: 2021-01-29

## 2021-01-29 DIAGNOSIS — Z90.710 ACQUIRED ABSENCE OF BOTH CERVIX AND UTERUS: Chronic | ICD-10-CM

## 2021-01-29 DIAGNOSIS — C92.00 ACUTE MYELOBLASTIC LEUKEMIA, NOT HAVING ACHIEVED REMISSION: ICD-10-CM

## 2021-02-02 ENCOUNTER — LABORATORY RESULT (OUTPATIENT)
Age: 81
End: 2021-02-02

## 2021-02-02 ENCOUNTER — APPOINTMENT (OUTPATIENT)
Dept: INFUSION THERAPY | Facility: HOSPITAL | Age: 81
End: 2021-02-02

## 2021-02-05 ENCOUNTER — EMERGENCY (EMERGENCY)
Facility: HOSPITAL | Age: 81
LOS: 1 days | Discharge: HOME | End: 2021-02-05
Attending: EMERGENCY MEDICINE | Admitting: EMERGENCY MEDICINE
Payer: COMMERCIAL

## 2021-02-05 ENCOUNTER — NON-APPOINTMENT (OUTPATIENT)
Age: 81
End: 2021-02-05

## 2021-02-05 VITALS
RESPIRATION RATE: 16 BRPM | HEIGHT: 64 IN | SYSTOLIC BLOOD PRESSURE: 146 MMHG | DIASTOLIC BLOOD PRESSURE: 80 MMHG | TEMPERATURE: 98 F | OXYGEN SATURATION: 98 % | WEIGHT: 123.9 LBS | HEART RATE: 88 BPM

## 2021-02-05 VITALS
SYSTOLIC BLOOD PRESSURE: 161 MMHG | TEMPERATURE: 99 F | RESPIRATION RATE: 17 BRPM | DIASTOLIC BLOOD PRESSURE: 72 MMHG | HEART RATE: 82 BPM | OXYGEN SATURATION: 96 %

## 2021-02-05 DIAGNOSIS — Z90.710 ACQUIRED ABSENCE OF BOTH CERVIX AND UTERUS: Chronic | ICD-10-CM

## 2021-02-05 PROCEDURE — 71045 X-RAY EXAM CHEST 1 VIEW: CPT | Mod: 26

## 2021-02-05 PROCEDURE — 71045 X-RAY EXAM CHEST 1 VIEW: CPT

## 2021-02-05 PROCEDURE — 73610 X-RAY EXAM OF ANKLE: CPT | Mod: 26,LT

## 2021-02-05 PROCEDURE — 27786 TREATMENT OF ANKLE FRACTURE: CPT | Mod: LT

## 2021-02-05 PROCEDURE — 99284 EMERGENCY DEPT VISIT MOD MDM: CPT

## 2021-02-05 PROCEDURE — 73610 X-RAY EXAM OF ANKLE: CPT

## 2021-02-05 PROCEDURE — 99285 EMERGENCY DEPT VISIT HI MDM: CPT | Mod: 25

## 2021-02-05 RX ORDER — OXYCODONE HYDROCHLORIDE 5 MG/1
5 TABLET ORAL ONCE
Refills: 0 | Status: DISCONTINUED | OUTPATIENT
Start: 2021-02-05 | End: 2021-02-05

## 2021-02-05 RX ADMIN — OXYCODONE HYDROCHLORIDE 5 MILLIGRAM(S): 5 TABLET ORAL at 14:50

## 2021-02-05 NOTE — ED PROVIDER NOTE - CARE PROVIDER_API CALL
Luigi David (DO)  Orthopaedic Surgery  [address] [place] [address]  Phone: [phone]  Fax: [phone]  Follow Up Time: 4-6 Days

## 2021-02-05 NOTE — ED PROVIDER NOTE - PATIENT PORTAL LINK FT
You can access the FollowMyHealth Patient Portal offered by [facility] by registering at the following website: [email or web address]. By joining [email or web address]s FollowMyHealth portal, you will also be able to view your health information using other applications (apps) compatible with our system.

## 2021-02-05 NOTE — CONSULT NOTE ADULT - SUBJECTIVE AND OBJECTIVE BOX
Orthopedic Surgery Consult Note    80yFemale p/w L ankle pain/swelling and difficulty bearing weight s/p her  tripping over her ankle 2 weeks ago. Patient denies falling and denies headstrike/LOC. Denies numbness/tingling in the feet/toes. No other bone or joint complaints. Patient states she has been ambulating with a walker at home for the last 2 weeks with difficulty.     PAST MEDICAL & SURGICAL HISTORY:  Hypertension    Breast cancer    S/P hysterectomy      Vital Signs Last 24 Hrs  T(C): 36.7 (02-05-21 @ 14:01), Max: 36.7 (02-05-21 @ 14:01)  T(F): 98.1 (02-05-21 @ 14:01), Max: 98.1 (02-05-21 @ 14:01)  HR: 88 (02-05-21 @ 14:01) (88 - 88)  BP: 146/80 (02-05-21 @ 14:01) (146/80 - 146/80)  BP(mean): --  RR: 16 (02-05-21 @ 14:01) (16 - 16)  SpO2: 98% (02-05-21 @ 14:01) (98% - 98%)    Physical Exam  Gen: Nad  L LE: Skin intact, -skin tenting medially +TTP lateral malleolus  Ecchymosis and swelling present   +EHL/FHL/TA/GSC  SILT L3-S1  + DP    Imaging:  XR showing L wasserman A nondisplaced ankle fracture    Procedure: Closed reduction performed followed by placement of a well padded trilam splint. Patient tolerated the procedure well. Post procedure imaging obtained and showed improved alignment. Post procedure the patient was NV intact.    A/P: 80yFemale with L Wasserman A ankle fracture s/p closed reduction and splinting  - Pain control  - NWB on affected extremity in splint in splint  - Keep splint clean, dry and intact until follow up  - Cane/crutches/walker as needed  - Ice/elevation  - Orthopaedically stable for discharge  - Follow up w/ Dr. David early next week after discharge. Call office to schedule appointment.  - Patient will likely be transitioned to a CAM walking boot early next week.  - Discharge planning  - All patient's questions answered. Patient understands and agrees w/ above plan.  - Discussed with attending who agrees and is aware

## 2021-02-05 NOTE — ED ADULT NURSE NOTE - NSIMPLEMENTINTERV_GEN_ALL_ED
Implemented All Fall with Harm Risk Interventions:  [place] to call system. Call bell, personal items and telephone within reach. Instruct patient to call for assistance. Room bathroom lighting operational. Non-slip footwear when patient is off stretcher. Physically safe environment: no spills, clutter or unnecessary equipment. Stretcher in lowest position, wheels locked, appropriate side rails in place. Provide visual cue, wrist band, yellow gown, etc. Monitor gait and stability. Monitor for mental status changes and reorient to person, place, and time. Review medications for side effects contributing to fall risk. Reinforce activity limits and safety measures with patient and family. Provide visual clues: red socks.

## 2021-02-05 NOTE — ED PROVIDER NOTE - OBJECTIVE STATEMENT
79 yo female with hx AML, on chemo, c/o pain and swelling to left ankle. Patient reports that 2 weeks ago her  fell over her, and she has pain since and has been limping. Patient went for xray today which showed ankle fracture. denies other injury  denies fever or chills  patient with AML and last platelet level was 24

## 2021-02-05 NOTE — ED PROVIDER NOTE - PROGRESS NOTE DETAILS
splint applied by orthopedics  patient can toe touch and follow up with Dr. David in office next week

## 2021-02-05 NOTE — ED PROVIDER NOTE - PHYSICAL EXAMINATION
LLE: no knee tenderness  1+ edema to LLE  eccymoses to ankle with ttp laterally and medially  sensation intact  2+ dp pulses

## 2021-02-05 NOTE — ED ADULT NURSE NOTE - OBJECTIVE STATEMENT
Pt p/w L ankle pain x 1 week sent by ortho for eval of fracture. Per patient,  fell on her leg causing injury, able to ambulate at home w/ significant pain.

## 2021-02-06 ENCOUNTER — APPOINTMENT (OUTPATIENT)
Dept: INFUSION THERAPY | Facility: HOSPITAL | Age: 81
End: 2021-02-06

## 2021-02-08 ENCOUNTER — NON-APPOINTMENT (OUTPATIENT)
Age: 81
End: 2021-02-08

## 2021-02-08 ENCOUNTER — LABORATORY RESULT (OUTPATIENT)
Age: 81
End: 2021-02-08

## 2021-02-09 ENCOUNTER — APPOINTMENT (OUTPATIENT)
Dept: INFUSION THERAPY | Facility: HOSPITAL | Age: 81
End: 2021-02-09

## 2021-02-10 LAB
BASOPHILS # BLD AUTO: 0 K/UL
BASOPHILS NFR BLD AUTO: 0 %
EOSINOPHIL # BLD AUTO: 0 K/UL
EOSINOPHIL NFR BLD AUTO: 0 %
HCT VFR BLD CALC: 28.5 %
HGB BLD-MCNC: 9 G/DL
IMM GRANULOCYTES NFR BLD AUTO: 2 %
LYMPHOCYTES # BLD AUTO: 0.44 K/UL
LYMPHOCYTES NFR BLD AUTO: 86.3 %
MAN DIFF?: NORMAL
MCHC RBC-ENTMCNC: 31.6 GM/DL
MCHC RBC-ENTMCNC: 33.6 PG
MCV RBC AUTO: 106.3 FL
MONOCYTES # BLD AUTO: 0.01 K/UL
MONOCYTES NFR BLD AUTO: 2 %
NEUTROPHILS # BLD AUTO: 0.05 K/UL
NEUTROPHILS NFR BLD AUTO: 9.7 %
PLATELET # BLD AUTO: 31 K/UL
RBC # BLD: 2.68 M/UL
RBC # FLD: 18.1 %
WBC # FLD AUTO: 0.51 K/UL

## 2021-02-12 ENCOUNTER — APPOINTMENT (OUTPATIENT)
Dept: INFUSION THERAPY | Facility: HOSPITAL | Age: 81
End: 2021-02-12

## 2021-02-12 ENCOUNTER — OUTPATIENT (OUTPATIENT)
Dept: OUTPATIENT SERVICES | Facility: HOSPITAL | Age: 81
LOS: 1 days | Discharge: HOME | End: 2021-02-12

## 2021-02-12 ENCOUNTER — NON-APPOINTMENT (OUTPATIENT)
Age: 81
End: 2021-02-12

## 2021-02-12 DIAGNOSIS — Z90.710 ACQUIRED ABSENCE OF BOTH CERVIX AND UTERUS: Chronic | ICD-10-CM

## 2021-02-12 DIAGNOSIS — C92.00 ACUTE MYELOBLASTIC LEUKEMIA, NOT HAVING ACHIEVED REMISSION: ICD-10-CM

## 2021-02-15 ENCOUNTER — LABORATORY RESULT (OUTPATIENT)
Age: 81
End: 2021-02-15

## 2021-02-16 ENCOUNTER — APPOINTMENT (OUTPATIENT)
Dept: INFUSION THERAPY | Facility: HOSPITAL | Age: 81
End: 2021-02-16

## 2021-02-18 ENCOUNTER — LABORATORY RESULT (OUTPATIENT)
Age: 81
End: 2021-02-18

## 2021-02-18 LAB
BASOPHILS # BLD AUTO: 0 K/UL
BASOPHILS NFR BLD AUTO: 0 %
EOSINOPHIL # BLD AUTO: 0.02 K/UL
EOSINOPHIL NFR BLD AUTO: 4 %
HCT VFR BLD CALC: 27 %
HCT VFR BLD CALC: 28.5 %
HGB BLD-MCNC: 8.5 G/DL
HGB BLD-MCNC: 9 G/DL
LYMPHOCYTES # BLD AUTO: 0.29 K/UL
LYMPHOCYTES NFR BLD AUTO: 60 %
MAN DIFF?: NORMAL
MAN DIFF?: NORMAL
MCHC RBC-ENTMCNC: 31.5 GM/DL
MCHC RBC-ENTMCNC: 31.6 GM/DL
MCHC RBC-ENTMCNC: 32.7 PG
MCHC RBC-ENTMCNC: 33.2 PG
MCV RBC AUTO: 103.8 FL
MCV RBC AUTO: 105.2 FL
MONOCYTES # BLD AUTO: 0.04 K/UL
MONOCYTES NFR BLD AUTO: 8 %
NEUTROPHILS # BLD AUTO: 0.12 K/UL
NEUTROPHILS NFR BLD AUTO: 24 %
NEUTROPHILS NFR BLD AUTO: NORMAL
PLATELET # BLD AUTO: 132 K/UL
PLATELET # BLD AUTO: 75 K/UL
RBC # BLD: 2.6 M/UL
RBC # BLD: 2.71 M/UL
RBC # FLD: 17.6 %
RBC # FLD: 17.6 %
WBC # FLD AUTO: 0.27 K/UL
WBC # FLD AUTO: 0.49 K/UL

## 2021-02-19 ENCOUNTER — APPOINTMENT (OUTPATIENT)
Dept: INFUSION THERAPY | Facility: HOSPITAL | Age: 81
End: 2021-02-19

## 2021-02-22 ENCOUNTER — LABORATORY RESULT (OUTPATIENT)
Age: 81
End: 2021-02-22

## 2021-02-22 ENCOUNTER — RESULT REVIEW (OUTPATIENT)
Age: 81
End: 2021-02-22

## 2021-02-22 ENCOUNTER — APPOINTMENT (OUTPATIENT)
Dept: INFUSION THERAPY | Facility: HOSPITAL | Age: 81
End: 2021-02-22

## 2021-02-22 ENCOUNTER — APPOINTMENT (OUTPATIENT)
Dept: HEMATOLOGY ONCOLOGY | Facility: CLINIC | Age: 81
End: 2021-02-22
Payer: MEDICARE

## 2021-02-22 LAB
BASOPHILS # BLD AUTO: 0 K/UL — SIGNIFICANT CHANGE UP (ref 0–0.2)
BASOPHILS NFR BLD AUTO: 0 % — SIGNIFICANT CHANGE UP (ref 0–2)
DACRYOCYTES BLD QL SMEAR: SLIGHT — SIGNIFICANT CHANGE UP
EOSINOPHIL # BLD AUTO: 0 K/UL — SIGNIFICANT CHANGE UP (ref 0–0.5)
EOSINOPHIL NFR BLD AUTO: 0 % — SIGNIFICANT CHANGE UP (ref 0–6)
HCT VFR BLD CALC: 27.2 % — LOW (ref 34.5–45)
HGB BLD-MCNC: 9.1 G/DL — LOW (ref 11.5–15.5)
LYMPHOCYTES # BLD AUTO: 0.5 K/UL — LOW (ref 1–3.3)
LYMPHOCYTES # BLD AUTO: 28 % — SIGNIFICANT CHANGE UP (ref 13–44)
MCHC RBC-ENTMCNC: 33.5 G/DL — SIGNIFICANT CHANGE UP (ref 32–36)
MCHC RBC-ENTMCNC: 33.6 PG — SIGNIFICANT CHANGE UP (ref 27–34)
MCV RBC AUTO: 100.4 FL — HIGH (ref 80–100)
MONOCYTES # BLD AUTO: 0.37 K/UL — SIGNIFICANT CHANGE UP (ref 0–0.9)
MONOCYTES NFR BLD AUTO: 21 % — HIGH (ref 2–14)
NEUTROPHILS # BLD AUTO: 0.9 K/UL — LOW (ref 1.8–7.4)
NEUTROPHILS NFR BLD AUTO: 51 % — SIGNIFICANT CHANGE UP (ref 43–77)
NRBC # BLD: 0 /100 — SIGNIFICANT CHANGE UP (ref 0–0)
NRBC # BLD: SIGNIFICANT CHANGE UP /100 WBCS (ref 0–0)
OVALOCYTES BLD QL SMEAR: SLIGHT — SIGNIFICANT CHANGE UP
PLAT MORPH BLD: NORMAL — SIGNIFICANT CHANGE UP
PLATELET # BLD AUTO: 123 K/UL — LOW (ref 150–400)
RBC # BLD: 2.71 M/UL — LOW (ref 3.8–5.2)
RBC # FLD: 18.1 % — HIGH (ref 10.3–14.5)
RBC BLD AUTO: ABNORMAL
SARS-COV-2 N GENE NPH QL NAA+PROBE: NOT DETECTED
WBC # BLD: 1.77 K/UL — LOW (ref 3.8–10.5)
WBC # FLD AUTO: 1.77 K/UL — LOW (ref 3.8–10.5)

## 2021-02-22 PROCEDURE — 99213 OFFICE O/P EST LOW 20 MIN: CPT

## 2021-02-22 RX ORDER — POTASSIUM CHLORIDE 1500 MG/1
20 TABLET, FILM COATED, EXTENDED RELEASE ORAL
Qty: 4 | Refills: 0 | Status: DISCONTINUED | COMMUNITY
Start: 2021-01-11 | End: 2021-02-22

## 2021-02-23 ENCOUNTER — APPOINTMENT (OUTPATIENT)
Dept: HEMATOLOGY ONCOLOGY | Facility: CLINIC | Age: 81
End: 2021-02-23

## 2021-02-23 ENCOUNTER — APPOINTMENT (OUTPATIENT)
Dept: INFUSION THERAPY | Facility: HOSPITAL | Age: 81
End: 2021-02-23

## 2021-02-24 ENCOUNTER — APPOINTMENT (OUTPATIENT)
Dept: INFUSION THERAPY | Facility: HOSPITAL | Age: 81
End: 2021-02-24

## 2021-02-25 ENCOUNTER — APPOINTMENT (OUTPATIENT)
Dept: INFUSION THERAPY | Facility: HOSPITAL | Age: 81
End: 2021-02-25

## 2021-02-26 ENCOUNTER — APPOINTMENT (OUTPATIENT)
Dept: INFUSION THERAPY | Facility: HOSPITAL | Age: 81
End: 2021-02-26

## 2021-03-01 ENCOUNTER — RESULT REVIEW (OUTPATIENT)
Age: 81
End: 2021-03-01

## 2021-03-01 ENCOUNTER — LABORATORY RESULT (OUTPATIENT)
Age: 81
End: 2021-03-01

## 2021-03-01 ENCOUNTER — APPOINTMENT (OUTPATIENT)
Dept: INFUSION THERAPY | Facility: HOSPITAL | Age: 81
End: 2021-03-01

## 2021-03-01 ENCOUNTER — NON-APPOINTMENT (OUTPATIENT)
Age: 81
End: 2021-03-01

## 2021-03-01 LAB
BASOPHILS # BLD AUTO: 0.03 K/UL — SIGNIFICANT CHANGE UP (ref 0–0.2)
BASOPHILS NFR BLD AUTO: 1 % — SIGNIFICANT CHANGE UP (ref 0–2)
EOSINOPHIL # BLD AUTO: 0 K/UL — SIGNIFICANT CHANGE UP (ref 0–0.5)
EOSINOPHIL NFR BLD AUTO: 0 % — SIGNIFICANT CHANGE UP (ref 0–6)
HCT VFR BLD CALC: 29.5 % — LOW (ref 34.5–45)
HGB BLD-MCNC: 9.6 G/DL — LOW (ref 11.5–15.5)
LYMPHOCYTES # BLD AUTO: 0.62 K/UL — LOW (ref 1–3.3)
LYMPHOCYTES # BLD AUTO: 19 % — SIGNIFICANT CHANGE UP (ref 13–44)
MCHC RBC-ENTMCNC: 32.5 G/DL — SIGNIFICANT CHANGE UP (ref 32–36)
MCHC RBC-ENTMCNC: 32.8 PG — SIGNIFICANT CHANGE UP (ref 27–34)
MCV RBC AUTO: 100.7 FL — HIGH (ref 80–100)
MONOCYTES # BLD AUTO: 0.65 K/UL — SIGNIFICANT CHANGE UP (ref 0–0.9)
MONOCYTES NFR BLD AUTO: 20 % — HIGH (ref 2–14)
NEUTROPHILS # BLD AUTO: 1.95 K/UL — SIGNIFICANT CHANGE UP (ref 1.8–7.4)
NEUTROPHILS NFR BLD AUTO: 60 % — SIGNIFICANT CHANGE UP (ref 43–77)
NRBC # BLD: 0 /100 — SIGNIFICANT CHANGE UP (ref 0–0)
NRBC # BLD: SIGNIFICANT CHANGE UP /100 WBCS (ref 0–0)
PLAT MORPH BLD: NORMAL — SIGNIFICANT CHANGE UP
PLATELET # BLD AUTO: 118 K/UL — LOW (ref 150–400)
RBC # BLD: 2.93 M/UL — LOW (ref 3.8–5.2)
RBC # FLD: 18.6 % — HIGH (ref 10.3–14.5)
RBC BLD AUTO: SIGNIFICANT CHANGE UP
WBC # BLD: 3.25 K/UL — LOW (ref 3.8–10.5)
WBC # FLD AUTO: 3.25 K/UL — LOW (ref 3.8–10.5)

## 2021-03-02 ENCOUNTER — APPOINTMENT (OUTPATIENT)
Dept: INFUSION THERAPY | Facility: HOSPITAL | Age: 81
End: 2021-03-02

## 2021-03-02 DIAGNOSIS — R11.2 NAUSEA WITH VOMITING, UNSPECIFIED: ICD-10-CM

## 2021-03-02 DIAGNOSIS — Z51.11 ENCOUNTER FOR ANTINEOPLASTIC CHEMOTHERAPY: ICD-10-CM

## 2021-03-03 ENCOUNTER — APPOINTMENT (OUTPATIENT)
Dept: INFUSION THERAPY | Facility: HOSPITAL | Age: 81
End: 2021-03-03

## 2021-03-04 ENCOUNTER — APPOINTMENT (OUTPATIENT)
Dept: INFUSION THERAPY | Facility: HOSPITAL | Age: 81
End: 2021-03-04

## 2021-03-04 ENCOUNTER — RESULT REVIEW (OUTPATIENT)
Age: 81
End: 2021-03-04

## 2021-03-04 LAB
BASOPHILS # BLD AUTO: 0 K/UL — SIGNIFICANT CHANGE UP (ref 0–0.2)
BASOPHILS NFR BLD AUTO: 0 % — SIGNIFICANT CHANGE UP (ref 0–2)
EOSINOPHIL # BLD AUTO: 0 K/UL — SIGNIFICANT CHANGE UP (ref 0–0.5)
EOSINOPHIL NFR BLD AUTO: 0 % — SIGNIFICANT CHANGE UP (ref 0–6)
HCT VFR BLD CALC: 27.7 % — LOW (ref 34.5–45)
HGB BLD-MCNC: 9.1 G/DL — LOW (ref 11.5–15.5)
LYMPHOCYTES # BLD AUTO: 0.55 K/UL — LOW (ref 1–3.3)
LYMPHOCYTES # BLD AUTO: 20 % — SIGNIFICANT CHANGE UP (ref 13–44)
MCHC RBC-ENTMCNC: 32.7 PG — SIGNIFICANT CHANGE UP (ref 27–34)
MCHC RBC-ENTMCNC: 32.9 G/DL — SIGNIFICANT CHANGE UP (ref 32–36)
MCV RBC AUTO: 99.6 FL — SIGNIFICANT CHANGE UP (ref 80–100)
MONOCYTES # BLD AUTO: 0.17 K/UL — SIGNIFICANT CHANGE UP (ref 0–0.9)
MONOCYTES NFR BLD AUTO: 6 % — SIGNIFICANT CHANGE UP (ref 2–14)
NEUTROPHILS # BLD AUTO: 2.04 K/UL — SIGNIFICANT CHANGE UP (ref 1.8–7.4)
NEUTROPHILS NFR BLD AUTO: 74 % — SIGNIFICANT CHANGE UP (ref 43–77)
NRBC # BLD: 0 /100 — SIGNIFICANT CHANGE UP (ref 0–0)
NRBC # BLD: SIGNIFICANT CHANGE UP /100 WBCS (ref 0–0)
PLAT MORPH BLD: NORMAL — SIGNIFICANT CHANGE UP
PLATELET # BLD AUTO: 132 K/UL — LOW (ref 150–400)
RBC # BLD: 2.78 M/UL — LOW (ref 3.8–5.2)
RBC # FLD: 18.4 % — HIGH (ref 10.3–14.5)
RBC BLD AUTO: SIGNIFICANT CHANGE UP
WBC # BLD: 2.75 K/UL — LOW (ref 3.8–10.5)
WBC # FLD AUTO: 2.75 K/UL — LOW (ref 3.8–10.5)

## 2021-03-05 ENCOUNTER — APPOINTMENT (OUTPATIENT)
Dept: INFUSION THERAPY | Facility: HOSPITAL | Age: 81
End: 2021-03-05

## 2021-03-08 DIAGNOSIS — Z51.89 ENCOUNTER FOR OTHER SPECIFIED AFTERCARE: ICD-10-CM

## 2021-03-11 ENCOUNTER — LABORATORY RESULT (OUTPATIENT)
Age: 81
End: 2021-03-11

## 2021-03-18 ENCOUNTER — LABORATORY RESULT (OUTPATIENT)
Age: 81
End: 2021-03-18

## 2021-03-19 ENCOUNTER — NON-APPOINTMENT (OUTPATIENT)
Age: 81
End: 2021-03-19

## 2021-03-19 LAB
BASOPHILS # BLD AUTO: 0.01 K/UL
BASOPHILS # BLD AUTO: 0.04 K/UL
BASOPHILS NFR BLD AUTO: 1.2 %
BASOPHILS NFR BLD AUTO: 2.3 %
EOSINOPHIL # BLD AUTO: 0.01 K/UL
EOSINOPHIL # BLD AUTO: 0.01 K/UL
EOSINOPHIL NFR BLD AUTO: 0.6 %
EOSINOPHIL NFR BLD AUTO: 1.2 %
HCT VFR BLD CALC: 26.8 %
HCT VFR BLD CALC: 27.6 %
HGB BLD-MCNC: 8.3 G/DL
HGB BLD-MCNC: 8.7 G/DL
IMM GRANULOCYTES NFR BLD AUTO: 0 %
IMM GRANULOCYTES NFR BLD AUTO: 0.6 %
LYMPHOCYTES # BLD AUTO: 0.33 K/UL
LYMPHOCYTES # BLD AUTO: 0.63 K/UL
LYMPHOCYTES NFR BLD AUTO: 18.8 %
LYMPHOCYTES NFR BLD AUTO: 73.3 %
MAN DIFF?: NORMAL
MAN DIFF?: NORMAL
MCHC RBC-ENTMCNC: 31 GM/DL
MCHC RBC-ENTMCNC: 31.5 GM/DL
MCHC RBC-ENTMCNC: 32.5 PG
MCHC RBC-ENTMCNC: 32.7 PG
MCV RBC AUTO: 103 FL
MCV RBC AUTO: 105.5 FL
MONOCYTES # BLD AUTO: 0.03 K/UL
MONOCYTES # BLD AUTO: 0.18 K/UL
MONOCYTES NFR BLD AUTO: 10.2 %
MONOCYTES NFR BLD AUTO: 3.5 %
NEUTROPHILS # BLD AUTO: 0.18 K/UL
NEUTROPHILS # BLD AUTO: 1.19 K/UL
NEUTROPHILS NFR BLD AUTO: 20.8 %
NEUTROPHILS NFR BLD AUTO: 67.5 %
PLATELET # BLD AUTO: 18 K/UL
PLATELET # BLD AUTO: 22 K/UL
RBC # BLD: 2.54 M/UL
RBC # BLD: 2.68 M/UL
RBC # FLD: 18.8 %
RBC # FLD: 20.7 %
WBC # FLD AUTO: 0.86 K/UL
WBC # FLD AUTO: 1.76 K/UL

## 2021-03-24 ENCOUNTER — LABORATORY RESULT (OUTPATIENT)
Age: 81
End: 2021-03-24

## 2021-03-24 ENCOUNTER — OUTPATIENT (OUTPATIENT)
Dept: OUTPATIENT SERVICES | Facility: HOSPITAL | Age: 81
LOS: 1 days | Discharge: HOME | End: 2021-03-24

## 2021-03-24 DIAGNOSIS — Z90.710 ACQUIRED ABSENCE OF BOTH CERVIX AND UTERUS: Chronic | ICD-10-CM

## 2021-03-24 DIAGNOSIS — C92.00 ACUTE MYELOBLASTIC LEUKEMIA, NOT HAVING ACHIEVED REMISSION: ICD-10-CM

## 2021-03-29 ENCOUNTER — RESULT REVIEW (OUTPATIENT)
Age: 81
End: 2021-03-29

## 2021-03-29 ENCOUNTER — APPOINTMENT (OUTPATIENT)
Dept: HEMATOLOGY ONCOLOGY | Facility: CLINIC | Age: 81
End: 2021-03-29

## 2021-03-29 ENCOUNTER — APPOINTMENT (OUTPATIENT)
Dept: HEMATOLOGY ONCOLOGY | Facility: CLINIC | Age: 81
End: 2021-03-29
Payer: MEDICARE

## 2021-03-29 VITALS
BODY MASS INDEX: 21.83 KG/M2 | OXYGEN SATURATION: 99 % | HEART RATE: 84 BPM | HEIGHT: 64 IN | DIASTOLIC BLOOD PRESSURE: 83 MMHG | WEIGHT: 127.87 LBS | RESPIRATION RATE: 17 BRPM | SYSTOLIC BLOOD PRESSURE: 155 MMHG | TEMPERATURE: 97.2 F

## 2021-03-29 LAB
ANISOCYTOSIS BLD QL: SLIGHT — SIGNIFICANT CHANGE UP
BASOPHILS # BLD AUTO: 0 K/UL — SIGNIFICANT CHANGE UP (ref 0–0.2)
BASOPHILS NFR BLD AUTO: 0 % — SIGNIFICANT CHANGE UP (ref 0–2)
EOSINOPHIL # BLD AUTO: 0 K/UL — SIGNIFICANT CHANGE UP (ref 0–0.5)
EOSINOPHIL NFR BLD AUTO: 0 % — SIGNIFICANT CHANGE UP (ref 0–6)
HCT VFR BLD CALC: 34 % — LOW (ref 34.5–45)
HGB BLD-MCNC: 10.7 G/DL — LOW (ref 11.5–15.5)
LYMPHOCYTES # BLD AUTO: 0.7 K/UL — LOW (ref 1–3.3)
LYMPHOCYTES # BLD AUTO: 24 % — SIGNIFICANT CHANGE UP (ref 13–44)
MCHC RBC-ENTMCNC: 31.5 G/DL — LOW (ref 32–36)
MCHC RBC-ENTMCNC: 32.3 PG — SIGNIFICANT CHANGE UP (ref 27–34)
MCV RBC AUTO: 102.7 FL — HIGH (ref 80–100)
MONOCYTES # BLD AUTO: 0.17 K/UL — SIGNIFICANT CHANGE UP (ref 0–0.9)
MONOCYTES NFR BLD AUTO: 6 % — SIGNIFICANT CHANGE UP (ref 2–14)
NEUTROPHILS # BLD AUTO: 2.04 K/UL — SIGNIFICANT CHANGE UP (ref 1.8–7.4)
NEUTROPHILS NFR BLD AUTO: 70 % — SIGNIFICANT CHANGE UP (ref 43–77)
NRBC # BLD: 0 /100 — SIGNIFICANT CHANGE UP (ref 0–0)
NRBC # BLD: SIGNIFICANT CHANGE UP /100 WBCS (ref 0–0)
PLAT MORPH BLD: NORMAL — SIGNIFICANT CHANGE UP
PLATELET # BLD AUTO: 231 K/UL — SIGNIFICANT CHANGE UP (ref 150–400)
POIKILOCYTOSIS BLD QL AUTO: SLIGHT — SIGNIFICANT CHANGE UP
RBC # BLD: 3.31 M/UL — LOW (ref 3.8–5.2)
RBC # FLD: 19.7 % — HIGH (ref 10.3–14.5)
RBC BLD AUTO: SIGNIFICANT CHANGE UP
WBC # BLD: 2.91 K/UL — LOW (ref 3.8–10.5)
WBC # FLD AUTO: 2.91 K/UL — LOW (ref 3.8–10.5)

## 2021-03-29 PROCEDURE — 99072 ADDL SUPL MATRL&STAF TM PHE: CPT

## 2021-03-29 PROCEDURE — 99214 OFFICE O/P EST MOD 30 MIN: CPT

## 2021-04-01 LAB
ALBUMIN SERPL ELPH-MCNC: 3.8 G/DL
ALP BLD-CCNC: 63 U/L
ALT SERPL-CCNC: 9 U/L
ANION GAP SERPL CALC-SCNC: 14 MMOL/L
AST SERPL-CCNC: 12 U/L
BILIRUB SERPL-MCNC: 0.6 MG/DL
BUN SERPL-MCNC: 20 MG/DL
CALCIUM SERPL-MCNC: 9.1 MG/DL
CHLORIDE SERPL-SCNC: 103 MMOL/L
CO2 SERPL-SCNC: 26 MMOL/L
CREAT SERPL-MCNC: 0.69 MG/DL
GLUCOSE SERPL-MCNC: 140 MG/DL
LDH SERPL-CCNC: 153 U/L
POTASSIUM SERPL-SCNC: 3.7 MMOL/L
PROT SERPL-MCNC: 6.2 G/DL
SODIUM SERPL-SCNC: 143 MMOL/L
URATE SERPL-MCNC: 2.6 MG/DL

## 2021-04-05 ENCOUNTER — LABORATORY RESULT (OUTPATIENT)
Age: 81
End: 2021-04-05

## 2021-04-05 ENCOUNTER — APPOINTMENT (OUTPATIENT)
Dept: INFUSION THERAPY | Facility: HOSPITAL | Age: 81
End: 2021-04-05

## 2021-04-05 ENCOUNTER — RESULT REVIEW (OUTPATIENT)
Age: 81
End: 2021-04-05

## 2021-04-05 LAB
BASOPHILS # BLD AUTO: 0.01 K/UL — SIGNIFICANT CHANGE UP (ref 0–0.2)
BASOPHILS NFR BLD AUTO: 0.2 % — SIGNIFICANT CHANGE UP (ref 0–2)
EOSINOPHIL # BLD AUTO: 0.02 K/UL — SIGNIFICANT CHANGE UP (ref 0–0.5)
EOSINOPHIL NFR BLD AUTO: 0.5 % — SIGNIFICANT CHANGE UP (ref 0–6)
HCT VFR BLD CALC: 35.6 % — SIGNIFICANT CHANGE UP (ref 34.5–45)
HGB BLD-MCNC: 11.6 G/DL — SIGNIFICANT CHANGE UP (ref 11.5–15.5)
IMM GRANULOCYTES NFR BLD AUTO: 0.7 % — SIGNIFICANT CHANGE UP (ref 0–1.5)
LYMPHOCYTES # BLD AUTO: 1.02 K/UL — SIGNIFICANT CHANGE UP (ref 1–3.3)
LYMPHOCYTES # BLD AUTO: 24.9 % — SIGNIFICANT CHANGE UP (ref 13–44)
MCHC RBC-ENTMCNC: 31.9 PG — SIGNIFICANT CHANGE UP (ref 27–34)
MCHC RBC-ENTMCNC: 32.6 G/DL — SIGNIFICANT CHANGE UP (ref 32–36)
MCV RBC AUTO: 97.8 FL — SIGNIFICANT CHANGE UP (ref 80–100)
MONOCYTES # BLD AUTO: 0.78 K/UL — SIGNIFICANT CHANGE UP (ref 0–0.9)
MONOCYTES NFR BLD AUTO: 19 % — HIGH (ref 2–14)
NEUTROPHILS # BLD AUTO: 2.24 K/UL — SIGNIFICANT CHANGE UP (ref 1.8–7.4)
NEUTROPHILS NFR BLD AUTO: 54.7 % — SIGNIFICANT CHANGE UP (ref 43–77)
NRBC # BLD: 0 /100 WBCS — SIGNIFICANT CHANGE UP (ref 0–0)
PLATELET # BLD AUTO: 189 K/UL — SIGNIFICANT CHANGE UP (ref 150–400)
RBC # BLD: 3.64 M/UL — LOW (ref 3.8–5.2)
RBC # FLD: 18.8 % — HIGH (ref 10.3–14.5)
WBC # BLD: 4.1 K/UL — SIGNIFICANT CHANGE UP (ref 3.8–10.5)
WBC # FLD AUTO: 4.1 K/UL — SIGNIFICANT CHANGE UP (ref 3.8–10.5)

## 2021-04-06 ENCOUNTER — NON-APPOINTMENT (OUTPATIENT)
Age: 81
End: 2021-04-06

## 2021-04-06 ENCOUNTER — APPOINTMENT (OUTPATIENT)
Dept: INFUSION THERAPY | Facility: HOSPITAL | Age: 81
End: 2021-04-06

## 2021-04-06 DIAGNOSIS — Z51.11 ENCOUNTER FOR ANTINEOPLASTIC CHEMOTHERAPY: ICD-10-CM

## 2021-04-07 ENCOUNTER — APPOINTMENT (OUTPATIENT)
Dept: INFUSION THERAPY | Facility: HOSPITAL | Age: 81
End: 2021-04-07

## 2021-04-07 NOTE — HISTORY OF PRESENT ILLNESS
[Disease:__________________________] : Disease: [unfilled] [Therapy: ___] : Therapy: [unfilled] [Cycle: ___] : Cycle: [unfilled] [Day: ___] : Day: [unfilled] [de-identified] : Ms. 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 She has a history of Breast cancer 12 years ago treated with tamoxifen (no chemotherapy), s/p RT and lumpectomy, HTN and surgical hx of hysterectomy, was sent in by oncologist Dr. Blank on 1/31/20 for rule out leukemia. Patient had been feeling generally fatigued and sluggish since December 2019. She was feeling more tired, and saw her PMD, who did some blood work and noticed blasts in her peripheral blood. Pt was told to see Dr. Blank for further workup. Dr. Blank did blood work again which again found blasts in the peripheral blood, at which point she sent pt to the ER for leukemia workup. 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 She was started on 2/4/20 on Cycle 1 of Dacogen + Venetoclax (50 mg on day 1, 100 mg on day 2, 200 mg on day 3 and onward when starting Diflucan). She tolerated it well other than some SOB -CXR on 2/7/20 : mild pulmonary edema and L pleural effusion, treated with diuretics. She was discharged home on 2/11/20. \par \par She had a BM bx to eval response on 3/2/20 -showed no blasts. Pt given C2 days 1-3 on 3/2, 3/4 and 3/5, after which her Venetoclax was held for count recovery. She has now completed up through cycle 8 of dacogen/Venclexta (10 days each cycle). Repeat bone marrow biopsy showing no evidence of leukemia and hypocellular trilineage hematopoiesis with 10% bone marrow cellularity. Venclexta was held and counts recovered, she is now in cycle 11. \par  [de-identified] : Disseminated [de-identified] : 1, 2. Bone marrow biopsy and bone marrow aspirate\par - Acute myeloid leukemia with mutated NPM1\par - FLT 3-ITD positive\par \par Diagnostic Note:\par Megakarocytes are normal in number with dysplastic morphology.\par Please note findings of a normal female karyotype, negative MDS\par FISH panel. and somatic mutations of FLT3 FLT3-ITD\par ([id number]), [id number]*12, and CEBPA F106fs*1.\par Based on the additional findings, the classification of AML has\par changed to acute myeloid leukemia with mutated NPM1.\par  [de-identified] : Patient seen today for follow up. Today is Day 34 of C 11 of Dacogen and Venclexta. Since last visit patient had broken her ankle after her  tripped over her while she was sitting in her chair. Has a boot on and follow up with Ortho soon. She reports an OK appetite, no dyspnea/chest pain. She does report mild fatigue that is unchanged.

## 2021-04-07 NOTE — ASSESSMENT
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  Repeat bone marrow biopsy after cycle 8 showing evidence of persistent complete remission. Now s/p 11 total cycles and planned to start cycle 12 today.\par \par -Will continue with dacogen/venetoclax q5wks given troubles with cytopenias, along with OncPro (day 5). \par -Venetoclax daily, dosed at 200mg  for 10 days with each cycle. \par -Patient's CBC today showing . Will hold C12 and reschedule for next week.\par -Continue abx/antifungals\par -BM bx done on 2/3/20 showed normal cytogenetics. In house assay for FLT-3 ITD positive, which confers an adverse prognosis in AML - previously had been d/w patient and family about BMT transplant -pt active prior to admission, good PS status; however, she was not interested. \par -patient has Port -looks good\par -patient has severe lower back pain related to DDD and apparent pinched nerve has improved on low dose gabapentin 100 mg QHS, may have anxiolytic effect as well. \par -Previously offered patient psychological referral given reported depression, but she is refusing. Emotional support provided. \par -Discussed plan with  and patient at length\par \par -Follow up monthly

## 2021-04-07 NOTE — PHYSICAL EXAM
[Restricted in physically strenuous activity but ambulatory and able to carry out work of a light or sedentary nature] : Status 1- Restricted in physically strenuous activity but ambulatory and able to carry out work of a light or sedentary nature, e.g., light house work, office work [Thin] : thin [Normal] : affect appropriate [de-identified] : supple [de-identified] : (+)S1S2 RRR [de-identified] : L sided Port -no inflammation. Trace edema RLE.  [de-identified] : no tenderness [de-identified] : ROM intact. LLE in boot s/p fracture [de-identified] : warm/dry

## 2021-04-09 ENCOUNTER — APPOINTMENT (OUTPATIENT)
Dept: INFUSION THERAPY | Facility: HOSPITAL | Age: 81
End: 2021-04-09

## 2021-04-10 ENCOUNTER — APPOINTMENT (OUTPATIENT)
Dept: INFUSION THERAPY | Facility: HOSPITAL | Age: 81
End: 2021-04-10

## 2021-04-12 NOTE — PHYSICAL EXAM
[Normal] : well developed, well nourished, in no acute distress [de-identified] : supple [de-identified] : (+)S1S2 RRR [de-identified] : L sided Port -no inflammation. Trace edema around previously fractured ankle [de-identified] : no tenderness [de-identified] : warm/dry

## 2021-04-12 NOTE — HISTORY OF PRESENT ILLNESS
[de-identified] : Ms. 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 She has a history of Breast cancer 12 years ago treated with tamoxifen (no chemotherapy), s/p RT and lumpectomy, HTN and surgical hx of hysterectomy, was sent in by oncologist Dr. Blank on 1/31/20 for rule out leukemia. Patient had been feeling generally fatigued and sluggish since December 2019. She was feeling more tired, and saw her PMD, who did some blood work and noticed blasts in her peripheral blood. Pt was told to see Dr. Blank for further workup. Dr. Blank did blood work again which again found blasts in the peripheral blood, at which point she sent pt to the ER for leukemia workup. 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 She was started on 2/4/20 on Cycle 1 of Dacogen + Venetoclax (50 mg on day 1, 100 mg on day 2, 200 mg on day 3 and onward when starting Diflucan). She tolerated it well other than some SOB -CXR on 2/7/20 : mild pulmonary edema and L pleural effusion, treated with diuretics. She was discharged home on 2/11/20. \par \par She had a BM bx to eval response on 3/2/20 -showed no blasts. Pt given C2 days 1-3 on 3/2, 3/4 and 3/5, after which her Venetoclax was held for count recovery. She has now completed up through cycle 8 of dacogen/Venclexta (10 days each cycle). Repeat bone marrow biopsy showing no evidence of leukemia and hypocellular trilineage hematopoiesis with 10% bone marrow cellularity. Venclexta was held and counts recovered, she is now in cycle 11. \par  [de-identified] : Disseminated [de-identified] : 1, 2. Bone marrow biopsy and bone marrow aspirate\par - Acute myeloid leukemia with mutated NPM1\par - FLT 3-ITD positive\par \par Diagnostic Note:\par Megakarocytes are normal in number with dysplastic morphology.\par Please note findings of a normal female karyotype, negative MDS\par FISH panel. and somatic mutations of FLT3 FLT3-ITD\par ([id number]), [id number]*12, and CEBPA F106fs*1.\par Based on the additional findings, the classification of AML has\par changed to acute myeloid leukemia with mutated NPM1.\par  [de-identified] : Patient seen today for follow up. Today is Day 29 of C 12 of Dacogen and Venclexta. Since last visit patient had continued PT for her broken ankle and is now healed and can bear weight. She continues to follow up with Ortho. She reports an OK appetite, no dyspnea at rest or chest pain. She does report fatigue that is unchanged. Per patient and  she does have intermittent exertional fatigue that is not new and has continued since treatment began but it comes and goes. Overall things have been stable and she is looking forward to celebrating her birthday later this week.

## 2021-04-12 NOTE — REVIEW OF SYSTEMS
[Lower Ext Edema] : lower extremity edema [Fever] : no fever [Chills] : no chills [Night Sweats] : no night sweats [Recent Change In Weight] : ~T no recent weight change [Vision Problems] : no vision problems [Dysphagia] : no dysphagia [Nosebleeds] : no nosebleeds [Odynophagia] : no odynophagia [Chest Pain] : no chest pain [Palpitations] : no palpitations [Leg Claudication] : no intermittent leg claudication [Dysuria] : no dysuria [Joint Stiffness] : no joint stiffness [Suicidal] : not suicidal [Insomnia] : no insomnia [Muscle Weakness] : no muscle weakness [FreeTextEntry5] : at site of previous ankle fracture [FreeTextEntry6] : can go up 1 flight of stairs - exertional fatigue but not dyspnea  [FreeTextEntry7] : no BRBPR. Colonoscopy -done > 1 yr ago, normal;  [FreeTextEntry8] : stress [FreeTextEntry9] : lower back pain chronic -s/p compressed vertebrae. L ankle pain at times since it was broken [de-identified] : uses trazodone for sleep and she sleeps fine.  [FreeTextEntry1] : seasonal allergies

## 2021-04-12 NOTE — ASSESSMENT
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  Repeat bone marrow biopsy after cycle 8 showing evidence of persistent complete remission. Now s/p 12 total cycles and planned to start cycle 13 on Monday.\par \par -Will continue with dacogen/venetoclax q5wks given troubles with cytopenias, along with OncPro (day 5). Will schedule to start on 4/5\par -Venetoclax daily, dosed at 200mg  for 10 days with each cycle. \par -Patient's CBC today showing ANC 2000. Will hold Levaquin and continue Diflucan 2/2 Venclexta dosing\par -will continue home draw at this time and will cancel this week and next week as patient will be here\par -BM bx done on 2/3/20 showed normal cytogenetics. In house assay for FLT-3 ITD positive, which confers an adverse prognosis in AML - previously had been d/w patient and family about BMT transplant -pt active prior to admission, good PS status; however, she was not interested. \par -patient had severe lower back pain related to DDD and apparent pinched nerve has improved on low dose gabapentin 100 mg QHS, may have anxiolytic effect as well. \par -Previously offered patient psychological referral given reported depression, but she is refusing. Emotional support provided. \par -Discussed plan with  and patient at length\par \par -Follow up with Dr. Goldberg in 2 weeks

## 2021-04-13 ENCOUNTER — RESULT REVIEW (OUTPATIENT)
Age: 81
End: 2021-04-13

## 2021-04-13 ENCOUNTER — APPOINTMENT (OUTPATIENT)
Dept: HEMATOLOGY ONCOLOGY | Facility: CLINIC | Age: 81
End: 2021-04-13
Payer: MEDICARE

## 2021-04-13 VITALS
HEART RATE: 84 BPM | HEIGHT: 64.65 IN | OXYGEN SATURATION: 98 % | DIASTOLIC BLOOD PRESSURE: 80 MMHG | RESPIRATION RATE: 17 BRPM | WEIGHT: 132.5 LBS | TEMPERATURE: 97.2 F | SYSTOLIC BLOOD PRESSURE: 161 MMHG | BODY MASS INDEX: 22.35 KG/M2

## 2021-04-13 DIAGNOSIS — Z51.89 ENCOUNTER FOR OTHER SPECIFIED AFTERCARE: ICD-10-CM

## 2021-04-13 LAB
ALBUMIN SERPL ELPH-MCNC: 4.2 G/DL
ALP BLD-CCNC: 77 U/L
ALT SERPL-CCNC: 7 U/L
ANION GAP SERPL CALC-SCNC: 12 MMOL/L
ANISOCYTOSIS BLD QL: SLIGHT — SIGNIFICANT CHANGE UP
AST SERPL-CCNC: 15 U/L
BASOPHILS # BLD AUTO: 0 K/UL — SIGNIFICANT CHANGE UP (ref 0–0.2)
BASOPHILS # BLD AUTO: 0.03 K/UL
BASOPHILS NFR BLD AUTO: 0 % — SIGNIFICANT CHANGE UP (ref 0–2)
BASOPHILS NFR BLD AUTO: 2.7 %
BILIRUB SERPL-MCNC: <0.2 MG/DL
BUN SERPL-MCNC: 17 MG/DL
CALCIUM SERPL-MCNC: 10 MG/DL
CHLORIDE SERPL-SCNC: 100 MMOL/L
CO2 SERPL-SCNC: 27 MMOL/L
CREAT SERPL-MCNC: 0.59 MG/DL
EOSINOPHIL # BLD AUTO: 0 K/UL — SIGNIFICANT CHANGE UP (ref 0–0.5)
EOSINOPHIL # BLD AUTO: 0.01 K/UL
EOSINOPHIL NFR BLD AUTO: 0 % — SIGNIFICANT CHANGE UP (ref 0–6)
EOSINOPHIL NFR BLD AUTO: 0.9 %
GLUCOSE SERPL-MCNC: 116 MG/DL
HCT VFR BLD CALC: 30.1 %
HCT VFR BLD CALC: 34.2 % — LOW (ref 34.5–45)
HGB BLD-MCNC: 10.7 G/DL — LOW (ref 11.5–15.5)
HGB BLD-MCNC: 9.1 G/DL
IMM GRANULOCYTES NFR BLD AUTO: 0 %
LDH SERPL-CCNC: 196 U/L
LYMPHOCYTES # BLD AUTO: 0.34 K/UL — LOW (ref 1–3.3)
LYMPHOCYTES # BLD AUTO: 0.38 K/UL
LYMPHOCYTES # BLD AUTO: 3 % — LOW (ref 13–44)
LYMPHOCYTES NFR BLD AUTO: 33.6 %
MAN DIFF?: NORMAL
MCHC RBC-ENTMCNC: 30.2 GM/DL
MCHC RBC-ENTMCNC: 31.3 G/DL — LOW (ref 32–36)
MCHC RBC-ENTMCNC: 31.9 PG — SIGNIFICANT CHANGE UP (ref 27–34)
MCHC RBC-ENTMCNC: 32.3 PG
MCV RBC AUTO: 102.1 FL — HIGH (ref 80–100)
MCV RBC AUTO: 106.7 FL
MONOCYTES # BLD AUTO: 0.09 K/UL
MONOCYTES # BLD AUTO: 0.56 K/UL — SIGNIFICANT CHANGE UP (ref 0–0.9)
MONOCYTES NFR BLD AUTO: 5 % — SIGNIFICANT CHANGE UP (ref 2–14)
MONOCYTES NFR BLD AUTO: 8 %
NEUTROPHILS # BLD AUTO: 0.62 K/UL
NEUTROPHILS # BLD AUTO: 10.33 K/UL — HIGH (ref 1.8–7.4)
NEUTROPHILS NFR BLD AUTO: 54.8 %
NEUTROPHILS NFR BLD AUTO: 92 % — HIGH (ref 43–77)
NRBC # BLD: 0 /100 — SIGNIFICANT CHANGE UP (ref 0–0)
NRBC # BLD: SIGNIFICANT CHANGE UP /100 WBCS (ref 0–0)
OVALOCYTES BLD QL SMEAR: SLIGHT — SIGNIFICANT CHANGE UP
PLAT MORPH BLD: NORMAL — SIGNIFICANT CHANGE UP
PLATELET # BLD AUTO: 167 K/UL
PLATELET # BLD AUTO: 81 K/UL — LOW (ref 150–400)
POIKILOCYTOSIS BLD QL AUTO: SLIGHT — SIGNIFICANT CHANGE UP
POTASSIUM SERPL-SCNC: 3.6 MMOL/L
PROT SERPL-MCNC: 6.7 G/DL
RBC # BLD: 2.82 M/UL
RBC # BLD: 3.35 M/UL — LOW (ref 3.8–5.2)
RBC # FLD: 19 % — HIGH (ref 10.3–14.5)
RBC # FLD: 20.9 %
RBC BLD AUTO: ABNORMAL
SCHISTOCYTES BLD QL AUTO: SLIGHT — SIGNIFICANT CHANGE UP
SODIUM SERPL-SCNC: 140 MMOL/L
URATE SERPL-MCNC: 4.1 MG/DL
WBC # BLD: 11.23 K/UL — HIGH (ref 3.8–10.5)
WBC # FLD AUTO: 1.13 K/UL
WBC # FLD AUTO: 11.23 K/UL — HIGH (ref 3.8–10.5)

## 2021-04-13 PROCEDURE — 99072 ADDL SUPL MATRL&STAF TM PHE: CPT

## 2021-04-13 PROCEDURE — 99214 OFFICE O/P EST MOD 30 MIN: CPT

## 2021-04-13 NOTE — REVIEW OF SYSTEMS
[Fever] : no fever [Chills] : no chills [Night Sweats] : no night sweats [Recent Change In Weight] : ~T no recent weight change [Vision Problems] : no vision problems [Dysphagia] : no dysphagia [Nosebleeds] : no nosebleeds [Odynophagia] : no odynophagia [Chest Pain] : no chest pain [Palpitations] : no palpitations [Leg Claudication] : no intermittent leg claudication [Dysuria] : no dysuria [Joint Stiffness] : no joint stiffness [Suicidal] : not suicidal [Insomnia] : no insomnia [Muscle Weakness] : no muscle weakness [FreeTextEntry5] : at site of previous ankle fracture [FreeTextEntry6] : can go up 1 flight of stairs - exertional fatigue but not dyspnea  [FreeTextEntry7] : no BRBPR. Colonoscopy -done > 1 yr ago, normal;  [FreeTextEntry8] : stress [FreeTextEntry9] : lower back pain chronic -s/p compressed vertebrae. L ankle pain at times since it was broken [de-identified] : uses trazodone for sleep and she sleeps fine.  [FreeTextEntry1] : seasonal allergies

## 2021-04-13 NOTE — HISTORY OF PRESENT ILLNESS
[de-identified] : Ms. 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 She has a history of Breast cancer 12 years ago treated with tamoxifen (no chemotherapy), s/p RT and lumpectomy, HTN and surgical hx of hysterectomy, was sent in by oncologist Dr. Blank on 1/31/20 for rule out leukemia. Patient had been feeling generally fatigued and sluggish since December 2019. She was feeling more tired, and saw her PMD, who did some blood work and noticed blasts in her peripheral blood. Pt was told to see Dr. Blank for further workup. Dr. Blank did blood work again which again found blasts in the peripheral blood, at which point she sent pt to the ER for leukemia workup. 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 She was started on 2/4/20 on Cycle 1 of Dacogen + Venetoclax (50 mg on day 1, 100 mg on day 2, 200 mg on day 3 and onward when starting Diflucan). She tolerated it well other than some SOB -CXR on 2/7/20 : mild pulmonary edema and L pleural effusion, treated with diuretics. She was discharged home on 2/11/20. \par \par She had a BM bx to eval response on 3/2/20 -showed no blasts. Pt given C2 days 1-3 on 3/2, 3/4 and 3/5, after which her Venetoclax was held for count recovery. She has now completed up through cycle 8 of dacogen/Venclexta (10 days each cycle). Repeat bone marrow biopsy showing no evidence of leukemia and hypocellular trilineage hematopoiesis with 10% bone marrow cellularity. Venclexta was held and counts recovered, she is now in cycle 11. \par  [de-identified] : Disseminated [de-identified] : 1, 2. Bone marrow biopsy and bone marrow aspirate\par - Acute myeloid leukemia with mutated NPM1\par - FLT 3-ITD positive\par \par Diagnostic Note:\par Megakarocytes are normal in number with dysplastic morphology.\par Please note findings of a normal female karyotype, negative MDS\par FISH panel. and somatic mutations of FLT3 FLT3-ITD\par ([id number]), [id number]*12, and CEBPA F106fs*1.\par Based on the additional findings, the classification of AML has\par changed to acute myeloid leukemia with mutated NPM1.\par  [de-identified] : Ankle is improving. No fevers or chills. No dyspnea or chest pain. Reports excellent appetite and has been gaining weight.

## 2021-04-13 NOTE — PHYSICAL EXAM
[de-identified] : supple [de-identified] : (+)S1S2 RRR [de-identified] : L sided Port -no inflammation. Trace edema around previously fractured ankle [de-identified] : no tenderness [de-identified] : warm/dry

## 2021-04-13 NOTE — ASSESSMENT
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  Repeat bone marrow biopsy after cycle 8 showing evidence of persistent complete remission. Now in cycle 13 of therapy with dacogen/venetoclax and doing well. \par \par -Will continue with dacogen/venetoclax q5wks given troubles with cytopenias, along with OnPro (day 5).Today is cycle 13 day 8. \par -Venetoclax daily, dosed at 200mg  for 10 days with each cycle. \par -Not neutropenic. Will hold Levaquin and continue Diflucan 2/2 Venclexta dosing\par -will continue home draw at this time. Possible platelets when dropping. \par -BM bx done on 2/3/20 showed normal cytogenetics. In house assay for FLT-3 ITD positive, which confers an adverse prognosis in AML - previously had been d/w patient and family about BMT transplant -pt active prior to admission, good PS status; however, she was not interested. \par -patient had severe lower back pain related to DDD and apparent pinched nerve has improved on low dose gabapentin 100 mg QHS, may have anxiolytic effect as well. \par -Previously offered patient psychological referral given reported depression, but she is refusing. Emotional support provided. \par -Discussed plan with  and patient at length

## 2021-04-15 ENCOUNTER — LABORATORY RESULT (OUTPATIENT)
Age: 81
End: 2021-04-15

## 2021-04-21 ENCOUNTER — LABORATORY RESULT (OUTPATIENT)
Age: 81
End: 2021-04-21

## 2021-04-22 LAB
ALBUMIN SERPL ELPH-MCNC: 4 G/DL
ALP BLD-CCNC: 83 U/L
ALT SERPL-CCNC: 10 U/L
ANION GAP SERPL CALC-SCNC: 11 MMOL/L
AST SERPL-CCNC: 17 U/L
BASOPHILS # BLD AUTO: 0.01 K/UL
BASOPHILS NFR BLD AUTO: 0.3 %
BILIRUB SERPL-MCNC: 0.4 MG/DL
BUN SERPL-MCNC: 13 MG/DL
CALCIUM SERPL-MCNC: 9.3 MG/DL
CHLORIDE SERPL-SCNC: 103 MMOL/L
CO2 SERPL-SCNC: 29 MMOL/L
CREAT SERPL-MCNC: 0.58 MG/DL
EOSINOPHIL # BLD AUTO: 0.02 K/UL
EOSINOPHIL NFR BLD AUTO: 0.5 %
GLUCOSE SERPL-MCNC: 90 MG/DL
HCT VFR BLD CALC: 32.2 %
HGB BLD-MCNC: 10.1 G/DL
IMM GRANULOCYTES NFR BLD AUTO: 1.3 %
LDH SERPL-CCNC: 287 U/L
LYMPHOCYTES # BLD AUTO: 0.56 K/UL
LYMPHOCYTES NFR BLD AUTO: 14.5 %
MAN DIFF?: NORMAL
MCHC RBC-ENTMCNC: 31.2 PG
MCHC RBC-ENTMCNC: 31.4 GM/DL
MCV RBC AUTO: 99.4 FL
MONOCYTES # BLD AUTO: 0.35 K/UL
MONOCYTES NFR BLD AUTO: 9.1 %
NEUTROPHILS # BLD AUTO: 2.86 K/UL
NEUTROPHILS NFR BLD AUTO: 74.3 %
PLATELET # BLD AUTO: 42 K/UL
POTASSIUM SERPL-SCNC: 3.6 MMOL/L
PROT SERPL-MCNC: 6.2 G/DL
RBC # BLD: 3.24 M/UL
RBC # FLD: 19 %
SODIUM SERPL-SCNC: 143 MMOL/L
WBC # FLD AUTO: 3.85 K/UL

## 2021-04-26 LAB
BASOPHILS # BLD AUTO: 0.02 K/UL
BASOPHILS NFR BLD AUTO: 2.5 %
EOSINOPHIL # BLD AUTO: 0.02 K/UL
EOSINOPHIL NFR BLD AUTO: 2.5 %
HCT VFR BLD CALC: 30.6 %
HGB BLD-MCNC: 9.6 G/DL
IMM GRANULOCYTES NFR BLD AUTO: 0 %
LYMPHOCYTES # BLD AUTO: 0.43 K/UL
LYMPHOCYTES NFR BLD AUTO: 54.4 %
MAN DIFF?: NORMAL
MCHC RBC-ENTMCNC: 31.2 PG
MCHC RBC-ENTMCNC: 31.4 GM/DL
MCV RBC AUTO: 99.4 FL
MONOCYTES # BLD AUTO: 0.05 K/UL
MONOCYTES NFR BLD AUTO: 6.3 %
NEUTROPHILS # BLD AUTO: 0.27 K/UL
NEUTROPHILS NFR BLD AUTO: 34.3 %
PLATELET # BLD AUTO: 16 K/UL
RBC # BLD: 3.08 M/UL
RBC # FLD: 20.2 %
WBC # FLD AUTO: 0.79 K/UL

## 2021-04-28 ENCOUNTER — LABORATORY RESULT (OUTPATIENT)
Age: 81
End: 2021-04-28

## 2021-05-06 LAB
BASOPHILS # BLD AUTO: 0.01 K/UL
BASOPHILS NFR BLD AUTO: 0.9 %
EOSINOPHIL # BLD AUTO: 0.02 K/UL
EOSINOPHIL NFR BLD AUTO: 1.8 %
HCT VFR BLD CALC: 33.1 %
HGB BLD-MCNC: 10.8 G/DL
LYMPHOCYTES # BLD AUTO: 0.48 K/UL
LYMPHOCYTES NFR BLD AUTO: 41.8 %
MAN DIFF?: NORMAL
MCHC RBC-ENTMCNC: 32 PG
MCHC RBC-ENTMCNC: 32.6 GM/DL
MCV RBC AUTO: 98.2 FL
MONOCYTES # BLD AUTO: 0.02 K/UL
MONOCYTES NFR BLD AUTO: 1.8 %
NEUTROPHILS # BLD AUTO: 0.62 K/UL
NEUTROPHILS NFR BLD AUTO: 53.7 %
PLATELET # BLD AUTO: 151 K/UL
RBC # BLD: 3.37 M/UL
RBC # FLD: 19.9 %
WBC # FLD AUTO: 1.16 K/UL

## 2021-05-07 ENCOUNTER — OUTPATIENT (OUTPATIENT)
Dept: OUTPATIENT SERVICES | Facility: HOSPITAL | Age: 81
LOS: 1 days | Discharge: HOME | End: 2021-05-07

## 2021-05-07 DIAGNOSIS — C92.00 ACUTE MYELOBLASTIC LEUKEMIA, NOT HAVING ACHIEVED REMISSION: ICD-10-CM

## 2021-05-07 DIAGNOSIS — Z90.710 ACQUIRED ABSENCE OF BOTH CERVIX AND UTERUS: Chronic | ICD-10-CM

## 2021-05-10 ENCOUNTER — RESULT REVIEW (OUTPATIENT)
Age: 81
End: 2021-05-10

## 2021-05-10 ENCOUNTER — LABORATORY RESULT (OUTPATIENT)
Age: 81
End: 2021-05-10

## 2021-05-10 ENCOUNTER — APPOINTMENT (OUTPATIENT)
Dept: INFUSION THERAPY | Facility: HOSPITAL | Age: 81
End: 2021-05-10

## 2021-05-10 DIAGNOSIS — Z51.11 ENCOUNTER FOR ANTINEOPLASTIC CHEMOTHERAPY: ICD-10-CM

## 2021-05-10 DIAGNOSIS — R11.2 NAUSEA WITH VOMITING, UNSPECIFIED: ICD-10-CM

## 2021-05-10 LAB
BASOPHILS # BLD AUTO: 0.01 K/UL — SIGNIFICANT CHANGE UP (ref 0–0.2)
BASOPHILS NFR BLD AUTO: 0.3 % — SIGNIFICANT CHANGE UP (ref 0–2)
EOSINOPHIL # BLD AUTO: 0.03 K/UL — SIGNIFICANT CHANGE UP (ref 0–0.5)
EOSINOPHIL NFR BLD AUTO: 0.8 % — SIGNIFICANT CHANGE UP (ref 0–6)
HCT VFR BLD CALC: 37.9 % — SIGNIFICANT CHANGE UP (ref 34.5–45)
HGB BLD-MCNC: 12.4 G/DL — SIGNIFICANT CHANGE UP (ref 11.5–15.5)
IMM GRANULOCYTES NFR BLD AUTO: 0.8 % — SIGNIFICANT CHANGE UP (ref 0–1.5)
LYMPHOCYTES # BLD AUTO: 0.94 K/UL — LOW (ref 1–3.3)
LYMPHOCYTES # BLD AUTO: 23.7 % — SIGNIFICANT CHANGE UP (ref 13–44)
MCHC RBC-ENTMCNC: 31.2 PG — SIGNIFICANT CHANGE UP (ref 27–34)
MCHC RBC-ENTMCNC: 32.7 G/DL — SIGNIFICANT CHANGE UP (ref 32–36)
MCV RBC AUTO: 95.2 FL — SIGNIFICANT CHANGE UP (ref 80–100)
MONOCYTES # BLD AUTO: 0.78 K/UL — SIGNIFICANT CHANGE UP (ref 0–0.9)
MONOCYTES NFR BLD AUTO: 19.7 % — HIGH (ref 2–14)
NEUTROPHILS # BLD AUTO: 2.17 K/UL — SIGNIFICANT CHANGE UP (ref 1.8–7.4)
NEUTROPHILS NFR BLD AUTO: 54.7 % — SIGNIFICANT CHANGE UP (ref 43–77)
NRBC # BLD: 0 /100 WBCS — SIGNIFICANT CHANGE UP (ref 0–0)
PLATELET # BLD AUTO: 164 K/UL — SIGNIFICANT CHANGE UP (ref 150–400)
RBC # BLD: 3.98 M/UL — SIGNIFICANT CHANGE UP (ref 3.8–5.2)
RBC # FLD: 19 % — HIGH (ref 10.3–14.5)
WBC # BLD: 3.96 K/UL — SIGNIFICANT CHANGE UP (ref 3.8–10.5)
WBC # FLD AUTO: 3.96 K/UL — SIGNIFICANT CHANGE UP (ref 3.8–10.5)

## 2021-05-11 ENCOUNTER — APPOINTMENT (OUTPATIENT)
Dept: INFUSION THERAPY | Facility: HOSPITAL | Age: 81
End: 2021-05-11

## 2021-05-12 ENCOUNTER — APPOINTMENT (OUTPATIENT)
Dept: INFUSION THERAPY | Facility: HOSPITAL | Age: 81
End: 2021-05-12

## 2021-05-12 ENCOUNTER — RESULT REVIEW (OUTPATIENT)
Age: 81
End: 2021-05-12

## 2021-05-12 LAB
BASOPHILS # BLD AUTO: 0.01 K/UL — SIGNIFICANT CHANGE UP (ref 0–0.2)
BASOPHILS NFR BLD AUTO: 0.2 % — SIGNIFICANT CHANGE UP (ref 0–2)
EOSINOPHIL # BLD AUTO: 0.02 K/UL — SIGNIFICANT CHANGE UP (ref 0–0.5)
EOSINOPHIL NFR BLD AUTO: 0.5 % — SIGNIFICANT CHANGE UP (ref 0–6)
HCT VFR BLD CALC: 35.5 % — SIGNIFICANT CHANGE UP (ref 34.5–45)
HGB BLD-MCNC: 11.6 G/DL — SIGNIFICANT CHANGE UP (ref 11.5–15.5)
IMM GRANULOCYTES NFR BLD AUTO: 0.5 % — SIGNIFICANT CHANGE UP (ref 0–1.5)
LYMPHOCYTES # BLD AUTO: 0.73 K/UL — LOW (ref 1–3.3)
LYMPHOCYTES # BLD AUTO: 16.5 % — SIGNIFICANT CHANGE UP (ref 13–44)
MCHC RBC-ENTMCNC: 31.7 PG — SIGNIFICANT CHANGE UP (ref 27–34)
MCHC RBC-ENTMCNC: 32.7 G/DL — SIGNIFICANT CHANGE UP (ref 32–36)
MCV RBC AUTO: 97 FL — SIGNIFICANT CHANGE UP (ref 80–100)
MONOCYTES # BLD AUTO: 0.45 K/UL — SIGNIFICANT CHANGE UP (ref 0–0.9)
MONOCYTES NFR BLD AUTO: 10.2 % — SIGNIFICANT CHANGE UP (ref 2–14)
NEUTROPHILS # BLD AUTO: 3.2 K/UL — SIGNIFICANT CHANGE UP (ref 1.8–7.4)
NEUTROPHILS NFR BLD AUTO: 72.1 % — SIGNIFICANT CHANGE UP (ref 43–77)
NRBC # BLD: 0 /100 WBCS — SIGNIFICANT CHANGE UP (ref 0–0)
PLATELET # BLD AUTO: 138 K/UL — LOW (ref 150–400)
RBC # BLD: 3.66 M/UL — LOW (ref 3.8–5.2)
RBC # FLD: 19.2 % — HIGH (ref 10.3–14.5)
WBC # BLD: 4.43 K/UL — SIGNIFICANT CHANGE UP (ref 3.8–10.5)
WBC # FLD AUTO: 4.43 K/UL — SIGNIFICANT CHANGE UP (ref 3.8–10.5)

## 2021-05-13 ENCOUNTER — APPOINTMENT (OUTPATIENT)
Dept: INFUSION THERAPY | Facility: HOSPITAL | Age: 81
End: 2021-05-13

## 2021-05-14 ENCOUNTER — APPOINTMENT (OUTPATIENT)
Dept: INFUSION THERAPY | Facility: HOSPITAL | Age: 81
End: 2021-05-14

## 2021-05-18 ENCOUNTER — RESULT REVIEW (OUTPATIENT)
Age: 81
End: 2021-05-18

## 2021-05-18 ENCOUNTER — APPOINTMENT (OUTPATIENT)
Dept: HEMATOLOGY ONCOLOGY | Facility: CLINIC | Age: 81
End: 2021-05-18
Payer: MEDICARE

## 2021-05-18 VITALS
OXYGEN SATURATION: 98 % | HEIGHT: 64.57 IN | WEIGHT: 130.93 LBS | BODY MASS INDEX: 22.08 KG/M2 | TEMPERATURE: 97.3 F | HEART RATE: 78 BPM | DIASTOLIC BLOOD PRESSURE: 81 MMHG | RESPIRATION RATE: 16 BRPM | SYSTOLIC BLOOD PRESSURE: 136 MMHG

## 2021-05-18 LAB
BASOPHILS # BLD AUTO: 0.01 K/UL — SIGNIFICANT CHANGE UP (ref 0–0.2)
BASOPHILS NFR BLD AUTO: 0.2 % — SIGNIFICANT CHANGE UP (ref 0–2)
EOSINOPHIL # BLD AUTO: 0.01 K/UL — SIGNIFICANT CHANGE UP (ref 0–0.5)
EOSINOPHIL NFR BLD AUTO: 0.2 % — SIGNIFICANT CHANGE UP (ref 0–6)
HCT VFR BLD CALC: 39.4 % — SIGNIFICANT CHANGE UP (ref 34.5–45)
HGB BLD-MCNC: 12.5 G/DL — SIGNIFICANT CHANGE UP (ref 11.5–15.5)
IMM GRANULOCYTES NFR BLD AUTO: 0.5 % — SIGNIFICANT CHANGE UP (ref 0–1.5)
LYMPHOCYTES # BLD AUTO: 0.66 K/UL — LOW (ref 1–3.3)
LYMPHOCYTES # BLD AUTO: 15.9 % — SIGNIFICANT CHANGE UP (ref 13–44)
MCHC RBC-ENTMCNC: 31.1 PG — SIGNIFICANT CHANGE UP (ref 27–34)
MCHC RBC-ENTMCNC: 31.7 G/DL — LOW (ref 32–36)
MCV RBC AUTO: 98 FL — SIGNIFICANT CHANGE UP (ref 80–100)
MONOCYTES # BLD AUTO: 0.21 K/UL — SIGNIFICANT CHANGE UP (ref 0–0.9)
MONOCYTES NFR BLD AUTO: 5.1 % — SIGNIFICANT CHANGE UP (ref 2–14)
NEUTROPHILS # BLD AUTO: 3.23 K/UL — SIGNIFICANT CHANGE UP (ref 1.8–7.4)
NEUTROPHILS NFR BLD AUTO: 78.1 % — HIGH (ref 43–77)
NRBC # BLD: 0 /100 WBCS — SIGNIFICANT CHANGE UP (ref 0–0)
PLATELET # BLD AUTO: 118 K/UL — LOW (ref 150–400)
RBC # BLD: 4.02 M/UL — SIGNIFICANT CHANGE UP (ref 3.8–5.2)
RBC # FLD: 18.9 % — HIGH (ref 10.3–14.5)
WBC # BLD: 4.14 K/UL — SIGNIFICANT CHANGE UP (ref 3.8–10.5)
WBC # FLD AUTO: 4.14 K/UL — SIGNIFICANT CHANGE UP (ref 3.8–10.5)

## 2021-05-18 PROCEDURE — 99213 OFFICE O/P EST LOW 20 MIN: CPT

## 2021-05-18 RX ORDER — LIDOCAINE AND PRILOCAINE 25; 25 MG/G; MG/G
2.5-2.5 CREAM TOPICAL
Qty: 1 | Refills: 2 | Status: DISCONTINUED | COMMUNITY
Start: 2020-08-31 | End: 2021-05-18

## 2021-05-21 LAB
ALBUMIN SERPL ELPH-MCNC: 4.2 G/DL
ALBUMIN SERPL ELPH-MCNC: 4.3 G/DL
ALP BLD-CCNC: 73 U/L
ALP BLD-CCNC: 73 U/L
ALT SERPL-CCNC: 12 U/L
ALT SERPL-CCNC: 15 U/L
ANION GAP SERPL CALC-SCNC: 13 MMOL/L
ANION GAP SERPL CALC-SCNC: 15 MMOL/L
AST SERPL-CCNC: 18 U/L
AST SERPL-CCNC: 22 U/L
BASOPHILS # BLD AUTO: 0.02 K/UL
BASOPHILS NFR BLD AUTO: 0.5 %
BILIRUB SERPL-MCNC: 0.2 MG/DL
BILIRUB SERPL-MCNC: 0.2 MG/DL
BUN SERPL-MCNC: 18 MG/DL
BUN SERPL-MCNC: 19 MG/DL
CALCIUM SERPL-MCNC: 9.4 MG/DL
CALCIUM SERPL-MCNC: 9.5 MG/DL
CHLORIDE SERPL-SCNC: 101 MMOL/L
CHLORIDE SERPL-SCNC: 102 MMOL/L
CO2 SERPL-SCNC: 24 MMOL/L
CO2 SERPL-SCNC: 25 MMOL/L
CREAT SERPL-MCNC: 0.69 MG/DL
CREAT SERPL-MCNC: 0.72 MG/DL
EOSINOPHIL # BLD AUTO: 0.01 K/UL
EOSINOPHIL NFR BLD AUTO: 0.3 %
GLUCOSE SERPL-MCNC: 106 MG/DL
GLUCOSE SERPL-MCNC: 89 MG/DL
HCT VFR BLD CALC: 37.8 %
HGB BLD-MCNC: 12.2 G/DL
IMM GRANULOCYTES NFR BLD AUTO: 0.5 %
LDH SERPL-CCNC: 188 U/L
LYMPHOCYTES # BLD AUTO: 0.66 K/UL
LYMPHOCYTES NFR BLD AUTO: 17.3 %
MAN DIFF?: NORMAL
MCHC RBC-ENTMCNC: 31.8 PG
MCHC RBC-ENTMCNC: 32.3 GM/DL
MCV RBC AUTO: 98.4 FL
MONOCYTES # BLD AUTO: 0.18 K/UL
MONOCYTES NFR BLD AUTO: 4.7 %
NEUTROPHILS # BLD AUTO: 2.92 K/UL
NEUTROPHILS NFR BLD AUTO: 76.7 %
PLATELET # BLD AUTO: 80 K/UL
POTASSIUM SERPL-SCNC: 3.3 MMOL/L
POTASSIUM SERPL-SCNC: 3.7 MMOL/L
PROT SERPL-MCNC: 6.5 G/DL
PROT SERPL-MCNC: 6.6 G/DL
RBC # BLD: 3.84 M/UL
RBC # FLD: 19.1 %
SODIUM SERPL-SCNC: 138 MMOL/L
SODIUM SERPL-SCNC: 142 MMOL/L
WBC # FLD AUTO: 3.81 K/UL

## 2021-05-25 ENCOUNTER — LABORATORY RESULT (OUTPATIENT)
Age: 81
End: 2021-05-25

## 2021-05-26 LAB
ALBUMIN SERPL ELPH-MCNC: 4 G/DL
ALP BLD-CCNC: 67 U/L
ALT SERPL-CCNC: 20 U/L
ANION GAP SERPL CALC-SCNC: 13 MMOL/L
AST SERPL-CCNC: 23 U/L
BASOPHILS # BLD AUTO: 0.01 K/UL
BASOPHILS NFR BLD AUTO: 0.9 %
BILIRUB SERPL-MCNC: 0.2 MG/DL
BUN SERPL-MCNC: 22 MG/DL
CALCIUM SERPL-MCNC: 9.5 MG/DL
CHLORIDE SERPL-SCNC: 102 MMOL/L
CO2 SERPL-SCNC: 28 MMOL/L
CREAT SERPL-MCNC: 0.76 MG/DL
EOSINOPHIL # BLD AUTO: 0 K/UL
EOSINOPHIL NFR BLD AUTO: 0 %
GLUCOSE SERPL-MCNC: 111 MG/DL
HCT VFR BLD CALC: 38 %
HGB BLD-MCNC: 12 G/DL
IMM GRANULOCYTES NFR BLD AUTO: 0.9 %
LYMPHOCYTES # BLD AUTO: 0.6 K/UL
LYMPHOCYTES NFR BLD AUTO: 56.1 %
MAN DIFF?: NORMAL
MCHC RBC-ENTMCNC: 30.8 PG
MCHC RBC-ENTMCNC: 31.6 GM/DL
MCV RBC AUTO: 97.7 FL
MONOCYTES # BLD AUTO: 0.05 K/UL
MONOCYTES NFR BLD AUTO: 4.7 %
NEUTROPHILS # BLD AUTO: 0.4 K/UL
NEUTROPHILS NFR BLD AUTO: 37.4 %
PLATELET # BLD AUTO: NORMAL K/UL
POTASSIUM SERPL-SCNC: 3.6 MMOL/L
PROT SERPL-MCNC: 6.1 G/DL
RBC # BLD: 3.89 M/UL
RBC # FLD: 19.2 %
SODIUM SERPL-SCNC: 143 MMOL/L
WBC # FLD AUTO: 1.07 K/UL

## 2021-05-27 ENCOUNTER — NON-APPOINTMENT (OUTPATIENT)
Age: 81
End: 2021-05-27

## 2021-05-27 LAB — LDH SERPL-CCNC: 180 U/L

## 2021-06-02 ENCOUNTER — LABORATORY RESULT (OUTPATIENT)
Age: 81
End: 2021-06-02

## 2021-06-03 LAB
ALBUMIN SERPL ELPH-MCNC: 3.9 G/DL
ALP BLD-CCNC: 68 U/L
ALT SERPL-CCNC: 16 U/L
ANION GAP SERPL CALC-SCNC: 9 MMOL/L
AST SERPL-CCNC: 19 U/L
BASOPHILS # BLD AUTO: 0 K/UL
BASOPHILS NFR BLD AUTO: 0 %
BILIRUB SERPL-MCNC: 0.2 MG/DL
BUN SERPL-MCNC: 16 MG/DL
CALCIUM SERPL-MCNC: 9.3 MG/DL
CHLORIDE SERPL-SCNC: 101 MMOL/L
CO2 SERPL-SCNC: 28 MMOL/L
CREAT SERPL-MCNC: 0.6 MG/DL
EOSINOPHIL # BLD AUTO: 0.01 K/UL
EOSINOPHIL NFR BLD AUTO: 2 %
GLUCOSE SERPL-MCNC: 80 MG/DL
HCT VFR BLD CALC: 38 %
HGB BLD-MCNC: 11.9 G/DL
LYMPHOCYTES # BLD AUTO: 0.54 K/UL
LYMPHOCYTES NFR BLD AUTO: 78 %
MAN DIFF?: NORMAL
MCHC RBC-ENTMCNC: 30.2 PG
MCHC RBC-ENTMCNC: 31.3 GM/DL
MCV RBC AUTO: 96.4 FL
MONOCYTES # BLD AUTO: 0.06 K/UL
MONOCYTES NFR BLD AUTO: 8 %
NEUTROPHILS # BLD AUTO: 0.07 K/UL
NEUTROPHILS NFR BLD AUTO: 10 %
PLATELET # BLD AUTO: 71 K/UL
POTASSIUM SERPL-SCNC: 4 MMOL/L
PROT SERPL-MCNC: 6.3 G/DL
RBC # BLD: 3.94 M/UL
RBC # FLD: 18.8 %
SODIUM SERPL-SCNC: 138 MMOL/L
WBC # FLD AUTO: 0.69 K/UL

## 2021-06-09 ENCOUNTER — LABORATORY RESULT (OUTPATIENT)
Age: 81
End: 2021-06-09

## 2021-06-10 ENCOUNTER — OUTPATIENT (OUTPATIENT)
Dept: OUTPATIENT SERVICES | Facility: HOSPITAL | Age: 81
LOS: 1 days | Discharge: HOME | End: 2021-06-10

## 2021-06-10 DIAGNOSIS — Z90.710 ACQUIRED ABSENCE OF BOTH CERVIX AND UTERUS: Chronic | ICD-10-CM

## 2021-06-10 DIAGNOSIS — C92.00 ACUTE MYELOBLASTIC LEUKEMIA, NOT HAVING ACHIEVED REMISSION: ICD-10-CM

## 2021-06-10 LAB
ALBUMIN SERPL ELPH-MCNC: 3.9 G/DL
ALP BLD-CCNC: 71 U/L
ALT SERPL-CCNC: 20 U/L
ANION GAP SERPL CALC-SCNC: 10 MMOL/L
AST SERPL-CCNC: 17 U/L
BASOPHILS # BLD AUTO: 0.01 K/UL
BASOPHILS NFR BLD AUTO: 1 %
BILIRUB SERPL-MCNC: 0.2 MG/DL
BUN SERPL-MCNC: 17 MG/DL
CALCIUM SERPL-MCNC: 9.5 MG/DL
CHLORIDE SERPL-SCNC: 99 MMOL/L
CO2 SERPL-SCNC: 30 MMOL/L
CREAT SERPL-MCNC: 0.8 MG/DL
EOSINOPHIL # BLD AUTO: 0.01 K/UL
EOSINOPHIL NFR BLD AUTO: 1 %
GLUCOSE SERPL-MCNC: 98 MG/DL
HCT VFR BLD CALC: 37.3 %
HGB BLD-MCNC: 11.9 G/DL
IMM GRANULOCYTES NFR BLD AUTO: 1 %
LDH SERPL-CCNC: 155 U/L
LYMPHOCYTES # BLD AUTO: 0.51 K/UL
LYMPHOCYTES NFR BLD AUTO: 52 %
MAN DIFF?: NORMAL
MCHC RBC-ENTMCNC: 30.4 PG
MCHC RBC-ENTMCNC: 31.9 GM/DL
MCV RBC AUTO: 95.2 FL
MONOCYTES # BLD AUTO: 0.12 K/UL
MONOCYTES NFR BLD AUTO: 12.2 %
NEUTROPHILS # BLD AUTO: 0.32 K/UL
NEUTROPHILS NFR BLD AUTO: 32.8 %
PLATELET # BLD AUTO: 144 K/UL
POTASSIUM SERPL-SCNC: 3.8 MMOL/L
PROT SERPL-MCNC: 6.4 G/DL
RBC # BLD: 3.92 M/UL
RBC # FLD: 18.6 %
SODIUM SERPL-SCNC: 140 MMOL/L
WBC # FLD AUTO: 0.98 K/UL

## 2021-06-14 ENCOUNTER — LABORATORY RESULT (OUTPATIENT)
Age: 81
End: 2021-06-14

## 2021-06-14 ENCOUNTER — RESULT REVIEW (OUTPATIENT)
Age: 81
End: 2021-06-14

## 2021-06-14 ENCOUNTER — APPOINTMENT (OUTPATIENT)
Dept: INFUSION THERAPY | Facility: HOSPITAL | Age: 81
End: 2021-06-14

## 2021-06-14 LAB
BASOPHILS # BLD AUTO: 0 K/UL — SIGNIFICANT CHANGE UP (ref 0–0.2)
BASOPHILS NFR BLD AUTO: 0 % — SIGNIFICANT CHANGE UP (ref 0–2)
EOSINOPHIL # BLD AUTO: 0 K/UL — SIGNIFICANT CHANGE UP (ref 0–0.5)
EOSINOPHIL NFR BLD AUTO: 0 % — SIGNIFICANT CHANGE UP (ref 0–6)
HCT VFR BLD CALC: 37.9 % — SIGNIFICANT CHANGE UP (ref 34.5–45)
HGB BLD-MCNC: 12.9 G/DL — SIGNIFICANT CHANGE UP (ref 11.5–15.5)
LYMPHOCYTES # BLD AUTO: 1.12 K/UL — SIGNIFICANT CHANGE UP (ref 1–3.3)
LYMPHOCYTES # BLD AUTO: 35 % — SIGNIFICANT CHANGE UP (ref 13–44)
MCHC RBC-ENTMCNC: 31.5 PG — SIGNIFICANT CHANGE UP (ref 27–34)
MCHC RBC-ENTMCNC: 34 G/DL — SIGNIFICANT CHANGE UP (ref 32–36)
MCV RBC AUTO: 92.4 FL — SIGNIFICANT CHANGE UP (ref 80–100)
MONOCYTES # BLD AUTO: 0.58 K/UL — SIGNIFICANT CHANGE UP (ref 0–0.9)
MONOCYTES NFR BLD AUTO: 18 % — HIGH (ref 2–14)
NEUTROPHILS # BLD AUTO: 1.51 K/UL — LOW (ref 1.8–7.4)
NEUTROPHILS NFR BLD AUTO: 47 % — SIGNIFICANT CHANGE UP (ref 43–77)
NRBC # BLD: 0 /100 — SIGNIFICANT CHANGE UP (ref 0–0)
NRBC # BLD: SIGNIFICANT CHANGE UP /100 WBCS (ref 0–0)
PLAT MORPH BLD: NORMAL — SIGNIFICANT CHANGE UP
PLATELET # BLD AUTO: 106 K/UL — LOW (ref 150–400)
RBC # BLD: 4.1 M/UL — SIGNIFICANT CHANGE UP (ref 3.8–5.2)
RBC # FLD: 18.8 % — HIGH (ref 10.3–14.5)
RBC BLD AUTO: SIGNIFICANT CHANGE UP
WBC # BLD: 3.21 K/UL — LOW (ref 3.8–10.5)
WBC # FLD AUTO: 3.21 K/UL — LOW (ref 3.8–10.5)

## 2021-06-15 ENCOUNTER — APPOINTMENT (OUTPATIENT)
Dept: INFUSION THERAPY | Facility: HOSPITAL | Age: 81
End: 2021-06-15

## 2021-06-15 ENCOUNTER — RESULT REVIEW (OUTPATIENT)
Age: 81
End: 2021-06-15

## 2021-06-15 DIAGNOSIS — R11.2 NAUSEA WITH VOMITING, UNSPECIFIED: ICD-10-CM

## 2021-06-15 DIAGNOSIS — Z51.11 ENCOUNTER FOR ANTINEOPLASTIC CHEMOTHERAPY: ICD-10-CM

## 2021-06-15 LAB
BASOPHILS # BLD AUTO: 0 K/UL — SIGNIFICANT CHANGE UP (ref 0–0.2)
BASOPHILS NFR BLD AUTO: 0 % — SIGNIFICANT CHANGE UP (ref 0–2)
EOSINOPHIL # BLD AUTO: 0.03 K/UL — SIGNIFICANT CHANGE UP (ref 0–0.5)
EOSINOPHIL NFR BLD AUTO: 1 % — SIGNIFICANT CHANGE UP (ref 0–6)
HCT VFR BLD CALC: 34.9 % — SIGNIFICANT CHANGE UP (ref 34.5–45)
HGB BLD-MCNC: 11.4 G/DL — LOW (ref 11.5–15.5)
LYMPHOCYTES # BLD AUTO: 0.7 K/UL — LOW (ref 1–3.3)
LYMPHOCYTES # BLD AUTO: 23 % — SIGNIFICANT CHANGE UP (ref 13–44)
MCHC RBC-ENTMCNC: 31.1 PG — SIGNIFICANT CHANGE UP (ref 27–34)
MCHC RBC-ENTMCNC: 32.7 G/DL — SIGNIFICANT CHANGE UP (ref 32–36)
MCV RBC AUTO: 95.1 FL — SIGNIFICANT CHANGE UP (ref 80–100)
MONOCYTES # BLD AUTO: 0.43 K/UL — SIGNIFICANT CHANGE UP (ref 0–0.9)
MONOCYTES NFR BLD AUTO: 14 % — SIGNIFICANT CHANGE UP (ref 2–14)
NEUTROPHILS # BLD AUTO: 1.9 K/UL — SIGNIFICANT CHANGE UP (ref 1.8–7.4)
NEUTROPHILS NFR BLD AUTO: 62 % — SIGNIFICANT CHANGE UP (ref 43–77)
NRBC # BLD: 0 /100 — SIGNIFICANT CHANGE UP (ref 0–0)
NRBC # BLD: SIGNIFICANT CHANGE UP /100 WBCS (ref 0–0)
PLAT MORPH BLD: NORMAL — SIGNIFICANT CHANGE UP
PLATELET # BLD AUTO: 102 K/UL — LOW (ref 150–400)
RBC # BLD: 3.67 M/UL — LOW (ref 3.8–5.2)
RBC # FLD: 19.2 % — HIGH (ref 10.3–14.5)
RBC BLD AUTO: SIGNIFICANT CHANGE UP
WBC # BLD: 3.06 K/UL — LOW (ref 3.8–10.5)
WBC # FLD AUTO: 3.06 K/UL — LOW (ref 3.8–10.5)

## 2021-06-16 ENCOUNTER — APPOINTMENT (OUTPATIENT)
Dept: INFUSION THERAPY | Facility: HOSPITAL | Age: 81
End: 2021-06-16

## 2021-06-17 ENCOUNTER — RESULT REVIEW (OUTPATIENT)
Age: 81
End: 2021-06-17

## 2021-06-17 ENCOUNTER — APPOINTMENT (OUTPATIENT)
Dept: INFUSION THERAPY | Facility: HOSPITAL | Age: 81
End: 2021-06-17

## 2021-06-17 LAB
BASOPHILS # BLD AUTO: 0 K/UL — SIGNIFICANT CHANGE UP (ref 0–0.2)
BASOPHILS NFR BLD AUTO: 0 % — SIGNIFICANT CHANGE UP (ref 0–2)
EOSINOPHIL # BLD AUTO: 0.03 K/UL — SIGNIFICANT CHANGE UP (ref 0–0.5)
EOSINOPHIL NFR BLD AUTO: 1 % — SIGNIFICANT CHANGE UP (ref 0–6)
HCT VFR BLD CALC: 36.3 % — SIGNIFICANT CHANGE UP (ref 34.5–45)
HGB BLD-MCNC: 12.3 G/DL — SIGNIFICANT CHANGE UP (ref 11.5–15.5)
LYMPHOCYTES # BLD AUTO: 0.81 K/UL — LOW (ref 1–3.3)
LYMPHOCYTES # BLD AUTO: 32 % — SIGNIFICANT CHANGE UP (ref 13–44)
MCHC RBC-ENTMCNC: 31.4 PG — SIGNIFICANT CHANGE UP (ref 27–34)
MCHC RBC-ENTMCNC: 33.9 G/DL — SIGNIFICANT CHANGE UP (ref 32–36)
MCV RBC AUTO: 92.6 FL — SIGNIFICANT CHANGE UP (ref 80–100)
MONOCYTES # BLD AUTO: 0.3 K/UL — SIGNIFICANT CHANGE UP (ref 0–0.9)
MONOCYTES NFR BLD AUTO: 12 % — SIGNIFICANT CHANGE UP (ref 2–14)
NEUTROPHILS # BLD AUTO: 1.39 K/UL — LOW (ref 1.8–7.4)
NEUTROPHILS NFR BLD AUTO: 55 % — SIGNIFICANT CHANGE UP (ref 43–77)
NRBC # BLD: 0 /100 — SIGNIFICANT CHANGE UP (ref 0–0)
NRBC # BLD: SIGNIFICANT CHANGE UP /100 WBCS (ref 0–0)
PLAT MORPH BLD: NORMAL — SIGNIFICANT CHANGE UP
PLATELET # BLD AUTO: 112 K/UL — LOW (ref 150–400)
RBC # BLD: 3.92 M/UL — SIGNIFICANT CHANGE UP (ref 3.8–5.2)
RBC # FLD: 19.5 % — HIGH (ref 10.3–14.5)
RBC BLD AUTO: SIGNIFICANT CHANGE UP
WBC # BLD: 2.52 K/UL — LOW (ref 3.8–10.5)
WBC # FLD AUTO: 2.52 K/UL — LOW (ref 3.8–10.5)

## 2021-06-17 NOTE — PHYSICAL EXAM
[Restricted in physically strenuous activity but ambulatory and able to carry out work of a light or sedentary nature] : Status 1- Restricted in physically strenuous activity but ambulatory and able to carry out work of a light or sedentary nature, e.g., light house work, office work [Normal] : affect appropriate [de-identified] : supple [de-identified] : (+)S1S2 RRR [de-identified] : L sided Port -no inflammation. Trace edema around previously fractured ankle [de-identified] : no tenderness [de-identified] : warm/dry

## 2021-06-17 NOTE — ASSESSMENT
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  Repeat bone marrow biopsy after cycle 8 showing evidence of persistent complete remission. Now in cycle 14 of therapy with dacogen/venetoclax and doing well. \par \par -Will continue with dacogen/venetoclax q5wks given troubles with cytopenias, along with OnPro (day 5).Today is cycle 14 day 9. \par -Venetoclax daily, dosed at 200mg  for 10 days with each cycle. \par -Not neutropenic. Will hold Levaquin and continue Diflucan 2/2 Venclexta dosing\par -will continue home draw at this time. Possible platelets if needed. \par -BM bx done on 2/3/20 showed normal cytogenetics. In house assay for FLT-3 ITD positive, which confers an adverse prognosis in AML - previously had been d/w patient and family about BMT transplant -pt active prior to admission, good PS status; however, she was not interested. \par -patient had severe lower back pain related to DDD and apparent pinched nerve has improved on low dose gabapentin 100 mg QHS, may have anxiolytic effect as well. \par -Previously offered patient psychological referral given reported depression, but she is refusing. Emotional support provided. \par -Discussed plan with  and patient at length\par \par Follow up in 1 month

## 2021-06-17 NOTE — HISTORY OF PRESENT ILLNESS
[Disease:__________________________] : Disease: [unfilled] [Therapy: ___] : Therapy: [unfilled] [Cycle: ___] : Cycle: [unfilled] [Day: ___] : Day: [unfilled] [de-identified] : Ms. 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 She has a history of Breast cancer 12 years ago treated with tamoxifen (no chemotherapy), s/p RT and lumpectomy, HTN and surgical hx of hysterectomy, was sent in by oncologist Dr. Blank on 1/31/20 for rule out leukemia. Patient had been feeling generally fatigued and sluggish since December 2019. She was feeling more tired, and saw her PMD, who did some blood work and noticed blasts in her peripheral blood. Pt was told to see Dr. Blank for further workup. Dr. Blank did blood work again which again found blasts in the peripheral blood, at which point she sent pt to the ER for leukemia workup. 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 She was started on 2/4/20 on Cycle 1 of Dacogen + Venetoclax (50 mg on day 1, 100 mg on day 2, 200 mg on day 3 and onward when starting Diflucan). She tolerated it well other than some SOB -CXR on 2/7/20 : mild pulmonary edema and L pleural effusion, treated with diuretics. She was discharged home on 2/11/20. \par \par She had a BM bx to eval response on 3/2/20 -showed no blasts. Pt given C2 days 1-3 on 3/2, 3/4 and 3/5, after which her Venetoclax was held for count recovery. She has now completed up through cycle 8 of dacogen/Venclexta (10 days each cycle). Repeat bone marrow biopsy showing no evidence of leukemia and hypocellular trilineage hematopoiesis with 10% bone marrow cellularity. Venclexta was held and counts recovered, she is now in cycle 11. \par  [de-identified] : Disseminated [de-identified] : 1, 2. Bone marrow biopsy and bone marrow aspirate\par - Acute myeloid leukemia with mutated NPM1\par - FLT 3-ITD positive\par \par Diagnostic Note:\par Megakarocytes are normal in number with dysplastic morphology.\par Please note findings of a normal female karyotype, negative MDS\par FISH panel. and somatic mutations of FLT3 FLT3-ITD\par ([id number]), [id number]*12, and CEBPA F106fs*1.\par Based on the additional findings, the classification of AML has\par changed to acute myeloid leukemia with mutated NPM1.\par  [de-identified] : Ankle pain continues to improve and there is minimal swelling. No fevers or chills. No dyspnea or chest pain. Reports excellent appetite and has been gaining weight.

## 2021-06-17 NOTE — REVIEW OF SYSTEMS
[Incontinence] : incontinence [Joint Pain] : joint pain [Anxiety] : anxiety [Negative] : Cardiovascular [Vision Problems] : no vision problems [Dysphagia] : no dysphagia [Nosebleeds] : no nosebleeds [Odynophagia] : no odynophagia [Dysuria] : no dysuria [Joint Stiffness] : no joint stiffness [Suicidal] : not suicidal [Insomnia] : no insomnia [Muscle Weakness] : no muscle weakness [FreeTextEntry6] : can go up 1 flight of stairs - exertional fatigue but not dyspnea  [FreeTextEntry7] : no BRBPR. Colonoscopy -done > 1 yr ago, normal;  [FreeTextEntry9] : lower back pain chronic -s/p compressed vertebrae. L ankle pain at times since it was broken [FreeTextEntry8] : stress [de-identified] : uses trazodone for sleep and she sleeps fine.  [FreeTextEntry1] : seasonal allergies

## 2021-06-18 ENCOUNTER — APPOINTMENT (OUTPATIENT)
Dept: HEMATOLOGY ONCOLOGY | Facility: CLINIC | Age: 81
End: 2021-06-18
Payer: MEDICARE

## 2021-06-18 ENCOUNTER — APPOINTMENT (OUTPATIENT)
Dept: INFUSION THERAPY | Facility: HOSPITAL | Age: 81
End: 2021-06-18

## 2021-06-18 VITALS
DIASTOLIC BLOOD PRESSURE: 70 MMHG | HEIGHT: 64 IN | BODY MASS INDEX: 22.88 KG/M2 | SYSTOLIC BLOOD PRESSURE: 139 MMHG | HEART RATE: 75 BPM | TEMPERATURE: 96.4 F | RESPIRATION RATE: 16 BRPM | WEIGHT: 134.04 LBS

## 2021-06-18 PROCEDURE — 99214 OFFICE O/P EST MOD 30 MIN: CPT

## 2021-06-18 NOTE — HISTORY OF PRESENT ILLNESS
[Disease:__________________________] : Disease: [unfilled] [Therapy: ___] : Therapy: [unfilled] [Cycle: ___] : Cycle: [unfilled] [Day: ___] : Day: [unfilled] [de-identified] : Ms. 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 She has a history of Breast cancer 12 years ago treated with tamoxifen (no chemotherapy), s/p RT and lumpectomy, HTN and surgical hx of hysterectomy, was sent in by oncologist Dr. Blank on 1/31/20 for rule out leukemia. Patient had been feeling generally fatigued and sluggish since December 2019. She was feeling more tired, and saw her PMD, who did some blood work and noticed blasts in her peripheral blood. Pt was told to see Dr. Blank for further workup. Dr. Blank did blood work again which again found blasts in the peripheral blood, at which point she sent pt to the ER for leukemia workup. 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 She was started on 2/4/20 on Cycle 1 of Dacogen + Venetoclax (50 mg on day 1, 100 mg on day 2, 200 mg on day 3 and onward when starting Diflucan). She tolerated it well other than some SOB -CXR on 2/7/20 : mild pulmonary edema and L pleural effusion, treated with diuretics. She was discharged home on 2/11/20. \par \par She had a BM bx to eval response on 3/2/20 -showed no blasts. She has since been on maintenance treatments with Dacogen/venetoclax (10 days) every 5 weeks. \par  [de-identified] : Disseminated [de-identified] : 1, 2. Bone marrow biopsy and bone marrow aspirate\par - Acute myeloid leukemia with mutated NPM1\par - FLT 3-ITD positive\par \par Diagnostic Note:\par Megakarocytes are normal in number with dysplastic morphology.\par Please note findings of a normal female karyotype, negative MDS\par FISH panel. and somatic mutations of FLT3 FLT3-ITD\par ([id number]), [id number]*12, and CEBPA F106fs*1.\par Based on the additional findings, the classification of AML has\par changed to acute myeloid leukemia with mutated NPM1.\par  [de-identified] : Patient feels overall very well. She still does complain of fatigue but not ADL limiting at this point. She intermittently does have constipation, she goes most days but it is very hard and difficult to pass. She takes miralax as needed. No pain symptoms at this time. No dyspnea or chest pain, no weight loss, good appetite. No fevers or chills.

## 2021-06-18 NOTE — PHYSICAL EXAM
[Restricted in physically strenuous activity but ambulatory and able to carry out work of a light or sedentary nature] : Status 1- Restricted in physically strenuous activity but ambulatory and able to carry out work of a light or sedentary nature, e.g., light house work, office work [Normal] : affect appropriate [de-identified] : supple [de-identified] : (+)S1S2 RRR [de-identified] : L sided Port -no inflammation. [de-identified] : no tenderness [de-identified] : warm/dry

## 2021-06-18 NOTE — REVIEW OF SYSTEMS
[Incontinence] : incontinence [Joint Pain] : joint pain [Anxiety] : anxiety [Negative] : Heme/Lymph [Vision Problems] : no vision problems [Dysphagia] : no dysphagia [Nosebleeds] : no nosebleeds [Odynophagia] : no odynophagia [Dysuria] : no dysuria [Joint Stiffness] : no joint stiffness [Suicidal] : not suicidal [Insomnia] : no insomnia [Muscle Weakness] : no muscle weakness [FreeTextEntry6] : can go up 1 flight of stairs - exertional fatigue but not dyspnea  [FreeTextEntry7] : no BRBPR. Colonoscopy -done > 1 yr ago, normal;  [FreeTextEntry8] : stress [FreeTextEntry9] : lower back pain chronic -s/p compressed vertebrae.  OK at this point.  [de-identified] : uses trazodone for sleep and she sleeps fine.  [FreeTextEntry1] : seasonal allergies

## 2021-06-18 NOTE — ASSESSMENT
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  Repeat bone marrow biopsy after cycle 8 showing evidence of persistent complete remission. Now in cycle 15 doing well. \par \par -Will continue with dacogen/venetoclax q5wks given troubles with cytopenias, along with OnPro (day 5).Today is cycle 15 day 5. \par -Venetoclax daily, dosed at 200mg  for 10 days with each cycle. \par -Not neutropenic. Will hold Levaquin but she stays on diflucan continuously for stable venetoclax dosing effect. \par -will continue home draw at this time. Possible platelets if needed. \par -BM bx done on 2/3/20 showed normal cytogenetics. In house assay for FLT-3 ITD positive, which confers an adverse prognosis in AML - previously had been d/w patient and family about BMT transplant -pt active prior to admission, good PS status; however, she was not interested. \par -patient had severe lower back pain related to DDD and apparent pinched nerve has improved on low dose gabapentin 100 mg QHS, may have anxiolytic effect as well. This ran out but not experiencing pain right now so will not renew. \par -Previously offered patient psychological referral given reported depression, but she is refusing. Emotional support provided. She is improved.\par -Discussed plan with  and patient at length\par -Follow up in 1 month with Jeannie VALENZUELA.

## 2021-06-23 ENCOUNTER — LABORATORY RESULT (OUTPATIENT)
Age: 81
End: 2021-06-23

## 2021-06-23 LAB
ALBUMIN SERPL ELPH-MCNC: 3.7 G/DL
ALP BLD-CCNC: 67 U/L
ALT SERPL-CCNC: 26 U/L
ANION GAP SERPL CALC-SCNC: 10 MMOL/L
AST SERPL-CCNC: 23 U/L
BILIRUB SERPL-MCNC: <0.2 MG/DL
BUN SERPL-MCNC: 17 MG/DL
CALCIUM SERPL-MCNC: 9 MG/DL
CHLORIDE SERPL-SCNC: 104 MMOL/L
CO2 SERPL-SCNC: 26 MMOL/L
CREAT SERPL-MCNC: 0.68 MG/DL
GLUCOSE SERPL-MCNC: 110 MG/DL
LDH SERPL-CCNC: 203 U/L
POTASSIUM SERPL-SCNC: 3.7 MMOL/L
PROT SERPL-MCNC: 5.7 G/DL
SODIUM SERPL-SCNC: 141 MMOL/L

## 2021-06-24 LAB
ALBUMIN SERPL ELPH-MCNC: 3.8 G/DL
ALP BLD-CCNC: 59 U/L
ALT SERPL-CCNC: 12 U/L
ANION GAP SERPL CALC-SCNC: 7 MMOL/L
AST SERPL-CCNC: 17 U/L
BASOPHILS # BLD AUTO: 0.01 K/UL
BASOPHILS NFR BLD AUTO: 0.9 %
BILIRUB SERPL-MCNC: 0.3 MG/DL
BUN SERPL-MCNC: 17 MG/DL
CALCIUM SERPL-MCNC: 9.1 MG/DL
CHLORIDE SERPL-SCNC: 106 MMOL/L
CO2 SERPL-SCNC: 29 MMOL/L
CREAT SERPL-MCNC: 0.66 MG/DL
EOSINOPHIL # BLD AUTO: 0.01 K/UL
EOSINOPHIL NFR BLD AUTO: 0.9 %
GLUCOSE SERPL-MCNC: 93 MG/DL
HCT VFR BLD CALC: 34.4 %
HGB BLD-MCNC: 11.1 G/DL
LDH SERPL-CCNC: 167 U/L
LYMPHOCYTES # BLD AUTO: 0.29 K/UL
LYMPHOCYTES NFR BLD AUTO: 24.3 %
MAN DIFF?: NORMAL
MCHC RBC-ENTMCNC: 30.9 PG
MCHC RBC-ENTMCNC: 32.3 GM/DL
MCV RBC AUTO: 95.8 FL
MONOCYTES # BLD AUTO: 0.09 K/UL
MONOCYTES NFR BLD AUTO: 7.8 %
NEUTROPHILS # BLD AUTO: 0.77 K/UL
NEUTROPHILS NFR BLD AUTO: 64.4 %
PLATELET # BLD AUTO: 65 K/UL
POTASSIUM SERPL-SCNC: 3.7 MMOL/L
PROT SERPL-MCNC: 5.9 G/DL
RBC # BLD: 3.59 M/UL
RBC # FLD: 19.7 %
SODIUM SERPL-SCNC: 142 MMOL/L
WBC # FLD AUTO: 1.2 K/UL

## 2021-06-30 ENCOUNTER — LABORATORY RESULT (OUTPATIENT)
Age: 81
End: 2021-06-30

## 2021-07-01 ENCOUNTER — NON-APPOINTMENT (OUTPATIENT)
Age: 81
End: 2021-07-01

## 2021-07-08 ENCOUNTER — NON-APPOINTMENT (OUTPATIENT)
Age: 81
End: 2021-07-08

## 2021-07-13 ENCOUNTER — LABORATORY RESULT (OUTPATIENT)
Age: 81
End: 2021-07-13

## 2021-07-14 LAB
ALBUMIN SERPL ELPH-MCNC: 4 G/DL
ALBUMIN SERPL ELPH-MCNC: 4.2 G/DL
ALP BLD-CCNC: 59 U/L
ALP BLD-CCNC: 63 U/L
ALT SERPL-CCNC: 15 U/L
ALT SERPL-CCNC: 20 U/L
ANION GAP SERPL CALC-SCNC: 13 MMOL/L
ANION GAP SERPL CALC-SCNC: 14 MMOL/L
AST SERPL-CCNC: 18 U/L
AST SERPL-CCNC: 19 U/L
BASOPHILS # BLD AUTO: 0 K/UL
BASOPHILS # BLD AUTO: 0 K/UL
BASOPHILS NFR BLD AUTO: 0 %
BASOPHILS NFR BLD AUTO: 0 %
BILIRUB SERPL-MCNC: 0.3 MG/DL
BILIRUB SERPL-MCNC: 0.4 MG/DL
BUN SERPL-MCNC: 14 MG/DL
BUN SERPL-MCNC: 19 MG/DL
CALCIUM SERPL-MCNC: 9.3 MG/DL
CALCIUM SERPL-MCNC: 9.5 MG/DL
CHLORIDE SERPL-SCNC: 103 MMOL/L
CHLORIDE SERPL-SCNC: 104 MMOL/L
CO2 SERPL-SCNC: 22 MMOL/L
CO2 SERPL-SCNC: 23 MMOL/L
CREAT SERPL-MCNC: 0.68 MG/DL
CREAT SERPL-MCNC: 0.77 MG/DL
EOSINOPHIL # BLD AUTO: 0 K/UL
EOSINOPHIL # BLD AUTO: 0.01 K/UL
EOSINOPHIL NFR BLD AUTO: 0 %
EOSINOPHIL NFR BLD AUTO: 2.1 %
GLUCOSE SERPL-MCNC: 106 MG/DL
GLUCOSE SERPL-MCNC: 80 MG/DL
HCT VFR BLD CALC: 34.1 %
HCT VFR BLD CALC: 35.6 %
HGB BLD-MCNC: 11.1 G/DL
HGB BLD-MCNC: 11.4 G/DL
IMM GRANULOCYTES NFR BLD AUTO: 0 %
IMM GRANULOCYTES NFR BLD AUTO: 2.6 %
LDH SERPL-CCNC: 199 U/L
LDH SERPL-CCNC: 203 U/L
LYMPHOCYTES # BLD AUTO: 0.41 K/UL
LYMPHOCYTES # BLD AUTO: 0.45 K/UL
LYMPHOCYTES NFR BLD AUTO: 59.2 %
LYMPHOCYTES NFR BLD AUTO: 85.4 %
MAN DIFF?: NORMAL
MAN DIFF?: NORMAL
MCHC RBC-ENTMCNC: 30.5 PG
MCHC RBC-ENTMCNC: 31.8 PG
MCHC RBC-ENTMCNC: 32 GM/DL
MCHC RBC-ENTMCNC: 32.6 GM/DL
MCV RBC AUTO: 95.2 FL
MCV RBC AUTO: 97.7 FL
MONOCYTES # BLD AUTO: 0.01 K/UL
MONOCYTES # BLD AUTO: 0.06 K/UL
MONOCYTES NFR BLD AUTO: 2.1 %
MONOCYTES NFR BLD AUTO: 7.9 %
NEUTROPHILS # BLD AUTO: 0.05 K/UL
NEUTROPHILS # BLD AUTO: 0.23 K/UL
NEUTROPHILS NFR BLD AUTO: 30.3 %
NEUTROPHILS NFR BLD AUTO: NORMAL
PLATELET # BLD AUTO: 103 K/UL
PLATELET # BLD AUTO: 25 K/UL
POTASSIUM SERPL-SCNC: 3.8 MMOL/L
POTASSIUM SERPL-SCNC: 3.9 MMOL/L
PROT SERPL-MCNC: 6.1 G/DL
PROT SERPL-MCNC: 6.2 G/DL
RBC # BLD: 3.49 M/UL
RBC # BLD: 3.74 M/UL
RBC # FLD: 20.2 %
RBC # FLD: 20.6 %
SODIUM SERPL-SCNC: 140 MMOL/L
SODIUM SERPL-SCNC: 140 MMOL/L
WBC # FLD AUTO: 0.48 K/UL
WBC # FLD AUTO: 0.76 K/UL

## 2021-07-15 ENCOUNTER — OUTPATIENT (OUTPATIENT)
Dept: OUTPATIENT SERVICES | Facility: HOSPITAL | Age: 81
LOS: 1 days | Discharge: HOME | End: 2021-07-15

## 2021-07-15 DIAGNOSIS — Z90.710 ACQUIRED ABSENCE OF BOTH CERVIX AND UTERUS: Chronic | ICD-10-CM

## 2021-07-15 DIAGNOSIS — C92.00 ACUTE MYELOBLASTIC LEUKEMIA, NOT HAVING ACHIEVED REMISSION: ICD-10-CM

## 2021-07-16 ENCOUNTER — NON-APPOINTMENT (OUTPATIENT)
Age: 81
End: 2021-07-16

## 2021-07-19 ENCOUNTER — RESULT REVIEW (OUTPATIENT)
Age: 81
End: 2021-07-19

## 2021-07-19 ENCOUNTER — LABORATORY RESULT (OUTPATIENT)
Age: 81
End: 2021-07-19

## 2021-07-19 ENCOUNTER — APPOINTMENT (OUTPATIENT)
Dept: INFUSION THERAPY | Facility: HOSPITAL | Age: 81
End: 2021-07-19

## 2021-07-19 DIAGNOSIS — Z51.11 ENCOUNTER FOR ANTINEOPLASTIC CHEMOTHERAPY: ICD-10-CM

## 2021-07-19 LAB
BASOPHILS # BLD AUTO: 0 K/UL — SIGNIFICANT CHANGE UP (ref 0–0.2)
BASOPHILS NFR BLD AUTO: 0 % — SIGNIFICANT CHANGE UP (ref 0–2)
EOSINOPHIL # BLD AUTO: 0.02 K/UL — SIGNIFICANT CHANGE UP (ref 0–0.5)
EOSINOPHIL NFR BLD AUTO: 1 % — SIGNIFICANT CHANGE UP (ref 0–6)
HCT VFR BLD CALC: 33.6 % — LOW (ref 34.5–45)
HGB BLD-MCNC: 11.3 G/DL — LOW (ref 11.5–15.5)
LYMPHOCYTES # BLD AUTO: 0.71 K/UL — LOW (ref 1–3.3)
LYMPHOCYTES # BLD AUTO: 30 % — SIGNIFICANT CHANGE UP (ref 13–44)
MCHC RBC-ENTMCNC: 31.4 PG — SIGNIFICANT CHANGE UP (ref 27–34)
MCHC RBC-ENTMCNC: 33.6 G/DL — SIGNIFICANT CHANGE UP (ref 32–36)
MCV RBC AUTO: 93.3 FL — SIGNIFICANT CHANGE UP (ref 80–100)
MONOCYTES # BLD AUTO: 0.61 K/UL — SIGNIFICANT CHANGE UP (ref 0–0.9)
MONOCYTES NFR BLD AUTO: 26 % — HIGH (ref 2–14)
NEUTROPHILS # BLD AUTO: 1.01 K/UL — LOW (ref 1.8–7.4)
NEUTROPHILS NFR BLD AUTO: 43 % — SIGNIFICANT CHANGE UP (ref 43–77)
NRBC # BLD: 0 /100 — SIGNIFICANT CHANGE UP (ref 0–0)
NRBC # BLD: SIGNIFICANT CHANGE UP /100 WBCS (ref 0–0)
PLAT MORPH BLD: NORMAL — SIGNIFICANT CHANGE UP
PLATELET # BLD AUTO: 121 K/UL — LOW (ref 150–400)
RBC # BLD: 3.6 M/UL — LOW (ref 3.8–5.2)
RBC # FLD: 20.3 % — HIGH (ref 10.3–14.5)
RBC BLD AUTO: SIGNIFICANT CHANGE UP
WBC # BLD: 2.35 K/UL — LOW (ref 3.8–10.5)
WBC # FLD AUTO: 2.35 K/UL — LOW (ref 3.8–10.5)

## 2021-07-19 RX ORDER — METOCLOPRAMIDE 10 MG/1
10 TABLET ORAL EVERY 6 HOURS
Qty: 40 | Refills: 2 | Status: ACTIVE | COMMUNITY
Start: 2020-02-11 | End: 1900-01-01

## 2021-07-19 RX ORDER — ONDANSETRON 8 MG/1
8 TABLET ORAL EVERY 8 HOURS
Qty: 30 | Refills: 0 | Status: ACTIVE | COMMUNITY
Start: 2020-02-11 | End: 1900-01-01

## 2021-07-20 ENCOUNTER — APPOINTMENT (OUTPATIENT)
Dept: INFUSION THERAPY | Facility: HOSPITAL | Age: 81
End: 2021-07-20

## 2021-07-21 ENCOUNTER — APPOINTMENT (OUTPATIENT)
Dept: INFUSION THERAPY | Facility: HOSPITAL | Age: 81
End: 2021-07-21

## 2021-07-21 ENCOUNTER — LABORATORY RESULT (OUTPATIENT)
Age: 81
End: 2021-07-21

## 2021-07-22 ENCOUNTER — APPOINTMENT (OUTPATIENT)
Dept: INFUSION THERAPY | Facility: HOSPITAL | Age: 81
End: 2021-07-22

## 2021-07-22 ENCOUNTER — RESULT REVIEW (OUTPATIENT)
Age: 81
End: 2021-07-22

## 2021-07-22 ENCOUNTER — APPOINTMENT (OUTPATIENT)
Dept: HEMATOLOGY ONCOLOGY | Facility: CLINIC | Age: 81
End: 2021-07-22
Payer: MEDICARE

## 2021-07-22 VITALS
OXYGEN SATURATION: 95 % | HEIGHT: 64.75 IN | RESPIRATION RATE: 18 BRPM | WEIGHT: 135.56 LBS | SYSTOLIC BLOOD PRESSURE: 166 MMHG | HEART RATE: 65 BPM | BODY MASS INDEX: 22.86 KG/M2 | TEMPERATURE: 98.4 F | DIASTOLIC BLOOD PRESSURE: 81 MMHG

## 2021-07-22 DIAGNOSIS — I10 ESSENTIAL (PRIMARY) HYPERTENSION: ICD-10-CM

## 2021-07-22 DIAGNOSIS — K59.09 OTHER CONSTIPATION: ICD-10-CM

## 2021-07-22 LAB
BASOPHILS # BLD AUTO: 0.01 K/UL — SIGNIFICANT CHANGE UP (ref 0–0.2)
BASOPHILS NFR BLD AUTO: 0.5 % — SIGNIFICANT CHANGE UP (ref 0–2)
EOSINOPHIL # BLD AUTO: 0 K/UL — SIGNIFICANT CHANGE UP (ref 0–0.5)
EOSINOPHIL NFR BLD AUTO: 0 % — SIGNIFICANT CHANGE UP (ref 0–6)
HCT VFR BLD CALC: 35.1 % — SIGNIFICANT CHANGE UP (ref 34.5–45)
HGB BLD-MCNC: 11.3 G/DL — LOW (ref 11.5–15.5)
IMM GRANULOCYTES NFR BLD AUTO: 1 % — SIGNIFICANT CHANGE UP (ref 0–1.5)
LYMPHOCYTES # BLD AUTO: 0.47 K/UL — LOW (ref 1–3.3)
LYMPHOCYTES # BLD AUTO: 22.4 % — SIGNIFICANT CHANGE UP (ref 13–44)
MCHC RBC-ENTMCNC: 31.1 PG — SIGNIFICANT CHANGE UP (ref 27–34)
MCHC RBC-ENTMCNC: 32.2 G/DL — SIGNIFICANT CHANGE UP (ref 32–36)
MCV RBC AUTO: 96.7 FL — SIGNIFICANT CHANGE UP (ref 80–100)
MONOCYTES # BLD AUTO: 0.27 K/UL — SIGNIFICANT CHANGE UP (ref 0–0.9)
MONOCYTES NFR BLD AUTO: 12.9 % — SIGNIFICANT CHANGE UP (ref 2–14)
NEUTROPHILS # BLD AUTO: 1.33 K/UL — LOW (ref 1.8–7.4)
NEUTROPHILS NFR BLD AUTO: 63.2 % — SIGNIFICANT CHANGE UP (ref 43–77)
NRBC # BLD: 0 /100 WBCS — SIGNIFICANT CHANGE UP (ref 0–0)
PLATELET # BLD AUTO: 104 K/UL — LOW (ref 150–400)
RBC # BLD: 3.63 M/UL — LOW (ref 3.8–5.2)
RBC # FLD: 20.8 % — HIGH (ref 10.3–14.5)
WBC # BLD: 2.1 K/UL — LOW (ref 3.8–10.5)
WBC # FLD AUTO: 2.1 K/UL — LOW (ref 3.8–10.5)

## 2021-07-22 PROCEDURE — 99213 OFFICE O/P EST LOW 20 MIN: CPT

## 2021-07-22 RX ORDER — HYDROCHLOROTHIAZIDE 12.5 MG/1
12.5 TABLET ORAL DAILY
Refills: 0 | Status: DISCONTINUED | COMMUNITY
Start: 2020-03-31 | End: 2021-07-22

## 2021-07-23 ENCOUNTER — APPOINTMENT (OUTPATIENT)
Dept: INFUSION THERAPY | Facility: HOSPITAL | Age: 81
End: 2021-07-23

## 2021-07-26 ENCOUNTER — APPOINTMENT (OUTPATIENT)
Dept: HEMATOLOGY ONCOLOGY | Facility: CLINIC | Age: 81
End: 2021-07-26

## 2021-07-28 ENCOUNTER — LABORATORY RESULT (OUTPATIENT)
Age: 81
End: 2021-07-28

## 2021-08-02 NOTE — REVIEW OF SYSTEMS
[Incontinence] : incontinence [Joint Pain] : joint pain [Anxiety] : anxiety [Negative] : Heme/Lymph [Fatigue] : fatigue [Fever] : no fever [Chills] : no chills [Night Sweats] : no night sweats [Vision Problems] : no vision problems [Dysphagia] : no dysphagia [Nosebleeds] : no nosebleeds [Odynophagia] : no odynophagia [Dysuria] : no dysuria [Joint Stiffness] : no joint stiffness [Suicidal] : not suicidal [Insomnia] : no insomnia [Muscle Weakness] : no muscle weakness [FreeTextEntry2] : chronic fatigue per HPI [FreeTextEntry6] : can go up 1 flight of stairs - exertional fatigue but not dyspnea  [FreeTextEntry7] : no BRBPR. Colonoscopy -done > 1 yr ago, normal;  [FreeTextEntry8] : stress [FreeTextEntry9] : lower back pain chronic -s/p compressed vertebrae.  OK at this point.  [de-identified] : uses trazodone for sleep and she sleeps fine.  [FreeTextEntry1] : seasonal allergies

## 2021-08-02 NOTE — HISTORY OF PRESENT ILLNESS
[Disease:__________________________] : Disease: [unfilled] [Therapy: ___] : Therapy: [unfilled] [Day: ___] : Day: [unfilled] [Cycle: ___] : Cycle: [unfilled] [de-identified] : Ms. 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 She has a history of Breast cancer 12 years ago treated with tamoxifen (no chemotherapy), s/p RT and lumpectomy, HTN and surgical hx of hysterectomy, was sent in by oncologist Dr. Blank on 1/31/20 for rule out leukemia. Patient had been feeling generally fatigued and sluggish since December 2019. She was feeling more tired, and saw her PMD, who did some blood work and noticed blasts in her peripheral blood. Pt was told to see Dr. Blank for further workup. Dr. Blank did blood work again which again found blasts in the peripheral blood, at which point she sent pt to the ER for leukemia workup. 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 She was started on 2/4/20 on Cycle 1 of Dacogen + Venetoclax (50 mg on day 1, 100 mg on day 2, 200 mg on day 3 and onward when starting Diflucan). She tolerated it well other than some SOB -CXR on 2/7/20 : mild pulmonary edema and L pleural effusion, treated with diuretics. She was discharged home on 2/11/20. \par \par She had a BM bx to eval response on 3/2/20 -showed no blasts. She has since been on maintenance treatments with Dacogen/venetoclax (10 days) every 5 weeks. \par  [de-identified] : Disseminated [de-identified] : 1, 2. Bone marrow biopsy and bone marrow aspirate\par - Acute myeloid leukemia with mutated NPM1\par - FLT 3-ITD positive\par \par Diagnostic Note:\par Megakarocytes are normal in number with dysplastic morphology.\par Please note findings of a normal female karyotype, negative MDS\par FISH panel. and somatic mutations of FLT3 FLT3-ITD\par ([id number]), [id number]*12, and CEBPA F106fs*1.\par Based on the additional findings, the classification of AML has\par changed to acute myeloid leukemia with mutated NPM1.\par  [de-identified] : Patient feels overall very well. She still does complain of fatigue but not ADL limiting at this point. She intermittently does have constipation, she goes most days but it is very hard and difficult to pass. She takes senna as needed. Did develop a rash to Mediport site after adhesive placed yesterday, already resolving. No pain symptoms at this time. No dyspnea or chest pain, no weight loss, good appetite. No fevers or chills. \par \par Of note her BP today at visit was 166/81 per patient and  she was taken off HCTZ for urinary frequency by her PCP but was also taken off Amlodipine and does not recall having a low BP in the office. They have sent a message to her PCP to discuss restarting. She denies any symptoms of HTN.

## 2021-08-02 NOTE — PHYSICAL EXAM
[Restricted in physically strenuous activity but ambulatory and able to carry out work of a light or sedentary nature] : Status 1- Restricted in physically strenuous activity but ambulatory and able to carry out work of a light or sedentary nature, e.g., light house work, office work [Normal] : affect appropriate [de-identified] : supple [de-identified] : (+)S1S2 RRR [de-identified] : L sided Port -no inflammation. [de-identified] : no tenderness [de-identified] : warm/dry. resolving rash over mediport site from adhesive tape

## 2021-08-02 NOTE — ASSESSMENT
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  Repeat bone marrow biopsy after cycle 8 showing evidence of persistent complete remission. Now in cycle 16 doing well. \par \par -Will continue with dacogen/venetoclax q5wks given troubles with cytopenias, along with OnPro (day 5).Today is cycle 16 day 4.\par -Venetoclax daily, dosed at 200mg  for 10 days with each cycle. \par -Not neutropenic but based on trend will continue Levaquin and stay on diflucan continuously for stable venetoclax dosing effect. \par -will continue home draw at this time. Possible platelets if needed. \par -BM bx done on 2/3/20 showed normal cytogenetics. In house assay for FLT-3 ITD positive, which confers an adverse prognosis in AML - previously had been d/w patient and family about BMT transplant -pt active prior to admission, good PS status; however, she was not interested. \par -patient had severe lower back pain related to DDD and apparent pinched nerve has improved on low dose gabapentin 100 mg QHS, may have anxiolytic effect as well. This ran out but not experiencing pain right now so will not renew. \par -Previously offered patient psychological referral given reported depression, but she is refusing. Emotional support provided. She is improved.\par -Discussed plan with  and patient at length\par \par -Follow up in 1 month with Dr. Goldberg\par

## 2021-08-05 ENCOUNTER — LABORATORY RESULT (OUTPATIENT)
Age: 81
End: 2021-08-05

## 2021-08-06 ENCOUNTER — NON-APPOINTMENT (OUTPATIENT)
Age: 81
End: 2021-08-06

## 2021-08-10 LAB
ALBUMIN SERPL ELPH-MCNC: 3.5 G/DL
ALBUMIN SERPL ELPH-MCNC: 3.6 G/DL
ALBUMIN SERPL ELPH-MCNC: 3.8 G/DL
ALBUMIN SERPL ELPH-MCNC: 4.1 G/DL
ALP BLD-CCNC: 55 U/L
ALP BLD-CCNC: 56 U/L
ALP BLD-CCNC: 57 U/L
ALP BLD-CCNC: 60 U/L
ALT SERPL-CCNC: 10 U/L
ALT SERPL-CCNC: 11 U/L
ALT SERPL-CCNC: 12 U/L
ALT SERPL-CCNC: 8 U/L
ANION GAP SERPL CALC-SCNC: 11 MMOL/L
ANION GAP SERPL CALC-SCNC: 12 MMOL/L
ANION GAP SERPL CALC-SCNC: 9 MMOL/L
ANION GAP SERPL CALC-SCNC: 9 MMOL/L
AST SERPL-CCNC: 13 U/L
AST SERPL-CCNC: 15 U/L
AST SERPL-CCNC: 16 U/L
AST SERPL-CCNC: 19 U/L
BASOPHILS # BLD AUTO: 0 K/UL
BASOPHILS NFR BLD AUTO: 0 %
BILIRUB SERPL-MCNC: 0.2 MG/DL
BILIRUB SERPL-MCNC: 0.2 MG/DL
BILIRUB SERPL-MCNC: 0.3 MG/DL
BILIRUB SERPL-MCNC: 0.3 MG/DL
BUN SERPL-MCNC: 11 MG/DL
BUN SERPL-MCNC: 13 MG/DL
CALCIUM SERPL-MCNC: 8.7 MG/DL
CALCIUM SERPL-MCNC: 8.7 MG/DL
CALCIUM SERPL-MCNC: 9 MG/DL
CALCIUM SERPL-MCNC: 9.2 MG/DL
CHLORIDE SERPL-SCNC: 105 MMOL/L
CHLORIDE SERPL-SCNC: 106 MMOL/L
CO2 SERPL-SCNC: 25 MMOL/L
CO2 SERPL-SCNC: 28 MMOL/L
CO2 SERPL-SCNC: 28 MMOL/L
CO2 SERPL-SCNC: 29 MMOL/L
CREAT SERPL-MCNC: 0.61 MG/DL
CREAT SERPL-MCNC: 0.66 MG/DL
CREAT SERPL-MCNC: 0.68 MG/DL
CREAT SERPL-MCNC: 0.83 MG/DL
EOSINOPHIL # BLD AUTO: 0 K/UL
EOSINOPHIL # BLD AUTO: 0.01 K/UL
EOSINOPHIL NFR BLD AUTO: 0 %
EOSINOPHIL NFR BLD AUTO: 0.5 %
GLUCOSE SERPL-MCNC: 102 MG/DL
GLUCOSE SERPL-MCNC: 76 MG/DL
GLUCOSE SERPL-MCNC: 85 MG/DL
GLUCOSE SERPL-MCNC: 91 MG/DL
HCT VFR BLD CALC: 30.7 %
HCT VFR BLD CALC: 31.4 %
HCT VFR BLD CALC: 33.1 %
HCT VFR BLD CALC: 33.5 %
HGB BLD-MCNC: 10.3 G/DL
HGB BLD-MCNC: 10.7 G/DL
HGB BLD-MCNC: 9.8 G/DL
HGB BLD-MCNC: 9.9 G/DL
IMM GRANULOCYTES NFR BLD AUTO: 0.5 %
IMM GRANULOCYTES NFR BLD AUTO: 0.6 %
IMM GRANULOCYTES NFR BLD AUTO: 0.9 %
LDH SERPL-CCNC: 176 U/L
LDH SERPL-CCNC: 178 U/L
LDH SERPL-CCNC: 180 U/L
LDH SERPL-CCNC: 212 U/L
LYMPHOCYTES # BLD AUTO: 0.37 K/UL
LYMPHOCYTES # BLD AUTO: 0.4 K/UL
LYMPHOCYTES # BLD AUTO: 0.49 K/UL
LYMPHOCYTES # BLD AUTO: 0.56 K/UL
LYMPHOCYTES NFR BLD AUTO: 19.5 %
LYMPHOCYTES NFR BLD AUTO: 28.2 %
LYMPHOCYTES NFR BLD AUTO: 49.1 %
LYMPHOCYTES NFR BLD AUTO: 76 %
MAN DIFF?: NORMAL
MCHC RBC-ENTMCNC: 30.8 PG
MCHC RBC-ENTMCNC: 31.1 GM/DL
MCHC RBC-ENTMCNC: 31.1 PG
MCHC RBC-ENTMCNC: 31.2 PG
MCHC RBC-ENTMCNC: 31.5 GM/DL
MCHC RBC-ENTMCNC: 31.6 PG
MCHC RBC-ENTMCNC: 31.9 GM/DL
MCHC RBC-ENTMCNC: 31.9 GM/DL
MCV RBC AUTO: 97.7 FL
MCV RBC AUTO: 98.7 FL
MCV RBC AUTO: 99 FL
MCV RBC AUTO: 99.1 FL
MONOCYTES # BLD AUTO: 0.02 K/UL
MONOCYTES # BLD AUTO: 0.13 K/UL
MONOCYTES # BLD AUTO: 0.21 K/UL
MONOCYTES # BLD AUTO: 0.32 K/UL
MONOCYTES NFR BLD AUTO: 18.4 %
MONOCYTES NFR BLD AUTO: 18.4 %
MONOCYTES NFR BLD AUTO: 4 %
MONOCYTES NFR BLD AUTO: 6.8 %
NEUTROPHILS # BLD AUTO: 0.11 K/UL
NEUTROPHILS # BLD AUTO: 0.36 K/UL
NEUTROPHILS # BLD AUTO: 0.92 K/UL
NEUTROPHILS # BLD AUTO: 1.38 K/UL
NEUTROPHILS NFR BLD AUTO: 20 %
NEUTROPHILS NFR BLD AUTO: 31.6 %
NEUTROPHILS NFR BLD AUTO: 52.8 %
NEUTROPHILS NFR BLD AUTO: 72.7 %
PLATELET # BLD AUTO: 111 K/UL
PLATELET # BLD AUTO: 15 K/UL
PLATELET # BLD AUTO: 157 K/UL
PLATELET # BLD AUTO: 58 K/UL
POTASSIUM SERPL-SCNC: 3.5 MMOL/L
POTASSIUM SERPL-SCNC: 3.6 MMOL/L
POTASSIUM SERPL-SCNC: 3.8 MMOL/L
POTASSIUM SERPL-SCNC: 3.9 MMOL/L
PROT SERPL-MCNC: 5.3 G/DL
PROT SERPL-MCNC: 5.5 G/DL
PROT SERPL-MCNC: 5.7 G/DL
PROT SERPL-MCNC: 6.2 G/DL
RBC # BLD: 3.1 M/UL
RBC # BLD: 3.18 M/UL
RBC # BLD: 3.34 M/UL
RBC # BLD: 3.43 M/UL
RBC # FLD: 20.5 %
RBC # FLD: 20.8 %
RBC # FLD: 20.9 %
RBC # FLD: 21.2 %
SODIUM SERPL-SCNC: 142 MMOL/L
SODIUM SERPL-SCNC: 143 MMOL/L
SODIUM SERPL-SCNC: 144 MMOL/L
SODIUM SERPL-SCNC: 144 MMOL/L
URATE SERPL-MCNC: 3.9 MG/DL
WBC # FLD AUTO: 0.53 K/UL
WBC # FLD AUTO: 1.14 K/UL
WBC # FLD AUTO: 1.74 K/UL
WBC # FLD AUTO: 1.9 K/UL

## 2021-08-11 ENCOUNTER — NON-APPOINTMENT (OUTPATIENT)
Age: 81
End: 2021-08-11

## 2021-08-11 ENCOUNTER — LABORATORY RESULT (OUTPATIENT)
Age: 81
End: 2021-08-11

## 2021-08-13 ENCOUNTER — NON-APPOINTMENT (OUTPATIENT)
Age: 81
End: 2021-08-13

## 2021-08-18 ENCOUNTER — LABORATORY RESULT (OUTPATIENT)
Age: 81
End: 2021-08-18

## 2021-08-18 LAB
BASOPHILS # BLD AUTO: 0 K/UL
BASOPHILS NFR BLD AUTO: 0 %
EOSINOPHIL # BLD AUTO: 0.01 K/UL
EOSINOPHIL NFR BLD AUTO: 1.2 %
HCT VFR BLD CALC: 33.8 %
HGB BLD-MCNC: 10.7 G/DL
IMM GRANULOCYTES NFR BLD AUTO: 1.2 %
LYMPHOCYTES # BLD AUTO: 0.72 K/UL
LYMPHOCYTES NFR BLD AUTO: 88.9 %
MAN DIFF?: NORMAL
MCHC RBC-ENTMCNC: 31.7 GM/DL
MCHC RBC-ENTMCNC: 31.7 PG
MCV RBC AUTO: 100 FL
MONOCYTES # BLD AUTO: 0.02 K/UL
MONOCYTES NFR BLD AUTO: 2.5 %
NEUTROPHILS # BLD AUTO: 0.05 K/UL
NEUTROPHILS NFR BLD AUTO: 6.2 %
PLATELET # BLD AUTO: 78 K/UL
RBC # BLD: 3.38 M/UL
RBC # FLD: 21.1 %
WBC # FLD AUTO: 0.81 K/UL

## 2021-08-19 ENCOUNTER — APPOINTMENT (OUTPATIENT)
Dept: HEMATOLOGY ONCOLOGY | Facility: CLINIC | Age: 81
End: 2021-08-19

## 2021-08-19 ENCOUNTER — NON-APPOINTMENT (OUTPATIENT)
Age: 81
End: 2021-08-19

## 2021-08-19 ENCOUNTER — OUTPATIENT (OUTPATIENT)
Dept: OUTPATIENT SERVICES | Facility: HOSPITAL | Age: 81
LOS: 1 days | Discharge: HOME | End: 2021-08-19

## 2021-08-19 DIAGNOSIS — C92.00 ACUTE MYELOBLASTIC LEUKEMIA, NOT HAVING ACHIEVED REMISSION: ICD-10-CM

## 2021-08-19 DIAGNOSIS — Z90.710 ACQUIRED ABSENCE OF BOTH CERVIX AND UTERUS: Chronic | ICD-10-CM

## 2021-08-23 ENCOUNTER — RESULT REVIEW (OUTPATIENT)
Age: 81
End: 2021-08-23

## 2021-08-23 ENCOUNTER — LABORATORY RESULT (OUTPATIENT)
Age: 81
End: 2021-08-23

## 2021-08-23 ENCOUNTER — APPOINTMENT (OUTPATIENT)
Dept: INFUSION THERAPY | Facility: HOSPITAL | Age: 81
End: 2021-08-23

## 2021-08-23 DIAGNOSIS — Z51.11 ENCOUNTER FOR ANTINEOPLASTIC CHEMOTHERAPY: ICD-10-CM

## 2021-08-23 DIAGNOSIS — R11.2 NAUSEA WITH VOMITING, UNSPECIFIED: ICD-10-CM

## 2021-08-23 LAB
BASOPHILS # BLD AUTO: 0.01 K/UL — SIGNIFICANT CHANGE UP (ref 0–0.2)
BASOPHILS NFR BLD AUTO: 0.4 % — SIGNIFICANT CHANGE UP (ref 0–2)
EOSINOPHIL # BLD AUTO: 0 K/UL — SIGNIFICANT CHANGE UP (ref 0–0.5)
EOSINOPHIL NFR BLD AUTO: 0 % — SIGNIFICANT CHANGE UP (ref 0–6)
HCT VFR BLD CALC: 33.8 % — LOW (ref 34.5–45)
HGB BLD-MCNC: 11.2 G/DL — LOW (ref 11.5–15.5)
IMM GRANULOCYTES NFR BLD AUTO: 1.9 % — HIGH (ref 0–1.5)
LYMPHOCYTES # BLD AUTO: 0.84 K/UL — LOW (ref 1–3.3)
LYMPHOCYTES # BLD AUTO: 31.3 % — SIGNIFICANT CHANGE UP (ref 13–44)
MCHC RBC-ENTMCNC: 31.9 PG — SIGNIFICANT CHANGE UP (ref 27–34)
MCHC RBC-ENTMCNC: 33.1 G/DL — SIGNIFICANT CHANGE UP (ref 32–36)
MCV RBC AUTO: 96.3 FL — SIGNIFICANT CHANGE UP (ref 80–100)
MONOCYTES # BLD AUTO: 0.64 K/UL — SIGNIFICANT CHANGE UP (ref 0–0.9)
MONOCYTES NFR BLD AUTO: 23.9 % — HIGH (ref 2–14)
NEUTROPHILS # BLD AUTO: 1.14 K/UL — LOW (ref 1.8–7.4)
NEUTROPHILS NFR BLD AUTO: 42.5 % — LOW (ref 43–77)
NRBC # BLD: 0 /100 WBCS — SIGNIFICANT CHANGE UP (ref 0–0)
PLATELET # BLD AUTO: 109 K/UL — LOW (ref 150–400)
RBC # BLD: 3.51 M/UL — LOW (ref 3.8–5.2)
RBC # FLD: 20.4 % — HIGH (ref 10.3–14.5)
WBC # BLD: 2.68 K/UL — LOW (ref 3.8–10.5)
WBC # FLD AUTO: 2.68 K/UL — LOW (ref 3.8–10.5)

## 2021-08-24 ENCOUNTER — APPOINTMENT (OUTPATIENT)
Dept: INFUSION THERAPY | Facility: HOSPITAL | Age: 81
End: 2021-08-24

## 2021-08-24 ENCOUNTER — APPOINTMENT (OUTPATIENT)
Dept: HEMATOLOGY ONCOLOGY | Facility: CLINIC | Age: 81
End: 2021-08-24
Payer: MEDICARE

## 2021-08-24 VITALS
HEIGHT: 63.86 IN | OXYGEN SATURATION: 99 % | DIASTOLIC BLOOD PRESSURE: 74 MMHG | RESPIRATION RATE: 20 BRPM | WEIGHT: 134.92 LBS | SYSTOLIC BLOOD PRESSURE: 154 MMHG | TEMPERATURE: 97.5 F | BODY MASS INDEX: 23.32 KG/M2 | HEART RATE: 65 BPM

## 2021-08-24 PROCEDURE — 99214 OFFICE O/P EST MOD 30 MIN: CPT

## 2021-08-25 ENCOUNTER — APPOINTMENT (OUTPATIENT)
Dept: INFUSION THERAPY | Facility: HOSPITAL | Age: 81
End: 2021-08-25

## 2021-08-25 NOTE — HISTORY OF PRESENT ILLNESS
[Disease:__________________________] : Disease: [unfilled] [Therapy: ___] : Therapy: [unfilled] [Cycle: ___] : Cycle: [unfilled] [Day: ___] : Day: [unfilled] [de-identified] : Ms. 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 She has a history of Breast cancer 12 years ago treated with tamoxifen (no chemotherapy), s/p RT and lumpectomy, HTN and surgical hx of hysterectomy, was sent in by oncologist Dr. Blank on 1/31/20 for rule out leukemia. Patient had been feeling generally fatigued and sluggish since December 2019. She was feeling more tired, and saw her PMD, who did some blood work and noticed blasts in her peripheral blood. Pt was told to see Dr. Blank for further workup. Dr. Blank did blood work again which again found blasts in the peripheral blood, at which point she sent pt to the ER for leukemia workup. 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 She was started on 2/4/20 on Cycle 1 of Dacogen + Venetoclax (50 mg on day 1, 100 mg on day 2, 200 mg on day 3 and onward when starting Diflucan). She tolerated it well other than some SOB -CXR on 2/7/20 : mild pulmonary edema and L pleural effusion, treated with diuretics. She was discharged home on 2/11/20. \par \par She had a BM bx to eval response on 3/2/20 -showed no blasts. She has since been on maintenance treatments with Dacogen/venetoclax (10 days) every 5 weeks. \par  [de-identified] : Disseminated [de-identified] : 1, 2. Bone marrow biopsy and bone marrow aspirate\par - Acute myeloid leukemia with mutated NPM1\par - FLT 3-ITD positive\par \par Diagnostic Note:\par Megakarocytes are normal in number with dysplastic morphology.\par Please note findings of a normal female karyotype, negative MDS\par FISH panel. and somatic mutations of FLT3 FLT3-ITD\par ([id number]), [id number]*12, and CEBPA F106fs*1.\par Based on the additional findings, the classification of AML has\par changed to acute myeloid leukemia with mutated NPM1.\par  [de-identified] : Patient reports she feels tired overall, this is seeming to be her normal lately. She had a bout of diarrhea yesterday after her day 1 of chemo but this has not happened today. It was a medium brown and it was watery. There was one a couple of weeks ago that was green but that resolved. Not painful, no abdominal cramps or anything like that. She has been afebrile. This has never happened before with her chemotherapy. She also was complaining that she had a white coating on her tongue yesterday which looked like thrush and she has been treating with Nystatin suspension.

## 2021-08-25 NOTE — PHYSICAL EXAM
[Restricted in physically strenuous activity but ambulatory and able to carry out work of a light or sedentary nature] : Status 1- Restricted in physically strenuous activity but ambulatory and able to carry out work of a light or sedentary nature, e.g., light house work, office work [Normal] : affect appropriate [de-identified] : supple [de-identified] : (+)S1S2 RRR [de-identified] : L sided Port -no inflammation. [de-identified] : no tenderness [de-identified] : warm/dry. resolving rash over mediport site from adhesive tape

## 2021-08-25 NOTE — REVIEW OF SYSTEMS
[Fatigue] : fatigue [Incontinence] : incontinence [Joint Pain] : joint pain [Anxiety] : anxiety [Negative] : Heme/Lymph [Fever] : no fever [Chills] : no chills [Night Sweats] : no night sweats [Vision Problems] : no vision problems [Dysphagia] : no dysphagia [Nosebleeds] : no nosebleeds [Odynophagia] : no odynophagia [Dysuria] : no dysuria [Joint Stiffness] : no joint stiffness [Suicidal] : not suicidal [Insomnia] : no insomnia [Muscle Weakness] : no muscle weakness [FreeTextEntry2] : chronic fatigue per HPI [FreeTextEntry6] : can go up 1 flight of stairs - exertional fatigue but not dyspnea  [FreeTextEntry7] : no BRBPR. Colonoscopy -done > 1 yr ago, normal;  [FreeTextEntry8] : stress [FreeTextEntry9] : lower back pain chronic -s/p compressed vertebrae.  OK at this point.  [de-identified] : uses trazodone for sleep and she sleeps fine.  [FreeTextEntry1] : seasonal allergies

## 2021-08-25 NOTE — ASSESSMENT
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  Repeat bone marrow biopsy after cycle 8 showing evidence of persistent complete remission. Now in cycle 16 doing well. \par \par -Will continue with dacogen/venetoclax q5wks given troubles with cytopenias, along with OnPro (day 5).Today is cycle 17 day 2\par -Thrush - noted yesterday, started Nystatin suspn already improved. Also taking fluconazole.\par -Venetoclax daily, dosed at 200mg  for 10 days with each cycle. \par -Not neutropenic so will hold Levaquin to continue when neutropenic, but will stay on diflucan continuously for stable venetoclax dosing effect. \par -will continue home draw at this time. Possible platelets if needed. \par -BM bx done on 2/3/20 showed normal cytogenetics. In house assay for FLT-3 ITD positive, which confers an adverse prognosis in AML - previously had been d/w patient and family about BMT transplant -pt active prior to admission, good PS status; however, she was not interested. \par -patient had severe lower back pain related to DDD and apparent pinched nerve has improved on low dose gabapentin 100 mg QHS, may have anxiolytic effect as well. This ran out but not experiencing pain right now so will not renew. \par -Previously offered patient psychological referral given reported depression, but she is refusing. Emotional support provided. She is improved.\par -Discussed plan with  and patient at length\par \par -Follow up in 1 month\par

## 2021-08-26 ENCOUNTER — RESULT REVIEW (OUTPATIENT)
Age: 81
End: 2021-08-26

## 2021-08-26 ENCOUNTER — APPOINTMENT (OUTPATIENT)
Dept: INFUSION THERAPY | Facility: HOSPITAL | Age: 81
End: 2021-08-26

## 2021-08-26 LAB
BASOPHILS # BLD AUTO: 0 K/UL — SIGNIFICANT CHANGE UP (ref 0–0.2)
BASOPHILS NFR BLD AUTO: 0 % — SIGNIFICANT CHANGE UP (ref 0–2)
EOSINOPHIL # BLD AUTO: 0 K/UL — SIGNIFICANT CHANGE UP (ref 0–0.5)
EOSINOPHIL NFR BLD AUTO: 0 % — SIGNIFICANT CHANGE UP (ref 0–6)
HCT VFR BLD CALC: 31.9 % — LOW (ref 34.5–45)
HGB BLD-MCNC: 10.4 G/DL — LOW (ref 11.5–15.5)
IMM GRANULOCYTES NFR BLD AUTO: 0.4 % — SIGNIFICANT CHANGE UP (ref 0–1.5)
LYMPHOCYTES # BLD AUTO: 0.61 K/UL — LOW (ref 1–3.3)
LYMPHOCYTES # BLD AUTO: 25.7 % — SIGNIFICANT CHANGE UP (ref 13–44)
MCHC RBC-ENTMCNC: 32.1 PG — SIGNIFICANT CHANGE UP (ref 27–34)
MCHC RBC-ENTMCNC: 32.6 G/DL — SIGNIFICANT CHANGE UP (ref 32–36)
MCV RBC AUTO: 98.5 FL — SIGNIFICANT CHANGE UP (ref 80–100)
MONOCYTES # BLD AUTO: 0.3 K/UL — SIGNIFICANT CHANGE UP (ref 0–0.9)
MONOCYTES NFR BLD AUTO: 12.7 % — SIGNIFICANT CHANGE UP (ref 2–14)
NEUTROPHILS # BLD AUTO: 1.45 K/UL — LOW (ref 1.8–7.4)
NEUTROPHILS NFR BLD AUTO: 61.2 % — SIGNIFICANT CHANGE UP (ref 43–77)
NRBC # BLD: 0 /100 WBCS — SIGNIFICANT CHANGE UP (ref 0–0)
PLATELET # BLD AUTO: 107 K/UL — LOW (ref 150–400)
RBC # BLD: 3.24 M/UL — LOW (ref 3.8–5.2)
RBC # FLD: 20.5 % — HIGH (ref 10.3–14.5)
WBC # BLD: 2.37 K/UL — LOW (ref 3.8–10.5)
WBC # FLD AUTO: 2.37 K/UL — LOW (ref 3.8–10.5)

## 2021-08-27 ENCOUNTER — APPOINTMENT (OUTPATIENT)
Dept: INFUSION THERAPY | Facility: HOSPITAL | Age: 81
End: 2021-08-27

## 2021-08-31 DIAGNOSIS — Z23 ENCOUNTER FOR IMMUNIZATION: ICD-10-CM

## 2021-09-01 ENCOUNTER — LABORATORY RESULT (OUTPATIENT)
Age: 81
End: 2021-09-01

## 2021-09-02 ENCOUNTER — NON-APPOINTMENT (OUTPATIENT)
Age: 81
End: 2021-09-02

## 2021-09-08 ENCOUNTER — LABORATORY RESULT (OUTPATIENT)
Age: 81
End: 2021-09-08

## 2021-09-08 LAB
BASOPHILS # BLD AUTO: 0 K/UL
BASOPHILS # BLD AUTO: 0 K/UL
BASOPHILS NFR BLD AUTO: 0 %
BASOPHILS NFR BLD AUTO: 0 %
EOSINOPHIL # BLD AUTO: 0 K/UL
EOSINOPHIL # BLD AUTO: 0 K/UL
EOSINOPHIL NFR BLD AUTO: 0 %
EOSINOPHIL NFR BLD AUTO: 0 %
HCT VFR BLD CALC: 31.2 %
HCT VFR BLD CALC: 33.5 %
HGB BLD-MCNC: 10.5 G/DL
HGB BLD-MCNC: 9.8 G/DL
IMM GRANULOCYTES NFR BLD AUTO: 0.9 %
LYMPHOCYTES # BLD AUTO: 0.47 K/UL
LYMPHOCYTES # BLD AUTO: 0.51 K/UL
LYMPHOCYTES NFR BLD AUTO: 29.8 %
LYMPHOCYTES NFR BLD AUTO: 47.7 %
MAN DIFF?: NORMAL
MAN DIFF?: NORMAL
MCHC RBC-ENTMCNC: 31.3 GM/DL
MCHC RBC-ENTMCNC: 31.4 GM/DL
MCHC RBC-ENTMCNC: 31.6 PG
MCHC RBC-ENTMCNC: 31.9 PG
MCV RBC AUTO: 100.9 FL
MCV RBC AUTO: 101.6 FL
MONOCYTES # BLD AUTO: 0.04 K/UL
MONOCYTES # BLD AUTO: 0.17 K/UL
MONOCYTES NFR BLD AUTO: 15.9 %
MONOCYTES NFR BLD AUTO: 2.6 %
NEUTROPHILS # BLD AUTO: 0.38 K/UL
NEUTROPHILS # BLD AUTO: 1.07 K/UL
NEUTROPHILS NFR BLD AUTO: 35.5 %
NEUTROPHILS NFR BLD AUTO: 67.6 %
PLATELET # BLD AUTO: 145 K/UL
PLATELET # BLD AUTO: 59 K/UL
RBC # BLD: 3.07 M/UL
RBC # BLD: 3.32 M/UL
RBC # FLD: 20.6 %
RBC # FLD: 21 %
WBC # FLD AUTO: 1.07 K/UL
WBC # FLD AUTO: 1.59 K/UL

## 2021-09-10 LAB
BASOPHILS # BLD AUTO: 0.01 K/UL
BASOPHILS NFR BLD AUTO: 1.4 %
EOSINOPHIL # BLD AUTO: 0.01 K/UL
EOSINOPHIL NFR BLD AUTO: 1.4 %
HCT VFR BLD CALC: 27.8 %
HGB BLD-MCNC: 9.3 G/DL
IMM GRANULOCYTES NFR BLD AUTO: 2.7 %
LYMPHOCYTES # BLD AUTO: 0.47 K/UL
LYMPHOCYTES NFR BLD AUTO: 63.5 %
MAN DIFF?: NORMAL
MCHC RBC-ENTMCNC: 33.2 PG
MCHC RBC-ENTMCNC: 33.5 GM/DL
MCV RBC AUTO: 99.3 FL
MONOCYTES # BLD AUTO: 0.06 K/UL
MONOCYTES NFR BLD AUTO: 8.1 %
NEUTROPHILS # BLD AUTO: 0.17 K/UL
NEUTROPHILS NFR BLD AUTO: 22.9 %
PLATELET # BLD AUTO: 26 K/UL
RBC # BLD: 2.8 M/UL
RBC # FLD: 19.9 %
WBC # FLD AUTO: 0.74 K/UL

## 2021-09-21 ENCOUNTER — NON-APPOINTMENT (OUTPATIENT)
Age: 81
End: 2021-09-21

## 2021-09-22 ENCOUNTER — OUTPATIENT (OUTPATIENT)
Dept: OUTPATIENT SERVICES | Facility: HOSPITAL | Age: 81
LOS: 1 days | Discharge: HOME | End: 2021-09-22

## 2021-09-22 ENCOUNTER — LABORATORY RESULT (OUTPATIENT)
Age: 81
End: 2021-09-22

## 2021-09-22 DIAGNOSIS — C92.00 ACUTE MYELOBLASTIC LEUKEMIA, NOT HAVING ACHIEVED REMISSION: ICD-10-CM

## 2021-09-22 DIAGNOSIS — Z90.710 ACQUIRED ABSENCE OF BOTH CERVIX AND UTERUS: Chronic | ICD-10-CM

## 2021-09-22 LAB
BASOPHILS # BLD AUTO: 0 K/UL
BASOPHILS NFR BLD AUTO: 0 %
EOSINOPHIL # BLD AUTO: 0 K/UL
EOSINOPHIL NFR BLD AUTO: 0 %
HCT VFR BLD CALC: 28.7 %
HGB BLD-MCNC: 8.9 G/DL
IMM GRANULOCYTES NFR BLD AUTO: 0 %
LYMPHOCYTES # BLD AUTO: 0.29 K/UL
LYMPHOCYTES NFR BLD AUTO: 82.9 %
MAN DIFF?: NORMAL
MCHC RBC-ENTMCNC: 31 GM/DL
MCHC RBC-ENTMCNC: 32.2 PG
MCV RBC AUTO: 104 FL
MONOCYTES # BLD AUTO: 0.02 K/UL
MONOCYTES NFR BLD AUTO: 5.7 %
NEUTROPHILS # BLD AUTO: 0.04 K/UL
NEUTROPHILS NFR BLD AUTO: NORMAL
PLATELET # BLD AUTO: 79 K/UL
RBC # BLD: 2.76 M/UL
RBC # FLD: 20.4 %
WBC # FLD AUTO: 0.35 K/UL

## 2021-09-24 LAB
ALBUMIN SERPL ELPH-MCNC: 4 G/DL
ALP BLD-CCNC: 89 U/L
ALT SERPL-CCNC: 11 U/L
ANION GAP SERPL CALC-SCNC: 11 MMOL/L
AST SERPL-CCNC: 15 U/L
BASOPHILS # BLD AUTO: 0 K/UL
BASOPHILS NFR BLD AUTO: 0 %
BILIRUB SERPL-MCNC: 0.2 MG/DL
BUN SERPL-MCNC: 13 MG/DL
CALCIUM SERPL-MCNC: 9.1 MG/DL
CHLORIDE SERPL-SCNC: 104 MMOL/L
CO2 SERPL-SCNC: 27 MMOL/L
CREAT SERPL-MCNC: 0.6 MG/DL
EOSINOPHIL # BLD AUTO: 0 K/UL
EOSINOPHIL NFR BLD AUTO: 0 %
GLUCOSE SERPL-MCNC: 92 MG/DL
HCT VFR BLD CALC: 30.1 %
HGB BLD-MCNC: 9.6 G/DL
LDH SERPL-CCNC: 223 U/L
LYMPHOCYTES # BLD AUTO: 0.48 K/UL
LYMPHOCYTES NFR BLD AUTO: 45.2 %
MAN DIFF?: NORMAL
MCHC RBC-ENTMCNC: 31.9 GM/DL
MCHC RBC-ENTMCNC: 32.3 PG
MCV RBC AUTO: 101.3 FL
MONOCYTES # BLD AUTO: 0.19 K/UL
MONOCYTES NFR BLD AUTO: 18.3 %
NEUTROPHILS # BLD AUTO: 0.38 K/UL
NEUTROPHILS NFR BLD AUTO: 35.6 %
PLATELET # BLD AUTO: 156 K/UL
POTASSIUM SERPL-SCNC: 3.8 MMOL/L
PROT SERPL-MCNC: 5.8 G/DL
RBC # BLD: 2.97 M/UL
RBC # FLD: 19.9 %
SODIUM SERPL-SCNC: 141 MMOL/L
WBC # FLD AUTO: 1.06 K/UL

## 2021-09-27 ENCOUNTER — APPOINTMENT (OUTPATIENT)
Dept: INFUSION THERAPY | Facility: HOSPITAL | Age: 81
End: 2021-09-27

## 2021-09-27 ENCOUNTER — RESULT REVIEW (OUTPATIENT)
Age: 81
End: 2021-09-27

## 2021-09-27 ENCOUNTER — LABORATORY RESULT (OUTPATIENT)
Age: 81
End: 2021-09-27

## 2021-09-27 ENCOUNTER — APPOINTMENT (OUTPATIENT)
Dept: HEMATOLOGY ONCOLOGY | Facility: CLINIC | Age: 81
End: 2021-09-27
Payer: MEDICARE

## 2021-09-27 VITALS
WEIGHT: 135.58 LBS | SYSTOLIC BLOOD PRESSURE: 134 MMHG | DIASTOLIC BLOOD PRESSURE: 79 MMHG | HEART RATE: 87 BPM | OXYGEN SATURATION: 98 % | RESPIRATION RATE: 18 BRPM | BODY MASS INDEX: 23.38 KG/M2 | TEMPERATURE: 97.1 F

## 2021-09-27 DIAGNOSIS — Z51.11 ENCOUNTER FOR ANTINEOPLASTIC CHEMOTHERAPY: ICD-10-CM

## 2021-09-27 LAB
BASOPHILS # BLD AUTO: 0.01 K/UL — SIGNIFICANT CHANGE UP (ref 0–0.2)
BASOPHILS NFR BLD AUTO: 0.4 % — SIGNIFICANT CHANGE UP (ref 0–2)
EOSINOPHIL # BLD AUTO: 0 K/UL — SIGNIFICANT CHANGE UP (ref 0–0.5)
EOSINOPHIL NFR BLD AUTO: 0 % — SIGNIFICANT CHANGE UP (ref 0–6)
HCT VFR BLD CALC: 31.9 % — LOW (ref 34.5–45)
HGB BLD-MCNC: 10.2 G/DL — LOW (ref 11.5–15.5)
IMM GRANULOCYTES NFR BLD AUTO: 0 % — SIGNIFICANT CHANGE UP (ref 0–1.5)
LYMPHOCYTES # BLD AUTO: 0.61 K/UL — LOW (ref 1–3.3)
LYMPHOCYTES # BLD AUTO: 25.6 % — SIGNIFICANT CHANGE UP (ref 13–44)
MCHC RBC-ENTMCNC: 32 G/DL — SIGNIFICANT CHANGE UP (ref 32–36)
MCHC RBC-ENTMCNC: 32.6 PG — SIGNIFICANT CHANGE UP (ref 27–34)
MCV RBC AUTO: 101.9 FL — HIGH (ref 80–100)
MONOCYTES # BLD AUTO: 0.46 K/UL — SIGNIFICANT CHANGE UP (ref 0–0.9)
MONOCYTES NFR BLD AUTO: 19.3 % — HIGH (ref 2–14)
NEUTROPHILS # BLD AUTO: 1.3 K/UL — LOW (ref 1.8–7.4)
NEUTROPHILS NFR BLD AUTO: 54.7 % — SIGNIFICANT CHANGE UP (ref 43–77)
NRBC # BLD: 0 /100 WBCS — SIGNIFICANT CHANGE UP (ref 0–0)
PLATELET # BLD AUTO: 133 K/UL — LOW (ref 150–400)
RBC # BLD: 3.13 M/UL — LOW (ref 3.8–5.2)
RBC # FLD: 19.9 % — HIGH (ref 10.3–14.5)
WBC # BLD: 2.38 K/UL — LOW (ref 3.8–10.5)
WBC # FLD AUTO: 2.38 K/UL — LOW (ref 3.8–10.5)

## 2021-09-27 PROCEDURE — 99213 OFFICE O/P EST LOW 20 MIN: CPT

## 2021-09-27 RX ORDER — OXYBUTYNIN CHLORIDE 5 MG/1
5 TABLET ORAL
Refills: 0 | Status: ACTIVE | COMMUNITY
Start: 2021-09-27

## 2021-09-28 ENCOUNTER — APPOINTMENT (OUTPATIENT)
Dept: INFUSION THERAPY | Facility: HOSPITAL | Age: 81
End: 2021-09-28

## 2021-09-29 ENCOUNTER — APPOINTMENT (OUTPATIENT)
Dept: INFUSION THERAPY | Facility: HOSPITAL | Age: 81
End: 2021-09-29

## 2021-09-29 LAB
ALBUMIN SERPL ELPH-MCNC: 4.1 G/DL
ALP BLD-CCNC: 128 U/L
ALT SERPL-CCNC: 15 U/L
ANION GAP SERPL CALC-SCNC: 14 MMOL/L
AST SERPL-CCNC: 17 U/L
BILIRUB SERPL-MCNC: 0.2 MG/DL
BUN SERPL-MCNC: 17 MG/DL
CALCIUM SERPL-MCNC: 9.4 MG/DL
CHLORIDE SERPL-SCNC: 104 MMOL/L
CO2 SERPL-SCNC: 25 MMOL/L
CREAT SERPL-MCNC: 0.68 MG/DL
GLUCOSE SERPL-MCNC: 100 MG/DL
LDH SERPL-CCNC: 229 U/L
POTASSIUM SERPL-SCNC: 3.7 MMOL/L
PROT SERPL-MCNC: 5.9 G/DL
SODIUM SERPL-SCNC: 143 MMOL/L

## 2021-09-30 ENCOUNTER — RESULT REVIEW (OUTPATIENT)
Age: 81
End: 2021-09-30

## 2021-09-30 ENCOUNTER — APPOINTMENT (OUTPATIENT)
Dept: INFUSION THERAPY | Facility: HOSPITAL | Age: 81
End: 2021-09-30

## 2021-09-30 LAB
ANISOCYTOSIS BLD QL: SLIGHT — SIGNIFICANT CHANGE UP
BASOPHILS # BLD AUTO: 0 K/UL — SIGNIFICANT CHANGE UP (ref 0–0.2)
BASOPHILS NFR BLD AUTO: 0 % — SIGNIFICANT CHANGE UP (ref 0–2)
DACRYOCYTES BLD QL SMEAR: SLIGHT — SIGNIFICANT CHANGE UP
EOSINOPHIL # BLD AUTO: 0 K/UL — SIGNIFICANT CHANGE UP (ref 0–0.5)
EOSINOPHIL NFR BLD AUTO: 0 % — SIGNIFICANT CHANGE UP (ref 0–6)
HCT VFR BLD CALC: 28.6 % — LOW (ref 34.5–45)
HGB BLD-MCNC: 9.3 G/DL — LOW (ref 11.5–15.5)
IMM GRANULOCYTES NFR BLD AUTO: 0.4 % — SIGNIFICANT CHANGE UP (ref 0–1.5)
LYMPHOCYTES # BLD AUTO: 0.36 K/UL — LOW (ref 1–3.3)
LYMPHOCYTES # BLD AUTO: 13.3 % — SIGNIFICANT CHANGE UP (ref 13–44)
MACROCYTES BLD QL: SLIGHT — SIGNIFICANT CHANGE UP
MCHC RBC-ENTMCNC: 32.4 PG — SIGNIFICANT CHANGE UP (ref 27–34)
MCHC RBC-ENTMCNC: 32.5 G/DL — SIGNIFICANT CHANGE UP (ref 32–36)
MCV RBC AUTO: 99.7 FL — SIGNIFICANT CHANGE UP (ref 80–100)
MICROCYTES BLD QL: SLIGHT — SIGNIFICANT CHANGE UP
MONOCYTES # BLD AUTO: 0.35 K/UL — SIGNIFICANT CHANGE UP (ref 0–0.9)
MONOCYTES NFR BLD AUTO: 12.9 % — SIGNIFICANT CHANGE UP (ref 2–14)
NEUTROPHILS # BLD AUTO: 1.99 K/UL — SIGNIFICANT CHANGE UP (ref 1.8–7.4)
NEUTROPHILS NFR BLD AUTO: 73.4 % — SIGNIFICANT CHANGE UP (ref 43–77)
NRBC # BLD: 0 /100 WBCS — SIGNIFICANT CHANGE UP (ref 0–0)
PLAT MORPH BLD: NORMAL — SIGNIFICANT CHANGE UP
PLATELET # BLD AUTO: 117 K/UL — LOW (ref 150–400)
POIKILOCYTOSIS BLD QL AUTO: SLIGHT — SIGNIFICANT CHANGE UP
RBC # BLD: 2.87 M/UL — LOW (ref 3.8–5.2)
RBC # FLD: 19.6 % — HIGH (ref 10.3–14.5)
RBC BLD AUTO: ABNORMAL
WBC # BLD: 2.71 K/UL — LOW (ref 3.8–10.5)
WBC # FLD AUTO: 2.71 K/UL — LOW (ref 3.8–10.5)

## 2021-10-01 ENCOUNTER — APPOINTMENT (OUTPATIENT)
Dept: INFUSION THERAPY | Facility: HOSPITAL | Age: 81
End: 2021-10-01

## 2021-10-01 NOTE — REVIEW OF SYSTEMS
[Fatigue] : fatigue [Joint Pain] : joint pain [Incontinence] : incontinence [Anxiety] : anxiety [Negative] : Heme/Lymph [Fever] : no fever [Chills] : no chills [Night Sweats] : no night sweats [Vision Problems] : no vision problems [Dysphagia] : no dysphagia [Nosebleeds] : no nosebleeds [Odynophagia] : no odynophagia [Dysuria] : no dysuria [Joint Stiffness] : no joint stiffness [Suicidal] : not suicidal [Insomnia] : no insomnia [Muscle Weakness] : no muscle weakness [FreeTextEntry2] : chronic fatigue per HPI [FreeTextEntry6] : can go up 1 flight of stairs - exertional fatigue but not dyspnea  [FreeTextEntry7] : no BRBPR. Colonoscopy -done > 1 yr ago, normal;  [FreeTextEntry8] : stress [FreeTextEntry9] : lower back pain chronic -s/p compressed vertebrae.  OK at this point.  [de-identified] : uses trazodone for sleep and she sleeps fine.  [FreeTextEntry1] : seasonal allergies

## 2021-10-01 NOTE — ASSESSMENT
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  Repeat bone marrow biopsy after cycle 8 showing evidence of persistent complete remission. Now starting cycle 18 and doing well. \par \par -Will continue with dacogen/venetoclax q5wks given troubles with cytopenias, along with OnPro (day 5).Today is cycle 18 day 1\par -Thrush on previous visit now resolved. She completed Nystatin suspn and continues on fluconazole.\par -Venetoclax daily, dosed at 200mg for 10 days with each cycle. \par -Not neutropenic so will hold Levaquin to continue when neutropenic, but will stay on diflucan continuously for stable venetoclax dosing effect. \par -will continue home draw at this time. Possible platelets if needed. \par -BM bx done on 2/3/20 showed normal cytogenetics. In house assay for FLT-3 ITD positive, which confers an adverse prognosis in AML - previously had been d/w patient and family about BMT transplant -pt active prior to admission, good PS status; however, she was not interested. \par -patient had severe lower back pain related to DDD and apparent pinched nerve has improved on low dose gabapentin 100 mg QHS, may have anxiolytic effect as well. This ran out but not experiencing pain right now so will not renew. \par -Previously offered patient psychological referral given reported depression, but she is refusing. Emotional support provided. She is improved.\par -Discussed plan with  and patient at length\par \par -Follow up in 1 month\par

## 2021-10-01 NOTE — HISTORY OF PRESENT ILLNESS
[Disease:__________________________] : Disease: [unfilled] [Therapy: ___] : Therapy: [unfilled] [Cycle: ___] : Cycle: [unfilled] [Day: ___] : Day: [unfilled] [de-identified] : Ms. 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 She has a history of Breast cancer 12 years ago treated with tamoxifen (no chemotherapy), s/p RT and lumpectomy, HTN and surgical hx of hysterectomy, was sent in by oncologist Dr. Blank on 1/31/20 for rule out leukemia. Patient had been feeling generally fatigued and sluggish since December 2019. She was feeling more tired, and saw her PMD, who did some blood work and noticed blasts in her peripheral blood. Pt was told to see Dr. Blank for further workup. Dr. Blank did blood work again which again found blasts in the peripheral blood, at which point she sent pt to the ER for leukemia workup. 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 She was started on 2/4/20 on Cycle 1 of Dacogen + Venetoclax (50 mg on day 1, 100 mg on day 2, 200 mg on day 3 and onward when starting Diflucan). She tolerated it well other than some SOB -CXR on 2/7/20 : mild pulmonary edema and L pleural effusion, treated with diuretics. She was discharged home on 2/11/20. \par \par She had a BM bx to eval response on 3/2/20 -showed no blasts. She has since been on maintenance treatments with Dacogen/venetoclax (10 days) every 5 weeks. \par  [de-identified] : Disseminated [de-identified] : 1, 2. Bone marrow biopsy and bone marrow aspirate\par - Acute myeloid leukemia with mutated NPM1\par - FLT 3-ITD positive\par \par Diagnostic Note:\par Megakarocytes are normal in number with dysplastic morphology.\par Please note findings of a normal female karyotype, negative MDS\par FISH panel. and somatic mutations of FLT3 FLT3-ITD\par ([id number]), [id number]*12, and CEBPA F106fs*1.\par Based on the additional findings, the classification of AML has\par changed to acute myeloid leukemia with mutated NPM1.\par  [de-identified] : Patient reports she feels tired overall, this is seeming to be her normal lately. She has a new hairline fracture of her fifth right metatarsal from 2 weeks ago when she woke up in the morning and quickly went to to get out of bed to use the bathroom and fell. She denies there was any LOC. Her foot has some increased swelling and bruising around her great toe since the incident. She is seeing an orthopedist who wrote for a boot however she has not been wearing it and she follows up in 2 weeks with them. She denies any other symptoms at this time.

## 2021-10-01 NOTE — PHYSICAL EXAM
[Restricted in physically strenuous activity but ambulatory and able to carry out work of a light or sedentary nature] : Status 1- Restricted in physically strenuous activity but ambulatory and able to carry out work of a light or sedentary nature, e.g., light house work, office work [Normal] : affect appropriate [de-identified] : supple [de-identified] : (+)S1S2 RRR [de-identified] : L sided Port -no inflammation. [de-identified] : no tenderness [de-identified] : warm/dry. resolving rash over mediport site from adhesive tape

## 2021-10-05 ENCOUNTER — LABORATORY RESULT (OUTPATIENT)
Age: 81
End: 2021-10-05

## 2021-10-08 ENCOUNTER — NON-APPOINTMENT (OUTPATIENT)
Age: 81
End: 2021-10-08

## 2021-10-13 ENCOUNTER — LABORATORY RESULT (OUTPATIENT)
Age: 81
End: 2021-10-13

## 2021-10-13 LAB
BASOPHILS # BLD AUTO: 0 K/UL
BASOPHILS NFR BLD AUTO: 0 %
EOSINOPHIL # BLD AUTO: 0 K/UL
EOSINOPHIL NFR BLD AUTO: 0 %
HCT VFR BLD CALC: 27.7 %
HGB BLD-MCNC: 8.8 G/DL
IMM GRANULOCYTES NFR BLD AUTO: 0.8 %
LYMPHOCYTES # BLD AUTO: 0.31 K/UL
LYMPHOCYTES NFR BLD AUTO: 24.4 %
MAN DIFF?: NORMAL
MCHC RBC-ENTMCNC: 31.8 GM/DL
MCHC RBC-ENTMCNC: 32.4 PG
MCV RBC AUTO: 101.8 FL
MONOCYTES # BLD AUTO: 0.1 K/UL
MONOCYTES NFR BLD AUTO: 7.9 %
NEUTROPHILS # BLD AUTO: 0.85 K/UL
NEUTROPHILS NFR BLD AUTO: 66.9 %
PLATELET # BLD AUTO: 56 K/UL
RBC # BLD: 2.72 M/UL
RBC # FLD: 19.7 %
WBC # FLD AUTO: 1.27 K/UL

## 2021-10-15 LAB
BASOPHILS # BLD AUTO: 0 K/UL
BASOPHILS NFR BLD AUTO: 0 %
EOSINOPHIL # BLD AUTO: 0 K/UL
EOSINOPHIL NFR BLD AUTO: 0 %
HCT VFR BLD CALC: 29.4 %
HGB BLD-MCNC: 9.3 G/DL
LYMPHOCYTES # BLD AUTO: 0.43 K/UL
LYMPHOCYTES NFR BLD AUTO: 57 %
MAN DIFF?: NORMAL
MCHC RBC-ENTMCNC: 31.6 GM/DL
MCHC RBC-ENTMCNC: 32.7 PG
MCV RBC AUTO: 103.5 FL
MONOCYTES # BLD AUTO: 0.06 K/UL
MONOCYTES NFR BLD AUTO: 8 %
NEUTROPHILS # BLD AUTO: 0.24 K/UL
NEUTROPHILS NFR BLD AUTO: 32 %
PLATELET # BLD AUTO: 16 K/UL
RBC # BLD: 2.84 M/UL
RBC # FLD: 20 %
WBC # FLD AUTO: 0.75 K/UL

## 2021-10-19 ENCOUNTER — LABORATORY RESULT (OUTPATIENT)
Age: 81
End: 2021-10-19

## 2021-10-21 LAB
BASOPHILS # BLD AUTO: 0 K/UL
BASOPHILS NFR BLD AUTO: 0 %
EOSINOPHIL # BLD AUTO: 0.02 K/UL
EOSINOPHIL NFR BLD AUTO: 2 %
HCT VFR BLD CALC: 31.4 %
HGB BLD-MCNC: 10 G/DL
LYMPHOCYTES # BLD AUTO: 0.63 K/UL
LYMPHOCYTES NFR BLD AUTO: 80 %
MAN DIFF?: YES
MCHC RBC-ENTMCNC: 31.8 GM/DL
MCHC RBC-ENTMCNC: 32.6 PG
MCV RBC AUTO: 102.3 FL
MONOCYTES # BLD AUTO: 0.08 K/UL
MONOCYTES NFR BLD AUTO: 10 %
NEUTROPHILS # BLD AUTO: 0.05 K/UL
NEUTROPHILS NFR BLD AUTO: 6 %
PLATELET # BLD AUTO: 124 K/UL
RBC # BLD: 3.07 M/UL
RBC # FLD: 20 %
WBC # FLD AUTO: 0.79 K/UL

## 2021-10-22 ENCOUNTER — NON-APPOINTMENT (OUTPATIENT)
Age: 81
End: 2021-10-22

## 2021-10-25 ENCOUNTER — OUTPATIENT (OUTPATIENT)
Dept: OUTPATIENT SERVICES | Facility: HOSPITAL | Age: 81
LOS: 1 days | Discharge: HOME | End: 2021-10-25

## 2021-10-25 DIAGNOSIS — Z90.710 ACQUIRED ABSENCE OF BOTH CERVIX AND UTERUS: Chronic | ICD-10-CM

## 2021-10-25 DIAGNOSIS — C92.00 ACUTE MYELOBLASTIC LEUKEMIA, NOT HAVING ACHIEVED REMISSION: ICD-10-CM

## 2021-10-26 ENCOUNTER — RESULT REVIEW (OUTPATIENT)
Age: 81
End: 2021-10-26

## 2021-10-26 ENCOUNTER — APPOINTMENT (OUTPATIENT)
Dept: HEMATOLOGY ONCOLOGY | Facility: CLINIC | Age: 81
End: 2021-10-26
Payer: MEDICARE

## 2021-10-26 VITALS
HEART RATE: 80 BPM | TEMPERATURE: 97.2 F | BODY MASS INDEX: 23.36 KG/M2 | WEIGHT: 135.14 LBS | DIASTOLIC BLOOD PRESSURE: 81 MMHG | SYSTOLIC BLOOD PRESSURE: 142 MMHG | RESPIRATION RATE: 17 BRPM | OXYGEN SATURATION: 98 % | HEIGHT: 63.86 IN

## 2021-10-26 LAB
ANISOCYTOSIS BLD QL: SLIGHT — SIGNIFICANT CHANGE UP
BASOPHILS # BLD AUTO: 0.01 K/UL — SIGNIFICANT CHANGE UP (ref 0–0.2)
BASOPHILS NFR BLD AUTO: 1 % — SIGNIFICANT CHANGE UP (ref 0–2)
DACRYOCYTES BLD QL SMEAR: SLIGHT — SIGNIFICANT CHANGE UP
EOSINOPHIL # BLD AUTO: 0 K/UL — SIGNIFICANT CHANGE UP (ref 0–0.5)
EOSINOPHIL NFR BLD AUTO: 0 % — SIGNIFICANT CHANGE UP (ref 0–6)
HCT VFR BLD CALC: 31.5 % — LOW (ref 34.5–45)
HGB BLD-MCNC: 10.2 G/DL — LOW (ref 11.5–15.5)
LYMPHOCYTES # BLD AUTO: 0.39 K/UL — LOW (ref 1–3.3)
LYMPHOCYTES # BLD AUTO: 43 % — SIGNIFICANT CHANGE UP (ref 13–44)
MCHC RBC-ENTMCNC: 32.4 G/DL — SIGNIFICANT CHANGE UP (ref 32–36)
MCHC RBC-ENTMCNC: 32.8 PG — SIGNIFICANT CHANGE UP (ref 27–34)
MCV RBC AUTO: 101.3 FL — HIGH (ref 80–100)
MICROCYTES BLD QL: SLIGHT — SIGNIFICANT CHANGE UP
MONOCYTES # BLD AUTO: 0.09 K/UL — SIGNIFICANT CHANGE UP (ref 0–0.9)
MONOCYTES NFR BLD AUTO: 10 % — SIGNIFICANT CHANGE UP (ref 2–14)
NEUTROPHILS # BLD AUTO: 0.42 K/UL — LOW (ref 1.8–7.4)
NEUTROPHILS NFR BLD AUTO: 46 % — SIGNIFICANT CHANGE UP (ref 43–77)
NRBC # BLD: 0 /100 — SIGNIFICANT CHANGE UP (ref 0–0)
NRBC # BLD: SIGNIFICANT CHANGE UP /100 WBCS (ref 0–0)
PLAT MORPH BLD: NORMAL — SIGNIFICANT CHANGE UP
PLATELET # BLD AUTO: 150 K/UL — SIGNIFICANT CHANGE UP (ref 150–400)
POIKILOCYTOSIS BLD QL AUTO: SLIGHT — SIGNIFICANT CHANGE UP
RBC # BLD: 3.11 M/UL — LOW (ref 3.8–5.2)
RBC # FLD: 20.3 % — HIGH (ref 10.3–14.5)
RBC BLD AUTO: SIGNIFICANT CHANGE UP
WBC # BLD: 0.91 K/UL — CRITICAL LOW (ref 3.8–10.5)
WBC # FLD AUTO: 0.91 K/UL — CRITICAL LOW (ref 3.8–10.5)

## 2021-10-26 PROCEDURE — 99214 OFFICE O/P EST MOD 30 MIN: CPT

## 2021-10-26 RX ORDER — SENNOSIDES 8.6 MG/1
8.6 CAPSULE, GELATIN COATED ORAL
Qty: 180 | Refills: 1 | Status: ACTIVE | COMMUNITY
Start: 2021-06-18 | End: 1900-01-01

## 2021-10-27 ENCOUNTER — LABORATORY RESULT (OUTPATIENT)
Age: 81
End: 2021-10-27

## 2021-10-29 ENCOUNTER — RX RENEWAL (OUTPATIENT)
Age: 81
End: 2021-10-29

## 2021-10-29 LAB
ALBUMIN SERPL ELPH-MCNC: 4.2 G/DL
ALP BLD-CCNC: 78 U/L
ALT SERPL-CCNC: 9 U/L
ANION GAP SERPL CALC-SCNC: 13 MMOL/L
AST SERPL-CCNC: 15 U/L
BILIRUB SERPL-MCNC: 0.2 MG/DL
BUN SERPL-MCNC: 14 MG/DL
CALCIUM SERPL-MCNC: 9.4 MG/DL
CHLORIDE SERPL-SCNC: 105 MMOL/L
CO2 SERPL-SCNC: 24 MMOL/L
CREAT SERPL-MCNC: 0.66 MG/DL
GLUCOSE SERPL-MCNC: 117 MG/DL
LDH SERPL-CCNC: 185 U/L
POTASSIUM SERPL-SCNC: 3.9 MMOL/L
PROT SERPL-MCNC: 6.3 G/DL
SODIUM SERPL-SCNC: 143 MMOL/L
URATE SERPL-MCNC: 3.7 MG/DL

## 2021-10-29 NOTE — HISTORY OF PRESENT ILLNESS
[Disease:__________________________] : Disease: [unfilled] [Therapy: ___] : Therapy: [unfilled] [Cycle: ___] : Cycle: [unfilled] [Day: ___] : Day: [unfilled] [de-identified] : Ms. 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 She has a history of Breast cancer 12 years ago treated with tamoxifen (no chemotherapy), s/p RT and lumpectomy, HTN and surgical hx of hysterectomy, was sent in by oncologist Dr. Blank on 1/31/20 for rule out leukemia. Patient had been feeling generally fatigued and sluggish since December 2019. She was feeling more tired, and saw her PMD, who did some blood work and noticed blasts in her peripheral blood. Pt was told to see Dr. Blank for further workup. Dr. Blank did blood work again which again found blasts in the peripheral blood, at which point she sent pt to the ER for leukemia workup. 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 She was started on 2/4/20 on Cycle 1 of Dacogen + Venetoclax (50 mg on day 1, 100 mg on day 2, 200 mg on day 3 and onward when starting Diflucan). She tolerated it well other than some SOB -CXR on 2/7/20 : mild pulmonary edema and L pleural effusion, treated with diuretics. She was discharged home on 2/11/20. \par \par She had a BM bx to eval response on 3/2/20 -showed no blasts. She has since been on maintenance treatments with Dacogen/venetoclax (10 days) every 5 weeks. \par  [de-identified] : Disseminated [de-identified] : 1, 2. Bone marrow biopsy and bone marrow aspirate\par - Acute myeloid leukemia with mutated NPM1\par - FLT 3-ITD positive\par \par Diagnostic Note:\par Megakarocytes are normal in number with dysplastic morphology.\par Please note findings of a normal female karyotype, negative MDS\par FISH panel. and somatic mutations of FLT3 FLT3-ITD\par ([id number]), [id number]*12, and CEBPA F106fs*1.\par Based on the additional findings, the classification of AML has\par changed to acute myeloid leukemia with mutated NPM1.\par  [de-identified] : Patient reports that she is feeling "tired" but not more than usual. She has been dealing with knee arthritis for years, its been bothering her but she doesn't take anything for it. Her hairline foot fracture hasn't been bothering her as much anymore. No shortness of breath, no chest pain or palpitations. Appetite is fair (same as usual), everything tastes different. Bowel habits and urinary habits are normal. Around a month and a half ago she had one episode of lightheadness that causes her to fall and fracture her foot. She did not lose consciousness and she didn't have any head trauma. No fevers or chills.

## 2021-10-29 NOTE — PHYSICAL EXAM
[Restricted in physically strenuous activity but ambulatory and able to carry out work of a light or sedentary nature] : Status 1- Restricted in physically strenuous activity but ambulatory and able to carry out work of a light or sedentary nature, e.g., light house work, office work [Normal] : affect appropriate [de-identified] : supple [de-identified] : (+)S1S2 RRR [de-identified] : L sided Port -no inflammation. [de-identified] : no tenderness [de-identified] : warm/dry. resolving rash over mediport site from adhesive tape

## 2021-10-29 NOTE — REVIEW OF SYSTEMS
[Fatigue] : fatigue [Incontinence] : incontinence [Joint Pain] : joint pain [Anxiety] : anxiety [Negative] : Heme/Lymph [Fever] : no fever [Chills] : no chills [Night Sweats] : no night sweats [Vision Problems] : no vision problems [Dysphagia] : no dysphagia [Nosebleeds] : no nosebleeds [Odynophagia] : no odynophagia [Dysuria] : no dysuria [Joint Stiffness] : no joint stiffness [Suicidal] : not suicidal [Insomnia] : no insomnia [Muscle Weakness] : no muscle weakness [FreeTextEntry2] : chronic fatigue per HPI [FreeTextEntry6] : can go up 1 flight of stairs - exertional fatigue but not dyspnea  [FreeTextEntry7] : no BRBPR. Colonoscopy -done > 1 yr ago, normal;  [FreeTextEntry8] : stress [FreeTextEntry9] : lower back pain chronic -s/p compressed vertebrae.  OK at this point.  [de-identified] : uses trazodone for sleep and she sleeps fine.  [FreeTextEntry1] : seasonal allergies

## 2021-10-29 NOTE — ASSESSMENT
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  Repeat bone marrow biopsy after cycle 8 showing evidence of persistent complete remission. Now at the end of cycle 18 and doing well. \par \par -Will continue with dacogen/venetoclax q5wks given troubles with cytopenias, along with OnPro (day 5).Today is cycle 18 day 30.\par -Thrush resolved but still with weird taste in her mouth. She completed Nystatin suspn and continues on fluconazole.\par -Venetoclax daily, dosed at 200mg for 10 days with each cycle. \par -Patient knows to take Levaquin when she is neutropenic, but will stay on diflucan continuously for stable venetoclax dosing effect. Not neutropenic today. \par -will continue home draw at this time. Possible platelets if needed. \par -BM bx done on 2/3/20 showed normal cytogenetics. In house assay for FLT-3 ITD positive, which confers an adverse prognosis in AML - previously had been d/w patient and family about BMT transplant -pt active prior to admission, good PS status; however, she was not interested. \par -patient had severe lower back pain related to DDD and apparent pinched nerve has improved on low dose gabapentin 100 mg QHS, may have anxiolytic effect as well. This ran out however patient doesn't want to reinitiate now. \par -Discussed plan with  and patient at length\par -Follow up in 1 month\par

## 2021-11-01 ENCOUNTER — RESULT REVIEW (OUTPATIENT)
Age: 81
End: 2021-11-01

## 2021-11-01 ENCOUNTER — APPOINTMENT (OUTPATIENT)
Dept: INFUSION THERAPY | Facility: HOSPITAL | Age: 81
End: 2021-11-01

## 2021-11-01 DIAGNOSIS — R11.2 NAUSEA WITH VOMITING, UNSPECIFIED: ICD-10-CM

## 2021-11-01 DIAGNOSIS — Z51.11 ENCOUNTER FOR ANTINEOPLASTIC CHEMOTHERAPY: ICD-10-CM

## 2021-11-01 LAB
ANISOCYTOSIS BLD QL: SLIGHT — SIGNIFICANT CHANGE UP
BASOPHILS # BLD AUTO: 0 K/UL — SIGNIFICANT CHANGE UP (ref 0–0.2)
BASOPHILS NFR BLD AUTO: 0 % — SIGNIFICANT CHANGE UP (ref 0–2)
DACRYOCYTES BLD QL SMEAR: SLIGHT — SIGNIFICANT CHANGE UP
ELLIPTOCYTES BLD QL SMEAR: SLIGHT — SIGNIFICANT CHANGE UP
EOSINOPHIL # BLD AUTO: 0 K/UL — SIGNIFICANT CHANGE UP (ref 0–0.5)
EOSINOPHIL NFR BLD AUTO: 0 % — SIGNIFICANT CHANGE UP (ref 0–6)
HCT VFR BLD CALC: 31.1 % — LOW (ref 34.5–45)
HGB BLD-MCNC: 10.3 G/DL — LOW (ref 11.5–15.5)
LYMPHOCYTES # BLD AUTO: 0.84 K/UL — LOW (ref 1–3.3)
LYMPHOCYTES # BLD AUTO: 34 % — SIGNIFICANT CHANGE UP (ref 13–44)
MCHC RBC-ENTMCNC: 32.8 PG — SIGNIFICANT CHANGE UP (ref 27–34)
MCHC RBC-ENTMCNC: 33.1 G/DL — SIGNIFICANT CHANGE UP (ref 32–36)
MCV RBC AUTO: 99 FL — SIGNIFICANT CHANGE UP (ref 80–100)
MONOCYTES # BLD AUTO: 0.47 K/UL — SIGNIFICANT CHANGE UP (ref 0–0.9)
MONOCYTES NFR BLD AUTO: 19 % — HIGH (ref 2–14)
NEUTROPHILS # BLD AUTO: 1.16 K/UL — LOW (ref 1.8–7.4)
NEUTROPHILS NFR BLD AUTO: 47 % — SIGNIFICANT CHANGE UP (ref 43–77)
NRBC # BLD: 0 /100 — SIGNIFICANT CHANGE UP (ref 0–0)
NRBC # BLD: SIGNIFICANT CHANGE UP /100 WBCS (ref 0–0)
PLAT MORPH BLD: NORMAL — SIGNIFICANT CHANGE UP
PLATELET # BLD AUTO: 120 K/UL — LOW (ref 150–400)
POIKILOCYTOSIS BLD QL AUTO: SLIGHT — SIGNIFICANT CHANGE UP
RBC # BLD: 3.14 M/UL — LOW (ref 3.8–5.2)
RBC # FLD: 20.5 % — HIGH (ref 10.3–14.5)
RBC BLD AUTO: ABNORMAL
WBC # BLD: 2.47 K/UL — LOW (ref 3.8–10.5)
WBC # FLD AUTO: 2.47 K/UL — LOW (ref 3.8–10.5)

## 2021-11-02 ENCOUNTER — APPOINTMENT (OUTPATIENT)
Dept: INFUSION THERAPY | Facility: HOSPITAL | Age: 81
End: 2021-11-02

## 2021-11-02 NOTE — PATIENT PROFILE ADULT - NSPROIMPLANTSMEDDEV_GEN_A_NUR
PAST MEDICAL HISTORY:  Anxiety     Hyperlipidemia     Hypertension     Memory difficulty     OA (osteoarthritis)        None

## 2021-11-03 ENCOUNTER — APPOINTMENT (OUTPATIENT)
Dept: INFUSION THERAPY | Facility: HOSPITAL | Age: 81
End: 2021-11-03

## 2021-11-04 ENCOUNTER — RESULT REVIEW (OUTPATIENT)
Age: 81
End: 2021-11-04

## 2021-11-04 ENCOUNTER — APPOINTMENT (OUTPATIENT)
Dept: INFUSION THERAPY | Facility: HOSPITAL | Age: 81
End: 2021-11-04

## 2021-11-04 LAB
ANISOCYTOSIS BLD QL: SLIGHT — SIGNIFICANT CHANGE UP
BASOPHILS # BLD AUTO: 0 K/UL — SIGNIFICANT CHANGE UP (ref 0–0.2)
BASOPHILS NFR BLD AUTO: 0 % — SIGNIFICANT CHANGE UP (ref 0–2)
DACRYOCYTES BLD QL SMEAR: SLIGHT — SIGNIFICANT CHANGE UP
ELLIPTOCYTES BLD QL SMEAR: SLIGHT — SIGNIFICANT CHANGE UP
EOSINOPHIL # BLD AUTO: 0 K/UL — SIGNIFICANT CHANGE UP (ref 0–0.5)
EOSINOPHIL NFR BLD AUTO: 0 % — SIGNIFICANT CHANGE UP (ref 0–6)
HCT VFR BLD CALC: 31.6 % — LOW (ref 34.5–45)
HGB BLD-MCNC: 10.5 G/DL — LOW (ref 11.5–15.5)
LYMPHOCYTES # BLD AUTO: 0.55 K/UL — LOW (ref 1–3.3)
LYMPHOCYTES # BLD AUTO: 23 % — SIGNIFICANT CHANGE UP (ref 13–44)
MCHC RBC-ENTMCNC: 32.6 PG — SIGNIFICANT CHANGE UP (ref 27–34)
MCHC RBC-ENTMCNC: 33.2 G/DL — SIGNIFICANT CHANGE UP (ref 32–36)
MCV RBC AUTO: 98.1 FL — SIGNIFICANT CHANGE UP (ref 80–100)
MONOCYTES # BLD AUTO: 0.33 K/UL — SIGNIFICANT CHANGE UP (ref 0–0.9)
MONOCYTES NFR BLD AUTO: 14 % — SIGNIFICANT CHANGE UP (ref 2–14)
NEUTROPHILS # BLD AUTO: 1.51 K/UL — LOW (ref 1.8–7.4)
NEUTROPHILS NFR BLD AUTO: 63 % — SIGNIFICANT CHANGE UP (ref 43–77)
NRBC # BLD: 0 /100 — SIGNIFICANT CHANGE UP (ref 0–0)
NRBC # BLD: SIGNIFICANT CHANGE UP /100 WBCS (ref 0–0)
PLAT MORPH BLD: NORMAL — SIGNIFICANT CHANGE UP
PLATELET # BLD AUTO: 112 K/UL — LOW (ref 150–400)
POIKILOCYTOSIS BLD QL AUTO: SLIGHT — SIGNIFICANT CHANGE UP
RBC # BLD: 3.22 M/UL — LOW (ref 3.8–5.2)
RBC # FLD: 20.5 % — HIGH (ref 10.3–14.5)
RBC BLD AUTO: ABNORMAL
WBC # BLD: 2.39 K/UL — LOW (ref 3.8–10.5)
WBC # FLD AUTO: 2.39 K/UL — LOW (ref 3.8–10.5)

## 2021-11-05 ENCOUNTER — APPOINTMENT (OUTPATIENT)
Dept: INFUSION THERAPY | Facility: HOSPITAL | Age: 81
End: 2021-11-05

## 2021-11-08 ENCOUNTER — RESULT REVIEW (OUTPATIENT)
Age: 81
End: 2021-11-08

## 2021-11-08 ENCOUNTER — APPOINTMENT (OUTPATIENT)
Dept: HEMATOLOGY ONCOLOGY | Facility: CLINIC | Age: 81
End: 2021-11-08
Payer: MEDICARE

## 2021-11-08 VITALS
WEIGHT: 132.72 LBS | TEMPERATURE: 97.6 F | DIASTOLIC BLOOD PRESSURE: 84 MMHG | HEART RATE: 77 BPM | RESPIRATION RATE: 16 BRPM | SYSTOLIC BLOOD PRESSURE: 153 MMHG | OXYGEN SATURATION: 94 % | BODY MASS INDEX: 22.88 KG/M2

## 2021-11-08 LAB
BASOPHILS # BLD AUTO: 0.01 K/UL — SIGNIFICANT CHANGE UP (ref 0–0.2)
BASOPHILS NFR BLD AUTO: 0.5 % — SIGNIFICANT CHANGE UP (ref 0–2)
EOSINOPHIL # BLD AUTO: 0 K/UL — SIGNIFICANT CHANGE UP (ref 0–0.5)
EOSINOPHIL NFR BLD AUTO: 0 % — SIGNIFICANT CHANGE UP (ref 0–6)
HCT VFR BLD CALC: 33.7 % — LOW (ref 34.5–45)
HGB BLD-MCNC: 10.7 G/DL — LOW (ref 11.5–15.5)
IMM GRANULOCYTES NFR BLD AUTO: 0.5 % — SIGNIFICANT CHANGE UP (ref 0–1.5)
LYMPHOCYTES # BLD AUTO: 0.49 K/UL — LOW (ref 1–3.3)
LYMPHOCYTES # BLD AUTO: 25.8 % — SIGNIFICANT CHANGE UP (ref 13–44)
MCHC RBC-ENTMCNC: 31.8 G/DL — LOW (ref 32–36)
MCHC RBC-ENTMCNC: 32.2 PG — SIGNIFICANT CHANGE UP (ref 27–34)
MCV RBC AUTO: 101.5 FL — HIGH (ref 80–100)
MONOCYTES # BLD AUTO: 0.2 K/UL — SIGNIFICANT CHANGE UP (ref 0–0.9)
MONOCYTES NFR BLD AUTO: 10.5 % — SIGNIFICANT CHANGE UP (ref 2–14)
NEUTROPHILS # BLD AUTO: 1.19 K/UL — LOW (ref 1.8–7.4)
NEUTROPHILS NFR BLD AUTO: 62.7 % — SIGNIFICANT CHANGE UP (ref 43–77)
NRBC # BLD: 0 /100 WBCS — SIGNIFICANT CHANGE UP (ref 0–0)
PLATELET # BLD AUTO: 91 K/UL — LOW (ref 150–400)
RBC # BLD: 3.32 M/UL — LOW (ref 3.8–5.2)
RBC # FLD: 20.4 % — HIGH (ref 10.3–14.5)
WBC # BLD: 1.9 K/UL — LOW (ref 3.8–10.5)
WBC # FLD AUTO: 1.9 K/UL — LOW (ref 3.8–10.5)

## 2021-11-08 PROCEDURE — 99213 OFFICE O/P EST LOW 20 MIN: CPT

## 2021-11-15 LAB
ALBUMIN SERPL ELPH-MCNC: 4.3 G/DL
ALP BLD-CCNC: 71 U/L
ALT SERPL-CCNC: 13 U/L
ANION GAP SERPL CALC-SCNC: 15 MMOL/L
AST SERPL-CCNC: 17 U/L
BILIRUB SERPL-MCNC: 0.3 MG/DL
BUN SERPL-MCNC: 14 MG/DL
CALCIUM SERPL-MCNC: 10 MG/DL
CHLORIDE SERPL-SCNC: 104 MMOL/L
CO2 SERPL-SCNC: 26 MMOL/L
CREAT SERPL-MCNC: 0.66 MG/DL
GLUCOSE SERPL-MCNC: 91 MG/DL
LDH SERPL-CCNC: 208 U/L
POTASSIUM SERPL-SCNC: 3.9 MMOL/L
PROT SERPL-MCNC: 6.3 G/DL
SODIUM SERPL-SCNC: 145 MMOL/L

## 2021-11-17 ENCOUNTER — LABORATORY RESULT (OUTPATIENT)
Age: 81
End: 2021-11-17

## 2021-11-18 ENCOUNTER — NON-APPOINTMENT (OUTPATIENT)
Age: 81
End: 2021-11-18

## 2021-11-26 NOTE — HISTORY OF PRESENT ILLNESS
[Disease:__________________________] : Disease: [unfilled] [Therapy: ___] : Therapy: [unfilled] [Day: ___] : Day: [unfilled] [Cycle: ___] : Cycle: [unfilled] [de-identified] : Ms. 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 She has a history of Breast cancer 12 years ago treated with tamoxifen (no chemotherapy), s/p RT and lumpectomy, HTN and surgical hx of hysterectomy, was sent in by oncologist Dr. Blank on 1/31/20 for rule out leukemia. Patient had been feeling generally fatigued and sluggish since December 2019. She was feeling more tired, and saw her PMD, who did some blood work and noticed blasts in her peripheral blood. Pt was told to see Dr. Blank for further workup. Dr. Blank did blood work again which again found blasts in the peripheral blood, at which point she sent pt to the ER for leukemia workup. 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 She was started on 2/4/20 on Cycle 1 of Dacogen + Venetoclax (50 mg on day 1, 100 mg on day 2, 200 mg on day 3 and onward when starting Diflucan). She tolerated it well other than some SOB -CXR on 2/7/20 : mild pulmonary edema and L pleural effusion, treated with diuretics. She was discharged home on 2/11/20. \par \par She had a BM bx to eval response on 3/2/20 -showed no blasts. She has since been on maintenance treatments with Dacogen/venetoclax (10 days) every 5 weeks. \par  [de-identified] : Disseminated [de-identified] : 1, 2. Bone marrow biopsy and bone marrow aspirate\par - Acute myeloid leukemia with mutated NPM1\par - FLT 3-ITD positive\par \par Diagnostic Note:\par Megakarocytes are normal in number with dysplastic morphology.\par Please note findings of a normal female karyotype, negative MDS\par FISH panel. and somatic mutations of FLT3 FLT3-ITD\par ([id number]), [id number]*12, and CEBPA F106fs*1.\par Based on the additional findings, the classification of AML has\par changed to acute myeloid leukemia with mutated NPM1.\par  [de-identified] : Patient reports that she is feeling "tired" but not more than usual. No shortness of breath, no chest pain or palpitations. Appetite is fair (same as usual), everything tastes different thinks it is due to chemo and fungal infection she previously completed nystatin for. Bowel habits and urinary habits are normal.  No fevers or chills. He  has been recently told he likely has prostate CA that was seen on MRI, he will be having a bx and scan they have been coping well and have a large support system.

## 2021-11-26 NOTE — PHYSICAL EXAM
[Restricted in physically strenuous activity but ambulatory and able to carry out work of a light or sedentary nature] : Status 1- Restricted in physically strenuous activity but ambulatory and able to carry out work of a light or sedentary nature, e.g., light house work, office work [Normal] : affect appropriate [Thrush] : thrush [Ulcers] : no ulcers [Mucositis] : no mucositis [Vesicles] : no vesicles [de-identified] : supple [de-identified] : (+)S1S2 RRR [de-identified] : L sided Port -no inflammation. [de-identified] : no tenderness [de-identified] : warm/dry.

## 2021-11-26 NOTE — ASSESSMENT
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  Repeat bone marrow biopsy after cycle 8 showing evidence of persistent complete remission. Now at the end of cycle 18 and doing well. \par \par -Will continue with dacogen/venetoclax q5wks given troubles with cytopenias, along with OnPro (day 5).Today is cycle 19 day 8.\par -Thrush previously resolved but today appears to have come back likely in setting of lower counts from chemo and still with weird taste in her mouth. She will restart Nystatin suspn and continues on fluconazole.\par -Venetoclax daily, dosed at 200mg for 10 days with each cycle. \par -Patient knows to take Levaquin when she is neutropenic, but will stay on diflucan continuously for stable venetoclax dosing effect. \par -will continue home draw at this time. Possible platelets if needed. \par -BM bx done on 2/3/20 showed normal cytogenetics. In house assay for FLT-3 ITD positive, which confers an adverse prognosis in AML - previously had been d/w patient and family about BMT transplant -pt active prior to admission, good PS status; however, she was not interested. \par -patient had severe lower back pain related to DDD and apparent pinched nerve has improved on low dose gabapentin 100 mg QHS, may have anxiolytic effect as well. This ran out however patient doesn't want to reinitiate now. \par -Discussed plan with  and patient at length, support services also discussed if needed in the future given husbands possible CA dx\par -Follow up in 1 month\par

## 2021-11-26 NOTE — REVIEW OF SYSTEMS
[Fatigue] : fatigue [Incontinence] : incontinence [Joint Pain] : joint pain [Anxiety] : anxiety [Negative] : Heme/Lymph [Constipation] : constipation [Fever] : no fever [Chills] : no chills [Night Sweats] : no night sweats [Vision Problems] : no vision problems [Dysphagia] : no dysphagia [Nosebleeds] : no nosebleeds [Odynophagia] : no odynophagia [Dysuria] : no dysuria [Joint Stiffness] : no joint stiffness [Suicidal] : not suicidal [Insomnia] : no insomnia [Muscle Weakness] : no muscle weakness [FreeTextEntry2] : chronic fatigue per HPI [FreeTextEntry6] : can go up 1 flight of stairs - exertional fatigue but not dyspnea  [FreeTextEntry7] : no BRBPR. Colonoscopy -done > 1 yr ago, normal; intermittent constipation [FreeTextEntry8] : stress [FreeTextEntry9] : lower back pain chronic -s/p compressed vertebrae.  OK at this point.  [de-identified] : uses trazodone for sleep and she sleeps fine.  [FreeTextEntry1] : seasonal allergies

## 2021-11-29 ENCOUNTER — LABORATORY RESULT (OUTPATIENT)
Age: 81
End: 2021-11-29

## 2021-11-30 ENCOUNTER — RX RENEWAL (OUTPATIENT)
Age: 81
End: 2021-11-30

## 2021-12-01 ENCOUNTER — NON-APPOINTMENT (OUTPATIENT)
Age: 81
End: 2021-12-01

## 2021-12-03 ENCOUNTER — OUTPATIENT (OUTPATIENT)
Dept: OUTPATIENT SERVICES | Facility: HOSPITAL | Age: 81
LOS: 1 days | Discharge: HOME | End: 2021-12-03

## 2021-12-03 DIAGNOSIS — Z90.710 ACQUIRED ABSENCE OF BOTH CERVIX AND UTERUS: Chronic | ICD-10-CM

## 2021-12-03 DIAGNOSIS — C92.00 ACUTE MYELOBLASTIC LEUKEMIA, NOT HAVING ACHIEVED REMISSION: ICD-10-CM

## 2021-12-03 LAB
ALBUMIN SERPL ELPH-MCNC: 4 G/DL
ALP BLD-CCNC: 65 U/L
ALT SERPL-CCNC: 9 U/L
ANION GAP SERPL CALC-SCNC: 11 MMOL/L
AST SERPL-CCNC: 12 U/L
BASOPHILS # BLD AUTO: 0.01 K/UL
BASOPHILS NFR BLD AUTO: 1.7 %
BILIRUB SERPL-MCNC: 0.2 MG/DL
BUN SERPL-MCNC: 15 MG/DL
CALCIUM SERPL-MCNC: 9.3 MG/DL
CHLORIDE SERPL-SCNC: 105 MMOL/L
CO2 SERPL-SCNC: 27 MMOL/L
CREAT SERPL-MCNC: 0.65 MG/DL
EOSINOPHIL # BLD AUTO: 0 K/UL
EOSINOPHIL NFR BLD AUTO: 0 %
GLUCOSE SERPL-MCNC: 122 MG/DL
HCT VFR BLD CALC: 32.1 %
HGB BLD-MCNC: 10.4 G/DL
IMM GRANULOCYTES NFR BLD AUTO: 0 %
LDH SERPL-CCNC: 186 U/L
LYMPHOCYTES # BLD AUTO: 0.38 K/UL
LYMPHOCYTES NFR BLD AUTO: 63.3 %
MAN DIFF?: NORMAL
MCHC RBC-ENTMCNC: 32.4 GM/DL
MCHC RBC-ENTMCNC: 33 PG
MCV RBC AUTO: 101.9 FL
MONOCYTES # BLD AUTO: 0.03 K/UL
MONOCYTES NFR BLD AUTO: 5 %
NEUTROPHILS # BLD AUTO: 0.18 K/UL
NEUTROPHILS NFR BLD AUTO: 30 %
PLATELET # BLD AUTO: 156 K/UL
POTASSIUM SERPL-SCNC: 4 MMOL/L
PROT SERPL-MCNC: 5.9 G/DL
RBC # BLD: 3.15 M/UL
RBC # FLD: 21 %
SODIUM SERPL-SCNC: 143 MMOL/L
WBC # FLD AUTO: 0.6 K/UL

## 2021-12-06 ENCOUNTER — RESULT REVIEW (OUTPATIENT)
Age: 81
End: 2021-12-06

## 2021-12-06 ENCOUNTER — LABORATORY RESULT (OUTPATIENT)
Age: 81
End: 2021-12-06

## 2021-12-06 ENCOUNTER — APPOINTMENT (OUTPATIENT)
Dept: HEMATOLOGY ONCOLOGY | Facility: CLINIC | Age: 81
End: 2021-12-06
Payer: MEDICARE

## 2021-12-06 ENCOUNTER — APPOINTMENT (OUTPATIENT)
Dept: INFUSION THERAPY | Facility: HOSPITAL | Age: 81
End: 2021-12-06

## 2021-12-06 VITALS
WEIGHT: 134.48 LBS | SYSTOLIC BLOOD PRESSURE: 137 MMHG | BODY MASS INDEX: 23.24 KG/M2 | HEIGHT: 63.86 IN | RESPIRATION RATE: 17 BRPM | HEART RATE: 89 BPM | DIASTOLIC BLOOD PRESSURE: 77 MMHG | OXYGEN SATURATION: 95 % | TEMPERATURE: 97.7 F

## 2021-12-06 DIAGNOSIS — Z51.11 ENCOUNTER FOR ANTINEOPLASTIC CHEMOTHERAPY: ICD-10-CM

## 2021-12-06 LAB
BASOPHILS # BLD AUTO: 0.01 K/UL — SIGNIFICANT CHANGE UP (ref 0–0.2)
BASOPHILS NFR BLD AUTO: 0.4 % — SIGNIFICANT CHANGE UP (ref 0–2)
EOSINOPHIL # BLD AUTO: 0 K/UL — SIGNIFICANT CHANGE UP (ref 0–0.5)
EOSINOPHIL NFR BLD AUTO: 0 % — SIGNIFICANT CHANGE UP (ref 0–6)
HCT VFR BLD CALC: 32.9 % — LOW (ref 34.5–45)
HGB BLD-MCNC: 10.8 G/DL — LOW (ref 11.5–15.5)
IMM GRANULOCYTES NFR BLD AUTO: 0 % — SIGNIFICANT CHANGE UP (ref 0–1.5)
LYMPHOCYTES # BLD AUTO: 0.89 K/UL — LOW (ref 1–3.3)
LYMPHOCYTES # BLD AUTO: 35.7 % — SIGNIFICANT CHANGE UP (ref 13–44)
MCHC RBC-ENTMCNC: 32.3 PG — SIGNIFICANT CHANGE UP (ref 27–34)
MCHC RBC-ENTMCNC: 32.8 G/DL — SIGNIFICANT CHANGE UP (ref 32–36)
MCV RBC AUTO: 98.5 FL — SIGNIFICANT CHANGE UP (ref 80–100)
MONOCYTES # BLD AUTO: 0.62 K/UL — SIGNIFICANT CHANGE UP (ref 0–0.9)
MONOCYTES NFR BLD AUTO: 24.9 % — HIGH (ref 2–14)
NEUTROPHILS # BLD AUTO: 0.97 K/UL — LOW (ref 1.8–7.4)
NEUTROPHILS NFR BLD AUTO: 39 % — LOW (ref 43–77)
NRBC # BLD: 0 /100 WBCS — SIGNIFICANT CHANGE UP (ref 0–0)
PLATELET # BLD AUTO: 135 K/UL — LOW (ref 150–400)
RBC # BLD: 3.34 M/UL — LOW (ref 3.8–5.2)
RBC # FLD: 21 % — HIGH (ref 10.3–14.5)
WBC # BLD: 2.49 K/UL — LOW (ref 3.8–10.5)
WBC # FLD AUTO: 2.49 K/UL — LOW (ref 3.8–10.5)

## 2021-12-06 PROCEDURE — 99214 OFFICE O/P EST MOD 30 MIN: CPT

## 2021-12-07 ENCOUNTER — APPOINTMENT (OUTPATIENT)
Dept: INFUSION THERAPY | Facility: HOSPITAL | Age: 81
End: 2021-12-07

## 2021-12-08 ENCOUNTER — APPOINTMENT (OUTPATIENT)
Dept: INFUSION THERAPY | Facility: HOSPITAL | Age: 81
End: 2021-12-08

## 2021-12-08 LAB
ALBUMIN SERPL ELPH-MCNC: 4.4 G/DL
ALP BLD-CCNC: 71 U/L
ALT SERPL-CCNC: 16 U/L
ANION GAP SERPL CALC-SCNC: 16 MMOL/L
AST SERPL-CCNC: 19 U/L
BILIRUB SERPL-MCNC: 0.3 MG/DL
BUN SERPL-MCNC: 14 MG/DL
CALCIUM SERPL-MCNC: 9.4 MG/DL
CHLORIDE SERPL-SCNC: 99 MMOL/L
CO2 SERPL-SCNC: 24 MMOL/L
CREAT SERPL-MCNC: 0.79 MG/DL
GLUCOSE SERPL-MCNC: 110 MG/DL
LDH SERPL-CCNC: 206 U/L
POTASSIUM SERPL-SCNC: 3.3 MMOL/L
PROT SERPL-MCNC: 6.4 G/DL
SODIUM SERPL-SCNC: 139 MMOL/L

## 2021-12-09 ENCOUNTER — RESULT REVIEW (OUTPATIENT)
Age: 81
End: 2021-12-09

## 2021-12-09 ENCOUNTER — NON-APPOINTMENT (OUTPATIENT)
Age: 81
End: 2021-12-09

## 2021-12-09 ENCOUNTER — APPOINTMENT (OUTPATIENT)
Dept: INFUSION THERAPY | Facility: HOSPITAL | Age: 81
End: 2021-12-09

## 2021-12-09 LAB
ANISOCYTOSIS BLD QL: SLIGHT — SIGNIFICANT CHANGE UP
BASOPHILS # BLD AUTO: 0 K/UL — SIGNIFICANT CHANGE UP (ref 0–0.2)
BASOPHILS NFR BLD AUTO: 0 % — SIGNIFICANT CHANGE UP (ref 0–2)
EOSINOPHIL # BLD AUTO: 0 K/UL — SIGNIFICANT CHANGE UP (ref 0–0.5)
EOSINOPHIL NFR BLD AUTO: 0 % — SIGNIFICANT CHANGE UP (ref 0–6)
HCT VFR BLD CALC: 31.4 % — LOW (ref 34.5–45)
HGB BLD-MCNC: 10.2 G/DL — LOW (ref 11.5–15.5)
LYMPHOCYTES # BLD AUTO: 0.34 K/UL — LOW (ref 1–3.3)
LYMPHOCYTES # BLD AUTO: 16 % — SIGNIFICANT CHANGE UP (ref 13–44)
LYMPHOCYTES # SPEC AUTO: 1 % — HIGH (ref 0–0)
MACROCYTES BLD QL: SLIGHT — SIGNIFICANT CHANGE UP
MCHC RBC-ENTMCNC: 32.4 PG — SIGNIFICANT CHANGE UP (ref 27–34)
MCHC RBC-ENTMCNC: 32.5 G/DL — SIGNIFICANT CHANGE UP (ref 32–36)
MCV RBC AUTO: 99.7 FL — SIGNIFICANT CHANGE UP (ref 80–100)
MICROCYTES BLD QL: SLIGHT — SIGNIFICANT CHANGE UP
MONOCYTES # BLD AUTO: 0.3 K/UL — SIGNIFICANT CHANGE UP (ref 0–0.9)
MONOCYTES NFR BLD AUTO: 14 % — SIGNIFICANT CHANGE UP (ref 2–14)
NEUTROPHILS # BLD AUTO: 1.48 K/UL — LOW (ref 1.8–7.4)
NEUTROPHILS NFR BLD AUTO: 69 % — SIGNIFICANT CHANGE UP (ref 43–77)
NRBC # BLD: 0 /100 — SIGNIFICANT CHANGE UP (ref 0–0)
NRBC # BLD: SIGNIFICANT CHANGE UP /100 WBCS (ref 0–0)
OVALOCYTES BLD QL SMEAR: SLIGHT — SIGNIFICANT CHANGE UP
PLAT MORPH BLD: NORMAL — SIGNIFICANT CHANGE UP
PLATELET # BLD AUTO: 118 K/UL — LOW (ref 150–400)
POIKILOCYTOSIS BLD QL AUTO: SLIGHT — SIGNIFICANT CHANGE UP
RBC # BLD: 3.15 M/UL — LOW (ref 3.8–5.2)
RBC # FLD: 20.4 % — HIGH (ref 10.3–14.5)
RBC BLD AUTO: SIGNIFICANT CHANGE UP
WBC # BLD: 2.14 K/UL — LOW (ref 3.8–10.5)
WBC # FLD AUTO: 2.14 K/UL — LOW (ref 3.8–10.5)

## 2021-12-10 ENCOUNTER — APPOINTMENT (OUTPATIENT)
Dept: INFUSION THERAPY | Facility: HOSPITAL | Age: 81
End: 2021-12-10

## 2021-12-11 DIAGNOSIS — Z51.89 ENCOUNTER FOR OTHER SPECIFIED AFTERCARE: ICD-10-CM

## 2021-12-14 ENCOUNTER — LABORATORY RESULT (OUTPATIENT)
Age: 81
End: 2021-12-14

## 2021-12-14 RX ORDER — VENETOCLAX 100 MG/1
100 TABLET, FILM COATED ORAL DAILY
Qty: 60 | Refills: 3 | Status: ACTIVE | COMMUNITY
Start: 2020-02-11 | End: 1900-01-01

## 2021-12-15 ENCOUNTER — NON-APPOINTMENT (OUTPATIENT)
Age: 81
End: 2021-12-15

## 2021-12-16 ENCOUNTER — NON-APPOINTMENT (OUTPATIENT)
Age: 81
End: 2021-12-16

## 2021-12-22 NOTE — ED PROVIDER NOTE - ATTENDING CONTRIBUTION TO CARE
82 yo white female 2-weeks s/p last CTx for Leukemia who was sent here today by Heme/Onc for low platelet count with no signs of any bleeding. No fever or chills. Exam revealed elderly white female in NAD with clear lungs, normal heart sounds and benign and non tender abdomen. Patient will require evaluation, labs and transfusion of platelet pack. I agree with plan and management outlined by PA.

## 2021-12-22 NOTE — ED PROVIDER NOTE - PHYSICAL EXAMINATION
Constitutional: Awake, Alert, non-toxic. NAD. Well appearing, well nourished.   HEAD: Normocephalic, atraumatic.   EYES: EOM intact, conjunctiva and sclera are clear bilaterally.   ENT: No rhinorrhea, patent, mucous membranes pink/moist, no drooling or stridor.   NECK: Supple, non-tender  CARDIOVASCULAR: Normal S1, S2; regular rate and rhythm.  RESPIRATORY: Normal respiratory effort; breath sounds CTAB, no wheezes, rhonchi, or rales. Speaking in full sentences. No accessory muscle use.   ABDOMEN: Soft; non-tender, non-distended.   EXTREMITIES: Full passive and active ROM in all extremities; non-tender to palpation; distal pulses palpable and symmetric  SKIN: Warm, dry; good skin turgor, no apparent lesions or rashes, no petechiae or purpura. no ecchymosis, brisk capillary refill.  NEURO: A&O x3. Sensory and motor functions are grossly intact. Speech is normal. Appearance and judgement seem appropriate for gender and age.

## 2021-12-22 NOTE — ED PROVIDER NOTE - CARE PROVIDER_API CALL
Mahad Sapp  HEMATOLOGY  [address] [facility], [address] [place] [address]  Phone: [phone]  Fax: [phone]  Follow Up Time: 1-3 Days

## 2021-12-22 NOTE — ED PROVIDER NOTE - PATIENT PORTAL LINK FT
You can access the FollowMyHealth Patient Portal offered by [facility] by registering at the following website: [email or web address]. By joining [facility]’s FollowMyHealth portal, you will also be able to view your health information using other applications (apps) compatible with our system.

## 2021-12-22 NOTE — ED PROVIDER NOTE - PROGRESS NOTE DETAILS
Discussed with Dr. Salcedo covering oncology Dr. Goldberg, advised patient is on prophylactic Levaquin. advised to provide platelet transfusion and patient may be discharged if afebrile. Reevaluated patient at bedside. Patient notes improvement of symptoms. Discussed results with the patient and provided copies.  All questions were answered. Discussed the importance of prompt, close medical follow-up. Patient will return with any changes, concerns or persistent/worsening symptoms.  Patient verbalized understanding.

## 2021-12-22 NOTE — ED ADULT NURSE NOTE - NSIMPLEMENTINTERV_GEN_ALL_ED
Implemented All Fall with Harm Risk Interventions:  [place] to call system. Call bell, personal items and telephone within reach. Instruct patient to call for assistance. Room bathroom lighting operational. Non-slip footwear when patient is off stretcher. Physically safe environment: no spills, clutter or unnecessary equipment. Stretcher in lowest position, wheels locked, appropriate side rails in place. Provide visual cue, wrist band, yellow gown, etc. Monitor gait and stability. Monitor for mental status changes and reorient to person, place, and time. Review medications for side effects contributing to fall risk. Reinforce activity limits and safety measures with patient and family. Provide visual clues: red socks.

## 2021-12-22 NOTE — ED PROVIDER NOTE - NSFOLLOWUPINSTRUCTIONS_ED_ALL_ED_FT
Follow up with your oncologist as soon as possible. Return for any bloody nose, fever, vomiting, headache, rash, etc.   Thrombocytopenia      Thrombocytopenia means that you have a low number of platelets in your blood. Platelets are tiny cells in the blood. When you bleed, they clump together at the cut or injury to stop the bleeding. This is called blood clotting. If you do not have enough platelets, it can cause bleeding problems. Some cases of this condition are mild while others are more severe.      What are the causes?  This condition may be caused by:•Your body not making enough platelets. This may be caused by:  •Your bone marrow not making blood cells (aplastic anemia).      •Cancer in the bone marrow.      •Certain medicines.      •Infection in the bone marrow.      •Drinking a lot of alcohol.      •Your body destroying platelets too quickly. This may be caused by:  •Certain immune diseases.      •Certain medicines.      •Certain blood clotting disorders.      •Certain disorders that are passed from parent to child (inherited).      •Certain bleeding disorders.      •Pregnancy.      •Having a spleen that is larger than normal.          What are the signs or symptoms?    •Bleeding that is not normal.      •Nosebleeds.      •Heavy menstrual periods.      •Blood in the pee (urine) or poop (stool).      •A purple-like color to the skin (purpura).      •Bruising.      •A rash that looks like pinpoint, purple-red spots (petechiae).        How is this treated?    •Treatment of another condition that is causing the low platelet count.      •Medicines to help protect your platelets from being destroyed.      •A replacement (transfusion) of platelets to stop or prevent bleeding.      •Surgery to remove the spleen.        Follow these instructions at home:    Activity     •Avoid activities that could cause you to get hurt or bruised. Follow instructions about how to prevent falls.    •Take care not to cut yourself:  •When you shave.      •When you use scissors, needles, knives, or other tools.      •Take care not to burn yourself:  •When you use an iron.      •When you cook.          General instructions      •Check your skin and the inside of your mouth for bruises or blood as told by your doctor.      •Check to see if there is blood in your spit (sputum), pee, and poop. Do this as told by your doctor.      • Do not drink alcohol.      •Take over-the-counter and prescription medicines only as told by your doctor.      • Do not take any medicines that have aspirin or NSAIDs in them. These medicines can thin your blood and cause you to bleed.      •Tell all of your doctors that you have this condition. Be sure to tell your dentist and eye doctor too.        Contact a doctor if:    •You have bruises and you do not know why.        Get help right away if:    •You are bleeding anywhere on your body.      •You have blood in your spit, pee, or poop.        Summary    •Thrombocytopenia means that you have a low number of platelets in your blood.      •Platelets are needed for blood clotting.      •Symptoms of this condition include bleeding that is not normal, and bruising.      •Take care not to cut or burn yourself.      This information is not intended to replace advice given to you by your health care provider. Make sure you discuss any questions you have with your health care provider.

## 2021-12-22 NOTE — ED PROVIDER NOTE - CLINICAL SUMMARY MEDICAL DECISION MAKING FREE TEXT BOX
sent by Oncologist Goldberg due to abnormal labs. Pt reports she had a platelet count of 5000. pt reports she had a platelet transfusion about 1.5 years ago. pt reports she is feeling well without acute complaints. plan includes labs, consider transfusion.

## 2021-12-22 NOTE — ED ADULT NURSE NOTE - OBJECTIVE STATEMENT
Patient states that she went for a check up at her oncology office and was sent to the ED for low H&H and Low platelet count. Patient denies any discomfort at this time

## 2021-12-24 NOTE — PATIENT PROFILE ADULT - DO YOU FEEL UNSAFE AT WORK?
History of BLANCO-2 status post colposcopy and biopsy.  Has had normal Pap smears and is due today so Pap smear obtained since we are doing a pelvic exam   not applicable

## 2022-01-01 ENCOUNTER — RESULT REVIEW (OUTPATIENT)
Age: 82
End: 2022-01-01

## 2022-01-01 ENCOUNTER — LABORATORY RESULT (OUTPATIENT)
Age: 82
End: 2022-01-01

## 2022-01-01 ENCOUNTER — OUTPATIENT (OUTPATIENT)
Dept: OUTPATIENT SERVICES | Facility: HOSPITAL | Age: 82
LOS: 1 days | Discharge: HOME | End: 2022-01-01

## 2022-01-01 ENCOUNTER — APPOINTMENT (OUTPATIENT)
Dept: INFUSION THERAPY | Facility: HOSPITAL | Age: 82
End: 2022-01-01

## 2022-01-01 ENCOUNTER — NON-APPOINTMENT (OUTPATIENT)
Age: 82
End: 2022-01-01

## 2022-01-01 ENCOUNTER — RX RENEWAL (OUTPATIENT)
Age: 82
End: 2022-01-01

## 2022-01-01 ENCOUNTER — APPOINTMENT (OUTPATIENT)
Dept: HEMATOLOGY ONCOLOGY | Facility: CLINIC | Age: 82
End: 2022-01-01

## 2022-01-01 ENCOUNTER — OUTPATIENT (OUTPATIENT)
Dept: OUTPATIENT SERVICES | Facility: HOSPITAL | Age: 82
LOS: 1 days | End: 2022-01-01
Payer: COMMERCIAL

## 2022-01-01 ENCOUNTER — APPOINTMENT (OUTPATIENT)
Dept: HEMATOLOGY ONCOLOGY | Facility: CLINIC | Age: 82
End: 2022-01-01
Payer: MEDICARE

## 2022-01-01 ENCOUNTER — APPOINTMENT (OUTPATIENT)
Dept: RADIOLOGY | Facility: CLINIC | Age: 82
End: 2022-01-01

## 2022-01-01 ENCOUNTER — APPOINTMENT (OUTPATIENT)
Dept: CV DIAGNOSTICS | Facility: HOSPITAL | Age: 82
End: 2022-01-01

## 2022-01-01 ENCOUNTER — INPATIENT (INPATIENT)
Facility: HOSPITAL | Age: 82
LOS: 40 days | DRG: 834 | End: 2022-12-18
Attending: FAMILY MEDICINE | Admitting: STUDENT IN AN ORGANIZED HEALTH CARE EDUCATION/TRAINING PROGRAM
Payer: COMMERCIAL

## 2022-01-01 VITALS
WEIGHT: 133.82 LBS | BODY MASS INDEX: 23.13 KG/M2 | SYSTOLIC BLOOD PRESSURE: 120 MMHG | HEIGHT: 63.86 IN | HEART RATE: 71 BPM | RESPIRATION RATE: 20 BRPM | DIASTOLIC BLOOD PRESSURE: 70 MMHG | TEMPERATURE: 96.4 F | OXYGEN SATURATION: 99 %

## 2022-01-01 VITALS
HEART RATE: 75 BPM | WEIGHT: 133.6 LBS | OXYGEN SATURATION: 99 % | DIASTOLIC BLOOD PRESSURE: 76 MMHG | BODY MASS INDEX: 23.03 KG/M2 | RESPIRATION RATE: 17 BRPM | TEMPERATURE: 97.2 F | SYSTOLIC BLOOD PRESSURE: 146 MMHG

## 2022-01-01 VITALS
RESPIRATION RATE: 16 BRPM | TEMPERATURE: 96.8 F | HEART RATE: 77 BPM | SYSTOLIC BLOOD PRESSURE: 133 MMHG | OXYGEN SATURATION: 98 % | DIASTOLIC BLOOD PRESSURE: 75 MMHG

## 2022-01-01 VITALS
OXYGEN SATURATION: 98 % | WEIGHT: 127.87 LBS | TEMPERATURE: 97.1 F | DIASTOLIC BLOOD PRESSURE: 73 MMHG | SYSTOLIC BLOOD PRESSURE: 133 MMHG | BODY MASS INDEX: 22.05 KG/M2 | HEART RATE: 79 BPM | RESPIRATION RATE: 18 BRPM

## 2022-01-01 VITALS
SYSTOLIC BLOOD PRESSURE: 67 MMHG | TEMPERATURE: 99 F | DIASTOLIC BLOOD PRESSURE: 38 MMHG | OXYGEN SATURATION: 91 % | HEART RATE: 101 BPM | RESPIRATION RATE: 20 BRPM

## 2022-01-01 VITALS
TEMPERATURE: 72.2 F | HEART RATE: 55 BPM | WEIGHT: 123 LBS | SYSTOLIC BLOOD PRESSURE: 149 MMHG | RESPIRATION RATE: 16 BRPM | OXYGEN SATURATION: 96 % | BODY MASS INDEX: 21.21 KG/M2 | DIASTOLIC BLOOD PRESSURE: 55 MMHG

## 2022-01-01 VITALS
RESPIRATION RATE: 17 BRPM | SYSTOLIC BLOOD PRESSURE: 162 MMHG | BODY MASS INDEX: 24.12 KG/M2 | HEART RATE: 67 BPM | OXYGEN SATURATION: 99 % | TEMPERATURE: 97.2 F | DIASTOLIC BLOOD PRESSURE: 79 MMHG | HEIGHT: 63.86 IN | WEIGHT: 139.55 LBS

## 2022-01-01 VITALS
HEART RATE: 89 BPM | RESPIRATION RATE: 18 BRPM | OXYGEN SATURATION: 96 % | TEMPERATURE: 99 F | SYSTOLIC BLOOD PRESSURE: 134 MMHG | DIASTOLIC BLOOD PRESSURE: 74 MMHG

## 2022-01-01 VITALS
DIASTOLIC BLOOD PRESSURE: 75 MMHG | WEIGHT: 130.07 LBS | OXYGEN SATURATION: 92 % | RESPIRATION RATE: 19 BRPM | HEART RATE: 82 BPM | BODY MASS INDEX: 22.43 KG/M2 | TEMPERATURE: 96.8 F | SYSTOLIC BLOOD PRESSURE: 143 MMHG

## 2022-01-01 DIAGNOSIS — Z90.710 ACQUIRED ABSENCE OF BOTH CERVIX AND UTERUS: Chronic | ICD-10-CM

## 2022-01-01 DIAGNOSIS — R09.02 HYPOXEMIA: ICD-10-CM

## 2022-01-01 DIAGNOSIS — C92.00 ACUTE MYELOBLASTIC LEUKEMIA, NOT HAVING ACHIEVED REMISSION: ICD-10-CM

## 2022-01-01 DIAGNOSIS — B99.9 UNSPECIFIED INFECTIOUS DISEASE: ICD-10-CM

## 2022-01-01 DIAGNOSIS — R11.2 NAUSEA WITH VOMITING, UNSPECIFIED: ICD-10-CM

## 2022-01-01 DIAGNOSIS — F32.A DEPRESSION, UNSPECIFIED: ICD-10-CM

## 2022-01-01 DIAGNOSIS — Z51.11 ENCOUNTER FOR ANTINEOPLASTIC CHEMOTHERAPY: ICD-10-CM

## 2022-01-01 DIAGNOSIS — R32 UNSPECIFIED URINARY INCONTINENCE: ICD-10-CM

## 2022-01-01 DIAGNOSIS — R41.0 DISORIENTATION, UNSPECIFIED: ICD-10-CM

## 2022-01-01 DIAGNOSIS — C92.02 ACUTE MYELOBLASTIC LEUKEMIA, IN RELAPSE: ICD-10-CM

## 2022-01-01 DIAGNOSIS — T45.1X5A ANTINEOPLASTIC CHEMOTHERAPY INDUCED PANCYTOPENIA: ICD-10-CM

## 2022-01-01 DIAGNOSIS — R52 PAIN, UNSPECIFIED: ICD-10-CM

## 2022-01-01 DIAGNOSIS — M79.642 PAIN IN LEFT HAND: ICD-10-CM

## 2022-01-01 DIAGNOSIS — D72.829 ELEVATED WHITE BLOOD CELL COUNT, UNSPECIFIED: ICD-10-CM

## 2022-01-01 DIAGNOSIS — I10 ESSENTIAL (PRIMARY) HYPERTENSION: ICD-10-CM

## 2022-01-01 DIAGNOSIS — R53.83 OTHER FATIGUE: ICD-10-CM

## 2022-01-01 DIAGNOSIS — G93.41 METABOLIC ENCEPHALOPATHY: ICD-10-CM

## 2022-01-01 DIAGNOSIS — Z29.9 ENCOUNTER FOR PROPHYLACTIC MEASURES, UNSPECIFIED: ICD-10-CM

## 2022-01-01 DIAGNOSIS — D64.9 ANEMIA, UNSPECIFIED: ICD-10-CM

## 2022-01-01 DIAGNOSIS — R06.00 DYSPNEA, UNSPECIFIED: ICD-10-CM

## 2022-01-01 DIAGNOSIS — Z51.5 ENCOUNTER FOR PALLIATIVE CARE: ICD-10-CM

## 2022-01-01 DIAGNOSIS — D61.810 ANTINEOPLASTIC CHEMOTHERAPY INDUCED PANCYTOPENIA: ICD-10-CM

## 2022-01-01 DIAGNOSIS — F41.8 OTHER SPECIFIED ANXIETY DISORDERS: ICD-10-CM

## 2022-01-01 DIAGNOSIS — T80.89XA OTHER COMPLICATIONS FOLLOWING INFUSION, TRANSFUSION AND THERAPEUTIC INJECTION, INITIAL ENCOUNTER: ICD-10-CM

## 2022-01-01 DIAGNOSIS — I50.9 HEART FAILURE, UNSPECIFIED: ICD-10-CM

## 2022-01-01 DIAGNOSIS — B37.0 CANDIDAL STOMATITIS: ICD-10-CM

## 2022-01-01 DIAGNOSIS — M54.50 LOW BACK PAIN, UNSPECIFIED: ICD-10-CM

## 2022-01-01 DIAGNOSIS — R07.9 CHEST PAIN, UNSPECIFIED: ICD-10-CM

## 2022-01-01 DIAGNOSIS — R53.2 FUNCTIONAL QUADRIPLEGIA: ICD-10-CM

## 2022-01-01 DIAGNOSIS — Z51.89 ENCOUNTER FOR OTHER SPECIFIED AFTERCARE: ICD-10-CM

## 2022-01-01 DIAGNOSIS — I35.0 NONRHEUMATIC AORTIC (VALVE) STENOSIS: ICD-10-CM

## 2022-01-01 DIAGNOSIS — R60.9 OTHER COMPLICATIONS FOLLOWING INFUSION, TRANSFUSION AND THERAPEUTIC INJECTION, INITIAL ENCOUNTER: ICD-10-CM

## 2022-01-01 DIAGNOSIS — Z71.89 OTHER SPECIFIED COUNSELING: ICD-10-CM

## 2022-01-01 DIAGNOSIS — K59.01 SLOW TRANSIT CONSTIPATION: ICD-10-CM

## 2022-01-01 DIAGNOSIS — R53.81 OTHER MALAISE: ICD-10-CM

## 2022-01-01 LAB
-  STAPHYLOCOCCUS EPIDERMIDIS, METHICILLIN RESISTANT: SIGNIFICANT CHANGE UP
ALBUMIN SERPL ELPH-MCNC: 2.4 G/DL — LOW (ref 3.3–5)
ALBUMIN SERPL ELPH-MCNC: 2.5 G/DL — LOW (ref 3.3–5)
ALBUMIN SERPL ELPH-MCNC: 2.6 G/DL — LOW (ref 3.3–5)
ALBUMIN SERPL ELPH-MCNC: 2.6 G/DL — LOW (ref 3.3–5)
ALBUMIN SERPL ELPH-MCNC: 2.7 G/DL — LOW (ref 3.3–5)
ALBUMIN SERPL ELPH-MCNC: 2.8 G/DL — LOW (ref 3.3–5)
ALBUMIN SERPL ELPH-MCNC: 2.9 G/DL — LOW (ref 3.3–5)
ALBUMIN SERPL ELPH-MCNC: 2.9 G/DL — LOW (ref 3.3–5)
ALBUMIN SERPL ELPH-MCNC: 3 G/DL — LOW (ref 3.3–5)
ALBUMIN SERPL ELPH-MCNC: 3 G/DL — LOW (ref 3.3–5)
ALBUMIN SERPL ELPH-MCNC: 3.1 G/DL — LOW (ref 3.3–5)
ALBUMIN SERPL ELPH-MCNC: 3.1 G/DL — LOW (ref 3.3–5)
ALBUMIN SERPL ELPH-MCNC: 3.6 G/DL
ALBUMIN SERPL ELPH-MCNC: 3.6 G/DL — SIGNIFICANT CHANGE UP (ref 3.3–5)
ALBUMIN SERPL ELPH-MCNC: 3.7 G/DL
ALBUMIN SERPL ELPH-MCNC: 3.7 G/DL — SIGNIFICANT CHANGE UP (ref 3.3–5)
ALBUMIN SERPL ELPH-MCNC: 3.8 G/DL — SIGNIFICANT CHANGE UP (ref 3.3–5)
ALBUMIN SERPL ELPH-MCNC: 3.9 G/DL
ALBUMIN SERPL ELPH-MCNC: 3.9 G/DL
ALBUMIN SERPL ELPH-MCNC: 3.9 G/DL — SIGNIFICANT CHANGE UP (ref 3.3–5)
ALBUMIN SERPL ELPH-MCNC: 3.9 G/DL — SIGNIFICANT CHANGE UP (ref 3.3–5)
ALBUMIN SERPL ELPH-MCNC: 4 G/DL
ALBUMIN SERPL ELPH-MCNC: 4.1 G/DL
ALBUMIN SERPL ELPH-MCNC: 4.1 G/DL — SIGNIFICANT CHANGE UP (ref 3.3–5)
ALBUMIN SERPL ELPH-MCNC: 4.2 G/DL
ALBUMIN SERPL ELPH-MCNC: 4.3 G/DL
ALBUMIN SERPL ELPH-MCNC: 4.3 G/DL
ALBUMIN SERPL ELPH-MCNC: 4.4 G/DL
ALBUMIN SERPL ELPH-MCNC: 4.6 G/DL
ALP BLD-CCNC: 59 U/L
ALP BLD-CCNC: 60 U/L
ALP BLD-CCNC: 61 U/L
ALP BLD-CCNC: 62 U/L
ALP BLD-CCNC: 62 U/L
ALP BLD-CCNC: 63 U/L
ALP BLD-CCNC: 64 U/L
ALP BLD-CCNC: 65 U/L
ALP BLD-CCNC: 66 U/L
ALP BLD-CCNC: 66 U/L
ALP BLD-CCNC: 67 U/L
ALP BLD-CCNC: 68 U/L
ALP BLD-CCNC: 68 U/L
ALP BLD-CCNC: 69 U/L
ALP BLD-CCNC: 70 U/L
ALP BLD-CCNC: 70 U/L
ALP BLD-CCNC: 71 U/L
ALP BLD-CCNC: 72 U/L
ALP BLD-CCNC: 74 U/L
ALP BLD-CCNC: 74 U/L
ALP BLD-CCNC: 76 U/L
ALP BLD-CCNC: 79 U/L
ALP SERPL-CCNC: 100 U/L — SIGNIFICANT CHANGE UP (ref 40–120)
ALP SERPL-CCNC: 100 U/L — SIGNIFICANT CHANGE UP (ref 40–120)
ALP SERPL-CCNC: 41 U/L — SIGNIFICANT CHANGE UP (ref 40–120)
ALP SERPL-CCNC: 42 U/L — SIGNIFICANT CHANGE UP (ref 40–120)
ALP SERPL-CCNC: 43 U/L — SIGNIFICANT CHANGE UP (ref 40–120)
ALP SERPL-CCNC: 45 U/L — SIGNIFICANT CHANGE UP (ref 40–120)
ALP SERPL-CCNC: 46 U/L — SIGNIFICANT CHANGE UP (ref 40–120)
ALP SERPL-CCNC: 48 U/L — SIGNIFICANT CHANGE UP (ref 40–120)
ALP SERPL-CCNC: 49 U/L — SIGNIFICANT CHANGE UP (ref 40–120)
ALP SERPL-CCNC: 49 U/L — SIGNIFICANT CHANGE UP (ref 40–120)
ALP SERPL-CCNC: 54 U/L — SIGNIFICANT CHANGE UP (ref 40–120)
ALP SERPL-CCNC: 56 U/L — SIGNIFICANT CHANGE UP (ref 40–120)
ALP SERPL-CCNC: 56 U/L — SIGNIFICANT CHANGE UP (ref 40–120)
ALP SERPL-CCNC: 62 U/L — SIGNIFICANT CHANGE UP (ref 40–120)
ALP SERPL-CCNC: 63 U/L — SIGNIFICANT CHANGE UP (ref 40–120)
ALP SERPL-CCNC: 63 U/L — SIGNIFICANT CHANGE UP (ref 40–120)
ALP SERPL-CCNC: 64 U/L — SIGNIFICANT CHANGE UP (ref 40–120)
ALP SERPL-CCNC: 64 U/L — SIGNIFICANT CHANGE UP (ref 40–120)
ALP SERPL-CCNC: 65 U/L — SIGNIFICANT CHANGE UP (ref 40–120)
ALP SERPL-CCNC: 66 U/L — SIGNIFICANT CHANGE UP (ref 40–120)
ALP SERPL-CCNC: 69 U/L — SIGNIFICANT CHANGE UP (ref 40–120)
ALP SERPL-CCNC: 69 U/L — SIGNIFICANT CHANGE UP (ref 40–120)
ALP SERPL-CCNC: 70 U/L — SIGNIFICANT CHANGE UP (ref 40–120)
ALP SERPL-CCNC: 73 U/L — SIGNIFICANT CHANGE UP (ref 40–120)
ALP SERPL-CCNC: 74 U/L — SIGNIFICANT CHANGE UP (ref 40–120)
ALP SERPL-CCNC: 75 U/L — SIGNIFICANT CHANGE UP (ref 40–120)
ALP SERPL-CCNC: 76 U/L — SIGNIFICANT CHANGE UP (ref 40–120)
ALP SERPL-CCNC: 76 U/L — SIGNIFICANT CHANGE UP (ref 40–120)
ALP SERPL-CCNC: 79 U/L — SIGNIFICANT CHANGE UP (ref 40–120)
ALP SERPL-CCNC: 82 U/L — SIGNIFICANT CHANGE UP (ref 40–120)
ALP SERPL-CCNC: 93 U/L — SIGNIFICANT CHANGE UP (ref 40–120)
ALP SERPL-CCNC: 93 U/L — SIGNIFICANT CHANGE UP (ref 40–120)
ALP SERPL-CCNC: 95 U/L — SIGNIFICANT CHANGE UP (ref 40–120)
ALP SERPL-CCNC: 98 U/L — SIGNIFICANT CHANGE UP (ref 40–120)
ALT FLD-CCNC: 12 U/L — SIGNIFICANT CHANGE UP (ref 10–45)
ALT FLD-CCNC: 12 U/L — SIGNIFICANT CHANGE UP (ref 10–45)
ALT FLD-CCNC: 14 U/L — SIGNIFICANT CHANGE UP (ref 10–45)
ALT FLD-CCNC: 17 U/L — SIGNIFICANT CHANGE UP (ref 10–45)
ALT FLD-CCNC: 18 U/L — SIGNIFICANT CHANGE UP (ref 10–45)
ALT FLD-CCNC: 19 U/L — SIGNIFICANT CHANGE UP (ref 10–45)
ALT FLD-CCNC: 20 U/L — SIGNIFICANT CHANGE UP (ref 10–45)
ALT FLD-CCNC: 21 U/L — SIGNIFICANT CHANGE UP (ref 10–45)
ALT FLD-CCNC: 23 U/L — SIGNIFICANT CHANGE UP (ref 10–45)
ALT FLD-CCNC: 23 U/L — SIGNIFICANT CHANGE UP (ref 10–45)
ALT FLD-CCNC: 24 U/L — SIGNIFICANT CHANGE UP (ref 10–45)
ALT FLD-CCNC: 25 U/L — SIGNIFICANT CHANGE UP (ref 10–45)
ALT FLD-CCNC: 26 U/L — SIGNIFICANT CHANGE UP (ref 10–45)
ALT FLD-CCNC: 27 U/L — SIGNIFICANT CHANGE UP (ref 10–45)
ALT FLD-CCNC: 27 U/L — SIGNIFICANT CHANGE UP (ref 10–45)
ALT FLD-CCNC: 29 U/L — SIGNIFICANT CHANGE UP (ref 10–45)
ALT FLD-CCNC: 30 U/L — SIGNIFICANT CHANGE UP (ref 10–45)
ALT FLD-CCNC: 30 U/L — SIGNIFICANT CHANGE UP (ref 10–45)
ALT FLD-CCNC: 32 U/L — SIGNIFICANT CHANGE UP (ref 10–45)
ALT FLD-CCNC: 35 U/L — SIGNIFICANT CHANGE UP (ref 10–45)
ALT FLD-CCNC: 36 U/L — SIGNIFICANT CHANGE UP (ref 10–45)
ALT FLD-CCNC: 41 U/L — SIGNIFICANT CHANGE UP (ref 10–45)
ALT FLD-CCNC: 50 U/L — HIGH (ref 10–45)
ALT FLD-CCNC: 8 U/L — LOW (ref 10–45)
ALT FLD-CCNC: 9 U/L — LOW (ref 10–45)
ALT SERPL-CCNC: 10 U/L
ALT SERPL-CCNC: 10 U/L
ALT SERPL-CCNC: 11 U/L
ALT SERPL-CCNC: 12 U/L
ALT SERPL-CCNC: 13 U/L
ALT SERPL-CCNC: 14 U/L
ALT SERPL-CCNC: 14 U/L
ALT SERPL-CCNC: 15 U/L
ALT SERPL-CCNC: 21 U/L
ALT SERPL-CCNC: 29 U/L
ALT SERPL-CCNC: 7 U/L
ALT SERPL-CCNC: 7 U/L
ALT SERPL-CCNC: 8 U/L
ALT SERPL-CCNC: 9 U/L
ANION GAP SERPL CALC-SCNC: 10 MMOL/L
ANION GAP SERPL CALC-SCNC: 10 MMOL/L — SIGNIFICANT CHANGE UP (ref 5–17)
ANION GAP SERPL CALC-SCNC: 11 MMOL/L
ANION GAP SERPL CALC-SCNC: 11 MMOL/L
ANION GAP SERPL CALC-SCNC: 11 MMOL/L — SIGNIFICANT CHANGE UP (ref 5–17)
ANION GAP SERPL CALC-SCNC: 12 MMOL/L
ANION GAP SERPL CALC-SCNC: 12 MMOL/L — SIGNIFICANT CHANGE UP (ref 5–17)
ANION GAP SERPL CALC-SCNC: 13 MMOL/L
ANION GAP SERPL CALC-SCNC: 13 MMOL/L — SIGNIFICANT CHANGE UP (ref 5–17)
ANION GAP SERPL CALC-SCNC: 13 MMOL/L — SIGNIFICANT CHANGE UP (ref 5–17)
ANION GAP SERPL CALC-SCNC: 14 MMOL/L
ANION GAP SERPL CALC-SCNC: 15 MMOL/L
ANION GAP SERPL CALC-SCNC: 15 MMOL/L
ANION GAP SERPL CALC-SCNC: 16 MMOL/L
ANION GAP SERPL CALC-SCNC: 16 MMOL/L
ANION GAP SERPL CALC-SCNC: 4 MMOL/L — LOW (ref 5–17)
ANION GAP SERPL CALC-SCNC: 6 MMOL/L — SIGNIFICANT CHANGE UP (ref 5–17)
ANION GAP SERPL CALC-SCNC: 7 MMOL/L — SIGNIFICANT CHANGE UP (ref 5–17)
ANION GAP SERPL CALC-SCNC: 8 MMOL/L
ANION GAP SERPL CALC-SCNC: 8 MMOL/L — SIGNIFICANT CHANGE UP (ref 5–17)
ANION GAP SERPL CALC-SCNC: 9 MMOL/L
ANION GAP SERPL CALC-SCNC: 9 MMOL/L
ANION GAP SERPL CALC-SCNC: 9 MMOL/L — SIGNIFICANT CHANGE UP (ref 5–17)
ANISOCYTOSIS BLD QL: SLIGHT — SIGNIFICANT CHANGE UP
APPEARANCE UR: CLEAR — SIGNIFICANT CHANGE UP
APPEARANCE UR: CLEAR — SIGNIFICANT CHANGE UP
APTT BLD: 23.3 SEC — LOW (ref 27.5–35.5)
APTT BLD: 24.6 SEC — LOW (ref 27.5–35.5)
APTT BLD: 25 SEC — LOW (ref 27.5–35.5)
APTT BLD: 25 SEC — LOW (ref 27.5–35.5)
APTT BLD: 25.1 SEC — LOW (ref 27.5–35.5)
APTT BLD: 25.2 SEC — LOW (ref 27.5–35.5)
APTT BLD: 25.5 SEC — LOW (ref 27.5–35.5)
APTT BLD: 25.6 SEC — LOW (ref 27.5–35.5)
APTT BLD: 25.7 SEC — LOW (ref 27.5–35.5)
APTT BLD: 25.8 SEC — LOW (ref 27.5–35.5)
APTT BLD: 26.9 SEC — LOW (ref 27.5–35.5)
APTT BLD: 27.2 SEC — LOW (ref 27.5–35.5)
APTT BLD: 27.4 SEC — LOW (ref 27.5–35.5)
APTT BLD: 28.2 SEC — SIGNIFICANT CHANGE UP (ref 27.5–35.5)
APTT BLD: 28.3 SEC — SIGNIFICANT CHANGE UP (ref 27.5–35.5)
APTT BLD: 29.1 SEC — SIGNIFICANT CHANGE UP (ref 27.5–35.5)
APTT BLD: 29.4 SEC — SIGNIFICANT CHANGE UP (ref 27.5–35.5)
APTT BLD: 31.9 SEC — SIGNIFICANT CHANGE UP (ref 27.5–35.5)
AST SERPL-CCNC: 10 U/L — SIGNIFICANT CHANGE UP (ref 10–40)
AST SERPL-CCNC: 12 U/L
AST SERPL-CCNC: 12 U/L — SIGNIFICANT CHANGE UP (ref 10–40)
AST SERPL-CCNC: 12 U/L — SIGNIFICANT CHANGE UP (ref 10–40)
AST SERPL-CCNC: 13 U/L
AST SERPL-CCNC: 13 U/L
AST SERPL-CCNC: 13 U/L — SIGNIFICANT CHANGE UP (ref 10–40)
AST SERPL-CCNC: 14 U/L
AST SERPL-CCNC: 14 U/L — SIGNIFICANT CHANGE UP (ref 10–40)
AST SERPL-CCNC: 15 U/L
AST SERPL-CCNC: 15 U/L — SIGNIFICANT CHANGE UP (ref 10–40)
AST SERPL-CCNC: 15 U/L — SIGNIFICANT CHANGE UP (ref 10–40)
AST SERPL-CCNC: 16 U/L
AST SERPL-CCNC: 16 U/L — SIGNIFICANT CHANGE UP (ref 10–40)
AST SERPL-CCNC: 16 U/L — SIGNIFICANT CHANGE UP (ref 10–40)
AST SERPL-CCNC: 17 U/L
AST SERPL-CCNC: 17 U/L — SIGNIFICANT CHANGE UP (ref 10–40)
AST SERPL-CCNC: 18 U/L
AST SERPL-CCNC: 18 U/L — SIGNIFICANT CHANGE UP (ref 10–40)
AST SERPL-CCNC: 19 U/L
AST SERPL-CCNC: 19 U/L
AST SERPL-CCNC: 19 U/L — SIGNIFICANT CHANGE UP (ref 10–40)
AST SERPL-CCNC: 20 U/L — SIGNIFICANT CHANGE UP (ref 10–40)
AST SERPL-CCNC: 20 U/L — SIGNIFICANT CHANGE UP (ref 10–40)
AST SERPL-CCNC: 21 U/L
AST SERPL-CCNC: 21 U/L — SIGNIFICANT CHANGE UP (ref 10–40)
AST SERPL-CCNC: 23 U/L — SIGNIFICANT CHANGE UP (ref 10–40)
AST SERPL-CCNC: 24 U/L
AST SERPL-CCNC: 24 U/L — SIGNIFICANT CHANGE UP (ref 10–40)
AST SERPL-CCNC: 25 U/L — SIGNIFICANT CHANGE UP (ref 10–40)
AST SERPL-CCNC: 25 U/L — SIGNIFICANT CHANGE UP (ref 10–40)
AST SERPL-CCNC: 26 U/L — SIGNIFICANT CHANGE UP (ref 10–40)
AST SERPL-CCNC: 27 U/L — SIGNIFICANT CHANGE UP (ref 10–40)
AST SERPL-CCNC: 28 U/L — SIGNIFICANT CHANGE UP (ref 10–40)
AST SERPL-CCNC: 29 U/L — SIGNIFICANT CHANGE UP (ref 10–40)
AST SERPL-CCNC: 30 U/L — SIGNIFICANT CHANGE UP (ref 10–40)
AST SERPL-CCNC: 31 U/L — SIGNIFICANT CHANGE UP (ref 10–40)
AST SERPL-CCNC: 31 U/L — SIGNIFICANT CHANGE UP (ref 10–40)
AST SERPL-CCNC: 32 U/L — SIGNIFICANT CHANGE UP (ref 10–40)
AST SERPL-CCNC: 32 U/L — SIGNIFICANT CHANGE UP (ref 10–40)
AST SERPL-CCNC: 33 U/L — SIGNIFICANT CHANGE UP (ref 10–40)
BACTERIA # UR AUTO: NEGATIVE — SIGNIFICANT CHANGE UP
BASE EXCESS BLDV CALC-SCNC: -6.6 MMOL/L — LOW (ref -2–3)
BASE EXCESS BLDV CALC-SCNC: 2 MMOL/L — SIGNIFICANT CHANGE UP (ref -2–3)
BASOPHILS # BLD AUTO: 0 K/UL
BASOPHILS # BLD AUTO: 0 K/UL — SIGNIFICANT CHANGE UP (ref 0–0.2)
BASOPHILS # BLD AUTO: 0.01 K/UL
BASOPHILS # BLD AUTO: 0.01 K/UL — SIGNIFICANT CHANGE UP (ref 0–0.2)
BASOPHILS # BLD AUTO: 0.02 K/UL
BASOPHILS # BLD AUTO: 0.02 K/UL
BASOPHILS # BLD AUTO: 0.02 K/UL — SIGNIFICANT CHANGE UP (ref 0–0.2)
BASOPHILS NFR BLD AUTO: 0 %
BASOPHILS NFR BLD AUTO: 0 % — SIGNIFICANT CHANGE UP (ref 0–2)
BASOPHILS NFR BLD AUTO: 0.2 % — SIGNIFICANT CHANGE UP (ref 0–2)
BASOPHILS NFR BLD AUTO: 0.3 % — SIGNIFICANT CHANGE UP (ref 0–2)
BASOPHILS NFR BLD AUTO: 0.3 % — SIGNIFICANT CHANGE UP (ref 0–2)
BASOPHILS NFR BLD AUTO: 0.4 % — SIGNIFICANT CHANGE UP (ref 0–2)
BASOPHILS NFR BLD AUTO: 0.9 % — SIGNIFICANT CHANGE UP (ref 0–2)
BASOPHILS NFR BLD AUTO: 1 %
BASOPHILS NFR BLD AUTO: 1.1 %
BASOPHILS NFR BLD AUTO: 1.2 %
BASOPHILS NFR BLD AUTO: 1.2 %
BASOPHILS NFR BLD AUTO: 2.3 %
BILIRUB SERPL-MCNC: 0.2 MG/DL
BILIRUB SERPL-MCNC: 0.2 MG/DL — SIGNIFICANT CHANGE UP (ref 0.2–1.2)
BILIRUB SERPL-MCNC: 0.3 MG/DL
BILIRUB SERPL-MCNC: 0.3 MG/DL — SIGNIFICANT CHANGE UP (ref 0.2–1.2)
BILIRUB SERPL-MCNC: 0.4 MG/DL
BILIRUB SERPL-MCNC: 0.4 MG/DL
BILIRUB SERPL-MCNC: 0.4 MG/DL — SIGNIFICANT CHANGE UP (ref 0.2–1.2)
BILIRUB SERPL-MCNC: 0.5 MG/DL — SIGNIFICANT CHANGE UP (ref 0.2–1.2)
BILIRUB SERPL-MCNC: 0.6 MG/DL — SIGNIFICANT CHANGE UP (ref 0.2–1.2)
BILIRUB SERPL-MCNC: 0.7 MG/DL — SIGNIFICANT CHANGE UP (ref 0.2–1.2)
BILIRUB SERPL-MCNC: 0.9 MG/DL — SIGNIFICANT CHANGE UP (ref 0.2–1.2)
BILIRUB SERPL-MCNC: 0.9 MG/DL — SIGNIFICANT CHANGE UP (ref 0.2–1.2)
BILIRUB UR-MCNC: NEGATIVE — SIGNIFICANT CHANGE UP
BILIRUB UR-MCNC: NEGATIVE — SIGNIFICANT CHANGE UP
BLASTS # FLD: 11 % — HIGH (ref 0–0)
BLASTS # FLD: 3 % — HIGH (ref 0–0)
BLASTS # FLD: 4 % — HIGH (ref 0–0)
BLASTS # FLD: 5 % — HIGH (ref 0–0)
BLASTS # FLD: 90 % — HIGH (ref 0–0)
BLASTS # FLD: 90 % — HIGH (ref 0–0)
BLD GP AB SCN SERPL QL: NEGATIVE — SIGNIFICANT CHANGE UP
BUN SERPL-MCNC: 10 MG/DL — SIGNIFICANT CHANGE UP (ref 7–23)
BUN SERPL-MCNC: 11 MG/DL
BUN SERPL-MCNC: 11 MG/DL — SIGNIFICANT CHANGE UP (ref 7–23)
BUN SERPL-MCNC: 12 MG/DL
BUN SERPL-MCNC: 13 MG/DL
BUN SERPL-MCNC: 13 MG/DL — SIGNIFICANT CHANGE UP (ref 7–23)
BUN SERPL-MCNC: 13 MG/DL — SIGNIFICANT CHANGE UP (ref 7–23)
BUN SERPL-MCNC: 14 MG/DL
BUN SERPL-MCNC: 14 MG/DL — SIGNIFICANT CHANGE UP (ref 7–23)
BUN SERPL-MCNC: 15 MG/DL
BUN SERPL-MCNC: 15 MG/DL — SIGNIFICANT CHANGE UP (ref 7–23)
BUN SERPL-MCNC: 16 MG/DL
BUN SERPL-MCNC: 16 MG/DL — SIGNIFICANT CHANGE UP (ref 7–23)
BUN SERPL-MCNC: 17 MG/DL
BUN SERPL-MCNC: 17 MG/DL — SIGNIFICANT CHANGE UP (ref 7–23)
BUN SERPL-MCNC: 17 MG/DL — SIGNIFICANT CHANGE UP (ref 7–23)
BUN SERPL-MCNC: 18 MG/DL — SIGNIFICANT CHANGE UP (ref 7–23)
BUN SERPL-MCNC: 19 MG/DL — SIGNIFICANT CHANGE UP (ref 7–23)
BUN SERPL-MCNC: 19 MG/DL — SIGNIFICANT CHANGE UP (ref 7–23)
BUN SERPL-MCNC: 21 MG/DL — SIGNIFICANT CHANGE UP (ref 7–23)
BUN SERPL-MCNC: 21 MG/DL — SIGNIFICANT CHANGE UP (ref 7–23)
BUN SERPL-MCNC: 23 MG/DL — SIGNIFICANT CHANGE UP (ref 7–23)
BUN SERPL-MCNC: 24 MG/DL — HIGH (ref 7–23)
BUN SERPL-MCNC: 25 MG/DL — HIGH (ref 7–23)
BUN SERPL-MCNC: 25 MG/DL — HIGH (ref 7–23)
BUN SERPL-MCNC: 26 MG/DL — HIGH (ref 7–23)
BUN SERPL-MCNC: 28 MG/DL — HIGH (ref 7–23)
BUN SERPL-MCNC: 9 MG/DL — SIGNIFICANT CHANGE UP (ref 7–23)
CA-I SERPL-SCNC: 1.14 MMOL/L — LOW (ref 1.15–1.33)
CA-I SERPL-SCNC: 1.23 MMOL/L — SIGNIFICANT CHANGE UP (ref 1.15–1.33)
CALCIUM SERPL-MCNC: 6.7 MG/DL — LOW (ref 8.4–10.5)
CALCIUM SERPL-MCNC: 7 MG/DL — LOW (ref 8.4–10.5)
CALCIUM SERPL-MCNC: 7 MG/DL — LOW (ref 8.4–10.5)
CALCIUM SERPL-MCNC: 7.1 MG/DL — LOW (ref 8.4–10.5)
CALCIUM SERPL-MCNC: 7.2 MG/DL — LOW (ref 8.4–10.5)
CALCIUM SERPL-MCNC: 7.2 MG/DL — LOW (ref 8.4–10.5)
CALCIUM SERPL-MCNC: 7.5 MG/DL — LOW (ref 8.4–10.5)
CALCIUM SERPL-MCNC: 7.6 MG/DL — LOW (ref 8.4–10.5)
CALCIUM SERPL-MCNC: 7.7 MG/DL — LOW (ref 8.4–10.5)
CALCIUM SERPL-MCNC: 7.8 MG/DL — LOW (ref 8.4–10.5)
CALCIUM SERPL-MCNC: 7.9 MG/DL — LOW (ref 8.4–10.5)
CALCIUM SERPL-MCNC: 8 MG/DL — LOW (ref 8.4–10.5)
CALCIUM SERPL-MCNC: 8.1 MG/DL
CALCIUM SERPL-MCNC: 8.1 MG/DL — LOW (ref 8.4–10.5)
CALCIUM SERPL-MCNC: 8.2 MG/DL — LOW (ref 8.4–10.5)
CALCIUM SERPL-MCNC: 8.3 MG/DL — LOW (ref 8.4–10.5)
CALCIUM SERPL-MCNC: 8.5 MG/DL — SIGNIFICANT CHANGE UP (ref 8.4–10.5)
CALCIUM SERPL-MCNC: 8.6 MG/DL — SIGNIFICANT CHANGE UP (ref 8.4–10.5)
CALCIUM SERPL-MCNC: 8.7 MG/DL — SIGNIFICANT CHANGE UP (ref 8.4–10.5)
CALCIUM SERPL-MCNC: 8.8 MG/DL
CALCIUM SERPL-MCNC: 8.8 MG/DL — SIGNIFICANT CHANGE UP (ref 8.4–10.5)
CALCIUM SERPL-MCNC: 8.9 MG/DL
CALCIUM SERPL-MCNC: 8.9 MG/DL — SIGNIFICANT CHANGE UP (ref 8.4–10.5)
CALCIUM SERPL-MCNC: 9 MG/DL
CALCIUM SERPL-MCNC: 9.1 MG/DL
CALCIUM SERPL-MCNC: 9.1 MG/DL — SIGNIFICANT CHANGE UP (ref 8.4–10.5)
CALCIUM SERPL-MCNC: 9.2 MG/DL
CALCIUM SERPL-MCNC: 9.3 MG/DL
CALCIUM SERPL-MCNC: 9.4 MG/DL
CALCIUM SERPL-MCNC: 9.4 MG/DL
CALCIUM SERPL-MCNC: 9.4 MG/DL — SIGNIFICANT CHANGE UP (ref 8.4–10.5)
CALCIUM SERPL-MCNC: 9.5 MG/DL
CALCIUM SERPL-MCNC: 9.5 MG/DL
CALCIUM SERPL-MCNC: 9.6 MG/DL
CALCIUM SERPL-MCNC: 9.6 MG/DL
CHLORIDE BLDV-SCNC: 103 MMOL/L — SIGNIFICANT CHANGE UP (ref 96–108)
CHLORIDE BLDV-SCNC: 112 MMOL/L — HIGH (ref 96–108)
CHLORIDE SERPL-SCNC: 100 MMOL/L — SIGNIFICANT CHANGE UP (ref 96–108)
CHLORIDE SERPL-SCNC: 101 MMOL/L
CHLORIDE SERPL-SCNC: 101 MMOL/L — SIGNIFICANT CHANGE UP (ref 96–108)
CHLORIDE SERPL-SCNC: 102 MMOL/L
CHLORIDE SERPL-SCNC: 102 MMOL/L
CHLORIDE SERPL-SCNC: 102 MMOL/L — SIGNIFICANT CHANGE UP (ref 96–108)
CHLORIDE SERPL-SCNC: 103 MMOL/L — SIGNIFICANT CHANGE UP (ref 96–108)
CHLORIDE SERPL-SCNC: 103 MMOL/L — SIGNIFICANT CHANGE UP (ref 96–108)
CHLORIDE SERPL-SCNC: 104 MMOL/L
CHLORIDE SERPL-SCNC: 104 MMOL/L — SIGNIFICANT CHANGE UP (ref 96–108)
CHLORIDE SERPL-SCNC: 105 MMOL/L
CHLORIDE SERPL-SCNC: 105 MMOL/L — SIGNIFICANT CHANGE UP (ref 96–108)
CHLORIDE SERPL-SCNC: 105 MMOL/L — SIGNIFICANT CHANGE UP (ref 96–108)
CHLORIDE SERPL-SCNC: 106 MMOL/L
CHLORIDE SERPL-SCNC: 106 MMOL/L — SIGNIFICANT CHANGE UP (ref 96–108)
CHLORIDE SERPL-SCNC: 106 MMOL/L — SIGNIFICANT CHANGE UP (ref 96–108)
CHLORIDE SERPL-SCNC: 107 MMOL/L
CHLORIDE SERPL-SCNC: 107 MMOL/L
CHLORIDE SERPL-SCNC: 107 MMOL/L — SIGNIFICANT CHANGE UP (ref 96–108)
CHLORIDE SERPL-SCNC: 108 MMOL/L — SIGNIFICANT CHANGE UP (ref 96–108)
CHLORIDE SERPL-SCNC: 109 MMOL/L — HIGH (ref 96–108)
CHLORIDE SERPL-SCNC: 110 MMOL/L — HIGH (ref 96–108)
CHLORIDE SERPL-SCNC: 110 MMOL/L — HIGH (ref 96–108)
CHLORIDE SERPL-SCNC: 111 MMOL/L — HIGH (ref 96–108)
CHLORIDE SERPL-SCNC: 111 MMOL/L — HIGH (ref 96–108)
CHLORIDE SERPL-SCNC: 112 MMOL/L — HIGH (ref 96–108)
CHLORIDE SERPL-SCNC: 116 MMOL/L — HIGH (ref 96–108)
CHLORIDE SERPL-SCNC: 98 MMOL/L — SIGNIFICANT CHANGE UP (ref 96–108)
CHLORIDE SERPL-SCNC: 99 MMOL/L — SIGNIFICANT CHANGE UP (ref 96–108)
CHLORIDE SERPL-SCNC: 99 MMOL/L — SIGNIFICANT CHANGE UP (ref 96–108)
CK MB BLD-MCNC: 10.2 % — HIGH (ref 0–3.5)
CK MB BLD-MCNC: 2.4 % — SIGNIFICANT CHANGE UP (ref 0–3.5)
CK MB BLD-MCNC: 3.8 % — HIGH (ref 0–3.5)
CK MB BLD-MCNC: 5.1 % — HIGH (ref 0–3.5)
CK MB CFR SERPL CALC: 3.6 NG/ML — SIGNIFICANT CHANGE UP (ref 0–3.8)
CK MB CFR SERPL CALC: 4.8 NG/ML — HIGH (ref 0–3.8)
CK MB CFR SERPL CALC: 4.9 NG/ML — HIGH (ref 0–3.8)
CK MB CFR SERPL CALC: 5.2 NG/ML — HIGH (ref 0–3.8)
CK SERPL-CCNC: 102 U/L — SIGNIFICANT CHANGE UP (ref 25–170)
CK SERPL-CCNC: 127 U/L — SIGNIFICANT CHANGE UP (ref 25–170)
CK SERPL-CCNC: 153 U/L — SIGNIFICANT CHANGE UP (ref 25–170)
CK SERPL-CCNC: 48 U/L — SIGNIFICANT CHANGE UP (ref 25–170)
CO2 BLDV-SCNC: 18 MMOL/L — LOW (ref 22–26)
CO2 BLDV-SCNC: 29 MMOL/L — HIGH (ref 22–26)
CO2 SERPL-SCNC: 15 MMOL/L — LOW (ref 22–31)
CO2 SERPL-SCNC: 17 MMOL/L — LOW (ref 22–31)
CO2 SERPL-SCNC: 18 MMOL/L — LOW (ref 22–31)
CO2 SERPL-SCNC: 18 MMOL/L — LOW (ref 22–31)
CO2 SERPL-SCNC: 20 MMOL/L — LOW (ref 22–31)
CO2 SERPL-SCNC: 20 MMOL/L — LOW (ref 22–31)
CO2 SERPL-SCNC: 22 MMOL/L — SIGNIFICANT CHANGE UP (ref 22–31)
CO2 SERPL-SCNC: 23 MMOL/L
CO2 SERPL-SCNC: 23 MMOL/L — SIGNIFICANT CHANGE UP (ref 22–31)
CO2 SERPL-SCNC: 24 MMOL/L
CO2 SERPL-SCNC: 24 MMOL/L — SIGNIFICANT CHANGE UP (ref 22–31)
CO2 SERPL-SCNC: 24 MMOL/L — SIGNIFICANT CHANGE UP (ref 22–31)
CO2 SERPL-SCNC: 25 MMOL/L
CO2 SERPL-SCNC: 25 MMOL/L — SIGNIFICANT CHANGE UP (ref 22–31)
CO2 SERPL-SCNC: 26 MMOL/L — SIGNIFICANT CHANGE UP (ref 22–31)
CO2 SERPL-SCNC: 26 MMOL/L — SIGNIFICANT CHANGE UP (ref 22–31)
CO2 SERPL-SCNC: 27 MMOL/L
CO2 SERPL-SCNC: 27 MMOL/L — SIGNIFICANT CHANGE UP (ref 22–31)
CO2 SERPL-SCNC: 28 MMOL/L — SIGNIFICANT CHANGE UP (ref 22–31)
CO2 SERPL-SCNC: 29 MMOL/L — SIGNIFICANT CHANGE UP (ref 22–31)
CO2 SERPL-SCNC: 29 MMOL/L — SIGNIFICANT CHANGE UP (ref 22–31)
CO2 SERPL-SCNC: 30 MMOL/L — SIGNIFICANT CHANGE UP (ref 22–31)
CO2 SERPL-SCNC: 31 MMOL/L — SIGNIFICANT CHANGE UP (ref 22–31)
CO2 SERPL-SCNC: 31 MMOL/L — SIGNIFICANT CHANGE UP (ref 22–31)
CO2 SERPL-SCNC: 32 MMOL/L — HIGH (ref 22–31)
CO2 SERPL-SCNC: 32 MMOL/L — HIGH (ref 22–31)
CO2 SERPL-SCNC: 33 MMOL/L — HIGH (ref 22–31)
COLOR SPEC: COLORLESS — SIGNIFICANT CHANGE UP
COLOR SPEC: COLORLESS — SIGNIFICANT CHANGE UP
CREAT SERPL-MCNC: 0.52 MG/DL — SIGNIFICANT CHANGE UP (ref 0.5–1.3)
CREAT SERPL-MCNC: 0.53 MG/DL — SIGNIFICANT CHANGE UP (ref 0.5–1.3)
CREAT SERPL-MCNC: 0.54 MG/DL — SIGNIFICANT CHANGE UP (ref 0.5–1.3)
CREAT SERPL-MCNC: 0.56 MG/DL
CREAT SERPL-MCNC: 0.56 MG/DL — SIGNIFICANT CHANGE UP (ref 0.5–1.3)
CREAT SERPL-MCNC: 0.57 MG/DL
CREAT SERPL-MCNC: 0.58 MG/DL — SIGNIFICANT CHANGE UP (ref 0.5–1.3)
CREAT SERPL-MCNC: 0.59 MG/DL — SIGNIFICANT CHANGE UP (ref 0.5–1.3)
CREAT SERPL-MCNC: 0.6 MG/DL
CREAT SERPL-MCNC: 0.6 MG/DL — SIGNIFICANT CHANGE UP (ref 0.5–1.3)
CREAT SERPL-MCNC: 0.61 MG/DL
CREAT SERPL-MCNC: 0.61 MG/DL — SIGNIFICANT CHANGE UP (ref 0.5–1.3)
CREAT SERPL-MCNC: 0.62 MG/DL
CREAT SERPL-MCNC: 0.62 MG/DL
CREAT SERPL-MCNC: 0.62 MG/DL — SIGNIFICANT CHANGE UP (ref 0.5–1.3)
CREAT SERPL-MCNC: 0.62 MG/DL — SIGNIFICANT CHANGE UP (ref 0.5–1.3)
CREAT SERPL-MCNC: 0.63 MG/DL
CREAT SERPL-MCNC: 0.63 MG/DL — SIGNIFICANT CHANGE UP (ref 0.5–1.3)
CREAT SERPL-MCNC: 0.63 MG/DL — SIGNIFICANT CHANGE UP (ref 0.5–1.3)
CREAT SERPL-MCNC: 0.64 MG/DL — SIGNIFICANT CHANGE UP (ref 0.5–1.3)
CREAT SERPL-MCNC: 0.66 MG/DL — SIGNIFICANT CHANGE UP (ref 0.5–1.3)
CREAT SERPL-MCNC: 0.66 MG/DL — SIGNIFICANT CHANGE UP (ref 0.5–1.3)
CREAT SERPL-MCNC: 0.67 MG/DL
CREAT SERPL-MCNC: 0.67 MG/DL
CREAT SERPL-MCNC: 0.67 MG/DL — SIGNIFICANT CHANGE UP (ref 0.5–1.3)
CREAT SERPL-MCNC: 0.67 MG/DL — SIGNIFICANT CHANGE UP (ref 0.5–1.3)
CREAT SERPL-MCNC: 0.68 MG/DL
CREAT SERPL-MCNC: 0.69 MG/DL
CREAT SERPL-MCNC: 0.69 MG/DL — SIGNIFICANT CHANGE UP (ref 0.5–1.3)
CREAT SERPL-MCNC: 0.7 MG/DL
CREAT SERPL-MCNC: 0.7 MG/DL
CREAT SERPL-MCNC: 0.71 MG/DL
CREAT SERPL-MCNC: 0.72 MG/DL
CREAT SERPL-MCNC: 0.72 MG/DL
CREAT SERPL-MCNC: 0.73 MG/DL
CREAT SERPL-MCNC: 0.73 MG/DL
CREAT SERPL-MCNC: 0.73 MG/DL — SIGNIFICANT CHANGE UP (ref 0.5–1.3)
CREAT SERPL-MCNC: 0.73 MG/DL — SIGNIFICANT CHANGE UP (ref 0.5–1.3)
CREAT SERPL-MCNC: 0.75 MG/DL
CREAT SERPL-MCNC: 0.75 MG/DL
CREAT SERPL-MCNC: 0.77 MG/DL — SIGNIFICANT CHANGE UP (ref 0.5–1.3)
CREAT SERPL-MCNC: 0.77 MG/DL — SIGNIFICANT CHANGE UP (ref 0.5–1.3)
CREAT SERPL-MCNC: 0.79 MG/DL — SIGNIFICANT CHANGE UP (ref 0.5–1.3)
CREAT SERPL-MCNC: 0.81 MG/DL — SIGNIFICANT CHANGE UP (ref 0.5–1.3)
CREAT SERPL-MCNC: 0.84 MG/DL
CREAT SERPL-MCNC: 0.84 MG/DL
CREAT SERPL-MCNC: 0.88 MG/DL — SIGNIFICANT CHANGE UP (ref 0.5–1.3)
CREAT SERPL-MCNC: 0.89 MG/DL — SIGNIFICANT CHANGE UP (ref 0.5–1.3)
CREAT SERPL-MCNC: 0.97 MG/DL — SIGNIFICANT CHANGE UP (ref 0.5–1.3)
CULTURE RESULTS: NO GROWTH — SIGNIFICANT CHANGE UP
CULTURE RESULTS: SIGNIFICANT CHANGE UP
D DIMER BLD IA.RAPID-MCNC: 1072 NG/ML DDU — HIGH
D DIMER BLD IA.RAPID-MCNC: 1076 NG/ML DDU — HIGH
D DIMER BLD IA.RAPID-MCNC: 1083 NG/ML DDU — HIGH
D DIMER BLD IA.RAPID-MCNC: 1100 NG/ML DDU — HIGH
D DIMER BLD IA.RAPID-MCNC: 1435 NG/ML DDU — HIGH
D DIMER BLD IA.RAPID-MCNC: 1523 NG/ML DDU — HIGH
D DIMER BLD IA.RAPID-MCNC: 1601 NG/ML DDU — HIGH
D DIMER BLD IA.RAPID-MCNC: 1638 NG/ML DDU — HIGH
D DIMER BLD IA.RAPID-MCNC: 1678 NG/ML DDU — HIGH
D DIMER BLD IA.RAPID-MCNC: 1679 NG/ML DDU — HIGH
D DIMER BLD IA.RAPID-MCNC: 1681 NG/ML DDU — HIGH
D DIMER BLD IA.RAPID-MCNC: 1892 NG/ML DDU — HIGH
D DIMER BLD IA.RAPID-MCNC: 1893 NG/ML DDU — HIGH
D DIMER BLD IA.RAPID-MCNC: 1972 NG/ML DDU — HIGH
D DIMER BLD IA.RAPID-MCNC: 2003 NG/ML DDU — HIGH
D DIMER BLD IA.RAPID-MCNC: 2102 NG/ML DDU — HIGH
D DIMER BLD IA.RAPID-MCNC: 2124 NG/ML DDU — HIGH
D DIMER BLD IA.RAPID-MCNC: 2159 NG/ML DDU — HIGH
D DIMER BLD IA.RAPID-MCNC: 2936 NG/ML DDU — HIGH
D DIMER BLD IA.RAPID-MCNC: 2989 NG/ML DDU — HIGH
D DIMER BLD IA.RAPID-MCNC: 3050 NG/ML DDU — HIGH
D DIMER BLD IA.RAPID-MCNC: 558 NG/ML DDU — HIGH
D DIMER BLD IA.RAPID-MCNC: 6373 NG/ML DDU — HIGH
D DIMER BLD IA.RAPID-MCNC: HIGH NG/ML DDU
D DIMER BLD IA.RAPID-MCNC: HIGH NG/ML DDU
DACRYOCYTES BLD QL SMEAR: SLIGHT — SIGNIFICANT CHANGE UP
DIFF PNL FLD: NEGATIVE — SIGNIFICANT CHANGE UP
DIFF PNL FLD: NEGATIVE — SIGNIFICANT CHANGE UP
EGFR: 58 ML/MIN/1.73M2 — LOW
EGFR: 65 ML/MIN/1.73M2 — SIGNIFICANT CHANGE UP
EGFR: 66 ML/MIN/1.73M2 — SIGNIFICANT CHANGE UP
EGFR: 69 ML/MIN/1.73M2
EGFR: 75 ML/MIN/1.73M2 — SIGNIFICANT CHANGE UP
EGFR: 77 ML/MIN/1.73M2 — SIGNIFICANT CHANGE UP
EGFR: 77 ML/MIN/1.73M2 — SIGNIFICANT CHANGE UP
EGFR: 79 ML/MIN/1.73M2
EGFR: 79 ML/MIN/1.73M2
EGFR: 82 ML/MIN/1.73M2
EGFR: 82 ML/MIN/1.73M2
EGFR: 82 ML/MIN/1.73M2 — SIGNIFICANT CHANGE UP
EGFR: 82 ML/MIN/1.73M2 — SIGNIFICANT CHANGE UP
EGFR: 83 ML/MIN/1.73M2
EGFR: 83 ML/MIN/1.73M2
EGFR: 85 ML/MIN/1.73M2
EGFR: 86 ML/MIN/1.73M2
EGFR: 87 ML/MIN/1.73M2
EGFR: 87 ML/MIN/1.73M2
EGFR: 87 ML/MIN/1.73M2 — SIGNIFICANT CHANGE UP
EGFR: 88 ML/MIN/1.73M2 — SIGNIFICANT CHANGE UP
EGFR: 89 ML/MIN/1.73M2
EGFR: 89 ML/MIN/1.73M2
EGFR: 89 ML/MIN/1.73M2 — SIGNIFICANT CHANGE UP
EGFR: 90 ML/MIN/1.73M2
EGFR: 90 ML/MIN/1.73M2
EGFR: 90 ML/MIN/1.73M2 — SIGNIFICANT CHANGE UP
EGFR: 91 ML/MIN/1.73M2 — SIGNIFICANT CHANGE UP
EGFR: 92 ML/MIN/1.73M2
EGFR: 92 ML/MIN/1.73M2 — SIGNIFICANT CHANGE UP
EGFR: 93 ML/MIN/1.73M2 — SIGNIFICANT CHANGE UP
ELLIPTOCYTES BLD QL SMEAR: SLIGHT — SIGNIFICANT CHANGE UP
EOSINOPHIL # BLD AUTO: 0 K/UL
EOSINOPHIL # BLD AUTO: 0 K/UL — SIGNIFICANT CHANGE UP (ref 0–0.5)
EOSINOPHIL # BLD AUTO: 0.01 K/UL
EOSINOPHIL # BLD AUTO: 0.01 K/UL — SIGNIFICANT CHANGE UP (ref 0–0.5)
EOSINOPHIL # BLD AUTO: 0.02 K/UL
EOSINOPHIL # BLD AUTO: 0.02 K/UL
EOSINOPHIL # BLD AUTO: 0.02 K/UL — SIGNIFICANT CHANGE UP (ref 0–0.5)
EOSINOPHIL # BLD AUTO: 0.05 K/UL
EOSINOPHIL # BLD AUTO: 0.05 K/UL — SIGNIFICANT CHANGE UP (ref 0–0.5)
EOSINOPHIL NFR BLD AUTO: 0 %
EOSINOPHIL NFR BLD AUTO: 0 % — SIGNIFICANT CHANGE UP (ref 0–6)
EOSINOPHIL NFR BLD AUTO: 0.2 % — SIGNIFICANT CHANGE UP (ref 0–6)
EOSINOPHIL NFR BLD AUTO: 0.3 % — SIGNIFICANT CHANGE UP (ref 0–6)
EOSINOPHIL NFR BLD AUTO: 0.4 % — SIGNIFICANT CHANGE UP (ref 0–6)
EOSINOPHIL NFR BLD AUTO: 1 %
EOSINOPHIL NFR BLD AUTO: 1 %
EOSINOPHIL NFR BLD AUTO: 1.5 %
EOSINOPHIL NFR BLD AUTO: 2 % — SIGNIFICANT CHANGE UP (ref 0–6)
EOSINOPHIL NFR BLD AUTO: 2 % — SIGNIFICANT CHANGE UP (ref 0–6)
EOSINOPHIL NFR BLD AUTO: 2.3 %
EOSINOPHIL NFR BLD AUTO: 3 %
EOSINOPHIL NFR BLD AUTO: 3.2 %
EOSINOPHIL NFR BLD AUTO: 3.3 %
EPI CELLS # UR: 1 /HPF — SIGNIFICANT CHANGE UP
FIBRINOGEN PPP-MCNC: 121 MG/DL — LOW (ref 200–445)
FIBRINOGEN PPP-MCNC: 210 MG/DL — SIGNIFICANT CHANGE UP (ref 200–445)
FIBRINOGEN PPP-MCNC: 213 MG/DL — SIGNIFICANT CHANGE UP (ref 200–445)
FIBRINOGEN PPP-MCNC: 299 MG/DL — SIGNIFICANT CHANGE UP (ref 200–445)
FIBRINOGEN PPP-MCNC: 320 MG/DL — SIGNIFICANT CHANGE UP (ref 200–445)
FIBRINOGEN PPP-MCNC: 320 MG/DL — SIGNIFICANT CHANGE UP (ref 200–445)
FIBRINOGEN PPP-MCNC: 324 MG/DL — SIGNIFICANT CHANGE UP (ref 200–445)
FIBRINOGEN PPP-MCNC: 339 MG/DL — SIGNIFICANT CHANGE UP (ref 200–445)
FIBRINOGEN PPP-MCNC: 360 MG/DL — SIGNIFICANT CHANGE UP (ref 200–445)
FIBRINOGEN PPP-MCNC: 376 MG/DL — SIGNIFICANT CHANGE UP (ref 200–445)
FIBRINOGEN PPP-MCNC: 389 MG/DL — SIGNIFICANT CHANGE UP (ref 200–445)
FIBRINOGEN PPP-MCNC: 406 MG/DL — SIGNIFICANT CHANGE UP (ref 200–445)
FIBRINOGEN PPP-MCNC: 542 MG/DL — HIGH (ref 330–520)
FUNGITELL: <31 PG/ML — SIGNIFICANT CHANGE UP
G6PD RBC-CCNC: 21.4 U/G HGB — HIGH (ref 7–20.5)
GALACTOMANNAN AG SERPL-ACNC: 0.03 INDEX — SIGNIFICANT CHANGE UP (ref 0–0.49)
GAS PNL BLDV: 137 MMOL/L — SIGNIFICANT CHANGE UP (ref 136–145)
GAS PNL BLDV: 137 MMOL/L — SIGNIFICANT CHANGE UP (ref 136–145)
GAS PNL BLDV: SIGNIFICANT CHANGE UP
GLUCOSE BLDV-MCNC: 115 MG/DL — HIGH (ref 70–99)
GLUCOSE BLDV-MCNC: 165 MG/DL — HIGH (ref 70–99)
GLUCOSE SERPL-MCNC: 100 MG/DL
GLUCOSE SERPL-MCNC: 101 MG/DL
GLUCOSE SERPL-MCNC: 101 MG/DL — HIGH (ref 70–99)
GLUCOSE SERPL-MCNC: 101 MG/DL — HIGH (ref 70–99)
GLUCOSE SERPL-MCNC: 102 MG/DL
GLUCOSE SERPL-MCNC: 103 MG/DL
GLUCOSE SERPL-MCNC: 103 MG/DL
GLUCOSE SERPL-MCNC: 103 MG/DL — HIGH (ref 70–99)
GLUCOSE SERPL-MCNC: 104 MG/DL
GLUCOSE SERPL-MCNC: 106 MG/DL — HIGH (ref 70–99)
GLUCOSE SERPL-MCNC: 107 MG/DL
GLUCOSE SERPL-MCNC: 109 MG/DL
GLUCOSE SERPL-MCNC: 109 MG/DL — HIGH (ref 70–99)
GLUCOSE SERPL-MCNC: 110 MG/DL — HIGH (ref 70–99)
GLUCOSE SERPL-MCNC: 112 MG/DL — HIGH (ref 70–99)
GLUCOSE SERPL-MCNC: 112 MG/DL — HIGH (ref 70–99)
GLUCOSE SERPL-MCNC: 115 MG/DL
GLUCOSE SERPL-MCNC: 118 MG/DL
GLUCOSE SERPL-MCNC: 120 MG/DL — HIGH (ref 70–99)
GLUCOSE SERPL-MCNC: 121 MG/DL — HIGH (ref 70–99)
GLUCOSE SERPL-MCNC: 124 MG/DL — HIGH (ref 70–99)
GLUCOSE SERPL-MCNC: 132 MG/DL — HIGH (ref 70–99)
GLUCOSE SERPL-MCNC: 134 MG/DL
GLUCOSE SERPL-MCNC: 134 MG/DL — HIGH (ref 70–99)
GLUCOSE SERPL-MCNC: 134 MG/DL — HIGH (ref 70–99)
GLUCOSE SERPL-MCNC: 135 MG/DL
GLUCOSE SERPL-MCNC: 135 MG/DL — HIGH (ref 70–99)
GLUCOSE SERPL-MCNC: 139 MG/DL — HIGH (ref 70–99)
GLUCOSE SERPL-MCNC: 146 MG/DL — HIGH (ref 70–99)
GLUCOSE SERPL-MCNC: 152 MG/DL — HIGH (ref 70–99)
GLUCOSE SERPL-MCNC: 154 MG/DL — HIGH (ref 70–99)
GLUCOSE SERPL-MCNC: 159 MG/DL — HIGH (ref 70–99)
GLUCOSE SERPL-MCNC: 166 MG/DL — HIGH (ref 70–99)
GLUCOSE SERPL-MCNC: 172 MG/DL — HIGH (ref 70–99)
GLUCOSE SERPL-MCNC: 182 MG/DL — HIGH (ref 70–99)
GLUCOSE SERPL-MCNC: 199 MG/DL — HIGH (ref 70–99)
GLUCOSE SERPL-MCNC: 87 MG/DL
GLUCOSE SERPL-MCNC: 88 MG/DL
GLUCOSE SERPL-MCNC: 89 MG/DL
GLUCOSE SERPL-MCNC: 89 MG/DL — SIGNIFICANT CHANGE UP (ref 70–99)
GLUCOSE SERPL-MCNC: 90 MG/DL
GLUCOSE SERPL-MCNC: 90 MG/DL — SIGNIFICANT CHANGE UP (ref 70–99)
GLUCOSE SERPL-MCNC: 91 MG/DL
GLUCOSE SERPL-MCNC: 92 MG/DL
GLUCOSE SERPL-MCNC: 92 MG/DL — SIGNIFICANT CHANGE UP (ref 70–99)
GLUCOSE SERPL-MCNC: 94 MG/DL
GLUCOSE SERPL-MCNC: 95 MG/DL
GLUCOSE SERPL-MCNC: 95 MG/DL
GLUCOSE SERPL-MCNC: 95 MG/DL — SIGNIFICANT CHANGE UP (ref 70–99)
GLUCOSE SERPL-MCNC: 96 MG/DL
GLUCOSE SERPL-MCNC: 96 MG/DL — SIGNIFICANT CHANGE UP (ref 70–99)
GLUCOSE SERPL-MCNC: 97 MG/DL — SIGNIFICANT CHANGE UP (ref 70–99)
GLUCOSE SERPL-MCNC: 98 MG/DL
GLUCOSE SERPL-MCNC: 98 MG/DL — SIGNIFICANT CHANGE UP (ref 70–99)
GLUCOSE SERPL-MCNC: 99 MG/DL
GLUCOSE SERPL-MCNC: 99 MG/DL — SIGNIFICANT CHANGE UP (ref 70–99)
GLUCOSE UR QL: NEGATIVE — SIGNIFICANT CHANGE UP
GLUCOSE UR QL: NEGATIVE — SIGNIFICANT CHANGE UP
GRAM STN FLD: SIGNIFICANT CHANGE UP
HBV CORE AB SER-ACNC: SIGNIFICANT CHANGE UP
HBV SURFACE AB SER-ACNC: <3 MIU/ML — LOW
HBV SURFACE AB SER-ACNC: SIGNIFICANT CHANGE UP
HCO3 BLDV-SCNC: 18 MMOL/L — LOW (ref 22–29)
HCO3 BLDV-SCNC: 28 MMOL/L — SIGNIFICANT CHANGE UP (ref 22–29)
HCT VFR BLD CALC: 18.9 % — CRITICAL LOW (ref 34.5–45)
HCT VFR BLD CALC: 19.4 % — CRITICAL LOW (ref 34.5–45)
HCT VFR BLD CALC: 20.2 % — CRITICAL LOW (ref 34.5–45)
HCT VFR BLD CALC: 20.5 % — CRITICAL LOW (ref 34.5–45)
HCT VFR BLD CALC: 21 % — CRITICAL LOW (ref 34.5–45)
HCT VFR BLD CALC: 21.1 % — LOW (ref 34.5–45)
HCT VFR BLD CALC: 21.1 % — LOW (ref 34.5–45)
HCT VFR BLD CALC: 21.8 % — LOW (ref 34.5–45)
HCT VFR BLD CALC: 21.9 % — LOW (ref 34.5–45)
HCT VFR BLD CALC: 22.2 % — LOW (ref 34.5–45)
HCT VFR BLD CALC: 22.3 % — LOW (ref 34.5–45)
HCT VFR BLD CALC: 22.4 % — LOW (ref 34.5–45)
HCT VFR BLD CALC: 22.5 % — LOW (ref 34.5–45)
HCT VFR BLD CALC: 22.8 % — LOW (ref 34.5–45)
HCT VFR BLD CALC: 23.3 % — LOW (ref 34.5–45)
HCT VFR BLD CALC: 23.8 % — LOW (ref 34.5–45)
HCT VFR BLD CALC: 23.9 % — LOW (ref 34.5–45)
HCT VFR BLD CALC: 23.9 % — LOW (ref 34.5–45)
HCT VFR BLD CALC: 24.1 % — LOW (ref 34.5–45)
HCT VFR BLD CALC: 24.1 % — LOW (ref 34.5–45)
HCT VFR BLD CALC: 24.2 % — LOW (ref 34.5–45)
HCT VFR BLD CALC: 24.7 % — LOW (ref 34.5–45)
HCT VFR BLD CALC: 25 % — LOW (ref 34.5–45)
HCT VFR BLD CALC: 25.3 % — LOW (ref 34.5–45)
HCT VFR BLD CALC: 25.4 % — LOW (ref 34.5–45)
HCT VFR BLD CALC: 25.5 % — LOW (ref 34.5–45)
HCT VFR BLD CALC: 25.6 % — LOW (ref 34.5–45)
HCT VFR BLD CALC: 25.7 % — LOW (ref 34.5–45)
HCT VFR BLD CALC: 26.3 % — LOW (ref 34.5–45)
HCT VFR BLD CALC: 26.4 % — LOW (ref 34.5–45)
HCT VFR BLD CALC: 27.2 %
HCT VFR BLD CALC: 27.9 % — LOW (ref 34.5–45)
HCT VFR BLD CALC: 28.5 % — LOW (ref 34.5–45)
HCT VFR BLD CALC: 29.1 % — LOW (ref 34.5–45)
HCT VFR BLD CALC: 29.2 % — LOW (ref 34.5–45)
HCT VFR BLD CALC: 30 % — LOW (ref 34.5–45)
HCT VFR BLD CALC: 30.2 %
HCT VFR BLD CALC: 30.2 % — LOW (ref 34.5–45)
HCT VFR BLD CALC: 30.3 %
HCT VFR BLD CALC: 30.6 %
HCT VFR BLD CALC: 30.8 %
HCT VFR BLD CALC: 31.2 %
HCT VFR BLD CALC: 31.3 % — LOW (ref 34.5–45)
HCT VFR BLD CALC: 31.3 % — LOW (ref 34.5–45)
HCT VFR BLD CALC: 31.5 %
HCT VFR BLD CALC: 31.5 %
HCT VFR BLD CALC: 31.7 % — LOW (ref 34.5–45)
HCT VFR BLD CALC: 31.7 % — LOW (ref 34.5–45)
HCT VFR BLD CALC: 31.9 % — LOW (ref 34.5–45)
HCT VFR BLD CALC: 32.1 %
HCT VFR BLD CALC: 32.4 %
HCT VFR BLD CALC: 32.4 %
HCT VFR BLD CALC: 32.5 %
HCT VFR BLD CALC: 32.5 %
HCT VFR BLD CALC: 32.6 % — LOW (ref 34.5–45)
HCT VFR BLD CALC: 32.7 %
HCT VFR BLD CALC: 33.2 %
HCT VFR BLD CALC: 33.3 %
HCT VFR BLD CALC: 33.4 % — LOW (ref 34.5–45)
HCT VFR BLD CALC: 33.6 %
HCT VFR BLD CALC: 34.1 % — LOW (ref 34.5–45)
HCT VFR BLD CALC: 34.3 %
HCT VFR BLD CALC: 34.4 %
HCT VFR BLD CALC: 34.8 %
HCT VFR BLD CALC: 35.1 % — SIGNIFICANT CHANGE UP (ref 34.5–45)
HCT VFR BLDA CALC: 24 % — LOW (ref 34.5–46.5)
HCT VFR BLDA CALC: 26 % — LOW (ref 34.5–46.5)
HCV AB S/CO SERPL IA: 0.09 S/CO — SIGNIFICANT CHANGE UP (ref 0–0.99)
HCV AB SERPL-IMP: SIGNIFICANT CHANGE UP
HGB BLD CALC-MCNC: 8 G/DL — LOW (ref 11.7–16.1)
HGB BLD CALC-MCNC: 8.5 G/DL — LOW (ref 11.7–16.1)
HGB BLD-MCNC: 10 G/DL
HGB BLD-MCNC: 10 G/DL — LOW (ref 11.5–15.5)
HGB BLD-MCNC: 10 G/DL — LOW (ref 11.5–15.5)
HGB BLD-MCNC: 10.1 G/DL
HGB BLD-MCNC: 10.2 G/DL
HGB BLD-MCNC: 10.2 G/DL
HGB BLD-MCNC: 10.2 G/DL — LOW (ref 11.5–15.5)
HGB BLD-MCNC: 10.3 G/DL
HGB BLD-MCNC: 10.4 G/DL
HGB BLD-MCNC: 10.4 G/DL
HGB BLD-MCNC: 10.4 G/DL — LOW (ref 11.5–15.5)
HGB BLD-MCNC: 10.4 G/DL — LOW (ref 11.5–15.5)
HGB BLD-MCNC: 10.5 G/DL
HGB BLD-MCNC: 10.5 G/DL — LOW (ref 11.5–15.5)
HGB BLD-MCNC: 10.6 G/DL
HGB BLD-MCNC: 10.7 G/DL
HGB BLD-MCNC: 10.9 G/DL — LOW (ref 11.5–15.5)
HGB BLD-MCNC: 11 G/DL — LOW (ref 11.5–15.5)
HGB BLD-MCNC: 11 G/DL — LOW (ref 11.5–15.5)
HGB BLD-MCNC: 11.3 G/DL — LOW (ref 11.5–15.5)
HGB BLD-MCNC: 6.1 G/DL — CRITICAL LOW (ref 11.5–15.5)
HGB BLD-MCNC: 6.6 G/DL — CRITICAL LOW (ref 11.5–15.5)
HGB BLD-MCNC: 6.6 G/DL — CRITICAL LOW (ref 11.5–15.5)
HGB BLD-MCNC: 6.7 G/DL — CRITICAL LOW (ref 11.5–15.5)
HGB BLD-MCNC: 6.8 G/DL — CRITICAL LOW (ref 11.5–15.5)
HGB BLD-MCNC: 7 G/DL — CRITICAL LOW (ref 11.5–15.5)
HGB BLD-MCNC: 7.1 G/DL — LOW (ref 11.5–15.5)
HGB BLD-MCNC: 7.3 G/DL — LOW (ref 11.5–15.5)
HGB BLD-MCNC: 7.4 G/DL — LOW (ref 11.5–15.5)
HGB BLD-MCNC: 7.5 G/DL — LOW (ref 11.5–15.5)
HGB BLD-MCNC: 7.6 G/DL — LOW (ref 11.5–15.5)
HGB BLD-MCNC: 7.6 G/DL — LOW (ref 11.5–15.5)
HGB BLD-MCNC: 7.7 G/DL — LOW (ref 11.5–15.5)
HGB BLD-MCNC: 7.8 G/DL — LOW (ref 11.5–15.5)
HGB BLD-MCNC: 7.8 G/DL — LOW (ref 11.5–15.5)
HGB BLD-MCNC: 7.9 G/DL — LOW (ref 11.5–15.5)
HGB BLD-MCNC: 7.9 G/DL — LOW (ref 11.5–15.5)
HGB BLD-MCNC: 8 G/DL — LOW (ref 11.5–15.5)
HGB BLD-MCNC: 8 G/DL — LOW (ref 11.5–15.5)
HGB BLD-MCNC: 8.1 G/DL — LOW (ref 11.5–15.5)
HGB BLD-MCNC: 8.3 G/DL — LOW (ref 11.5–15.5)
HGB BLD-MCNC: 8.4 G/DL — LOW (ref 11.5–15.5)
HGB BLD-MCNC: 8.5 G/DL
HGB BLD-MCNC: 8.5 G/DL — LOW (ref 11.5–15.5)
HGB BLD-MCNC: 8.5 G/DL — LOW (ref 11.5–15.5)
HGB BLD-MCNC: 8.6 G/DL — LOW (ref 11.5–15.5)
HGB BLD-MCNC: 8.6 G/DL — LOW (ref 11.5–15.5)
HGB BLD-MCNC: 8.7 G/DL — LOW (ref 11.5–15.5)
HGB BLD-MCNC: 8.9 G/DL — LOW (ref 11.5–15.5)
HGB BLD-MCNC: 9.3 G/DL
HGB BLD-MCNC: 9.5 G/DL
HGB BLD-MCNC: 9.5 G/DL
HGB BLD-MCNC: 9.5 G/DL — LOW (ref 11.5–15.5)
HGB BLD-MCNC: 9.6 G/DL
HGB BLD-MCNC: 9.6 G/DL — LOW (ref 11.5–15.5)
HGB BLD-MCNC: 9.7 G/DL — LOW (ref 11.5–15.5)
HGB BLD-MCNC: 9.8 G/DL
HGB BLD-MCNC: 9.8 G/DL — LOW (ref 11.5–15.5)
HGB BLD-MCNC: 9.9 G/DL
HGB BLD-MCNC: 9.9 G/DL
HGB BLD-MCNC: 9.9 G/DL — LOW (ref 11.5–15.5)
HIV 1+2 AB+HIV1 P24 AG SERPL QL IA: SIGNIFICANT CHANGE UP
HOROWITZ INDEX BLDV+IHG-RTO: SIGNIFICANT CHANGE UP
HYALINE CASTS # UR AUTO: 0 /LPF — SIGNIFICANT CHANGE UP (ref 0–7)
HYPOCHROMIA BLD QL: SLIGHT — SIGNIFICANT CHANGE UP
IMM GRANULOCYTES NFR BLD AUTO: 0 %
IMM GRANULOCYTES NFR BLD AUTO: 0.4 % — SIGNIFICANT CHANGE UP (ref 0–1.5)
IMM GRANULOCYTES NFR BLD AUTO: 0.4 % — SIGNIFICANT CHANGE UP (ref 0–1.5)
IMM GRANULOCYTES NFR BLD AUTO: 0.5 % — SIGNIFICANT CHANGE UP (ref 0–1.5)
IMM GRANULOCYTES NFR BLD AUTO: 0.6 %
IMM GRANULOCYTES NFR BLD AUTO: 0.9 % — SIGNIFICANT CHANGE UP (ref 0–1.5)
IMM GRANULOCYTES NFR BLD AUTO: 1 % — SIGNIFICANT CHANGE UP (ref 0–1.5)
IMM GRANULOCYTES NFR BLD AUTO: 1.2 % — SIGNIFICANT CHANGE UP (ref 0–1.5)
IMM GRANULOCYTES NFR BLD AUTO: 1.5 % — SIGNIFICANT CHANGE UP (ref 0–1.5)
IMM GRANULOCYTES NFR BLD AUTO: 1.5 % — SIGNIFICANT CHANGE UP (ref 0–1.5)
IMM GRANULOCYTES NFR BLD AUTO: 1.8 % — HIGH (ref 0–1.5)
IMM GRANULOCYTES NFR BLD AUTO: 2.3 %
INR BLD: 1.06 RATIO — SIGNIFICANT CHANGE UP (ref 0.88–1.16)
INR BLD: 1.12 RATIO — SIGNIFICANT CHANGE UP (ref 0.88–1.16)
INR BLD: 1.15 RATIO — SIGNIFICANT CHANGE UP (ref 0.88–1.16)
INR BLD: 1.16 RATIO — SIGNIFICANT CHANGE UP (ref 0.88–1.16)
INR BLD: 1.16 RATIO — SIGNIFICANT CHANGE UP (ref 0.88–1.16)
INR BLD: 1.21 RATIO — HIGH (ref 0.88–1.16)
INR BLD: 1.21 RATIO — HIGH (ref 0.88–1.16)
INR BLD: 1.23 RATIO — HIGH (ref 0.88–1.16)
INR BLD: 1.23 RATIO — HIGH (ref 0.88–1.16)
INR BLD: 1.27 RATIO — HIGH (ref 0.88–1.16)
INR BLD: 1.28 RATIO — HIGH (ref 0.88–1.16)
INR BLD: 1.29 RATIO — HIGH (ref 0.88–1.16)
INR BLD: 1.29 RATIO — HIGH (ref 0.88–1.16)
INR BLD: 1.41 RATIO — HIGH (ref 0.88–1.16)
INR BLD: 1.43 RATIO — HIGH (ref 0.88–1.16)
INR BLD: 1.63 RATIO — HIGH (ref 0.88–1.16)
INR BLD: 1.69 RATIO — HIGH (ref 0.88–1.16)
INR BLD: 1.7 RATIO — HIGH (ref 0.88–1.16)
INR BLD: 2.36 RATIO — HIGH (ref 0.88–1.16)
KETONES UR-MCNC: NEGATIVE — SIGNIFICANT CHANGE UP
KETONES UR-MCNC: NEGATIVE — SIGNIFICANT CHANGE UP
LACTATE BLDV-MCNC: 1.6 MMOL/L — SIGNIFICANT CHANGE UP (ref 0.5–2)
LACTATE BLDV-MCNC: 1.7 MMOL/L — SIGNIFICANT CHANGE UP (ref 0.5–2)
LACTATE SERPL-SCNC: 0.5 MMOL/L — SIGNIFICANT CHANGE UP (ref 0.5–2)
LACTATE SERPL-SCNC: 0.8 MMOL/L — SIGNIFICANT CHANGE UP (ref 0.5–2)
LACTATE SERPL-SCNC: 1 MMOL/L — SIGNIFICANT CHANGE UP (ref 0.5–2)
LACTATE SERPL-SCNC: 1 MMOL/L — SIGNIFICANT CHANGE UP (ref 0.5–2)
LACTATE SERPL-SCNC: 1.2 MMOL/L — SIGNIFICANT CHANGE UP (ref 0.5–2)
LACTATE SERPL-SCNC: 1.2 MMOL/L — SIGNIFICANT CHANGE UP (ref 0.5–2)
LACTATE SERPL-SCNC: 1.4 MMOL/L — SIGNIFICANT CHANGE UP (ref 0.5–2)
LACTATE SERPL-SCNC: 1.4 MMOL/L — SIGNIFICANT CHANGE UP (ref 0.5–2)
LACTATE SERPL-SCNC: 1.6 MMOL/L — SIGNIFICANT CHANGE UP (ref 0.5–2)
LACTATE SERPL-SCNC: 1.7 MMOL/L — SIGNIFICANT CHANGE UP (ref 0.5–2)
LACTATE SERPL-SCNC: 2.7 MMOL/L — HIGH (ref 0.5–2)
LDH SERPL L TO P-CCNC: 190 U/L — SIGNIFICANT CHANGE UP (ref 50–242)
LDH SERPL L TO P-CCNC: 190 U/L — SIGNIFICANT CHANGE UP (ref 50–242)
LDH SERPL L TO P-CCNC: 194 U/L — SIGNIFICANT CHANGE UP (ref 50–242)
LDH SERPL L TO P-CCNC: 195 U/L — SIGNIFICANT CHANGE UP (ref 50–242)
LDH SERPL L TO P-CCNC: 209 U/L — SIGNIFICANT CHANGE UP (ref 50–242)
LDH SERPL L TO P-CCNC: 245 U/L — HIGH (ref 50–242)
LDH SERPL L TO P-CCNC: 300 U/L — HIGH (ref 50–242)
LDH SERPL L TO P-CCNC: 303 U/L — HIGH (ref 50–242)
LDH SERPL L TO P-CCNC: 305 U/L — HIGH (ref 50–242)
LDH SERPL L TO P-CCNC: 306 U/L — HIGH (ref 50–242)
LDH SERPL L TO P-CCNC: 306 U/L — HIGH (ref 50–242)
LDH SERPL L TO P-CCNC: 308 U/L — HIGH (ref 50–242)
LDH SERPL L TO P-CCNC: 330 U/L — HIGH (ref 50–242)
LDH SERPL L TO P-CCNC: 331 U/L — HIGH (ref 50–242)
LDH SERPL L TO P-CCNC: 338 U/L — HIGH (ref 50–242)
LDH SERPL L TO P-CCNC: 342 U/L — HIGH (ref 50–242)
LDH SERPL L TO P-CCNC: 354 U/L — HIGH (ref 50–242)
LDH SERPL L TO P-CCNC: 355 U/L — HIGH (ref 50–242)
LDH SERPL L TO P-CCNC: 358 U/L — HIGH (ref 50–242)
LDH SERPL L TO P-CCNC: 408 U/L — HIGH (ref 50–242)
LDH SERPL L TO P-CCNC: 421 U/L — HIGH (ref 50–242)
LDH SERPL L TO P-CCNC: 422 U/L — HIGH (ref 50–242)
LDH SERPL L TO P-CCNC: 447 U/L — HIGH (ref 50–242)
LDH SERPL L TO P-CCNC: 455 U/L — HIGH (ref 50–242)
LDH SERPL L TO P-CCNC: 465 U/L — HIGH (ref 50–242)
LDH SERPL L TO P-CCNC: 465 U/L — HIGH (ref 50–242)
LDH SERPL L TO P-CCNC: 470 U/L — HIGH (ref 50–242)
LDH SERPL L TO P-CCNC: 554 U/L — HIGH (ref 50–242)
LDH SERPL L TO P-CCNC: 697 U/L — HIGH (ref 50–242)
LDH SERPL L TO P-CCNC: 786 U/L — HIGH (ref 50–242)
LDH SERPL L TO P-CCNC: 827 U/L — HIGH (ref 50–242)
LDH SERPL L TO P-CCNC: 842 U/L — HIGH (ref 50–242)
LDH SERPL L TO P-CCNC: 859 U/L — HIGH (ref 50–242)
LDH SERPL L TO P-CCNC: 880 U/L — HIGH (ref 50–242)
LDH SERPL-CCNC: 157 U/L
LDH SERPL-CCNC: 167 U/L
LDH SERPL-CCNC: 170 U/L
LDH SERPL-CCNC: 177 U/L
LDH SERPL-CCNC: 184 U/L
LDH SERPL-CCNC: 185 U/L
LDH SERPL-CCNC: 194 U/L
LDH SERPL-CCNC: 201 U/L
LDH SERPL-CCNC: 201 U/L
LDH SERPL-CCNC: 202 U/L
LDH SERPL-CCNC: 205 U/L
LDH SERPL-CCNC: 209 U/L
LDH SERPL-CCNC: 210 U/L
LDH SERPL-CCNC: 216 U/L
LDH SERPL-CCNC: 218 U/L
LDH SERPL-CCNC: 224 U/L
LDH SERPL-CCNC: 238 U/L
LDH SERPL-CCNC: 239 U/L
LDH SERPL-CCNC: 253 U/L
LDH SERPL-CCNC: 254 U/L
LDH SERPL-CCNC: 258 U/L
LEUKOCYTE ESTERASE UR-ACNC: NEGATIVE — SIGNIFICANT CHANGE UP
LEUKOCYTE ESTERASE UR-ACNC: NEGATIVE — SIGNIFICANT CHANGE UP
LYMPHOCYTES # BLD AUTO: 0.05 K/UL — LOW (ref 1–3.3)
LYMPHOCYTES # BLD AUTO: 0.16 K/UL — LOW (ref 1–3.3)
LYMPHOCYTES # BLD AUTO: 0.26 K/UL — LOW (ref 1–3.3)
LYMPHOCYTES # BLD AUTO: 0.27 K/UL
LYMPHOCYTES # BLD AUTO: 0.28 K/UL
LYMPHOCYTES # BLD AUTO: 0.28 K/UL — LOW (ref 1–3.3)
LYMPHOCYTES # BLD AUTO: 0.29 K/UL
LYMPHOCYTES # BLD AUTO: 0.29 K/UL
LYMPHOCYTES # BLD AUTO: 0.32 K/UL
LYMPHOCYTES # BLD AUTO: 0.35 K/UL — LOW (ref 1–3.3)
LYMPHOCYTES # BLD AUTO: 0.36 K/UL
LYMPHOCYTES # BLD AUTO: 0.38 K/UL — LOW (ref 1–3.3)
LYMPHOCYTES # BLD AUTO: 0.39 K/UL
LYMPHOCYTES # BLD AUTO: 0.39 K/UL — LOW (ref 1–3.3)
LYMPHOCYTES # BLD AUTO: 0.4 K/UL
LYMPHOCYTES # BLD AUTO: 0.4 K/UL — LOW (ref 1–3.3)
LYMPHOCYTES # BLD AUTO: 0.41 K/UL
LYMPHOCYTES # BLD AUTO: 0.43 K/UL
LYMPHOCYTES # BLD AUTO: 0.43 K/UL
LYMPHOCYTES # BLD AUTO: 0.43 K/UL — LOW (ref 1–3.3)
LYMPHOCYTES # BLD AUTO: 0.46 K/UL — LOW (ref 1–3.3)
LYMPHOCYTES # BLD AUTO: 0.46 K/UL — LOW (ref 1–3.3)
LYMPHOCYTES # BLD AUTO: 0.47 K/UL — LOW (ref 1–3.3)
LYMPHOCYTES # BLD AUTO: 0.48 K/UL
LYMPHOCYTES # BLD AUTO: 0.49 K/UL
LYMPHOCYTES # BLD AUTO: 0.5 K/UL — LOW (ref 1–3.3)
LYMPHOCYTES # BLD AUTO: 0.51 K/UL — LOW (ref 1–3.3)
LYMPHOCYTES # BLD AUTO: 0.54 K/UL — LOW (ref 1–3.3)
LYMPHOCYTES # BLD AUTO: 0.59 K/UL
LYMPHOCYTES # BLD AUTO: 0.6 K/UL — LOW (ref 1–3.3)
LYMPHOCYTES # BLD AUTO: 0.67 K/UL — LOW (ref 1–3.3)
LYMPHOCYTES # BLD AUTO: 0.69 K/UL
LYMPHOCYTES # BLD AUTO: 0.75 K/UL
LYMPHOCYTES # BLD AUTO: 0.78 K/UL — LOW (ref 1–3.3)
LYMPHOCYTES # BLD AUTO: 0.87 K/UL — LOW (ref 1–3.3)
LYMPHOCYTES # BLD AUTO: 0.9 % — LOW (ref 13–44)
LYMPHOCYTES # BLD AUTO: 0.9 % — LOW (ref 13–44)
LYMPHOCYTES # BLD AUTO: 1 % — LOW (ref 13–44)
LYMPHOCYTES # BLD AUTO: 1.01 K/UL — SIGNIFICANT CHANGE UP (ref 1–3.3)
LYMPHOCYTES # BLD AUTO: 11.6 % — LOW (ref 13–44)
LYMPHOCYTES # BLD AUTO: 11.6 % — LOW (ref 13–44)
LYMPHOCYTES # BLD AUTO: 13.8 % — SIGNIFICANT CHANGE UP (ref 13–44)
LYMPHOCYTES # BLD AUTO: 14.7 % — SIGNIFICANT CHANGE UP (ref 13–44)
LYMPHOCYTES # BLD AUTO: 19.1 % — SIGNIFICANT CHANGE UP (ref 13–44)
LYMPHOCYTES # BLD AUTO: 19.9 % — SIGNIFICANT CHANGE UP (ref 13–44)
LYMPHOCYTES # BLD AUTO: 2.07 K/UL — SIGNIFICANT CHANGE UP (ref 1–3.3)
LYMPHOCYTES # BLD AUTO: 2.9 % — LOW (ref 13–44)
LYMPHOCYTES # BLD AUTO: 20.3 % — SIGNIFICANT CHANGE UP (ref 13–44)
LYMPHOCYTES # BLD AUTO: 20.4 % — SIGNIFICANT CHANGE UP (ref 13–44)
LYMPHOCYTES # BLD AUTO: 22.1 % — SIGNIFICANT CHANGE UP (ref 13–44)
LYMPHOCYTES # BLD AUTO: 24 % — SIGNIFICANT CHANGE UP (ref 13–44)
LYMPHOCYTES # BLD AUTO: 28 % — SIGNIFICANT CHANGE UP (ref 13–44)
LYMPHOCYTES # BLD AUTO: 28 % — SIGNIFICANT CHANGE UP (ref 13–44)
LYMPHOCYTES # BLD AUTO: 29 % — SIGNIFICANT CHANGE UP (ref 13–44)
LYMPHOCYTES # BLD AUTO: 30 % — SIGNIFICANT CHANGE UP (ref 13–44)
LYMPHOCYTES # BLD AUTO: 36 % — SIGNIFICANT CHANGE UP (ref 13–44)
LYMPHOCYTES # BLD AUTO: 37 % — SIGNIFICANT CHANGE UP (ref 13–44)
LYMPHOCYTES # BLD AUTO: 5.19 K/UL — HIGH (ref 1–3.3)
LYMPHOCYTES # BLD AUTO: 54 % — HIGH (ref 13–44)
LYMPHOCYTES # BLD AUTO: 6 % — LOW (ref 13–44)
LYMPHOCYTES # BLD AUTO: 6 % — LOW (ref 13–44)
LYMPHOCYTES # BLD AUTO: 6.1 K/UL — HIGH (ref 1–3.3)
LYMPHOCYTES NFR BLD AUTO: 13 %
LYMPHOCYTES NFR BLD AUTO: 13.2 %
LYMPHOCYTES NFR BLD AUTO: 14.8 %
LYMPHOCYTES NFR BLD AUTO: 25.4 %
LYMPHOCYTES NFR BLD AUTO: 32.6 %
LYMPHOCYTES NFR BLD AUTO: 36.3 %
LYMPHOCYTES NFR BLD AUTO: 43 %
LYMPHOCYTES NFR BLD AUTO: 47.7 %
LYMPHOCYTES NFR BLD AUTO: 52.2 %
LYMPHOCYTES NFR BLD AUTO: 57.1 %
LYMPHOCYTES NFR BLD AUTO: 59.3 %
LYMPHOCYTES NFR BLD AUTO: 60.3 %
LYMPHOCYTES NFR BLD AUTO: 65.9 %
LYMPHOCYTES NFR BLD AUTO: 65.9 %
LYMPHOCYTES NFR BLD AUTO: 72.1 %
LYMPHOCYTES NFR BLD AUTO: 85 %
LYMPHOCYTES NFR BLD AUTO: 86.5 %
LYMPHOCYTES NFR BLD AUTO: 86.7 %
LYMPHOCYTES NFR BLD AUTO: 90.1 %
LYMPHOCYTES NFR BLD AUTO: 90.3 %
MACROCYTES BLD QL: SLIGHT — SIGNIFICANT CHANGE UP
MAGNESIUM SERPL-MCNC: 1.9 MG/DL — SIGNIFICANT CHANGE UP (ref 1.6–2.6)
MAGNESIUM SERPL-MCNC: 2 MG/DL — SIGNIFICANT CHANGE UP (ref 1.6–2.6)
MAGNESIUM SERPL-MCNC: 2.1 MG/DL — SIGNIFICANT CHANGE UP (ref 1.6–2.6)
MAGNESIUM SERPL-MCNC: 2.2 MG/DL — SIGNIFICANT CHANGE UP (ref 1.6–2.6)
MAGNESIUM SERPL-MCNC: 2.3 MG/DL — SIGNIFICANT CHANGE UP (ref 1.6–2.6)
MAGNESIUM SERPL-MCNC: 2.4 MG/DL — SIGNIFICANT CHANGE UP (ref 1.6–2.6)
MAGNESIUM SERPL-MCNC: 2.5 MG/DL — SIGNIFICANT CHANGE UP (ref 1.6–2.6)
MAGNESIUM SERPL-MCNC: 2.6 MG/DL — SIGNIFICANT CHANGE UP (ref 1.6–2.6)
MAN DIFF?: NORMAL
MANUAL DIF COMMENT BLD-IMP: SIGNIFICANT CHANGE UP
MANUAL SMEAR VERIFICATION: SIGNIFICANT CHANGE UP
MANUAL SMEAR VERIFICATION: SIGNIFICANT CHANGE UP
MCHC RBC-ENTMCNC: 30.5 GM/DL
MCHC RBC-ENTMCNC: 30.5 GM/DL
MCHC RBC-ENTMCNC: 30.5 PG — SIGNIFICANT CHANGE UP (ref 27–34)
MCHC RBC-ENTMCNC: 30.6 PG
MCHC RBC-ENTMCNC: 30.7 GM/DL
MCHC RBC-ENTMCNC: 30.7 PG — SIGNIFICANT CHANGE UP (ref 27–34)
MCHC RBC-ENTMCNC: 30.8 GM/DL
MCHC RBC-ENTMCNC: 30.8 PG
MCHC RBC-ENTMCNC: 30.9 PG — SIGNIFICANT CHANGE UP (ref 27–34)
MCHC RBC-ENTMCNC: 31 GM/DL
MCHC RBC-ENTMCNC: 31 GM/DL
MCHC RBC-ENTMCNC: 31 PG
MCHC RBC-ENTMCNC: 31 PG — SIGNIFICANT CHANGE UP (ref 27–34)
MCHC RBC-ENTMCNC: 31.1 GM/DL
MCHC RBC-ENTMCNC: 31.1 PG — SIGNIFICANT CHANGE UP (ref 27–34)
MCHC RBC-ENTMCNC: 31.1 PG — SIGNIFICANT CHANGE UP (ref 27–34)
MCHC RBC-ENTMCNC: 31.2 GM/DL
MCHC RBC-ENTMCNC: 31.2 PG
MCHC RBC-ENTMCNC: 31.2 PG — SIGNIFICANT CHANGE UP (ref 27–34)
MCHC RBC-ENTMCNC: 31.2 PG — SIGNIFICANT CHANGE UP (ref 27–34)
MCHC RBC-ENTMCNC: 31.3 GM/DL
MCHC RBC-ENTMCNC: 31.3 PG
MCHC RBC-ENTMCNC: 31.3 PG
MCHC RBC-ENTMCNC: 31.3 PG — SIGNIFICANT CHANGE UP (ref 27–34)
MCHC RBC-ENTMCNC: 31.4 GM/DL
MCHC RBC-ENTMCNC: 31.4 PG
MCHC RBC-ENTMCNC: 31.4 PG — SIGNIFICANT CHANGE UP (ref 27–34)
MCHC RBC-ENTMCNC: 31.5 GM/DL
MCHC RBC-ENTMCNC: 31.5 GM/DL
MCHC RBC-ENTMCNC: 31.5 PG
MCHC RBC-ENTMCNC: 31.5 PG — SIGNIFICANT CHANGE UP (ref 27–34)
MCHC RBC-ENTMCNC: 31.6 PG
MCHC RBC-ENTMCNC: 31.6 PG
MCHC RBC-ENTMCNC: 31.6 PG — SIGNIFICANT CHANGE UP (ref 27–34)
MCHC RBC-ENTMCNC: 31.7 PG
MCHC RBC-ENTMCNC: 31.7 PG — SIGNIFICANT CHANGE UP (ref 27–34)
MCHC RBC-ENTMCNC: 31.8 GM/DL — LOW (ref 32–36)
MCHC RBC-ENTMCNC: 31.8 GM/DL — LOW (ref 32–36)
MCHC RBC-ENTMCNC: 31.9 G/DL — LOW (ref 32–36)
MCHC RBC-ENTMCNC: 31.9 GM/DL — LOW (ref 32–36)
MCHC RBC-ENTMCNC: 31.9 PG — SIGNIFICANT CHANGE UP (ref 27–34)
MCHC RBC-ENTMCNC: 32 GM/DL — SIGNIFICANT CHANGE UP (ref 32–36)
MCHC RBC-ENTMCNC: 32 PG
MCHC RBC-ENTMCNC: 32 PG
MCHC RBC-ENTMCNC: 32 PG — SIGNIFICANT CHANGE UP (ref 27–34)
MCHC RBC-ENTMCNC: 32.1 GM/DL
MCHC RBC-ENTMCNC: 32.1 PG
MCHC RBC-ENTMCNC: 32.1 PG
MCHC RBC-ENTMCNC: 32.1 PG — SIGNIFICANT CHANGE UP (ref 27–34)
MCHC RBC-ENTMCNC: 32.2 G/DL — SIGNIFICANT CHANGE UP (ref 32–36)
MCHC RBC-ENTMCNC: 32.2 PG
MCHC RBC-ENTMCNC: 32.2 PG — SIGNIFICANT CHANGE UP (ref 27–34)
MCHC RBC-ENTMCNC: 32.3 G/DL — SIGNIFICANT CHANGE UP (ref 32–36)
MCHC RBC-ENTMCNC: 32.3 GM/DL
MCHC RBC-ENTMCNC: 32.3 GM/DL — SIGNIFICANT CHANGE UP (ref 32–36)
MCHC RBC-ENTMCNC: 32.3 PG
MCHC RBC-ENTMCNC: 32.4 G/DL — SIGNIFICANT CHANGE UP (ref 32–36)
MCHC RBC-ENTMCNC: 32.4 GM/DL — SIGNIFICANT CHANGE UP (ref 32–36)
MCHC RBC-ENTMCNC: 32.4 PG — SIGNIFICANT CHANGE UP (ref 27–34)
MCHC RBC-ENTMCNC: 32.5 PG
MCHC RBC-ENTMCNC: 32.5 PG — SIGNIFICANT CHANGE UP (ref 27–34)
MCHC RBC-ENTMCNC: 32.6 G/DL — SIGNIFICANT CHANGE UP (ref 32–36)
MCHC RBC-ENTMCNC: 32.6 G/DL — SIGNIFICANT CHANGE UP (ref 32–36)
MCHC RBC-ENTMCNC: 32.6 GM/DL — SIGNIFICANT CHANGE UP (ref 32–36)
MCHC RBC-ENTMCNC: 32.6 PG — SIGNIFICANT CHANGE UP (ref 27–34)
MCHC RBC-ENTMCNC: 32.7 G/DL — SIGNIFICANT CHANGE UP (ref 32–36)
MCHC RBC-ENTMCNC: 32.7 GM/DL
MCHC RBC-ENTMCNC: 32.7 GM/DL — SIGNIFICANT CHANGE UP (ref 32–36)
MCHC RBC-ENTMCNC: 32.7 GM/DL — SIGNIFICANT CHANGE UP (ref 32–36)
MCHC RBC-ENTMCNC: 32.8 G/DL — SIGNIFICANT CHANGE UP (ref 32–36)
MCHC RBC-ENTMCNC: 32.8 G/DL — SIGNIFICANT CHANGE UP (ref 32–36)
MCHC RBC-ENTMCNC: 32.8 PG — SIGNIFICANT CHANGE UP (ref 27–34)
MCHC RBC-ENTMCNC: 32.9 G/DL — SIGNIFICANT CHANGE UP (ref 32–36)
MCHC RBC-ENTMCNC: 32.9 G/DL — SIGNIFICANT CHANGE UP (ref 32–36)
MCHC RBC-ENTMCNC: 32.9 PG — SIGNIFICANT CHANGE UP (ref 27–34)
MCHC RBC-ENTMCNC: 32.9 PG — SIGNIFICANT CHANGE UP (ref 27–34)
MCHC RBC-ENTMCNC: 33.1 G/DL — SIGNIFICANT CHANGE UP (ref 32–36)
MCHC RBC-ENTMCNC: 33.1 G/DL — SIGNIFICANT CHANGE UP (ref 32–36)
MCHC RBC-ENTMCNC: 33.1 GM/DL — SIGNIFICANT CHANGE UP (ref 32–36)
MCHC RBC-ENTMCNC: 33.1 GM/DL — SIGNIFICANT CHANGE UP (ref 32–36)
MCHC RBC-ENTMCNC: 33.1 PG — SIGNIFICANT CHANGE UP (ref 27–34)
MCHC RBC-ENTMCNC: 33.1 PG — SIGNIFICANT CHANGE UP (ref 27–34)
MCHC RBC-ENTMCNC: 33.2 G/DL — SIGNIFICANT CHANGE UP (ref 32–36)
MCHC RBC-ENTMCNC: 33.2 GM/DL — SIGNIFICANT CHANGE UP (ref 32–36)
MCHC RBC-ENTMCNC: 33.2 GM/DL — SIGNIFICANT CHANGE UP (ref 32–36)
MCHC RBC-ENTMCNC: 33.2 PG — SIGNIFICANT CHANGE UP (ref 27–34)
MCHC RBC-ENTMCNC: 33.3 G/DL — SIGNIFICANT CHANGE UP (ref 32–36)
MCHC RBC-ENTMCNC: 33.3 GM/DL — SIGNIFICANT CHANGE UP (ref 32–36)
MCHC RBC-ENTMCNC: 33.4 G/DL — SIGNIFICANT CHANGE UP (ref 32–36)
MCHC RBC-ENTMCNC: 33.5 GM/DL — SIGNIFICANT CHANGE UP (ref 32–36)
MCHC RBC-ENTMCNC: 33.5 GM/DL — SIGNIFICANT CHANGE UP (ref 32–36)
MCHC RBC-ENTMCNC: 33.6 GM/DL — SIGNIFICANT CHANGE UP (ref 32–36)
MCHC RBC-ENTMCNC: 33.7 G/DL — SIGNIFICANT CHANGE UP (ref 32–36)
MCHC RBC-ENTMCNC: 33.7 PG — SIGNIFICANT CHANGE UP (ref 27–34)
MCHC RBC-ENTMCNC: 33.7 PG — SIGNIFICANT CHANGE UP (ref 27–34)
MCHC RBC-ENTMCNC: 33.8 GM/DL — SIGNIFICANT CHANGE UP (ref 32–36)
MCHC RBC-ENTMCNC: 34 G/DL — SIGNIFICANT CHANGE UP (ref 32–36)
MCHC RBC-ENTMCNC: 34 GM/DL — SIGNIFICANT CHANGE UP (ref 32–36)
MCHC RBC-ENTMCNC: 34.1 GM/DL — SIGNIFICANT CHANGE UP (ref 32–36)
MCHC RBC-ENTMCNC: 34.2 GM/DL — SIGNIFICANT CHANGE UP (ref 32–36)
MCHC RBC-ENTMCNC: 34.2 GM/DL — SIGNIFICANT CHANGE UP (ref 32–36)
MCHC RBC-ENTMCNC: 34.2 PG — HIGH (ref 27–34)
MCHC RBC-ENTMCNC: 34.4 GM/DL — SIGNIFICANT CHANGE UP (ref 32–36)
MCHC RBC-ENTMCNC: 34.6 GM/DL — SIGNIFICANT CHANGE UP (ref 32–36)
MCHC RBC-ENTMCNC: 36 PG — HIGH (ref 27–34)
MCV RBC AUTO: 100 FL
MCV RBC AUTO: 100 FL — SIGNIFICANT CHANGE UP (ref 80–100)
MCV RBC AUTO: 100.3 FL
MCV RBC AUTO: 100.4 FL — HIGH (ref 80–100)
MCV RBC AUTO: 100.6 FL
MCV RBC AUTO: 100.7 FL — HIGH (ref 80–100)
MCV RBC AUTO: 101.2 FL
MCV RBC AUTO: 101.5 FL
MCV RBC AUTO: 101.6 FL
MCV RBC AUTO: 101.7 FL
MCV RBC AUTO: 102 FL — HIGH (ref 80–100)
MCV RBC AUTO: 102.3 FL
MCV RBC AUTO: 102.4 FL
MCV RBC AUTO: 102.5 FL
MCV RBC AUTO: 102.5 FL
MCV RBC AUTO: 102.6 FL — HIGH (ref 80–100)
MCV RBC AUTO: 103 FL
MCV RBC AUTO: 103.1 FL
MCV RBC AUTO: 103.1 FL — HIGH (ref 80–100)
MCV RBC AUTO: 103.6 FL — HIGH (ref 80–100)
MCV RBC AUTO: 103.7 FL
MCV RBC AUTO: 103.7 FL — HIGH (ref 80–100)
MCV RBC AUTO: 103.9 FL — HIGH (ref 80–100)
MCV RBC AUTO: 104.3 FL
MCV RBC AUTO: 111.2 FL — HIGH (ref 80–100)
MCV RBC AUTO: 90.9 FL — SIGNIFICANT CHANGE UP (ref 80–100)
MCV RBC AUTO: 91.4 FL — SIGNIFICANT CHANGE UP (ref 80–100)
MCV RBC AUTO: 91.4 FL — SIGNIFICANT CHANGE UP (ref 80–100)
MCV RBC AUTO: 91.7 FL — SIGNIFICANT CHANGE UP (ref 80–100)
MCV RBC AUTO: 91.9 FL — SIGNIFICANT CHANGE UP (ref 80–100)
MCV RBC AUTO: 91.9 FL — SIGNIFICANT CHANGE UP (ref 80–100)
MCV RBC AUTO: 92.6 FL — SIGNIFICANT CHANGE UP (ref 80–100)
MCV RBC AUTO: 93.2 FL — SIGNIFICANT CHANGE UP (ref 80–100)
MCV RBC AUTO: 93.2 FL — SIGNIFICANT CHANGE UP (ref 80–100)
MCV RBC AUTO: 93.3 FL — SIGNIFICANT CHANGE UP (ref 80–100)
MCV RBC AUTO: 93.3 FL — SIGNIFICANT CHANGE UP (ref 80–100)
MCV RBC AUTO: 93.4 FL — SIGNIFICANT CHANGE UP (ref 80–100)
MCV RBC AUTO: 93.4 FL — SIGNIFICANT CHANGE UP (ref 80–100)
MCV RBC AUTO: 93.6 FL — SIGNIFICANT CHANGE UP (ref 80–100)
MCV RBC AUTO: 93.8 FL — SIGNIFICANT CHANGE UP (ref 80–100)
MCV RBC AUTO: 94.1 FL — SIGNIFICANT CHANGE UP (ref 80–100)
MCV RBC AUTO: 94.2 FL — SIGNIFICANT CHANGE UP (ref 80–100)
MCV RBC AUTO: 94.3 FL — SIGNIFICANT CHANGE UP (ref 80–100)
MCV RBC AUTO: 94.4 FL — SIGNIFICANT CHANGE UP (ref 80–100)
MCV RBC AUTO: 95 FL — SIGNIFICANT CHANGE UP (ref 80–100)
MCV RBC AUTO: 95.5 FL — SIGNIFICANT CHANGE UP (ref 80–100)
MCV RBC AUTO: 96.3 FL — SIGNIFICANT CHANGE UP (ref 80–100)
MCV RBC AUTO: 96.3 FL — SIGNIFICANT CHANGE UP (ref 80–100)
MCV RBC AUTO: 96.4 FL — SIGNIFICANT CHANGE UP (ref 80–100)
MCV RBC AUTO: 97 FL — SIGNIFICANT CHANGE UP (ref 80–100)
MCV RBC AUTO: 97.2 FL — SIGNIFICANT CHANGE UP (ref 80–100)
MCV RBC AUTO: 97.3 FL — SIGNIFICANT CHANGE UP (ref 80–100)
MCV RBC AUTO: 97.3 FL — SIGNIFICANT CHANGE UP (ref 80–100)
MCV RBC AUTO: 97.4 FL — SIGNIFICANT CHANGE UP (ref 80–100)
MCV RBC AUTO: 97.5 FL — SIGNIFICANT CHANGE UP (ref 80–100)
MCV RBC AUTO: 98 FL — SIGNIFICANT CHANGE UP (ref 80–100)
MCV RBC AUTO: 98.1 FL
MCV RBC AUTO: 98.3 FL — SIGNIFICANT CHANGE UP (ref 80–100)
MCV RBC AUTO: 98.3 FL — SIGNIFICANT CHANGE UP (ref 80–100)
MCV RBC AUTO: 98.4 FL
MCV RBC AUTO: 98.5 FL
MCV RBC AUTO: 98.8 FL — SIGNIFICANT CHANGE UP (ref 80–100)
MCV RBC AUTO: 99 FL — SIGNIFICANT CHANGE UP (ref 80–100)
MCV RBC AUTO: 99.4 FL — SIGNIFICANT CHANGE UP (ref 80–100)
MCV RBC AUTO: 99.4 FL — SIGNIFICANT CHANGE UP (ref 80–100)
METHOD TYPE: SIGNIFICANT CHANGE UP
MICROCYTES BLD QL: SLIGHT — SIGNIFICANT CHANGE UP
MICROCYTES BLD QL: SLIGHT — SIGNIFICANT CHANGE UP
MONOCYTES # BLD AUTO: 0 K/UL — SIGNIFICANT CHANGE UP (ref 0–0.9)
MONOCYTES # BLD AUTO: 0.01 K/UL
MONOCYTES # BLD AUTO: 0.01 K/UL
MONOCYTES # BLD AUTO: 0.01 K/UL — SIGNIFICANT CHANGE UP (ref 0–0.9)
MONOCYTES # BLD AUTO: 0.02 K/UL
MONOCYTES # BLD AUTO: 0.03 K/UL
MONOCYTES # BLD AUTO: 0.03 K/UL
MONOCYTES # BLD AUTO: 0.03 K/UL — SIGNIFICANT CHANGE UP (ref 0–0.9)
MONOCYTES # BLD AUTO: 0.03 K/UL — SIGNIFICANT CHANGE UP (ref 0–0.9)
MONOCYTES # BLD AUTO: 0.04 K/UL
MONOCYTES # BLD AUTO: 0.04 K/UL
MONOCYTES # BLD AUTO: 0.04 K/UL — SIGNIFICANT CHANGE UP (ref 0–0.9)
MONOCYTES # BLD AUTO: 0.05 K/UL
MONOCYTES # BLD AUTO: 0.05 K/UL
MONOCYTES # BLD AUTO: 0.06 K/UL
MONOCYTES # BLD AUTO: 0.06 K/UL
MONOCYTES # BLD AUTO: 0.07 K/UL
MONOCYTES # BLD AUTO: 0.08 K/UL — SIGNIFICANT CHANGE UP (ref 0–0.9)
MONOCYTES # BLD AUTO: 0.11 K/UL
MONOCYTES # BLD AUTO: 0.11 K/UL — SIGNIFICANT CHANGE UP (ref 0–0.9)
MONOCYTES # BLD AUTO: 0.13 K/UL
MONOCYTES # BLD AUTO: 0.14 K/UL
MONOCYTES # BLD AUTO: 0.19 K/UL — SIGNIFICANT CHANGE UP (ref 0–0.9)
MONOCYTES # BLD AUTO: 0.26 K/UL — SIGNIFICANT CHANGE UP (ref 0–0.9)
MONOCYTES # BLD AUTO: 0.29 K/UL
MONOCYTES # BLD AUTO: 0.35 K/UL — SIGNIFICANT CHANGE UP (ref 0–0.9)
MONOCYTES # BLD AUTO: 0.36 K/UL — SIGNIFICANT CHANGE UP (ref 0–0.9)
MONOCYTES # BLD AUTO: 0.39 K/UL — SIGNIFICANT CHANGE UP (ref 0–0.9)
MONOCYTES # BLD AUTO: 0.44 K/UL — SIGNIFICANT CHANGE UP (ref 0–0.9)
MONOCYTES # BLD AUTO: 0.47 K/UL — SIGNIFICANT CHANGE UP (ref 0–0.9)
MONOCYTES # BLD AUTO: 0.51 K/UL — SIGNIFICANT CHANGE UP (ref 0–0.9)
MONOCYTES # BLD AUTO: 0.55 K/UL
MONOCYTES # BLD AUTO: 0.59 K/UL — SIGNIFICANT CHANGE UP (ref 0–0.9)
MONOCYTES # BLD AUTO: 0.59 K/UL — SIGNIFICANT CHANGE UP (ref 0–0.9)
MONOCYTES # BLD AUTO: 1.73 K/UL — HIGH (ref 0–0.9)
MONOCYTES # BLD AUTO: 2.03 K/UL — HIGH (ref 0–0.9)
MONOCYTES NFR BLD AUTO: 0 % — LOW (ref 2–14)
MONOCYTES NFR BLD AUTO: 1 % — LOW (ref 2–14)
MONOCYTES NFR BLD AUTO: 11.5 %
MONOCYTES NFR BLD AUTO: 11.5 % — SIGNIFICANT CHANGE UP (ref 2–14)
MONOCYTES NFR BLD AUTO: 12 % — SIGNIFICANT CHANGE UP (ref 2–14)
MONOCYTES NFR BLD AUTO: 12 % — SIGNIFICANT CHANGE UP (ref 2–14)
MONOCYTES NFR BLD AUTO: 13.2 % — SIGNIFICANT CHANGE UP (ref 2–14)
MONOCYTES NFR BLD AUTO: 13.5 % — SIGNIFICANT CHANGE UP (ref 2–14)
MONOCYTES NFR BLD AUTO: 14 %
MONOCYTES NFR BLD AUTO: 17.7 % — HIGH (ref 2–14)
MONOCYTES NFR BLD AUTO: 2 % — SIGNIFICANT CHANGE UP (ref 2–14)
MONOCYTES NFR BLD AUTO: 2 % — SIGNIFICANT CHANGE UP (ref 2–14)
MONOCYTES NFR BLD AUTO: 2.7 %
MONOCYTES NFR BLD AUTO: 2.7 %
MONOCYTES NFR BLD AUTO: 20 %
MONOCYTES NFR BLD AUTO: 22 % — HIGH (ref 2–14)
MONOCYTES NFR BLD AUTO: 22.5 % — HIGH (ref 2–14)
MONOCYTES NFR BLD AUTO: 3 %
MONOCYTES NFR BLD AUTO: 3 % — SIGNIFICANT CHANGE UP (ref 2–14)
MONOCYTES NFR BLD AUTO: 3.2 %
MONOCYTES NFR BLD AUTO: 3.3 %
MONOCYTES NFR BLD AUTO: 3.5 %
MONOCYTES NFR BLD AUTO: 4 %
MONOCYTES NFR BLD AUTO: 4 % — SIGNIFICANT CHANGE UP (ref 2–14)
MONOCYTES NFR BLD AUTO: 4.2 % — SIGNIFICANT CHANGE UP (ref 2–14)
MONOCYTES NFR BLD AUTO: 4.5 %
MONOCYTES NFR BLD AUTO: 4.5 %
MONOCYTES NFR BLD AUTO: 4.7 %
MONOCYTES NFR BLD AUTO: 5 % — SIGNIFICANT CHANGE UP (ref 2–14)
MONOCYTES NFR BLD AUTO: 5.5 %
MONOCYTES NFR BLD AUTO: 6 % — SIGNIFICANT CHANGE UP (ref 2–14)
MONOCYTES NFR BLD AUTO: 6.3 %
MONOCYTES NFR BLD AUTO: 6.7 %
MONOCYTES NFR BLD AUTO: 7 %
MONOCYTES NFR BLD AUTO: 7.9 % — SIGNIFICANT CHANGE UP (ref 2–14)
MONOCYTES NFR BLD AUTO: 8.3 %
MONOCYTES NFR BLD AUTO: 8.4 % — SIGNIFICANT CHANGE UP (ref 2–14)
MONOCYTES NFR BLD AUTO: 8.8 %
MONOCYTES NFR BLD AUTO: 8.9 %
MRSA PCR RESULT.: SIGNIFICANT CHANGE UP
NEUTROPHILS # BLD AUTO: 0 K/UL — LOW (ref 1.8–7.4)
NEUTROPHILS # BLD AUTO: 0.01 K/UL
NEUTROPHILS # BLD AUTO: 0.02 K/UL
NEUTROPHILS # BLD AUTO: 0.03 K/UL
NEUTROPHILS # BLD AUTO: 0.04 K/UL
NEUTROPHILS # BLD AUTO: 0.04 K/UL
NEUTROPHILS # BLD AUTO: 0.11 K/UL
NEUTROPHILS # BLD AUTO: 0.12 K/UL
NEUTROPHILS # BLD AUTO: 0.2 K/UL
NEUTROPHILS # BLD AUTO: 0.23 K/UL
NEUTROPHILS # BLD AUTO: 0.24 K/UL
NEUTROPHILS # BLD AUTO: 0.27 K/UL — LOW (ref 1.8–7.4)
NEUTROPHILS # BLD AUTO: 0.28 K/UL
NEUTROPHILS # BLD AUTO: 0.33 K/UL — LOW (ref 1.8–7.4)
NEUTROPHILS # BLD AUTO: 0.35 K/UL
NEUTROPHILS # BLD AUTO: 0.4 K/UL
NEUTROPHILS # BLD AUTO: 0.4 K/UL — LOW (ref 1.8–7.4)
NEUTROPHILS # BLD AUTO: 0.41 K/UL
NEUTROPHILS # BLD AUTO: 0.52 K/UL
NEUTROPHILS # BLD AUTO: 0.64 K/UL — LOW (ref 1.8–7.4)
NEUTROPHILS # BLD AUTO: 0.64 K/UL — LOW (ref 1.8–7.4)
NEUTROPHILS # BLD AUTO: 0.79 K/UL — LOW (ref 1.8–7.4)
NEUTROPHILS # BLD AUTO: 0.84 K/UL
NEUTROPHILS # BLD AUTO: 1 K/UL — LOW (ref 1.8–7.4)
NEUTROPHILS # BLD AUTO: 1.09 K/UL
NEUTROPHILS # BLD AUTO: 1.26 K/UL — LOW (ref 1.8–7.4)
NEUTROPHILS # BLD AUTO: 1.44 K/UL — LOW (ref 1.8–7.4)
NEUTROPHILS # BLD AUTO: 1.45 K/UL — LOW (ref 1.8–7.4)
NEUTROPHILS # BLD AUTO: 1.48 K/UL
NEUTROPHILS # BLD AUTO: 1.5 K/UL — LOW (ref 1.8–7.4)
NEUTROPHILS # BLD AUTO: 1.53 K/UL — LOW (ref 1.8–7.4)
NEUTROPHILS # BLD AUTO: 1.59 K/UL — LOW (ref 1.8–7.4)
NEUTROPHILS # BLD AUTO: 1.6 K/UL — LOW (ref 1.8–7.4)
NEUTROPHILS # BLD AUTO: 1.66 K/UL — LOW (ref 1.8–7.4)
NEUTROPHILS # BLD AUTO: 1.71 K/UL — LOW (ref 1.8–7.4)
NEUTROPHILS # BLD AUTO: 1.73 K/UL — LOW (ref 1.8–7.4)
NEUTROPHILS # BLD AUTO: 1.78 K/UL
NEUTROPHILS # BLD AUTO: 2.03 K/UL — SIGNIFICANT CHANGE UP (ref 1.8–7.4)
NEUTROPHILS # BLD AUTO: 2.39 K/UL — SIGNIFICANT CHANGE UP (ref 1.8–7.4)
NEUTROPHILS # BLD AUTO: 2.47 K/UL — SIGNIFICANT CHANGE UP (ref 1.8–7.4)
NEUTROPHILS # BLD AUTO: 2.57 K/UL
NEUTROPHILS # BLD AUTO: 2.7 K/UL — SIGNIFICANT CHANGE UP (ref 1.8–7.4)
NEUTROPHILS # BLD AUTO: 3.49 K/UL — SIGNIFICANT CHANGE UP (ref 1.8–7.4)
NEUTROPHILS NFR BLD AUTO: 0 % — LOW (ref 43–77)
NEUTROPHILS NFR BLD AUTO: 0.9 % — LOW (ref 43–77)
NEUTROPHILS NFR BLD AUTO: 1 % — LOW (ref 43–77)
NEUTROPHILS NFR BLD AUTO: 1.8 % — LOW (ref 43–77)
NEUTROPHILS NFR BLD AUTO: 1.9 % — LOW (ref 43–77)
NEUTROPHILS NFR BLD AUTO: 2 % — LOW (ref 43–77)
NEUTROPHILS NFR BLD AUTO: 25 %
NEUTROPHILS NFR BLD AUTO: 25.2 %
NEUTROPHILS NFR BLD AUTO: 27.3 %
NEUTROPHILS NFR BLD AUTO: 29.4 %
NEUTROPHILS NFR BLD AUTO: 3.3 %
NEUTROPHILS NFR BLD AUTO: 3.3 %
NEUTROPHILS NFR BLD AUTO: 34.1 %
NEUTROPHILS NFR BLD AUTO: 36.3 %
NEUTROPHILS NFR BLD AUTO: 36.6 %
NEUTROPHILS NFR BLD AUTO: 37 % — LOW (ref 43–77)
NEUTROPHILS NFR BLD AUTO: 46 %
NEUTROPHILS NFR BLD AUTO: 46.4 %
NEUTROPHILS NFR BLD AUTO: 47 % — SIGNIFICANT CHANGE UP (ref 43–77)
NEUTROPHILS NFR BLD AUTO: 48 %
NEUTROPHILS NFR BLD AUTO: 56 % — SIGNIFICANT CHANGE UP (ref 43–77)
NEUTROPHILS NFR BLD AUTO: 57.2 % — SIGNIFICANT CHANGE UP (ref 43–77)
NEUTROPHILS NFR BLD AUTO: 6.6 %
NEUTROPHILS NFR BLD AUTO: 60 % — SIGNIFICANT CHANGE UP (ref 43–77)
NEUTROPHILS NFR BLD AUTO: 60.1 % — SIGNIFICANT CHANGE UP (ref 43–77)
NEUTROPHILS NFR BLD AUTO: 60.4 %
NEUTROPHILS NFR BLD AUTO: 61 % — SIGNIFICANT CHANGE UP (ref 43–77)
NEUTROPHILS NFR BLD AUTO: 62.8 % — SIGNIFICANT CHANGE UP (ref 43–77)
NEUTROPHILS NFR BLD AUTO: 63 % — SIGNIFICANT CHANGE UP (ref 43–77)
NEUTROPHILS NFR BLD AUTO: 63.5 %
NEUTROPHILS NFR BLD AUTO: 64 % — SIGNIFICANT CHANGE UP (ref 43–77)
NEUTROPHILS NFR BLD AUTO: 64.5 %
NEUTROPHILS NFR BLD AUTO: 65 % — SIGNIFICANT CHANGE UP (ref 43–77)
NEUTROPHILS NFR BLD AUTO: 66.7 % — SIGNIFICANT CHANGE UP (ref 43–77)
NEUTROPHILS NFR BLD AUTO: 70.8 % — SIGNIFICANT CHANGE UP (ref 43–77)
NEUTROPHILS NFR BLD AUTO: 71 %
NEUTROPHILS NFR BLD AUTO: 72.6 % — SIGNIFICANT CHANGE UP (ref 43–77)
NEUTROPHILS NFR BLD AUTO: 73.1 % — SIGNIFICANT CHANGE UP (ref 43–77)
NEUTROPHILS NFR BLD AUTO: 79.2 % — HIGH (ref 43–77)
NEUTROPHILS NFR BLD AUTO: 8 %
NEUTROPHILS NFR BLD AUTO: 80.1 % — HIGH (ref 43–77)
NEUTROPHILS NFR BLD AUTO: 83.3 %
NEUTROPHILS NFR BLD AUTO: NORMAL
NEUTS BAND # BLD: 1 % — SIGNIFICANT CHANGE UP (ref 0–8)
NEUTS BAND # BLD: 1 % — SIGNIFICANT CHANGE UP (ref 0–8)
NITRITE UR-MCNC: NEGATIVE — SIGNIFICANT CHANGE UP
NITRITE UR-MCNC: NEGATIVE — SIGNIFICANT CHANGE UP
NRBC # BLD: 0 /100 WBCS — SIGNIFICANT CHANGE UP (ref 0–0)
NRBC # BLD: 0 /100 — SIGNIFICANT CHANGE UP (ref 0–0)
NRBC # BLD: 1 /100 — HIGH (ref 0–0)
NRBC # BLD: 16 /100 WBCS — HIGH (ref 0–0)
NRBC # BLD: 16 /100 WBCS — HIGH (ref 0–0)
NRBC # BLD: 19 /100 WBCS — HIGH (ref 0–0)
NRBC # BLD: 2 /100 WBCS — HIGH (ref 0–0)
NRBC # BLD: 20 /100 WBCS — HIGH (ref 0–0)
NRBC # BLD: 24 /100 WBCS — HIGH (ref 0–0)
NRBC # BLD: 33 /100 WBCS — HIGH (ref 0–0)
NRBC # BLD: 35 /100 WBCS — HIGH (ref 0–0)
NRBC # BLD: 36 /100 WBCS — HIGH (ref 0–0)
NRBC # BLD: 36 /100 — HIGH (ref 0–0)
NRBC # BLD: 40 /100 — HIGH (ref 0–0)
NRBC # BLD: 43 /100 — HIGH (ref 0–0)
NRBC # BLD: 44 /100 — HIGH (ref 0–0)
NRBC # BLD: 8 /100 WBCS — HIGH (ref 0–0)
NRBC # BLD: SIGNIFICANT CHANGE UP /100 WBCS (ref 0–0)
NT-PROBNP SERPL-SCNC: 1078 PG/ML — HIGH (ref 0–300)
NT-PROBNP SERPL-SCNC: 1246 PG/ML — HIGH (ref 0–300)
NT-PROBNP SERPL-SCNC: 1665 PG/ML — HIGH (ref 0–300)
NT-PROBNP SERPL-SCNC: 2061 PG/ML — HIGH (ref 0–300)
NT-PROBNP SERPL-SCNC: 2252 PG/ML — HIGH (ref 0–300)
NT-PROBNP SERPL-SCNC: 2456 PG/ML — HIGH (ref 0–300)
NT-PROBNP SERPL-SCNC: 2494 PG/ML — HIGH (ref 0–300)
NT-PROBNP SERPL-SCNC: 2620 PG/ML — HIGH (ref 0–300)
NT-PROBNP SERPL-SCNC: 2748 PG/ML — HIGH (ref 0–300)
NT-PROBNP SERPL-SCNC: 2818 PG/ML — HIGH (ref 0–300)
NT-PROBNP SERPL-SCNC: 3126 PG/ML — HIGH (ref 0–300)
NT-PROBNP SERPL-SCNC: 3493 PG/ML — HIGH (ref 0–300)
NT-PROBNP SERPL-SCNC: 7165 PG/ML — HIGH (ref 0–300)
NT-PROBNP SERPL-SCNC: 9379 PG/ML — HIGH (ref 0–300)
ORGANISM # SPEC MICROSCOPIC CNT: SIGNIFICANT CHANGE UP
ORGANISM # SPEC MICROSCOPIC CNT: SIGNIFICANT CHANGE UP
OVALOCYTES BLD QL SMEAR: SLIGHT — SIGNIFICANT CHANGE UP
OVALOCYTES BLD QL SMEAR: SLIGHT — SIGNIFICANT CHANGE UP
PCO2 BLDV: 29 MMHG — LOW (ref 39–42)
PCO2 BLDV: 48 MMHG — HIGH (ref 39–42)
PH BLDV: 7.37 — SIGNIFICANT CHANGE UP (ref 7.32–7.43)
PH BLDV: 7.39 — SIGNIFICANT CHANGE UP (ref 7.32–7.43)
PH UR: 6 — SIGNIFICANT CHANGE UP (ref 5–8)
PH UR: 6 — SIGNIFICANT CHANGE UP (ref 5–8)
PHOSPHATE SERPL-MCNC: 2.2 MG/DL — LOW (ref 2.5–4.5)
PHOSPHATE SERPL-MCNC: 2.3 MG/DL — LOW (ref 2.5–4.5)
PHOSPHATE SERPL-MCNC: 2.5 MG/DL — SIGNIFICANT CHANGE UP (ref 2.5–4.5)
PHOSPHATE SERPL-MCNC: 2.6 MG/DL — SIGNIFICANT CHANGE UP (ref 2.5–4.5)
PHOSPHATE SERPL-MCNC: 2.7 MG/DL — SIGNIFICANT CHANGE UP (ref 2.5–4.5)
PHOSPHATE SERPL-MCNC: 2.8 MG/DL — SIGNIFICANT CHANGE UP (ref 2.5–4.5)
PHOSPHATE SERPL-MCNC: 2.8 MG/DL — SIGNIFICANT CHANGE UP (ref 2.5–4.5)
PHOSPHATE SERPL-MCNC: 2.9 MG/DL — SIGNIFICANT CHANGE UP (ref 2.5–4.5)
PHOSPHATE SERPL-MCNC: 3 MG/DL — SIGNIFICANT CHANGE UP (ref 2.5–4.5)
PHOSPHATE SERPL-MCNC: 3 MG/DL — SIGNIFICANT CHANGE UP (ref 2.5–4.5)
PHOSPHATE SERPL-MCNC: 3.1 MG/DL — SIGNIFICANT CHANGE UP (ref 2.5–4.5)
PHOSPHATE SERPL-MCNC: 3.2 MG/DL — SIGNIFICANT CHANGE UP (ref 2.5–4.5)
PHOSPHATE SERPL-MCNC: 3.3 MG/DL — SIGNIFICANT CHANGE UP (ref 2.5–4.5)
PHOSPHATE SERPL-MCNC: 3.3 MG/DL — SIGNIFICANT CHANGE UP (ref 2.5–4.5)
PHOSPHATE SERPL-MCNC: 3.4 MG/DL — SIGNIFICANT CHANGE UP (ref 2.5–4.5)
PHOSPHATE SERPL-MCNC: 3.5 MG/DL — SIGNIFICANT CHANGE UP (ref 2.5–4.5)
PHOSPHATE SERPL-MCNC: 3.6 MG/DL — SIGNIFICANT CHANGE UP (ref 2.5–4.5)
PHOSPHATE SERPL-MCNC: 3.7 MG/DL — SIGNIFICANT CHANGE UP (ref 2.5–4.5)
PHOSPHATE SERPL-MCNC: 3.8 MG/DL — SIGNIFICANT CHANGE UP (ref 2.5–4.5)
PLAT MORPH BLD: NORMAL — SIGNIFICANT CHANGE UP
PLATELET # BLD AUTO: 10 K/UL — CRITICAL LOW (ref 150–400)
PLATELET # BLD AUTO: 107 K/UL — LOW (ref 150–400)
PLATELET # BLD AUTO: 11 K/UL — CRITICAL LOW (ref 150–400)
PLATELET # BLD AUTO: 112 K/UL — LOW (ref 150–400)
PLATELET # BLD AUTO: 117 K/UL
PLATELET # BLD AUTO: 12 K/UL — CRITICAL LOW (ref 150–400)
PLATELET # BLD AUTO: 122 K/UL
PLATELET # BLD AUTO: 126 K/UL — LOW (ref 150–400)
PLATELET # BLD AUTO: 127 K/UL — LOW (ref 150–400)
PLATELET # BLD AUTO: 128 K/UL — LOW (ref 150–400)
PLATELET # BLD AUTO: 129 K/UL
PLATELET # BLD AUTO: 129 K/UL — LOW (ref 150–400)
PLATELET # BLD AUTO: 135 K/UL — LOW (ref 150–400)
PLATELET # BLD AUTO: 141 K/UL — LOW (ref 150–400)
PLATELET # BLD AUTO: 142 K/UL — LOW (ref 150–400)
PLATELET # BLD AUTO: 143 K/UL — LOW (ref 150–400)
PLATELET # BLD AUTO: 15 K/UL — CRITICAL LOW (ref 150–400)
PLATELET # BLD AUTO: 16 K/UL — CRITICAL LOW (ref 150–400)
PLATELET # BLD AUTO: 166 K/UL
PLATELET # BLD AUTO: 168 K/UL
PLATELET # BLD AUTO: 170 K/UL
PLATELET # BLD AUTO: 172 K/UL
PLATELET # BLD AUTO: 180 K/UL
PLATELET # BLD AUTO: 186 K/UL
PLATELET # BLD AUTO: 21 K/UL — LOW (ref 150–400)
PLATELET # BLD AUTO: 22 K/UL — LOW (ref 150–400)
PLATELET # BLD AUTO: 23 K/UL — LOW (ref 150–400)
PLATELET # BLD AUTO: 24 K/UL — LOW (ref 150–400)
PLATELET # BLD AUTO: 25 K/UL
PLATELET # BLD AUTO: 25 K/UL — LOW (ref 150–400)
PLATELET # BLD AUTO: 26 K/UL
PLATELET # BLD AUTO: 26 K/UL — LOW (ref 150–400)
PLATELET # BLD AUTO: 26 K/UL — LOW (ref 150–400)
PLATELET # BLD AUTO: 27 K/UL — LOW (ref 150–400)
PLATELET # BLD AUTO: 30 K/UL — LOW (ref 150–400)
PLATELET # BLD AUTO: 30 K/UL — LOW (ref 150–400)
PLATELET # BLD AUTO: 32 K/UL — LOW (ref 150–400)
PLATELET # BLD AUTO: 32 K/UL — LOW (ref 150–400)
PLATELET # BLD AUTO: 34 K/UL — LOW (ref 150–400)
PLATELET # BLD AUTO: 35 K/UL — LOW (ref 150–400)
PLATELET # BLD AUTO: 38 K/UL — LOW (ref 150–400)
PLATELET # BLD AUTO: 40 K/UL — LOW (ref 150–400)
PLATELET # BLD AUTO: 40 K/UL — LOW (ref 150–400)
PLATELET # BLD AUTO: 42 K/UL
PLATELET # BLD AUTO: 53 K/UL — LOW (ref 150–400)
PLATELET # BLD AUTO: 54 K/UL — LOW (ref 150–400)
PLATELET # BLD AUTO: 6 K/UL — CRITICAL LOW (ref 150–400)
PLATELET # BLD AUTO: 61 K/UL
PLATELET # BLD AUTO: 61 K/UL
PLATELET # BLD AUTO: 67 K/UL — LOW (ref 150–400)
PLATELET # BLD AUTO: 68 K/UL
PLATELET # BLD AUTO: 7 K/UL — CRITICAL LOW (ref 150–400)
PLATELET # BLD AUTO: 70 K/UL — LOW (ref 150–400)
PLATELET # BLD AUTO: 75 K/UL
PLATELET # BLD AUTO: 79 K/UL
PLATELET # BLD AUTO: 8 K/UL
PLATELET # BLD AUTO: 8 K/UL — CRITICAL LOW (ref 150–400)
PLATELET # BLD AUTO: 84 K/UL
PLATELET # BLD AUTO: 89 K/UL — LOW (ref 150–400)
PLATELET # BLD AUTO: 9 K/UL — CRITICAL LOW (ref 150–400)
PLATELET # BLD AUTO: 91 K/UL
PLATELET # BLD AUTO: 91 K/UL — LOW (ref 150–400)
PLATELET # BLD AUTO: 96 K/UL — LOW (ref 150–400)
PO2 BLDV: 31 MMHG — SIGNIFICANT CHANGE UP (ref 25–45)
PO2 BLDV: 37 MMHG — SIGNIFICANT CHANGE UP (ref 25–45)
POIKILOCYTOSIS BLD QL AUTO: SLIGHT — SIGNIFICANT CHANGE UP
POLYCHROMASIA BLD QL SMEAR: SLIGHT — SIGNIFICANT CHANGE UP
POTASSIUM BLDV-SCNC: 3.1 MMOL/L — LOW (ref 3.5–5.1)
POTASSIUM BLDV-SCNC: 4.3 MMOL/L — SIGNIFICANT CHANGE UP (ref 3.5–5.1)
POTASSIUM SERPL-MCNC: 2.9 MMOL/L — CRITICAL LOW (ref 3.5–5.3)
POTASSIUM SERPL-MCNC: 3.2 MMOL/L — LOW (ref 3.5–5.3)
POTASSIUM SERPL-MCNC: 3.3 MMOL/L — LOW (ref 3.5–5.3)
POTASSIUM SERPL-MCNC: 3.3 MMOL/L — LOW (ref 3.5–5.3)
POTASSIUM SERPL-MCNC: 3.4 MMOL/L — LOW (ref 3.5–5.3)
POTASSIUM SERPL-MCNC: 3.5 MMOL/L — SIGNIFICANT CHANGE UP (ref 3.5–5.3)
POTASSIUM SERPL-MCNC: 3.5 MMOL/L — SIGNIFICANT CHANGE UP (ref 3.5–5.3)
POTASSIUM SERPL-MCNC: 3.6 MMOL/L — SIGNIFICANT CHANGE UP (ref 3.5–5.3)
POTASSIUM SERPL-MCNC: 3.7 MMOL/L — SIGNIFICANT CHANGE UP (ref 3.5–5.3)
POTASSIUM SERPL-MCNC: 3.8 MMOL/L — SIGNIFICANT CHANGE UP (ref 3.5–5.3)
POTASSIUM SERPL-MCNC: 3.9 MMOL/L — SIGNIFICANT CHANGE UP (ref 3.5–5.3)
POTASSIUM SERPL-MCNC: 4 MMOL/L — SIGNIFICANT CHANGE UP (ref 3.5–5.3)
POTASSIUM SERPL-MCNC: 4.1 MMOL/L — SIGNIFICANT CHANGE UP (ref 3.5–5.3)
POTASSIUM SERPL-MCNC: 4.2 MMOL/L — SIGNIFICANT CHANGE UP (ref 3.5–5.3)
POTASSIUM SERPL-MCNC: 4.2 MMOL/L — SIGNIFICANT CHANGE UP (ref 3.5–5.3)
POTASSIUM SERPL-MCNC: 4.3 MMOL/L — SIGNIFICANT CHANGE UP (ref 3.5–5.3)
POTASSIUM SERPL-MCNC: 4.3 MMOL/L — SIGNIFICANT CHANGE UP (ref 3.5–5.3)
POTASSIUM SERPL-MCNC: 4.4 MMOL/L — SIGNIFICANT CHANGE UP (ref 3.5–5.3)
POTASSIUM SERPL-SCNC: 2.9 MMOL/L — CRITICAL LOW (ref 3.5–5.3)
POTASSIUM SERPL-SCNC: 3.2 MMOL/L — LOW (ref 3.5–5.3)
POTASSIUM SERPL-SCNC: 3.3 MMOL/L — LOW (ref 3.5–5.3)
POTASSIUM SERPL-SCNC: 3.3 MMOL/L — LOW (ref 3.5–5.3)
POTASSIUM SERPL-SCNC: 3.4 MMOL/L — LOW (ref 3.5–5.3)
POTASSIUM SERPL-SCNC: 3.5 MMOL/L
POTASSIUM SERPL-SCNC: 3.5 MMOL/L — SIGNIFICANT CHANGE UP (ref 3.5–5.3)
POTASSIUM SERPL-SCNC: 3.5 MMOL/L — SIGNIFICANT CHANGE UP (ref 3.5–5.3)
POTASSIUM SERPL-SCNC: 3.6 MMOL/L — SIGNIFICANT CHANGE UP (ref 3.5–5.3)
POTASSIUM SERPL-SCNC: 3.7 MMOL/L
POTASSIUM SERPL-SCNC: 3.7 MMOL/L
POTASSIUM SERPL-SCNC: 3.7 MMOL/L — SIGNIFICANT CHANGE UP (ref 3.5–5.3)
POTASSIUM SERPL-SCNC: 3.8 MMOL/L
POTASSIUM SERPL-SCNC: 3.8 MMOL/L — SIGNIFICANT CHANGE UP (ref 3.5–5.3)
POTASSIUM SERPL-SCNC: 3.9 MMOL/L
POTASSIUM SERPL-SCNC: 3.9 MMOL/L — SIGNIFICANT CHANGE UP (ref 3.5–5.3)
POTASSIUM SERPL-SCNC: 4 MMOL/L
POTASSIUM SERPL-SCNC: 4 MMOL/L — SIGNIFICANT CHANGE UP (ref 3.5–5.3)
POTASSIUM SERPL-SCNC: 4.1 MMOL/L
POTASSIUM SERPL-SCNC: 4.1 MMOL/L
POTASSIUM SERPL-SCNC: 4.1 MMOL/L — SIGNIFICANT CHANGE UP (ref 3.5–5.3)
POTASSIUM SERPL-SCNC: 4.2 MMOL/L
POTASSIUM SERPL-SCNC: 4.2 MMOL/L
POTASSIUM SERPL-SCNC: 4.2 MMOL/L — SIGNIFICANT CHANGE UP (ref 3.5–5.3)
POTASSIUM SERPL-SCNC: 4.2 MMOL/L — SIGNIFICANT CHANGE UP (ref 3.5–5.3)
POTASSIUM SERPL-SCNC: 4.3 MMOL/L — SIGNIFICANT CHANGE UP (ref 3.5–5.3)
POTASSIUM SERPL-SCNC: 4.3 MMOL/L — SIGNIFICANT CHANGE UP (ref 3.5–5.3)
POTASSIUM SERPL-SCNC: 4.4 MMOL/L
POTASSIUM SERPL-SCNC: 4.4 MMOL/L — SIGNIFICANT CHANGE UP (ref 3.5–5.3)
PROT SERPL-MCNC: 4.9 G/DL — LOW (ref 6–8.3)
PROT SERPL-MCNC: 5 G/DL — LOW (ref 6–8.3)
PROT SERPL-MCNC: 5.1 G/DL — LOW (ref 6–8.3)
PROT SERPL-MCNC: 5.2 G/DL — LOW (ref 6–8.3)
PROT SERPL-MCNC: 5.2 G/DL — LOW (ref 6–8.3)
PROT SERPL-MCNC: 5.3 G/DL — LOW (ref 6–8.3)
PROT SERPL-MCNC: 5.4 G/DL — LOW (ref 6–8.3)
PROT SERPL-MCNC: 5.5 G/DL — LOW (ref 6–8.3)
PROT SERPL-MCNC: 5.5 G/DL — LOW (ref 6–8.3)
PROT SERPL-MCNC: 5.6 G/DL
PROT SERPL-MCNC: 5.6 G/DL — LOW (ref 6–8.3)
PROT SERPL-MCNC: 5.6 G/DL — LOW (ref 6–8.3)
PROT SERPL-MCNC: 5.8 G/DL
PROT SERPL-MCNC: 5.8 G/DL
PROT SERPL-MCNC: 5.8 G/DL — LOW (ref 6–8.3)
PROT SERPL-MCNC: 5.9 G/DL
PROT SERPL-MCNC: 5.9 G/DL — LOW (ref 6–8.3)
PROT SERPL-MCNC: 6 G/DL
PROT SERPL-MCNC: 6.1 G/DL
PROT SERPL-MCNC: 6.1 G/DL
PROT SERPL-MCNC: 6.1 G/DL — SIGNIFICANT CHANGE UP (ref 6–8.3)
PROT SERPL-MCNC: 6.2 G/DL
PROT SERPL-MCNC: 6.2 G/DL
PROT SERPL-MCNC: 6.2 G/DL — SIGNIFICANT CHANGE UP (ref 6–8.3)
PROT SERPL-MCNC: 6.3 G/DL
PROT SERPL-MCNC: 6.3 G/DL
PROT SERPL-MCNC: 6.3 G/DL — SIGNIFICANT CHANGE UP (ref 6–8.3)
PROT SERPL-MCNC: 6.4 G/DL
PROT SERPL-MCNC: 6.4 G/DL
PROT SERPL-MCNC: 6.5 G/DL
PROT SERPL-MCNC: 6.5 G/DL
PROT SERPL-MCNC: 6.6 G/DL
PROT SERPL-MCNC: 6.6 G/DL — SIGNIFICANT CHANGE UP (ref 6–8.3)
PROT SERPL-MCNC: 6.6 G/DL — SIGNIFICANT CHANGE UP (ref 6–8.3)
PROT SERPL-MCNC: 6.7 G/DL
PROT SERPL-MCNC: 7 G/DL
PROT UR-MCNC: NEGATIVE — SIGNIFICANT CHANGE UP
PROT UR-MCNC: NEGATIVE — SIGNIFICANT CHANGE UP
PROTHROM AB SERPL-ACNC: 12.3 SEC — SIGNIFICANT CHANGE UP (ref 10.5–13.4)
PROTHROM AB SERPL-ACNC: 12.9 SEC — SIGNIFICANT CHANGE UP (ref 10.5–13.4)
PROTHROM AB SERPL-ACNC: 13.2 SEC — SIGNIFICANT CHANGE UP (ref 10.5–13.4)
PROTHROM AB SERPL-ACNC: 13.3 SEC — SIGNIFICANT CHANGE UP (ref 10.5–13.4)
PROTHROM AB SERPL-ACNC: 13.4 SEC — SIGNIFICANT CHANGE UP (ref 10.5–13.4)
PROTHROM AB SERPL-ACNC: 13.4 SEC — SIGNIFICANT CHANGE UP (ref 10.5–13.4)
PROTHROM AB SERPL-ACNC: 13.5 SEC — HIGH (ref 10.5–13.4)
PROTHROM AB SERPL-ACNC: 13.9 SEC — HIGH (ref 10.5–13.4)
PROTHROM AB SERPL-ACNC: 14.1 SEC — HIGH (ref 10.5–13.4)
PROTHROM AB SERPL-ACNC: 14.2 SEC — HIGH (ref 10.5–13.4)
PROTHROM AB SERPL-ACNC: 14.3 SEC — HIGH (ref 10.5–13.4)
PROTHROM AB SERPL-ACNC: 14.7 SEC — HIGH (ref 10.5–13.4)
PROTHROM AB SERPL-ACNC: 14.8 SEC — HIGH (ref 10.5–13.4)
PROTHROM AB SERPL-ACNC: 14.8 SEC — HIGH (ref 10.5–13.4)
PROTHROM AB SERPL-ACNC: 14.9 SEC — HIGH (ref 10.5–13.4)
PROTHROM AB SERPL-ACNC: 14.9 SEC — HIGH (ref 10.5–13.4)
PROTHROM AB SERPL-ACNC: 15 SEC — HIGH (ref 10.5–13.4)
PROTHROM AB SERPL-ACNC: 16.4 SEC — HIGH (ref 10.5–13.4)
PROTHROM AB SERPL-ACNC: 16.7 SEC — HIGH (ref 10.5–13.4)
PROTHROM AB SERPL-ACNC: 19 SEC — HIGH (ref 10.5–13.4)
PROTHROM AB SERPL-ACNC: 19.5 SEC — HIGH (ref 10.5–13.4)
PROTHROM AB SERPL-ACNC: 19.8 SEC — HIGH (ref 10.5–13.4)
PROTHROM AB SERPL-ACNC: 27.4 SEC — HIGH (ref 10.5–13.4)
RAPID RVP RESULT: SIGNIFICANT CHANGE UP
RBC # BLD: 1.94 M/UL — LOW (ref 3.8–5.2)
RBC # BLD: 1.96 M/UL — LOW (ref 3.8–5.2)
RBC # BLD: 2.11 M/UL — LOW (ref 3.8–5.2)
RBC # BLD: 2.14 M/UL — LOW (ref 3.8–5.2)
RBC # BLD: 2.18 M/UL — LOW (ref 3.8–5.2)
RBC # BLD: 2.2 M/UL — LOW (ref 3.8–5.2)
RBC # BLD: 2.22 M/UL — LOW (ref 3.8–5.2)
RBC # BLD: 2.24 M/UL — LOW (ref 3.8–5.2)
RBC # BLD: 2.25 M/UL — LOW (ref 3.8–5.2)
RBC # BLD: 2.26 M/UL — LOW (ref 3.8–5.2)
RBC # BLD: 2.31 M/UL — LOW (ref 3.8–5.2)
RBC # BLD: 2.34 M/UL — LOW (ref 3.8–5.2)
RBC # BLD: 2.35 M/UL — LOW (ref 3.8–5.2)
RBC # BLD: 2.39 M/UL — LOW (ref 3.8–5.2)
RBC # BLD: 2.4 M/UL — LOW (ref 3.8–5.2)
RBC # BLD: 2.41 M/UL — LOW (ref 3.8–5.2)
RBC # BLD: 2.42 M/UL — LOW (ref 3.8–5.2)
RBC # BLD: 2.44 M/UL — LOW (ref 3.8–5.2)
RBC # BLD: 2.45 M/UL — LOW (ref 3.8–5.2)
RBC # BLD: 2.45 M/UL — LOW (ref 3.8–5.2)
RBC # BLD: 2.47 M/UL — LOW (ref 3.8–5.2)
RBC # BLD: 2.48 M/UL — LOW (ref 3.8–5.2)
RBC # BLD: 2.52 M/UL — LOW (ref 3.8–5.2)
RBC # BLD: 2.54 M/UL — LOW (ref 3.8–5.2)
RBC # BLD: 2.54 M/UL — LOW (ref 3.8–5.2)
RBC # BLD: 2.58 M/UL — LOW (ref 3.8–5.2)
RBC # BLD: 2.62 M/UL — LOW (ref 3.8–5.2)
RBC # BLD: 2.65 M/UL — LOW (ref 3.8–5.2)
RBC # BLD: 2.66 M/UL
RBC # BLD: 2.7 M/UL — LOW (ref 3.8–5.2)
RBC # BLD: 2.76 M/UL — LOW (ref 3.8–5.2)
RBC # BLD: 2.8 M/UL — LOW (ref 3.8–5.2)
RBC # BLD: 2.82 M/UL — LOW (ref 3.8–5.2)
RBC # BLD: 2.95 M/UL
RBC # BLD: 2.95 M/UL
RBC # BLD: 2.98 M/UL — LOW (ref 3.8–5.2)
RBC # BLD: 2.98 M/UL — LOW (ref 3.8–5.2)
RBC # BLD: 2.99 M/UL
RBC # BLD: 3 M/UL — LOW (ref 3.8–5.2)
RBC # BLD: 3.02 M/UL
RBC # BLD: 3.02 M/UL — LOW (ref 3.8–5.2)
RBC # BLD: 3.03 M/UL
RBC # BLD: 3.05 M/UL — LOW (ref 3.8–5.2)
RBC # BLD: 3.07 M/UL — LOW (ref 3.8–5.2)
RBC # BLD: 3.16 M/UL
RBC # BLD: 3.16 M/UL
RBC # BLD: 3.17 M/UL
RBC # BLD: 3.19 M/UL — LOW (ref 3.8–5.2)
RBC # BLD: 3.19 M/UL — LOW (ref 3.8–5.2)
RBC # BLD: 3.2 M/UL
RBC # BLD: 3.21 M/UL
RBC # BLD: 3.21 M/UL — LOW (ref 3.8–5.2)
RBC # BLD: 3.22 M/UL
RBC # BLD: 3.22 M/UL — LOW (ref 3.8–5.2)
RBC # BLD: 3.23 M/UL
RBC # BLD: 3.24 M/UL
RBC # BLD: 3.29 M/UL — LOW (ref 3.8–5.2)
RBC # BLD: 3.32 M/UL
RBC # BLD: 3.35 M/UL — LOW (ref 3.8–5.2)
RBC # BLD: 3.39 M/UL
RBC # BLD: 3.42 M/UL
RBC # BLD: 3.46 M/UL
RBC # BLD: 3.47 M/UL — LOW (ref 3.8–5.2)
RBC # BLD: 3.47 M/UL — LOW (ref 3.8–5.2)
RBC # BLD: 3.57 M/UL — LOW (ref 3.8–5.2)
RBC # FLD: 15.3 % — HIGH (ref 10.3–14.5)
RBC # FLD: 15.8 % — HIGH (ref 10.3–14.5)
RBC # FLD: 15.9 % — HIGH (ref 10.3–14.5)
RBC # FLD: 16 % — HIGH (ref 10.3–14.5)
RBC # FLD: 16.1 % — HIGH (ref 10.3–14.5)
RBC # FLD: 16.1 % — HIGH (ref 10.3–14.5)
RBC # FLD: 16.8 % — HIGH (ref 10.3–14.5)
RBC # FLD: 16.8 % — HIGH (ref 10.3–14.5)
RBC # FLD: 17.2 % — HIGH (ref 10.3–14.5)
RBC # FLD: 17.5 % — HIGH (ref 10.3–14.5)
RBC # FLD: 17.7 % — HIGH (ref 10.3–14.5)
RBC # FLD: 18.1 % — HIGH (ref 10.3–14.5)
RBC # FLD: 18.7 % — HIGH (ref 10.3–14.5)
RBC # FLD: 19.2 % — HIGH (ref 10.3–14.5)
RBC # FLD: 19.4 % — HIGH (ref 10.3–14.5)
RBC # FLD: 19.4 % — HIGH (ref 10.3–14.5)
RBC # FLD: 19.5 % — HIGH (ref 10.3–14.5)
RBC # FLD: 19.7 %
RBC # FLD: 19.7 %
RBC # FLD: 19.8 % — HIGH (ref 10.3–14.5)
RBC # FLD: 19.9 % — HIGH (ref 10.3–14.5)
RBC # FLD: 20 % — HIGH (ref 10.3–14.5)
RBC # FLD: 20.1 % — HIGH (ref 10.3–14.5)
RBC # FLD: 20.1 % — HIGH (ref 10.3–14.5)
RBC # FLD: 20.3 %
RBC # FLD: 20.4 %
RBC # FLD: 20.4 %
RBC # FLD: 20.5 %
RBC # FLD: 20.6 % — HIGH (ref 10.3–14.5)
RBC # FLD: 20.7 %
RBC # FLD: 20.7 % — HIGH (ref 10.3–14.5)
RBC # FLD: 20.8 % — HIGH (ref 10.3–14.5)
RBC # FLD: 21 % — HIGH (ref 10.3–14.5)
RBC # FLD: 21.2 %
RBC # FLD: 21.2 % — HIGH (ref 10.3–14.5)
RBC # FLD: 21.3 %
RBC # FLD: 21.3 % — HIGH (ref 10.3–14.5)
RBC # FLD: 21.3 % — HIGH (ref 10.3–14.5)
RBC # FLD: 21.4 %
RBC # FLD: 21.4 % — HIGH (ref 10.3–14.5)
RBC # FLD: 21.5 % — HIGH (ref 10.3–14.5)
RBC # FLD: 21.5 % — HIGH (ref 10.3–14.5)
RBC # FLD: 21.7 %
RBC # FLD: 21.7 % — HIGH (ref 10.3–14.5)
RBC # FLD: 21.8 %
RBC # FLD: 21.8 % — HIGH (ref 10.3–14.5)
RBC # FLD: 21.9 %
RBC # FLD: 21.9 % — HIGH (ref 10.3–14.5)
RBC # FLD: 22 %
RBC # FLD: 22.5 %
RBC # FLD: 22.5 %
RBC # FLD: 24.1 % — HIGH (ref 10.3–14.5)
RBC # FLD: 24.4 % — HIGH (ref 10.3–14.5)
RBC BLD AUTO: ABNORMAL
RBC BLD AUTO: SIGNIFICANT CHANGE UP
RBC CASTS # UR COMP ASSIST: 1 /HPF — SIGNIFICANT CHANGE UP (ref 0–4)
RH IG SCN BLD-IMP: POSITIVE — SIGNIFICANT CHANGE UP
S AUREUS DNA NOSE QL NAA+PROBE: SIGNIFICANT CHANGE UP
SAO2 % BLDV: 58.6 % — LOW (ref 67–88)
SAO2 % BLDV: 58.9 % — LOW (ref 67–88)
SARS-COV-2 RNA SPEC QL NAA+PROBE: SIGNIFICANT CHANGE UP
SCHISTOCYTES BLD QL AUTO: SLIGHT — SIGNIFICANT CHANGE UP
SMUDGE CELLS # BLD: PRESENT — SIGNIFICANT CHANGE UP
SODIUM SERPL-SCNC: 134 MMOL/L — LOW (ref 135–145)
SODIUM SERPL-SCNC: 135 MMOL/L — SIGNIFICANT CHANGE UP (ref 135–145)
SODIUM SERPL-SCNC: 136 MMOL/L — SIGNIFICANT CHANGE UP (ref 135–145)
SODIUM SERPL-SCNC: 137 MMOL/L — SIGNIFICANT CHANGE UP (ref 135–145)
SODIUM SERPL-SCNC: 138 MMOL/L
SODIUM SERPL-SCNC: 138 MMOL/L — SIGNIFICANT CHANGE UP (ref 135–145)
SODIUM SERPL-SCNC: 139 MMOL/L — SIGNIFICANT CHANGE UP (ref 135–145)
SODIUM SERPL-SCNC: 140 MMOL/L
SODIUM SERPL-SCNC: 140 MMOL/L
SODIUM SERPL-SCNC: 140 MMOL/L — SIGNIFICANT CHANGE UP (ref 135–145)
SODIUM SERPL-SCNC: 141 MMOL/L
SODIUM SERPL-SCNC: 141 MMOL/L — SIGNIFICANT CHANGE UP (ref 135–145)
SODIUM SERPL-SCNC: 142 MMOL/L
SODIUM SERPL-SCNC: 142 MMOL/L — SIGNIFICANT CHANGE UP (ref 135–145)
SODIUM SERPL-SCNC: 143 MMOL/L
SODIUM SERPL-SCNC: 143 MMOL/L — SIGNIFICANT CHANGE UP (ref 135–145)
SODIUM SERPL-SCNC: 144 MMOL/L
SODIUM SERPL-SCNC: 145 MMOL/L
SODIUM SERPL-SCNC: 145 MMOL/L — SIGNIFICANT CHANGE UP (ref 135–145)
SP GR SPEC: 1.01 — LOW (ref 1.01–1.02)
SP GR SPEC: 1.01 — SIGNIFICANT CHANGE UP (ref 1.01–1.02)
SPECIMEN SOURCE: SIGNIFICANT CHANGE UP
TROPONIN T, HIGH SENSITIVITY RESULT: 19 NG/L — SIGNIFICANT CHANGE UP (ref 0–51)
TROPONIN T, HIGH SENSITIVITY RESULT: 281 NG/L — HIGH (ref 0–51)
TROPONIN T, HIGH SENSITIVITY RESULT: 310 NG/L — HIGH (ref 0–51)
TROPONIN T, HIGH SENSITIVITY RESULT: 470 NG/L — HIGH (ref 0–51)
TROPONIN T, HIGH SENSITIVITY RESULT: 50 NG/L — SIGNIFICANT CHANGE UP (ref 0–51)
URATE SERPL-MCNC: 1.6 MG/DL — LOW (ref 2.5–7)
URATE SERPL-MCNC: 1.7 MG/DL — LOW (ref 2.5–7)
URATE SERPL-MCNC: 1.8 MG/DL — LOW (ref 2.5–7)
URATE SERPL-MCNC: 1.9 MG/DL — LOW (ref 2.5–7)
URATE SERPL-MCNC: 2.2 MG/DL — LOW (ref 2.5–7)
URATE SERPL-MCNC: 2.2 MG/DL — LOW (ref 2.5–7)
URATE SERPL-MCNC: 2.3 MG/DL — LOW (ref 2.5–7)
URATE SERPL-MCNC: 2.3 MG/DL — LOW (ref 2.5–7)
URATE SERPL-MCNC: 2.4 MG/DL — LOW (ref 2.5–7)
URATE SERPL-MCNC: 2.5 MG/DL — SIGNIFICANT CHANGE UP (ref 2.5–7)
URATE SERPL-MCNC: 2.6 MG/DL — SIGNIFICANT CHANGE UP (ref 2.5–7)
URATE SERPL-MCNC: 2.6 MG/DL — SIGNIFICANT CHANGE UP (ref 2.5–7)
URATE SERPL-MCNC: 2.7 MG/DL — SIGNIFICANT CHANGE UP (ref 2.5–7)
URATE SERPL-MCNC: 2.8 MG/DL — SIGNIFICANT CHANGE UP (ref 2.5–7)
URATE SERPL-MCNC: 2.9 MG/DL — SIGNIFICANT CHANGE UP (ref 2.5–7)
URATE SERPL-MCNC: 2.9 MG/DL — SIGNIFICANT CHANGE UP (ref 2.5–7)
URATE SERPL-MCNC: 3 MG/DL — SIGNIFICANT CHANGE UP (ref 2.5–7)
URATE SERPL-MCNC: 3.1 MG/DL — SIGNIFICANT CHANGE UP (ref 2.5–7)
URATE SERPL-MCNC: 3.2 MG/DL — SIGNIFICANT CHANGE UP (ref 2.5–7)
URATE SERPL-MCNC: 3.4 MG/DL — SIGNIFICANT CHANGE UP (ref 2.5–7)
URATE SERPL-MCNC: 3.5 MG/DL — SIGNIFICANT CHANGE UP (ref 2.5–7)
URATE SERPL-MCNC: 3.6 MG/DL — SIGNIFICANT CHANGE UP (ref 2.5–7)
URATE SERPL-MCNC: 3.7 MG/DL — SIGNIFICANT CHANGE UP (ref 2.5–7)
URATE SERPL-MCNC: 3.7 MG/DL — SIGNIFICANT CHANGE UP (ref 2.5–7)
URATE SERPL-MCNC: 3.8 MG/DL — SIGNIFICANT CHANGE UP (ref 2.5–7)
URATE SERPL-MCNC: 4.3 MG/DL — SIGNIFICANT CHANGE UP (ref 2.5–7)
URATE SERPL-MCNC: 5 MG/DL — SIGNIFICANT CHANGE UP (ref 2.5–7)
UROBILINOGEN FLD QL: NEGATIVE — SIGNIFICANT CHANGE UP
UROBILINOGEN FLD QL: NEGATIVE — SIGNIFICANT CHANGE UP
VANCOMYCIN TROUGH SERPL-MCNC: 11.9 UG/ML — SIGNIFICANT CHANGE UP (ref 10–20)
VORICONAZOLE SERPL-MCNC: 2.6 MCG/ML — SIGNIFICANT CHANGE UP (ref 1–5.5)
VORICONAZOLE SERPL-MCNC: 4.3 MCG/ML — SIGNIFICANT CHANGE UP (ref 1–5.5)
WBC # BLD: 0.07 K/UL — CRITICAL LOW (ref 3.8–10.5)
WBC # BLD: 0.09 K/UL — CRITICAL LOW (ref 3.8–10.5)
WBC # BLD: 0.16 K/UL — CRITICAL LOW (ref 3.8–10.5)
WBC # BLD: 0.17 K/UL — CRITICAL LOW (ref 3.8–10.5)
WBC # BLD: 0.19 K/UL — CRITICAL LOW (ref 3.8–10.5)
WBC # BLD: 0.19 K/UL — CRITICAL LOW (ref 3.8–10.5)
WBC # BLD: 0.2 K/UL — CRITICAL LOW (ref 3.8–10.5)
WBC # BLD: 0.21 K/UL — CRITICAL LOW (ref 3.8–10.5)
WBC # BLD: 0.24 K/UL — CRITICAL LOW (ref 3.8–10.5)
WBC # BLD: 0.25 K/UL — CRITICAL LOW (ref 3.8–10.5)
WBC # BLD: 0.31 K/UL — CRITICAL LOW (ref 3.8–10.5)
WBC # BLD: 0.49 K/UL — CRITICAL LOW (ref 3.8–10.5)
WBC # BLD: 0.49 K/UL — CRITICAL LOW (ref 3.8–10.5)
WBC # BLD: 0.71 K/UL — CRITICAL LOW (ref 3.8–10.5)
WBC # BLD: 0.74 K/UL — CRITICAL LOW (ref 3.8–10.5)
WBC # BLD: 1.06 K/UL — LOW (ref 3.8–10.5)
WBC # BLD: 1.24 K/UL — LOW (ref 3.8–10.5)
WBC # BLD: 1.64 K/UL — LOW (ref 3.8–10.5)
WBC # BLD: 1.91 K/UL — LOW (ref 3.8–10.5)
WBC # BLD: 101.59 K/UL — CRITICAL HIGH (ref 3.8–10.5)
WBC # BLD: 105.44 K/UL — CRITICAL HIGH (ref 3.8–10.5)
WBC # BLD: 18.21 K/UL — HIGH (ref 3.8–10.5)
WBC # BLD: 2.16 K/UL — LOW (ref 3.8–10.5)
WBC # BLD: 2.23 K/UL — LOW (ref 3.8–10.5)
WBC # BLD: 2.25 K/UL — LOW (ref 3.8–10.5)
WBC # BLD: 2.62 K/UL — LOW (ref 3.8–10.5)
WBC # BLD: 2.66 K/UL — LOW (ref 3.8–10.5)
WBC # BLD: 2.69 K/UL — LOW (ref 3.8–10.5)
WBC # BLD: 2.72 K/UL — LOW (ref 3.8–10.5)
WBC # BLD: 3.27 K/UL — LOW (ref 3.8–10.5)
WBC # BLD: 3.4 K/UL — LOW (ref 3.8–10.5)
WBC # BLD: 4.22 K/UL — SIGNIFICANT CHANGE UP (ref 3.8–10.5)
WBC # BLD: 4.41 K/UL — SIGNIFICANT CHANGE UP (ref 3.8–10.5)
WBC # BLD: 6.11 K/UL — SIGNIFICANT CHANGE UP (ref 3.8–10.5)
WBC # BLD: 71.21 K/UL — CRITICAL HIGH (ref 3.8–10.5)
WBC # BLD: 8.96 K/UL — SIGNIFICANT CHANGE UP (ref 3.8–10.5)
WBC # BLD: 86.45 K/UL — CRITICAL HIGH (ref 3.8–10.5)
WBC # BLD: 87.28 K/UL — CRITICAL HIGH (ref 3.8–10.5)
WBC # BLD: 96.03 K/UL — CRITICAL HIGH (ref 3.8–10.5)
WBC # BLD: 99.75 K/UL — CRITICAL HIGH (ref 3.8–10.5)
WBC # BLD: <0.1 K/UL — CRITICAL LOW (ref 3.8–10.5)
WBC # FLD AUTO: 0.07 K/UL — CRITICAL LOW (ref 3.8–10.5)
WBC # FLD AUTO: 0.09 K/UL — CRITICAL LOW (ref 3.8–10.5)
WBC # FLD AUTO: 0.16 K/UL — CRITICAL LOW (ref 3.8–10.5)
WBC # FLD AUTO: 0.17 K/UL — CRITICAL LOW (ref 3.8–10.5)
WBC # FLD AUTO: 0.19 K/UL — CRITICAL LOW (ref 3.8–10.5)
WBC # FLD AUTO: 0.19 K/UL — CRITICAL LOW (ref 3.8–10.5)
WBC # FLD AUTO: 0.2 K/UL — CRITICAL LOW (ref 3.8–10.5)
WBC # FLD AUTO: 0.21 K/UL — CRITICAL LOW (ref 3.8–10.5)
WBC # FLD AUTO: 0.24 K/UL — CRITICAL LOW (ref 3.8–10.5)
WBC # FLD AUTO: 0.25 K/UL — CRITICAL LOW (ref 3.8–10.5)
WBC # FLD AUTO: 0.31 K/UL
WBC # FLD AUTO: 0.31 K/UL — CRITICAL LOW (ref 3.8–10.5)
WBC # FLD AUTO: 0.37 K/UL
WBC # FLD AUTO: 0.44 K/UL
WBC # FLD AUTO: 0.44 K/UL
WBC # FLD AUTO: 0.45 K/UL
WBC # FLD AUTO: 0.49 K/UL — CRITICAL LOW (ref 3.8–10.5)
WBC # FLD AUTO: 0.49 K/UL — CRITICAL LOW (ref 3.8–10.5)
WBC # FLD AUTO: 0.5 K/UL
WBC # FLD AUTO: 0.53 K/UL
WBC # FLD AUTO: 0.63 K/UL
WBC # FLD AUTO: 0.7 K/UL
WBC # FLD AUTO: 0.71 K/UL — CRITICAL LOW (ref 3.8–10.5)
WBC # FLD AUTO: 0.74 K/UL — CRITICAL LOW (ref 3.8–10.5)
WBC # FLD AUTO: 0.76 K/UL
WBC # FLD AUTO: 0.82 K/UL
WBC # FLD AUTO: 0.86 K/UL
WBC # FLD AUTO: 0.86 K/UL
WBC # FLD AUTO: 0.96 K/UL
WBC # FLD AUTO: 1.06 K/UL — LOW (ref 3.8–10.5)
WBC # FLD AUTO: 1.13 K/UL
WBC # FLD AUTO: 1.24 K/UL — LOW (ref 3.8–10.5)
WBC # FLD AUTO: 1.64 K/UL — LOW (ref 3.8–10.5)
WBC # FLD AUTO: 1.69 K/UL
WBC # FLD AUTO: 1.74 K/UL
WBC # FLD AUTO: 1.91 K/UL — LOW (ref 3.8–10.5)
WBC # FLD AUTO: 101.59 K/UL — CRITICAL HIGH (ref 3.8–10.5)
WBC # FLD AUTO: 105.44 K/UL — CRITICAL HIGH (ref 3.8–10.5)
WBC # FLD AUTO: 18.21 K/UL — HIGH (ref 3.8–10.5)
WBC # FLD AUTO: 2.09 K/UL
WBC # FLD AUTO: 2.16 K/UL — LOW (ref 3.8–10.5)
WBC # FLD AUTO: 2.23 K/UL — LOW (ref 3.8–10.5)
WBC # FLD AUTO: 2.25 K/UL — LOW (ref 3.8–10.5)
WBC # FLD AUTO: 2.62 K/UL — LOW (ref 3.8–10.5)
WBC # FLD AUTO: 2.66 K/UL — LOW (ref 3.8–10.5)
WBC # FLD AUTO: 2.69 K/UL — LOW (ref 3.8–10.5)
WBC # FLD AUTO: 2.72 K/UL — LOW (ref 3.8–10.5)
WBC # FLD AUTO: 2.73 K/UL
WBC # FLD AUTO: 3.09 K/UL
WBC # FLD AUTO: 3.27 K/UL — LOW (ref 3.8–10.5)
WBC # FLD AUTO: 3.4 K/UL — LOW (ref 3.8–10.5)
WBC # FLD AUTO: 4.22 K/UL — SIGNIFICANT CHANGE UP (ref 3.8–10.5)
WBC # FLD AUTO: 4.41 K/UL — SIGNIFICANT CHANGE UP (ref 3.8–10.5)
WBC # FLD AUTO: 6.11 K/UL — SIGNIFICANT CHANGE UP (ref 3.8–10.5)
WBC # FLD AUTO: 71.21 K/UL — CRITICAL HIGH (ref 3.8–10.5)
WBC # FLD AUTO: 8.96 K/UL — SIGNIFICANT CHANGE UP (ref 3.8–10.5)
WBC # FLD AUTO: 86.45 K/UL — CRITICAL HIGH (ref 3.8–10.5)
WBC # FLD AUTO: 87.28 K/UL — CRITICAL HIGH (ref 3.8–10.5)
WBC # FLD AUTO: 96.03 K/UL — CRITICAL HIGH (ref 3.8–10.5)
WBC # FLD AUTO: 99.75 K/UL — CRITICAL HIGH (ref 3.8–10.5)
WBC # FLD AUTO: <0.1 K/UL — CRITICAL LOW (ref 3.8–10.5)
WBC UR QL: 2 /HPF — SIGNIFICANT CHANGE UP (ref 0–5)

## 2022-01-01 PROCEDURE — 93010 ELECTROCARDIOGRAM REPORT: CPT

## 2022-01-01 PROCEDURE — 99233 SBSQ HOSP IP/OBS HIGH 50: CPT

## 2022-01-01 PROCEDURE — 71045 X-RAY EXAM CHEST 1 VIEW: CPT | Mod: 26

## 2022-01-01 PROCEDURE — 99232 SBSQ HOSP IP/OBS MODERATE 35: CPT

## 2022-01-01 PROCEDURE — 86901 BLOOD TYPING SEROLOGIC RH(D): CPT

## 2022-01-01 PROCEDURE — 86923 COMPATIBILITY TEST ELECTRIC: CPT

## 2022-01-01 PROCEDURE — 85060 BLOOD SMEAR INTERPRETATION: CPT

## 2022-01-01 PROCEDURE — 86850 RBC ANTIBODY SCREEN: CPT

## 2022-01-01 PROCEDURE — 73130 X-RAY EXAM OF HAND: CPT | Mod: 26,LT

## 2022-01-01 PROCEDURE — 99233 SBSQ HOSP IP/OBS HIGH 50: CPT | Mod: GC

## 2022-01-01 PROCEDURE — 93321 DOPPLER ECHO F-UP/LMTD STD: CPT | Mod: 26

## 2022-01-01 PROCEDURE — 99212 OFFICE O/P EST SF 10 MIN: CPT

## 2022-01-01 PROCEDURE — 99214 OFFICE O/P EST MOD 30 MIN: CPT

## 2022-01-01 PROCEDURE — 99232 SBSQ HOSP IP/OBS MODERATE 35: CPT | Mod: GC

## 2022-01-01 PROCEDURE — 99222 1ST HOSP IP/OBS MODERATE 55: CPT

## 2022-01-01 PROCEDURE — 93306 TTE W/DOPPLER COMPLETE: CPT | Mod: 26

## 2022-01-01 PROCEDURE — 74018 RADEX ABDOMEN 1 VIEW: CPT | Mod: 26

## 2022-01-01 PROCEDURE — 99213 OFFICE O/P EST LOW 20 MIN: CPT

## 2022-01-01 PROCEDURE — 86900 BLOOD TYPING SEROLOGIC ABO: CPT

## 2022-01-01 PROCEDURE — 99231 SBSQ HOSP IP/OBS SF/LOW 25: CPT

## 2022-01-01 PROCEDURE — 99358 PROLONG SERVICE W/O CONTACT: CPT

## 2022-01-01 PROCEDURE — 93356 MYOCRD STRAIN IMG SPCKL TRCK: CPT

## 2022-01-01 PROCEDURE — 99223 1ST HOSP IP/OBS HIGH 75: CPT

## 2022-01-01 PROCEDURE — 38222 DX BONE MARROW BX & ASPIR: CPT | Mod: LT

## 2022-01-01 PROCEDURE — 71250 CT THORAX DX C-: CPT | Mod: 26

## 2022-01-01 PROCEDURE — 93308 TTE F-UP OR LMTD: CPT | Mod: 26

## 2022-01-01 PROCEDURE — 73130 X-RAY EXAM OF HAND: CPT

## 2022-01-01 PROCEDURE — 99285 EMERGENCY DEPT VISIT HI MDM: CPT

## 2022-01-01 PROCEDURE — 93010 ELECTROCARDIOGRAM REPORT: CPT | Mod: 76

## 2022-01-01 PROCEDURE — 99215 OFFICE O/P EST HI 40 MIN: CPT

## 2022-01-01 RX ORDER — ACYCLOVIR 400 MG/1
400 TABLET ORAL 3 TIMES DAILY
Qty: 90 | Refills: 2 | Status: ACTIVE | COMMUNITY
Start: 2022-01-01 | End: 1900-01-01

## 2022-01-01 RX ORDER — POTASSIUM CHLORIDE 20 MEQ
20 PACKET (EA) ORAL
Refills: 0 | Status: COMPLETED | OUTPATIENT
Start: 2022-01-01 | End: 2022-01-01

## 2022-01-01 RX ORDER — HYDROMORPHONE HYDROCHLORIDE 2 MG/ML
0.3 INJECTION INTRAMUSCULAR; INTRAVENOUS; SUBCUTANEOUS
Refills: 0 | Status: DISCONTINUED | OUTPATIENT
Start: 2022-01-01 | End: 2022-01-01

## 2022-01-01 RX ORDER — PHYTONADIONE (VIT K1) 5 MG
10 TABLET ORAL DAILY
Refills: 0 | Status: COMPLETED | OUTPATIENT
Start: 2022-01-01 | End: 2022-01-01

## 2022-01-01 RX ORDER — ACETAMINOPHEN 500 MG
650 TABLET ORAL EVERY 6 HOURS
Refills: 0 | Status: DISCONTINUED | OUTPATIENT
Start: 2022-01-01 | End: 2022-01-01

## 2022-01-01 RX ORDER — OXYBUTYNIN CHLORIDE 5 MG
5 TABLET ORAL DAILY
Refills: 0 | Status: DISCONTINUED | OUTPATIENT
Start: 2022-01-01 | End: 2022-01-01

## 2022-01-01 RX ORDER — ACYCLOVIR SODIUM 500 MG
1 VIAL (EA) INTRAVENOUS
Qty: 0 | Refills: 0 | DISCHARGE

## 2022-01-01 RX ORDER — AMLODIPINE BESYLATE 2.5 MG/1
5 TABLET ORAL DAILY
Refills: 0 | Status: DISCONTINUED | OUTPATIENT
Start: 2022-01-01 | End: 2022-01-01

## 2022-01-01 RX ORDER — FLUCONAZOLE 200 MG/1
200 TABLET ORAL DAILY
Qty: 90 | Refills: 1 | Status: ACTIVE | COMMUNITY
Start: 2020-10-20 | End: 1900-01-01

## 2022-01-01 RX ORDER — GABAPENTIN 100 MG/1
100 CAPSULE ORAL
Qty: 90 | Refills: 3 | Status: ACTIVE | COMMUNITY
Start: 2020-11-06 | End: 1900-01-01

## 2022-01-01 RX ORDER — ATORVASTATIN CALCIUM 80 MG/1
1 TABLET, FILM COATED ORAL
Qty: 30 | Refills: 3
Start: 2022-01-01 | End: 2023-03-19

## 2022-01-01 RX ORDER — HYDROMORPHONE HYDROCHLORIDE 2 MG/ML
1 INJECTION INTRAMUSCULAR; INTRAVENOUS; SUBCUTANEOUS
Refills: 0 | Status: DISCONTINUED | OUTPATIENT
Start: 2022-01-01 | End: 2022-01-01

## 2022-01-01 RX ORDER — ACETAMINOPHEN 500 MG
1000 TABLET ORAL ONCE
Refills: 0 | Status: COMPLETED | OUTPATIENT
Start: 2022-01-01 | End: 2022-01-01

## 2022-01-01 RX ORDER — DEXAMETHASONE 0.5 MG/5ML
5 ELIXIR ORAL EVERY 12 HOURS
Refills: 0 | Status: DISCONTINUED | OUTPATIENT
Start: 2022-01-01 | End: 2022-01-01

## 2022-01-01 RX ORDER — HYDROMORPHONE HYDROCHLORIDE 2 MG/ML
0.5 INJECTION INTRAMUSCULAR; INTRAVENOUS; SUBCUTANEOUS
Refills: 0 | Status: DISCONTINUED | OUTPATIENT
Start: 2022-01-01 | End: 2022-01-01

## 2022-01-01 RX ORDER — AMLODIPINE BESYLATE 2.5 MG/1
1 TABLET ORAL
Qty: 0 | Refills: 0 | DISCHARGE

## 2022-01-01 RX ORDER — HYDROMORPHONE HYDROCHLORIDE 2 MG/ML
0.2 INJECTION INTRAMUSCULAR; INTRAVENOUS; SUBCUTANEOUS
Refills: 0 | Status: DISCONTINUED | OUTPATIENT
Start: 2022-01-01 | End: 2022-01-01

## 2022-01-01 RX ORDER — CEFEPIME 1 G/1
2000 INJECTION, POWDER, FOR SOLUTION INTRAMUSCULAR; INTRAVENOUS EVERY 8 HOURS
Refills: 0 | Status: DISCONTINUED | OUTPATIENT
Start: 2022-01-01 | End: 2022-01-01

## 2022-01-01 RX ORDER — MIDODRINE HYDROCHLORIDE 2.5 MG/1
10 TABLET ORAL THREE TIMES A DAY
Refills: 0 | Status: DISCONTINUED | OUTPATIENT
Start: 2022-01-01 | End: 2022-01-01

## 2022-01-01 RX ORDER — FUROSEMIDE 40 MG
20 TABLET ORAL ONCE
Refills: 0 | Status: COMPLETED | OUTPATIENT
Start: 2022-01-01 | End: 2022-01-01

## 2022-01-01 RX ORDER — ALENDRONATE SODIUM 70 MG/1
1 TABLET ORAL
Qty: 0 | Refills: 0 | DISCHARGE

## 2022-01-01 RX ORDER — HYDROXYUREA 500 MG/1
1000 CAPSULE ORAL EVERY 12 HOURS
Refills: 0 | Status: DISCONTINUED | OUTPATIENT
Start: 2022-01-01 | End: 2022-01-01

## 2022-01-01 RX ORDER — ACYCLOVIR SODIUM 500 MG
400 VIAL (EA) INTRAVENOUS
Refills: 0 | Status: DISCONTINUED | OUTPATIENT
Start: 2022-01-01 | End: 2022-01-01

## 2022-01-01 RX ORDER — BUPROPION HYDROCHLORIDE 150 MG/1
1 TABLET, EXTENDED RELEASE ORAL
Qty: 0 | Refills: 0 | DISCHARGE

## 2022-01-01 RX ORDER — SODIUM BICARBONATE 1 MEQ/ML
650 SYRINGE (ML) INTRAVENOUS
Refills: 0 | Status: COMPLETED | OUTPATIENT
Start: 2022-01-01 | End: 2022-01-01

## 2022-01-01 RX ORDER — FUROSEMIDE 40 MG
40 TABLET ORAL ONCE
Refills: 0 | Status: COMPLETED | OUTPATIENT
Start: 2022-01-01 | End: 2022-01-01

## 2022-01-01 RX ORDER — PHENYLEPHRINE-SHARK LIVER OIL-MINERAL OIL-PETROLATUM RECTAL OINTMENT
1 OINTMENT (GRAM) RECTAL ONCE
Refills: 0 | Status: COMPLETED | OUTPATIENT
Start: 2022-01-01 | End: 2022-01-01

## 2022-01-01 RX ORDER — VANCOMYCIN HCL 1 G
1000 VIAL (EA) INTRAVENOUS EVERY 12 HOURS
Refills: 0 | Status: DISCONTINUED | OUTPATIENT
Start: 2022-01-01 | End: 2022-01-01

## 2022-01-01 RX ORDER — LEVOFLOXACIN 500 MG/1
500 TABLET, FILM COATED ORAL
Qty: 90 | Refills: 1 | Status: ACTIVE | COMMUNITY
Start: 2020-10-20 | End: 1900-01-01

## 2022-01-01 RX ORDER — HYDROMORPHONE HYDROCHLORIDE 2 MG/ML
0.5 INJECTION INTRAMUSCULAR; INTRAVENOUS; SUBCUTANEOUS EVERY 4 HOURS
Refills: 0 | Status: DISCONTINUED | OUTPATIENT
Start: 2022-01-01 | End: 2022-01-01

## 2022-01-01 RX ORDER — IPRATROPIUM/ALBUTEROL SULFATE 18-103MCG
3 AEROSOL WITH ADAPTER (GRAM) INHALATION ONCE
Refills: 0 | Status: COMPLETED | OUTPATIENT
Start: 2022-01-01 | End: 2022-01-01

## 2022-01-01 RX ORDER — DIPHENHYDRAMINE HYDROCHLORIDE AND LIDOCAINE HYDROCHLORIDE AND ALUMINUM HYDROXIDE AND MAGNESIUM HYDRO
KIT
Qty: 1 | Refills: 1 | Status: DISCONTINUED | COMMUNITY
Start: 2022-01-01 | End: 2022-01-01

## 2022-01-01 RX ORDER — CALCIUM GLUCONATE 100 MG/ML
1 VIAL (ML) INTRAVENOUS ONCE
Refills: 0 | Status: COMPLETED | OUTPATIENT
Start: 2022-01-01 | End: 2022-01-01

## 2022-01-01 RX ORDER — SODIUM CHLORIDE 9 MG/ML
1000 INJECTION INTRAMUSCULAR; INTRAVENOUS; SUBCUTANEOUS
Refills: 0 | Status: DISCONTINUED | OUTPATIENT
Start: 2022-01-01 | End: 2022-01-01

## 2022-01-01 RX ORDER — MEROPENEM 1 G/30ML
1000 INJECTION INTRAVENOUS EVERY 8 HOURS
Refills: 0 | Status: DISCONTINUED | OUTPATIENT
Start: 2022-01-01 | End: 2022-01-01

## 2022-01-01 RX ORDER — POLYETHYLENE GLYCOL 3350 17 G/17G
17 POWDER, FOR SOLUTION ORAL DAILY
Refills: 0 | Status: DISCONTINUED | OUTPATIENT
Start: 2022-01-01 | End: 2022-01-01

## 2022-01-01 RX ORDER — VANCOMYCIN HCL 1 G
1000 VIAL (EA) INTRAVENOUS ONCE
Refills: 0 | Status: COMPLETED | OUTPATIENT
Start: 2022-01-01 | End: 2022-01-01

## 2022-01-01 RX ORDER — HYDROXYUREA 500 MG/1
500 CAPSULE ORAL ONCE
Refills: 0 | Status: COMPLETED | OUTPATIENT
Start: 2022-01-01 | End: 2022-01-01

## 2022-01-01 RX ORDER — CYTARABINE 100 MG
164 VIAL (EA) INJECTION DAILY
Refills: 0 | Status: COMPLETED | OUTPATIENT
Start: 2022-01-01 | End: 2022-01-01

## 2022-01-01 RX ORDER — SODIUM CHLORIDE 0.65 %
1 AEROSOL, SPRAY (ML) NASAL
Refills: 0 | Status: DISCONTINUED | OUTPATIENT
Start: 2022-01-01 | End: 2022-01-01

## 2022-01-01 RX ORDER — CASPOFUNGIN ACETATE 7 MG/ML
INJECTION, POWDER, LYOPHILIZED, FOR SOLUTION INTRAVENOUS
Refills: 0 | Status: DISCONTINUED | OUTPATIENT
Start: 2022-01-01 | End: 2022-01-01

## 2022-01-01 RX ORDER — BUPROPION HYDROCHLORIDE 150 MG/1
150 TABLET, EXTENDED RELEASE ORAL DAILY
Qty: 30 | Refills: 0 | Status: ACTIVE | COMMUNITY
Start: 2022-01-01 | End: 1900-01-01

## 2022-01-01 RX ORDER — CEFEPIME 1 G/1
INJECTION, POWDER, FOR SOLUTION INTRAMUSCULAR; INTRAVENOUS
Refills: 0 | Status: DISCONTINUED | OUTPATIENT
Start: 2022-01-01 | End: 2022-01-01

## 2022-01-01 RX ORDER — LACTULOSE 10 G/15ML
20 SOLUTION ORAL ONCE
Refills: 0 | Status: DISCONTINUED | OUTPATIENT
Start: 2022-01-01 | End: 2022-01-01

## 2022-01-01 RX ORDER — MAGNESIUM SULFATE 500 MG/ML
1 VIAL (ML) INJECTION ONCE
Refills: 0 | Status: COMPLETED | OUTPATIENT
Start: 2022-01-01 | End: 2022-01-01

## 2022-01-01 RX ORDER — CHLORHEXIDINE GLUCONATE 213 G/1000ML
1 SOLUTION TOPICAL DAILY
Refills: 0 | Status: DISCONTINUED | OUTPATIENT
Start: 2022-01-01 | End: 2022-01-01

## 2022-01-01 RX ORDER — TRAZODONE HCL 50 MG
150 TABLET ORAL AT BEDTIME
Refills: 0 | Status: DISCONTINUED | OUTPATIENT
Start: 2022-01-01 | End: 2022-01-01

## 2022-01-01 RX ORDER — METOCLOPRAMIDE HCL 10 MG
5 TABLET ORAL EVERY 6 HOURS
Refills: 0 | Status: DISCONTINUED | OUTPATIENT
Start: 2022-01-01 | End: 2022-01-01

## 2022-01-01 RX ORDER — HALOPERIDOL DECANOATE 100 MG/ML
0.5 INJECTION INTRAMUSCULAR EVERY 6 HOURS
Refills: 0 | Status: DISCONTINUED | OUTPATIENT
Start: 2022-01-01 | End: 2022-01-01

## 2022-01-01 RX ORDER — SENNA PLUS 8.6 MG/1
2 TABLET ORAL ONCE
Refills: 0 | Status: COMPLETED | OUTPATIENT
Start: 2022-01-01 | End: 2022-01-01

## 2022-01-01 RX ORDER — GABAPENTIN 400 MG/1
100 CAPSULE ORAL DAILY
Refills: 0 | Status: DISCONTINUED | OUTPATIENT
Start: 2022-01-01 | End: 2022-01-01

## 2022-01-01 RX ORDER — FUROSEMIDE 40 MG
40 TABLET ORAL DAILY
Refills: 0 | Status: COMPLETED | OUTPATIENT
Start: 2022-01-01 | End: 2022-01-01

## 2022-01-01 RX ORDER — ONDANSETRON 8 MG/1
8 TABLET, FILM COATED ORAL EVERY 8 HOURS
Refills: 0 | Status: COMPLETED | OUTPATIENT
Start: 2022-01-01 | End: 2022-01-01

## 2022-01-01 RX ORDER — HYDROMORPHONE HYDROCHLORIDE 2 MG/ML
0.1 INJECTION INTRAMUSCULAR; INTRAVENOUS; SUBCUTANEOUS
Refills: 0 | Status: DISCONTINUED | OUTPATIENT
Start: 2022-01-01 | End: 2022-01-01

## 2022-01-01 RX ORDER — SENNA PLUS 8.6 MG/1
2 TABLET ORAL AT BEDTIME
Refills: 0 | Status: DISCONTINUED | OUTPATIENT
Start: 2022-01-01 | End: 2022-01-01

## 2022-01-01 RX ORDER — NYSTATIN 100000 [USP'U]/ML
100000 SUSPENSION ORAL 3 TIMES DAILY
Qty: 1 | Refills: 2 | Status: DISCONTINUED | COMMUNITY
Start: 2021-08-23 | End: 2022-01-01

## 2022-01-01 RX ORDER — HYDROMORPHONE HYDROCHLORIDE 2 MG/ML
0.5 INJECTION INTRAMUSCULAR; INTRAVENOUS; SUBCUTANEOUS EVERY 6 HOURS
Refills: 0 | Status: DISCONTINUED | OUTPATIENT
Start: 2022-01-01 | End: 2022-01-01

## 2022-01-01 RX ORDER — CEFEPIME 1 G/1
2000 INJECTION, POWDER, FOR SOLUTION INTRAMUSCULAR; INTRAVENOUS EVERY 12 HOURS
Refills: 0 | Status: DISCONTINUED | OUTPATIENT
Start: 2022-01-01 | End: 2022-01-01

## 2022-01-01 RX ORDER — FLUCONAZOLE 150 MG/1
200 TABLET ORAL DAILY
Refills: 0 | Status: DISCONTINUED | OUTPATIENT
Start: 2022-01-01 | End: 2022-01-01

## 2022-01-01 RX ORDER — ATORVASTATIN CALCIUM 80 MG/1
10 TABLET, FILM COATED ORAL AT BEDTIME
Refills: 0 | Status: DISCONTINUED | OUTPATIENT
Start: 2022-01-01 | End: 2022-01-01

## 2022-01-01 RX ORDER — AMLODIPINE BESYLATE 2.5 MG/1
1 TABLET ORAL
Qty: 0 | Refills: 0 | DISCHARGE
Start: 2022-01-01

## 2022-01-01 RX ORDER — DRONABINOL 2.5 MG
5 CAPSULE ORAL
Refills: 0 | Status: DISCONTINUED | OUTPATIENT
Start: 2022-01-01 | End: 2022-01-01

## 2022-01-01 RX ORDER — SODIUM CHLORIDE 9 MG/ML
1000 INJECTION INTRAMUSCULAR; INTRAVENOUS; SUBCUTANEOUS ONCE
Refills: 0 | Status: COMPLETED | OUTPATIENT
Start: 2022-01-01 | End: 2022-01-01

## 2022-01-01 RX ORDER — POTASSIUM CHLORIDE 1500 MG/1
20 TABLET, EXTENDED RELEASE ORAL DAILY
Qty: 30 | Refills: 0 | Status: DISCONTINUED | COMMUNITY
Start: 2021-12-08 | End: 2022-01-01

## 2022-01-01 RX ORDER — BUPROPION HYDROCHLORIDE 150 MG/1
150 TABLET, EXTENDED RELEASE ORAL DAILY
Refills: 0 | Status: DISCONTINUED | OUTPATIENT
Start: 2022-01-01 | End: 2022-01-01

## 2022-01-01 RX ORDER — MIDODRINE HYDROCHLORIDE 2.5 MG/1
5 TABLET ORAL EVERY 8 HOURS
Refills: 0 | Status: DISCONTINUED | OUTPATIENT
Start: 2022-01-01 | End: 2022-01-01

## 2022-01-01 RX ORDER — SODIUM CHLORIDE 9 MG/ML
500 INJECTION INTRAMUSCULAR; INTRAVENOUS; SUBCUTANEOUS ONCE
Refills: 0 | Status: COMPLETED | OUTPATIENT
Start: 2022-01-01 | End: 2022-01-01

## 2022-01-01 RX ORDER — LANOLIN ALCOHOL/MO/W.PET/CERES
3 CREAM (GRAM) TOPICAL AT BEDTIME
Refills: 0 | Status: DISCONTINUED | OUTPATIENT
Start: 2022-01-01 | End: 2022-01-01

## 2022-01-01 RX ORDER — FUROSEMIDE 40 MG
20 TABLET ORAL ONCE
Refills: 0 | Status: DISCONTINUED | OUTPATIENT
Start: 2022-01-01 | End: 2022-01-01

## 2022-01-01 RX ORDER — GILTERITINIB 40 MG/1
120 TABLET ORAL
Refills: 0 | Status: DISCONTINUED | OUTPATIENT
Start: 2022-01-01 | End: 2022-01-01

## 2022-01-01 RX ORDER — POLYETHYLENE GLYCOL 3350 17 G/17G
17 POWDER, FOR SOLUTION ORAL
Refills: 0 | Status: DISCONTINUED | OUTPATIENT
Start: 2022-01-01 | End: 2022-01-01

## 2022-01-01 RX ORDER — IPRATROPIUM/ALBUTEROL SULFATE 18-103MCG
3 AEROSOL WITH ADAPTER (GRAM) INHALATION EVERY 6 HOURS
Refills: 0 | Status: DISCONTINUED | OUTPATIENT
Start: 2022-01-01 | End: 2022-01-01

## 2022-01-01 RX ORDER — BUDESONIDE AND FORMOTEROL FUMARATE DIHYDRATE 160; 4.5 UG/1; UG/1
2 AEROSOL RESPIRATORY (INHALATION)
Qty: 1 | Refills: 3
Start: 2022-01-01 | End: 2023-03-19

## 2022-01-01 RX ORDER — VORICONAZOLE 10 MG/ML
1 INJECTION, POWDER, LYOPHILIZED, FOR SOLUTION INTRAVENOUS
Qty: 60 | Refills: 3
Start: 2022-01-01 | End: 2023-03-19

## 2022-01-01 RX ORDER — METOPROLOL TARTRATE 50 MG
25 TABLET ORAL DAILY
Refills: 0 | Status: DISCONTINUED | OUTPATIENT
Start: 2022-01-01 | End: 2022-01-01

## 2022-01-01 RX ORDER — LACTULOSE 10 G/15ML
10 SOLUTION ORAL
Refills: 0 | Status: DISCONTINUED | OUTPATIENT
Start: 2022-01-01 | End: 2022-01-01

## 2022-01-01 RX ORDER — CEFEPIME 1 G/1
2000 INJECTION, POWDER, FOR SOLUTION INTRAMUSCULAR; INTRAVENOUS ONCE
Refills: 0 | Status: COMPLETED | OUTPATIENT
Start: 2022-01-01 | End: 2022-01-01

## 2022-01-01 RX ORDER — FLUCONAZOLE 150 MG/1
1 TABLET ORAL
Qty: 0 | Refills: 0 | DISCHARGE

## 2022-01-01 RX ORDER — CHLORHEXIDINE GLUCONATE 213 G/1000ML
1 SOLUTION TOPICAL
Refills: 0 | Status: DISCONTINUED | OUTPATIENT
Start: 2022-01-01 | End: 2022-01-01

## 2022-01-01 RX ORDER — ALLOPURINOL 300 MG
300 TABLET ORAL DAILY
Refills: 0 | Status: DISCONTINUED | OUTPATIENT
Start: 2022-01-01 | End: 2022-01-01

## 2022-01-01 RX ORDER — HYDROXYUREA 500 MG/1
2500 CAPSULE ORAL EVERY 12 HOURS
Refills: 0 | Status: DISCONTINUED | OUTPATIENT
Start: 2022-01-01 | End: 2022-01-01

## 2022-01-01 RX ORDER — HYDROXYUREA 500 MG/1
2000 CAPSULE ORAL
Refills: 0 | Status: DISCONTINUED | OUTPATIENT
Start: 2022-01-01 | End: 2022-01-01

## 2022-01-01 RX ORDER — FUROSEMIDE 40 MG
40 TABLET ORAL
Refills: 0 | Status: DISCONTINUED | OUTPATIENT
Start: 2022-01-01 | End: 2022-01-01

## 2022-01-01 RX ORDER — FUROSEMIDE 40 MG
40 TABLET ORAL DAILY
Refills: 0 | Status: DISCONTINUED | OUTPATIENT
Start: 2022-01-01 | End: 2022-01-01

## 2022-01-01 RX ORDER — VORICONAZOLE 10 MG/ML
200 INJECTION, POWDER, LYOPHILIZED, FOR SOLUTION INTRAVENOUS EVERY 12 HOURS
Refills: 0 | Status: DISCONTINUED | OUTPATIENT
Start: 2022-01-01 | End: 2022-01-01

## 2022-01-01 RX ORDER — ONDANSETRON 8 MG/1
4 TABLET, FILM COATED ORAL EVERY 8 HOURS
Refills: 0 | Status: DISCONTINUED | OUTPATIENT
Start: 2022-01-01 | End: 2022-01-01

## 2022-01-01 RX ORDER — BUDESONIDE AND FORMOTEROL FUMARATE DIHYDRATE 160; 4.5 UG/1; UG/1
2 AEROSOL RESPIRATORY (INHALATION)
Refills: 0 | Status: DISCONTINUED | OUTPATIENT
Start: 2022-01-01 | End: 2022-01-01

## 2022-01-01 RX ORDER — DEXAMETHASONE 0.5 MG/5ML
2.5 ELIXIR ORAL EVERY 12 HOURS
Refills: 0 | Status: DISCONTINUED | OUTPATIENT
Start: 2022-01-01 | End: 2022-01-01

## 2022-01-01 RX ORDER — OXYBUTYNIN CHLORIDE 5 MG
1 TABLET ORAL
Qty: 0 | Refills: 0 | DISCHARGE

## 2022-01-01 RX ORDER — HYDROXYUREA 500 MG/1
1000 CAPSULE ORAL ONCE
Refills: 0 | Status: COMPLETED | OUTPATIENT
Start: 2022-01-01 | End: 2022-01-01

## 2022-01-01 RX ORDER — MAGNESIUM SULFATE 500 MG/ML
2 VIAL (ML) INJECTION ONCE
Refills: 0 | Status: COMPLETED | OUTPATIENT
Start: 2022-01-01 | End: 2022-01-01

## 2022-01-01 RX ORDER — POTASSIUM CHLORIDE 20 MEQ
20 PACKET (EA) ORAL ONCE
Refills: 0 | Status: COMPLETED | OUTPATIENT
Start: 2022-01-01 | End: 2022-01-01

## 2022-01-01 RX ORDER — LISINOPRIL 2.5 MG/1
5 TABLET ORAL DAILY
Refills: 0 | Status: DISCONTINUED | OUTPATIENT
Start: 2022-01-01 | End: 2022-01-01

## 2022-01-01 RX ORDER — CALCIUM GLUCONATE 100 MG/ML
2 VIAL (ML) INTRAVENOUS ONCE
Refills: 0 | Status: COMPLETED | OUTPATIENT
Start: 2022-01-01 | End: 2022-01-01

## 2022-01-01 RX ORDER — PHYTONADIONE (VIT K1) 5 MG
5 TABLET ORAL DAILY
Refills: 0 | Status: COMPLETED | OUTPATIENT
Start: 2022-01-01 | End: 2022-01-01

## 2022-01-01 RX ORDER — POTASSIUM CHLORIDE 1500 MG/1
20 TABLET, EXTENDED RELEASE ORAL
Qty: 90 | Refills: 0 | Status: ACTIVE | COMMUNITY
Start: 2022-01-01 | End: 1900-01-01

## 2022-01-01 RX ORDER — SALIVA SUBSTITUTE COMB NO.11 351 MG
15 POWDER IN PACKET (EA) MUCOUS MEMBRANE THREE TIMES A DAY
Refills: 0 | Status: DISCONTINUED | OUTPATIENT
Start: 2022-01-01 | End: 2022-01-01

## 2022-01-01 RX ORDER — METOCLOPRAMIDE HCL 10 MG
1 TABLET ORAL
Qty: 0 | Refills: 0 | DISCHARGE

## 2022-01-01 RX ORDER — POTASSIUM CHLORIDE 20 MEQ
20 PACKET (EA) ORAL DAILY
Refills: 0 | Status: DISCONTINUED | OUTPATIENT
Start: 2022-01-01 | End: 2022-01-01

## 2022-01-01 RX ORDER — AMLODIPINE BESYLATE 5 MG/1
5 TABLET ORAL
Qty: 30 | Refills: 0 | Status: DISCONTINUED | COMMUNITY
Start: 2020-03-31 | End: 2022-01-01

## 2022-01-01 RX ORDER — GILTERITINIB 40 MG/1
3 TABLET ORAL
Qty: 90 | Refills: 3
Start: 2022-01-01 | End: 2023-03-07

## 2022-01-01 RX ORDER — HYDROXYUREA 500 MG/1
2000 CAPSULE ORAL EVERY 12 HOURS
Refills: 0 | Status: DISCONTINUED | OUTPATIENT
Start: 2022-01-01 | End: 2022-01-01

## 2022-01-01 RX ORDER — POTASSIUM CHLORIDE 20 MEQ
40 PACKET (EA) ORAL EVERY 4 HOURS
Refills: 0 | Status: COMPLETED | OUTPATIENT
Start: 2022-01-01 | End: 2022-01-01

## 2022-01-01 RX ORDER — CASPOFUNGIN ACETATE 7 MG/ML
70 INJECTION, POWDER, LYOPHILIZED, FOR SOLUTION INTRAVENOUS ONCE
Refills: 0 | Status: DISCONTINUED | OUTPATIENT
Start: 2022-01-01 | End: 2022-01-01

## 2022-01-01 RX ORDER — VORICONAZOLE 10 MG/ML
400 INJECTION, POWDER, LYOPHILIZED, FOR SOLUTION INTRAVENOUS EVERY 12 HOURS
Refills: 0 | Status: COMPLETED | OUTPATIENT
Start: 2022-01-01 | End: 2022-01-01

## 2022-01-01 RX ORDER — GABAPENTIN 400 MG/1
1 CAPSULE ORAL
Qty: 0 | Refills: 0 | DISCHARGE

## 2022-01-01 RX ORDER — ACETAMINOPHEN 500 MG
325 TABLET ORAL ONCE
Refills: 0 | Status: COMPLETED | OUTPATIENT
Start: 2022-01-01 | End: 2022-01-01

## 2022-01-01 RX ORDER — CYTARABINE 100 MG
164 VIAL (EA) INJECTION DAILY
Refills: 0 | Status: DISCONTINUED | OUTPATIENT
Start: 2022-01-01 | End: 2022-01-01

## 2022-01-01 RX ORDER — ZINC OXIDE 200 MG/G
1 OINTMENT TOPICAL
Refills: 0 | Status: DISCONTINUED | OUTPATIENT
Start: 2022-01-01 | End: 2022-01-01

## 2022-01-01 RX ORDER — SENNA PLUS 8.6 MG/1
1 TABLET ORAL AT BEDTIME
Refills: 0 | Status: DISCONTINUED | OUTPATIENT
Start: 2022-01-01 | End: 2022-01-01

## 2022-01-01 RX ADMIN — Medication 1 SPRAY(S): at 17:22

## 2022-01-01 RX ADMIN — Medication 15 MILLILITER(S): at 05:16

## 2022-01-01 RX ADMIN — Medication 400 MILLIGRAM(S): at 05:20

## 2022-01-01 RX ADMIN — Medication 15 MILLILITER(S): at 13:43

## 2022-01-01 RX ADMIN — POLYETHYLENE GLYCOL 3350 17 GRAM(S): 17 POWDER, FOR SOLUTION ORAL at 06:03

## 2022-01-01 RX ADMIN — Medication 40 MILLIGRAM(S): at 11:57

## 2022-01-01 RX ADMIN — Medication 150 MILLIGRAM(S): at 21:37

## 2022-01-01 RX ADMIN — CEFEPIME 100 MILLIGRAM(S): 1 INJECTION, POWDER, FOR SOLUTION INTRAMUSCULAR; INTRAVENOUS at 13:46

## 2022-01-01 RX ADMIN — Medication 5 MILLIGRAM(S): at 17:15

## 2022-01-01 RX ADMIN — GABAPENTIN 100 MILLIGRAM(S): 400 CAPSULE ORAL at 12:13

## 2022-01-01 RX ADMIN — LISINOPRIL 5 MILLIGRAM(S): 2.5 TABLET ORAL at 05:26

## 2022-01-01 RX ADMIN — Medication 15 MILLILITER(S): at 06:18

## 2022-01-01 RX ADMIN — LISINOPRIL 5 MILLIGRAM(S): 2.5 TABLET ORAL at 05:45

## 2022-01-01 RX ADMIN — GABAPENTIN 100 MILLIGRAM(S): 400 CAPSULE ORAL at 12:37

## 2022-01-01 RX ADMIN — BUPROPION HYDROCHLORIDE 150 MILLIGRAM(S): 150 TABLET, EXTENDED RELEASE ORAL at 22:15

## 2022-01-01 RX ADMIN — Medication 3 MILLIGRAM(S): at 22:03

## 2022-01-01 RX ADMIN — BUPROPION HYDROCHLORIDE 150 MILLIGRAM(S): 150 TABLET, EXTENDED RELEASE ORAL at 12:28

## 2022-01-01 RX ADMIN — Medication 650 MILLIGRAM(S): at 11:51

## 2022-01-01 RX ADMIN — SODIUM CHLORIDE 20 MILLILITER(S): 9 INJECTION INTRAMUSCULAR; INTRAVENOUS; SUBCUTANEOUS at 05:47

## 2022-01-01 RX ADMIN — Medication 650 MILLIGRAM(S): at 07:51

## 2022-01-01 RX ADMIN — Medication 15 MILLILITER(S): at 22:18

## 2022-01-01 RX ADMIN — Medication 150 MILLIGRAM(S): at 21:32

## 2022-01-01 RX ADMIN — Medication 5 MILLIGRAM(S): at 11:12

## 2022-01-01 RX ADMIN — Medication 40 MILLIGRAM(S): at 06:00

## 2022-01-01 RX ADMIN — CEFEPIME 100 MILLIGRAM(S): 1 INJECTION, POWDER, FOR SOLUTION INTRAMUSCULAR; INTRAVENOUS at 21:08

## 2022-01-01 RX ADMIN — Medication 15 MILLILITER(S): at 22:32

## 2022-01-01 RX ADMIN — Medication 150 MILLIGRAM(S): at 00:05

## 2022-01-01 RX ADMIN — Medication 3 MILLILITER(S): at 19:09

## 2022-01-01 RX ADMIN — Medication 1 SPRAY(S): at 23:27

## 2022-01-01 RX ADMIN — GABAPENTIN 100 MILLIGRAM(S): 400 CAPSULE ORAL at 11:54

## 2022-01-01 RX ADMIN — SENNA PLUS 1 TABLET(S): 8.6 TABLET ORAL at 22:24

## 2022-01-01 RX ADMIN — BUDESONIDE AND FORMOTEROL FUMARATE DIHYDRATE 2 PUFF(S): 160; 4.5 AEROSOL RESPIRATORY (INHALATION) at 17:31

## 2022-01-01 RX ADMIN — SODIUM CHLORIDE 60 MILLILITER(S): 9 INJECTION INTRAMUSCULAR; INTRAVENOUS; SUBCUTANEOUS at 22:24

## 2022-01-01 RX ADMIN — BUPROPION HYDROCHLORIDE 150 MILLIGRAM(S): 150 TABLET, EXTENDED RELEASE ORAL at 12:00

## 2022-01-01 RX ADMIN — Medication 15 MILLILITER(S): at 06:38

## 2022-01-01 RX ADMIN — BUDESONIDE AND FORMOTEROL FUMARATE DIHYDRATE 2 PUFF(S): 160; 4.5 AEROSOL RESPIRATORY (INHALATION) at 05:33

## 2022-01-01 RX ADMIN — Medication 400 MILLIGRAM(S): at 06:13

## 2022-01-01 RX ADMIN — Medication 400 MILLIGRAM(S): at 16:02

## 2022-01-01 RX ADMIN — Medication 10 MILLIGRAM(S): at 12:06

## 2022-01-01 RX ADMIN — Medication 250 MILLIGRAM(S): at 01:26

## 2022-01-01 RX ADMIN — Medication 1 SPRAY(S): at 11:53

## 2022-01-01 RX ADMIN — Medication 25 MILLIGRAM(S): at 05:35

## 2022-01-01 RX ADMIN — Medication 150 MILLIGRAM(S): at 21:53

## 2022-01-01 RX ADMIN — Medication 40 MILLIGRAM(S): at 12:50

## 2022-01-01 RX ADMIN — Medication 15 MILLILITER(S): at 14:23

## 2022-01-01 RX ADMIN — VORICONAZOLE 200 MILLIGRAM(S): 10 INJECTION, POWDER, LYOPHILIZED, FOR SOLUTION INTRAVENOUS at 17:03

## 2022-01-01 RX ADMIN — Medication 25 MILLIGRAM(S): at 05:58

## 2022-01-01 RX ADMIN — Medication 5 MILLIGRAM(S): at 05:40

## 2022-01-01 RX ADMIN — POLYETHYLENE GLYCOL 3350 17 GRAM(S): 17 POWDER, FOR SOLUTION ORAL at 18:24

## 2022-01-01 RX ADMIN — LISINOPRIL 5 MILLIGRAM(S): 2.5 TABLET ORAL at 05:52

## 2022-01-01 RX ADMIN — Medication 2.5 MILLIGRAM(S): at 05:22

## 2022-01-01 RX ADMIN — Medication 15 MILLILITER(S): at 21:45

## 2022-01-01 RX ADMIN — GABAPENTIN 100 MILLIGRAM(S): 400 CAPSULE ORAL at 12:26

## 2022-01-01 RX ADMIN — Medication 3 MILLILITER(S): at 17:10

## 2022-01-01 RX ADMIN — Medication 5 MILLIGRAM(S): at 05:51

## 2022-01-01 RX ADMIN — Medication 3 MILLILITER(S): at 11:36

## 2022-01-01 RX ADMIN — Medication 40 MILLIGRAM(S): at 14:43

## 2022-01-01 RX ADMIN — MIDODRINE HYDROCHLORIDE 10 MILLIGRAM(S): 2.5 TABLET ORAL at 12:38

## 2022-01-01 RX ADMIN — Medication 650 MILLIGRAM(S): at 16:11

## 2022-01-01 RX ADMIN — LACTULOSE 10 GRAM(S): 10 SOLUTION ORAL at 13:00

## 2022-01-01 RX ADMIN — Medication 3 MILLILITER(S): at 17:15

## 2022-01-01 RX ADMIN — Medication 15 MILLILITER(S): at 21:09

## 2022-01-01 RX ADMIN — BUDESONIDE AND FORMOTEROL FUMARATE DIHYDRATE 2 PUFF(S): 160; 4.5 AEROSOL RESPIRATORY (INHALATION) at 05:46

## 2022-01-01 RX ADMIN — VORICONAZOLE 200 MILLIGRAM(S): 10 INJECTION, POWDER, LYOPHILIZED, FOR SOLUTION INTRAVENOUS at 06:04

## 2022-01-01 RX ADMIN — SODIUM CHLORIDE 20 MILLILITER(S): 9 INJECTION INTRAMUSCULAR; INTRAVENOUS; SUBCUTANEOUS at 05:35

## 2022-01-01 RX ADMIN — Medication 650 MILLIGRAM(S): at 06:14

## 2022-01-01 RX ADMIN — BUPROPION HYDROCHLORIDE 150 MILLIGRAM(S): 150 TABLET, EXTENDED RELEASE ORAL at 13:00

## 2022-01-01 RX ADMIN — Medication 5 MILLIGRAM(S): at 12:38

## 2022-01-01 RX ADMIN — HYDROXYUREA 2000 MILLIGRAM(S): 500 CAPSULE ORAL at 17:29

## 2022-01-01 RX ADMIN — POLYETHYLENE GLYCOL 3350 17 GRAM(S): 17 POWDER, FOR SOLUTION ORAL at 17:10

## 2022-01-01 RX ADMIN — SODIUM CHLORIDE 20 MILLILITER(S): 9 INJECTION INTRAMUSCULAR; INTRAVENOUS; SUBCUTANEOUS at 05:39

## 2022-01-01 RX ADMIN — POLYETHYLENE GLYCOL 3350 17 GRAM(S): 17 POWDER, FOR SOLUTION ORAL at 17:27

## 2022-01-01 RX ADMIN — Medication 15 MILLILITER(S): at 22:22

## 2022-01-01 RX ADMIN — BUDESONIDE AND FORMOTEROL FUMARATE DIHYDRATE 2 PUFF(S): 160; 4.5 AEROSOL RESPIRATORY (INHALATION) at 17:16

## 2022-01-01 RX ADMIN — BUDESONIDE AND FORMOTEROL FUMARATE DIHYDRATE 2 PUFF(S): 160; 4.5 AEROSOL RESPIRATORY (INHALATION) at 17:22

## 2022-01-01 RX ADMIN — HYDROMORPHONE HYDROCHLORIDE 1 MILLIGRAM(S): 2 INJECTION INTRAMUSCULAR; INTRAVENOUS; SUBCUTANEOUS at 18:29

## 2022-01-01 RX ADMIN — Medication 1 SPRAY(S): at 23:06

## 2022-01-01 RX ADMIN — Medication 5 MILLIGRAM(S): at 11:58

## 2022-01-01 RX ADMIN — GABAPENTIN 100 MILLIGRAM(S): 400 CAPSULE ORAL at 13:35

## 2022-01-01 RX ADMIN — Medication 15 MILLILITER(S): at 21:57

## 2022-01-01 RX ADMIN — Medication 15 MILLILITER(S): at 05:59

## 2022-01-01 RX ADMIN — BUPROPION HYDROCHLORIDE 150 MILLIGRAM(S): 150 TABLET, EXTENDED RELEASE ORAL at 11:36

## 2022-01-01 RX ADMIN — BUDESONIDE AND FORMOTEROL FUMARATE DIHYDRATE 2 PUFF(S): 160; 4.5 AEROSOL RESPIRATORY (INHALATION) at 06:00

## 2022-01-01 RX ADMIN — Medication 15 MILLILITER(S): at 06:09

## 2022-01-01 RX ADMIN — Medication 15 MILLILITER(S): at 13:12

## 2022-01-01 RX ADMIN — Medication 5 MILLIGRAM(S): at 18:24

## 2022-01-01 RX ADMIN — HYDROMORPHONE HYDROCHLORIDE 0.2 MILLIGRAM(S): 2 INJECTION INTRAMUSCULAR; INTRAVENOUS; SUBCUTANEOUS at 19:43

## 2022-01-01 RX ADMIN — HYDROMORPHONE HYDROCHLORIDE 0.2 MILLIGRAM(S): 2 INJECTION INTRAMUSCULAR; INTRAVENOUS; SUBCUTANEOUS at 18:30

## 2022-01-01 RX ADMIN — Medication 15 MILLILITER(S): at 17:00

## 2022-01-01 RX ADMIN — Medication 5 MILLIGRAM(S): at 12:14

## 2022-01-01 RX ADMIN — Medication 40 MILLIGRAM(S): at 05:35

## 2022-01-01 RX ADMIN — Medication 25 MILLIGRAM(S): at 06:01

## 2022-01-01 RX ADMIN — Medication 100 MILLIGRAM(S): at 11:01

## 2022-01-01 RX ADMIN — Medication 40 MILLIGRAM(S): at 05:34

## 2022-01-01 RX ADMIN — POLYETHYLENE GLYCOL 3350 17 GRAM(S): 17 POWDER, FOR SOLUTION ORAL at 17:28

## 2022-01-01 RX ADMIN — Medication 15 MILLILITER(S): at 21:30

## 2022-01-01 RX ADMIN — BUDESONIDE AND FORMOTEROL FUMARATE DIHYDRATE 2 PUFF(S): 160; 4.5 AEROSOL RESPIRATORY (INHALATION) at 06:08

## 2022-01-01 RX ADMIN — Medication 3 MILLILITER(S): at 06:09

## 2022-01-01 RX ADMIN — LACTULOSE 10 GRAM(S): 10 SOLUTION ORAL at 14:28

## 2022-01-01 RX ADMIN — GABAPENTIN 100 MILLIGRAM(S): 400 CAPSULE ORAL at 14:10

## 2022-01-01 RX ADMIN — Medication 1 SPRAY(S): at 12:00

## 2022-01-01 RX ADMIN — Medication 25 MILLIGRAM(S): at 05:38

## 2022-01-01 RX ADMIN — Medication 400 MILLIGRAM(S): at 16:46

## 2022-01-01 RX ADMIN — HYDROXYUREA 1000 MILLIGRAM(S): 500 CAPSULE ORAL at 04:32

## 2022-01-01 RX ADMIN — Medication 50 MILLIEQUIVALENT(S): at 19:38

## 2022-01-01 RX ADMIN — Medication 15 MILLILITER(S): at 14:42

## 2022-01-01 RX ADMIN — Medication 1 SPRAY(S): at 18:27

## 2022-01-01 RX ADMIN — Medication 15 MILLILITER(S): at 22:29

## 2022-01-01 RX ADMIN — Medication 3 MILLILITER(S): at 06:03

## 2022-01-01 RX ADMIN — HYDROXYUREA 2500 MILLIGRAM(S): 500 CAPSULE ORAL at 17:32

## 2022-01-01 RX ADMIN — Medication 50 MILLIEQUIVALENT(S): at 15:58

## 2022-01-01 RX ADMIN — VORICONAZOLE 200 MILLIGRAM(S): 10 INJECTION, POWDER, LYOPHILIZED, FOR SOLUTION INTRAVENOUS at 19:17

## 2022-01-01 RX ADMIN — HYDROMORPHONE HYDROCHLORIDE 0.5 MILLIGRAM(S): 2 INJECTION INTRAMUSCULAR; INTRAVENOUS; SUBCUTANEOUS at 07:47

## 2022-01-01 RX ADMIN — Medication 20 MILLIEQUIVALENT(S): at 11:32

## 2022-01-01 RX ADMIN — Medication 300 MILLIGRAM(S): at 12:15

## 2022-01-01 RX ADMIN — Medication 40 MILLIGRAM(S): at 06:25

## 2022-01-01 RX ADMIN — Medication 5 MILLIGRAM(S): at 12:17

## 2022-01-01 RX ADMIN — BUDESONIDE AND FORMOTEROL FUMARATE DIHYDRATE 2 PUFF(S): 160; 4.5 AEROSOL RESPIRATORY (INHALATION) at 05:54

## 2022-01-01 RX ADMIN — Medication 15 MILLILITER(S): at 14:28

## 2022-01-01 RX ADMIN — Medication 100 GRAM(S): at 08:39

## 2022-01-01 RX ADMIN — Medication 3 MILLILITER(S): at 17:30

## 2022-01-01 RX ADMIN — Medication 0.25 MILLIGRAM(S): at 11:51

## 2022-01-01 RX ADMIN — GILTERITINIB 120 MILLIGRAM(S): 40 TABLET ORAL at 19:35

## 2022-01-01 RX ADMIN — CHLORHEXIDINE GLUCONATE 1 APPLICATION(S): 213 SOLUTION TOPICAL at 12:19

## 2022-01-01 RX ADMIN — FLUCONAZOLE 200 MILLIGRAM(S): 150 TABLET ORAL at 11:58

## 2022-01-01 RX ADMIN — CEFEPIME 100 MILLIGRAM(S): 1 INJECTION, POWDER, FOR SOLUTION INTRAMUSCULAR; INTRAVENOUS at 17:11

## 2022-01-01 RX ADMIN — Medication 1 SPRAY(S): at 17:16

## 2022-01-01 RX ADMIN — Medication 15 MILLILITER(S): at 05:37

## 2022-01-01 RX ADMIN — Medication 5 MILLIGRAM(S): at 12:19

## 2022-01-01 RX ADMIN — ZINC OXIDE 1 APPLICATION(S): 200 OINTMENT TOPICAL at 05:14

## 2022-01-01 RX ADMIN — Medication 150 MILLIGRAM(S): at 22:29

## 2022-01-01 RX ADMIN — SODIUM CHLORIDE 20 MILLILITER(S): 9 INJECTION INTRAMUSCULAR; INTRAVENOUS; SUBCUTANEOUS at 05:57

## 2022-01-01 RX ADMIN — Medication 3 MILLIGRAM(S): at 22:22

## 2022-01-01 RX ADMIN — Medication 150 MILLIGRAM(S): at 21:47

## 2022-01-01 RX ADMIN — Medication 1 SPRAY(S): at 17:27

## 2022-01-01 RX ADMIN — LISINOPRIL 5 MILLIGRAM(S): 2.5 TABLET ORAL at 05:58

## 2022-01-01 RX ADMIN — SENNA PLUS 2 TABLET(S): 8.6 TABLET ORAL at 08:49

## 2022-01-01 RX ADMIN — LISINOPRIL 5 MILLIGRAM(S): 2.5 TABLET ORAL at 05:34

## 2022-01-01 RX ADMIN — ONDANSETRON 8 MILLIGRAM(S): 8 TABLET, FILM COATED ORAL at 17:05

## 2022-01-01 RX ADMIN — Medication 1 SPRAY(S): at 00:53

## 2022-01-01 RX ADMIN — GILTERITINIB 120 MILLIGRAM(S): 40 TABLET ORAL at 17:31

## 2022-01-01 RX ADMIN — Medication 3 MILLIGRAM(S): at 21:58

## 2022-01-01 RX ADMIN — Medication 650 MILLIGRAM(S): at 07:59

## 2022-01-01 RX ADMIN — BUPROPION HYDROCHLORIDE 150 MILLIGRAM(S): 150 TABLET, EXTENDED RELEASE ORAL at 11:12

## 2022-01-01 RX ADMIN — MIDODRINE HYDROCHLORIDE 10 MILLIGRAM(S): 2.5 TABLET ORAL at 06:14

## 2022-01-01 RX ADMIN — SODIUM CHLORIDE 100 MILLILITER(S): 9 INJECTION INTRAMUSCULAR; INTRAVENOUS; SUBCUTANEOUS at 06:10

## 2022-01-01 RX ADMIN — VORICONAZOLE 200 MILLIGRAM(S): 10 INJECTION, POWDER, LYOPHILIZED, FOR SOLUTION INTRAVENOUS at 18:30

## 2022-01-01 RX ADMIN — Medication 0.25 MILLIGRAM(S): at 10:12

## 2022-01-01 RX ADMIN — HYDROMORPHONE HYDROCHLORIDE 0.5 MILLIGRAM(S): 2 INJECTION INTRAMUSCULAR; INTRAVENOUS; SUBCUTANEOUS at 16:13

## 2022-01-01 RX ADMIN — BUPROPION HYDROCHLORIDE 150 MILLIGRAM(S): 150 TABLET, EXTENDED RELEASE ORAL at 11:02

## 2022-01-01 RX ADMIN — Medication 1 SPRAY(S): at 12:33

## 2022-01-01 RX ADMIN — Medication 50 MILLIEQUIVALENT(S): at 09:30

## 2022-01-01 RX ADMIN — Medication 1 SPRAY(S): at 23:03

## 2022-01-01 RX ADMIN — HYDROXYUREA 500 MILLIGRAM(S): 500 CAPSULE ORAL at 18:54

## 2022-01-01 RX ADMIN — Medication 650 MILLIGRAM(S): at 09:07

## 2022-01-01 RX ADMIN — POLYETHYLENE GLYCOL 3350 17 GRAM(S): 17 POWDER, FOR SOLUTION ORAL at 17:31

## 2022-01-01 RX ADMIN — GABAPENTIN 100 MILLIGRAM(S): 400 CAPSULE ORAL at 11:57

## 2022-01-01 RX ADMIN — SODIUM CHLORIDE 10 MILLILITER(S): 9 INJECTION INTRAMUSCULAR; INTRAVENOUS; SUBCUTANEOUS at 05:54

## 2022-01-01 RX ADMIN — LACTULOSE 10 GRAM(S): 10 SOLUTION ORAL at 18:01

## 2022-01-01 RX ADMIN — BUDESONIDE AND FORMOTEROL FUMARATE DIHYDRATE 2 PUFF(S): 160; 4.5 AEROSOL RESPIRATORY (INHALATION) at 17:29

## 2022-01-01 RX ADMIN — CHLORHEXIDINE GLUCONATE 1 APPLICATION(S): 213 SOLUTION TOPICAL at 12:53

## 2022-01-01 RX ADMIN — Medication 400 MILLIGRAM(S): at 06:00

## 2022-01-01 RX ADMIN — Medication 50 MILLIEQUIVALENT(S): at 17:00

## 2022-01-01 RX ADMIN — BUPROPION HYDROCHLORIDE 150 MILLIGRAM(S): 150 TABLET, EXTENDED RELEASE ORAL at 13:12

## 2022-01-01 RX ADMIN — LISINOPRIL 5 MILLIGRAM(S): 2.5 TABLET ORAL at 05:36

## 2022-01-01 RX ADMIN — GILTERITINIB 120 MILLIGRAM(S): 40 TABLET ORAL at 18:08

## 2022-01-01 RX ADMIN — BUPROPION HYDROCHLORIDE 150 MILLIGRAM(S): 150 TABLET, EXTENDED RELEASE ORAL at 18:35

## 2022-01-01 RX ADMIN — Medication 5 MILLIGRAM(S): at 12:39

## 2022-01-01 RX ADMIN — Medication 20 MILLIEQUIVALENT(S): at 10:15

## 2022-01-01 RX ADMIN — Medication 1 SPRAY(S): at 23:57

## 2022-01-01 RX ADMIN — Medication 15 MILLILITER(S): at 05:36

## 2022-01-01 RX ADMIN — Medication 50 MILLIEQUIVALENT(S): at 14:19

## 2022-01-01 RX ADMIN — Medication 1 SPRAY(S): at 00:23

## 2022-01-01 RX ADMIN — Medication 0.25 MILLIGRAM(S): at 17:03

## 2022-01-01 RX ADMIN — Medication 1 SPRAY(S): at 06:37

## 2022-01-01 RX ADMIN — Medication 1 SPRAY(S): at 23:16

## 2022-01-01 RX ADMIN — GABAPENTIN 100 MILLIGRAM(S): 400 CAPSULE ORAL at 12:11

## 2022-01-01 RX ADMIN — MIDODRINE HYDROCHLORIDE 10 MILLIGRAM(S): 2.5 TABLET ORAL at 05:19

## 2022-01-01 RX ADMIN — SODIUM CHLORIDE 10 MILLILITER(S): 9 INJECTION INTRAMUSCULAR; INTRAVENOUS; SUBCUTANEOUS at 12:10

## 2022-01-01 RX ADMIN — Medication 0.25 MILLIGRAM(S): at 18:30

## 2022-01-01 RX ADMIN — Medication 15 MILLILITER(S): at 21:56

## 2022-01-01 RX ADMIN — Medication 1 SPRAY(S): at 00:52

## 2022-01-01 RX ADMIN — CHLORHEXIDINE GLUCONATE 1 APPLICATION(S): 213 SOLUTION TOPICAL at 12:33

## 2022-01-01 RX ADMIN — VORICONAZOLE 200 MILLIGRAM(S): 10 INJECTION, POWDER, LYOPHILIZED, FOR SOLUTION INTRAVENOUS at 17:22

## 2022-01-01 RX ADMIN — HYDROMORPHONE HYDROCHLORIDE 0.2 MILLIGRAM(S): 2 INJECTION INTRAMUSCULAR; INTRAVENOUS; SUBCUTANEOUS at 16:59

## 2022-01-01 RX ADMIN — GILTERITINIB 120 MILLIGRAM(S): 40 TABLET ORAL at 17:17

## 2022-01-01 RX ADMIN — BUDESONIDE AND FORMOTEROL FUMARATE DIHYDRATE 2 PUFF(S): 160; 4.5 AEROSOL RESPIRATORY (INHALATION) at 18:00

## 2022-01-01 RX ADMIN — Medication 15 MILLILITER(S): at 13:23

## 2022-01-01 RX ADMIN — Medication 40 MILLIGRAM(S): at 12:45

## 2022-01-01 RX ADMIN — Medication 20 MILLIEQUIVALENT(S): at 08:33

## 2022-01-01 RX ADMIN — SODIUM CHLORIDE 60 MILLILITER(S): 9 INJECTION INTRAMUSCULAR; INTRAVENOUS; SUBCUTANEOUS at 09:10

## 2022-01-01 RX ADMIN — BUDESONIDE AND FORMOTEROL FUMARATE DIHYDRATE 2 PUFF(S): 160; 4.5 AEROSOL RESPIRATORY (INHALATION) at 18:03

## 2022-01-01 RX ADMIN — CHLORHEXIDINE GLUCONATE 1 APPLICATION(S): 213 SOLUTION TOPICAL at 21:39

## 2022-01-01 RX ADMIN — VORICONAZOLE 200 MILLIGRAM(S): 10 INJECTION, POWDER, LYOPHILIZED, FOR SOLUTION INTRAVENOUS at 17:29

## 2022-01-01 RX ADMIN — CHLORHEXIDINE GLUCONATE 1 APPLICATION(S): 213 SOLUTION TOPICAL at 12:43

## 2022-01-01 RX ADMIN — Medication 40 MILLIGRAM(S): at 06:07

## 2022-01-01 RX ADMIN — SODIUM CHLORIDE 20 MILLILITER(S): 9 INJECTION INTRAMUSCULAR; INTRAVENOUS; SUBCUTANEOUS at 17:57

## 2022-01-01 RX ADMIN — Medication 40 MILLIGRAM(S): at 12:19

## 2022-01-01 RX ADMIN — Medication 15 MILLILITER(S): at 05:38

## 2022-01-01 RX ADMIN — Medication 15 MILLILITER(S): at 22:03

## 2022-01-01 RX ADMIN — Medication 40 MILLIEQUIVALENT(S): at 17:26

## 2022-01-01 RX ADMIN — ONDANSETRON 8 MILLIGRAM(S): 8 TABLET, FILM COATED ORAL at 05:38

## 2022-01-01 RX ADMIN — Medication 100 MILLIGRAM(S): at 06:39

## 2022-01-01 RX ADMIN — CEFEPIME 100 MILLIGRAM(S): 1 INJECTION, POWDER, FOR SOLUTION INTRAMUSCULAR; INTRAVENOUS at 00:23

## 2022-01-01 RX ADMIN — VORICONAZOLE 200 MILLIGRAM(S): 10 INJECTION, POWDER, LYOPHILIZED, FOR SOLUTION INTRAVENOUS at 05:02

## 2022-01-01 RX ADMIN — Medication 3 MILLILITER(S): at 14:08

## 2022-01-01 RX ADMIN — Medication 1000 MILLIGRAM(S): at 16:32

## 2022-01-01 RX ADMIN — Medication 1 SPRAY(S): at 18:37

## 2022-01-01 RX ADMIN — Medication 1000 MILLIGRAM(S): at 23:05

## 2022-01-01 RX ADMIN — BUPROPION HYDROCHLORIDE 150 MILLIGRAM(S): 150 TABLET, EXTENDED RELEASE ORAL at 11:59

## 2022-01-01 RX ADMIN — Medication 1 SPRAY(S): at 12:40

## 2022-01-01 RX ADMIN — Medication 25 MILLIGRAM(S): at 05:26

## 2022-01-01 RX ADMIN — SENNA PLUS 2 TABLET(S): 8.6 TABLET ORAL at 21:47

## 2022-01-01 RX ADMIN — BUPROPION HYDROCHLORIDE 150 MILLIGRAM(S): 150 TABLET, EXTENDED RELEASE ORAL at 12:16

## 2022-01-01 RX ADMIN — Medication 3 MILLILITER(S): at 05:40

## 2022-01-01 RX ADMIN — PHENYLEPHRINE-SHARK LIVER OIL-MINERAL OIL-PETROLATUM RECTAL OINTMENT 1 APPLICATION(S): at 06:45

## 2022-01-01 RX ADMIN — Medication 3 MILLILITER(S): at 12:38

## 2022-01-01 RX ADMIN — HYDROMORPHONE HYDROCHLORIDE 0.2 MILLIGRAM(S): 2 INJECTION INTRAMUSCULAR; INTRAVENOUS; SUBCUTANEOUS at 14:40

## 2022-01-01 RX ADMIN — Medication 400 MILLIGRAM(S): at 17:10

## 2022-01-01 RX ADMIN — Medication 40 MILLIGRAM(S): at 13:26

## 2022-01-01 RX ADMIN — Medication 650 MILLIGRAM(S): at 05:20

## 2022-01-01 RX ADMIN — ATORVASTATIN CALCIUM 10 MILLIGRAM(S): 80 TABLET, FILM COATED ORAL at 21:09

## 2022-01-01 RX ADMIN — Medication 3 MILLILITER(S): at 16:02

## 2022-01-01 RX ADMIN — BUDESONIDE AND FORMOTEROL FUMARATE DIHYDRATE 2 PUFF(S): 160; 4.5 AEROSOL RESPIRATORY (INHALATION) at 17:55

## 2022-01-01 RX ADMIN — Medication 15 MILLILITER(S): at 13:21

## 2022-01-01 RX ADMIN — Medication 15 MILLILITER(S): at 06:27

## 2022-01-01 RX ADMIN — GILTERITINIB 120 MILLIGRAM(S): 40 TABLET ORAL at 18:00

## 2022-01-01 RX ADMIN — HYDROXYUREA 2500 MILLIGRAM(S): 500 CAPSULE ORAL at 06:08

## 2022-01-01 RX ADMIN — CEFEPIME 100 MILLIGRAM(S): 1 INJECTION, POWDER, FOR SOLUTION INTRAMUSCULAR; INTRAVENOUS at 00:16

## 2022-01-01 RX ADMIN — CEFEPIME 100 MILLIGRAM(S): 1 INJECTION, POWDER, FOR SOLUTION INTRAMUSCULAR; INTRAVENOUS at 17:27

## 2022-01-01 RX ADMIN — LISINOPRIL 5 MILLIGRAM(S): 2.5 TABLET ORAL at 05:38

## 2022-01-01 RX ADMIN — Medication 20 MILLIEQUIVALENT(S): at 12:51

## 2022-01-01 RX ADMIN — SODIUM CHLORIDE 10 MILLILITER(S): 9 INJECTION INTRAMUSCULAR; INTRAVENOUS; SUBCUTANEOUS at 17:46

## 2022-01-01 RX ADMIN — Medication 5 MILLIGRAM(S): at 06:18

## 2022-01-01 RX ADMIN — Medication 50 MILLIEQUIVALENT(S): at 08:48

## 2022-01-01 RX ADMIN — Medication 400 MILLIGRAM(S): at 17:32

## 2022-01-01 RX ADMIN — Medication 40 MILLIGRAM(S): at 11:12

## 2022-01-01 RX ADMIN — BUDESONIDE AND FORMOTEROL FUMARATE DIHYDRATE 2 PUFF(S): 160; 4.5 AEROSOL RESPIRATORY (INHALATION) at 05:23

## 2022-01-01 RX ADMIN — Medication 3 MILLIGRAM(S): at 21:10

## 2022-01-01 RX ADMIN — Medication 1 SPRAY(S): at 12:52

## 2022-01-01 RX ADMIN — BUPROPION HYDROCHLORIDE 150 MILLIGRAM(S): 150 TABLET, EXTENDED RELEASE ORAL at 12:58

## 2022-01-01 RX ADMIN — Medication 1 SPRAY(S): at 23:35

## 2022-01-01 RX ADMIN — Medication 1 SPRAY(S): at 12:04

## 2022-01-01 RX ADMIN — BUDESONIDE AND FORMOTEROL FUMARATE DIHYDRATE 2 PUFF(S): 160; 4.5 AEROSOL RESPIRATORY (INHALATION) at 06:19

## 2022-01-01 RX ADMIN — Medication 1 SPRAY(S): at 05:41

## 2022-01-01 RX ADMIN — ATORVASTATIN CALCIUM 10 MILLIGRAM(S): 80 TABLET, FILM COATED ORAL at 22:22

## 2022-01-01 RX ADMIN — Medication 3 MILLILITER(S): at 17:29

## 2022-01-01 RX ADMIN — HYDROMORPHONE HYDROCHLORIDE 1 MILLIGRAM(S): 2 INJECTION INTRAMUSCULAR; INTRAVENOUS; SUBCUTANEOUS at 03:43

## 2022-01-01 RX ADMIN — BUDESONIDE AND FORMOTEROL FUMARATE DIHYDRATE 2 PUFF(S): 160; 4.5 AEROSOL RESPIRATORY (INHALATION) at 17:18

## 2022-01-01 RX ADMIN — SODIUM CHLORIDE 10 MILLILITER(S): 9 INJECTION INTRAMUSCULAR; INTRAVENOUS; SUBCUTANEOUS at 04:51

## 2022-01-01 RX ADMIN — MIDODRINE HYDROCHLORIDE 10 MILLIGRAM(S): 2.5 TABLET ORAL at 14:39

## 2022-01-01 RX ADMIN — BUPROPION HYDROCHLORIDE 150 MILLIGRAM(S): 150 TABLET, EXTENDED RELEASE ORAL at 12:19

## 2022-01-01 RX ADMIN — BUDESONIDE AND FORMOTEROL FUMARATE DIHYDRATE 2 PUFF(S): 160; 4.5 AEROSOL RESPIRATORY (INHALATION) at 05:03

## 2022-01-01 RX ADMIN — Medication 400 MILLIGRAM(S): at 17:29

## 2022-01-01 RX ADMIN — Medication 3 MILLILITER(S): at 17:03

## 2022-01-01 RX ADMIN — Medication 50 MILLIEQUIVALENT(S): at 10:49

## 2022-01-01 RX ADMIN — Medication 650 MILLIGRAM(S): at 05:37

## 2022-01-01 RX ADMIN — Medication 40 MILLIGRAM(S): at 05:38

## 2022-01-01 RX ADMIN — Medication 3 MILLILITER(S): at 06:17

## 2022-01-01 RX ADMIN — CHLORHEXIDINE GLUCONATE 1 APPLICATION(S): 213 SOLUTION TOPICAL at 22:24

## 2022-01-01 RX ADMIN — GABAPENTIN 100 MILLIGRAM(S): 400 CAPSULE ORAL at 11:33

## 2022-01-01 RX ADMIN — POLYETHYLENE GLYCOL 3350 17 GRAM(S): 17 POWDER, FOR SOLUTION ORAL at 06:13

## 2022-01-01 RX ADMIN — SODIUM CHLORIDE 20 MILLILITER(S): 9 INJECTION INTRAMUSCULAR; INTRAVENOUS; SUBCUTANEOUS at 06:06

## 2022-01-01 RX ADMIN — ATORVASTATIN CALCIUM 10 MILLIGRAM(S): 80 TABLET, FILM COATED ORAL at 21:58

## 2022-01-01 RX ADMIN — ONDANSETRON 8 MILLIGRAM(S): 8 TABLET, FILM COATED ORAL at 06:39

## 2022-01-01 RX ADMIN — SODIUM CHLORIDE 10 MILLILITER(S): 9 INJECTION INTRAMUSCULAR; INTRAVENOUS; SUBCUTANEOUS at 00:50

## 2022-01-01 RX ADMIN — Medication 15 MILLILITER(S): at 05:02

## 2022-01-01 RX ADMIN — Medication 3 MILLILITER(S): at 23:36

## 2022-01-01 RX ADMIN — Medication 1 SPRAY(S): at 17:14

## 2022-01-01 RX ADMIN — CHLORHEXIDINE GLUCONATE 1 APPLICATION(S): 213 SOLUTION TOPICAL at 22:32

## 2022-01-01 RX ADMIN — Medication 5 MILLIGRAM(S): at 11:03

## 2022-01-01 RX ADMIN — Medication 1 SPRAY(S): at 06:18

## 2022-01-01 RX ADMIN — HYDROXYUREA 2000 MILLIGRAM(S): 500 CAPSULE ORAL at 17:26

## 2022-01-01 RX ADMIN — SODIUM CHLORIDE 60 MILLILITER(S): 9 INJECTION INTRAMUSCULAR; INTRAVENOUS; SUBCUTANEOUS at 06:43

## 2022-01-01 RX ADMIN — LACTULOSE 10 GRAM(S): 10 SOLUTION ORAL at 22:40

## 2022-01-01 RX ADMIN — Medication 15 MILLILITER(S): at 16:22

## 2022-01-01 RX ADMIN — BUDESONIDE AND FORMOTEROL FUMARATE DIHYDRATE 2 PUFF(S): 160; 4.5 AEROSOL RESPIRATORY (INHALATION) at 06:24

## 2022-01-01 RX ADMIN — Medication 300 MILLIGRAM(S): at 12:27

## 2022-01-01 RX ADMIN — Medication 15 MILLILITER(S): at 14:20

## 2022-01-01 RX ADMIN — Medication 2.5 MILLIGRAM(S): at 17:25

## 2022-01-01 RX ADMIN — Medication 1 SPRAY(S): at 12:29

## 2022-01-01 RX ADMIN — GABAPENTIN 100 MILLIGRAM(S): 400 CAPSULE ORAL at 12:09

## 2022-01-01 RX ADMIN — SENNA PLUS 1 TABLET(S): 8.6 TABLET ORAL at 21:03

## 2022-01-01 RX ADMIN — Medication 15 MILLILITER(S): at 14:00

## 2022-01-01 RX ADMIN — Medication 15 MILLILITER(S): at 13:40

## 2022-01-01 RX ADMIN — Medication 3 MILLILITER(S): at 05:50

## 2022-01-01 RX ADMIN — Medication 150 MILLIGRAM(S): at 21:33

## 2022-01-01 RX ADMIN — ATORVASTATIN CALCIUM 10 MILLIGRAM(S): 80 TABLET, FILM COATED ORAL at 21:46

## 2022-01-01 RX ADMIN — CHLORHEXIDINE GLUCONATE 1 APPLICATION(S): 213 SOLUTION TOPICAL at 12:14

## 2022-01-01 RX ADMIN — Medication 15 MILLILITER(S): at 21:11

## 2022-01-01 RX ADMIN — Medication 20 MILLIEQUIVALENT(S): at 12:13

## 2022-01-01 RX ADMIN — Medication 300 MILLIGRAM(S): at 11:57

## 2022-01-01 RX ADMIN — Medication 0.25 MILLIGRAM(S): at 05:43

## 2022-01-01 RX ADMIN — GILTERITINIB 120 MILLIGRAM(S): 40 TABLET ORAL at 17:59

## 2022-01-01 RX ADMIN — BUPROPION HYDROCHLORIDE 150 MILLIGRAM(S): 150 TABLET, EXTENDED RELEASE ORAL at 13:23

## 2022-01-01 RX ADMIN — Medication 40 MILLIGRAM(S): at 05:37

## 2022-01-01 RX ADMIN — Medication 150 MILLIGRAM(S): at 21:09

## 2022-01-01 RX ADMIN — Medication 1 SPRAY(S): at 06:09

## 2022-01-01 RX ADMIN — Medication 40 MILLIGRAM(S): at 08:52

## 2022-01-01 RX ADMIN — SODIUM CHLORIDE 75 MILLILITER(S): 9 INJECTION INTRAMUSCULAR; INTRAVENOUS; SUBCUTANEOUS at 06:26

## 2022-01-01 RX ADMIN — Medication 10 MILLIGRAM(S): at 12:31

## 2022-01-01 RX ADMIN — Medication 3 MILLILITER(S): at 09:34

## 2022-01-01 RX ADMIN — Medication 20 MILLIEQUIVALENT(S): at 11:53

## 2022-01-01 RX ADMIN — Medication 15 MILLILITER(S): at 21:51

## 2022-01-01 RX ADMIN — Medication 40 MILLIGRAM(S): at 14:51

## 2022-01-01 RX ADMIN — VORICONAZOLE 400 MILLIGRAM(S): 10 INJECTION, POWDER, LYOPHILIZED, FOR SOLUTION INTRAVENOUS at 18:26

## 2022-01-01 RX ADMIN — VORICONAZOLE 200 MILLIGRAM(S): 10 INJECTION, POWDER, LYOPHILIZED, FOR SOLUTION INTRAVENOUS at 05:36

## 2022-01-01 RX ADMIN — Medication 1 SPRAY(S): at 06:10

## 2022-01-01 RX ADMIN — Medication 650 MILLIGRAM(S): at 06:08

## 2022-01-01 RX ADMIN — Medication 15 MILLILITER(S): at 22:19

## 2022-01-01 RX ADMIN — Medication 5 MILLIGRAM(S): at 11:54

## 2022-01-01 RX ADMIN — Medication 0.25 MILLIGRAM(S): at 17:19

## 2022-01-01 RX ADMIN — Medication 15 MILLILITER(S): at 05:19

## 2022-01-01 RX ADMIN — Medication 3 MILLIGRAM(S): at 22:23

## 2022-01-01 RX ADMIN — Medication 15 MILLILITER(S): at 21:36

## 2022-01-01 RX ADMIN — GILTERITINIB 120 MILLIGRAM(S): 40 TABLET ORAL at 18:38

## 2022-01-01 RX ADMIN — Medication 3 MILLILITER(S): at 23:01

## 2022-01-01 RX ADMIN — Medication 3 MILLILITER(S): at 05:20

## 2022-01-01 RX ADMIN — Medication 150 MILLIGRAM(S): at 21:57

## 2022-01-01 RX ADMIN — Medication 2.5 MILLIGRAM(S): at 17:30

## 2022-01-01 RX ADMIN — Medication 40 MILLIGRAM(S): at 14:16

## 2022-01-01 RX ADMIN — VORICONAZOLE 200 MILLIGRAM(S): 10 INJECTION, POWDER, LYOPHILIZED, FOR SOLUTION INTRAVENOUS at 05:45

## 2022-01-01 RX ADMIN — Medication 400 MILLIGRAM(S): at 17:58

## 2022-01-01 RX ADMIN — CHLORHEXIDINE GLUCONATE 1 APPLICATION(S): 213 SOLUTION TOPICAL at 21:02

## 2022-01-01 RX ADMIN — Medication 50 MILLIEQUIVALENT(S): at 12:51

## 2022-01-01 RX ADMIN — ONDANSETRON 8 MILLIGRAM(S): 8 TABLET, FILM COATED ORAL at 13:23

## 2022-01-01 RX ADMIN — CEFEPIME 100 MILLIGRAM(S): 1 INJECTION, POWDER, FOR SOLUTION INTRAMUSCULAR; INTRAVENOUS at 21:03

## 2022-01-01 RX ADMIN — Medication 400 MILLIGRAM(S): at 17:15

## 2022-01-01 RX ADMIN — Medication 150 MILLIGRAM(S): at 21:07

## 2022-01-01 RX ADMIN — VORICONAZOLE 200 MILLIGRAM(S): 10 INJECTION, POWDER, LYOPHILIZED, FOR SOLUTION INTRAVENOUS at 06:08

## 2022-01-01 RX ADMIN — Medication 5 MILLIGRAM(S): at 12:12

## 2022-01-01 RX ADMIN — Medication 25 MILLIGRAM(S): at 06:25

## 2022-01-01 RX ADMIN — Medication 15 MILLILITER(S): at 05:45

## 2022-01-01 RX ADMIN — CHLORHEXIDINE GLUCONATE 1 APPLICATION(S): 213 SOLUTION TOPICAL at 12:26

## 2022-01-01 RX ADMIN — Medication 150 MILLIGRAM(S): at 22:25

## 2022-01-01 RX ADMIN — Medication 5 MILLIGRAM(S): at 11:51

## 2022-01-01 RX ADMIN — CHLORHEXIDINE GLUCONATE 1 APPLICATION(S): 213 SOLUTION TOPICAL at 13:31

## 2022-01-01 RX ADMIN — Medication 63.75 MILLIMOLE(S): at 00:47

## 2022-01-01 RX ADMIN — POLYETHYLENE GLYCOL 3350 17 GRAM(S): 17 POWDER, FOR SOLUTION ORAL at 17:47

## 2022-01-01 RX ADMIN — Medication 1 SPRAY(S): at 17:04

## 2022-01-01 RX ADMIN — Medication 40 MILLIGRAM(S): at 06:01

## 2022-01-01 RX ADMIN — Medication 15 MILLILITER(S): at 22:13

## 2022-01-01 RX ADMIN — MIDODRINE HYDROCHLORIDE 10 MILLIGRAM(S): 2.5 TABLET ORAL at 16:52

## 2022-01-01 RX ADMIN — POLYETHYLENE GLYCOL 3350 17 GRAM(S): 17 POWDER, FOR SOLUTION ORAL at 06:09

## 2022-01-01 RX ADMIN — Medication 20 MILLIEQUIVALENT(S): at 12:26

## 2022-01-01 RX ADMIN — BUPROPION HYDROCHLORIDE 150 MILLIGRAM(S): 150 TABLET, EXTENDED RELEASE ORAL at 12:05

## 2022-01-01 RX ADMIN — CEFEPIME 100 MILLIGRAM(S): 1 INJECTION, POWDER, FOR SOLUTION INTRAMUSCULAR; INTRAVENOUS at 12:04

## 2022-01-01 RX ADMIN — Medication 400 MILLIGRAM(S): at 18:36

## 2022-01-01 RX ADMIN — HYDROMORPHONE HYDROCHLORIDE 0.2 MILLIGRAM(S): 2 INJECTION INTRAMUSCULAR; INTRAVENOUS; SUBCUTANEOUS at 14:20

## 2022-01-01 RX ADMIN — Medication 1 SPRAY(S): at 05:59

## 2022-01-01 RX ADMIN — LISINOPRIL 5 MILLIGRAM(S): 2.5 TABLET ORAL at 06:08

## 2022-01-01 RX ADMIN — HYDROMORPHONE HYDROCHLORIDE 0.5 MILLIGRAM(S): 2 INJECTION INTRAMUSCULAR; INTRAVENOUS; SUBCUTANEOUS at 23:33

## 2022-01-01 RX ADMIN — ATORVASTATIN CALCIUM 10 MILLIGRAM(S): 80 TABLET, FILM COATED ORAL at 21:55

## 2022-01-01 RX ADMIN — Medication 1 SPRAY(S): at 18:25

## 2022-01-01 RX ADMIN — ATORVASTATIN CALCIUM 10 MILLIGRAM(S): 80 TABLET, FILM COATED ORAL at 21:10

## 2022-01-01 RX ADMIN — GABAPENTIN 100 MILLIGRAM(S): 400 CAPSULE ORAL at 12:54

## 2022-01-01 RX ADMIN — HYDROMORPHONE HYDROCHLORIDE 0.5 MILLIGRAM(S): 2 INJECTION INTRAMUSCULAR; INTRAVENOUS; SUBCUTANEOUS at 05:58

## 2022-01-01 RX ADMIN — GABAPENTIN 100 MILLIGRAM(S): 400 CAPSULE ORAL at 12:52

## 2022-01-01 RX ADMIN — Medication 150 MILLIGRAM(S): at 23:57

## 2022-01-01 RX ADMIN — Medication 400 MILLIGRAM(S): at 17:47

## 2022-01-01 RX ADMIN — SODIUM CHLORIDE 20 MILLILITER(S): 9 INJECTION INTRAMUSCULAR; INTRAVENOUS; SUBCUTANEOUS at 17:04

## 2022-01-01 RX ADMIN — Medication 1 SPRAY(S): at 18:01

## 2022-01-01 RX ADMIN — GABAPENTIN 100 MILLIGRAM(S): 400 CAPSULE ORAL at 12:38

## 2022-01-01 RX ADMIN — Medication 1 SPRAY(S): at 17:31

## 2022-01-01 RX ADMIN — GABAPENTIN 100 MILLIGRAM(S): 400 CAPSULE ORAL at 11:01

## 2022-01-01 RX ADMIN — Medication 25 MILLIGRAM(S): at 05:45

## 2022-01-01 RX ADMIN — Medication 0.5 MILLIGRAM(S): at 17:18

## 2022-01-01 RX ADMIN — Medication 20 MILLIEQUIVALENT(S): at 10:32

## 2022-01-01 RX ADMIN — SODIUM CHLORIDE 500 MILLILITER(S): 9 INJECTION INTRAMUSCULAR; INTRAVENOUS; SUBCUTANEOUS at 14:44

## 2022-01-01 RX ADMIN — Medication 40 MILLIGRAM(S): at 06:05

## 2022-01-01 RX ADMIN — Medication 1 SPRAY(S): at 05:13

## 2022-01-01 RX ADMIN — CEFEPIME 100 MILLIGRAM(S): 1 INJECTION, POWDER, FOR SOLUTION INTRAMUSCULAR; INTRAVENOUS at 06:15

## 2022-01-01 RX ADMIN — Medication 15 MILLILITER(S): at 13:56

## 2022-01-01 RX ADMIN — CHLORHEXIDINE GLUCONATE 1 APPLICATION(S): 213 SOLUTION TOPICAL at 21:45

## 2022-01-01 RX ADMIN — BUPROPION HYDROCHLORIDE 150 MILLIGRAM(S): 150 TABLET, EXTENDED RELEASE ORAL at 12:11

## 2022-01-01 RX ADMIN — CEFEPIME 100 MILLIGRAM(S): 1 INJECTION, POWDER, FOR SOLUTION INTRAMUSCULAR; INTRAVENOUS at 05:35

## 2022-01-01 RX ADMIN — Medication 1 SPRAY(S): at 17:28

## 2022-01-01 RX ADMIN — Medication 1000 MILLIGRAM(S): at 16:30

## 2022-01-01 RX ADMIN — BUPROPION HYDROCHLORIDE 150 MILLIGRAM(S): 150 TABLET, EXTENDED RELEASE ORAL at 12:09

## 2022-01-01 RX ADMIN — HYDROMORPHONE HYDROCHLORIDE 0.2 MILLIGRAM(S): 2 INJECTION INTRAMUSCULAR; INTRAVENOUS; SUBCUTANEOUS at 04:51

## 2022-01-01 RX ADMIN — CHLORHEXIDINE GLUCONATE 1 APPLICATION(S): 213 SOLUTION TOPICAL at 11:03

## 2022-01-01 RX ADMIN — BUPROPION HYDROCHLORIDE 150 MILLIGRAM(S): 150 TABLET, EXTENDED RELEASE ORAL at 12:31

## 2022-01-01 RX ADMIN — SODIUM CHLORIDE 20 MILLILITER(S): 9 INJECTION INTRAMUSCULAR; INTRAVENOUS; SUBCUTANEOUS at 05:59

## 2022-01-01 RX ADMIN — Medication 3 MILLILITER(S): at 05:58

## 2022-01-01 RX ADMIN — Medication 20 MILLIEQUIVALENT(S): at 08:14

## 2022-01-01 RX ADMIN — Medication 3 MILLILITER(S): at 18:33

## 2022-01-01 RX ADMIN — Medication 1 SPRAY(S): at 11:11

## 2022-01-01 RX ADMIN — VORICONAZOLE 200 MILLIGRAM(S): 10 INJECTION, POWDER, LYOPHILIZED, FOR SOLUTION INTRAVENOUS at 05:19

## 2022-01-01 RX ADMIN — Medication 1 SPRAY(S): at 06:36

## 2022-01-01 RX ADMIN — Medication 150 MILLIGRAM(S): at 22:22

## 2022-01-01 RX ADMIN — CEFEPIME 100 MILLIGRAM(S): 1 INJECTION, POWDER, FOR SOLUTION INTRAMUSCULAR; INTRAVENOUS at 05:37

## 2022-01-01 RX ADMIN — Medication 5 MILLIGRAM(S): at 10:22

## 2022-01-01 RX ADMIN — Medication 15 MILLILITER(S): at 05:57

## 2022-01-01 RX ADMIN — Medication 150 MILLIGRAM(S): at 21:43

## 2022-01-01 RX ADMIN — Medication 1 SPRAY(S): at 13:14

## 2022-01-01 RX ADMIN — Medication 1 SPRAY(S): at 12:11

## 2022-01-01 RX ADMIN — GILTERITINIB 120 MILLIGRAM(S): 40 TABLET ORAL at 19:17

## 2022-01-01 RX ADMIN — CEFEPIME 100 MILLIGRAM(S): 1 INJECTION, POWDER, FOR SOLUTION INTRAMUSCULAR; INTRAVENOUS at 12:21

## 2022-01-01 RX ADMIN — Medication 63.75 MILLIMOLE(S): at 20:46

## 2022-01-01 RX ADMIN — Medication 300 MILLIGRAM(S): at 11:54

## 2022-01-01 RX ADMIN — SENNA PLUS 2 TABLET(S): 8.6 TABLET ORAL at 22:41

## 2022-01-01 RX ADMIN — Medication 15 MILLILITER(S): at 21:21

## 2022-01-01 RX ADMIN — Medication 3 MILLILITER(S): at 01:24

## 2022-01-01 RX ADMIN — SODIUM CHLORIDE 10 MILLILITER(S): 9 INJECTION INTRAMUSCULAR; INTRAVENOUS; SUBCUTANEOUS at 08:55

## 2022-01-01 RX ADMIN — Medication 150 MILLIGRAM(S): at 21:08

## 2022-01-01 RX ADMIN — Medication 1 SPRAY(S): at 05:39

## 2022-01-01 RX ADMIN — MIDODRINE HYDROCHLORIDE 10 MILLIGRAM(S): 2.5 TABLET ORAL at 11:52

## 2022-01-01 RX ADMIN — Medication 3 MILLILITER(S): at 00:23

## 2022-01-01 RX ADMIN — LISINOPRIL 5 MILLIGRAM(S): 2.5 TABLET ORAL at 05:35

## 2022-01-01 RX ADMIN — Medication 0.25 MILLIGRAM(S): at 15:05

## 2022-01-01 RX ADMIN — ONDANSETRON 8 MILLIGRAM(S): 8 TABLET, FILM COATED ORAL at 21:08

## 2022-01-01 RX ADMIN — LISINOPRIL 5 MILLIGRAM(S): 2.5 TABLET ORAL at 06:18

## 2022-01-01 RX ADMIN — CEFEPIME 100 MILLIGRAM(S): 1 INJECTION, POWDER, FOR SOLUTION INTRAMUSCULAR; INTRAVENOUS at 14:23

## 2022-01-01 RX ADMIN — Medication 0.25 MILLIGRAM(S): at 17:20

## 2022-01-01 RX ADMIN — Medication 15 MILLILITER(S): at 23:01

## 2022-01-01 RX ADMIN — GILTERITINIB 120 MILLIGRAM(S): 40 TABLET ORAL at 17:08

## 2022-01-01 RX ADMIN — Medication 3 MILLILITER(S): at 05:57

## 2022-01-01 RX ADMIN — Medication 20 MILLIEQUIVALENT(S): at 13:23

## 2022-01-01 RX ADMIN — Medication 15 MILLILITER(S): at 13:11

## 2022-01-01 RX ADMIN — Medication 1 SPRAY(S): at 17:29

## 2022-01-01 RX ADMIN — Medication 1 SPRAY(S): at 12:18

## 2022-01-01 RX ADMIN — Medication 50 MILLIEQUIVALENT(S): at 12:14

## 2022-01-01 RX ADMIN — Medication 3 MILLILITER(S): at 06:25

## 2022-01-01 RX ADMIN — CHLORHEXIDINE GLUCONATE 1 APPLICATION(S): 213 SOLUTION TOPICAL at 12:18

## 2022-01-01 RX ADMIN — Medication 1 SPRAY(S): at 23:00

## 2022-01-01 RX ADMIN — SODIUM CHLORIDE 20 MILLILITER(S): 9 INJECTION INTRAMUSCULAR; INTRAVENOUS; SUBCUTANEOUS at 17:28

## 2022-01-01 RX ADMIN — Medication 15 MILLILITER(S): at 06:22

## 2022-01-01 RX ADMIN — Medication 15 MILLILITER(S): at 13:16

## 2022-01-01 RX ADMIN — Medication 20 MILLIGRAM(S): at 16:43

## 2022-01-01 RX ADMIN — SODIUM CHLORIDE 20 MILLILITER(S): 9 INJECTION INTRAMUSCULAR; INTRAVENOUS; SUBCUTANEOUS at 16:32

## 2022-01-01 RX ADMIN — LISINOPRIL 5 MILLIGRAM(S): 2.5 TABLET ORAL at 05:20

## 2022-01-01 RX ADMIN — Medication 0.5 MILLIGRAM(S): at 23:19

## 2022-01-01 RX ADMIN — Medication 1 SPRAY(S): at 17:25

## 2022-01-01 RX ADMIN — Medication 40 MILLIEQUIVALENT(S): at 08:40

## 2022-01-01 RX ADMIN — Medication 15 MILLILITER(S): at 15:19

## 2022-01-01 RX ADMIN — CEFEPIME 100 MILLIGRAM(S): 1 INJECTION, POWDER, FOR SOLUTION INTRAMUSCULAR; INTRAVENOUS at 05:46

## 2022-01-01 RX ADMIN — AMLODIPINE BESYLATE 5 MILLIGRAM(S): 2.5 TABLET ORAL at 06:09

## 2022-01-01 RX ADMIN — HYDROMORPHONE HYDROCHLORIDE 0.5 MILLIGRAM(S): 2 INJECTION INTRAMUSCULAR; INTRAVENOUS; SUBCUTANEOUS at 13:14

## 2022-01-01 RX ADMIN — Medication 1 SPRAY(S): at 17:46

## 2022-01-01 RX ADMIN — Medication 3 MILLILITER(S): at 17:24

## 2022-01-01 RX ADMIN — Medication 25 MILLIGRAM(S): at 05:56

## 2022-01-01 RX ADMIN — SENNA PLUS 1 TABLET(S): 8.6 TABLET ORAL at 22:29

## 2022-01-01 RX ADMIN — LISINOPRIL 5 MILLIGRAM(S): 2.5 TABLET ORAL at 06:00

## 2022-01-01 RX ADMIN — Medication 5 MILLIGRAM(S): at 13:36

## 2022-01-01 RX ADMIN — Medication 5 MILLIGRAM(S): at 06:25

## 2022-01-01 RX ADMIN — Medication 40 MILLIEQUIVALENT(S): at 14:24

## 2022-01-01 RX ADMIN — Medication 1 SPRAY(S): at 06:41

## 2022-01-01 RX ADMIN — HYDROMORPHONE HYDROCHLORIDE 0.2 MILLIGRAM(S): 2 INJECTION INTRAMUSCULAR; INTRAVENOUS; SUBCUTANEOUS at 20:24

## 2022-01-01 RX ADMIN — MIDODRINE HYDROCHLORIDE 10 MILLIGRAM(S): 2.5 TABLET ORAL at 06:07

## 2022-01-01 RX ADMIN — BUDESONIDE AND FORMOTEROL FUMARATE DIHYDRATE 2 PUFF(S): 160; 4.5 AEROSOL RESPIRATORY (INHALATION) at 05:56

## 2022-01-01 RX ADMIN — SODIUM CHLORIDE 20 MILLILITER(S): 9 INJECTION INTRAMUSCULAR; INTRAVENOUS; SUBCUTANEOUS at 06:17

## 2022-01-01 RX ADMIN — GABAPENTIN 100 MILLIGRAM(S): 400 CAPSULE ORAL at 11:58

## 2022-01-01 RX ADMIN — Medication 50 MILLIEQUIVALENT(S): at 10:14

## 2022-01-01 RX ADMIN — VORICONAZOLE 200 MILLIGRAM(S): 10 INJECTION, POWDER, LYOPHILIZED, FOR SOLUTION INTRAVENOUS at 05:22

## 2022-01-01 RX ADMIN — Medication 1 SPRAY(S): at 23:36

## 2022-01-01 RX ADMIN — Medication 5 MILLIGRAM(S): at 12:28

## 2022-01-01 RX ADMIN — BUPROPION HYDROCHLORIDE 150 MILLIGRAM(S): 150 TABLET, EXTENDED RELEASE ORAL at 12:54

## 2022-01-01 RX ADMIN — Medication 3 MILLIGRAM(S): at 22:31

## 2022-01-01 RX ADMIN — Medication 1 SPRAY(S): at 23:31

## 2022-01-01 RX ADMIN — Medication 25 GRAM(S): at 09:20

## 2022-01-01 RX ADMIN — Medication 5 MILLIGRAM(S): at 06:36

## 2022-01-01 RX ADMIN — Medication 650 MILLIGRAM(S): at 00:16

## 2022-01-01 RX ADMIN — ZINC OXIDE 1 APPLICATION(S): 200 OINTMENT TOPICAL at 05:26

## 2022-01-01 RX ADMIN — Medication 5 MILLIGRAM(S): at 11:33

## 2022-01-01 RX ADMIN — SENNA PLUS 1 TABLET(S): 8.6 TABLET ORAL at 21:32

## 2022-01-01 RX ADMIN — Medication 0.5 MILLIGRAM(S): at 11:36

## 2022-01-01 RX ADMIN — Medication 50 MILLIEQUIVALENT(S): at 12:03

## 2022-01-01 RX ADMIN — Medication 15 MILLILITER(S): at 13:31

## 2022-01-01 RX ADMIN — BUDESONIDE AND FORMOTEROL FUMARATE DIHYDRATE 2 PUFF(S): 160; 4.5 AEROSOL RESPIRATORY (INHALATION) at 06:36

## 2022-01-01 RX ADMIN — Medication 5 MILLIGRAM(S): at 11:35

## 2022-01-01 RX ADMIN — MIDODRINE HYDROCHLORIDE 10 MILLIGRAM(S): 2.5 TABLET ORAL at 12:19

## 2022-01-01 RX ADMIN — Medication 650 MILLIGRAM(S): at 14:45

## 2022-01-01 RX ADMIN — Medication 40 MILLIGRAM(S): at 05:27

## 2022-01-01 RX ADMIN — VORICONAZOLE 200 MILLIGRAM(S): 10 INJECTION, POWDER, LYOPHILIZED, FOR SOLUTION INTRAVENOUS at 05:20

## 2022-01-01 RX ADMIN — Medication 250 MILLIGRAM(S): at 12:38

## 2022-01-01 RX ADMIN — Medication 3 MILLILITER(S): at 12:26

## 2022-01-01 RX ADMIN — Medication 1 SPRAY(S): at 23:45

## 2022-01-01 RX ADMIN — Medication 40 MILLIGRAM(S): at 05:19

## 2022-01-01 RX ADMIN — GABAPENTIN 100 MILLIGRAM(S): 400 CAPSULE ORAL at 13:12

## 2022-01-01 RX ADMIN — GABAPENTIN 100 MILLIGRAM(S): 400 CAPSULE ORAL at 12:19

## 2022-01-01 RX ADMIN — HYDROMORPHONE HYDROCHLORIDE 1 MILLIGRAM(S): 2 INJECTION INTRAMUSCULAR; INTRAVENOUS; SUBCUTANEOUS at 11:51

## 2022-01-01 RX ADMIN — GABAPENTIN 100 MILLIGRAM(S): 400 CAPSULE ORAL at 11:02

## 2022-01-01 RX ADMIN — Medication 25 MILLIGRAM(S): at 05:36

## 2022-01-01 RX ADMIN — LISINOPRIL 5 MILLIGRAM(S): 2.5 TABLET ORAL at 06:24

## 2022-01-01 RX ADMIN — HYDROMORPHONE HYDROCHLORIDE 0.2 MILLIGRAM(S): 2 INJECTION INTRAMUSCULAR; INTRAVENOUS; SUBCUTANEOUS at 09:31

## 2022-01-01 RX ADMIN — Medication 150 MILLIGRAM(S): at 23:03

## 2022-01-01 RX ADMIN — Medication 50 MILLIEQUIVALENT(S): at 15:57

## 2022-01-01 RX ADMIN — CEFEPIME 100 MILLIGRAM(S): 1 INJECTION, POWDER, FOR SOLUTION INTRAMUSCULAR; INTRAVENOUS at 05:50

## 2022-01-01 RX ADMIN — GABAPENTIN 100 MILLIGRAM(S): 400 CAPSULE ORAL at 12:31

## 2022-01-01 RX ADMIN — Medication 5 MILLIGRAM(S): at 12:20

## 2022-01-01 RX ADMIN — Medication 325 MILLIGRAM(S): at 06:45

## 2022-01-01 RX ADMIN — SODIUM CHLORIDE 20 MILLILITER(S): 9 INJECTION INTRAMUSCULAR; INTRAVENOUS; SUBCUTANEOUS at 06:12

## 2022-01-01 RX ADMIN — CHLORHEXIDINE GLUCONATE 1 APPLICATION(S): 213 SOLUTION TOPICAL at 22:23

## 2022-01-01 RX ADMIN — Medication 150 MILLIGRAM(S): at 23:00

## 2022-01-01 RX ADMIN — Medication 5 MILLIGRAM(S): at 21:46

## 2022-01-01 RX ADMIN — Medication 50 MILLIEQUIVALENT(S): at 10:24

## 2022-01-01 RX ADMIN — POLYETHYLENE GLYCOL 3350 17 GRAM(S): 17 POWDER, FOR SOLUTION ORAL at 06:08

## 2022-01-01 RX ADMIN — Medication 2.5 MILLIGRAM(S): at 05:02

## 2022-01-01 RX ADMIN — Medication 5 MILLIGRAM(S): at 12:26

## 2022-01-01 RX ADMIN — Medication 1 SPRAY(S): at 17:03

## 2022-01-01 RX ADMIN — Medication 1 SPRAY(S): at 05:45

## 2022-01-01 RX ADMIN — SODIUM CHLORIDE 100 MILLILITER(S): 9 INJECTION INTRAMUSCULAR; INTRAVENOUS; SUBCUTANEOUS at 06:30

## 2022-01-01 RX ADMIN — CHLORHEXIDINE GLUCONATE 1 APPLICATION(S): 213 SOLUTION TOPICAL at 11:46

## 2022-01-01 RX ADMIN — GILTERITINIB 120 MILLIGRAM(S): 40 TABLET ORAL at 17:27

## 2022-01-01 RX ADMIN — BUPROPION HYDROCHLORIDE 150 MILLIGRAM(S): 150 TABLET, EXTENDED RELEASE ORAL at 11:54

## 2022-01-01 RX ADMIN — Medication 400 MILLIGRAM(S): at 18:29

## 2022-01-01 RX ADMIN — Medication 1 SPRAY(S): at 06:14

## 2022-01-01 RX ADMIN — CEFEPIME 100 MILLIGRAM(S): 1 INJECTION, POWDER, FOR SOLUTION INTRAMUSCULAR; INTRAVENOUS at 05:59

## 2022-01-01 RX ADMIN — CHLORHEXIDINE GLUCONATE 1 APPLICATION(S): 213 SOLUTION TOPICAL at 23:03

## 2022-01-01 RX ADMIN — Medication 3 MILLIGRAM(S): at 21:56

## 2022-01-01 RX ADMIN — Medication 3 MILLILITER(S): at 06:13

## 2022-01-01 RX ADMIN — LACTULOSE 10 GRAM(S): 10 SOLUTION ORAL at 17:27

## 2022-01-01 RX ADMIN — Medication 1 SPRAY(S): at 13:24

## 2022-01-01 RX ADMIN — BUDESONIDE AND FORMOTEROL FUMARATE DIHYDRATE 2 PUFF(S): 160; 4.5 AEROSOL RESPIRATORY (INHALATION) at 18:37

## 2022-01-01 RX ADMIN — SENNA PLUS 1 TABLET(S): 8.6 TABLET ORAL at 21:45

## 2022-01-01 RX ADMIN — Medication 150 MILLIGRAM(S): at 21:04

## 2022-01-01 RX ADMIN — Medication 1 SPRAY(S): at 23:04

## 2022-01-01 RX ADMIN — CHLORHEXIDINE GLUCONATE 1 APPLICATION(S): 213 SOLUTION TOPICAL at 12:11

## 2022-01-01 RX ADMIN — Medication 3 MILLILITER(S): at 18:24

## 2022-01-01 RX ADMIN — SODIUM CHLORIDE 20 MILLILITER(S): 9 INJECTION INTRAMUSCULAR; INTRAVENOUS; SUBCUTANEOUS at 06:02

## 2022-01-01 RX ADMIN — Medication 5 MILLIGRAM(S): at 18:32

## 2022-01-01 RX ADMIN — BUPROPION HYDROCHLORIDE 150 MILLIGRAM(S): 150 TABLET, EXTENDED RELEASE ORAL at 14:10

## 2022-01-01 RX ADMIN — HYDROXYUREA 500 MILLIGRAM(S): 500 CAPSULE ORAL at 12:19

## 2022-01-01 RX ADMIN — Medication 1 SPRAY(S): at 05:54

## 2022-01-01 RX ADMIN — Medication 1 SPRAY(S): at 12:26

## 2022-01-01 RX ADMIN — Medication 40 MILLIGRAM(S): at 06:39

## 2022-01-01 RX ADMIN — POLYETHYLENE GLYCOL 3350 17 GRAM(S): 17 POWDER, FOR SOLUTION ORAL at 06:01

## 2022-01-01 RX ADMIN — Medication 1000 MILLIGRAM(S): at 18:28

## 2022-01-01 RX ADMIN — Medication 5 MILLIGRAM(S): at 12:11

## 2022-01-01 RX ADMIN — GILTERITINIB 120 MILLIGRAM(S): 40 TABLET ORAL at 17:40

## 2022-01-01 RX ADMIN — Medication 1 SPRAY(S): at 18:38

## 2022-01-01 RX ADMIN — Medication 5 MILLIGRAM(S): at 05:57

## 2022-01-01 RX ADMIN — Medication 3 MILLIGRAM(S): at 21:43

## 2022-01-01 RX ADMIN — Medication 20 MILLIGRAM(S): at 09:34

## 2022-01-01 RX ADMIN — FLUCONAZOLE 200 MILLIGRAM(S): 150 TABLET ORAL at 13:36

## 2022-01-01 RX ADMIN — SODIUM CHLORIDE 60 MILLILITER(S): 9 INJECTION INTRAMUSCULAR; INTRAVENOUS; SUBCUTANEOUS at 12:59

## 2022-01-01 RX ADMIN — Medication 2.5 MILLIGRAM(S): at 17:27

## 2022-01-01 RX ADMIN — SODIUM CHLORIDE 60 MILLILITER(S): 9 INJECTION INTRAMUSCULAR; INTRAVENOUS; SUBCUTANEOUS at 06:38

## 2022-01-01 RX ADMIN — Medication 150 MILLIGRAM(S): at 21:56

## 2022-01-01 RX ADMIN — GABAPENTIN 100 MILLIGRAM(S): 400 CAPSULE ORAL at 11:53

## 2022-01-01 RX ADMIN — Medication 1 SPRAY(S): at 17:55

## 2022-01-01 RX ADMIN — ATORVASTATIN CALCIUM 10 MILLIGRAM(S): 80 TABLET, FILM COATED ORAL at 21:56

## 2022-01-01 RX ADMIN — HYDROMORPHONE HYDROCHLORIDE 0.2 MILLIGRAM(S): 2 INJECTION INTRAMUSCULAR; INTRAVENOUS; SUBCUTANEOUS at 13:43

## 2022-01-01 RX ADMIN — Medication 1 SPRAY(S): at 05:46

## 2022-01-01 RX ADMIN — POLYETHYLENE GLYCOL 3350 17 GRAM(S): 17 POWDER, FOR SOLUTION ORAL at 16:45

## 2022-01-01 RX ADMIN — Medication 164 MILLIGRAM(S): at 16:48

## 2022-01-01 RX ADMIN — BUPROPION HYDROCHLORIDE 150 MILLIGRAM(S): 150 TABLET, EXTENDED RELEASE ORAL at 12:39

## 2022-01-01 RX ADMIN — Medication 2.5 MILLIGRAM(S): at 17:47

## 2022-01-01 RX ADMIN — Medication 0.25 MILLIGRAM(S): at 17:25

## 2022-01-01 RX ADMIN — GILTERITINIB 120 MILLIGRAM(S): 40 TABLET ORAL at 17:36

## 2022-01-01 RX ADMIN — Medication 1 SPRAY(S): at 12:13

## 2022-01-01 RX ADMIN — Medication 1 SPRAY(S): at 06:24

## 2022-01-01 RX ADMIN — BUPROPION HYDROCHLORIDE 150 MILLIGRAM(S): 150 TABLET, EXTENDED RELEASE ORAL at 13:17

## 2022-01-01 RX ADMIN — Medication 40 MILLIGRAM(S): at 05:13

## 2022-01-01 RX ADMIN — Medication 50 MILLIEQUIVALENT(S): at 12:32

## 2022-01-01 RX ADMIN — SODIUM CHLORIDE 60 MILLILITER(S): 9 INJECTION INTRAMUSCULAR; INTRAVENOUS; SUBCUTANEOUS at 16:53

## 2022-01-01 RX ADMIN — SODIUM CHLORIDE 20 MILLILITER(S): 9 INJECTION INTRAMUSCULAR; INTRAVENOUS; SUBCUTANEOUS at 05:02

## 2022-01-01 RX ADMIN — Medication 150 MILLIGRAM(S): at 22:32

## 2022-01-01 RX ADMIN — VORICONAZOLE 200 MILLIGRAM(S): 10 INJECTION, POWDER, LYOPHILIZED, FOR SOLUTION INTRAVENOUS at 06:13

## 2022-01-01 RX ADMIN — Medication 164 MILLIGRAM(S): at 16:52

## 2022-01-01 RX ADMIN — Medication 300 MILLIGRAM(S): at 11:04

## 2022-01-01 RX ADMIN — Medication 40 MILLIGRAM(S): at 13:38

## 2022-01-01 RX ADMIN — SODIUM CHLORIDE 20 MILLILITER(S): 9 INJECTION INTRAMUSCULAR; INTRAVENOUS; SUBCUTANEOUS at 08:36

## 2022-01-01 RX ADMIN — HYDROMORPHONE HYDROCHLORIDE 0.3 MILLIGRAM(S): 2 INJECTION INTRAMUSCULAR; INTRAVENOUS; SUBCUTANEOUS at 15:46

## 2022-01-01 RX ADMIN — Medication 25 MILLIGRAM(S): at 05:52

## 2022-01-01 RX ADMIN — BUDESONIDE AND FORMOTEROL FUMARATE DIHYDRATE 2 PUFF(S): 160; 4.5 AEROSOL RESPIRATORY (INHALATION) at 06:09

## 2022-01-01 RX ADMIN — Medication 3 MILLILITER(S): at 23:30

## 2022-01-01 RX ADMIN — Medication 3 MILLILITER(S): at 12:39

## 2022-01-01 RX ADMIN — BUDESONIDE AND FORMOTEROL FUMARATE DIHYDRATE 2 PUFF(S): 160; 4.5 AEROSOL RESPIRATORY (INHALATION) at 17:14

## 2022-01-01 RX ADMIN — Medication 3 MILLILITER(S): at 23:58

## 2022-01-01 RX ADMIN — CEFEPIME 100 MILLIGRAM(S): 1 INJECTION, POWDER, FOR SOLUTION INTRAMUSCULAR; INTRAVENOUS at 11:57

## 2022-01-01 RX ADMIN — Medication 1 SPRAY(S): at 21:58

## 2022-01-01 RX ADMIN — Medication 300 MILLIGRAM(S): at 11:52

## 2022-01-01 RX ADMIN — Medication 15 MILLILITER(S): at 22:04

## 2022-01-01 RX ADMIN — Medication 300 MILLIGRAM(S): at 12:38

## 2022-01-01 RX ADMIN — SODIUM CHLORIDE 60 MILLILITER(S): 9 INJECTION INTRAMUSCULAR; INTRAVENOUS; SUBCUTANEOUS at 11:58

## 2022-01-01 RX ADMIN — SENNA PLUS 2 TABLET(S): 8.6 TABLET ORAL at 23:36

## 2022-01-01 RX ADMIN — CEFEPIME 100 MILLIGRAM(S): 1 INJECTION, POWDER, FOR SOLUTION INTRAMUSCULAR; INTRAVENOUS at 17:29

## 2022-01-01 RX ADMIN — Medication 0.25 MILLIGRAM(S): at 23:33

## 2022-01-01 RX ADMIN — Medication 0.25 MILLIGRAM(S): at 11:23

## 2022-01-01 RX ADMIN — Medication 2.5 MILLIGRAM(S): at 06:09

## 2022-01-01 RX ADMIN — Medication 1 SPRAY(S): at 05:38

## 2022-01-01 RX ADMIN — Medication 3 MILLIGRAM(S): at 21:55

## 2022-01-01 RX ADMIN — Medication 15 MILLILITER(S): at 06:00

## 2022-01-01 RX ADMIN — SODIUM CHLORIDE 100 MILLILITER(S): 9 INJECTION INTRAMUSCULAR; INTRAVENOUS; SUBCUTANEOUS at 06:07

## 2022-01-01 RX ADMIN — Medication 1 SPRAY(S): at 18:19

## 2022-01-01 RX ADMIN — Medication 2.5 MILLIGRAM(S): at 06:08

## 2022-01-01 RX ADMIN — Medication 0.25 MILLIGRAM(S): at 23:59

## 2022-01-01 RX ADMIN — Medication 3 MILLILITER(S): at 11:57

## 2022-01-01 RX ADMIN — GABAPENTIN 100 MILLIGRAM(S): 400 CAPSULE ORAL at 12:28

## 2022-01-01 RX ADMIN — Medication 250 MILLIGRAM(S): at 16:08

## 2022-01-01 RX ADMIN — CHLORHEXIDINE GLUCONATE 1 APPLICATION(S): 213 SOLUTION TOPICAL at 12:24

## 2022-01-01 RX ADMIN — CHLORHEXIDINE GLUCONATE 1 APPLICATION(S): 213 SOLUTION TOPICAL at 12:01

## 2022-01-01 RX ADMIN — Medication 3 MILLILITER(S): at 11:51

## 2022-01-01 RX ADMIN — Medication 1 SPRAY(S): at 12:37

## 2022-01-01 RX ADMIN — CHLORHEXIDINE GLUCONATE 1 APPLICATION(S): 213 SOLUTION TOPICAL at 11:21

## 2022-01-01 RX ADMIN — Medication 1 SPRAY(S): at 12:17

## 2022-01-01 RX ADMIN — Medication 3 MILLILITER(S): at 06:08

## 2022-01-01 RX ADMIN — Medication 400 MILLIGRAM(S): at 15:40

## 2022-01-01 RX ADMIN — Medication 40 MILLIGRAM(S): at 05:53

## 2022-01-01 RX ADMIN — VORICONAZOLE 200 MILLIGRAM(S): 10 INJECTION, POWDER, LYOPHILIZED, FOR SOLUTION INTRAVENOUS at 06:07

## 2022-01-01 RX ADMIN — ONDANSETRON 8 MILLIGRAM(S): 8 TABLET, FILM COATED ORAL at 16:08

## 2022-01-01 RX ADMIN — ONDANSETRON 8 MILLIGRAM(S): 8 TABLET, FILM COATED ORAL at 08:41

## 2022-01-01 RX ADMIN — Medication 150 MILLIGRAM(S): at 23:07

## 2022-01-01 RX ADMIN — Medication 3 MILLILITER(S): at 23:23

## 2022-01-01 RX ADMIN — Medication 400 MILLIGRAM(S): at 06:07

## 2022-01-01 RX ADMIN — POLYETHYLENE GLYCOL 3350 17 GRAM(S): 17 POWDER, FOR SOLUTION ORAL at 17:17

## 2022-01-01 RX ADMIN — CHLORHEXIDINE GLUCONATE 1 APPLICATION(S): 213 SOLUTION TOPICAL at 11:10

## 2022-01-01 RX ADMIN — Medication 650 MILLIGRAM(S): at 06:45

## 2022-01-01 RX ADMIN — LACTULOSE 10 GRAM(S): 10 SOLUTION ORAL at 15:09

## 2022-01-01 RX ADMIN — SODIUM CHLORIDE 20 MILLILITER(S): 9 INJECTION INTRAMUSCULAR; INTRAVENOUS; SUBCUTANEOUS at 19:30

## 2022-01-01 RX ADMIN — CHLORHEXIDINE GLUCONATE 1 APPLICATION(S): 213 SOLUTION TOPICAL at 21:33

## 2022-01-01 RX ADMIN — CEFEPIME 100 MILLIGRAM(S): 1 INJECTION, POWDER, FOR SOLUTION INTRAMUSCULAR; INTRAVENOUS at 06:02

## 2022-01-01 RX ADMIN — Medication 3 MILLILITER(S): at 12:19

## 2022-01-01 RX ADMIN — Medication 15 MILLILITER(S): at 05:52

## 2022-01-01 RX ADMIN — SENNA PLUS 2 TABLET(S): 8.6 TABLET ORAL at 21:10

## 2022-01-01 RX ADMIN — Medication 15 MILLILITER(S): at 05:34

## 2022-01-01 RX ADMIN — VORICONAZOLE 200 MILLIGRAM(S): 10 INJECTION, POWDER, LYOPHILIZED, FOR SOLUTION INTRAVENOUS at 17:10

## 2022-01-01 RX ADMIN — GILTERITINIB 120 MILLIGRAM(S): 40 TABLET ORAL at 17:22

## 2022-01-01 RX ADMIN — Medication 150 MILLIGRAM(S): at 23:36

## 2022-01-01 RX ADMIN — Medication 1 SPRAY(S): at 11:54

## 2022-01-01 RX ADMIN — CHLORHEXIDINE GLUCONATE 1 APPLICATION(S): 213 SOLUTION TOPICAL at 11:45

## 2022-01-01 RX ADMIN — GABAPENTIN 100 MILLIGRAM(S): 400 CAPSULE ORAL at 12:05

## 2022-01-01 RX ADMIN — Medication 100 MILLIGRAM(S): at 14:34

## 2022-01-01 RX ADMIN — Medication 5 MILLIGRAM(S): at 17:27

## 2022-01-01 RX ADMIN — CEFEPIME 100 MILLIGRAM(S): 1 INJECTION, POWDER, FOR SOLUTION INTRAMUSCULAR; INTRAVENOUS at 12:19

## 2022-01-01 RX ADMIN — Medication 40 MILLIGRAM(S): at 06:36

## 2022-01-01 RX ADMIN — GABAPENTIN 100 MILLIGRAM(S): 400 CAPSULE ORAL at 13:18

## 2022-01-01 RX ADMIN — VORICONAZOLE 200 MILLIGRAM(S): 10 INJECTION, POWDER, LYOPHILIZED, FOR SOLUTION INTRAVENOUS at 17:47

## 2022-01-01 RX ADMIN — Medication 400 MILLIGRAM(S): at 05:36

## 2022-01-01 RX ADMIN — GABAPENTIN 100 MILLIGRAM(S): 400 CAPSULE ORAL at 12:16

## 2022-01-01 RX ADMIN — Medication 5 MILLIGRAM(S): at 18:20

## 2022-01-01 RX ADMIN — ATORVASTATIN CALCIUM 10 MILLIGRAM(S): 80 TABLET, FILM COATED ORAL at 22:42

## 2022-01-01 RX ADMIN — Medication 650 MILLIGRAM(S): at 17:01

## 2022-01-01 RX ADMIN — Medication 250 MILLIGRAM(S): at 19:06

## 2022-01-01 RX ADMIN — Medication 300 MILLIGRAM(S): at 12:20

## 2022-01-01 RX ADMIN — Medication 40 MILLIGRAM(S): at 15:57

## 2022-01-01 RX ADMIN — Medication 15 MILLILITER(S): at 05:22

## 2022-01-01 RX ADMIN — CHLORHEXIDINE GLUCONATE 1 APPLICATION(S): 213 SOLUTION TOPICAL at 11:56

## 2022-01-01 RX ADMIN — Medication 5 MILLIGRAM(S): at 14:10

## 2022-01-01 RX ADMIN — Medication 150 MILLIGRAM(S): at 22:42

## 2022-01-01 RX ADMIN — FLUCONAZOLE 200 MILLIGRAM(S): 150 TABLET ORAL at 21:47

## 2022-01-01 RX ADMIN — Medication 15 MILLILITER(S): at 14:56

## 2022-01-01 RX ADMIN — BUDESONIDE AND FORMOTEROL FUMARATE DIHYDRATE 2 PUFF(S): 160; 4.5 AEROSOL RESPIRATORY (INHALATION) at 18:28

## 2022-01-01 RX ADMIN — GABAPENTIN 100 MILLIGRAM(S): 400 CAPSULE ORAL at 11:36

## 2022-01-01 RX ADMIN — VORICONAZOLE 200 MILLIGRAM(S): 10 INJECTION, POWDER, LYOPHILIZED, FOR SOLUTION INTRAVENOUS at 18:43

## 2022-01-01 RX ADMIN — Medication 20 MILLIEQUIVALENT(S): at 11:03

## 2022-01-01 RX ADMIN — BUDESONIDE AND FORMOTEROL FUMARATE DIHYDRATE 2 PUFF(S): 160; 4.5 AEROSOL RESPIRATORY (INHALATION) at 05:21

## 2022-01-01 RX ADMIN — Medication 5 MILLIGRAM(S): at 05:20

## 2022-01-01 RX ADMIN — HYDROXYUREA 2000 MILLIGRAM(S): 500 CAPSULE ORAL at 06:10

## 2022-01-01 RX ADMIN — HYDROMORPHONE HYDROCHLORIDE 0.2 MILLIGRAM(S): 2 INJECTION INTRAMUSCULAR; INTRAVENOUS; SUBCUTANEOUS at 12:09

## 2022-01-01 RX ADMIN — AMLODIPINE BESYLATE 5 MILLIGRAM(S): 2.5 TABLET ORAL at 06:11

## 2022-01-01 RX ADMIN — CHLORHEXIDINE GLUCONATE 1 APPLICATION(S): 213 SOLUTION TOPICAL at 21:42

## 2022-01-01 RX ADMIN — LISINOPRIL 5 MILLIGRAM(S): 2.5 TABLET ORAL at 05:57

## 2022-01-01 RX ADMIN — Medication 400 MILLIGRAM(S): at 22:26

## 2022-01-01 RX ADMIN — MIDODRINE HYDROCHLORIDE 10 MILLIGRAM(S): 2.5 TABLET ORAL at 11:54

## 2022-01-01 RX ADMIN — Medication 400 MILLIGRAM(S): at 18:24

## 2022-01-01 RX ADMIN — Medication 5 MILLIGRAM(S): at 05:37

## 2022-01-01 RX ADMIN — Medication 150 MILLIGRAM(S): at 21:45

## 2022-01-01 RX ADMIN — Medication 25 MILLIGRAM(S): at 06:38

## 2022-01-01 RX ADMIN — Medication 150 MILLIGRAM(S): at 22:19

## 2022-01-01 RX ADMIN — Medication 20 MILLIEQUIVALENT(S): at 14:09

## 2022-01-01 RX ADMIN — CEFEPIME 100 MILLIGRAM(S): 1 INJECTION, POWDER, FOR SOLUTION INTRAMUSCULAR; INTRAVENOUS at 05:55

## 2022-01-01 RX ADMIN — Medication 15 MILLILITER(S): at 21:32

## 2022-01-01 RX ADMIN — Medication 325 MILLIGRAM(S): at 06:28

## 2022-01-01 RX ADMIN — BUDESONIDE AND FORMOTEROL FUMARATE DIHYDRATE 2 PUFF(S): 160; 4.5 AEROSOL RESPIRATORY (INHALATION) at 06:37

## 2022-01-01 RX ADMIN — Medication 0.25 MILLIGRAM(S): at 23:05

## 2022-01-01 RX ADMIN — Medication 50 MILLIEQUIVALENT(S): at 12:19

## 2022-01-01 RX ADMIN — LACTULOSE 10 GRAM(S): 10 SOLUTION ORAL at 21:44

## 2022-01-01 RX ADMIN — Medication 5 MILLIGRAM(S): at 06:23

## 2022-01-01 RX ADMIN — SODIUM CHLORIDE 10 MILLILITER(S): 9 INJECTION INTRAMUSCULAR; INTRAVENOUS; SUBCUTANEOUS at 05:25

## 2022-01-01 RX ADMIN — Medication 1 SPRAY(S): at 12:35

## 2022-01-01 RX ADMIN — Medication 250 MILLIGRAM(S): at 18:25

## 2022-01-01 RX ADMIN — Medication 400 MILLIGRAM(S): at 17:22

## 2022-01-01 RX ADMIN — Medication 40 MILLIGRAM(S): at 05:48

## 2022-01-01 RX ADMIN — Medication 15 MILLILITER(S): at 13:39

## 2022-01-01 RX ADMIN — Medication 25 MILLIGRAM(S): at 05:37

## 2022-01-01 RX ADMIN — Medication 150 MILLIGRAM(S): at 22:03

## 2022-01-01 RX ADMIN — Medication 400 MILLIGRAM(S): at 05:45

## 2022-01-01 RX ADMIN — BUPROPION HYDROCHLORIDE 150 MILLIGRAM(S): 150 TABLET, EXTENDED RELEASE ORAL at 12:27

## 2022-01-01 RX ADMIN — HYDROMORPHONE HYDROCHLORIDE 0.3 MILLIGRAM(S): 2 INJECTION INTRAMUSCULAR; INTRAVENOUS; SUBCUTANEOUS at 14:22

## 2022-01-01 RX ADMIN — Medication 650 MILLIGRAM(S): at 10:07

## 2022-01-01 RX ADMIN — FLUCONAZOLE 200 MILLIGRAM(S): 150 TABLET ORAL at 12:23

## 2022-01-01 RX ADMIN — Medication 100 MILLIGRAM(S): at 12:08

## 2022-01-01 RX ADMIN — Medication 15 MILLILITER(S): at 06:03

## 2022-01-01 RX ADMIN — SENNA PLUS 2 TABLET(S): 8.6 TABLET ORAL at 22:32

## 2022-01-01 RX ADMIN — Medication 300 MILLIGRAM(S): at 13:36

## 2022-01-01 RX ADMIN — Medication 0.25 MILLIGRAM(S): at 22:14

## 2022-01-01 RX ADMIN — Medication 5 MILLIGRAM(S): at 12:31

## 2022-01-01 RX ADMIN — Medication 3 MILLILITER(S): at 12:31

## 2022-01-01 RX ADMIN — Medication 3 MILLILITER(S): at 05:39

## 2022-01-01 RX ADMIN — Medication 3 MILLILITER(S): at 18:02

## 2022-01-01 RX ADMIN — Medication 50 MILLIEQUIVALENT(S): at 08:14

## 2022-01-01 RX ADMIN — CHLORHEXIDINE GLUCONATE 1 APPLICATION(S): 213 SOLUTION TOPICAL at 23:16

## 2022-01-01 RX ADMIN — Medication 650 MILLIGRAM(S): at 08:50

## 2022-01-01 RX ADMIN — Medication 0.25 MILLIGRAM(S): at 07:47

## 2022-01-01 RX ADMIN — Medication 3 MILLILITER(S): at 12:14

## 2022-01-01 RX ADMIN — SODIUM CHLORIDE 100 MILLILITER(S): 9 INJECTION INTRAMUSCULAR; INTRAVENOUS; SUBCUTANEOUS at 08:48

## 2022-01-01 RX ADMIN — Medication 5 MILLIGRAM(S): at 11:55

## 2022-01-01 RX ADMIN — Medication 5 MILLIGRAM(S): at 06:14

## 2022-01-01 RX ADMIN — Medication 25 MILLIGRAM(S): at 05:39

## 2022-01-01 RX ADMIN — Medication 5 MILLIGRAM(S): at 17:13

## 2022-01-01 RX ADMIN — SODIUM CHLORIDE 10 MILLILITER(S): 9 INJECTION INTRAMUSCULAR; INTRAVENOUS; SUBCUTANEOUS at 10:22

## 2022-01-01 RX ADMIN — Medication 5 MILLIGRAM(S): at 19:18

## 2022-01-01 RX ADMIN — Medication 20 MILLIEQUIVALENT(S): at 12:24

## 2022-01-01 RX ADMIN — Medication 1 SPRAY(S): at 17:20

## 2022-01-01 RX ADMIN — VORICONAZOLE 400 MILLIGRAM(S): 10 INJECTION, POWDER, LYOPHILIZED, FOR SOLUTION INTRAVENOUS at 05:37

## 2022-01-01 RX ADMIN — VORICONAZOLE 200 MILLIGRAM(S): 10 INJECTION, POWDER, LYOPHILIZED, FOR SOLUTION INTRAVENOUS at 17:58

## 2022-01-01 RX ADMIN — Medication 25 MILLIGRAM(S): at 05:50

## 2022-01-01 RX ADMIN — Medication 400 MILLIGRAM(S): at 06:08

## 2022-01-01 RX ADMIN — LISINOPRIL 5 MILLIGRAM(S): 2.5 TABLET ORAL at 05:39

## 2022-01-01 RX ADMIN — SODIUM CHLORIDE 10 MILLILITER(S): 9 INJECTION INTRAMUSCULAR; INTRAVENOUS; SUBCUTANEOUS at 06:14

## 2022-01-01 RX ADMIN — Medication 15 MILLILITER(S): at 22:27

## 2022-01-01 RX ADMIN — Medication 1 SPRAY(S): at 05:34

## 2022-01-01 RX ADMIN — Medication 1 SPRAY(S): at 17:23

## 2022-01-01 RX ADMIN — Medication 20 MILLIEQUIVALENT(S): at 12:12

## 2022-01-01 RX ADMIN — POLYETHYLENE GLYCOL 3350 17 GRAM(S): 17 POWDER, FOR SOLUTION ORAL at 06:12

## 2022-01-01 RX ADMIN — Medication 3 MILLILITER(S): at 12:55

## 2022-01-01 RX ADMIN — BUDESONIDE AND FORMOTEROL FUMARATE DIHYDRATE 2 PUFF(S): 160; 4.5 AEROSOL RESPIRATORY (INHALATION) at 18:35

## 2022-01-01 RX ADMIN — Medication 20 MILLIEQUIVALENT(S): at 12:10

## 2022-01-01 RX ADMIN — Medication 15 MILLILITER(S): at 21:13

## 2022-01-01 RX ADMIN — Medication 400 MILLIGRAM(S): at 06:04

## 2022-01-01 RX ADMIN — VORICONAZOLE 200 MILLIGRAM(S): 10 INJECTION, POWDER, LYOPHILIZED, FOR SOLUTION INTRAVENOUS at 17:28

## 2022-01-01 RX ADMIN — Medication 3 MILLILITER(S): at 23:34

## 2022-01-01 RX ADMIN — CEFEPIME 100 MILLIGRAM(S): 1 INJECTION, POWDER, FOR SOLUTION INTRAMUSCULAR; INTRAVENOUS at 13:40

## 2022-01-01 RX ADMIN — Medication 1 SPRAY(S): at 12:28

## 2022-01-01 RX ADMIN — Medication 400 MILLIGRAM(S): at 06:38

## 2022-01-01 RX ADMIN — Medication 400 MILLIGRAM(S): at 17:16

## 2022-01-01 RX ADMIN — Medication 1 SPRAY(S): at 22:25

## 2022-01-01 RX ADMIN — HYDROXYUREA 2000 MILLIGRAM(S): 500 CAPSULE ORAL at 06:09

## 2022-01-01 RX ADMIN — Medication 15 MILLILITER(S): at 13:46

## 2022-01-01 RX ADMIN — CEFEPIME 100 MILLIGRAM(S): 1 INJECTION, POWDER, FOR SOLUTION INTRAMUSCULAR; INTRAVENOUS at 00:20

## 2022-01-01 RX ADMIN — ONDANSETRON 8 MILLIGRAM(S): 8 TABLET, FILM COATED ORAL at 16:53

## 2022-01-01 RX ADMIN — Medication 250 MILLIGRAM(S): at 06:10

## 2022-01-01 RX ADMIN — Medication 1 SPRAY(S): at 13:39

## 2022-01-01 RX ADMIN — ONDANSETRON 8 MILLIGRAM(S): 8 TABLET, FILM COATED ORAL at 00:05

## 2022-01-01 RX ADMIN — Medication 3 MILLILITER(S): at 06:38

## 2022-01-01 RX ADMIN — Medication 5 MILLIGRAM(S): at 12:02

## 2022-01-01 RX ADMIN — Medication 400 MILLIGRAM(S): at 17:27

## 2022-01-01 RX ADMIN — Medication 40 MILLIGRAM(S): at 05:45

## 2022-01-01 RX ADMIN — Medication 1 SPRAY(S): at 05:20

## 2022-01-01 RX ADMIN — SODIUM CHLORIDE 1000 MILLILITER(S): 9 INJECTION INTRAMUSCULAR; INTRAVENOUS; SUBCUTANEOUS at 15:59

## 2022-01-01 RX ADMIN — BUDESONIDE AND FORMOTEROL FUMARATE DIHYDRATE 2 PUFF(S): 160; 4.5 AEROSOL RESPIRATORY (INHALATION) at 17:24

## 2022-01-01 RX ADMIN — Medication 1 SPRAY(S): at 05:37

## 2022-01-01 RX ADMIN — Medication 50 MILLIEQUIVALENT(S): at 22:01

## 2022-01-01 RX ADMIN — SODIUM CHLORIDE 20 MILLILITER(S): 9 INJECTION INTRAMUSCULAR; INTRAVENOUS; SUBCUTANEOUS at 12:27

## 2022-01-01 RX ADMIN — Medication 250 MILLIGRAM(S): at 13:22

## 2022-01-01 RX ADMIN — SODIUM CHLORIDE 20 MILLILITER(S): 9 INJECTION INTRAMUSCULAR; INTRAVENOUS; SUBCUTANEOUS at 08:56

## 2022-01-01 RX ADMIN — Medication 15 MILLILITER(S): at 06:32

## 2022-01-01 RX ADMIN — Medication 150 MILLIGRAM(S): at 22:09

## 2022-01-01 RX ADMIN — SODIUM CHLORIDE 20 MILLILITER(S): 9 INJECTION INTRAMUSCULAR; INTRAVENOUS; SUBCUTANEOUS at 09:30

## 2022-01-01 RX ADMIN — Medication 1 SPRAY(S): at 17:09

## 2022-01-01 RX ADMIN — Medication 0.5 MILLIGRAM(S): at 13:14

## 2022-01-01 RX ADMIN — ONDANSETRON 8 MILLIGRAM(S): 8 TABLET, FILM COATED ORAL at 21:24

## 2022-01-01 RX ADMIN — Medication 5 MILLIGRAM(S): at 12:50

## 2022-01-01 RX ADMIN — Medication 3 MILLILITER(S): at 23:15

## 2022-01-01 RX ADMIN — CEFEPIME 100 MILLIGRAM(S): 1 INJECTION, POWDER, FOR SOLUTION INTRAMUSCULAR; INTRAVENOUS at 17:57

## 2022-01-01 RX ADMIN — CEFEPIME 100 MILLIGRAM(S): 1 INJECTION, POWDER, FOR SOLUTION INTRAMUSCULAR; INTRAVENOUS at 05:19

## 2022-01-01 RX ADMIN — Medication 400 MILLIGRAM(S): at 05:22

## 2022-01-01 RX ADMIN — Medication 400 MILLIGRAM(S): at 18:02

## 2022-01-01 RX ADMIN — SODIUM CHLORIDE 100 MILLILITER(S): 9 INJECTION INTRAMUSCULAR; INTRAVENOUS; SUBCUTANEOUS at 11:17

## 2022-01-01 RX ADMIN — VORICONAZOLE 200 MILLIGRAM(S): 10 INJECTION, POWDER, LYOPHILIZED, FOR SOLUTION INTRAVENOUS at 06:00

## 2022-01-01 RX ADMIN — HYDROMORPHONE HYDROCHLORIDE 1 MILLIGRAM(S): 2 INJECTION INTRAMUSCULAR; INTRAVENOUS; SUBCUTANEOUS at 09:08

## 2022-01-01 RX ADMIN — BUDESONIDE AND FORMOTEROL FUMARATE DIHYDRATE 2 PUFF(S): 160; 4.5 AEROSOL RESPIRATORY (INHALATION) at 05:38

## 2022-01-01 RX ADMIN — BUPROPION HYDROCHLORIDE 150 MILLIGRAM(S): 150 TABLET, EXTENDED RELEASE ORAL at 14:25

## 2022-01-01 RX ADMIN — ATORVASTATIN CALCIUM 10 MILLIGRAM(S): 80 TABLET, FILM COATED ORAL at 21:05

## 2022-01-01 RX ADMIN — GABAPENTIN 100 MILLIGRAM(S): 400 CAPSULE ORAL at 11:11

## 2022-01-01 RX ADMIN — Medication 650 MILLIGRAM(S): at 07:33

## 2022-01-01 RX ADMIN — Medication 3 MILLILITER(S): at 23:47

## 2022-01-01 RX ADMIN — Medication 15 MILLILITER(S): at 14:49

## 2022-01-01 RX ADMIN — ATORVASTATIN CALCIUM 10 MILLIGRAM(S): 80 TABLET, FILM COATED ORAL at 22:04

## 2022-01-01 RX ADMIN — Medication 40 MILLIGRAM(S): at 15:50

## 2022-01-01 RX ADMIN — SODIUM CHLORIDE 60 MILLILITER(S): 9 INJECTION INTRAMUSCULAR; INTRAVENOUS; SUBCUTANEOUS at 05:23

## 2022-01-01 RX ADMIN — Medication 20 MILLIGRAM(S): at 22:27

## 2022-01-01 RX ADMIN — Medication 1 SPRAY(S): at 00:19

## 2022-01-01 RX ADMIN — Medication 3 MILLIGRAM(S): at 22:04

## 2022-01-01 RX ADMIN — CEFEPIME 100 MILLIGRAM(S): 1 INJECTION, POWDER, FOR SOLUTION INTRAMUSCULAR; INTRAVENOUS at 15:20

## 2022-01-01 RX ADMIN — CEFEPIME 100 MILLIGRAM(S): 1 INJECTION, POWDER, FOR SOLUTION INTRAMUSCULAR; INTRAVENOUS at 00:12

## 2022-01-01 RX ADMIN — Medication 3 MILLILITER(S): at 17:14

## 2022-01-01 RX ADMIN — LACTULOSE 10 GRAM(S): 10 SOLUTION ORAL at 16:09

## 2022-01-01 RX ADMIN — Medication 15 MILLILITER(S): at 22:31

## 2022-01-01 RX ADMIN — Medication 3 MILLILITER(S): at 12:50

## 2022-01-01 RX ADMIN — Medication 15 MILLILITER(S): at 21:05

## 2022-01-01 RX ADMIN — Medication 15 MILLILITER(S): at 06:14

## 2022-01-01 RX ADMIN — BUDESONIDE AND FORMOTEROL FUMARATE DIHYDRATE 2 PUFF(S): 160; 4.5 AEROSOL RESPIRATORY (INHALATION) at 05:41

## 2022-01-01 RX ADMIN — Medication 15 MILLILITER(S): at 23:36

## 2022-01-01 RX ADMIN — BUDESONIDE AND FORMOTEROL FUMARATE DIHYDRATE 2 PUFF(S): 160; 4.5 AEROSOL RESPIRATORY (INHALATION) at 06:11

## 2022-01-01 RX ADMIN — GABAPENTIN 100 MILLIGRAM(S): 400 CAPSULE ORAL at 12:00

## 2022-01-01 RX ADMIN — Medication 15 MILLILITER(S): at 21:07

## 2022-01-01 RX ADMIN — Medication 150 MILLIGRAM(S): at 21:02

## 2022-01-01 RX ADMIN — SODIUM CHLORIDE 10 MILLILITER(S): 9 INJECTION INTRAMUSCULAR; INTRAVENOUS; SUBCUTANEOUS at 12:01

## 2022-01-01 RX ADMIN — Medication 25 MILLIGRAM(S): at 05:40

## 2022-01-01 RX ADMIN — BUDESONIDE AND FORMOTEROL FUMARATE DIHYDRATE 2 PUFF(S): 160; 4.5 AEROSOL RESPIRATORY (INHALATION) at 06:04

## 2022-01-01 RX ADMIN — MIDODRINE HYDROCHLORIDE 10 MILLIGRAM(S): 2.5 TABLET ORAL at 05:37

## 2022-01-01 RX ADMIN — Medication 30 MILLILITER(S): at 05:12

## 2022-01-01 RX ADMIN — Medication 3 MILLIGRAM(S): at 21:04

## 2022-01-01 RX ADMIN — Medication 400 MILLIGRAM(S): at 17:52

## 2022-01-01 RX ADMIN — Medication 0.25 MILLIGRAM(S): at 01:42

## 2022-01-01 RX ADMIN — Medication 300 MILLIGRAM(S): at 12:19

## 2022-01-01 RX ADMIN — Medication 1 SPRAY(S): at 05:26

## 2022-01-01 RX ADMIN — Medication 1 SPRAY(S): at 06:03

## 2022-01-01 RX ADMIN — Medication 40 MILLIGRAM(S): at 05:41

## 2022-01-01 RX ADMIN — Medication 15 MILLILITER(S): at 14:46

## 2022-01-01 RX ADMIN — Medication 200 GRAM(S): at 20:50

## 2022-01-01 RX ADMIN — BUPROPION HYDROCHLORIDE 150 MILLIGRAM(S): 150 TABLET, EXTENDED RELEASE ORAL at 13:35

## 2022-01-01 RX ADMIN — Medication 40 MILLIGRAM(S): at 17:21

## 2022-01-01 RX ADMIN — VORICONAZOLE 200 MILLIGRAM(S): 10 INJECTION, POWDER, LYOPHILIZED, FOR SOLUTION INTRAVENOUS at 05:56

## 2022-01-01 RX ADMIN — LISINOPRIL 5 MILLIGRAM(S): 2.5 TABLET ORAL at 05:51

## 2022-01-01 RX ADMIN — BUPROPION HYDROCHLORIDE 150 MILLIGRAM(S): 150 TABLET, EXTENDED RELEASE ORAL at 11:01

## 2022-01-01 RX ADMIN — GABAPENTIN 100 MILLIGRAM(S): 400 CAPSULE ORAL at 11:52

## 2022-01-01 RX ADMIN — CEFEPIME 100 MILLIGRAM(S): 1 INJECTION, POWDER, FOR SOLUTION INTRAMUSCULAR; INTRAVENOUS at 23:45

## 2022-01-01 RX ADMIN — VORICONAZOLE 200 MILLIGRAM(S): 10 INJECTION, POWDER, LYOPHILIZED, FOR SOLUTION INTRAVENOUS at 18:24

## 2022-01-01 RX ADMIN — Medication 1 SPRAY(S): at 12:19

## 2022-01-01 RX ADMIN — CEFEPIME 100 MILLIGRAM(S): 1 INJECTION, POWDER, FOR SOLUTION INTRAMUSCULAR; INTRAVENOUS at 17:03

## 2022-01-01 RX ADMIN — SENNA PLUS 1 TABLET(S): 8.6 TABLET ORAL at 21:42

## 2022-01-01 RX ADMIN — Medication 25 MILLIGRAM(S): at 06:18

## 2022-01-01 RX ADMIN — VORICONAZOLE 200 MILLIGRAM(S): 10 INJECTION, POWDER, LYOPHILIZED, FOR SOLUTION INTRAVENOUS at 17:09

## 2022-01-01 RX ADMIN — ATORVASTATIN CALCIUM 10 MILLIGRAM(S): 80 TABLET, FILM COATED ORAL at 21:29

## 2022-01-01 RX ADMIN — Medication 1 SPRAY(S): at 17:39

## 2022-01-01 RX ADMIN — FLUCONAZOLE 200 MILLIGRAM(S): 150 TABLET ORAL at 12:27

## 2022-01-01 RX ADMIN — Medication 3 MILLIGRAM(S): at 01:42

## 2022-01-01 RX ADMIN — BUDESONIDE AND FORMOTEROL FUMARATE DIHYDRATE 2 PUFF(S): 160; 4.5 AEROSOL RESPIRATORY (INHALATION) at 17:27

## 2022-01-01 RX ADMIN — Medication 1 SPRAY(S): at 12:09

## 2022-01-01 RX ADMIN — Medication 5 MILLIGRAM(S): at 12:05

## 2022-01-01 RX ADMIN — Medication 15 MILLILITER(S): at 06:39

## 2022-01-01 RX ADMIN — GABAPENTIN 100 MILLIGRAM(S): 400 CAPSULE ORAL at 17:27

## 2022-01-01 RX ADMIN — SODIUM CHLORIDE 20 MILLILITER(S): 9 INJECTION INTRAMUSCULAR; INTRAVENOUS; SUBCUTANEOUS at 22:23

## 2022-01-01 RX ADMIN — HYDROXYUREA 2500 MILLIGRAM(S): 500 CAPSULE ORAL at 16:46

## 2022-01-01 RX ADMIN — Medication 0.25 MILLIGRAM(S): at 18:13

## 2022-01-01 RX ADMIN — CHLORHEXIDINE GLUCONATE 1 APPLICATION(S): 213 SOLUTION TOPICAL at 22:18

## 2022-01-01 RX ADMIN — BUPROPION HYDROCHLORIDE 150 MILLIGRAM(S): 150 TABLET, EXTENDED RELEASE ORAL at 12:38

## 2022-01-01 RX ADMIN — Medication 15 MILLILITER(S): at 22:23

## 2022-01-01 RX ADMIN — BUPROPION HYDROCHLORIDE 150 MILLIGRAM(S): 150 TABLET, EXTENDED RELEASE ORAL at 11:32

## 2022-01-01 RX ADMIN — CHLORHEXIDINE GLUCONATE 1 APPLICATION(S): 213 SOLUTION TOPICAL at 23:01

## 2022-01-01 RX ADMIN — Medication 15 MILLILITER(S): at 05:49

## 2022-01-01 RX ADMIN — CHLORHEXIDINE GLUCONATE 1 APPLICATION(S): 213 SOLUTION TOPICAL at 12:04

## 2022-01-01 RX ADMIN — Medication 3 MILLILITER(S): at 11:11

## 2022-01-01 RX ADMIN — Medication 3 MILLILITER(S): at 17:21

## 2022-01-01 RX ADMIN — BUDESONIDE AND FORMOTEROL FUMARATE DIHYDRATE 2 PUFF(S): 160; 4.5 AEROSOL RESPIRATORY (INHALATION) at 05:58

## 2022-01-01 RX ADMIN — POLYETHYLENE GLYCOL 3350 17 GRAM(S): 17 POWDER, FOR SOLUTION ORAL at 09:21

## 2022-01-01 RX ADMIN — CEFEPIME 100 MILLIGRAM(S): 1 INJECTION, POWDER, FOR SOLUTION INTRAMUSCULAR; INTRAVENOUS at 21:22

## 2022-01-01 RX ADMIN — Medication 400 MILLIGRAM(S): at 16:00

## 2022-01-01 RX ADMIN — Medication 5 MILLIGRAM(S): at 17:23

## 2022-01-01 RX ADMIN — HYDROXYUREA 1000 MILLIGRAM(S): 500 CAPSULE ORAL at 11:04

## 2022-01-01 RX ADMIN — HYDROMORPHONE HYDROCHLORIDE 0.2 MILLIGRAM(S): 2 INJECTION INTRAMUSCULAR; INTRAVENOUS; SUBCUTANEOUS at 09:47

## 2022-01-01 RX ADMIN — SODIUM CHLORIDE 10 MILLILITER(S): 9 INJECTION INTRAMUSCULAR; INTRAVENOUS; SUBCUTANEOUS at 07:58

## 2022-01-01 RX ADMIN — BUPROPION HYDROCHLORIDE 150 MILLIGRAM(S): 150 TABLET, EXTENDED RELEASE ORAL at 12:26

## 2022-01-01 RX ADMIN — SENNA PLUS 1 TABLET(S): 8.6 TABLET ORAL at 23:04

## 2022-01-01 RX ADMIN — Medication 40 MILLIGRAM(S): at 14:20

## 2022-01-01 RX ADMIN — Medication 20 MILLIEQUIVALENT(S): at 12:05

## 2022-01-01 RX ADMIN — MIDODRINE HYDROCHLORIDE 10 MILLIGRAM(S): 2.5 TABLET ORAL at 16:27

## 2022-01-01 RX ADMIN — Medication 650 MILLIGRAM(S): at 08:38

## 2022-01-01 RX ADMIN — CEFEPIME 100 MILLIGRAM(S): 1 INJECTION, POWDER, FOR SOLUTION INTRAMUSCULAR; INTRAVENOUS at 23:34

## 2022-01-01 RX ADMIN — GABAPENTIN 100 MILLIGRAM(S): 400 CAPSULE ORAL at 13:24

## 2022-01-01 RX ADMIN — Medication 40 MILLIGRAM(S): at 06:17

## 2022-01-01 RX ADMIN — Medication 15 MILLILITER(S): at 21:43

## 2022-01-01 RX ADMIN — Medication 5 MILLIGRAM(S): at 11:53

## 2022-01-01 RX ADMIN — Medication 400 MILLIGRAM(S): at 17:26

## 2022-01-01 RX ADMIN — Medication 20 MILLIEQUIVALENT(S): at 14:16

## 2022-01-01 RX ADMIN — Medication 400 MILLIGRAM(S): at 05:56

## 2022-01-01 RX ADMIN — HYDROMORPHONE HYDROCHLORIDE 0.2 MILLIGRAM(S): 2 INJECTION INTRAMUSCULAR; INTRAVENOUS; SUBCUTANEOUS at 17:21

## 2022-01-01 RX ADMIN — HYDROMORPHONE HYDROCHLORIDE 0.2 MILLIGRAM(S): 2 INJECTION INTRAMUSCULAR; INTRAVENOUS; SUBCUTANEOUS at 12:35

## 2022-01-01 RX ADMIN — Medication 1 SPRAY(S): at 17:36

## 2022-01-01 RX ADMIN — GILTERITINIB 120 MILLIGRAM(S): 40 TABLET ORAL at 18:29

## 2022-01-01 RX ADMIN — Medication 25 MILLIGRAM(S): at 05:34

## 2022-01-01 RX ADMIN — CHLORHEXIDINE GLUCONATE 1 APPLICATION(S): 213 SOLUTION TOPICAL at 22:21

## 2022-01-01 RX ADMIN — MIDODRINE HYDROCHLORIDE 10 MILLIGRAM(S): 2.5 TABLET ORAL at 17:14

## 2022-01-01 RX ADMIN — HYDROMORPHONE HYDROCHLORIDE 1 MILLIGRAM(S): 2 INJECTION INTRAMUSCULAR; INTRAVENOUS; SUBCUTANEOUS at 05:59

## 2022-01-01 RX ADMIN — SODIUM CHLORIDE 10 MILLILITER(S): 9 INJECTION INTRAMUSCULAR; INTRAVENOUS; SUBCUTANEOUS at 02:15

## 2022-01-01 RX ADMIN — BUDESONIDE AND FORMOTEROL FUMARATE DIHYDRATE 2 PUFF(S): 160; 4.5 AEROSOL RESPIRATORY (INHALATION) at 06:15

## 2022-01-01 RX ADMIN — GILTERITINIB 120 MILLIGRAM(S): 40 TABLET ORAL at 17:47

## 2022-01-01 RX ADMIN — BUPROPION HYDROCHLORIDE 150 MILLIGRAM(S): 150 TABLET, EXTENDED RELEASE ORAL at 11:57

## 2022-01-01 RX ADMIN — ONDANSETRON 8 MILLIGRAM(S): 8 TABLET, FILM COATED ORAL at 05:21

## 2022-01-01 RX ADMIN — VORICONAZOLE 200 MILLIGRAM(S): 10 INJECTION, POWDER, LYOPHILIZED, FOR SOLUTION INTRAVENOUS at 17:16

## 2022-01-01 RX ADMIN — HYDROMORPHONE HYDROCHLORIDE 0.2 MILLIGRAM(S): 2 INJECTION INTRAMUSCULAR; INTRAVENOUS; SUBCUTANEOUS at 19:41

## 2022-01-01 RX ADMIN — Medication 400 MILLIGRAM(S): at 17:09

## 2022-01-01 RX ADMIN — Medication 1 SPRAY(S): at 17:54

## 2022-01-01 RX ADMIN — Medication 1 SPRAY(S): at 12:15

## 2022-01-01 RX ADMIN — AMLODIPINE BESYLATE 5 MILLIGRAM(S): 2.5 TABLET ORAL at 06:08

## 2022-01-01 RX ADMIN — VORICONAZOLE 200 MILLIGRAM(S): 10 INJECTION, POWDER, LYOPHILIZED, FOR SOLUTION INTRAVENOUS at 18:02

## 2022-01-01 RX ADMIN — BUDESONIDE AND FORMOTEROL FUMARATE DIHYDRATE 2 PUFF(S): 160; 4.5 AEROSOL RESPIRATORY (INHALATION) at 20:06

## 2022-01-01 RX ADMIN — HYDROMORPHONE HYDROCHLORIDE 0.5 MILLIGRAM(S): 2 INJECTION INTRAMUSCULAR; INTRAVENOUS; SUBCUTANEOUS at 05:43

## 2022-01-01 RX ADMIN — Medication 15 MILLILITER(S): at 14:26

## 2022-01-01 RX ADMIN — Medication 3 MILLILITER(S): at 06:37

## 2022-01-01 RX ADMIN — Medication 100 GRAM(S): at 18:31

## 2022-01-01 RX ADMIN — Medication 3 MILLILITER(S): at 12:28

## 2022-01-01 RX ADMIN — Medication 0.25 MILLIGRAM(S): at 05:14

## 2022-01-01 RX ADMIN — BUDESONIDE AND FORMOTEROL FUMARATE DIHYDRATE 2 PUFF(S): 160; 4.5 AEROSOL RESPIRATORY (INHALATION) at 05:59

## 2022-01-01 RX ADMIN — BUPROPION HYDROCHLORIDE 150 MILLIGRAM(S): 150 TABLET, EXTENDED RELEASE ORAL at 12:52

## 2022-01-01 RX ADMIN — Medication 100 MILLIGRAM(S): at 17:30

## 2022-01-01 RX ADMIN — Medication 15 MILLILITER(S): at 21:08

## 2022-01-01 RX ADMIN — SODIUM CHLORIDE 20 MILLILITER(S): 9 INJECTION INTRAMUSCULAR; INTRAVENOUS; SUBCUTANEOUS at 05:50

## 2022-01-01 RX ADMIN — Medication 400 MILLIGRAM(S): at 05:37

## 2022-01-01 RX ADMIN — SODIUM CHLORIDE 20 MILLILITER(S): 9 INJECTION INTRAMUSCULAR; INTRAVENOUS; SUBCUTANEOUS at 21:09

## 2022-01-01 RX ADMIN — Medication 1 SPRAY(S): at 14:09

## 2022-01-01 RX ADMIN — SENNA PLUS 2 TABLET(S): 8.6 TABLET ORAL at 21:29

## 2022-01-01 RX ADMIN — LISINOPRIL 5 MILLIGRAM(S): 2.5 TABLET ORAL at 06:38

## 2022-01-01 RX ADMIN — SENNA PLUS 2 TABLET(S): 8.6 TABLET ORAL at 23:00

## 2022-01-01 RX ADMIN — Medication 15 MILLILITER(S): at 13:26

## 2022-01-01 RX ADMIN — GABAPENTIN 100 MILLIGRAM(S): 400 CAPSULE ORAL at 13:00

## 2022-01-01 RX ADMIN — CEFEPIME 100 MILLIGRAM(S): 1 INJECTION, POWDER, FOR SOLUTION INTRAMUSCULAR; INTRAVENOUS at 05:16

## 2022-01-01 RX ADMIN — Medication 150 MILLIGRAM(S): at 21:42

## 2022-01-01 RX ADMIN — Medication 0.5 MILLIGRAM(S): at 05:59

## 2022-01-01 RX ADMIN — Medication 3 MILLILITER(S): at 17:56

## 2022-01-01 RX ADMIN — Medication 400 MILLIGRAM(S): at 05:51

## 2022-01-01 RX ADMIN — CEFEPIME 100 MILLIGRAM(S): 1 INJECTION, POWDER, FOR SOLUTION INTRAMUSCULAR; INTRAVENOUS at 22:30

## 2022-01-01 RX ADMIN — Medication 164 MILLIGRAM(S): at 17:39

## 2022-01-01 RX ADMIN — Medication 15 MILLILITER(S): at 06:08

## 2022-01-01 RX ADMIN — CEFEPIME 100 MILLIGRAM(S): 1 INJECTION, POWDER, FOR SOLUTION INTRAMUSCULAR; INTRAVENOUS at 18:36

## 2022-01-01 RX ADMIN — HYDROMORPHONE HYDROCHLORIDE 0.5 MILLIGRAM(S): 2 INJECTION INTRAMUSCULAR; INTRAVENOUS; SUBCUTANEOUS at 23:48

## 2022-01-01 RX ADMIN — CEFEPIME 100 MILLIGRAM(S): 1 INJECTION, POWDER, FOR SOLUTION INTRAMUSCULAR; INTRAVENOUS at 23:24

## 2022-01-01 RX ADMIN — Medication 400 MILLIGRAM(S): at 06:09

## 2022-01-01 RX ADMIN — Medication 15 MILLILITER(S): at 05:26

## 2022-01-01 RX ADMIN — CHLORHEXIDINE GLUCONATE 1 APPLICATION(S): 213 SOLUTION TOPICAL at 22:29

## 2022-01-01 RX ADMIN — Medication 20 MILLIEQUIVALENT(S): at 21:47

## 2022-01-01 RX ADMIN — HYDROMORPHONE HYDROCHLORIDE 0.5 MILLIGRAM(S): 2 INJECTION INTRAMUSCULAR; INTRAVENOUS; SUBCUTANEOUS at 16:28

## 2022-01-01 RX ADMIN — BUPROPION HYDROCHLORIDE 150 MILLIGRAM(S): 150 TABLET, EXTENDED RELEASE ORAL at 11:52

## 2022-01-01 RX ADMIN — Medication 1 SPRAY(S): at 12:41

## 2022-01-01 RX ADMIN — Medication 5 MILLIGRAM(S): at 11:57

## 2022-01-01 RX ADMIN — CHLORHEXIDINE GLUCONATE 1 APPLICATION(S): 213 SOLUTION TOPICAL at 13:40

## 2022-01-01 RX ADMIN — CEFEPIME 100 MILLIGRAM(S): 1 INJECTION, POWDER, FOR SOLUTION INTRAMUSCULAR; INTRAVENOUS at 14:18

## 2022-01-01 RX ADMIN — SODIUM CHLORIDE 20 MILLILITER(S): 9 INJECTION INTRAMUSCULAR; INTRAVENOUS; SUBCUTANEOUS at 05:20

## 2022-01-01 RX ADMIN — Medication 0.25 MILLIGRAM(S): at 13:12

## 2022-01-01 RX ADMIN — Medication 5 MILLIGRAM(S): at 17:54

## 2022-01-01 RX ADMIN — BUPROPION HYDROCHLORIDE 150 MILLIGRAM(S): 150 TABLET, EXTENDED RELEASE ORAL at 12:14

## 2022-01-01 RX ADMIN — HYDROXYUREA 2500 MILLIGRAM(S): 500 CAPSULE ORAL at 06:11

## 2022-01-01 RX ADMIN — Medication 3 MILLILITER(S): at 12:05

## 2022-01-01 RX ADMIN — Medication 5 MILLIGRAM(S): at 17:57

## 2022-01-01 RX ADMIN — Medication 150 MILLIGRAM(S): at 22:13

## 2022-01-01 RX ADMIN — Medication 650 MILLIGRAM(S): at 18:30

## 2022-01-01 RX ADMIN — VORICONAZOLE 200 MILLIGRAM(S): 10 INJECTION, POWDER, LYOPHILIZED, FOR SOLUTION INTRAVENOUS at 05:52

## 2022-01-01 RX ADMIN — Medication 400 MILLIGRAM(S): at 17:03

## 2022-01-01 RX ADMIN — GABAPENTIN 100 MILLIGRAM(S): 400 CAPSULE ORAL at 12:39

## 2022-01-01 RX ADMIN — SODIUM CHLORIDE 1000 MILLILITER(S): 9 INJECTION INTRAMUSCULAR; INTRAVENOUS; SUBCUTANEOUS at 18:39

## 2022-01-01 RX ADMIN — Medication 400 MILLIGRAM(S): at 05:02

## 2022-01-01 RX ADMIN — Medication 3 MILLILITER(S): at 18:00

## 2022-01-01 RX ADMIN — Medication 15 MILLILITER(S): at 14:16

## 2022-01-01 RX ADMIN — CEFEPIME 100 MILLIGRAM(S): 1 INJECTION, POWDER, FOR SOLUTION INTRAMUSCULAR; INTRAVENOUS at 11:14

## 2022-01-01 RX ADMIN — LISINOPRIL 5 MILLIGRAM(S): 2.5 TABLET ORAL at 05:40

## 2022-01-01 RX ADMIN — Medication 100 MILLIGRAM(S): at 23:03

## 2022-01-01 RX ADMIN — Medication 15 MILLILITER(S): at 21:46

## 2022-01-01 RX ADMIN — Medication 40 MILLIEQUIVALENT(S): at 11:05

## 2022-01-01 RX ADMIN — VORICONAZOLE 200 MILLIGRAM(S): 10 INJECTION, POWDER, LYOPHILIZED, FOR SOLUTION INTRAVENOUS at 06:37

## 2022-01-01 RX ADMIN — BUDESONIDE AND FORMOTEROL FUMARATE DIHYDRATE 2 PUFF(S): 160; 4.5 AEROSOL RESPIRATORY (INHALATION) at 17:47

## 2022-01-01 RX ADMIN — CHLORHEXIDINE GLUCONATE 1 APPLICATION(S): 213 SOLUTION TOPICAL at 12:15

## 2022-01-01 RX ADMIN — Medication 40 MILLIGRAM(S): at 05:50

## 2022-01-01 RX ADMIN — Medication 400 MILLIGRAM(S): at 06:11

## 2022-01-01 RX ADMIN — Medication 1 SPRAY(S): at 23:25

## 2022-01-13 PROBLEM — B37.0 THRUSH: Status: ACTIVE | Noted: 2021-08-23

## 2022-01-13 NOTE — PHYSICAL EXAM
[Thrush] : thrush [Normal] : affect appropriate [Restricted in physically strenuous activity but ambulatory and able to carry out work of a light or sedentary nature] : Status 1- Restricted in physically strenuous activity but ambulatory and able to carry out work of a light or sedentary nature, e.g., light house work, office work [Ulcers] : no ulcers [Mucositis] : no mucositis [Vesicles] : no vesicles [de-identified] : supple [de-identified] : (+)S1S2 RRR [de-identified] : L sided Port -no inflammation. [de-identified] : no tenderness [de-identified] : warm/dry. L thumb with Band-Aid where she cut herself last night, C/D/I

## 2022-01-13 NOTE — ASSESSMENT
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  Repeat bone marrow biopsy after cycle 8 showing evidence of persistent complete remission. \par \par -Will continue with dacogen/venetoclax q5wks given troubles with cytopenias, along with OnPro (day 5).Today is cycle 21 day 4.\par -Thrush resolved now. Off Nystatin for now but will continue flucanazole (also for consideration with venetoclax dosing). \par -Venetoclax daily, dosed at 200mg for 10 days with each cycle. \par -Patient knows to take Levaquin when she is neutropenic, but will stay on diflucan continuously for stable venetoclax dosing effect. \par -will continue home draw at this time weekly. Possible platelets if needed. \par -BM bx done on 2/3/20 showed normal cytogenetics. In house assay for FLT-3 ITD positive, which confers an adverse prognosis in AML - previously had been d/w patient and family about BMT transplant -pt active prior to admission, good PS status; however, she was not interested. \par -patient had severe lower back pain related to DDD and apparent pinched nerve has improved on low dose gabapentin 100 mg QHS, may have anxiolytic effect as well. \par -Patient likely suffering from depression related to her illness. Will reach out and set her up with psych.\par -Patient's  now diagnosed with metastatic prostate cancer. Likely will ultimately require more assistance at home. \par -Arranged for tetanus shot to be given in treatment room today.\par -Follow up in 1 month\par

## 2022-01-13 NOTE — REVIEW OF SYSTEMS
[Fatigue] : fatigue [Constipation] : constipation [Incontinence] : incontinence [Joint Pain] : joint pain [Anxiety] : anxiety [Negative] : Heme/Lymph [Fever] : no fever [Chills] : no chills [Night Sweats] : no night sweats [Vision Problems] : no vision problems [Dysphagia] : no dysphagia [Nosebleeds] : no nosebleeds [Odynophagia] : no odynophagia [Dysuria] : no dysuria [Joint Stiffness] : no joint stiffness [Suicidal] : not suicidal [Insomnia] : no insomnia [Muscle Weakness] : no muscle weakness [FreeTextEntry2] : chronic fatigue per HPI [FreeTextEntry6] : can go up 1 flight of stairs - exertional fatigue but not dyspnea  [FreeTextEntry7] : no BRBPR. Colonoscopy -done > 1 yr ago, normal; intermittent constipation [FreeTextEntry8] : stress [FreeTextEntry9] : lower back pain chronic -s/p compressed vertebrae.  OK at this point.  [de-identified] : uses trazodone for sleep and she sleeps fine.  [FreeTextEntry1] : seasonal allergies

## 2022-01-13 NOTE — HISTORY OF PRESENT ILLNESS
[Disease:__________________________] : Disease: [unfilled] [Therapy: ___] : Therapy: [unfilled] [Cycle: ___] : Cycle: [unfilled] [Day: ___] : Day: [unfilled] [de-identified] : Ms. 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 She has a history of Breast cancer 12 years ago treated with tamoxifen (no chemotherapy), s/p RT and lumpectomy, HTN and surgical hx of hysterectomy, was sent in by oncologist Dr. Blank on 1/31/20 for rule out leukemia. Patient had been feeling generally fatigued and sluggish since December 2019. She was feeling more tired, and saw her PMD, who did some blood work and noticed blasts in her peripheral blood. Pt was told to see Dr. Blank for further workup. Dr. Blank did blood work again which again found blasts in the peripheral blood, at which point she sent pt to the ER for leukemia workup. 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 She was started on 2/4/20 on Cycle 1 of Dacogen + Venetoclax (50 mg on day 1, 100 mg on day 2, 200 mg on day 3 and onward when starting Diflucan). She tolerated it well other than some SOB -CXR on 2/7/20 : mild pulmonary edema and L pleural effusion, treated with diuretics. She was discharged home on 2/11/20. \par \par She had a BM bx to eval response on 3/2/20 -showed no blasts. She has since been on maintenance treatments with Dacogen/venetoclax (10 days) every 5 weeks. \par  [de-identified] : Disseminated [de-identified] : 1, 2. Bone marrow biopsy and bone marrow aspirate\par - Acute myeloid leukemia with mutated NPM1\par - FLT 3-ITD positive\par \par Diagnostic Note:\par Megakarocytes are normal in number with dysplastic morphology.\par Please note findings of a normal female karyotype, negative MDS\par FISH panel. and somatic mutations of FLT3 FLT3-ITD\par ([id number]), [id number]*12, and CEBPA F106fs*1.\par Based on the additional findings, the classification of AML has\par changed to acute myeloid leukemia with mutated NPM1.\par  [de-identified] : Patient reports that she has no energy, very low quality of life. She reports that with her daily life she only sits in a chair all day at this point. No trouble walking at this point. No pain symptoms. She does think that she has some big time issues with depression at this point. Reports no febrile illnesses. No weight loss or night sweats. In contrast she has gained a little weight.  No chest pain, palpitations or shortness of breath. Denies any N/V/D. No pain symptoms at the present time. Normal bowel movements and normal urinary habits. Patient reports her appetite has been less. Her thrush seems to have resolved but still with very little sense taste.

## 2022-01-28 NOTE — PHYSICAL EXAM
[Restricted in physically strenuous activity but ambulatory and able to carry out work of a light or sedentary nature] : Status 1- Restricted in physically strenuous activity but ambulatory and able to carry out work of a light or sedentary nature, e.g., light house work, office work [Thrush] : thrush [Normal] : affect appropriate [Ulcers] : no ulcers [Mucositis] : no mucositis [Vesicles] : no vesicles [de-identified] : supple [de-identified] : (+)S1S2 RRR [de-identified] : L sided Port -no inflammation. [de-identified] : no tenderness [de-identified] : warm/dry. L thumb with Band-Aid where she cut herself last night, C/D/I

## 2022-01-28 NOTE — ASSESSMENT
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  Repeat bone marrow biopsy after cycle 8 showing evidence of persistent complete remission. Now starting cycle 20 and doing well. \par \par -Will continue with dacogen/venetoclax q5wks given troubles with cytopenias, along with OnPro (day 5).Today is cycle 20 day 1.\par -Thrush previously resolved but today appears to have come back likely in setting of lower counts from chemo and still with weird taste in her mouth. She will continue Nystatin suspn and fluconazole.\par -Venetoclax daily, dosed at 200mg for 10 days with each cycle. \par -Patient knows to take Levaquin when she is neutropenic, but will stay on diflucan continuously for stable venetoclax dosing effect. \par -will continue home draw at this time. Possible platelets if needed. \par -BM bx done on 2/3/20 showed normal cytogenetics. In house assay for FLT-3 ITD positive, which confers an adverse prognosis in AML - previously had been d/w patient and family about BMT transplant -pt active prior to admission, good PS status; however, she was not interested. \par -patient had severe lower back pain related to DDD and apparent pinched nerve has improved on low dose gabapentin 100 mg QHS, may have anxiolytic effect as well. This ran out however patient doesn't want to reinitiate now. \par -Discussed plan with son and patient at length, support services also discussed if needed in the future given husbands possible CA dx\par -Arranged for tetanus shot to be given in treatment room today.\par -Follow up in 1 month\par

## 2022-01-28 NOTE — REVIEW OF SYSTEMS
[Fatigue] : fatigue [Constipation] : constipation [Incontinence] : incontinence [Joint Pain] : joint pain [Anxiety] : anxiety [Negative] : Heme/Lymph [Fever] : no fever [Chills] : no chills [Night Sweats] : no night sweats [Vision Problems] : no vision problems [Dysphagia] : no dysphagia [Nosebleeds] : no nosebleeds [Odynophagia] : no odynophagia [Dysuria] : no dysuria [Joint Stiffness] : no joint stiffness [Suicidal] : not suicidal [Insomnia] : no insomnia [Muscle Weakness] : no muscle weakness [FreeTextEntry2] : chronic fatigue per HPI [FreeTextEntry6] : can go up 1 flight of stairs - exertional fatigue but not dyspnea  [FreeTextEntry7] : no BRBPR. Colonoscopy -done > 1 yr ago, normal; intermittent constipation [FreeTextEntry8] : stress [FreeTextEntry9] : lower back pain chronic -s/p compressed vertebrae.  OK at this point.  [de-identified] : uses trazodone for sleep and she sleeps fine.  [FreeTextEntry1] : seasonal allergies

## 2022-01-28 NOTE — HISTORY OF PRESENT ILLNESS
[Disease:__________________________] : Disease: [unfilled] [Therapy: ___] : Therapy: [unfilled] [Cycle: ___] : Cycle: [unfilled] [Day: ___] : Day: [unfilled] [de-identified] : Ms. 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 She has a history of Breast cancer 12 years ago treated with tamoxifen (no chemotherapy), s/p RT and lumpectomy, HTN and surgical hx of hysterectomy, was sent in by oncologist Dr. Blank on 1/31/20 for rule out leukemia. Patient had been feeling generally fatigued and sluggish since December 2019. She was feeling more tired, and saw her PMD, who did some blood work and noticed blasts in her peripheral blood. Pt was told to see Dr. Blank for further workup. Dr. Blank did blood work again which again found blasts in the peripheral blood, at which point she sent pt to the ER for leukemia workup. 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 She was started on 2/4/20 on Cycle 1 of Dacogen + Venetoclax (50 mg on day 1, 100 mg on day 2, 200 mg on day 3 and onward when starting Diflucan). She tolerated it well other than some SOB -CXR on 2/7/20 : mild pulmonary edema and L pleural effusion, treated with diuretics. She was discharged home on 2/11/20. \par \par She had a BM bx to eval response on 3/2/20 -showed no blasts. She has since been on maintenance treatments with Dacogen/venetoclax (10 days) every 5 weeks. \par  [de-identified] : Disseminated [de-identified] : 1, 2. Bone marrow biopsy and bone marrow aspirate\par - Acute myeloid leukemia with mutated NPM1\par - FLT 3-ITD positive\par \par Diagnostic Note:\par Megakarocytes are normal in number with dysplastic morphology.\par Please note findings of a normal female karyotype, negative MDS\par FISH panel. and somatic mutations of FLT3 FLT3-ITD\par ([id number]), [id number]*12, and CEBPA F106fs*1.\par Based on the additional findings, the classification of AML has\par changed to acute myeloid leukemia with mutated NPM1.\par  [de-identified] : Patient reports that she is feeling overall well, does always feel to a degree she is "tired" but not more than usual. She cut her finger last night on a metal can of sauce, she denies and s/s of infection, does not know when last tetanus shot was other than it was "many, many years ago". No shortness of breath, no chest pain or palpitations. Appetite is fair (same as usual), everything tastes different thinks it is due to chemo and fungal infection she previously completed nystatin for. Bowel habits and urinary habits are normal.  No fevers or chills. He  has been admitted to [facility] for mass found in abdomen, had been told recently he may have prostate CA and is having a colonoscopy today, her son is here at visit.

## 2022-02-10 NOTE — REVIEW OF SYSTEMS
[Fatigue] : fatigue [Incontinence] : incontinence [Joint Pain] : joint pain [Anxiety] : anxiety [Negative] : Heme/Lymph [Fever] : no fever [Chills] : no chills [Night Sweats] : no night sweats [Recent Change In Weight] : ~T no recent weight change [Vision Problems] : no vision problems [Dysphagia] : no dysphagia [Nosebleeds] : no nosebleeds [Odynophagia] : no odynophagia [Chest Pain] : no chest pain [Palpitations] : no palpitations [Leg Claudication] : no intermittent leg claudication [Lower Ext Edema] : no lower extremity edema [Dysuria] : no dysuria [Joint Stiffness] : no joint stiffness [Suicidal] : not suicidal [Insomnia] : no insomnia [Muscle Weakness] : no muscle weakness [FreeTextEntry6] : can go up 1 flight of stairs - exertional fatigue but not dyspnea  [FreeTextEntry7] : no BRBPR. Colonoscopy -done > 1 yr ago, normal;  [FreeTextEntry8] : stress [FreeTextEntry9] : lower back pain chronic -s/p compressed vertebrae [de-identified] : uses trazodone for sleep and she sleeps fine.  [FreeTextEntry1] : seasonal allergies Estimated Blood Loss (Cc): minimal

## 2022-02-24 PROBLEM — F32.A DEPRESSION: Status: ACTIVE | Noted: 2022-01-01

## 2022-02-28 NOTE — ED PROVIDER NOTE - OBJECTIVE STATEMENT
[facility]   Progress Note and Procedure Note      Esther Shrestha  AGE: 40 y.o. GENDER: male  : 1977  TODAY'S DATE:  2022    Subjective:     Chief Complaint   Patient presents with    Wound Check     Right foot DFU         HISTORY of PRESENT ILLNESS HPI     Esther Shrestha is a 40 y.o. male who presents today for wound evaluation. History of Wound: R foot ulcer   Wound Pain:  none  Severity:  0 / 10   Wound Type:  diabetic  Modifying Factors:  edema, diabetes, chronic pressure and shear force  Associated Signs/Symptoms:  edema and drainage        PAST MEDICAL HISTORY        Diagnosis Date    Diabetes mellitus ([facility] [address]     Hypertension     Obesity     SVT (supraventricular tachycardia) ([facility] [address]     WPW syndrome        PAST SURGICAL HISTORY    Past Surgical History:   Procedure Laterality Date    ABLATION OF DYSRHYTHMIC FOCUS  12    posterior, septal accessory pathway    FOOT SURGERY      BILAT INGROWN TOENAILS       FAMILY HISTORY    Family History   Problem Relation Age of Onset    High Blood Pressure Mother     Cancer Maternal Grandmother         bone       SOCIAL HISTORY    Social History     Tobacco Use    Smoking status: Former Smoker     Packs/day: 1.00     Years: 15.00     Pack years: 15.00    Smokeless tobacco: Former User     Quit date: 3/8/2012   Substance Use Topics    Alcohol use: No    Drug use: No       ALLERGIES    Allergies   Allergen Reactions    Lactose      Lactose intolerance       MEDICATIONS    Current Outpatient Medications on File Prior to Encounter   Medication Sig Dispense Refill    insulin detemir (LEVEMIR) 100 UNIT/ML injection vial Inject into the skin nightly      empagliflozin (JARDIANCE) 25 MG tablet Take 25 mg by mouth daily      lisinopril (PRINIVIL;ZESTRIL) 40 MG tablet Take 1 tablet by mouth daily. 90 tablet 1    metoprolol (LOPRESSOR) 50 MG tablet Take 1 tablet by mouth 2 times daily.  180 tablet 1    simvastatin (ZOCOR) 20 MG tablet Take 1 tablet by mouth nightly. 90 tablet 1    aspirin 81 MG chewable tablet Take 1 tablet by mouth daily. 30 tablet 0     No current facility-administered medications on file prior to encounter. REVIEW OF SYSTEMS    Pertinent items are noted in HPI. Objective:      BP (!) 149/94   Pulse 76   Resp 16     PHYSICAL EXAM    General Appearance: alert and oriented to person, place and time, well-developed and well-nourished, in no acute distress  Skin: warm and dry, no rash or erythema and ulcer plantar R foot granular and no probe to bone. Callused edges.   Cardiovascular: normal rate and intact distal pulses  Extremities: no cyanosis, no clubbing and edema noted to R foot.   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 L hallux amputation noted.   Neurologic: gait and coordination normal, speech normal and decreased sensation b/l LE.       Assessment:     Patient Active Problem List   Diagnosis    WPW (Agatha-Parkinson-White syndrome)    Morbid obesity [facility] [address]    Polycythemia    Diabetes mellitus ([facility] [address]    Hypertension    Right foot ulcer, with fat layer exposed ([facility] [address]       Procedure Note    Performed by: Milderd Denver, DPM    Consent obtained: Yes    Time out taken:  Yes    Pain Control: Anesthetic  Anesthetic: 4% Lidocaine Cream     Debridement:Excisional Debridement    Using curette the wound was sharply debrided    down through and including the removal of epidermis, dermis and subcutaneous tissue.         Devitalized Tissue Debrided:  fibrin, biofilm, slough and callus    Pre Debridement Measurements:  Are located in the Wound Documentation Flow Sheet    Wound #: 1     Post  Debridement Measurements:  Wound 01/31/22 Foot Right;Plantar #1 (Active)   Wound Image   01/31/22 1452   Wound Etiology Diabetic 02/28/22 1450   Wound Cleansed Cleansed with saline 02/28/22 1450   Dressing/Treatment Dry dressing;Triad hydro/zinc oxide-based hydrophilic paste; Other (comment) 01/31/22 1504   Offloading for Diabetic Foot Ulcers Diabetic shoes/inserts 02/28/22 1450   Wound Length (cm) 3 cm 02/28/22 1450   Wound Width (cm) 2.4 cm 02/28/22 1450   Wound Depth (cm) 0.1 cm 02/28/22 1450   Wound Surface Area (cm^2) 7.2 cm^2 02/28/22 1450   Change in Wound Size % (l*w) -0.84 02/28/22 1450   Wound Volume (cm^3) 0.72 cm^3 02/28/22 1450   Wound Healing % -1 02/28/22 1450   Post-Procedure Length (cm) 3 cm 02/28/22 1512   Post-Procedure Width (cm) 2.4 cm 02/28/22 1512   Post-Procedure Depth (cm) 0.1 cm 02/28/22 1512   Post-Procedure Surface Area (cm^2) 7.2 cm^2 02/28/22 1512   Post-Procedure Volume (cm^3) 0.72 cm^3 02/28/22 1512   Wound Assessment Bleeding 02/28/22 1512   Drainage Amount Moderate 02/28/22 1512   Drainage Description Serosanguinous 02/28/22 1450   Odor None 02/28/22 1450   Santa-wound Assessment Hyperkeratosis (callous) 02/28/22 1450   Margins Attached edges; Defined edges 02/28/22 1450   Number of days: 28           Total Surface Area Debrided:  7.2 sq cm     Percentage of wound debrided 100%    Bleeding:  Minimal    Hemostasis Achieved:  by pressure    Procedural Pain:  0  / 10     Post Procedural Pain:  0 / 10     Response to treatment:  Well tolerated by patient. Plan:     The nature of the patient's condition was explained in depth. The patient was informed that their compliance to the treatment plan is paramount to successful healing and prevention of further ulceration and/or infection     Discharge Treatment       Pt examined and evaluated  Wound debrided full thickness with curette. Dress with collagen and DSD  Discussed importance of offloading and avoiding barefoot walking. F/u 2 weeks   Pending progress discuss TCC or grafting in future.     Written Patient Discharge Instructions Given            Electronically signed by Jayden Guerrier DPM on 2/28/2022 at 3:14 PM 80 y/o female with PMHx HTN and Breast CA sent by Oncologist Goldberg due to abnormal labs. Pt reports she had a platelet count of 5000. pt reports she had a platelet transfusion about 1.5 years ago. pt reports she is feeling well without acute complaints. pt denies fever, vomiting, headache, cp, sob, rash, bloody stool, bloody nose, bruising, or any other complaints.

## 2022-03-28 PROBLEM — D61.810 ANTINEOPLASTIC CHEMOTHERAPY INDUCED PANCYTOPENIA: Status: ACTIVE | Noted: 2020-02-11

## 2022-03-28 NOTE — ASSESSMENT
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  Repeat bone marrow biopsy after cycle 8 showing evidence of persistent complete remission. \par \par -Will continue with dacogen/venetoclax q5wks given troubles with cytopenias, along with OnPro (day 5). Today is cycle 23 day 1. \par -Thrush resolved now. Off Nystatin for now but will continue flucanazole (also for consideration with venetoclax dosing). \par -Venetoclax daily, dosed at 200mg for 10 days with each cycle. \par -Patient knows to take Levaquin when she is neutropenic, but will stay on diflucan continuously for stable venetoclax dosing effect. \par -will continue home draw at this time weekly. Possible platelets if needed. \par -BM bx done on 2/3/20 showed normal cytogenetics. In house assay for FLT-3 ITD positive, which confers an adverse prognosis in AML - previously had been d/w patient and family about BMT transplant -pt active prior to admission, good PS status; however, she was not interested. \par -patient had severe lower back pain related to DDD and apparent pinched nerve has improved on low dose gabapentin 100 mg QHS, may have anxiolytic effect as well. \par -Continued follow up with psychology / social work regarding patient's depression. \par -Follow up in 1 month\par

## 2022-03-28 NOTE — REVIEW OF SYSTEMS
[Fatigue] : fatigue [Constipation] : constipation [Incontinence] : incontinence [Joint Pain] : joint pain [Anxiety] : anxiety [Depression] : depression [Negative] : Heme/Lymph [Fever] : no fever [Chills] : no chills [Night Sweats] : no night sweats [Vision Problems] : no vision problems [Dysphagia] : no dysphagia [Nosebleeds] : no nosebleeds [Odynophagia] : no odynophagia [Dysuria] : no dysuria [Joint Stiffness] : no joint stiffness [Suicidal] : not suicidal [Insomnia] : no insomnia [Muscle Weakness] : no muscle weakness [FreeTextEntry2] : chronic fatigue per HPI [FreeTextEntry6] : can go up 1 flight of stairs - exertional fatigue but not dyspnea  [FreeTextEntry7] : no BRBPR. Colonoscopy -done > 1 yr ago, normal; intermittent constipation [FreeTextEntry8] : stress [FreeTextEntry9] : lower back pain chronic -s/p compressed vertebrae.  OK at this point.  [de-identified] : uses trazodone for sleep and she sleeps fine.  [FreeTextEntry1] : seasonal allergies

## 2022-03-28 NOTE — PHYSICAL EXAM
[Restricted in physically strenuous activity but ambulatory and able to carry out work of a light or sedentary nature] : Status 1- Restricted in physically strenuous activity but ambulatory and able to carry out work of a light or sedentary nature, e.g., light house work, office work [Normal] : affect appropriate [de-identified] : supple [de-identified] : (+)S1S2 RRR [de-identified] : L sided Port -no inflammation. [de-identified] : no tenderness [de-identified] : warm/dry.

## 2022-03-28 NOTE — HISTORY OF PRESENT ILLNESS
[Disease:__________________________] : Disease: [unfilled] [Therapy: ___] : Therapy: [unfilled] [Cycle: ___] : Cycle: [unfilled] [Day: ___] : Day: [unfilled] [de-identified] : Ms. 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 She has a history of Breast cancer 12 years ago treated with tamoxifen (no chemotherapy), s/p RT and lumpectomy, HTN and surgical hx of hysterectomy, was sent in by oncologist Dr. Blank on 1/31/20 for rule out leukemia. Patient had been feeling generally fatigued and sluggish since December 2019. She was feeling more tired, and saw her PMD, who did some blood work and noticed blasts in her peripheral blood. Pt was told to see Dr. Blank for further workup. Dr. Blank did blood work again which again found blasts in the peripheral blood, at which point she sent pt to the ER for leukemia workup. 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 She was started on 2/4/20 on Cycle 1 of Dacogen + Venetoclax (50 mg on day 1, 100 mg on day 2, 200 mg on day 3 and onward when starting Diflucan). She tolerated it well other than some SOB -CXR on 2/7/20 : mild pulmonary edema and L pleural effusion, treated with diuretics. She was discharged home on 2/11/20. \par \par She had a BM bx to eval response on 3/2/20 -showed no blasts. She has since been on maintenance treatments with Dacogen/venetoclax (10 days) every 5 weeks. \par  [de-identified] : Disseminated [de-identified] : 1, 2. Bone marrow biopsy and bone marrow aspirate\par - Acute myeloid leukemia with mutated NPM1\par - FLT 3-ITD positive\par \par Diagnostic Note:\par Megakarocytes are normal in number with dysplastic morphology.\par Please note findings of a normal female karyotype, negative MDS\par FISH panel. and somatic mutations of FLT3 FLT3-ITD\par ([id number]), [id number]*12, and CEBPA F106fs*1.\par Based on the additional findings, the classification of AML has\par changed to acute myeloid leukemia with mutated NPM1.\par  [de-identified] : Patient is reported that she has been having back pain for a while. She does have compressed discs in her C2/C3 joint. No lower extremity weakness. R ankle swelling at the end of the day. No pain currently. Suggested an ultrasound but patient didn't want to consider it. She feels no dyspnea, but she just feels overwhelming fatigue. She denied nausea or vomiting. Her appetite is "terrible", she is not very hungry. Her weight is down 6 pounds from previous, but had gained weight at recent appointment so now down to her baseline. No dizziness or lightheadedness. She is having some memory problems.

## 2022-04-08 NOTE — PHYSICAL EXAM
[Restricted in physically strenuous activity but ambulatory and able to carry out work of a light or sedentary nature] : Status 1- Restricted in physically strenuous activity but ambulatory and able to carry out work of a light or sedentary nature, e.g., light house work, office work [Normal] : pharynx is unremarkable, moist mucus membrane, no oral lesions [de-identified] : supple [de-identified] : (+)S1S2 RRR [de-identified] : L sided Port -no inflammation. [de-identified] : no tenderness [de-identified] : warm/dry.

## 2022-04-08 NOTE — REVIEW OF SYSTEMS
[Fatigue] : fatigue [Constipation] : constipation [Joint Pain] : joint pain [Incontinence] : incontinence [Anxiety] : anxiety [Negative] : Heme/Lymph [Depression] : depression [Fever] : no fever [Chills] : no chills [Night Sweats] : no night sweats [Vision Problems] : no vision problems [Dysphagia] : no dysphagia [Nosebleeds] : no nosebleeds [Odynophagia] : no odynophagia [Dysuria] : no dysuria [Joint Stiffness] : no joint stiffness [Suicidal] : not suicidal [Insomnia] : no insomnia [Muscle Weakness] : no muscle weakness [FreeTextEntry2] : chronic fatigue per HPI [FreeTextEntry6] : can go up 1 flight of stairs - exertional fatigue but not dyspnea  [FreeTextEntry7] : no BRBPR. Colonoscopy -done > 1 yr ago, normal; intermittent constipation [FreeTextEntry8] : stress [FreeTextEntry9] : lower back pain chronic -s/p compressed vertebrae.  OK at this point.  [de-identified] : uses trazodone for sleep and she sleeps fine.  [FreeTextEntry1] : seasonal allergies

## 2022-04-08 NOTE — HISTORY OF PRESENT ILLNESS
[Disease:__________________________] : Disease: [unfilled] [Therapy: ___] : Therapy: [unfilled] [Cycle: ___] : Cycle: [unfilled] [Day: ___] : Day: [unfilled] [de-identified] : Ms. 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 She has a history of Breast cancer 12 years ago treated with tamoxifen (no chemotherapy), s/p RT and lumpectomy, HTN and surgical hx of hysterectomy, was sent in by oncologist Dr. Blank on 1/31/20 for rule out leukemia. Patient had been feeling generally fatigued and sluggish since December 2019. She was feeling more tired, and saw her PMD, who did some blood work and noticed blasts in her peripheral blood. Pt was told to see Dr. Blank for further workup. Dr. Blank did blood work again which again found blasts in the peripheral blood, at which point she sent pt to the ER for leukemia workup. 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 She was started on 2/4/20 on Cycle 1 of Dacogen + Venetoclax (50 mg on day 1, 100 mg on day 2, 200 mg on day 3 and onward when starting Diflucan). She tolerated it well other than some SOB -CXR on 2/7/20 : mild pulmonary edema and L pleural effusion, treated with diuretics. She was discharged home on 2/11/20. \par \par She had a BM bx to eval response on 3/2/20 -showed no blasts. She has since been on maintenance treatments with Dacogen/venetoclax (10 days) every 5 weeks. \par  [de-identified] : Disseminated [de-identified] : 1, 2. Bone marrow biopsy and bone marrow aspirate\par - Acute myeloid leukemia with mutated NPM1\par - FLT 3-ITD positive\par \par Diagnostic Note:\par Megakarocytes are normal in number with dysplastic morphology.\par Please note findings of a normal female karyotype, negative MDS\par FISH panel. and somatic mutations of FLT3 FLT3-ITD\par ([id number]), [id number]*12, and CEBPA F106fs*1.\par Based on the additional findings, the classification of AML has\par changed to acute myeloid leukemia with mutated NPM1.\par  [de-identified] : Patient seen today in treatment room with daughter-in-law and the patients  joined visit via video call. The patient had initially inquired about stopping treatment at start of visit. Over the past few months her  was diagnosed with metastatic prostate cancer and had started taking an oral therapy which led to acute renal failure and he is currently in rehab receiving HD. Her  reports he is doing much better, his kidney injury has greatly improved, he is able to walk up steps and is hopefully being discharged next week. All of the events that have occurred with her  have negatively impacted the patients mental state and she continues to report that she has no energy and very low quality of life. She reports that with her daily life she only sits in a chair all day at this point. She does think that she has issues with depression at this point.She denies any SI/HI. No trouble walking or pain symptoms.  Reports no febrile illnesses. No weight loss or night sweats.  No chest pain, palpitations or shortness of breath. Denies any N/V/D. No pain symptoms at the present time. Normal bowel movements and normal urinary habits. Patient reports her appetite has been less. Her thrush seems to have resolved but still with very little sense taste.

## 2022-04-27 PROBLEM — T80.89XA: Status: ACTIVE | Noted: 2022-01-01

## 2022-05-02 PROBLEM — M54.50 LOWER BACK PAIN: Status: ACTIVE | Noted: 2020-11-06

## 2022-06-15 NOTE — HISTORY OF PRESENT ILLNESS
[Disease:__________________________] : Disease: [unfilled] [Therapy: ___] : Therapy: [unfilled] [Cycle: ___] : Cycle: [unfilled] [Day: ___] : Day: [unfilled] [de-identified] : Ms. 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 She has a history of Breast cancer 12 years ago treated with tamoxifen (no chemotherapy), s/p RT and lumpectomy, HTN and surgical hx of hysterectomy, was sent in by oncologist Dr. Blank on 1/31/20 for rule out leukemia. Patient had been feeling generally fatigued and sluggish since December 2019. She was feeling more tired, and saw her PMD, who did some blood work and noticed blasts in her peripheral blood. Pt was told to see Dr. Blank for further workup. Dr. Blank did blood work again which again found blasts in the peripheral blood, at which point she sent pt to the ER for leukemia workup. 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 She was started on 2/4/20 on Cycle 1 of Dacogen + Venetoclax (50 mg on day 1, 100 mg on day 2, 200 mg on day 3 and onward when starting Diflucan). She tolerated it well other than some SOB -CXR on 2/7/20 : mild pulmonary edema and L pleural effusion, treated with diuretics. She was discharged home on 2/11/20. \par \par She had a BM bx to eval response on 3/2/20 -showed no blasts. She has since been on maintenance treatments with Dacogen/venetoclax (10 days) every 5 weeks. \par  [de-identified] : Disseminated [de-identified] : 1, 2. Bone marrow biopsy and bone marrow aspirate\par - Acute myeloid leukemia with mutated NPM1\par - FLT 3-ITD positive\par \par Diagnostic Note:\par Megakarocytes are normal in number with dysplastic morphology.\par Please note findings of a normal female karyotype, negative MDS\par FISH panel. and somatic mutations of FLT3 FLT3-ITD\par ([id number]), [id number]*12, and CEBPA F106fs*1.\par Based on the additional findings, the classification of AML has\par changed to acute myeloid leukemia with mutated NPM1.\par  [de-identified] : Patient is reported that she has been having her usual back pain (she does have compressed discs in her C2/C3 joint). No lower extremity weakness. R ankle swelling at the end of the day which has been a chronic issue, no US has been completed as patient does not want one. She feels no dyspnea, but she just feels overwhelming fatigue. She denied nausea or vomiting. Her appetite is "terrible", she is not very hungry, denies any further metallic taste in mouth since previously having thrush. Her weight has been stable, she has not been weighed today as she is going to be weighed in the treatment room. No dizziness or lightheadedness.

## 2022-06-15 NOTE — PHYSICAL EXAM
[Restricted in physically strenuous activity but ambulatory and able to carry out work of a light or sedentary nature] : Status 1- Restricted in physically strenuous activity but ambulatory and able to carry out work of a light or sedentary nature, e.g., light house work, office work [Normal] : affect appropriate [de-identified] : (+)S1S2 RRR [de-identified] : supple [de-identified] : L sided Port -no inflammation. No edema on exam [de-identified] : no tenderness [de-identified] : warm/dry.

## 2022-06-15 NOTE — REVIEW OF SYSTEMS
[Fatigue] : fatigue [Constipation] : constipation [Incontinence] : incontinence [Joint Pain] : joint pain [Anxiety] : anxiety [Depression] : depression [Negative] : Heme/Lymph [Fever] : no fever [Chills] : no chills [Night Sweats] : no night sweats [Vision Problems] : no vision problems [Dysphagia] : no dysphagia [Nosebleeds] : no nosebleeds [Odynophagia] : no odynophagia [Dysuria] : no dysuria [Joint Stiffness] : no joint stiffness [Suicidal] : not suicidal [Insomnia] : no insomnia [Muscle Weakness] : no muscle weakness [FreeTextEntry2] : chronic fatigue per HPI [FreeTextEntry6] : can go up 1 flight of stairs - exertional fatigue but not dyspnea  [FreeTextEntry7] : no BRBPR. Colonoscopy -done > 1 yr ago, normal; intermittent constipation [FreeTextEntry8] : stress [FreeTextEntry9] : lower back pain chronic -s/p compressed vertebrae.  OK at this point.  [de-identified] : uses trazodone for sleep and she sleeps fine.  [FreeTextEntry1] : seasonal allergies

## 2022-06-15 NOTE — ASSESSMENT
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  Repeat bone marrow biopsy after cycle 8 showing evidence of persistent complete remission. \par \par -Will continue with dacogen/venetoclax q5wks given troubles with cytopenias, along with OnPro (day 5). Today is cycle 24 day 1. \par -Thrush resolved now. Off Nystatin for now but will continue flucanazole (also for consideration with venetoclax dosing). \par -Venetoclax daily, dosed at 200mg for 10 days with each cycle. \par -Patient knows to take Levaquin when she is neutropenic, but will stay on diflucan continuously for stable venetoclax dosing effect. \par -will continue home draw at this time weekly. Possible platelets if needed. \par -BM bx done on 2/3/20 showed normal cytogenetics. In house assay for FLT-3 ITD positive, which confers an adverse prognosis in AML - previously had been d/w patient and family about BMT transplant -pt active prior to admission, good PS status; however, she was not interested. \par -patient had severe lower back pain related to DDD and apparent pinched nerve has improved on low dose gabapentin 100 mg QHS, may have anxiolytic effect as well. \par -Continued follow up with psychology / social work regarding patient's depression. \par -Follow up in 1 month\par

## 2022-06-20 NOTE — HISTORY OF PRESENT ILLNESS
[Disease:__________________________] : Disease: [unfilled] [Therapy: ___] : Therapy: [unfilled] [Cycle: ___] : Cycle: [unfilled] [Day: ___] : Day: [unfilled] [de-identified] : Ms. 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 She has a history of Breast cancer 12 years ago treated with tamoxifen (no chemotherapy), s/p RT and lumpectomy, HTN and surgical hx of hysterectomy, was sent in by oncologist Dr. Blank on 1/31/20 for rule out leukemia. Patient had been feeling generally fatigued and sluggish since December 2019. She was feeling more tired, and saw her PMD, who did some blood work and noticed blasts in her peripheral blood. Pt was told to see Dr. Blank for further workup. Dr. Blank did blood work again which again found blasts in the peripheral blood, at which point she sent pt to the ER for leukemia workup. 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 She was started on 2/4/20 on Cycle 1 of Dacogen + Venetoclax (50 mg on day 1, 100 mg on day 2, 200 mg on day 3 and onward when starting Diflucan). She tolerated it well other than some SOB -CXR on 2/7/20 : mild pulmonary edema and L pleural effusion, treated with diuretics. She was discharged home on 2/11/20. \par \par She had a BM bx to eval response on 3/2/20 -showed no blasts. She has since been on maintenance treatments with Dacogen/venetoclax (10 days) every 5 weeks. \par  [de-identified] : Disseminated [de-identified] : 1, 2. Bone marrow biopsy and bone marrow aspirate\par - Acute myeloid leukemia with mutated NPM1\par - FLT 3-ITD positive\par \par Diagnostic Note:\par Megakarocytes are normal in number with dysplastic morphology.\par Please note findings of a normal female karyotype, negative MDS\par FISH panel. and somatic mutations of FLT3 FLT3-ITD\par ([id number]), [id number]*12, and CEBPA F106fs*1.\par Based on the additional findings, the classification of AML has\par changed to acute myeloid leukemia with mutated NPM1.\par  [de-identified] : Patient is reported that she has been having her usual back pain (she does have compressed discs in her C2/C3 joint). No lower extremity weakness. R ankle swelling at the end of the day which has been a chronic issue, no US has been completed as patient does not want one. She feels no dyspnea, but she just feels overwhelming fatigue. She denied nausea or vomiting. Her appetite remains "terrible", she is not very hungry, denies any further metallic taste in mouth since previously having thrush. Discussed supplements such as Ensure, she does have them at home however has not been utilizing them and will start. Her weight has been stable. No dizziness or lightheadedness. Mentioned briefly wanting to stop therapy and she will further discuss with .

## 2022-06-20 NOTE — PHYSICAL EXAM
[Restricted in physically strenuous activity but ambulatory and able to carry out work of a light or sedentary nature] : Status 1- Restricted in physically strenuous activity but ambulatory and able to carry out work of a light or sedentary nature, e.g., light house work, office work [Normal] : affect appropriate [de-identified] : supple [de-identified] : (+)S1S2 RRR [de-identified] : L sided Port -no inflammation. No edema on exam [de-identified] : no tenderness [de-identified] : warm/dry.

## 2022-06-20 NOTE — REVIEW OF SYSTEMS
[Fatigue] : fatigue [Constipation] : constipation [Incontinence] : incontinence [Joint Pain] : joint pain [Anxiety] : anxiety [Depression] : depression [Negative] : Heme/Lymph [Fever] : no fever [Chills] : no chills [Night Sweats] : no night sweats [Vision Problems] : no vision problems [Dysphagia] : no dysphagia [Nosebleeds] : no nosebleeds [Odynophagia] : no odynophagia [Abdominal Pain] : no abdominal pain [Vomiting] : no vomiting [Diarrhea] : no diarrhea [Dysuria] : no dysuria [Joint Stiffness] : no joint stiffness [Suicidal] : not suicidal [Insomnia] : no insomnia [Muscle Weakness] : no muscle weakness [FreeTextEntry2] : chronic fatigue per HPI [FreeTextEntry6] : can go up 1 flight of stairs - exertional fatigue but not dyspnea  [FreeTextEntry7] : no BRBPR. Colonoscopy -done > 1 yr ago, normal; intermittent constipation [FreeTextEntry8] : stress [FreeTextEntry9] : lower back pain chronic -s/p compressed vertebrae.  OK at this point.  [de-identified] : uses trazodone for sleep and she sleeps fine.  [FreeTextEntry1] : seasonal allergies

## 2022-06-20 NOTE — ASSESSMENT
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  Repeat bone marrow biopsy after cycle 8 showing evidence of persistent complete remission. \par \par -Will continue with dacogen/venetoclax q5wks given troubles with cytopenias, along with OnPro (day 5). Today is cycle 25 day 1. \par -Thrush resolved now. Off Nystatin for now but will continue flucanazole (also for consideration with venetoclax dosing). \par -Venetoclax daily, dosed at 200mg for 10 days with each cycle. \par -Patient knows to take Levaquin when she is neutropenic, but will stay on diflucan continuously for stable venetoclax dosing effect. \par -will continue home draw at this time weekly. Possible platelets if needed. \par -BM bx done on 2/3/20 showed normal cytogenetics. In house assay for FLT-3 ITD positive, which confers an adverse prognosis in AML - previously had been d/w patient and family about BMT transplant -pt active prior to admission, good PS status; however, she was not interested. \par -patient had severe lower back pain related to DDD and apparent pinched nerve has improved on low dose gabapentin 100 mg QHS, may have anxiolytic effect as well. \par -Continued follow up with psychology / social work regarding patient's depression. \par -Follow up in 1 month\par

## 2022-07-18 PROBLEM — F41.8 DEPRESSION WITH ANXIETY: Status: ACTIVE | Noted: 2020-11-06

## 2022-07-18 NOTE — REVIEW OF SYSTEMS
[Fatigue] : fatigue [Constipation] : constipation [Incontinence] : incontinence [Joint Pain] : joint pain [Anxiety] : anxiety [Depression] : depression [Negative] : Heme/Lymph [Fever] : no fever [Chills] : no chills [Night Sweats] : no night sweats [Vision Problems] : no vision problems [Dysphagia] : no dysphagia [Nosebleeds] : no nosebleeds [Odynophagia] : no odynophagia [Abdominal Pain] : no abdominal pain [Vomiting] : no vomiting [Diarrhea] : no diarrhea [Dysuria] : no dysuria [Joint Stiffness] : no joint stiffness [Suicidal] : not suicidal [Insomnia] : no insomnia [Muscle Weakness] : no muscle weakness [FreeTextEntry2] : chronic fatigue per HPI [FreeTextEntry6] : can go up 1 flight of stairs - exertional fatigue but not dyspnea  [FreeTextEntry7] : no BRBPR. intermittent constipation [FreeTextEntry8] : stress [FreeTextEntry9] : lower back pain chronic -s/p compressed vertebrae.  OK at this point.  [de-identified] : uses trazodone for sleep and she sleeps fine.  [FreeTextEntry1] : seasonal allergies

## 2022-07-18 NOTE — HISTORY OF PRESENT ILLNESS
[Disease:__________________________] : Disease: [unfilled] [Therapy: ___] : Therapy: [unfilled] [Cycle: ___] : Cycle: [unfilled] [Day: ___] : Day: [unfilled] [de-identified] : Ms. 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 She has a history of Breast cancer 12 years ago treated with tamoxifen (no chemotherapy), s/p RT and lumpectomy, HTN and surgical hx of hysterectomy, was sent in by oncologist Dr. Blank on 1/31/20 for rule out leukemia. Patient had been feeling generally fatigued and sluggish since December 2019. She was feeling more tired, and saw her PMD, who did some blood work and noticed blasts in her peripheral blood. Pt was told to see Dr. Blank for further workup. Dr. Blank did blood work again which again found blasts in the peripheral blood, at which point she sent pt to the ER for leukemia workup. 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 She was started on 2/4/20 on Cycle 1 of Dacogen + Venetoclax (50 mg on day 1, 100 mg on day 2, 200 mg on day 3 and onward when starting Diflucan). She tolerated it well other than some SOB -CXR on 2/7/20 : mild pulmonary edema and L pleural effusion, treated with diuretics. She was discharged home on 2/11/20. \par \par She had a BM bx to eval response on 3/2/20 -showed no blasts. She has since been on maintenance treatments with Dacogen/venetoclax (10 days) every 5 weeks. \par  [de-identified] : Disseminated [de-identified] : 1, 2. Bone marrow biopsy and bone marrow aspirate\par - Acute myeloid leukemia with mutated NPM1\par - FLT 3-ITD positive\par \par Diagnostic Note:\par Megakarocytes are normal in number with dysplastic morphology.\par Please note findings of a normal female karyotype, negative MDS\par FISH panel. and somatic mutations of FLT3 FLT3-ITD\par ([id number]), [id number]*12, and CEBPA F106fs*1.\par Based on the additional findings, the classification of AML has\par changed to acute myeloid leukemia with mutated NPM1.\par  [de-identified] : Patient is reported that she has been having her usual back pain (she does have compressed discs in her C2/C3 joint). No lower extremity weakness. R ankle swelling at the end of the day which has been a chronic issue, no US has been completed as patient does not want one. She feels no dyspnea, but she just feels overwhelming fatigue. She denied nausea or vomiting. Her appetite remains "terrible", she is not very hungry, denies any further metallic taste in mouth since previously having thrush. Her weight has been stable. No dizziness or lightheadedness. Discussed feelings of fatigue and at times she feels like she has no quality of life. She denies any suicidal or homicidal thoughts but would like to discuss further options to help with these feelings. She does take Trazodone currently and it helps her sleep.

## 2022-07-18 NOTE — PHYSICAL EXAM
[Restricted in physically strenuous activity but ambulatory and able to carry out work of a light or sedentary nature] : Status 1- Restricted in physically strenuous activity but ambulatory and able to carry out work of a light or sedentary nature, e.g., light house work, office work [Normal] : affect appropriate [de-identified] : supple [de-identified] : (+)S1S2 RRR [de-identified] : L sided Port -no inflammation. No edema on exam [de-identified] : no tenderness [de-identified] : warm/dry. (+) ecchymosis on L arm from blood draw last week that is resolving.

## 2022-07-18 NOTE — ASSESSMENT
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  Repeat bone marrow biopsy after cycle 8 showing evidence of persistent complete remission. \par \par -Will continue with dacogen/venetoclax q5wks given troubles with cytopenias, along with OnPro (day 5) as needed. Today is cycle 26 day 1. \par -Thrush resolved now. Off Nystatin for now but will continue flucanazole (also for consideration with venetoclax dosing). \par -Venetoclax daily, dosed at 200mg for 10 days with each cycle. \par -Patient knows to take Levaquin when she is neutropenic, but will stay on diflucan continuously for stable venetoclax dosing effect. \par -Will continue home draw at this time weekly. Possible platelets if needed. \par -BM bx done on 2/3/20 showed normal cytogenetics. In house assay for FLT-3 ITD positive, which confers an adverse prognosis in AML - previously had been d/w patient and family about BMT transplant -pt active prior to admission, good PS status; however, she was not interested. \par -Patient had severe lower back pain related to DDD and apparent pinched nerve has improved on low dose gabapentin 100 mg QHS, may have anxiolytic effect as well. \par -Patient in agreement in starting Welbutrin XL and also speaking with a therapist. Will send consult to [facility]. \par -Follow up in 1 month\par

## 2022-08-22 NOTE — PHYSICAL EXAM
[Restricted in physically strenuous activity but ambulatory and able to carry out work of a light or sedentary nature] : Status 1- Restricted in physically strenuous activity but ambulatory and able to carry out work of a light or sedentary nature, e.g., light house work, office work [Normal] : affect appropriate [de-identified] : supple [de-identified] : (+)S1S2 RRR [de-identified] : L sided Port -no inflammation. No edema on exam [de-identified] : no tenderness [de-identified] : warm/dry.

## 2022-08-22 NOTE — REVIEW OF SYSTEMS
[Fatigue] : fatigue [Constipation] : constipation [Incontinence] : incontinence [Joint Pain] : joint pain [Anxiety] : anxiety [Depression] : depression [Negative] : Heme/Lymph [Dysphagia] : dysphagia [Odynophagia] : odynophagia [Fever] : no fever [Chills] : no chills [Night Sweats] : no night sweats [Vision Problems] : no vision problems [Nosebleeds] : no nosebleeds [Shortness Of Breath] : no shortness of breath [Wheezing] : no wheezing [Cough] : no cough [SOB on Exertion] : no shortness of breath during exertion [Abdominal Pain] : no abdominal pain [Vomiting] : no vomiting [Diarrhea] : no diarrhea [Dysuria] : no dysuria [Joint Stiffness] : no joint stiffness [Suicidal] : not suicidal [Insomnia] : no insomnia [Hot Flashes] : no hot flashes [Muscle Weakness] : no muscle weakness [FreeTextEntry2] : chronic fatigue per HPI [FreeTextEntry6] : per HPI "gasping" and exertional fatigue but not dyspnea  [FreeTextEntry7] : no BRBPR. intermittent constipation [FreeTextEntry8] : stress [FreeTextEntry9] : lower back pain chronic -s/p compressed vertebrae.  OK at this point.  [FreeTextEntry1] : seasonal allergies

## 2022-08-22 NOTE — ASSESSMENT
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  Repeat bone marrow biopsy after cycle 8 showing evidence of persistent complete remission. \par \par -Will continue with dacogen/venetoclax q5wks given troubles with cytopenias, along with OnPro (day 5) as needed. Today would be cycle 27 day 1. \par -At this time will hold chemo given low blood counts and reschedule for next week. Will also schedule for 1 unit PRBCs due to Hgb 7.8 and dizziness. Discussed with patient and  that her blood count recovery has been slower and at this point we may need to pursue a BM bx. \par -In addition CBC today now shows 3% blasts and given anemia and slow count recovery concern for relapsed disease. Discussed with Dr. Seth, will monitor at this time as these blasts may be present due to marrow recovering.\par -Thrush resolved now. Off Nystatin for now but will continue fluconazole (also for consideration with venetoclax dosing). \par -Patient has mouth ulcerations on palate. Will start Acyclovir 400mg q 8 and also Magic Mouth Wash. \par -Venetoclax daily, dosed at 200mg for 10 days with each cycle. \par -Patient knows to take Levaquin when she is neutropenic, but will stay on diflucan continuously for stable venetoclax dosing effect. \par -Will continue home draw at this time weekly.\par -BM bx done on 2/3/20 showed normal cytogenetics. In house assay for FLT-3 ITD positive, which confers an adverse prognosis in AML - previously had been d/w patient and family about BMT transplant -pt active prior to admission, good PS status; however, she was not interested. \par -Patient had severe lower back pain related to DDD and apparent pinched nerve has improved on low dose gabapentin 100 mg QHS, may have anxiolytic effect as well. \par -Continue Welbutrin XL.  \par -Follow up in 1 month\par

## 2022-08-22 NOTE — HISTORY OF PRESENT ILLNESS
[Disease:__________________________] : Disease: [unfilled] [Therapy: ___] : Therapy: [unfilled] [Cycle: ___] : Cycle: [unfilled] [Day: ___] : Day: [unfilled] [de-identified] : Ms. 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 She has a history of Breast cancer 12 years ago treated with tamoxifen (no chemotherapy), s/p RT and lumpectomy, HTN and surgical hx of hysterectomy, was sent in by oncologist Dr. Blank on 1/31/20 for rule out leukemia. Patient had been feeling generally fatigued and sluggish since December 2019. She was feeling more tired, and saw her PMD, who did some blood work and noticed blasts in her peripheral blood. Pt was told to see Dr. Blank for further workup. Dr. Blank did blood work again which again found blasts in the peripheral blood, at which point she sent pt to the ER for leukemia workup. 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 She was started on 2/4/20 on Cycle 1 of Dacogen + Venetoclax (50 mg on day 1, 100 mg on day 2, 200 mg on day 3 and onward when starting Diflucan). She tolerated it well other than some SOB -CXR on 2/7/20 : mild pulmonary edema and L pleural effusion, treated with diuretics. She was discharged home on 2/11/20. \par \par She had a BM bx to eval response on 3/2/20 -showed no blasts. She has since been on maintenance treatments with Dacogen/venetoclax (10 days) every 5 weeks. \par  [de-identified] : Disseminated [de-identified] : 1, 2. Bone marrow biopsy and bone marrow aspirate\par - Acute myeloid leukemia with mutated NPM1\par - FLT 3-ITD positive\par \par Diagnostic Note:\par Megakarocytes are normal in number with dysplastic morphology.\par Please note findings of a normal female karyotype, negative MDS\par FISH panel. and somatic mutations of FLT3 FLT3-ITD\par ([id number]), [id number]*12, and CEBPA F106fs*1.\par Based on the additional findings, the classification of AML has\par changed to acute myeloid leukemia with mutated NPM1.\par  [de-identified] : Patient seen today for follow up. She recently saw a cardiologist for new "gasping" while breathing. Per  she had an echo that was unremarkable and she is scheduled for a stress test next week. She states she doesn't feel short of breath, but she just feels overwhelming fatigue. Her  also states that one of her physicians thought there could be a psychologic component to this as well. Her appetite remains "terrible", she is not very hungry and she has had painful mouth sores the last 2 weeks causing further difficulty eating and swallowing. She denied nausea or vomiting. Her weight has been stable. She states she is having dizziness over the past few days. Again the patient discussed feelings of fatigue and at times she feels like she has no quality of life. She has been tolerating Wellbutrin and also Trazodone for sleep. Denies any new bruising or bleeding, night sweats, or fever.

## 2022-08-29 PROBLEM — M79.642 PAIN OF LEFT HAND: Status: ACTIVE | Noted: 2022-01-01

## 2022-09-07 NOTE — REASON FOR VISIT
[Bone Marrow Biopsy] : bone marrow biopsy [Bone Marrow Aspiration] : bone marrow aspiration [FreeTextEntry2] : 83 yo female w/ AML on dacogen/Venetoclax, with persist pancytopenia Bmbx to r/o relapsed dz

## 2022-09-07 NOTE — PROCEDURE
[Bone Marrow Biopsy] : bone marrow biopsy [Bone Marrow Aspiration] : bone marrow aspiration  [Patient] : the patient [Patient identification verified] : patient identification verified [Procedure verified and consent obtained] : procedure verified and consent obtained [Correct positioning] : correct positioning [Prone] : prone [The left posterior iliac crest was prepped with betadine and draped, using sterile technique.] : The left posterior iliac crest was prepped with betadine and draped, using sterile technique. [Lidocaine was injected and into the periosteum overlying the site.] : Lidocaine was injected and into the periosteum overlying the site. [Aspirate] : aspirate [Cytogenetics] : cytogenetics [FISH] : FISH [Other ___] : [unfilled] [Biopsy] : biopsy [Flow Cytometry] : flow cytometry [] : The patient was instructed to remove the bandage the following AM. The patient may bathe. Acetaminophen may be taken for discomfort, as per package directions.If there are any other problems, the patient was instructed to call the office. The patient verbalized understanding, and is aware of the office contact numbers. [FreeTextEntry1] : 83 yo female w/ AML on dacogen/Venetoclax, with persist pancytopenia Bmbx to r/o relapsed dz [FreeTextEntry2] : CBC prior to procedure on 8/31 (pt didn't have CBC done prior to BMBx, CBC STAT ordered and advised pt to go to lab for CBC after BMBx done. Both pt and  agreed to go)\par WBC 1.02\par Hgb  9.0 Hct 28.1\par Plt 70K\par BM Bx and aspiration was performed by SHIMA Wright. 2 lavender + 2 green top tubes of BM aspirate and 1 cassette of BM core specimen sent to lab. [facility] study sent, FeDex tracking #: [id number]\par \par

## 2022-09-12 NOTE — REVIEW OF SYSTEMS
[Fatigue] : fatigue [Dysphagia] : dysphagia [Odynophagia] : odynophagia [Constipation] : constipation [Incontinence] : incontinence [Joint Pain] : joint pain [Anxiety] : anxiety [Depression] : depression [Negative] : Heme/Lymph [Fever] : no fever [Chills] : no chills [Night Sweats] : no night sweats [Vision Problems] : no vision problems [Nosebleeds] : no nosebleeds [Shortness Of Breath] : no shortness of breath [Wheezing] : no wheezing [Cough] : no cough [SOB on Exertion] : no shortness of breath during exertion [Abdominal Pain] : no abdominal pain [Vomiting] : no vomiting [Diarrhea] : no diarrhea [Dysuria] : no dysuria [Joint Stiffness] : no joint stiffness [Suicidal] : not suicidal [Insomnia] : no insomnia [Hot Flashes] : no hot flashes [Muscle Weakness] : no muscle weakness [FreeTextEntry2] : chronic fatigue per HPI [FreeTextEntry6] : per HPI "gasping" and exertional fatigue but not dyspnea  [FreeTextEntry7] : no BRBPR. intermittent constipation [FreeTextEntry8] : stress [FreeTextEntry9] : lower back pain chronic -s/p compressed vertebrae.  OK at this point.  [FreeTextEntry1] : seasonal allergies

## 2022-09-12 NOTE — HISTORY OF PRESENT ILLNESS
[Disease:__________________________] : Disease: [unfilled] [Therapy: ___] : Therapy: [unfilled] [Cycle: ___] : Cycle: [unfilled] [Day: ___] : Day: [unfilled] [de-identified] : Ms. 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 She has a history of Breast cancer 12 years ago treated with tamoxifen (no chemotherapy), s/p RT and lumpectomy, HTN and surgical hx of hysterectomy, was sent in by oncologist Dr. Blank on 1/31/20 for rule out leukemia. Patient had been feeling generally fatigued and sluggish since December 2019. She was feeling more tired, and saw her PMD, who did some blood work and noticed blasts in her peripheral blood. Pt was told to see Dr. Blank for further workup. Dr. Blank did blood work again which again found blasts in the peripheral blood, at which point she sent pt to the ER for leukemia workup. 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 She was started on 2/4/20 on Cycle 1 of Dacogen + Venetoclax (50 mg on day 1, 100 mg on day 2, 200 mg on day 3 and onward when starting Diflucan). She tolerated it well other than some SOB -CXR on 2/7/20 : mild pulmonary edema and L pleural effusion, treated with diuretics. She was discharged home on 2/11/20. \par \par She had a BM bx to eval response on 3/2/20 -showed no blasts. She has since been on maintenance treatments with Dacogen/venetoclax (10 days) every 5 weeks. \par Patient with poorly recovering blood counts at the end of August 2022. BMBx done on 9/7/22.  [de-identified] : Disseminated [de-identified] : 1, 2. Bone marrow biopsy and bone marrow aspirate\par - Acute myeloid leukemia with mutated NPM1\par - FLT 3-ITD positive\par \par Diagnostic Note:\par Megakarocytes are normal in number with dysplastic morphology.\par Please note findings of a normal female karyotype, negative MDS\par FISH panel. and somatic mutations of FLT3 FLT3-ITD\par ([id number]), [id number]*12, and CEBPA F106fs*1.\par Based on the additional findings, the classification of AML has\par changed to acute myeloid leukemia with mutated NPM1.\par  [de-identified] : Patient presented for follow up on 9/12/22. Patient felt better today. She ambulates with a cane. Her appetite is still poor, mouth is still dry and hard to chew. She denied nausea and vomiting. She had mouth issues recently with lesions according to her  which prompted us to start acyclovir, but patient vehemently disagreed. She currently reported no pain in her mouth. Denied any fevers or chills, no dyspnea or chest pain.

## 2022-09-12 NOTE — ASSESSMENT
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  Repeat bone marrow biopsy after cycle 8 showed evidence of persistent complete remission. \par \par -We had been continuing with dacogen/venetoclax q5wks given troubles with cytopenias, along with OnPro (day 5) as needed. Have not been using Onpro recently (last dose was Decemebr 2021). She would be due for cycle 27. \par -At this time we have held chemo given low blood counts, not recovering withing 7-8 weeks, now s/p BMBx on 9/7/22. \par -Discussed with hematopathologist Dr. Oleary. At this point the bone marrow is hypocellular but does not have a large number of blasts, and via IHC the blasts which are there appear differently than her original clone. She was NPM1 positive on initial diagnosis. Will be sending additional NPM1 stains and NGS, and in addition requested specimen to be sent for NPM1 PCR for MRD. \par -For now given this finding will hold off on chemotherapy for at least another month to allow marrow recovery. \par -ANC is now improving. ANC 1000. \par -Peripheral blood still showing blasts, unclear etiology.\par -Thrush resolved now and no longer severely neutropenic, stopped fluconazole for now. Also will hold levofloxacin.\par -Patient had mouth ulcerations on palate previously, appears healed today. Continue Acyclovir 400mg TID. \par -Will continue home draw at this time weekly.\par -Patient had severe lower back pain related to DDD and apparent pinched nerve has improved on low dose gabapentin 100 mg QHS, may have anxiolytic effect as well. \par -Continue Welbutrin XL.  \par -Follow up in 1 month\par

## 2022-09-12 NOTE — PHYSICAL EXAM
[Normal] : affect appropriate [Restricted in physically strenuous activity but ambulatory and able to carry out work of a light or sedentary nature] : Status 1- Restricted in physically strenuous activity but ambulatory and able to carry out work of a light or sedentary nature, e.g., light house work, office work [de-identified] : supple [de-identified] : (+)S1S2 RRR [de-identified] : L sided Port -no inflammation. No edema on exam [de-identified] : no tenderness [de-identified] : warm/dry.

## 2022-10-03 NOTE — RESULTS/DATA
[FreeTextEntry1] : (9/7/22) Bone marrow biopsy and bone marrow aspirate\par      - Limited bone marrow tissue showing erythroid predominance,\par  decreased myeloid maturation, increased blasts (5% by flow and in\par  peripheral blood), decreased megakaryocytes (h/o AML with mutated NPM1\par  under treatment)\par      - NPM1 [id number] identified by NGS, consistent with relapse/\par refractory AML\par      -  FLT3 FLT3-ITD ([id number]) identified\par \par Diagnostic Note:\par Patient has been under maintenance therapy and not\par  received growth factor recently. The morphology findings are compatible\par  with relapse of patients known AML given NPM1 mutation identified.\par Please note findings of a normal female karyotype. Mutations identified in\par  NGS studies:  FLT3 FLT3-ITD ([id number]), NPM1 W288fs*12, ASXL1 S852*,\par  CEBPA F106fs*1\par WT1 R369fs*16.\par \par \par \par

## 2022-10-03 NOTE — HISTORY OF PRESENT ILLNESS
[Disease:__________________________] : Disease: [unfilled] [Therapy: ___] : Therapy: [unfilled] [Cycle: ___] : Cycle: [unfilled] [Day: ___] : Day: [unfilled] [de-identified] : Ms. 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 She has a history of Breast cancer 12 years ago treated with tamoxifen (no chemotherapy), s/p RT and lumpectomy, HTN and surgical hx of hysterectomy, was sent in by oncologist Dr. Blank on 1/31/20 for rule out leukemia. Patient had been feeling generally fatigued and sluggish since December 2019. She was feeling more tired, and saw her PMD, who did some blood work and noticed blasts in her peripheral blood. Pt was told to see Dr. Blank for further workup. Dr. Blank did blood work again which again found blasts in the peripheral blood, at which point she sent pt to the ER for leukemia workup. 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 She was started on 2/4/20 on Cycle 1 of Dacogen + Venetoclax (50 mg on day 1, 100 mg on day 2, 200 mg on day 3 and onward when starting Diflucan). She tolerated it well other than some SOB -CXR on 2/7/20 : mild pulmonary edema and L pleural effusion, treated with diuretics. She was discharged home on 2/11/20. \par \par She had a BM bx to eval response on 3/2/20 -showed no blasts. She has since been on maintenance treatments with Dacogen/venetoclax (10 days) every 5 weeks. \par Patient with poorly recovering blood counts at the end of August 2022. BMBx done on 9/7/22.  [de-identified] : Disseminated [de-identified] : 1, 2. Bone marrow biopsy and bone marrow aspirate\par - Acute myeloid leukemia with mutated NPM1\par - FLT 3-ITD positive\par \par Diagnostic Note:\par Megakarocytes are normal in number with dysplastic morphology.\par Please note findings of a normal female karyotype, negative MDS\par FISH panel. and somatic mutations of FLT3 FLT3-ITD\par ([id number]), [id number]*12, and CEBPA F106fs*1.\par Based on the additional findings, the classification of AML has\par changed to acute myeloid leukemia with mutated NPM1.\par  [de-identified] : Patient presented for follow up on 10/03/2022. Patient felt fatigue. She had diarrhea last night which was the first time, no changes in diet. She has had episodes of lightheadedness, no vertigo. No chest pain, palpitations or shortness of breath. No fevers, chills, night sweats. Poor appetite, lost 5 lbs.

## 2022-10-03 NOTE — ASSESSMENT
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  Repeat bone marrow biopsy after cycle 8 showed evidence of persistent complete remission. \par \par -We had been continuing with dacogen/venetoclax q5wks given troubles with cytopenias, along with OnPro (day 5) as needed. Have not been using Onpro recently (last dose was Decemebr 2021). She would have been due for cycle 27 but at that time we had held chemo given low blood counts, not recovering withing 7-8 weeks, now s/p BMBx on 9/7/22. \par -Based on bone marrow biopsy specimen is hypocellular with a small percentage of blasts (up to around 3%) but the blasts are NPM1 postive on staining and NGS for the specimen was positive for FLT3 ITD mutation and NPM1 mutations in addition to other findings, which is consistent with relapse. \par -ANC is overall improved, ranging from around 700-1000. \par -Peripheral blood still showing blasts but at a very low level. \par -Thrush resolved now and no longer severely neutropenic, stopped fluconazole for now. Also will hold levofloxacin.\par -Patient had mouth ulcerations on palate previously, but healed and no recurrence. Continue Acyclovir 400mg TID. \par -Will continue home draw at this time weekly.\par -Discussed the option of single agent therapy with gilteritinib given presence of FLT3 mutation and the loss of response to HMA-venetoclax. Discussed the risk of differentiation syndrome, and decreasing blood counts with this therapy. \par -For now, as disease is not fulminant and palliative goals of care, will continue to monitor closely without treatment for the immediate time period and give chemotherapy free time period. \par -Patient understands and agrees with plan. All information explained to the best of my ability. \par -Follow up in 1 month\par

## 2022-10-03 NOTE — PHYSICAL EXAM
[Restricted in physically strenuous activity but ambulatory and able to carry out work of a light or sedentary nature] : Status 1- Restricted in physically strenuous activity but ambulatory and able to carry out work of a light or sedentary nature, e.g., light house work, office work Strong peripheral pulses [Normal] : affect appropriate [de-identified] : supple [de-identified] : (+)S1S2 RRR [de-identified] : L sided Port -no inflammation. No edema on exam [de-identified] : no tenderness [de-identified] : warm/dry.

## 2022-10-25 PROBLEM — C92.00 ACUTE MYELOID LEUKEMIA: Status: ACTIVE | Noted: 2020-02-07

## 2022-11-07 NOTE — ED ADULT TRIAGE NOTE - GLASGOW COMA SCALE: SCORE, MLM
58 years old Female from home with PMH of RA, diverticulitis PSH of appendectomy and cholecystomy presents with upper abd pain x 2 days, worse with eating, associated with NBNB vomiting. Patient pain starts with epigastrium , 10/10 in intensity, intermittent and radiates to back. Patient is seen in ED 2 days ago with CT abdomen and pelvis showing lesion in pancreatic body. Symptoms returned today after eating so patient came to ED. Patient states that she never had such pain before. Patient does have history of gall bladder stones but s/p cholecystectomy Denies fever, diarrhea, constipation, back pain, etoh abuse, other acute complaints.  Patient denies any chest pain, shortness of breath, palpitations fever, chills, change in urinary or bowel habits.    pt seen in bed, vitals stable, physical exam reveals lungs cta b/l, heart s1s2, abd soft nd epigastric tender to mild palp, bs+, ext no edema. labs and diagnostic test result reviewed.    assessment  --  epigastric pain r/o pud, gastritis, pancreatitis, jaundice r/o billary obstruction, transaminitis r/o hepatitis, h/o RA, diverticulitis, appendectomy and cholecystomy    plan  --  admit to med, cont preadmit home meds, gi and dvt profilaxis,  cbc, bmp, mg, phos, lipids, tsh, bld cx, ua, ucx, acute hepatitis panel, lft    mri abd w/wo contrast    gi cons
15

## 2022-11-07 NOTE — CHART NOTE - NSCHARTNOTEFT_GEN_A_CORE
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 Patient was sent to ED for further management   - Please repeat CBC with differential   - Please obtain CMP, mag, phos, LDH, uric acid, PT/PTT, fibrinogen, Ddimer.   - give rasburicase 3mg IV for uric acid > 8.0; repeat uric acid in AM - place blood tube on ice if post rasburicase.   - Please start hydroxyurea after repeat CBC, 1g BID for now, per outpatient oncologist, Dr Goldberg recommendations.   - Transfuse if Hgb < 7.0.  Transfuse if platelets <10K, or <15K if febrile. Transfuse for platelets <50K if bleeding.    Full consult to be done in AM.     Dona Padilla MD   PGY5 heme onc fellow.   Covering overnight. 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 Patient was sent to ED for further management   - Please repeat CBC with differential   - Please obtain CMP, mag, phos, LDH, uric acid, PT/PTT, fibrinogen, Ddimer.   - give rasburicase 3mg IV if uric acid greater 8.0; repeat uric acid in AM - place blood tube on ice if post rasburicase.   - Please start hydroxyurea after repeat CBC, 1g BID for now, per outpatient oncologist, Dr Goldberg recommendations.   - Transfuse if Hgb < 7.0.  Transfuse if platelets <10K, or <15K if febrile. Transfuse for platelets <50K if bleeding.    Full consult to be done in AM.     Dona Padilla MD   PGY5 heme onc fellow.   Covering overnight.

## 2022-11-07 NOTE — ED ADULT TRIAGE NOTE - TEMPERATURE IN FAHRENHEIT (DEGREES F)
CC: IUD consult    HPI: Pt is a 27 y.o.  female who presents to discuss IUD for contraception management.  She is interested in the ParaGard.  Reports regular monthly cycles.  Reports for the past several months she has had breakthrough spotting.     Reports unprotected sex this cycle- UPT is negative.  Pt had annual well women exam this AM with PCP.   ROS:  GENERAL: Feeling well overall. Denies fever or chills.   SKIN: Denies rash or lesions.   HEAD: Denies head injury or headache.   NODES: Denies enlarged lymph nodes.   CHEST: Denies chest pain or shortness of breath.   CARDIOVASCULAR: Denies palpitations or left sided chest pain.   ABDOMEN: No abdominal pain, constipation, diarrhea, nausea, vomiting or rectal bleeding.   URINARY: No dysuria, hematuria, or burning on urination.  REPRODUCTIVE: See HPI.   BREASTS: Denies pain, lumps, or nipple discharge.   HEMATOLOGIC: No easy bruisability or excessive bleeding.   MUSCULOSKELETAL: Denies joint pain or swelling.   NEUROLOGIC: Denies syncope or weakness.   PSYCHIATRIC: Denies depression, anxiety or mood swings.    PE:   APPEARANCE: Well nourished, well developed, Black or  female in no acute distress.  PELVIS: Deferred per pt request      Diagnosis:  1. Counseling for birth control regarding intrauterine device (IUD)    2. Unprotected sex        Plan:   Discussed R/B/A ParaGard IUD  Benefits investigation initiated for ParaGard IUD to be inserted by Dr. Azael Galvan for IUD insertion      Orders Placed This Encounter    POCT Urine Pregnancy    miSOPROStol (CYTOTEC) 200 MCG Tab         Follow-up PRN no resolution of symptoms.    RAYSA Wing         99.1

## 2022-11-08 NOTE — ED PROVIDER NOTE - PROGRESS NOTE DETAILS
Chris: labs show blast crisis, given hydroxyurea per heme instructions, held off on rasburicase as uric acid not high enough. endorsed to hospitalist

## 2022-11-08 NOTE — H&P ADULT - NSHPLABSRESULTS_GEN_ALL_CORE
Labs, personally reviewed by me.     LABS:                        8.5    101.59 )-----------( 38       ( 08 Nov 2022 03:05 )             25.7     Hgb Trend: 8.5<--  11-08    139  |  102  |  15  ----------------------------<  124<H>  3.2<L>   |  26  |  0.79    Ca    8.9      08 Nov 2022 03:05  Phos  3.5     11-08  Mg     2.3     11-08    TPro  6.6  /  Alb  3.9  /  TBili  0.3  /  DBili  x   /  AST  29  /  ALT  19  /  AlkPhos  75  11-08    Creatinine Trend: 0.79<--  PT/INR - ( 08 Nov 2022 03:05 )   PT: 14.9 sec;   INR: 1.28 ratio         PTT - ( 08 Nov 2022 03:05 )  PTT:27.4 sec      Venous Blood Gas:  11-08 @ 02:58     7.37/48/37/28/58.6  VBG Lactate: 1.6 Labs, personally reviewed by me.     LABS:                        8.5    101.59 )-----------( 38       ( 08 Nov 2022 03:05 )             25.7     Hgb Trend: 8.5<--  11-08    139  |  102  |  15  ----------------------------<  124<H>  3.2<L>   |  26  |  0.79    Ca    8.9      08 Nov 2022 03:05  Phos  3.5     11-08  Mg     2.3     11-08    TPro  6.6  /  Alb  3.9  /  TBili  0.3  /  DBili  x   /  AST  29  /  ALT  19  /  AlkPhos  75  11-08    Creatinine Trend: 0.79<--  PT/INR - ( 08 Nov 2022 03:05 )   PT: 14.9 sec;   INR: 1.28 ratio         PTT - ( 08 Nov 2022 03:05 )  PTT:27.4 sec      Venous Blood Gas:  11-08 @ 02:58     7.37/48/37/28/58.6  VBG Lactate: 1.6    < from: Xray Chest 1 View- PORTABLE-Urgent (Xray Chest 1 View- PORTABLE-Urgent .) (11.08.22 @ 02:29) >      FINDINGS:  Left chest wall CT compatible port in stable position with tip   terminating in the SVC. Port reservoir remains in appropriate   configuration; no port flipping.  The heart is normal in size.  The lungs are clear.  There is no pneumothorax or pleural effusion.  No acute bony abnormality.    IMPRESSION:  Stable configuration and location of left chest wall CT compatible port.    Clear lungs.    < end of copied text >

## 2022-11-08 NOTE — H&P ADULT - NSICDXPASTMEDICALHX_GEN_ALL_CORE_FT
PAST MEDICAL HISTORY:  AML (acute myelogenous leukemia)     Breast cancer     Hypertension      PAST MEDICAL HISTORY:  AML (acute myelogenous leukemia)     Breast cancer s/p lumpectomy, RT, tamoxifen    Hypertension

## 2022-11-08 NOTE — H&P ADULT - HISTORY OF PRESENT ILLNESS
This patient is an 83yo lady with PMH of AML, breast cancer s/p treatment with tamoxifen, RT and lumpectomy, who presents with AML relapse.    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 On 2/4/2020, she started C1 dacogen and venetoclax, which she tolerated, other than some dypsnea which was attributed to pulmonary edema and L pleural effusion, and treated with diuretics. Patient was discharged home on 2/11/2020.  BMBx on 3/2/2020 found no blasts. Patient thereafter was continued on maintenance treatment with dacogen and venetoclax (10 days) every 5 weeks, with onpro as needed. Onpro was last given on ____.   6 month interval BMBx in 10/2020 found hypoclelular marrow with focal erythropoiesis, markedly diminished myelopoiesis, rare megakaryocyte and aspicular aspirate, and no increase in blast popuation.      In August 2022, she was noted to not have count recovery between cycles and was pancytopenic.   Repeat BMBx performed on 9/7/2022. Limited bone marrow tissue shows erythroid predominance, decreased myeloid maturation, increased blasts (5% by flow and in peripheral blood), decreased megakaryocytes. 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   Flow cytometry found 5% myeloblasts, positive for HLA-DR, CD34, , CD33, CD13. Negative for CD2, CD7, CD15,CD19, CD38 and CD56.   Karyotype normal.  Results were compatible with relapse of known AML.     Patient was sent to [facility] with plan to initiate therapy with single agent gilteritinib given presence of FLT3 mutation, and loss of response to dacogen and venetoclax.  This patient is an 81yo lady with PMH of AML, breast cancer s/p treatment with tamoxifen, RT and lumpectomy, who presents with AML relapse.    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 On 2/4/2020, she started C1 dacogen and venetoclax, which she tolerated, other than some dypsnea which was attributed to pulmonary edema and L pleural effusion, and treated with diuretics. Patient was discharged home on 2/11/2020.  BMBx on 3/2/2020 found no blasts. Patient thereafter was continued on maintenance treatment with dacogen and venetoclax (10 days) every 5 weeks, with onpro as needed.    6 month interval BMBx in 10/2020 found hypocellular marrow with focal erythropoiesis, markedly diminished myelopoiesis, rare megakaryocyte and aspicular aspirate, and no increase in blast popuation.      In August 2022, she was noted to not have count recovery between cycles and was pancytopenic.   Repeat BMBx performed on 9/7/2022. Limited bone marrow tissue shows erythroid predominance, decreased myeloid maturation, increased blasts (5% by flow and in peripheral blood), decreased megakaryocytes. 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   Flow cytometry found 5% myeloblasts, positive for HLA-DR, CD34, , CD33, CD13. Negative for CD2, CD7, CD15,CD19, CD38 and CD56.   Karyotype normal.  Results were compatible with relapse of known AML.   Onpro was last given on ____.  Last dacogen+ venetoclax was _____.   Patient was sent to [facility] with plan to initiate therapy with single agent gilteritinib given presence of FLT3 mutation, and loss of response to dacogen and venetoclax.     Today, CBC today found WBC of 101.6, Hgb of 8.5, MCV of 102, plt count of 38k. She has 90% of blasts in peripheral blood.   CMP notable for elevated K of 3.2.   LDH is high at 859. Serum creatinine, Uric acid, Ca, phos, magnesium are all WNL.  Lactate also WNL.    This patient is an 83yo lady with PMH of AML, breast cancer s/p treatment with tamoxifen, RT and R lumpectomy, who presents with AML relapse.    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 On 2/4/2020, she started C1 dacogen and venetoclax, which she tolerated, other than some dypsnea which was attributed to pulmonary edema and L pleural effusion, and treated with diuretics. Patient was discharged home on 2/11/2020.  BMBx on 3/2/2020 found no blasts. Patient thereafter was continued on maintenance treatment with dacogen and venetoclax (10 days) every 5 weeks, with onpro as needed.    6 month interval BMBx in 10/2020 found hypocellular marrow with focal erythropoiesis, markedly diminished myelopoiesis, rare megakaryocyte and aspicular aspirate, and no increase in blast population.      In August 2022, she was noted to not have count recovery between cycles and was pancytopenic.   Repeat BMBx performed on 9/7/2022. Limited bone marrow tissue shows erythroid predominance, decreased myeloid maturation, increased blasts (5% by flow and in peripheral blood), decreased megakaryocytes. 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   Flow cytometry found 5% myeloblasts, positive for HLA-DR, CD34, , CD33, CD13. Negative for CD2, CD7, CD15,CD19, CD38 and CD56.   Karyotype normal.  Results were compatible with relapse of known AML.   Onpro was last given on ____.  Last dacogen+ venetoclax was _____.   Patient was sent to [facility] with plan to initiate therapy with single agent gilteritinib given presence of FLT3 mutation, and loss of response to dacogen and venetoclax.     Patient reports worsening fatigue over the last few weeks. She denies dizziness or lightheadedness, headaches, vision changes. She denies dyspnea, coughing, chest pain or palpitations. She reports decreased appetite. She has maintained her weight at 123lbs.  She endorses constipation. She has had intermittent drenching night sweats for over 1 month. She denies fevers or chills.     Today, CBC today found WBC of 101.6, Hgb of 8.5, MCV of 102, plt count of 38k. She has 90% of blasts in peripheral blood.   CMP notable for elevated K of 3.2.   LDH is high at 859. Serum creatinine, Uric acid, Ca, phos, magnesium are all WNL.  Lactate also WNL.    This patient is an 81yo lady with PMH of AML, breast cancer s/p treatment with tamoxifen, RT and R lumpectomy, who presents with AML relapse.    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 On 2/4/2020, she started C1 dacogen and venetoclax, which she tolerated, other than some dypsnea which was attributed to pulmonary edema and L pleural effusion, and treated with diuretics. Patient was discharged home on 2/11/2020.  BMBx on 3/2/2020 found no blasts. Patient thereafter was continued on maintenance treatment with dacogen and venetoclax (10 days) every 5 weeks, with onpro as needed.    6 month interval BMBx in 10/2020 found hypocellular marrow with focal erythropoiesis, markedly diminished myelopoiesis, rare megakaryocyte and aspicular aspirate, and no increase in blast population.      In August 2022, she was noted to not have count recovery between cycles and was pancytopenic.   Repeat BMBx performed on 9/7/2022. Limited bone marrow tissue shows erythroid predominance, decreased myeloid maturation, increased blasts (5% by flow and in peripheral blood), decreased megakaryocytes. 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   Flow cytometry found 5% myeloblasts, positive for HLA-DR, CD34, , CD33, CD13. Negative for CD2, CD7, CD15,CD19, CD38 and CD56.   Karyotype normal.  Results were compatible with relapse of known AML.   Last dose of pegfilgrastim was on 12/10/2021.   C27D1 of decitabine and venetoclax was on 10/5-10/9.  Patient was sent to [facility] with plan to initiate therapy with single agent gilteritinib given presence of FLT3 mutation, and loss of response to dacogen and venetoclax.     Patient reports worsening fatigue over the last few weeks. She denies dizziness or lightheadedness, headaches, vision changes. She denies dyspnea, coughing, chest pain or palpitations. She reports decreased appetite. She has maintained her weight at 123lbs.  She endorses constipation. She has had intermittent drenching night sweats for over 1 month. She denies fevers or chills.     Today, CBC today found WBC of 101.6, Hgb of 8.5, MCV of 102, plt count of 38k. She has 90% of blasts in peripheral blood.   CMP notable for elevated K of 3.2.   LDH is high at 859. Serum creatinine, Uric acid, Ca, phos, magnesium are all WNL.  Lactate also WNL.     INR is 1.28, PT 14.9, PTT 27.4. Fibrinogen elevated 542. D-dimer 1076 elevated.

## 2022-11-08 NOTE — ED ADULT NURSE NOTE - OBJECTIVE STATEMENT
83y/o female A&Ox3 speaking coherently hx relapsed AML p/w abnormal outpt labs. Pt had b/w done yesterday (11/7), showed new leukocytosis Sent in by MD. Does ot c/o any pain/discomfort. Does not appear to be in any acute distress. LEIGHA henderson, unknown what kind, MD to order xray to assess. Does not appear to be in any acute distress. Safety and comfort measures provided. Bed locked and in lowest position, side rails up for safety. Call bell within reach. 83y/o female A&Ox3 speaking coherently hx relapsed AML p/w abnormal outpt labs. Pt had b/w done yesterday (11/7), showed new leukocytosis Sent in by MD. Does ot c/o any pain/discomfort. Does not appear to be in any acute distress. LEIGHA henderson, unknown what kind, MD to order xray to assess. Does not appear to be in any acute distress. Safety and comfort measures provided. Bed locked and in lowest position, side rails up for safety. Call bell within reach.    11/8: On handoff, A&Ox4, vitals stable.  Pt admitted awaiting room. Monitoring continued. ANNIE Cordero.

## 2022-11-08 NOTE — ED PROVIDER NOTE - NS ED ROS FT
Constitutional: (-) fever (-) vomiting +fatigue  Eyes/ENT: (-) vision changes, (-) hearing changes  Cardiovascular: (-) chest pain, (-) wheezing  Respiratory: (-) cough, (-) shortness of breath  Gastrointestinal: (-) vomiting, (-) diarrhea, (-) abdominal pain  : (-) dysuria   Musculoskeletal: (-) back pain  Integumentary: (-) rash, (+) edema  Neurological: (-)loc  Allergic/Immunologic: (-) pruritus  Endocrine: No history of thyroid disease

## 2022-11-08 NOTE — ED ADULT NURSE NOTE - NSIMPLEMENTINTERV_GEN_ALL_ED
Implemented All Fall with Harm Risk Interventions:  [place] to call system. Call bell, personal items and telephone within reach. Instruct patient to call for assistance. Room bathroom lighting operational. Non-slip footwear when patient is off stretcher. Physically safe environment: no spills, clutter or unnecessary equipment. Stretcher in lowest position, wheels locked, appropriate side rails in place. Provide visual cue, wrist band, yellow gown, etc. Monitor gait and stability. Monitor for mental status changes and reorient to person, place, and time. Review medications for side effects contributing to fall risk. Reinforce activity limits and safety measures with patient and family. Provide visual clues: red socks.

## 2022-11-08 NOTE — ED PROVIDER NOTE - PHYSICAL EXAMINATION
Vitals: I have reviewed the patients vital signs  General: tired appearing  HEENT: Atraumatic, normocephalic, airway patent  Eyes: EOMI, tracking appropriately  Neck: no tracheal deviation, no JVD  Chest/Lungs: no trauma, symmetric chest rise, speaking in complete sentences, no WOB  Heart: cool extremities, chronic LE nonpitting ankle edema, regular rate and rhythm,   Neuro: A+Ox3, ambulating without difficulty, CN grossly intact  MSK: strength at baseline in all extremities, no muscle wasting or atrophy  Skin: no cyanosis, no jaundice, no new emergent lesions, chronic senile purpura but no petechiae noted

## 2022-11-08 NOTE — ED ADULT NURSE NOTE - TEMPLATE LIST FOR HEAD TO TOE ASSESSMENT
PATIENT ARRIVED AMBULATORY TO ROOM C/O RIGHT WRIST PAIN. MOM STATES PATIENT WAS PLAYING IN A MALL PLAYGROUND AND WAS COMPLAINING THAT HIS WRIST HURT. FULL RANGE OF MOTION. NO DEFORMITY.  STRONG RADIAL PULSE
General

## 2022-11-08 NOTE — H&P ADULT - NSHPREVIEWOFSYSTEMS_GEN_ALL_CORE
REVIEW OF SYSTEMS  CONSTITUTIONAL: No fever, no chills, no fatigue  EYES: No eye pain, no vision changes  ENMT:  No difficulty hearing, no throat pain  RESPIRATORY: No cough, no wheezing,  no sputum production; No shortness of breath  CARDIOVASCULAR: No chest pain, no palpitations, no WALTERS, no leg swelling  GASTROINTESTINAL: No abdominal pain, no nausea, no vomiting, no hematemesis, no diarrhea, no constipation,  GENITOURINARY: No dysuria, no hematuria  NEUROLOGICAL: No headaches, no loss of strength, no numbness  SKIN: No itching, no rashes, no lesions   MUSCULOSKELETAL: No joint pain, no joint swelling; No muscle pain  HEME/LYMPH: No easy bruising, bleeding REVIEW OF SYSTEMS  CONSTITUTIONAL: No fever, no chills, + drenching night sweats, +  fatigue  EYES: No eye pain, no vision changes  ENMT:  No difficulty hearing, no throat pain  RESPIRATORY: No cough, no wheezing,  no sputum production; No shortness of breath  CARDIOVASCULAR: No chest pain, no palpitations, no WALTERS, no leg swelling  GASTROINTESTINAL: No abdominal pain, no nausea, no vomiting, no hematemesis, no diarrhea, + constipation,  GENITOURINARY: No dysuria, no hematuria  NEUROLOGICAL: No headaches, no loss of strength, no numbness  SKIN: No itching, no rashes, no lesions   MUSCULOSKELETAL: No joint pain, no joint swelling; No muscle pain  HEME/LYMPH: No easy bruising, bleeding

## 2022-11-08 NOTE — ED PROVIDER NOTE - OBJECTIVE STATEMENT
81 y/o F hx AML on chemo, sent in for 88k wbc with blasts. C/o fatigue for several weeks, worsening. No f/c, cp, sob, syncope. Does have decreased appetitie, weight loss, no nausea, urinary sx. Some chronic LE ankle swelling but no calf pain or tenderness.     Heme wrote chart note for pre arrival, info noted and appreciated.

## 2022-11-08 NOTE — H&P ADULT - PROBLEM SELECTOR PLAN 2
ANC is 2031, however we anticipate this will drop with continued use of hydroxyurea.   Patient currently is afebrile and she denies infectious symptoms. Will continue to monitor closely.   Start levofloxacin 500mg PO qdaily for antibacterial coverage, fluconazole 200mg PO qdaily for antifungal coverage and acyclovir 400mg PO BID for antiviral coverage.

## 2022-11-08 NOTE — H&P ADULT - ASSESSMENT
This patient is an 83yo lady with PMH of AML, breast cancer s/p treatment with tamoxifen, RT and R lumpectomy, who presents with AML relapse.    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 On 2/4/2020, she started C1 dacogen and venetoclax, which she tolerated, other than some dypsnea which was attributed to pulmonary edema and L pleural effusion, and treated with diuretics. Patient was discharged home on 2/11/2020.  BMBx on 3/2/2020 found no blasts. Patient thereafter was continued on maintenance treatment with dacogen and venetoclax (10 days) every 5 weeks, with onpro as needed.    6 month interval BMBx in 10/2020 found hypocellular marrow with focal erythropoiesis, markedly diminished myelopoiesis, rare megakaryocyte and aspicular aspirate, and no increase in blast population.      In August 2022, she was noted to not have count recovery between cycles and was pancytopenic.   Repeat BMBx performed on 9/7/2022. Limited bone marrow tissue shows erythroid predominance, decreased myeloid maturation, increased blasts (5% by flow and in peripheral blood), decreased megakaryocytes. 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   Flow cytometry found 5% myeloblasts, positive for HLA-DR, CD34, , CD33, CD13. Negative for CD2, CD7, CD15,CD19, CD38 and CD56.   Karyotype normal.  Results were compatible with relapse of known AML.   Last dose of pegfilgrastim was on 12/10/2021.   C27D1 of decitabine and venetoclax was on 10/5-10/9.  Patient was sent to [facility] with plan to initiate therapy with single agent gilteritinib given presence of FLT3 mutation, and loss of response to dacogen and venetoclax.     Patient reports worsening fatigue over the last few weeks. She denies dizziness or lightheadedness, headaches, vision changes. She denies dyspnea, coughing, chest pain or palpitations. She reports decreased appetite. She has maintained her weight at 123lbs.  She endorses constipation. She has had intermittent drenching night sweats for over 1 month. She denies fevers or chills.     Today, CBC today found WBC of 101.6, Hgb of 8.5, MCV of 102, plt count of 38k. She has 90% of blasts in peripheral blood.   CMP notable for elevated K of 3.2.   LDH is high at 859. Serum creatinine, Uric acid, Ca, phos, magnesium are all WNL.  Lactate also WNL.     INR is 1.28, PT 14.9, PTT 27.4. Fibrinogen elevated 542. D-dimer 1076 elevated.    This patient is an 81yo lady with PMH of AML, breast cancer s/p treatment with tamoxifen, RT and R lumpectomy, who presents with FLT3+ AML relapse.

## 2022-11-08 NOTE — H&P ADULT - PROBLEM SELECTOR PLAN 1
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   - She currently denies symptoms of leukostasis.   - Her labs are not consistent with TLS.  - PT is elevated at 14.9, however PTT is 27.4 (low) and fibrinogen is elevated (542), which is not consistent with DIC.   - Will start IVNS at 100cc/hr. Check TTE to assess cardiac function.   - Check T&S, hepatitis B SAg, hep B SAb, hep B total core Ab, HCV and HIV  G6PD level was normal in 2020.   - Trend CBC, CMP, TLS labs, DIC labs q12 hours.   If fibrinogen drops under 150, please give 10 units of cryoprecipitate.  Transfuse for Hgb < 7 and platelet count < 10k, < 20k if febrile, < 50k if bleeding    Due to leukocytosis, patient was started on cytoreductive therapy with hydroxyurea 1000mg BID. Will trend CBC. If patient develops new neurologic changes, obtain stat CT chest. If patient develops new respiratory symptoms, obtain stat xray.   For TLS prophylaxis, continue allopurinol 300mg PO qdaily.     - Based on result of ADMIRAL study, gilteritinib is FDA approved for treatment of relapsed/refractory FLT3 positive AML. We will plan to start dosing at 120mg PO qdaily.  Because gilteritinib can cause QTc prolongation, will check EKG. 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   - She currently denies symptoms of leukostasis.   - Her labs are not consistent with TLS.  - PT is elevated at 14.9, however PTT is 27.4 (low) and fibrinogen is elevated (542), which is not consistent with DIC.   - Will start IVNS at 100cc/hr. Check TTE to assess cardiac function.   - Check T&S, hepatitis B SAg, hep B SAb, hep B total core Ab, HCV and HIV  G6PD level was normal in 2020.   - Trend CBC, CMP, TLS labs, DIC labs q12 hours.   If fibrinogen drops under 150, please give 10 units of cryoprecipitate.  Transfuse for Hgb < 7 and platelet count < 10k, < 20k if febrile, < 50k if bleeding    Due to leukocytosis, patient was started on cytoreductive therapy with hydroxyurea 1000mg BID --> WBC repeat is 105, will increase dose to 2000mg BID. Will trend CBC. If patient develops new neurologic changes, obtain stat CT chest. If patient develops new respiratory symptoms, obtain stat xray.   For TLS prophylaxis, continue allopurinol 300mg PO qdaily.     - Based on result of ADMIRAL study, gilteritinib is FDA approved for treatment of relapsed/refractory FLT3 positive AML. We will plan to start dosing at 120mg PO qdaily.  Because gilteritinib can cause QTc prolongation, EKG checked - sinus rhythm HR 77, with left axis deviation, and LBBB  Will start dexamethasone 5mg q12h x 14 days to avoid differentiation syndrome.

## 2022-11-08 NOTE — H&P ADULT - PROBLEM SELECTOR PLAN 3
Patient's SBP is in range of 140s-160s.  Start home dose of amlodipine 5mg PO qd with hold parameters.

## 2022-11-08 NOTE — ED PROVIDER NOTE - CLINICAL SUMMARY MEDICAL DECISION MAKING FREE TEXT BOX
Pt sent in for eval of poss blast crisis will send off hemolysis/crisis labs, fluids, rasburicase. EKG and XR. TBA. Hemodyniamically stable, afebrile, exam without concerning acute features.

## 2022-11-08 NOTE — H&P ADULT - NSHPPHYSICALEXAM_GEN_ALL_CORE
T(C): 36.8 (11-08-22 @ 13:19), Max: 37.3 (11-07-22 @ 21:16)  HR: 72 (11-08-22 @ 13:19) (70 - 89)  BP: 151/64 (11-08-22 @ 13:19) (109/73 - 167/79)  RR: 18 (11-08-22 @ 13:19) (16 - 18)  SpO2: 97% (11-08-22 @ 13:19) (96% - 100%)  Wt(kg): --    PHYSICAL EXAM:  GENERAL: NAD, well-groomed, well-developed  HEAD:  Atraumatic, Normocephalic  EYES: EOMI, PERRLA, conjunctiva and sclera clear  ENMT: No oropharyngeal exudates, erythema or lesions,  Moist mucous membranes  NECK: Supple, no cervical lymphadenopathy, no JVD  NERVOUS SYSTEM:  Alert & Oriented X3, CN II-XII intact, 5/5 BUE and BLE motor strength, full sensation to light touch   CHEST/LUNG: Clear to auscultation bilaterally; no rhonchi  HEART: Regular rate and rhythm; No murmurs  ABDOMEN: Soft, Nontender, Nondistended  EXTREMITIES:  2+ radial Pulses, No cyanosis or edema  SKIN: warm, dry T(C): 36.8 (11-08-22 @ 13:19), Max: 37.3 (11-07-22 @ 21:16)  HR: 72 (11-08-22 @ 13:19) (70 - 89)  BP: 151/64 (11-08-22 @ 13:19) (109/73 - 167/79)  RR: 18 (11-08-22 @ 13:19) (16 - 18)  SpO2: 97% (11-08-22 @ 13:19) (96% - 100%)  Wt(kg): --    PHYSICAL EXAM:  GENERAL: NAD, well-groomed, well-developed  HEAD:  Atraumatic, Normocephalic  EYES: EOMI, PERRLA, conjunctiva and sclera clear  ENMT: No oropharyngeal exudates, erythema or lesions,  Moist mucous membranes  NECK: Supple, no cervical lymphadenopathy, no JVD  NERVOUS SYSTEM:  Alert & Oriented X3, CN II-XII intact, 5/5 BUE and BLE motor strength, full sensation to light touch   CHEST/LUNG: Clear to auscultation bilaterally; no rhonchi  HEART: Regular rate and rhythm; No murmurs  ABDOMEN: Soft, Nontender, Nondistended, no hepatosplenomegaly  EXTREMITIES:  2+ radial Pulses, No cyanosis or edema  SKIN: warm, dry  LYMPH: no palpable lymphadenopathy

## 2022-11-09 NOTE — DISCHARGE NOTE PROVIDER - NSDCFUSCHEDAPPT_GEN_ALL_CORE_FT
[facility] Physician [facility]  [facility]  Scheduled Appointment: 11/17/2022    Goldberg, Bradley Northwell [facility]  [facility]  Scheduled Appointment: 11/17/2022

## 2022-11-09 NOTE — DISCHARGE NOTE PROVIDER - DETAILS OF MALNUTRITION DIAGNOSIS/DIAGNOSES
This patient has been assessed with a concern for Malnutrition and was treated during this hospitalization for the following Nutrition diagnosis/diagnoses:     -  11/15/2022: Moderate protein-calorie malnutrition

## 2022-11-09 NOTE — PROGRESS NOTE ADULT - ASSESSMENT
This patient is an 83yo lady with PMH of AML, breast cancer s/p treatment with tamoxifen, RT and R lumpectomy, who presents with FLT3+ AML relapse.   Ms. Tobar is an 81 yo female with PMH of AML (Dx 2020, s/p 27 cycles decitabine/venetoclax) , breast cancer (Dx 12 years prior),  s/p treatment with tamoxifen, RT and R lumpectomy, who presents with FLT3+ AML in relapse.   Ms. 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 Patient presents with hyperleukocytosis, anemia, and thrombocytopenia due to disease process.

## 2022-11-09 NOTE — PROGRESS NOTE ADULT - SUBJECTIVE AND OBJECTIVE BOX
Diagnosis: relapsed AML, FLT3 ITD+, NPM1, CEBPA, ASXL1 mutated    Protocol/Chemo Regimen: plan for oral FLT3 inhibitor gilteritinib  Day: n/a     Pt endorsed:    Review of Systems:    Pain scale:                                        Location:    Diet:     Allergies: No Known Allergies    ANTIMICROBIALS  acyclovir   Oral Tab/Cap 400 milliGRAM(s) Oral two times a day  fluconAZOLE   Tablet 200 milliGRAM(s) Oral daily  levoFLOXacin  Tablet 500 milliGRAM(s) Oral every 24 hours      HEME/ONC MEDICATIONS  hydroxyurea 2000 milliGRAM(s) Oral every 12 hours      STANDING MEDICATIONS  allopurinol 300 milliGRAM(s) Oral daily  amLODIPine   Tablet 5 milliGRAM(s) Oral daily  Biotene Dry Mouth Oral Rinse 15 milliLiter(s) Swish and Spit three times a day  buPROPion XL (24-Hour) . 150 milliGRAM(s) Oral daily  dexAMETHasone  Injectable 5 milliGRAM(s) IV Push every 12 hours  gabapentin 100 milliGRAM(s) Oral daily  oxybutynin 5 milliGRAM(s) Oral daily  sodium chloride 0.9%. 1000 milliLiter(s) IV Continuous <Continuous>  traZODone 150 milliGRAM(s) Oral at bedtime      PRN MEDICATIONS  acetaminophen     Tablet .. 650 milliGRAM(s) Oral every 6 hours PRN  aluminum hydroxide/magnesium hydroxide/simethicone Suspension 30 milliLiter(s) Oral every 4 hours PRN  melatonin 3 milliGRAM(s) Oral at bedtime PRN  ondansetron Injectable 4 milliGRAM(s) IV Push every 8 hours PRN      Vital Signs Last 24 Hrs  T(C): 36.7 (09 Nov 2022 05:00), Max: 37.3 (08 Nov 2022 17:19)  T(F): 98.1 (09 Nov 2022 05:00), Max: 99.2 (08 Nov 2022 17:19)  HR: 77 (09 Nov 2022 05:00) (70 - 80)  BP: 107/54 (09 Nov 2022 05:00) (107/54 - 167/79)  RR: 18 (09 Nov 2022 05:00) (18 - 18)  SpO2: 97% (09 Nov 2022 05:00) (95% - 99%)    Parameters below as of 09 Nov 2022 05:00  Patient On (Oxygen Delivery Method): room air      PHYSICAL EXAM  General: adult in NAD  HEENT: clear oropharynx, anicteric sclera, pink conjunctiva  Neck: supple  CV: normal S1/S2 RRR  Lungs: positive air movement b/l ant lungs,clear to auscultation, no wheezes, no rales  Abdomen: soft non-tender non-distended  Ext: no clubbing cyanosis or edema  Skin: no rashes and no petechiae  Neuro: alert and oriented X 4, no focal deficits  Central Line:     LABS:               RADIOLOGY & ADDITIONAL STUDIES:         Diagnosis: relapsed AML, FLT3 ITD+, NPM1, CEBPA, ASXL1 mutated    Protocol/Chemo Regimen: plan for oral FLT3 inhibitor gilteritinib  Day: n/a     Pt endorsed: feels better today; constipated x 2 days    Review of Systems: denies abdominal pain, chest pain, n/v/d, sob    Pain scale: 0                                           Diet: DASH    Allergies: No Known Allergies    ANTIMICROBIALS  acyclovir   Oral Tab/Cap 400 milliGRAM(s) Oral two times a day  fluconAZOLE   Tablet 200 milliGRAM(s) Oral daily  levoFLOXacin  Tablet 500 milliGRAM(s) Oral every 24 hours      HEME/ONC MEDICATIONS  hydroxyurea 2000 milliGRAM(s) Oral every 12 hours      STANDING MEDICATIONS  allopurinol 300 milliGRAM(s) Oral daily  amLODIPine   Tablet 5 milliGRAM(s) Oral daily  Biotene Dry Mouth Oral Rinse 15 milliLiter(s) Swish and Spit three times a day  buPROPion XL (24-Hour) . 150 milliGRAM(s) Oral daily  dexAMETHasone  Injectable 5 milliGRAM(s) IV Push every 12 hours  gabapentin 100 milliGRAM(s) Oral daily  oxybutynin 5 milliGRAM(s) Oral daily  sodium chloride 0.9%. 1000 milliLiter(s) IV Continuous <Continuous>  traZODone 150 milliGRAM(s) Oral at bedtime      PRN MEDICATIONS  acetaminophen     Tablet .. 650 milliGRAM(s) Oral every 6 hours PRN  aluminum hydroxide/magnesium hydroxide/simethicone Suspension 30 milliLiter(s) Oral every 4 hours PRN  melatonin 3 milliGRAM(s) Oral at bedtime PRN  ondansetron Injectable 4 milliGRAM(s) IV Push every 8 hours PRN      Vital Signs Last 24 Hrs  T(C): 36.7 (09 Nov 2022 05:00), Max: 37.3 (08 Nov 2022 17:19)  T(F): 98.1 (09 Nov 2022 05:00), Max: 99.2 (08 Nov 2022 17:19)  HR: 77 (09 Nov 2022 05:00) (70 - 80)  BP: 107/54 (09 Nov 2022 05:00) (107/54 - 167/79)  RR: 18 (09 Nov 2022 05:00) (18 - 18)  SpO2: 97% (09 Nov 2022 05:00) (95% - 99%)    Parameters below as of 09 Nov 2022 05:00  Patient On (Oxygen Delivery Method): room air      PHYSICAL EXAM  General: adult in NAD  HEENT: clear oropharynx, anicteric sclera, pink conjunctiva  Neck: supple  CV: normal S1/S2 RRR  Lungs: positive air movement b/l ant lungs,clear to auscultation, no wheezes, no rales  Abdomen: soft non-tender non-distended  Ext: no LE edema;  <1cm hemorrhagic skin lesion R medial calf  Skin: no rashes and no petechiae  Neuro: alert and oriented X 4, no focal deficits  Central Line: LCW Mediport c/d/i    Cultures: no recent    LABS:                      8.4    86.45 )-----------( 32       ( 09 Nov 2022 07:22 )             26.4     09 Nov 2022 07:28    143    |  110    |  9      ----------------------------<  154    3.7     |  22     |  0.53     Ca    8.6        09 Nov 2022 07:28  Phos  2.6       08 Nov 2022 15:49  Mg     2.2       08 Nov 2022 15:49    TPro  6.1    /  Alb  3.7    /  TBili  0.2    /  DBili  x      /  AST  26     /  ALT  26     /  AlkPhos  76     09 Nov 2022 07:28    PT/INR - ( 08 Nov 2022 15:49 )   PT: 14.9 sec;   INR: 1.29 ratio    PTT - ( 08 Nov 2022 15:49 )  PTT:25.0 sec    LIVER FUNCTIONS - ( 09 Nov 2022 07:28 )  Alb: 3.7 g/dL / Pro: 6.1 g/dL / ALK PHOS: 76 U/L / ALT: 26 U/L / AST: 26 U/L / GGT: x           RADIOLOGY & ADDITIONAL STUDIES:  from: Xray Chest 1 View- PORTABLE-Urgent (Xray Chest 1 View- PORTABLE-Urgent .) (11.08.22 @ 02:29)   FINDINGS:  Left chest wall CT compatible port in stable position with tip   terminating in the SVC. Port reservoir remains in appropriate   configuration; no port flipping.  The heart is normal in size.  The lungs are clear.  There is no pneumothorax or pleural effusion.  No acute bony abnormality.  IMPRESSION:  Stable configuration and location of left chest wall CT compatible port.  Clear lungs.

## 2022-11-09 NOTE — DISCHARGE NOTE PROVIDER - HOSPITAL COURSE
Ms. Tobar is an 81yo lady with PMH of AML, breast cancer s/p treatment with tamoxifen, RT and R lumpectomy, who presents with AML relapse.    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 On 2/4/2020, she started C1 dacogen and venetoclax, which she tolerated, other than some dypsnea which was attributed to pulmonary edema and L pleural effusion, and treated with diuretics. Patient was discharged home on 2/11/2020.  BMBx on 3/2/2020 found no blasts. Patient thereafter was continued on maintenance treatment with dacogen and venetoclax (10 days) every 5 weeks, with onpro as needed.    6 month interval BMBx in 10/2020 found hypocellular marrow with focal erythropoiesis, markedly diminished myelopoiesis, rare megakaryocyte and aspicular aspirate, and no increase in blast population.      In August 2022, she was noted to not have count recovery between cycles and was pancytopenic.   Repeat BMBx performed on 9/7/2022. Limited bone marrow tissue shows erythroid predominance, decreased myeloid maturation, increased blasts (5% by flow and in peripheral blood), decreased megakaryocytes. 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   Flow cytometry found 5% myeloblasts, positive for HLA-DR, CD34, , CD33, CD13. Negative for CD2, CD7, CD15,CD19, CD38 and CD56.   Karyotype normal.  Results were compatible with relapse of known AML.   Last dose of pegfilgrastim was on 12/10/2021.   C27D1 of decitabine and venetoclax was on 10/5-10/9.  Patient was sent to [facility] with plan to initiate therapy with single agent gilteritinib given presence of FLT3 mutation, and loss of response to dacogen and venetoclax.     Patient reports worsening fatigue over the last few weeks. She denies dizziness or lightheadedness, headaches, vision changes. She denies dyspnea, coughing, chest pain or palpitations. She reports decreased appetite. She has maintained her weight at 123lbs.  She endorses constipation. She has had intermittent drenching night sweats for over 1 month. She denies fevers or chills.     Upon admission,  CBC today found WBC of 101.6, Hgb of 8.5, MCV of 102, plt count of 38k. She has 90% of blasts in peripheral blood.   CMP notable for elevated K of 3.2. LDH was high at 859. Serum creatinine, Uric acid, Ca, phos, magnesium are all WNL.  Lactate also WNL.   INR is 1.28, PT 14.9, PTT 27.4. Fibrinogen elevated 542. D-dimer 1076 elevated.     IVF started and allopurinol for tumor lysis prevention. Hyroxyurea given for cytoreduction.  antimicrobials were initiated with levaquin, diflucan, and acyclovir. Gilteritinib was delivered to [address] [place] [address] on 11/10/22 and started______.   Ms. Tobar is an 81yo lady with PMH of AML, breast cancer s/p treatment with tamoxifen, RT and R lumpectomy, who presents with AML in relapse.    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 On 2/4/2020, she started C1 dacogen and venetoclax, which she tolerated, other than some dypsnea which was attributed to pulmonary edema and L pleural effusion, and treated with diuretics. Patient was discharged home on 2/11/2020.  BMBx on 3/2/2020 found no blasts. Patient thereafter was continued on maintenance treatment with dacogen and venetoclax (10 days) every 5 weeks, with onpro as needed.    6 month interval BMBx in 10/2020 found hypocellular marrow with focal erythropoiesis, markedly diminished myelopoiesis, rare megakaryocyte and aspicular aspirate, and no increase in blast population.      In August 2022, she was noted to not have count recovery between cycles and was pancytopenic. Repeat BMBx performed on 9/7/2022. Limited bone marrow tissue shows erythroid predominance, decreased myeloid maturation, increased blasts (5% by flow and in peripheral blood), decreased megakaryocytes. 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 Flow cytometry found 5% myeloblasts, positive for HLA-DR, CD34, , CD33, CD13. Negative for CD2, CD7, CD15,CD19, CD38 and CD56. Karyotype normal.Results were compatible with relapse of known AML. Last dose of pegfilgrastim was on 12/10/2021.   C27D1 of decitabine and venetoclax was on 10/5-10/9. Patient was sent to [facility] with plan to initiate therapy with single agent gilteritinib given presence of FLT3 mutation, and loss of response to dacogen and venetoclax.     Patient reports worsening fatigue over the last few weeks. She denies dizziness or lightheadedness, headaches, vision changes. She denies dyspnea, coughing, chest pain or palpitations. She reports decreased appetite. She has maintained her weight at 123lbs.  She endorses constipation. She has had intermittent drenching night sweats for over 1 month. She denies fevers or chills.     Upon admission,  CBC today found WBC of 101.6, Hgb of 8.5, MCV of 102, plt count of 38k. She has 90% of blasts in peripheral blood.  CMP notable for elevated K of 3.2. LDH was high at 859. Serum creatinine, Uric acid, Ca, phos, magnesium are all WNL.  Lactate also WNL. INR is 1.28, PT 14.9, PTT 27.4. Fibrinogen elevated 542. D-dimer 1076 elevated.     IVF started and allopurinol for tumor lysis prevention. Hyroxyurea given for cytoreduction.  antimicrobials were initiated with levaquin, diflucan, and acyclovir. Gilteritinib was delivered to [address] [place] [address] on 11/10/22. Cytoreduction continued with hydroxyurea and added cytarabine 100mg/m2 continuous infusion until Wbc < 20k to start gilteritinib.    Ms. Tobar is an 83yo lady with PMH of AML, breast cancer s/p treatment with tamoxifen, RT and R lumpectomy, who presents with AML in relapse.    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 On 2/4/2020, she started C1 dacogen and venetoclax, which she tolerated, other than some dypsnea which was attributed to pulmonary edema and L pleural effusion, and treated with diuretics. Patient was discharged home on 2/11/2020.  BMBx on 3/2/2020 found no blasts. Patient thereafter was continued on maintenance treatment with dacogen and venetoclax (10 days) every 5 weeks, with onpro as needed.    6 month interval BMBx in 10/2020 found hypocellular marrow with focal erythropoiesis, markedly diminished myelopoiesis, rare megakaryocyte and aspicular aspirate, and no increase in blast population.      In August 2022, she was noted to not have count recovery between cycles and was pancytopenic. Repeat BMBx performed on 9/7/2022. Limited bone marrow tissue shows erythroid predominance, decreased myeloid maturation, increased blasts (5% by flow and in peripheral blood), decreased megakaryocytes. 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 Flow cytometry found 5% myeloblasts, positive for HLA-DR, CD34, , CD33, CD13. Negative for CD2, CD7, CD15,CD19, CD38 and CD56. Karyotype normal.Results were compatible with relapse of known AML. Last dose of pegfilgrastim was on 12/10/2021.   C27D1 of decitabine and venetoclax was on 10/5-10/9. Patient was sent to [facility] with plan to initiate therapy with single agent gilteritinib given presence of FLT3 mutation, and loss of response to dacogen and venetoclax.     Patient reports worsening fatigue over the last few weeks.  Upon admission pancytopenia with concern for relapse due to  She has 90% of blasts in peripheral blood.   Admitted to [address] [place] [address], labs monitored, iv hydration, diuresis prn, pain control, antiemetics, cultures if febrile.   11/10 Cytarabine was started x3 days did not receive full dose (309 of the 509 ml just for wbc reduction).   11/12 Gilteritinib was started. Cefepime was continued for ppx at renal dose. 11/11 patient experienced hypotension and fever. Patient had a positive culture and was started on vanco which was stropped as culture thought to be a containment on 11/9/22. Slight trop leak with some increase in chest pressure and known LBBB. Seen by cardiology as patient had increased work of breathing, hypoxia and rales.   11/15 Repeat TTE showed decrease in EF 39%. BNP monitored, lactate monitored, statin started low dose given azole, midodrine weaned off, speech and swallow eval for difficulty with swallowing.    Ms. Tobar is an 81yo lady with PMH of AML, breast cancer s/p treatment with tamoxifen, RT and R lumpectomy, who presents with AML in relapse.    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 On 2/4/2020, she started C1 dacogen and venetoclax, which she tolerated, other than some dypsnea which was attributed to pulmonary edema and L pleural effusion, and treated with diuretics. Patient was discharged home on 2/11/2020.  BMBx on 3/2/2020 found no blasts. Patient thereafter was continued on maintenance treatment with dacogen and venetoclax (10 days) every 5 weeks, with onpro as needed.    6 month interval BMBx in 10/2020 found hypocellular marrow with focal erythropoiesis, markedly diminished myelopoiesis, rare megakaryocyte and aspicular aspirate, and no increase in blast population.      In August 2022, she was noted to not have count recovery between cycles and was pancytopenic. Repeat BMBx performed on 9/7/2022. Limited bone marrow tissue shows erythroid predominance, decreased myeloid maturation, increased blasts (5% by flow and in peripheral blood), decreased megakaryocytes. 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 Flow cytometry found 5% myeloblasts, positive for HLA-DR, CD34, , CD33, CD13. Negative for CD2, CD7, CD15,CD19, CD38 and CD56. Karyotype normal.Results were compatible with relapse of known AML. Last dose of pegfilgrastim was on 12/10/2021.   C27D1 of decitabine and venetoclax was on 10/5-10/9. Patient was sent to [facility] with plan to initiate therapy with single agent gilteritinib given presence of FLT3 mutation, and loss of response to dacogen and venetoclax.     Patient reports worsening fatigue over the last few weeks.  Upon admission pancytopenia with concern for relapse due to  She has 90% of blasts in peripheral blood.   Admitted to [address] [place] [address], labs monitored, iv hydration, diuresis prn, pain control, antiemetics, cultures if febrile.   11/10 Cytarabine was started x3 days did not receive full dose (309 of the 509 ml just for wbc reduction).   11/12 Gilteritinib was started. Cefepime was continued for ppx at renal dose. 11/11 patient experienced hypotension and fever. Patient had a positive culture and was started on vanco which was stropped as culture thought to be a containment on 11/9/22. Slight trop leak with some increase in chest pressure and known LBBB. Seen by cardiology as patient had increased work of breathing, hypoxia and rales.   11/15 Repeat TTE showed decrease in EF 39%. BNP monitored, lactate monitored, statin started low dose given azole, midodrine weaned off, speech and swallow eval for difficulty with swallowing.     Patient continued BID lasix 40mg to goal Net negative 1-2L/day. Pro-BNP and weights trended down.

## 2022-11-09 NOTE — PROVIDER CONTACT NOTE (CRITICAL VALUE NOTIFICATION) - ASSESSMENT
Chemotherapy Verification - PRIMARY RN      Height = 66.93 in  Weight = 174 lb  BSA = 1.93 m2       Medication: Velcade  Dose: 1.3 mg/m2  Calculated Dose: 2.509 mg                             (In mg/m2, AUC, mg/kg)     I confirm this process was performed independently with the BSA and all final chemotherapy dosing calculations congruent.  Any discrepancies of 10% or greater have been addressed with the chemotherapy pharmacist. The resolution of the discrepancy has been documented in the EPIC progress notes.        nad, vss

## 2022-11-09 NOTE — PROGRESS NOTE ADULT - NSPROGADDITIONALINFOA_GEN_ALL_CORE
81 yo with FLT-3, NPM1, ASXL1, WT1, CEBPA mut - (ASXL1, WT1 recent additional findings)  had response to decitabine/lisa through 27 cycles, now with progression, hyperleukocytosis  ,000, with 90% blasts  Placed on HU, now 2 gm 2x/d  WBC 86K today, 90% blasts, ANC 1700  Renal fxn, LFTs nl, , uric acid 3.8, phos 2.6    MEDICATIONS  (STANDING):   acyclovir   Oral Tab/Cap 400 milliGRAM(s) Oral two times a day  allopurinol 300 milliGRAM(s) Oral daily  amLODIPine   Tablet 5 milliGRAM(s) Oral daily  Biotene Dry Mouth Oral Rinse 15 milliLiter(s) Swish and Spit three times a day  buPROPion XL (24-Hour) . 150 milliGRAM(s) Oral daily  chlorhexidine 2% Cloths 1 Application(s) Topical daily  dexAMETHasone  Injectable 5 milliGRAM(s) IV Push every 12 hours  fluconAZOLE   Tablet 200 milliGRAM(s) Oral daily  gabapentin 100 milliGRAM(s) Oral daily  hydroxyurea 2000 milliGRAM(s) Oral every 12 hours  levoFLOXacin  Tablet 500 milliGRAM(s) Oral every 24 hours  oxybutynin 5 milliGRAM(s) Oral daily  polyethylene glycol 3350 17 Gram(s) Oral two times a day  sodium chloride 0.9%. 1000 milliLiter(s) (100 mL/Hr) IV Continuous <Continuous>  traZODone 150 milliGRAM(s) Oral at bedtime    Plan to treat with gilteritinib once WBC decreased  until then decr dex to 2 mg q12 hr  cont IVFs, I and O,  tumor lysis labs  allopurinol  cont ppx antibiotics  echocardiogram today  OOB 83 yo with FLT-3, NPM1, ASXL1, WT1, CEBPA mut - (ASXL1, WT1 recent additional findings)  had response to decitabine/lisa through 27 cycles, now with progression, hyperleukocytosis  ,000, with 90% blasts  Placed on HU, now 2 gm 2x/d  WBC 86K today, 90% blasts, ANC 1700  Renal fxn, LFTs nl, , uric acid 3.8, phos 2.6    MEDICATIONS  (STANDING):   acyclovir   Oral Tab/Cap 400 milliGRAM(s) Oral two times a day  allopurinol 300 milliGRAM(s) Oral daily  amLODIPine   Tablet 5 milliGRAM(s) Oral daily  Biotene Dry Mouth Oral Rinse 15 milliLiter(s) Swish and Spit three times a day  buPROPion XL (24-Hour) . 150 milliGRAM(s) Oral daily  chlorhexidine 2% Cloths 1 Application(s) Topical daily  dexAMETHasone  Injectable 5 milliGRAM(s) IV Push every 12 hours  fluconAZOLE   Tablet 200 milliGRAM(s) Oral daily  gabapentin 100 milliGRAM(s) Oral daily  hydroxyurea 2000 milliGRAM(s) Oral every 12 hours  levoFLOXacin  Tablet 500 milliGRAM(s) Oral every 24 hours  oxybutynin 5 milliGRAM(s) Oral daily  polyethylene glycol 3350 17 Gram(s) Oral two times a day  sodium chloride 0.9%. 1000 milliLiter(s) (100 mL/Hr) IV Continuous <Continuous>  traZODone 150 milliGRAM(s) Oral at bedtime    Plan to treat with gilteritinib once WBC decreased  until then decr dex to 2 mg q12 hr  cont IVFs, I and O,  tumor lysis labs  cont HU, allopurinol  cont ppx antibiotics  echocardiogram today  OOB

## 2022-11-09 NOTE — PROVIDER CONTACT NOTE (CRITICAL VALUE NOTIFICATION) - DATE AND TIME:
09-Nov-2022 08:05 Notification Instructions: Patient will be notified of biopsy results. However, patient instructed to call the office if not contacted within 2 weeks. 09-Nov-2022 08:10 Hide Second Anesthesia?: No Cryotherapy Text: The wound bed was treated with cryotherapy after the biopsy was performed. Dressing: bandage Detail Level: Detailed Biopsy Type: H and E Curettage Text: The wound bed was treated with curettage after the biopsy was performed. Additional Anesthesia Volume In Cc (Will Not Render If 0): 0 Consent: Written consent was obtained and risks were reviewed including but not limited to scarring, infection, bleeding, scabbing, incomplete removal, nerve damage and allergy to anesthesia. Type Of Destruction Used: Curettage Anesthesia Volume In Cc (Will Not Render If 0): 0.5 Post-Care Instructions: I reviewed with the patient in detail post-care instructions. Patient is to keep the biopsy site dry overnight, and then apply bacitracin twice daily until healed. Depth Of Biopsy: dermis Silver Nitrate Text: The wound bed was treated with silver nitrate after the biopsy was performed. Electrodesiccation Text: The wound bed was treated with electrodesiccation after the biopsy was performed. Hemostasis: Aluminum Chloride Wound Care: Bacitracin Biopsy Method: Dermablade Billing Type: Third-Party Bill Was A Bandage Applied: Yes Information: Selecting Yes will display possible errors in your note based on the variables you have selected. This validation is only offered as a suggestion for you. PLEASE NOTE THAT THE VALIDATION TEXT WILL BE REMOVED WHEN YOU FINALIZE YOUR NOTE. IF YOU WANT TO FAX A PRELIMINARY NOTE YOU WILL NEED TO TOGGLE THIS TO 'NO' IF YOU DO NOT WANT IT IN YOUR FAXED NOTE. Anesthesia Type: 1% lidocaine with epinephrine and a 1:10 solution of 8.4% sodium bicarbonate Electrodesiccation And Curettage Text: The wound bed was treated with electrodesiccation and curettage after the biopsy was performed.

## 2022-11-09 NOTE — PROGRESS NOTE ADULT - PROBLEM SELECTOR PLAN 1
leukocytosis of 101k, 90% of blasts in peripheral blood.  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   She currently denies symptoms of leukostasis.   Her labs are not consistent with TLS.  PT is elevated at 14.9, however PTT is 27.4 (low) and fibrinogen is elevated (542), which is not consistent with DIC.   IVNS at 100cc/hr. Check TTE to assess cardiac function.   Check T&S, hepatitis B SAg, hep B SAb, hep B total core Ab, HCV and HIV  G6PD level was normal in 2020.   Trend CBC, CMP, TLS labs, DIC labs q12 hours.   If fibrinogen drops under 150, please give 10 units of cryoprecipitate.  Transfuse for Hgb < 7 and platelet count < 10k, < 20k if febrile, < 50k if bleeding  Due to leukocytosis, patient was started on cytoreductive therapy with hydroxyurea 1000mg BID --> WBC repeat is 105, will increase dose to 2000mg BID. Will trend CBC. If patient develops new neurologic changes, obtain stat CT chest. If patient develops new respiratory symptoms, obtain stat xray.   For TLS prophylaxis, continue allopurinol 300mg PO qdaily.    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 We will plan to start dosing at 120mg PO qdaily.  Because gilteritinib can cause QTc prolongation, EKG checked - sinus rhythm HR 77, with left axis deviation, and LBBB  Will start dexamethasone 5mg q12h x 14 days to avoid differentiation syndrome. leukocytosis of 101k, 90% of blasts in peripheral blood.  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   currently denies symptoms of leukostasis.   Her labs are not consistent with TLS.  PT is elevated at 14.9, however PTT is 27.4 (low) and fibrinogen is elevated (542), which is not consistent with DIC.   IVNS at 100cc/hr. Check TTE to assess cardiac function.   Check T&S, hepatitis B SAg, hep B SAb, hep B total core Ab, HCV and HIV  G6PD level was normal in 2020.   Trend CBC, CMP, TLS labs, DIC labs q12 hours.   If fibrinogen drops under 150, please give 10 units of cryoprecipitate.  Transfuse for Hgb < 7 and platelet count < 10k, < 20k if febrile, < 50k if bleeding  Due to leukocytosis, patient was started on cytoreductive therapy with hydroxyurea 1000mg BID --> WBC repeat is 105, will increase dose to 2000mg BID. Will trend CBC. If patient develops new neurologic changes, obtain stat CT chest. If patient develops new respiratory symptoms, obtain stat xray.   For TLS prophylaxis, continue allopurinol 300mg PO qdaily.    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 We will plan to start dosing at 120mg PO qdaily.  Because gilteritinib can cause QTc prolongation, EKG checked - sinus rhythm HR 77, with left axis deviation, and LBBB  Will start dexamethasone 5mg q12h x 14 days to avoid differentiation syndrome. leukocytosis of 101k, 90% of blasts in peripheral blood.  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   currently denies symptoms of leukostasis.   Her labs are not consistent with TLS.  PT is elevated at 14.9, however PTT is 27.4 (low) and fibrinogen is elevated (542), which is not consistent with DIC.   IVNS at 100cc/hr. Check TTE to assess cardiac function.   Check T&S, hepatitis B SAg, hep B SAb, hep B total core Ab, HCV and HIV  G6PD level was normal in 2020.   Trend CBC, CMP, TLS labs, DIC labs q12 hours.   If fibrinogen drops under 150, please give 10 units of cryoprecipitate.  Transfuse for Hgb < 7 and platelet count < 10k, < 20k if febrile, < 50k if bleeding  Due to leukocytosis, patient was started on cytoreductive therapy with hydroxyurea 1000mg BID --> WBC repeat is 105, increased dose to 2000mg BID. Will trend CBC. If patient develops new neurologic changes, obtain stat CT chest. If patient develops new respiratory symptoms, obtain stat xray.   For TLS prophylaxis, continue allopurinol 300mg PO qdaily.    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 We will plan to start dosing at 120mg PO qdaily (awaiting prior authorization)  Because gilteritinib can cause QTc prolongation, EKG checked - sinus rhythm HR 77, with left axis deviation, and LBBB  cont dexamethasone 2.5mg q12h to avoid differentiation syndrome.

## 2022-11-09 NOTE — DISCHARGE NOTE PROVIDER - NSRESEARCHGRANT_PROPHYLAXISRECOMFT_GEN_A_CORE
How Severe Are They?: moderate Is This A New Presentation, Or A Follow-Up?: Skin Lesions IMPROVE-DD Application Not Available

## 2022-11-09 NOTE — DISCHARGE NOTE PROVIDER - NSDCCPCAREPLAN_GEN_ALL_CORE_FT
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 Notify MD and report to ER for any temperature greater than or equal to 100.4 degrees, intractable nausea, vomiting, diarrhea, or uncontrolled bleeding.        SECONDARY DISCHARGE DIAGNOSES  Diagnosis: AML (acute myeloblastic leukemia)  Assessment and Plan of Treatment:

## 2022-11-09 NOTE — PROGRESS NOTE ADULT - PROBLEM SELECTOR PLAN 2
ANC is 2031, however we anticipate this will drop with continued use of hydroxyurea.   Patient currently is afebrile and she denies infectious symptoms. Will continue to monitor closely.   Start levofloxacin 500mg PO qdaily for antibacterial coverage, fluconazole 200mg PO qdaily for antifungal coverage and acyclovir 400mg PO BID for antiviral coverage. ANC is 2031, however we anticipate this will drop with continued use of hydroxyurea.   Patient currently is afebrile and she denies infectious symptoms. Will continue to monitor closely.   Started levofloxacin 500mg PO qdaily for antibacterial coverage, fluconazole 200mg PO qdaily for antifungal coverage and acyclovir 400mg PO BID for antiviral coverage.  Blood Cx and MSSA swab ordered (Mediport present upon admission)

## 2022-11-09 NOTE — PROGRESS NOTE ADULT - PROBLEM SELECTOR PLAN 6
VTE ppx: venodyne boots. Hold pharmacologic prophylaxis due to thrombocytopenia  Activity: OOB with assistance, fall precaution, bed alarm  Diet: DASH VTE ppx:  Hold pharmacologic prophylaxis due to thrombocytopenia  Activity: OOB with assistance, fall precaution, bed alarm  Diet: DASH

## 2022-11-09 NOTE — DISCHARGE NOTE PROVIDER - NSDCMRMEDTOKEN_GEN_ALL_CORE_FT
acyclovir 400 mg oral tablet: 1 tab(s) orally 2 times a day  amLODIPine 5 mg oral tablet: 1 tab(s) orally once a day  buPROPion 150 mg/12 hours (SR) oral tablet, extended release: 1 tab(s) orally once a day  fluconazole 200 mg oral tablet: 1 tab(s) orally once a day  gabapentin 100 mg oral tablet: 1 tab(s) orally once a day  gilteritinib 40 mg oral tablet: 3 tab(s) orally once a day   levoFLOXacin 500 mg oral tablet: 1 tab(s) orally every 24 hours  ondansetron 8 mg oral tablet: 1 tab(s) orally 3 times a day, As Needed nausea  oxybutynin 5 mg oral tablet: 1 tab(s) orally once a day  traZODone 150 mg oral tablet: 1 tab(s) orally once a day (at bedtime)   acyclovir 400 mg oral tablet: 1 tab(s) orally 2 times a day  amLODIPine 5 mg oral tablet: 1 tab(s) orally once a day  atorvastatin 10 mg oral tablet: 1 tab(s) orally once a day (at bedtime)  budesonide-formoterol 80 mcg-4.5 mcg/inh inhalation aerosol: 2 puff(s) inhaled 2 times a day   buPROPion 150 mg/12 hours (SR) oral tablet, extended release: 1 tab(s) orally once a day  gabapentin 100 mg oral tablet: 1 tab(s) orally once a day  gilteritinib 40 mg oral tablet: 3 tab(s) orally once a day   levoFLOXacin 500 mg oral tablet: 1 tab(s) orally every 24 hours  ondansetron 8 mg oral tablet: 1 tab(s) orally 3 times a day, As Needed nausea  oxybutynin 5 mg oral tablet: 1 tab(s) orally once a day  traZODone 150 mg oral tablet: 1 tab(s) orally once a day (at bedtime)  voriconazole 200 mg oral tablet: 1 tab(s) orally every 12 hours

## 2022-11-09 NOTE — DISCHARGE NOTE PROVIDER - CARE PROVIDER_API CALL
Goldberg, Bradley H (MD)  Hematology; Internal Medicine; Medical Oncology  [address] [place] [address]  Phone: [phone]  Fax: [phone]  Follow Up Time:     Sherri Rao (NP; RN)  NP in Adult Health  [address] [place] [address]  Phone: [phone]  Fax: [phone]  Follow Up Time:

## 2022-11-10 NOTE — PROGRESS NOTE ADULT - PROBLEM SELECTOR PLAN 6
VTE ppx:  Hold pharmacologic prophylaxis due to thrombocytopenia  Activity: OOB with assistance, fall precaution, bed alarm  Diet: DASH

## 2022-11-10 NOTE — PROGRESS NOTE ADULT - PROBLEM SELECTOR PLAN 1
leukocytosis of 101k, 90% of blasts in peripheral blood.  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   currently denies symptoms of leukostasis.   Her labs are not consistent with TLS.  PT is elevated at 14.9, however PTT is 27.4 (low) and fibrinogen is elevated (542), which is not consistent with DIC.   IVNS at 100cc/hr. Check TTE to assess cardiac function.   Check T&S, hepatitis B SAg, hep B SAb, hep B total core Ab, HCV and HIV  G6PD level was normal in 2020.   Trend CBC, CMP, TLS labs, DIC labs q12 hours.   If fibrinogen drops under 150, please give 10 units of cryoprecipitate.  Transfuse for Hgb < 7 and platelet count < 10k, < 20k if febrile, < 50k if bleeding  Due to leukocytosis, patient was started on cytoreductive therapy with hydroxyurea 1000mg BID --> WBC repeat is 105, increased dose to 2000mg BID. Will trend CBC. If patient develops new neurologic changes, obtain stat CT chest. If patient develops new respiratory symptoms, obtain stat xray.   For TLS prophylaxis, continue allopurinol 300mg PO qdaily.   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 plan to start dosing at 120mg PO qdaily - delivery planned today  Because gilteritinib can cause QTc prolongation, EKG checked - sinus rhythm HR 77, with left axis deviation, and LBBB  cont dexamethasone 2.5mg q12h to avoid differentiation syndrome. leukocytosis of 101k, 90% of blasts in peripheral blood.  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   currently denies symptoms of leukostasis.   Her labs are not consistent with TLS.  PT is elevated at 14.9, however PTT is 27.4 (low) and fibrinogen is elevated (542), which is not consistent with DIC.   IVNS at 100cc/hr. Check TTE to assess cardiac function.   Check T&S, hepatitis B SAg, hep B SAb, hep B total core Ab, HCV and HIV  G6PD level was normal in 2020.   Trend CBC, CMP, TLS labs, DIC labs q12 hours.   If fibrinogen drops under 150, please give 10 units of cryoprecipitate.  Transfuse for Hgb < 7 and platelet count < 10k, < 20k if febrile, < 50k if bleeding  Due to leukocytosis, patient was started on cytoreductive therapy with hydroxyurea 1000mg BID --> WBC repeat is 105, increased dose to 2000mg BID. Will trend CBC. If patient develops new neurologic changes, obtain stat CT chest. If patient develops new respiratory symptoms, obtain stat xray.   For TLS prophylaxis, continue allopurinol 300mg PO qdaily.   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 plan to start dosing at 120mg PO qdaily - delivery planned today  Because gilteritinib can cause QTc prolongation, EKG checked - sinus rhythm HR 77, with left axis deviation, and LBBB  cont dexamethasone 2.5mg q12h to avoid differentiation syndrome.  11/10 planned to start gilteritinib today. continue HU leukocytosis of 101k, 90% of blasts in peripheral blood.  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   currently denies symptoms of leukostasis.   Her labs are not consistent with TLS.  PT is elevated at 14.9, however PTT is 27.4 (low) and fibrinogen is elevated (542), which is not consistent with DIC.   IVNS at 100cc/hr. Check TTE to assess cardiac function.   Check T&S, hepatitis B SAg, hep B SAb, hep B total core Ab, HCV and HIV  G6PD level was normal in 2020.   Trend CBC, CMP, TLS labs, DIC labs q12 hours.   If fibrinogen drops under 150, please give 10 units of cryoprecipitate.  Transfuse for Hgb < 7 and platelet count < 10k, < 20k if febrile, < 50k if bleeding  Due to leukocytosis, patient was started on cytoreductive therapy with hydroxyurea 1000mg BID --> WBC repeat is 105, increased dose to 2000mg BID. Will trend CBC. If patient develops new neurologic changes, obtain stat CT chest. If patient develops new respiratory symptoms, obtain stat xray.   For TLS prophylaxis, continue allopurinol 300mg PO qdaily.   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 plan to start dosing at 120mg PO qdaily - delivery planned today  Because gilteritinib can cause QTc prolongation, EKG checked - sinus rhythm HR 77, with left axis deviation, and LBBB  cont dexamethasone 2.5mg q12h to avoid differentiation syndrome.  11/10 plan to start gilteritinib when WBC < 20k. increase hydrea to 2.5g BID, Cytarabine 100mg/m2 x 3 days continuous infusion. leukocytosis of 101k, 90% of blasts in peripheral blood.  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   IV NS at 100cc/hr.   TTE  11/9 - EF 70% mild AS   G6PD level was normal in 2020.   Trend CBC, CMP, TLS labs, DIC labs q12 hours.   If fibrinogen drops under 100, please give 10 units of cryoprecipitate.  Transfuse for Hgb < 7 and platelet count < 10k, < 20k if febrile, < 50k if bleeding  hydrea 2.5g BID for hyperleukocytosis  If patient develops new neurologic changes, obtain stat CT chest. If patient develops new respiratory symptoms, obtain stat xray. continue allopurinol 300mg PO qdaily.   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 plan to start dosing at 120mg PO qdaily - delivery planned today  Because gilteritinib can cause QTc prolongation, EKG checked - sinus rhythm HR 77, with left axis deviation, and LBBB  cont dexamethasone 2.5mg q12h to avoid differentiation syndrome.  11/10 plan to start gilteritinib when WBC < 20k. increase hydrea to 2.5g BID, Cytarabine 100mg/m2 x 3 days continuous infusion.

## 2022-11-10 NOTE — PROGRESS NOTE ADULT - PROBLEM SELECTOR PLAN 2
ANC is 2031, however we anticipate this will drop with continued use of hydroxyurea.   Patient currently is afebrile and she denies infectious symptoms. Will continue to monitor closely.   Started levofloxacin 500mg PO qdaily for antibacterial coverage, fluconazole 200mg PO qdaily for antifungal coverage and acyclovir 400mg PO BID for antiviral coverage.  Blood Cx and MSSA swab ordered (Mediport present upon admission) Patient is neutropenic,a febrile  Started levofloxacin 500mg PO qdaily for antibacterial coverage, fluconazole 200mg PO qdaily for antifungal coverage and acyclovir 400mg PO BID for antiviral coverage.  [facility]

## 2022-11-10 NOTE — PROGRESS NOTE ADULT - SUBJECTIVE AND OBJECTIVE BOX
Diagnosis: relapsed AML, FLT3 ITD+, NPM1, CEBPA, ASXL1 mutated    Protocol/Chemo Regimen: oral FLT3 inhibitor gilteritinib  Day: 1     Pt endorsed:     Review of Systems: denies abdominal pain, chest pain, n/v/d, sob    Pain scale: 0                                           Diet: DASH    Allergies: No Known Allergies    ANTIMICROBIALS  acyclovir   Oral Tab/Cap 400 milliGRAM(s) Oral two times a day  fluconAZOLE   Tablet 200 milliGRAM(s) Oral daily  levoFLOXacin  Tablet 500 milliGRAM(s) Oral every 24 hours      HEME/ONC MEDICATIONS  hydroxyurea 2000 milliGRAM(s) Oral every 12 hours      STANDING MEDICATIONS  allopurinol 300 milliGRAM(s) Oral daily  amLODIPine   Tablet 5 milliGRAM(s) Oral daily  Biotene Dry Mouth Oral Rinse 15 milliLiter(s) Swish and Spit three times a day  buPROPion XL (24-Hour) . 150 milliGRAM(s) Oral daily  chlorhexidine 2% Cloths 1 Application(s) Topical daily  dexAMETHasone  Injectable 2.5 milliGRAM(s) IV Push every 12 hours  gabapentin 100 milliGRAM(s) Oral daily  oxybutynin 5 milliGRAM(s) Oral daily  polyethylene glycol 3350 17 Gram(s) Oral two times a day  sodium chloride 0.9%. 1000 milliLiter(s) IV Continuous <Continuous>  traZODone 150 milliGRAM(s) Oral at bedtime      PRN MEDICATIONS  acetaminophen     Tablet .. 650 milliGRAM(s) Oral every 6 hours PRN  aluminum hydroxide/magnesium hydroxide/simethicone Suspension 30 milliLiter(s) Oral every 4 hours PRN  melatonin 3 milliGRAM(s) Oral at bedtime PRN  ondansetron Injectable 4 milliGRAM(s) IV Push every 8 hours PRN      Vital Signs Last 24 Hrs  T(C): 36.6 (10 Nov 2022 05:56), Max: 37.6 (09 Nov 2022 16:45)  T(F): 97.8 (10 Nov 2022 05:56), Max: 99.7 (09 Nov 2022 16:45)  HR: 71 (10 Nov 2022 05:56) (71 - 79)  BP: 122/66 (10 Nov 2022 05:56) (109/60 - 134/65)  RR: 18 (10 Nov 2022 05:56) (18 - 19)  SpO2: 96% (10 Nov 2022 05:56) (95% - 98%)    Parameters below as of 10 Nov 2022 05:56  Patient On (Oxygen Delivery Method): room air        PHYSICAL EXAM  General: adult in NAD  HEENT: clear oropharynx, anicteric sclera, pink conjunctiva  Neck: supple  CV: normal S1/S2 RRR  Lungs: positive air movement b/l ant lungs,clear to auscultation, no wheezes, no rales  Abdomen: soft non-tender non-distended  Ext: no LE edema;  <1cm hemorrhagic skin lesion R medial calf  Skin: no rashes and no petechiae  Neuro: alert and oriented X 4, no focal deficits  Central Line: LCW Mediport c/d/i    Cultures: no recent    LABS:            RADIOLOGY & ADDITIONAL STUDIES:         Diagnosis: relapsed AML, FLT3 ITD+, NPM1, CEBPA, ASXL1 mutated    Protocol/Chemo Regimen: oral FLT3 inhibitor gilteritinib  Day: 1     Pt endorsed: moving bowels today; otherwise no complaints    Review of Systems: denies abdominal pain, chest pain, n/v/d, sob    Pain scale: 0                                           Diet: DASH    Allergies: No Known Allergies    ANTIMICROBIALS  acyclovir   Oral Tab/Cap 400 milliGRAM(s) Oral two times a day  fluconAZOLE   Tablet 200 milliGRAM(s) Oral daily  levoFLOXacin  Tablet 500 milliGRAM(s) Oral every 24 hours      HEME/ONC MEDICATIONS  hydroxyurea 2000 milliGRAM(s) Oral every 12 hours      STANDING MEDICATIONS  allopurinol 300 milliGRAM(s) Oral daily  amLODIPine   Tablet 5 milliGRAM(s) Oral daily  Biotene Dry Mouth Oral Rinse 15 milliLiter(s) Swish and Spit three times a day  buPROPion XL (24-Hour) . 150 milliGRAM(s) Oral daily  chlorhexidine 2% Cloths 1 Application(s) Topical daily  dexAMETHasone  Injectable 2.5 milliGRAM(s) IV Push every 12 hours  gabapentin 100 milliGRAM(s) Oral daily  oxybutynin 5 milliGRAM(s) Oral daily  polyethylene glycol 3350 17 Gram(s) Oral two times a day  sodium chloride 0.9%. 1000 milliLiter(s) IV Continuous <Continuous>  traZODone 150 milliGRAM(s) Oral at bedtime      PRN MEDICATIONS  acetaminophen     Tablet .. 650 milliGRAM(s) Oral every 6 hours PRN  aluminum hydroxide/magnesium hydroxide/simethicone Suspension 30 milliLiter(s) Oral every 4 hours PRN  melatonin 3 milliGRAM(s) Oral at bedtime PRN  ondansetron Injectable 4 milliGRAM(s) IV Push every 8 hours PRN      Vital Signs Last 24 Hrs  T(C): 36.6 (10 Nov 2022 05:56), Max: 37.6 (09 Nov 2022 16:45)  T(F): 97.8 (10 Nov 2022 05:56), Max: 99.7 (09 Nov 2022 16:45)  HR: 71 (10 Nov 2022 05:56) (71 - 79)  BP: 122/66 (10 Nov 2022 05:56) (109/60 - 134/65)  RR: 18 (10 Nov 2022 05:56) (18 - 19)  SpO2: 96% (10 Nov 2022 05:56) (95% - 98%)    Parameters below as of 10 Nov 2022 05:56  Patient On (Oxygen Delivery Method): room air    PHYSICAL EXAM  General: adult in NAD  HEENT: clear oropharynx, anicteric sclera, pink conjunctiva  Neck: supple  CV: normal S1/S2 RRR  Lungs: positive air movement b/l ant lungs,clear to auscultation, no wheezes, no rales  Abdomen: soft non-tender non-distended  Ext: no LE edema;  <1cm hemorrhagic skin lesion R medial calf  Skin: no rashes and no petechiae  Neuro: alert and oriented X 4, no focal deficits  Central Line: LCW Mediport c/d/i    Cultures: no recent    LABS:               7.6    87.28 )-----------( 27       ( 10 Nov 2022 06:56 )             22.2     10 Nov 2022 06:55    143    |  110    |  13     ----------------------------<  134    3.4     |  24     |  0.53     Ca    8.0        10 Nov 2022 06:55  Phos  2.7       10 Nov 2022 06:55  Mg     2.3       10 Nov 2022 06:55    TPro  5.9    /  Alb  3.7    /  TBili  0.2    /  DBili  x      /  AST  23     /  ALT  23     /  AlkPhos  64     10 Nov 2022 06:55    PT/INR - ( 10 Nov 2022 06:57 )   PT: 15.0 sec;   INR: 1.29 ratio    PTT - ( 10 Nov 2022 06:57 )  PTT:23.3 sec    LIVER FUNCTIONS - ( 10 Nov 2022 06:55 )  Alb: 3.7 g/dL / Pro: 5.9 g/dL / ALK PHOS: 64 U/L / ALT: 23 U/L / AST: 23 U/L / GGT: x           RADIOLOGY & ADDITIONAL STUDIES:  from: Xray Chest 1 View- PORTABLE-Urgent (Xray Chest 1 View- PORTABLE-Urgent .) (11.08.22 @ 02:29)   FINDINGS:  Left chest wall CT compatible port in stable position with tip   terminating in the SVC. Port reservoir remains in appropriate   configuration; no port flipping.  The heart is normal in size.  The lungs are clear.  There is no pneumothorax or pleural effusion.  No acute bony abnormality.  IMPRESSION:  Stable configuration and location of left chest wall CT compatible port.  Clear lungs.             Diagnosis: relapsed AML, FLT3 ITD+, NPM1, CEBPA, ASXL1 mutated    Protocol/Chemo Regimen: daily oral FLT3 inhibitor gilteritinib (cytarabine 100mg/m2 x 3 days continuous infusion prior to start of gilteritinib)    Day: pending start     Pt endorsed: moving bowels today; otherwise no complaints    Review of Systems: denies abdominal pain, chest pain, n/v/d, sob    Pain scale: 0                                           Diet: DASH    Allergies: No Known Allergies    ANTIMICROBIALS  acyclovir   Oral Tab/Cap 400 milliGRAM(s) Oral two times a day  fluconAZOLE   Tablet 200 milliGRAM(s) Oral daily  levoFLOXacin  Tablet 500 milliGRAM(s) Oral every 24 hours      HEME/ONC MEDICATIONS  hydroxyurea 2000 milliGRAM(s) Oral every 12 hours      STANDING MEDICATIONS  allopurinol 300 milliGRAM(s) Oral daily  amLODIPine   Tablet 5 milliGRAM(s) Oral daily  Biotene Dry Mouth Oral Rinse 15 milliLiter(s) Swish and Spit three times a day  buPROPion XL (24-Hour) . 150 milliGRAM(s) Oral daily  chlorhexidine 2% Cloths 1 Application(s) Topical daily  dexAMETHasone  Injectable 2.5 milliGRAM(s) IV Push every 12 hours  gabapentin 100 milliGRAM(s) Oral daily  oxybutynin 5 milliGRAM(s) Oral daily  polyethylene glycol 3350 17 Gram(s) Oral two times a day  sodium chloride 0.9%. 1000 milliLiter(s) IV Continuous <Continuous>  traZODone 150 milliGRAM(s) Oral at bedtime      PRN MEDICATIONS  acetaminophen     Tablet .. 650 milliGRAM(s) Oral every 6 hours PRN  aluminum hydroxide/magnesium hydroxide/simethicone Suspension 30 milliLiter(s) Oral every 4 hours PRN  melatonin 3 milliGRAM(s) Oral at bedtime PRN  ondansetron Injectable 4 milliGRAM(s) IV Push every 8 hours PRN      Vital Signs Last 24 Hrs  T(C): 36.6 (10 Nov 2022 05:56), Max: 37.6 (09 Nov 2022 16:45)  T(F): 97.8 (10 Nov 2022 05:56), Max: 99.7 (09 Nov 2022 16:45)  HR: 71 (10 Nov 2022 05:56) (71 - 79)  BP: 122/66 (10 Nov 2022 05:56) (109/60 - 134/65)  RR: 18 (10 Nov 2022 05:56) (18 - 19)  SpO2: 96% (10 Nov 2022 05:56) (95% - 98%)    Parameters below as of 10 Nov 2022 05:56  Patient On (Oxygen Delivery Method): room air    PHYSICAL EXAM  General: adult in NAD  HEENT: clear oropharynx, anicteric sclera, pink conjunctiva  Neck: supple  CV: normal S1/S2 RRR  Lungs: positive air movement b/l ant lungs,clear to auscultation, no wheezes, no rales  Abdomen: soft non-tender non-distended  Ext: no LE edema;  <1cm hemorrhagic skin lesion R medial calf  Skin: no rashes and no petechiae  Neuro: alert and oriented X 4, no focal deficits  Central Line: LCW Mediport c/d/i    Cultures: no recent    LABS:               7.6    87.28 )-----------( 27       ( 10 Nov 2022 06:56 )             22.2     10 Nov 2022 06:55    143    |  110    |  13     ----------------------------<  134    3.4     |  24     |  0.53     Ca    8.0        10 Nov 2022 06:55  Phos  2.7       10 Nov 2022 06:55  Mg     2.3       10 Nov 2022 06:55    TPro  5.9    /  Alb  3.7    /  TBili  0.2    /  DBili  x      /  AST  23     /  ALT  23     /  AlkPhos  64     10 Nov 2022 06:55    PT/INR - ( 10 Nov 2022 06:57 )   PT: 15.0 sec;   INR: 1.29 ratio    PTT - ( 10 Nov 2022 06:57 )  PTT:23.3 sec    LIVER FUNCTIONS - ( 10 Nov 2022 06:55 )  Alb: 3.7 g/dL / Pro: 5.9 g/dL / ALK PHOS: 64 U/L / ALT: 23 U/L / AST: 23 U/L / GGT: x           RADIOLOGY & ADDITIONAL STUDIES:  from: Xray Chest 1 View- PORTABLE-Urgent (Xray Chest 1 View- PORTABLE-Urgent .) (11.08.22 @ 02:29)   FINDINGS:  Left chest wall CT compatible port in stable position with tip   terminating in the SVC. Port reservoir remains in appropriate   configuration; no port flipping.  The heart is normal in size.  The lungs are clear.  There is no pneumothorax or pleural effusion.  No acute bony abnormality.  IMPRESSION:  Stable configuration and location of left chest wall CT compatible port.  Clear lungs.             Diagnosis: relapsed AML, FLT3 ITD+, NPM1, CEBPA, ASXL1 mutated    Protocol/Chemo Regimen: daily oral FLT3 inhibitor gilteritinib (cytarabine 100mg/m2 x 3 days continuous infusion prior to start of gilteritinib)    Day: pending start     Pt endorsed: moving bowels today; otherwise no complaints    Review of Systems: denies abdominal pain, chest pain, n/v/d, sob    Pain scale: 0                                           Diet: DASH    Allergies: No Known Allergies    ANTIMICROBIALS  acyclovir   Oral Tab/Cap 400 milliGRAM(s) Oral two times a day  fluconAZOLE   Tablet 200 milliGRAM(s) Oral daily  levoFLOXacin  Tablet 500 milliGRAM(s) Oral every 24 hours    HEME/ONC MEDICATIONS  hydroxyurea 2000 milliGRAM(s) Oral every 12 hours    STANDING MEDICATIONS  allopurinol 300 milliGRAM(s) Oral daily  amLODIPine   Tablet 5 milliGRAM(s) Oral daily  Biotene Dry Mouth Oral Rinse 15 milliLiter(s) Swish and Spit three times a day  buPROPion XL (24-Hour) . 150 milliGRAM(s) Oral daily  chlorhexidine 2% Cloths 1 Application(s) Topical daily  dexAMETHasone  Injectable 2.5 milliGRAM(s) IV Push every 12 hours  gabapentin 100 milliGRAM(s) Oral daily  oxybutynin 5 milliGRAM(s) Oral daily  polyethylene glycol 3350 17 Gram(s) Oral two times a day  sodium chloride 0.9%. 1000 milliLiter(s) IV Continuous <Continuous>  traZODone 150 milliGRAM(s) Oral at bedtime    PRN MEDICATIONS  acetaminophen     Tablet .. 650 milliGRAM(s) Oral every 6 hours PRN  aluminum hydroxide/magnesium hydroxide/simethicone Suspension 30 milliLiter(s) Oral every 4 hours PRN  melatonin 3 milliGRAM(s) Oral at bedtime PRN  ondansetron Injectable 4 milliGRAM(s) IV Push every 8 hours PRN    Vital Signs Last 24 Hrs  T(C): 36.6 (10 Nov 2022 05:56), Max: 37.6 (09 Nov 2022 16:45)  T(F): 97.8 (10 Nov 2022 05:56), Max: 99.7 (09 Nov 2022 16:45)  HR: 71 (10 Nov 2022 05:56) (71 - 79)  BP: 122/66 (10 Nov 2022 05:56) (109/60 - 134/65)  RR: 18 (10 Nov 2022 05:56) (18 - 19)  SpO2: 96% (10 Nov 2022 05:56) (95% - 98%)    Parameters below as of 10 Nov 2022 05:56  Patient On (Oxygen Delivery Method): room air    PHYSICAL EXAM  General: adult in NAD  HEENT: clear oropharynx, anicteric sclera, pink conjunctiva  Neck: supple  CV: normal S1/S2 RRR  Lungs: positive air movement b/l ant lungs,clear to auscultation, no wheezes, no rales  Abdomen: soft non-tender non-distended  Ext: no LE edema;  <1cm hemorrhagic skin lesion R medial calf  Skin: no rashes and no petechiae  Neuro: alert and oriented X 4, no focal deficits  Central Line: LCW Mediport c/d/i    Cultures: no recent    LABS:               7.6    87.28 )-----------( 27       ( 10 Nov 2022 06:56 )             22.2     10 Nov 2022 06:55    143    |  110    |  13     ----------------------------<  134    3.4     |  24     |  0.53     Ca    8.0        10 Nov 2022 06:55  Phos  2.7       10 Nov 2022 06:55  Mg     2.3       10 Nov 2022 06:55    TPro  5.9    /  Alb  3.7    /  TBili  0.2    /  DBili  x      /  AST  23     /  ALT  23     /  AlkPhos  64     10 Nov 2022 06:55    PT/INR - ( 10 Nov 2022 06:57 )   PT: 15.0 sec;   INR: 1.29 ratio    PTT - ( 10 Nov 2022 06:57 )  PTT:23.3 sec    LIVER FUNCTIONS - ( 10 Nov 2022 06:55 )  Alb: 3.7 g/dL / Pro: 5.9 g/dL / ALK PHOS: 64 U/L / ALT: 23 U/L / AST: 23 U/L / GGT: x           RADIOLOGY & ADDITIONAL STUDIES:  from: Xray Chest 1 View- PORTABLE-Urgent (Xray Chest 1 View- PORTABLE-Urgent .) (11.08.22 @ 02:29)   FINDINGS:  Left chest wall CT compatible port in stable position with tip   terminating in the SVC. Port reservoir remains in appropriate   configuration; no port flipping.  The heart is normal in size.  The lungs are clear.  There is no pneumothorax or pleural effusion.  No acute bony abnormality.  IMPRESSION:  Stable configuration and location of left chest wall CT compatible port.  Clear lungs.

## 2022-11-10 NOTE — PROGRESS NOTE ADULT - NSPROGADDITIONALINFOA_GEN_ALL_CORE
83 yo with FLT-3, NPM1, ASXL1, WT1, CEBPA mut - (ASXL1, WT1 recent additional findings)  had response to decitabine/lisa through 27 cycles, now with progression, hyperleukocytosis  ,000, with 90% blasts  Placed on HU, now 2 gm 2x/d  WBC 86K today, 90% blasts, ANC 1700  Renal fxn, LFTs nl, , uric acid 3.8, phos 2.6    MEDICATIONS  (STANDING):   acyclovir   Oral Tab/Cap 400 milliGRAM(s) Oral two times a day  allopurinol 300 milliGRAM(s) Oral daily  amLODIPine   Tablet 5 milliGRAM(s) Oral daily  Biotene Dry Mouth Oral Rinse 15 milliLiter(s) Swish and Spit three times a day  buPROPion XL (24-Hour) . 150 milliGRAM(s) Oral daily  chlorhexidine 2% Cloths 1 Application(s) Topical daily  dexAMETHasone  Injectable 5 milliGRAM(s) IV Push every 12 hours  fluconAZOLE   Tablet 200 milliGRAM(s) Oral daily  gabapentin 100 milliGRAM(s) Oral daily  hydroxyurea 2000 milliGRAM(s) Oral every 12 hours  levoFLOXacin  Tablet 500 milliGRAM(s) Oral every 24 hours  oxybutynin 5 milliGRAM(s) Oral daily  polyethylene glycol 3350 17 Gram(s) Oral two times a day  sodium chloride 0.9%. 1000 milliLiter(s) (100 mL/Hr) IV Continuous <Continuous>  traZODone 150 milliGRAM(s) Oral at bedtime    Plan to treat with gilteritinib once WBC decreased  until then decr dex to 2 mg q12 hr  cont IVFs, I and O,  tumor lysis labs  cont HU, allopurinol  cont ppx antibiotics  echocardiogram today  OOB 81 yo with FLT-3, NPM1, ASXL1, WT1, CEBPA mut - (ASXL1, WT1 recent additional findings)  had response to decitabine/lisa through 27 cycles, now with progression, hyperleukocytosis  Today, WBC 87,280, with 96% blasts, ANC 1.66  on HU, now 2 gm 2x/d     Renal fxn, LFTs nl,  uric acid and phos nl  MEDICATIONS  (STANDING):   acyclovir   Oral Tab/Cap 400 milliGRAM(s) Oral two times a day  allopurinol 300 milliGRAM(s) Oral daily  amLODIPine   Tablet 5 milliGRAM(s) Oral daily  Biotene Dry Mouth Oral Rinse 15 milliLiter(s) Swish and Spit three times a day  buPROPion XL (24-Hour) . 150 milliGRAM(s) Oral daily  chlorhexidine 2% Cloths 1 Application(s) Topical daily  dexAMETHasone  Injectable 5 milliGRAM(s) IV Push every 12 hours  fluconAZOLE   Tablet 200 milliGRAM(s) Oral daily  gabapentin 100 milliGRAM(s) Oral daily  hydroxyurea 2000 milliGRAM(s) Oral every 12 hours  levoFLOXacin  Tablet 500 milliGRAM(s) Oral every 24 hours  oxybutynin 5 milliGRAM(s) Oral daily  polyethylene glycol 3350 17 Gram(s) Oral two times a day  sodium chloride 0.9%. 1000 milliLiter(s) (100 mL/Hr) IV Continuous <Continuous>  traZODone 150 milliGRAM(s) Oral at bedtime    Plan to treat with gilteritinib once WBC decreased  until then d/c dex until gilteritinib started  begin tamela-c 100 mg/sq m/d to lower WBC rapidly  cont IVFs, I and O,  tumor lysis labs incl fibrinogen (trending down) 2x/d once tamela-c starts  cont HU, allopurinol  cont ppx antibiotics  echocardiogram mild AS, nl LV fxn  OOB

## 2022-11-10 NOTE — PROGRESS NOTE ADULT - ASSESSMENT
Ms. 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 Patient presents with hyperleukocytosis, anemia, and thrombocytopenia due to disease process.

## 2022-11-11 NOTE — PROGRESS NOTE ADULT - PROBLEM SELECTOR PLAN 1
leukocytosis of 101k, 90% of blasts in peripheral blood.  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   IV NS at 100cc/hr.   TTE  11/9 - EF 70% mild AS   G6PD level was normal in 2020.   Trend CBC, CMP, TLS labs, DIC labs q12 hours.   If fibrinogen drops under 100, please give 10 units of cryoprecipitate.  Transfuse for Hgb < 7 and platelet count < 10k, < 20k if febrile, < 50k if bleeding  hydrea 2.5g BID for hyperleukocytosis  If patient develops new neurologic changes, obtain stat CT chest. If patient develops new respiratory symptoms, obtain stat xray. continue allopurinol 300mg PO qdaily.   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 plan to start dosing at 120mg PO qdaily - delivery planned today  Because gilteritinib can cause QTc prolongation, EKG checked - sinus rhythm HR 77, with left axis deviation, and LBBB  cont dexamethasone 2.5mg q12h to avoid differentiation syndrome.  11/10 plan to start gilteritinib when WBC < 20k. increase hydrea to 2.5g BID, Cytarabine 100mg/m2 x 3 days continuous infusion. leukocytosis of 101k, 90% of blasts in peripheral blood.  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   IV NS at 100cc/hr.   TTE  11/9 - EF 70% mild AS   G6PD level was normal in 2020.   Trend CBC, CMP, TLS labs, DIC labs q12 hours.   If fibrinogen drops under 100, please give 10 units of cryoprecipitate.  Transfuse for Hgb < 7 and platelet count < 10k, < 20k if febrile, < 50k if bleeding  hydrea 2.5g BID for hyperleukocytosis  If patient develops new neurologic changes, obtain stat CT chest. If patient develops new respiratory symptoms, obtain stat xray. continue allopurinol 300mg PO qdaily.   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 plan to start dosing at 120mg PO qdaily - delivered on 11/10  Because gilteritinib can cause QTc prolongation, EKG checked - sinus rhythm HR 77, with left axis deviation, and LBBB  cont dexamethasone 2.5mg q12h to avoid differentiation syndrome.  11/10 plan to start gilteritinib when WBC < 20k. increase hydrea to 2.5g BID, Cytarabine 100mg/m2 x 3 days continuous infusion.  11/11 Anemia PRBC x 1  11/11 Thrombocytopenia - Platelet x 1 unit

## 2022-11-11 NOTE — PROGRESS NOTE ADULT - NSPROGADDITIONALINFOA_GEN_ALL_CORE
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 150 milliGRAM(s) Oral daily  chlorhexidine 2% Cloths 1 Application(s) Topical daily  dexAMETHasone  Injectable 5 milliGRAM(s) IV Push every 12 hours  fluconAZOLE   Tablet 200 milliGRAM(s) Oral daily  gabapentin 100 milliGRAM(s) Oral daily  hydroxyurea 2000 milliGRAM(s) Oral every 12 hours  levoFLOXacin  Tablet 500 milliGRAM(s) Oral every 24 hours  oxybutynin 5 milliGRAM(s) Oral daily  polyethylene glycol 3350 17 Gram(s) Oral two times a day  sodium chloride 0.9%. 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 epi, got one dose vanco,cults pending  on HU, now 2 gm 2x/d     Renal fxn, LFTs nl, , uric acid and phos nl    MEDICATIONS  (STANDING):  acyclovir   Oral Tab/Cap 400 milliGRAM(s) Oral two times a day  allopurinol 300 milliGRAM(s) Oral daily  amLODIPine   Tablet 5 milliGRAM(s) Oral daily  Biotene Dry Mouth Oral Rinse 15 milliLiter(s) Swish and Spit three times a day  buPROPion XL (24-Hour) . 150 milliGRAM(s) Oral daily  chlorhexidine 2% Cloths 1 Application(s) Topical daily  cytarabine IVPB (eMAR) 164 milliGRAM(s) IV Intermittent daily  fluconAZOLE   Tablet 200 milliGRAM(s) Oral daily  furosemide    Tablet 40 milliGRAM(s) Oral daily  gabapentin 100 milliGRAM(s) Oral daily  hydroxyurea 2500 milliGRAM(s) Oral every 12 hours  levoFLOXacin  Tablet 500 milliGRAM(s) Oral every 24 hours  ondansetron Injectable 8 milliGRAM(s) IV Push every 8 hours  oxybutynin 5 milliGRAM(s) Oral daily  phytonadione   Solution 5 milliGRAM(s) Oral daily  polyethylene glycol 3350 17 Gram(s) Oral two times a day  sodium chloride 0.9%. 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 epi, got one dose vanco,cults pending  on HU, now 2 gm 2x/d     Renal fxn, LFTs nl, , uric acid and phos nl    MEDICATIONS  (STANDING):  acyclovir   Oral Tab/Cap 400 milliGRAM(s) Oral two times a day  allopurinol 300 milliGRAM(s) Oral daily  amLODIPine   Tablet 5 milliGRAM(s) Oral daily  Biotene Dry Mouth Oral Rinse 15 milliLiter(s) Swish and Spit three times a day  buPROPion XL (24-Hour) . 150 milliGRAM(s) Oral daily  chlorhexidine 2% Cloths 1 Application(s) Topical daily  cytarabine IVPB (eMAR) 164 milliGRAM(s) IV Intermittent daily  fluconAZOLE   Tablet 200 milliGRAM(s) Oral daily  furosemide    Tablet 40 milliGRAM(s) Oral daily  gabapentin 100 milliGRAM(s) Oral daily  hydroxyurea 2500 milliGRAM(s) Oral every 12 hours  levoFLOXacin  Tablet 500 milliGRAM(s) Oral every 24 hours  ondansetron Injectable 8 milliGRAM(s) IV Push every 8 hours  oxybutynin 5 milliGRAM(s) Oral daily  phytonadione   Solution 5 milliGRAM(s) Oral daily  polyethylene glycol 3350 17 Gram(s) Oral two times a day  sodium chloride 0.9%. 1000 milliLiter(s) (10 mL/Hr) IV Continuous <Continuous>  traZODone 150 milliGRAM(s) Oral at bedtime    Plan to treat with gilteritinib once WBC decreased  risk of DS with xospada relatively low, but data not extensive and in this case most of PB WBC are blasts  could consider starting xospada once WBC/blasts < 20K  until then d/c dex until gilteritinib started  cont HU and  tamela-c 100 mg/sq m/d    cont IVFs, I and O, needs diuresis again today  tumor lysis labs incl fibrinogen (trending down) 2x/d    d-dimer markedly increased     levaquine d/c'd >>> start cefepime    OOB 83 yo with FLT-3, NPM1, ASXL1, 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 epi, got one dose vanco,cults pending  on HU, now 2 gm 2x/d  c/o  SSCP w/o radiation  fluid overloaded, has AS on echo, check EKG, CE, cardiology to see   Renal fxn, LFTs nl, , uric acid and phos nl    MEDICATIONS  (STANDING):  acyclovir   Oral Tab/Cap 400 milliGRAM(s) Oral two times a day  allopurinol 300 milliGRAM(s) Oral daily  amLODIPine   Tablet 5 milliGRAM(s) Oral daily  Biotene Dry Mouth Oral Rinse 15 milliLiter(s) Swish and Spit three times a day  buPROPion XL (24-Hour) . 150 milliGRAM(s) Oral daily  chlorhexidine 2% Cloths 1 Application(s) Topical daily  cytarabine IVPB (eMAR) 164 milliGRAM(s) IV Intermittent daily  fluconAZOLE   Tablet 200 milliGRAM(s) Oral daily  furosemide    Tablet 40 milliGRAM(s) Oral daily  gabapentin 100 milliGRAM(s) Oral daily  hydroxyurea 2500 milliGRAM(s) Oral every 12 hours  levoFLOXacin  Tablet 500 milliGRAM(s) Oral every 24 hours  ondansetron Injectable 8 milliGRAM(s) IV Push every 8 hours  oxybutynin 5 milliGRAM(s) Oral daily  phytonadione   Solution 5 milliGRAM(s) Oral daily  polyethylene glycol 3350 17 Gram(s) Oral two times a day  sodium chloride 0.9%. 1000 milliLiter(s) (10 mL/Hr) IV Continuous <Continuous>  traZODone 150 milliGRAM(s) Oral at bedtime    Plan to treat with gilteritinib once WBC decreased  risk of DS with xospada relatively low, but data not extensive and in this case most of PB WBC are blasts  could consider starting xospada once WBC/blasts < 20K  until then d/c dex until gilteritinib started  cont HU and  tamela-c 100 mg/sq m/d    cont IVFs, I and O, needs diuresis again today  tumor lysis labs incl fibrinogen (trending down) 2x/d    d-dimer markedly increased     levaquine d/c'd >>> start cefepime    OOB

## 2022-11-11 NOTE — PROGRESS NOTE ADULT - ASSESSMENT
Ms. 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 Patient presents with hyperleukocytosis, anemia, and thrombocytopenia due to disease process.    Ms. 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 Started Cytarabine 100 mg/m2 on 11/10. Hospital course complicated by Staph epidermis bacteriemia. Patient presents with hyperleukocytosis, anemia, and thrombocytopenia due to disease process.

## 2022-11-11 NOTE — PROGRESS NOTE ADULT - SUBJECTIVE AND OBJECTIVE BOX
Diagnosis: relapsed AML, FLT3 ITD+, NPM1, CEBPA, ASXL1 mutated    Protocol/Chemo Regimen: daily oral FLT3 inhibitor gilteritinib (cytarabine 100mg/m2 x 3 days continuous infusion prior to start of gilteritinib)    Day: 2     Pt endorsed: No acute complaints     Review of Systems: Patient denies  nausea, vomiting, diarrhea, abdominal pain, SOB, chest pain and headache.      Pain scale: 0                                           Diet: DASH      Allergies    No Known Allergies    Intolerances        ANTIMICROBIALS  acyclovir   Oral Tab/Cap 400 milliGRAM(s) Oral two times a day  fluconAZOLE   Tablet 200 milliGRAM(s) Oral daily  levoFLOXacin  Tablet 500 milliGRAM(s) Oral every 24 hours      HEME/ONC MEDICATIONS  cytarabine IVPB (eMAR) 164 milliGRAM(s) IV Intermittent daily  hydroxyurea 2500 milliGRAM(s) Oral every 12 hours      STANDING MEDICATIONS  allopurinol 300 milliGRAM(s) Oral daily  amLODIPine   Tablet 5 milliGRAM(s) Oral daily  Biotene Dry Mouth Oral Rinse 15 milliLiter(s) Swish and Spit three times a day  buPROPion XL (24-Hour) . 150 milliGRAM(s) Oral daily  chlorhexidine 2% Cloths 1 Application(s) Topical daily  gabapentin 100 milliGRAM(s) Oral daily  ondansetron Injectable 8 milliGRAM(s) IV Push every 8 hours  oxybutynin 5 milliGRAM(s) Oral daily  phytonadione   Solution 5 milliGRAM(s) Oral daily  polyethylene glycol 3350 17 Gram(s) Oral two times a day  sodium chloride 0.9%. 1000 milliLiter(s) IV Continuous <Continuous>  traZODone 150 milliGRAM(s) Oral at bedtime      PRN MEDICATIONS  acetaminophen     Tablet .. 650 milliGRAM(s) Oral every 6 hours PRN  aluminum hydroxide/magnesium hydroxide/simethicone Suspension 30 milliLiter(s) Oral every 4 hours PRN  melatonin 3 milliGRAM(s) Oral at bedtime PRN  ondansetron Injectable 4 milliGRAM(s) IV Push every 8 hours PRN        Vital Signs Last 24 Hrs  T(C): 37.2 (11 Nov 2022 09:05), Max: 38.1 (11 Nov 2022 05:00)  T(F): 98.9 (11 Nov 2022 09:05), Max: 100.5 (11 Nov 2022 05:00)  HR: 87 (11 Nov 2022 09:05) (78 - 96)  BP: 129/63 (11 Nov 2022 09:05) (113/60 - 137/68)  BP(mean): --  RR: 18 (11 Nov 2022 09:05) (16 - 18)  SpO2: 94% (11 Nov 2022 09:05) (94% - 98%)    Parameters below as of 11 Nov 2022 09:05  Patient On (Oxygen Delivery Method): room air        PHYSICAL EXAM  General: NAD  HEENT:  clear oropharynx, anicteric sclera  CV: (+) S1/S2 RRR  Lungs: Decreased breath sounds bilateral base   Abdomen: soft, non-tender, non-distended (+) BS  Ext: no  edema  Skin: no rash  Neuro: alert and oriented X 3  Central Line: Mediport  CDI           LABS:                        6.6    71.21 )-----------( 12       ( 11 Nov 2022 07:17 )             20.2         Mean Cell Volume : 103.1 fl  Mean Cell Hemoglobin : 33.7 pg  Mean Cell Hemoglobin Concentration : 32.7 gm/dL  Auto Neutrophil # : 0.64 K/uL  Auto Lymphocyte # : 2.07 K/uL  Auto Monocyte # : 0.00 K/uL  Auto Eosinophil # : 0.00 K/uL  Auto Basophil # : 0.00 K/uL  Auto Neutrophil % : 0.9 %  Auto Lymphocyte % : 2.9 %  Auto Monocyte % : 0.0 %  Auto Eosinophil % : 0.0 %  Auto Basophil % : 0.0 %      11-11    141  |  108  |  17  ----------------------------<  121<H>  3.6   |  22  |  0.53    Ca    7.1<L>      11 Nov 2022 07:18  Phos  3.3     11-11  Mg     2.2     11-11    TPro  5.2<L>  /  Alb  3.1<L>  /  TBili  0.2  /  DBili  x   /  AST  20  /  ALT  20  /  AlkPhos  54  11-11          PT/INR - ( 11 Nov 2022 07:17 )   PT: 16.7 sec;   INR: 1.43 ratio         PTT - ( 11 Nov 2022 07:17 )  PTT:25.8 sec    RECENT CULTURES:  11-09 @ 17:44  .Blood Blood-Peripheral  Blood Culture PCR  Blood Culture PCR  PCR    Growth in aerobic bottle: Gram Positive Cocci in Clusters  ***Blood Panel PCR results on this specimen are available  approximately 3 hours after the Gram stain result.***  Gram stain, PCR, and/or culture results may not always  correspond due to difference in methodologies.  ************************************************************  This PCR assay was performed by multiplex PCR. This  Assay tests for 66 bacterial and resistance gene targets.  Please refer to the [facility] test directory  at [email or web address] for details.  --      RADIOLOGY & ADDITIONAL STUDIES:  Xray Chest 1 View- PORTABLE-Urgent (Xray Chest 1 View- PORTABLE-Urgent .) (11.08.22 @ 02:29) >  IMPRESSION:  Stable configuration and location of left chest wall CT compatible port.    Clear lungs.             Diagnosis: relapsed AML, FLT3 ITD+, NPM1, CEBPA, ASXL1 mutated    Protocol/Chemo Regimen: daily oral FLT3 inhibitor gilteritinib (cytarabine 100mg/m2 x 3 days continuous infusion prior to start of gilteritinib)    Day: 2     Pt endorsed: Chest tightness    Review of Systems: Patient denies  nausea, vomiting, diarrhea, abdominal pain, SOB, chest pain and headache.      Pain scale: 0                                           Diet: DASH      Allergies    No Known Allergies    Intolerances        ANTIMICROBIALS  acyclovir   Oral Tab/Cap 400 milliGRAM(s) Oral two times a day  fluconAZOLE   Tablet 200 milliGRAM(s) Oral daily  levoFLOXacin  Tablet 500 milliGRAM(s) Oral every 24 hours      HEME/ONC MEDICATIONS  cytarabine IVPB (eMAR) 164 milliGRAM(s) IV Intermittent daily  hydroxyurea 2500 milliGRAM(s) Oral every 12 hours      STANDING MEDICATIONS  allopurinol 300 milliGRAM(s) Oral daily  amLODIPine   Tablet 5 milliGRAM(s) Oral daily  Biotene Dry Mouth Oral Rinse 15 milliLiter(s) Swish and Spit three times a day  buPROPion XL (24-Hour) . 150 milliGRAM(s) Oral daily  chlorhexidine 2% Cloths 1 Application(s) Topical daily  gabapentin 100 milliGRAM(s) Oral daily  ondansetron Injectable 8 milliGRAM(s) IV Push every 8 hours  oxybutynin 5 milliGRAM(s) Oral daily  phytonadione   Solution 5 milliGRAM(s) Oral daily  polyethylene glycol 3350 17 Gram(s) Oral two times a day  sodium chloride 0.9%. 1000 milliLiter(s) IV Continuous <Continuous>  traZODone 150 milliGRAM(s) Oral at bedtime      PRN MEDICATIONS  acetaminophen     Tablet .. 650 milliGRAM(s) Oral every 6 hours PRN  aluminum hydroxide/magnesium hydroxide/simethicone Suspension 30 milliLiter(s) Oral every 4 hours PRN  melatonin 3 milliGRAM(s) Oral at bedtime PRN  ondansetron Injectable 4 milliGRAM(s) IV Push every 8 hours PRN        Vital Signs Last 24 Hrs  T(C): 37.2 (11 Nov 2022 09:05), Max: 38.1 (11 Nov 2022 05:00)  T(F): 98.9 (11 Nov 2022 09:05), Max: 100.5 (11 Nov 2022 05:00)  HR: 87 (11 Nov 2022 09:05) (78 - 96)  BP: 129/63 (11 Nov 2022 09:05) (113/60 - 137/68)  BP(mean): --  RR: 18 (11 Nov 2022 09:05) (16 - 18)  SpO2: 94% (11 Nov 2022 09:05) (94% - 98%)    Parameters below as of 11 Nov 2022 09:05  Patient On (Oxygen Delivery Method): room air        PHYSICAL EXAM  General: NAD  HEENT:  clear oropharynx, anicteric sclera  CV: (+) S1/S2 RRR  Lungs: Decreased breath sounds bilateral base   Abdomen: soft, non-tender, non-distended (+) BS  Ext: no  edema  Skin: no rash  Neuro: alert and oriented X 3  Central Line: Mediport  CDI           LABS:                        6.6    71.21 )-----------( 12       ( 11 Nov 2022 07:17 )             20.2         Mean Cell Volume : 103.1 fl  Mean Cell Hemoglobin : 33.7 pg  Mean Cell Hemoglobin Concentration : 32.7 gm/dL  Auto Neutrophil # : 0.64 K/uL  Auto Lymphocyte # : 2.07 K/uL  Auto Monocyte # : 0.00 K/uL  Auto Eosinophil # : 0.00 K/uL  Auto Basophil # : 0.00 K/uL  Auto Neutrophil % : 0.9 %  Auto Lymphocyte % : 2.9 %  Auto Monocyte % : 0.0 %  Auto Eosinophil % : 0.0 %  Auto Basophil % : 0.0 %      11-11    141  |  108  |  17  ----------------------------<  121<H>  3.6   |  22  |  0.53    Ca    7.1<L>      11 Nov 2022 07:18  Phos  3.3     11-11  Mg     2.2     11-11    TPro  5.2<L>  /  Alb  3.1<L>  /  TBili  0.2  /  DBili  x   /  AST  20  /  ALT  20  /  AlkPhos  54  11-11          PT/INR - ( 11 Nov 2022 07:17 )   PT: 16.7 sec;   INR: 1.43 ratio         PTT - ( 11 Nov 2022 07:17 )  PTT:25.8 sec    RECENT CULTURES:  11-09 @ 17:44  .Blood Blood-Peripheral  Blood Culture PCR  Blood Culture PCR  PCR    Growth in aerobic bottle: Gram Positive Cocci in Clusters  ***Blood Panel PCR results on this specimen are available  approximately 3 hours after the Gram stain result.***  Gram stain, PCR, and/or culture results may not always  correspond due to difference in methodologies.  ************************************************************  This PCR assay was performed by multiplex PCR. This  Assay tests for 66 bacterial and resistance gene targets.  Please refer to the [facility] test directory  at [email or web address] for details.  --      RADIOLOGY & ADDITIONAL STUDIES:  Xray Chest 1 View- PORTABLE-Urgent (Xray Chest 1 View- PORTABLE-Urgent .) (11.08.22 @ 02:29) >  IMPRESSION:  Stable configuration and location of left chest wall CT compatible port.    Clear lungs.             Diagnosis: relapsed AML, FLT3 ITD+, NPM1, CEBPA, ASXL1 mutated    Protocol/Chemo Regimen: daily oral FLT3 inhibitor gilteritinib (cytarabine 100mg/m2 x 3 days continuous infusion prior to start of gilteritinib)    Day: 2     Pt endorsed: Chest tightness    Review of Systems: Patient denies  nausea, vomiting, diarrhea, abdominal pain, SOB, chest pain and headache.      Pain scale: 0                                           Diet: DASH      Allergies    No Known Allergies    Intolerances        ANTIMICROBIALS  acyclovir   Oral Tab/Cap 400 milliGRAM(s) Oral two times a day  fluconAZOLE   Tablet 200 milliGRAM(s) Oral daily  Cefepime 2000 milliGRAM(s) IV every 12 hours  Vancomycin 1000 milliGRAM(s) IV every 12 hours      HEME/ONC MEDICATIONS  cytarabine IVPB (eMAR) 164 milliGRAM(s) IV Intermittent daily  hydroxyurea 2500 milliGRAM(s) Oral every 12 hours      STANDING MEDICATIONS  allopurinol 300 milliGRAM(s) Oral daily  amLODIPine   Tablet 5 milliGRAM(s) Oral daily  Biotene Dry Mouth Oral Rinse 15 milliLiter(s) Swish and Spit three times a day  buPROPion XL (24-Hour) . 150 milliGRAM(s) Oral daily  chlorhexidine 2% Cloths 1 Application(s) Topical daily  gabapentin 100 milliGRAM(s) Oral daily  ondansetron Injectable 8 milliGRAM(s) IV Push every 8 hours  oxybutynin 5 milliGRAM(s) Oral daily  phytonadione   Solution 5 milliGRAM(s) Oral daily  polyethylene glycol 3350 17 Gram(s) Oral two times a day  sodium chloride 0.9%. 1000 milliLiter(s) IV Continuous <Continuous>  traZODone 150 milliGRAM(s) Oral at bedtime      PRN MEDICATIONS  acetaminophen     Tablet .. 650 milliGRAM(s) Oral every 6 hours PRN  aluminum hydroxide/magnesium hydroxide/simethicone Suspension 30 milliLiter(s) Oral every 4 hours PRN  melatonin 3 milliGRAM(s) Oral at bedtime PRN  ondansetron Injectable 4 milliGRAM(s) IV Push every 8 hours PRN        Vital Signs Last 24 Hrs  T(C): 37.2 (11 Nov 2022 09:05), Max: 38.1 (11 Nov 2022 05:00)  T(F): 98.9 (11 Nov 2022 09:05), Max: 100.5 (11 Nov 2022 05:00)  HR: 87 (11 Nov 2022 09:05) (78 - 96)  BP: 129/63 (11 Nov 2022 09:05) (113/60 - 137/68)  BP(mean): --  RR: 18 (11 Nov 2022 09:05) (16 - 18)  SpO2: 94% (11 Nov 2022 09:05) (94% - 98%)    Parameters below as of 11 Nov 2022 09:05  Patient On (Oxygen Delivery Method): room air        PHYSICAL EXAM  General: NAD  HEENT:  clear oropharynx, anicteric sclera  CV: (+) S1/S2 RRR  Lungs: Decreased breath sounds bilateral base   Abdomen: soft, non-tender, non-distended (+) BS  Ext: no  edema  Skin: no rash  Neuro: alert and oriented X 3  Central Line: Mediport  CDI           LABS:                        6.6    71.21 )-----------( 12       ( 11 Nov 2022 07:17 )             20.2         Mean Cell Volume : 103.1 fl  Mean Cell Hemoglobin : 33.7 pg  Mean Cell Hemoglobin Concentration : 32.7 gm/dL  Auto Neutrophil # : 0.64 K/uL  Auto Lymphocyte # : 2.07 K/uL  Auto Monocyte # : 0.00 K/uL  Auto Eosinophil # : 0.00 K/uL  Auto Basophil # : 0.00 K/uL  Auto Neutrophil % : 0.9 %  Auto Lymphocyte % : 2.9 %  Auto Monocyte % : 0.0 %  Auto Eosinophil % : 0.0 %  Auto Basophil % : 0.0 %      11-11    141  |  108  |  17  ----------------------------<  121<H>  3.6   |  22  |  0.53    Ca    7.1<L>      11 Nov 2022 07:18  Phos  3.3     11-11  Mg     2.2     11-11    TPro  5.2<L>  /  Alb  3.1<L>  /  TBili  0.2  /  DBili  x   /  AST  20  /  ALT  20  /  AlkPhos  54  11-11          PT/INR - ( 11 Nov 2022 07:17 )   PT: 16.7 sec;   INR: 1.43 ratio         PTT - ( 11 Nov 2022 07:17 )  PTT:25.8 sec    RECENT CULTURES:  11-09 @ 17:44  .Blood Blood-Peripheral  Blood Culture PCR  Blood Culture PCR  PCR    Growth in aerobic bottle: Gram Positive Cocci in Clusters  ***Blood Panel PCR results on this specimen are available  approximately 3 hours after the Gram stain result.***  Gram stain, PCR, and/or culture results may not always  correspond due to difference in methodologies.  ************************************************************  This PCR assay was performed by multiplex PCR. This  Assay tests for 66 bacterial and resistance gene targets.  Please refer to the [facility] Labs test directory  at [email or web address] for details.  --      RADIOLOGY & ADDITIONAL STUDIES:  Xray Chest 1 View- PORTABLE-Urgent (Xray Chest 1 View- PORTABLE-Urgent .) (11.08.22 @ 02:29) >  IMPRESSION:  Stable configuration and location of left chest wall CT compatible port.    Clear lungs.

## 2022-11-11 NOTE — PROGRESS NOTE ADULT - PROBLEM SELECTOR PLAN 2
Patient is neutropenic,a febrile  Started levofloxacin 500mg PO qdaily for antibacterial coverage, fluconazole 200mg PO qdaily for antifungal coverage and acyclovir 400mg PO BID for antiviral coverage.  [facility] Patient is neutropenic, febrile  Started levofloxacin 500mg PO qdaily for antibacterial coverage, fluconazole 200mg PO qdaily for antifungal coverage and acyclovir 400mg PO BID for antiviral coverage.  11/10  Blood Cx Staph epidermis ; Vanco 1g IV x 1 given   Follow up repeat cultures Patient is neutropenic, febrile  Started levofloxacin 500mg PO qdaily for antibacterial coverage, fluconazole 200mg PO qdaily for antifungal coverage and acyclovir 400mg PO BID for antiviral coverage.  11/10  Blood Cx Staph epidermis ; Vanco 1g IV x 1 given   Follow up repeat cultures  10/11 Switched Levaquin to Cefepime Patient is neutropenic, febrile  Started levofloxacin 500mg PO qdaily for antibacterial coverage, fluconazole 200mg PO qdaily for antifungal coverage and acyclovir 400mg PO BID for antiviral coverage.  11/10  Blood Cx Staph epidermis ; Vanco 1g IV x 1 given   Follow up repeat cultures  11/11 Switched Levaquin to Cefepime  11/11 Started Vancomycin

## 2022-11-11 NOTE — PROGRESS NOTE ADULT - PROBLEM SELECTOR PLAN 5
Continue oxybutynin 5mg qdaily. 11/11 reported reproductive chest pain  most likely pulmonary origin  Follow up CXR  Follow up Cardiac enzymes  11/11 EKG no changes

## 2022-11-11 NOTE — PROGRESS NOTE ADULT - PROBLEM SELECTOR PLAN 6
VTE ppx:  Hold pharmacologic prophylaxis due to thrombocytopenia  Activity: OOB with assistance, fall precaution, bed alarm  Diet: DASH Continue oxybutynin 5mg qdaily.

## 2022-11-11 NOTE — PROGRESS NOTE ADULT - PROBLEM SELECTOR PLAN 4
Start home dose of trazodone 150mg PO qhs.   Start home bupropion 150mg PO qdaily. Continue  home dose of trazodone 150mg PO qhs.   Continue  home bupropion 150mg PO qdaily.

## 2022-11-11 NOTE — CHART NOTE - NSCHARTNOTEFT_GEN_A_CORE
MEDICINE PA    Notified by RN patient with temperature 102.9F oral. Seen and examined patient at bedside. Patient is alert, nontoxic appearing and in NAD. Patient taking deep breaths but not hypoxic and denies any shortness of breath or difficulty breathing. Denies HA, CP, SOB, cough, N/V, or abd pain.    VITAL SIGNS:  T(C): 39.4 (11-11-22 @ 21:00), Max: 39.5 (11-11-22 @ 15:45)  HR: 89 (11-11-22 @ 21:00) (84 - 103)  BP: 126/67 (11-11-22 @ 21:00) (103/58 - 137/71)  RR: 18 (11-11-22 @ 21:00) (18 - 20)  SpO2: 94% (11-11-22 @ 21:00) (93% - 96%)  Wt(kg): --      LABORATORY:                          6.6    71.21 )-----------( 12       ( 11 Nov 2022 07:17 )             20.2       11-11    141  |  108  |  17  ----------------------------<  121<H>  3.6   |  22  |  0.53    Ca    7.1<L>      11 Nov 2022 07:18  Phos  3.3     11-11  Mg     2.2     11-11    TPro  5.2<L>  /  Alb  3.1<L>  /  TBili  0.2  /  DBili  x   /  AST  20  /  ALT  20  /  AlkPhos  54  11-11          MICROBIOLOGY:     Culture - Blood (collected 09 Nov 2022 17:44)  Source: .Blood Blood-Peripheral  Gram Stain (10 Nov 2022 18:17):    Growth in aerobic bottle: Gram Positive Cocci in Clusters  Preliminary Report (11 Nov 2022 11:05):    Growth in aerobic bottle: Staphylococcus hominis    Coag Negative Staphylococcus    Single set isolate, possible contaminant. Contact    Microbiology if susceptibility testing clinically    indicated.    ***Blood Panel PCR results on this specimen are available    approximately 3 hours after the Gram stain result.***    Gram stain, PCR, and/or culture results may not always    correspond due to difference in methodologies.    ************************************************************    This PCR assay was performed by multiplex PCR. This    Assay tests for 66 bacterial and resistance gene targets.    Please refer to the [facility] test directory    at [email or web address] for details.  Organism: Blood Culture PCR (10 Nov 2022 21:32)  Organism: Blood Culture PCR (10 Nov 2022 21:32)            RADIOLOGY:  < from: Xray Chest 1 View- PORTABLE-Urgent (Xray Chest 1 View- PORTABLE-Urgent .) (11.11.22 @ 14:11) >      ******PRELIMINARY REPORT******      ******PRELIMINARY REPORT******       ACC: [id number] EXAM:  XR CHEST PORTABLE URGENT 1V                          PROCEDURE DATE:  11/11/2022    ******PRELIMINARY REPORT******      ******PRELIMINARY REPORT******     INTERPRETATION:  CLINICAL INDICATION: Shortness of breath    TECHNIQUE: Single frontal, portable view of the chest was obtained.    COMPARISON: Chest Radiograph dated 11/8/2022    FINDINGS:    Accessed left chest wall port with tip in SVC.  Heart size is normal.  Clear lungs.  The visualized osseous structures demonstrate no acute pathology.      IMPRESSION:  Clear lungs.      ******PRELIMINARY REPORT******      ******PRELIMINARY REPORT******        ESME SIDHU MD; Resident Radiologist  This document is a PRELIMINARY interpretation and is pending final   attending approval. Nov 11 2022  6:14PM    < end of copied text >          PHYSICAL EXAM:    Constitutional: AOx3. NAD.    Respiratory: clear lungs bilaterally. No wheezing, rhonchi, or crackles.    Cardiovascular: S1 S2. No murmurs.    Gastrointestinal: BS X4 active. soft. nontender.    Extremities/Vascular: +2 pulses bilaterally. No BLE edema.      ASSESSMENT/PLAN:   HPI:  This patient is an 81yo lady with PMH of AML, breast cancer s/p treatment with tamoxifen, RT and R lumpectomy, who presents with AML relapse.    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 On 2/4/2020, she started C1 dacogen and venetoclax, which she tolerated, other than some dypsnea which was attributed to pulmonary edema and L pleural effusion, and treated with diuretics. Patient was discharged home on 2/11/2020.  BMBx on 3/2/2020 found no blasts. Patient thereafter was continued on maintenance treatment with dacogen and venetoclax (10 days) every 5 weeks, with onpro as needed.    6 month interval BMBx in 10/2020 found hypocellular marrow with focal erythropoiesis, markedly diminished myelopoiesis, rare megakaryocyte and aspicular aspirate, and no increase in blast population.      In August 2022, she was noted to not have count recovery between cycles and was pancytopenic.   Repeat BMBx performed on 9/7/2022. Limited bone marrow tissue shows erythroid predominance, decreased myeloid maturation, increased blasts (5% by flow and in peripheral blood), decreased megakaryocytes. 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   Flow cytometry found 5% myeloblasts, positive for HLA-DR, CD34, , CD33, CD13. Negative for CD2, CD7, CD15,CD19, CD38 and CD56.   Karyotype normal.  Results were compatible with relapse of known AML.   Last dose of pegfilgrastim was on 12/10/2021.   C27D1 of decitabine and venetoclax was on 10/5-10/9.  Patient was sent to [facility] with plan to initiate therapy with single agent gilteritinib given presence of FLT3 mutation, and loss of response to dacogen and venetoclax.     Patient reports worsening fatigue over the last few weeks. She denies dizziness or lightheadedness, headaches, vision changes. She denies dyspnea, coughing, chest pain or palpitations. She reports decreased appetite. She has maintained her weight at 123lbs.  She endorses constipation. She has had intermittent drenching night sweats for over 1 month. She denies fevers or chills.     Today, CBC today found WBC of 101.6, Hgb of 8.5, MCV of 102, plt count of 38k. She has 90% of blasts in peripheral blood.   CMP notable for elevated K of 3.2.   LDH is high at 859. Serum creatinine, Uric acid, Ca, phos, magnesium are all WNL.  Lactate also WNL.     INR is 1.28, PT 14.9, PTT 27.4. Fibrinogen elevated 542. D-dimer 1076 elevated.  (08 Nov 2022 13:07)        1) Fever  - tylenol and cooling measures prn for pyrexia  - lasix 20mg IVP x1  - c/w antibiotics overnight  - c/w monitoring overnight  - F/U primary team in [place] Reynolds PA-C  Spectra #[phone] MEDICINE PA    Notified by RN patient with temperature 102.9F oral. Seen and examined patient at bedside. Patient is alert, nontoxic appearing and in NAD. Patient taking deep breaths but not hypoxic and denies any shortness of breath or difficulty breathing. Denies HA, CP, SOB, cough, N/V, or abd pain.    VITAL SIGNS:  T(C): 39.4 (11-11-22 @ 21:00), Max: 39.5 (11-11-22 @ 15:45)  HR: 89 (11-11-22 @ 21:00) (84 - 103)  BP: 126/67 (11-11-22 @ 21:00) (103/58 - 137/71)  RR: 18 (11-11-22 @ 21:00) (18 - 20)  SpO2: 94% (11-11-22 @ 21:00) (93% - 96%)  Wt(kg): --      LABORATORY:                          6.6    71.21 )-----------( 12       ( 11 Nov 2022 07:17 )             20.2       11-11    141  |  108  |  17  ----------------------------<  121<H>  3.6   |  22  |  0.53    Ca    7.1<L>      11 Nov 2022 07:18  Phos  3.3     11-11  Mg     2.2     11-11    TPro  5.2<L>  /  Alb  3.1<L>  /  TBili  0.2  /  DBili  x   /  AST  20  /  ALT  20  /  AlkPhos  54  11-11          MICROBIOLOGY:     Culture - Blood (collected 09 Nov 2022 17:44)  Source: .Blood Blood-Peripheral  Gram Stain (10 Nov 2022 18:17):    Growth in aerobic bottle: Gram Positive Cocci in Clusters  Preliminary Report (11 Nov 2022 11:05):    Growth in aerobic bottle: Staphylococcus hominis    Coag Negative Staphylococcus    Single set isolate, possible contaminant. Contact    Microbiology if susceptibility testing clinically    indicated.    ***Blood Panel PCR results on this specimen are available    approximately 3 hours after the Gram stain result.***    Gram stain, PCR, and/or culture results may not always    correspond due to difference in methodologies.    ************************************************************    This PCR assay was performed by multiplex PCR. This    Assay tests for 66 bacterial and resistance gene targets.    Please refer to the [facility] test directory    at [email or web address] for details.  Organism: Blood Culture PCR (10 Nov 2022 21:32)  Organism: Blood Culture PCR (10 Nov 2022 21:32)            RADIOLOGY:  < from: Xray Chest 1 View- PORTABLE-Urgent (Xray Chest 1 View- PORTABLE-Urgent .) (11.11.22 @ 14:11) >      ******PRELIMINARY REPORT******      ******PRELIMINARY REPORT******       ACC: [id number] EXAM:  XR CHEST PORTABLE URGENT 1V                          PROCEDURE DATE:  11/11/2022    ******PRELIMINARY REPORT******      ******PRELIMINARY REPORT******     INTERPRETATION:  CLINICAL INDICATION: Shortness of breath    TECHNIQUE: Single frontal, portable view of the chest was obtained.    COMPARISON: Chest Radiograph dated 11/8/2022    FINDINGS:    Accessed left chest wall port with tip in SVC.  Heart size is normal.  Clear lungs.  The visualized osseous structures demonstrate no acute pathology.      IMPRESSION:  Clear lungs.      ******PRELIMINARY REPORT******      ******PRELIMINARY REPORT******        ESME SIDHU MD; Resident Radiologist  This document is a PRELIMINARY interpretation and is pending final   attending approval. Nov 11 2022  6:14PM    < end of copied text >          PHYSICAL EXAM:    Constitutional: AOx3. NAD.    Respiratory: clear lungs bilaterally. No wheezing, rhonchi, or crackles.    Cardiovascular: S1 S2. No murmurs.    Gastrointestinal: BS X4 active. soft. nontender.    Extremities/Vascular: +2 pulses bilaterally. No BLE edema.      ASSESSMENT/PLAN:   HPI:  This patient is an 83yo lady with PMH of AML, breast cancer s/p treatment with tamoxifen, RT and R lumpectomy, who presents with AML relapse.    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 On 2/4/2020, she started C1 dacogen and venetoclax, which she tolerated, other than some dypsnea which was attributed to pulmonary edema and L pleural effusion, and treated with diuretics. Patient was discharged home on 2/11/2020.  BMBx on 3/2/2020 found no blasts. Patient thereafter was continued on maintenance treatment with dacogen and venetoclax (10 days) every 5 weeks, with onpro as needed.    6 month interval BMBx in 10/2020 found hypocellular marrow with focal erythropoiesis, markedly diminished myelopoiesis, rare megakaryocyte and aspicular aspirate, and no increase in blast population.      In August 2022, she was noted to not have count recovery between cycles and was pancytopenic.   Repeat BMBx performed on 9/7/2022. Limited bone marrow tissue shows erythroid predominance, decreased myeloid maturation, increased blasts (5% by flow and in peripheral blood), decreased megakaryocytes. 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   Flow cytometry found 5% myeloblasts, positive for HLA-DR, CD34, , CD33, CD13. Negative for CD2, CD7, CD15,CD19, CD38 and CD56.   Karyotype normal.  Results were compatible with relapse of known AML.   Last dose of pegfilgrastim was on 12/10/2021.   C27D1 of decitabine and venetoclax was on 10/5-10/9.  Patient was sent to [facility] with plan to initiate therapy with single agent gilteritinib given presence of FLT3 mutation, and loss of response to dacogen and venetoclax.     Patient reports worsening fatigue over the last few weeks. She denies dizziness or lightheadedness, headaches, vision changes. She denies dyspnea, coughing, chest pain or palpitations. She reports decreased appetite. She has maintained her weight at 123lbs.  She endorses constipation. She has had intermittent drenching night sweats for over 1 month. She denies fevers or chills.     Today, CBC today found WBC of 101.6, Hgb of 8.5, MCV of 102, plt count of 38k. She has 90% of blasts in peripheral blood.   CMP notable for elevated K of 3.2.   LDH is high at 859. Serum creatinine, Uric acid, Ca, phos, magnesium are all WNL.  Lactate also WNL.     INR is 1.28, PT 14.9, PTT 27.4. Fibrinogen elevated 542. D-dimer 1076 elevated.  (08 Nov 2022 13:07)        1) Fever  - tylenol and cooling measures prn for pyrexia  - lasix 20mg IVP x1  - c/w antibiotics overnight  - c/w monitoring overnight  - F/U primary team in AM      ADDENDUM- 11/12/22- 6:42 AM  Rn to PA- Patient febrile again with oral temp 101.3F. Patient had some shortness of breath with mild hypoxia, started on nasal cannula 2L now satting 95%. Patient was about to take meds when she had one episode of emesis from coughing with small amount of yellow bile. Patient seen at bedside in no acute distress or any complaints. Denies any headaches, SOB, chest pain, or abdominal pain. Will relay to primary team in AM of overnight events.       Sammie Reynolds PA-C  Spectra #[phone]

## 2022-11-12 NOTE — PROGRESS NOTE ADULT - PROBLEM SELECTOR PLAN 1
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    TTE  11/9 - EF 70% mild AS   G6PD level was normal in 2020.   Trend CBC, CMP, TLS labs, DIC labs q12 hours.   If fibrinogen under 100, give 10 units of cryoprecipitate.  Transfuse for Hgb < 7 and platelet count < 10k, < 20k if febrile, < 50k if bleeding  continue allopurinol 300mg PO qdaily.   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   Because gilteritinib can cause QTc prolongation, check  EKG prior to start, then daily x 7 days  dexamethasone 5 mg q12h to avoid differentiation syndrome.  11/10 Cytarabine 100mg/m2 x 3 days continuous infusion.  11/12 Wbc 18k - cont last day of cytarabine. stop hydroxyurea. start gilteritinib w dexamethasone. check CT Chest r/o pneumonia. fever last 24 hrs (tumor fever vs ?bacteremia/contaminant) on cefepime/vanco for now. 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    TTE  11/9 - EF 70% mild AS   G6PD level was normal in 2020.   Trend CBC, CMP, TLS labs, DIC labs q12 hours.   If fibrinogen under 100, give 10 units of cryoprecipitate.  Transfuse for Hgb < 7 and platelet count < 10k, < 20k if febrile, < 50k if bleeding  continue allopurinol 300mg PO qdaily.   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   Because gilteritinib can cause QTc prolongation, check  EKG prior to start, then daily x 7 days  dexamethasone 5 mg q12h to avoid differentiation syndrome.  11/10 Cytarabine 100mg/m2 x 3 days continuous infusion.  11/12 Wbc 18k - cont last day of cytarabine. stop hydroxyurea. start gilteritinib w dexamethasone.   **hypotensive this afternoon SBP 80's. SPO2 in 70% (Hx Raynauds). Giving IVF bolus, started midodrine. CT chest revealing for scattered nodules and opacities in Lung, adrenal nodule, and indeterminate liver lesion. change antifungal to voriconazole. cont chemo/gilteritinib. check fungitell/galactomanan sent. overall DNR/DNI.

## 2022-11-12 NOTE — PROGRESS NOTE ADULT - PROBLEM SELECTOR PLAN 7
VTE ppx:  Hold pharmacologic prophylaxis due to thrombocytopenia  Activity: OOB with assistance, fall precaution, bed alarm  Diet: DASH  PT evaluation

## 2022-11-12 NOTE — PROGRESS NOTE ADULT - PROBLEM SELECTOR PLAN 5
11/11 reported reproductive chest pain  most likely pulmonary origin  Follow up CXR  Follow up Cardiac enzymes  11/11 EKG no changes

## 2022-11-12 NOTE — PROGRESS NOTE ADULT - NS ATTEND AMEND GEN_ALL_CORE FT
Vital Signs Last 24 Hrs  T(C): 37.7 (11-12-22 @ 00:56), Max: 39.5 (11-11-22 @ 15:45)  T(F): 99.9 (11-12-22 @ 00:56), Max: 103.1 (11-11-22 @ 15:45)  HR: 82 (11-12-22 @ 00:56) (82 - 103)  BP: 118/67 (11-12-22 @ 00:56) (103/58 - 137/71)  BP(mean): --  RR: 18 (11-12-22 @ 00:56) (18 - 20)  SpO2: 92% (11-12-22 @ 00:56) (92% - 95%)    MEDICATIONS  (STANDING):  acyclovir   Oral Tab/Cap 400 milliGRAM(s) Oral two times a day  allopurinol 300 milliGRAM(s) Oral daily  amLODIPine   Tablet 5 milliGRAM(s) Oral daily  Biotene Dry Mouth Oral Rinse 15 milliLiter(s) Swish and Spit three times a day  buPROPion XL (24-Hour) . 150 milliGRAM(s) Oral daily  cefepime   IVPB 2000 milliGRAM(s) IV Intermittent every 8 hours  cefepime   IVPB      chlorhexidine 2% Cloths 1 Application(s) Topical daily  cytarabine IVPB (eMAR) 164 milliGRAM(s) IV Intermittent daily  fluconAZOLE   Tablet 200 milliGRAM(s) Oral daily  gabapentin 100 milliGRAM(s) Oral daily  hydroxyurea 2500 milliGRAM(s) Oral every 12 hours  ondansetron Injectable 8 milliGRAM(s) IV Push every 8 hours  oxybutynin 5 milliGRAM(s) Oral daily  phytonadione   Solution 5 milliGRAM(s) Oral daily  polyethylene glycol 3350 17 Gram(s) Oral two times a day  sodium chloride 0.9%. 1000 milliLiter(s) (10 mL/Hr) IV Continuous <Continuous>  traZODone 150 milliGRAM(s) Oral at bedtime  vancomycin  IVPB 1000 milliGRAM(s) IV Intermittent every 12 hours Vital Signs Last 24 Hrs  T(C): 37.7 (11-12-22 @ 00:56), Max: 39.5 (11-11-22 @ 15:45)  T(F): 99.9 (11-12-22 @ 00:56), Max: 103.1 (11-11-22 @ 15:45)  HR: 82 (11-12-22 @ 00:56) (82 - 103)  BP: 118/67 (11-12-22 @ 00:56) (103/58 - 137/71)  BP(mean): --  RR: 18 (11-12-22 @ 00:56) (18 - 20)  SpO2: 92% (11-12-22 @ 00:56) (92% - 95%)    MEDICATIONS  (STANDING):  acyclovir   Oral Tab/Cap 400 milliGRAM(s) Oral two times a day  allopurinol 300 milliGRAM(s) Oral daily  amLODIPine   Tablet 5 milliGRAM(s) Oral daily  Biotene Dry Mouth Oral Rinse 15 milliLiter(s) Swish and Spit three times a day  buPROPion XL (24-Hour) . 150 milliGRAM(s) Oral daily  cefepime   IVPB 2000 milliGRAM(s) IV Intermittent every 8 hours  cefepime   IVPB      chlorhexidine 2% Cloths 1 Application(s) Topical daily  cytarabine IVPB (eMAR) 164 milliGRAM(s) IV Intermittent daily  fluconAZOLE   Tablet 200 milliGRAM(s) Oral daily      Assessment: 81 yo prescribed HU and continue infusion Scarlet-C for progressive FLT-3, NPM1, ASXL1, WT1, CEBPA mut - (ASXL1, WT1 recent additional findings) AML.  Course complicated S. epi culture (    ), continuous neutropenic fevers and fluild overloaded stated.     Onc History:  decitabine/lisa through 27 cycles,    Plan:  Heme: PLT goal > 10,000; Hgb goal > 7.0g/dL;   continue HU and Scarlet-C re-induction  continue TLS labs and allopurinol   start gilteritinib once WBC <  20,000      ID: cef, flu, acyclovir, Vanco     Nutrition: tolerating PO    DVT prophylaxis: ambulation.     Code status:     Over 35 minutes were spent in direct patient care and care coordination. Vital Signs Last 24 Hrs  T(C): 37.7 (11-12-22 @ 00:56), Max: 39.5 (11-11-22 @ 15:45)  T(F): 99.9 (11-12-22 @ 00:56), Max: 103.1 (11-11-22 @ 15:45)  HR: 82 (11-12-22 @ 00:56) (82 - 103)  BP: 118/67 (11-12-22 @ 00:56) (103/58 - 137/71)  BP(mean): --  RR: 18 (11-12-22 @ 00:56) (18 - 20)  SpO2: 92% (11-12-22 @ 00:56) (92% - 95%)    MEDICATIONS  (STANDING):  acyclovir   Oral Tab/Cap 400 milliGRAM(s) Oral two times a day  allopurinol 300 milliGRAM(s) Oral daily  amLODIPine   Tablet 5 milliGRAM(s) Oral daily  Biotene Dry Mouth Oral Rinse 15 milliLiter(s) Swish and Spit three times a day  buPROPion XL (24-Hour) . 150 milliGRAM(s) Oral daily  cefepime   IVPB 2000 milliGRAM(s) IV Intermittent every 8 hours  cefepime   IVPB      chlorhexidine 2% Cloths 1 Application(s) Topical daily  cytarabine IVPB (eMAR) 164 milliGRAM(s) IV Intermittent daily  fluconAZOLE   Tablet 200 milliGRAM(s) Oral daily      Assessment: 83 yo prescribed HU and continue infusion Scarlet-C for progressive FLT-3, NPM1, ASXL1, WT1, CEBPA AML.  Course complicated S. epi bacteremia? (11/9/22), continuous neutropenic fevers and fluidly overloaded state.     Onc History:  decitabine/lisa through 27 cycles,    Plan:  Heme: PLT goal > 10,000; Hgb goal > 7.0g/dL;   continue HU and Scarlet-C re-induction until tomorrow AM.  continue TLS labs and allopurinol   start gilteritinib today    ID: cef, flu, acyclovir, Vanco     Radiographs: please obtain dry CT.     Nutrition: tolerating PO    DVT prophylaxis: ambulation.     Code status: DNR/DNI    Over 35 minutes were spent in direct patient care and care coordination. Vital Signs Last 24 Hrs  T(C): 37.7 (11-12-22 @ 00:56), Max: 39.5 (11-11-22 @ 15:45)  T(F): 99.9 (11-12-22 @ 00:56), Max: 103.1 (11-11-22 @ 15:45)  HR: 82 (11-12-22 @ 00:56) (82 - 103)  BP: 118/67 (11-12-22 @ 00:56) (103/58 - 137/71)  RR: 18 (11-12-22 @ 00:56) (18 - 20)  SpO2: 92% (11-12-22 @ 00:56) (92% - 95%)    MEDICATIONS  (STANDING):  acyclovir   Oral Tab/Cap 400 milliGRAM(s) Oral two times a day  allopurinol 300 milliGRAM(s) Oral daily  amLODIPine   Tablet 5 milliGRAM(s) Oral daily  Biotene Dry Mouth Oral Rinse 15 milliLiter(s) Swish and Spit three times a day  buPROPion XL (24-Hour) . 150 milliGRAM(s) Oral daily  cefepime   IVPB 2000 milliGRAM(s) IV Intermittent every 8 hours    chlorhexidine 2% Cloths 1 Application(s) Topical daily  cytarabine IVPB (eMAR) 164 milliGRAM(s) IV Intermittent daily  fluconAZOLE   Tablet 200 milliGRAM(s) Oral daily    Assessment: 81 yo prescribed HU and continue infusion Scarlet-C for progressive FLT-3, NPM1, ASXL1, WT1, CEBPA AML.  Course complicated S. epi bacteremia? (11/9/22), continuous neutropenic fevers and fluidly overloaded state.     Onc History:  decitabine/lisa through 27 cycles,    Plan:  Heme: PLT goal > 10,000; Hgb goal > 7.0g/dL;   continue HU and Scarlet-C re-induction until tomorrow AM.  continue TLS labs and allopurinol   start gilteritinib today    ID: cef, flu, acyclovir, Vanco     Radiographs: please obtain dry CT.     Nutrition: tolerating PO    DVT prophylaxis: ambulation.     Code status: DNR/DNI    Over 35 minutes were spent in direct patient care and care coordination.

## 2022-11-12 NOTE — ADVANCED PRACTICE NURSE CONSULT - REASON FOR CONSULT
Relapsed AML, Cytarabine. day 1/3. Consent in chart.
Relapsed AML, Cytarabine. day 2/3. Consent in chart.
Relapsed AML, Cytarabine. day 3/3. Consent in chart.

## 2022-11-12 NOTE — PROGRESS NOTE ADULT - SUBJECTIVE AND OBJECTIVE BOX
Diagnosis: relapsed AML, FLT3 ITD+, NPM1, CEBPA, ASXL1 mutated    Protocol/Chemo Regimen: daily oral FLT3 inhibitor gilteritinib (cytarabine 100mg/m2 x 3 days continuous infusion prior to start of gilteritinib)    Day: 3 cytarabine; Day 1 gilteritinib     Pt endorsed: intermittent WALTERS; fever overnight    Review of Systems: denies  nausea, vomiting, diarrhea, abdominal pain, chest pain and headache.    Pain scale: 0                                           Diet: DASH    Allergies: No Known Allergies    ANTIMICROBIALS  acyclovir   Oral Tab/Cap 400 milliGRAM(s) Oral two times a day  cefepime   IVPB 2000 milliGRAM(s) IV Intermittent every 8 hours  fluconAZOLE   Tablet 200 milliGRAM(s) Oral daily  vancomycin  IVPB 1000 milliGRAM(s) IV Intermittent every 12 hours    HEME/ONC MEDICATIONS  cytarabine IVPB (eMAR) 164 milliGRAM(s) IV Intermittent daily    STANDING MEDICATIONS  allopurinol 300 milliGRAM(s) Oral daily  Biotene Dry Mouth Oral Rinse 15 milliLiter(s) Swish and Spit three times a day  buPROPion XL (24-Hour) . 150 milliGRAM(s) Oral daily  chlorhexidine 2% Cloths 1 Application(s) Topical daily  gabapentin 100 milliGRAM(s) Oral daily  ondansetron Injectable 8 milliGRAM(s) IV Push every 8 hours  oxybutynin 5 milliGRAM(s) Oral daily  phytonadione   Solution 5 milliGRAM(s) Oral daily  polyethylene glycol 3350 17 Gram(s) Oral two times a day  sodium chloride 0.9%. 1000 milliLiter(s) IV Continuous <Continuous>  traZODone 150 milliGRAM(s) Oral at bedtime      PRN MEDICATIONS  acetaminophen     Tablet .. 650 milliGRAM(s) Oral every 6 hours PRN  aluminum hydroxide/magnesium hydroxide/simethicone Suspension 30 milliLiter(s) Oral every 4 hours PRN  melatonin 3 milliGRAM(s) Oral at bedtime PRN  ondansetron Injectable 4 milliGRAM(s) IV Push every 8 hours PRN      Vital Signs Last 24 Hrs  T(C): 37.4 (12 Nov 2022 07:51), Max: 39.5 (11 Nov 2022 15:45)  T(F): 99.4 (12 Nov 2022 07:51), Max: 103.1 (11 Nov 2022 15:45)  HR: 80 (12 Nov 2022 07:51) (80 - 103)  BP: 105/73 (12 Nov 2022 05:20) (103/58 - 137/71)  RR: 19 (12 Nov 2022 07:51) (18 - 20)  SpO2: 95% (12 Nov 2022 07:51) (92% - 95%)    Parameters below as of 12 Nov 2022 07:51  Patient On (Oxygen Delivery Method): nasal cannula  O2 Flow (L/min): 2    PHYSICAL EXAM  General: NAD  HEENT:  clear oropharynx, anicteric sclera  CV: (+) S1/S2 RRR  Lungs: Decreased breath sounds bilateral base   Abdomen: soft, non-tender, non-distended (+) BS  Ext: no  edema  Skin: no rash  Neuro: alert and oriented X 3  Central Line: LCW Mediport  CDI     Cultures:  Culture - Blood (11.09.22 @ 17:44)    -  Staphylococcus epidermidis, Methicillin resistant: Detec    Gram Stain:   Growth in aerobic bottle: Gram Positive Cocci in Clusters    Specimen Source: .Blood Blood-Peripheral    Organism: Blood Culture PCR    Culture Results:   Growth in aerobic bottle: Staphylococcus hominis  Coag Negative Staphylococcus  Single set isolate, possible contaminant. Contact  Microbiology if susceptibility testing clinically  indicated.    LABS:                        7.7    18.21 )-----------( 23       ( 12 Nov 2022 06:55 )             22.4     Mean Cell Volume : 91.4 fl  Mean Cell Hemoglobin : 31.4 pg  Mean Cell Hemoglobin Concentration : 34.4 gm/dL  Auto Neutrophil # : 0.33 K/uL  Auto Lymphocyte # : 0.16 K/uL  Auto Monocyte # : 0.00 K/uL  Auto Eosinophil # : 0.00 K/uL  Auto Basophil # : 0.00 K/uL  Auto Neutrophil % : 1.8 %  Auto Lymphocyte % : 0.9 %  Auto Monocyte % : 0.0 %  Auto Eosinophil % : 0.0 %  Auto Basophil % : 0.0 %      11-12    137  |  102  |  19  ----------------------------<  152<H>  3.2<L>   |  23  |  0.69    Ca    7.0<L>      12 Nov 2022 06:55  Phos  3.1     11-12  Mg     1.9     11-12    TPro  5.4<L>  /  Alb  3.0<L>  /  TBili  0.9  /  DBili  x   /  AST  23  /  ALT  20  /  AlkPhos  49  11-12    PT/INR - ( 12 Nov 2022 06:56 )   PT: 27.4 sec;   INR: 2.36 ratio    PTT - ( 11 Nov 2022 07:17 )  PTT:25.8 sec      Uric Acid 3.2    RADIOLOGY & ADDITIONAL STUDIES:  from: Xray Chest 1 View- PORTABLE-Urgent (Xray Chest 1 View- PORTABLE-Urgent .) (11.11.22 @ 14:11)   FINDINGS:  Accessed left chest wall port with tip in SVC.  Heart size is normal.  Clear lungs.  The visualized osseous structures demonstrate no acute pathology.  IMPRESSION:  Clear lungs.

## 2022-11-12 NOTE — PROGRESS NOTE ADULT - ASSESSMENT
Ms. 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 Started Cytarabine 100 mg/m2 on 11/10 x 3 days + BID hydroxyurea for cytoreduction, then transitioned to daily oral FLT3 inhibitor gilteritinib on 11/12. Hospital course complicated by Staph epidermis/hominis bacteriemia (?contaminant).  Patient presents with hyperleukocytosis, anemia, and thrombocytopenia due to disease process.

## 2022-11-13 NOTE — PROGRESS NOTE ADULT - PROBLEM SELECTOR PLAN 8
pt requiring NC 6 L/M, o2 Sat 90%  11/12 CCT Scattered nodular and small consolidative opacities peripherally and   bilateral lower lobes; findings are favored to represent aspiration   and/or infection.  11/13 Added Duoneb, gentle diuresis as tolerated

## 2022-11-13 NOTE — PROGRESS NOTE ADULT - PROBLEM SELECTOR PLAN 5
11/11 reported reproductive chest pain  most likely pulmonary origin  Follow up CXR  Follow up Cardiac enzymes  11/11 EKG no changes 11/11 reported reproductive chest pain  most likely pulmonary origin  11/11 CXR  11/11 EKG no changes  11/12 elevated trop 470, the rest of cardiac enzymes WNL  11/13 pt wo cp 11/11 reported reproductive chest pain  most likely pulmonary origin  11/11 CXR clear lungs   11/11 EKG no changes  11/12 CCT Scattered nodular and small consolidative opacities peripherally and   bilateral lower lobes; findings are favored to represent aspiration   and/or infection.  11/12 elevated trop 470, the rest of cardiac enzymes WNL  11/13 pt wo cp

## 2022-11-13 NOTE — PROGRESS NOTE ADULT - PROBLEM SELECTOR PLAN 4
Continue  home dose of trazodone 150mg PO qhs.   Continue  home bupropion 150mg PO qdaily. Continue  home dose of trazodone 150mg PO qhs.   Continue  home bupropion 150mg PO q daily.

## 2022-11-13 NOTE — PROGRESS NOTE ADULT - PROBLEM SELECTOR PLAN 3
Patient's SBP is in range of 140s-160s.  Start home dose of amlodipine 5mg PO qd with hold parameters. Started  home dose of amlodipine 5mg PO qd with hold parameters.  Hypotensive 11/12, now on Midodrine, amlodipine on hold

## 2022-11-13 NOTE — PHYSICAL THERAPY INITIAL EVALUATION ADULT - ADDITIONAL COMMENTS
Pt lives with her  in a split level house with 2 set of staircase, +HR. Pt occasionally used a RW for ambulation PTA.

## 2022-11-13 NOTE — PHYSICAL THERAPY INITIAL EVALUATION ADULT - PERTINENT HX OF CURRENT PROBLEM, REHAB EVAL
Pt is a 83 yo female with PMH of AML (Dx 2020: FLT3+, NPM1, CEBPA mutated. 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 Started Cytarabine 100 mg/m2 on 11/10 x 3 days + BID hydroxyurea for cytoreduction, then transitioned to daily oral FLT3 inhibitor gilteritinib on 11/12. Hospital course complicated by Staph epidermis/hominis bacteriemia (?contaminant).  Patient presents with hyperleukocytosis, anemia, and thrombocytopenia due to disease process. Chest CT 11/12/22: Scattered nodular and small consolidative opacities peripherally and bilateral lower lobes; findings are favored to represent aspiration and/or infection. Indeterminate right hepatic lobe 2 cm lesion and right adrenal 2.3 cm nodule. (-) CXR 11/11/22.

## 2022-11-13 NOTE — PROGRESS NOTE ADULT - PROBLEM SELECTOR PLAN 1
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    TTE  11/9 - EF 70% mild AS   G6PD level was normal in 2020.   Trend CBC, CMP, TLS labs, DIC labs q12 hours.   If fibrinogen under 100, give 10 units of cryoprecipitate.  Transfuse for Hgb < 7 and platelet count < 10k, < 20k if febrile, < 50k if bleeding  continue allopurinol 300mg PO qdaily.   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   Because gilteritinib can cause QTc prolongation, check  EKG prior to start, then daily x 7 days  dexamethasone 5 mg q12h to avoid differentiation syndrome.  11/10 Cytarabine 100mg/m2 x 3 days continuous infusion.  11/12 Wbc 18k - cont last day of cytarabine. stop hydroxyurea. start gilteritinib w dexamethasone.   **hypotensive this afternoon SBP 80's. SPO2 in 70% (Hx Raynauds). Giving IVF bolus, started midodrine. CT chest revealing for scattered nodules and opacities in Lung, adrenal nodule, and indeterminate liver lesion. change antifungal to voriconazole. cont chemo/gilteritinib. check fungitell/galactomanan sent. overall DNR/DNI. 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    TTE  11/9 - EF 70% mild AS   G6PD level was normal in 2020.   Trend CBC, CMP, TLS labs, DIC labs q12 hours.   If fibrinogen under 100, give 10 units of cryoprecipitate.  Transfuse for Hgb < 7 and platelet count < 10k, < 20k if febrile, < 50k if bleeding  continue allopurinol 300mg PO q daily.   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   Because gilteritinib can cause QTc prolongation, check  EKG prior to start, then daily x 7 days  dexamethasone 5 mg q12h to avoid differentiation syndrome.  11/10 Cytarabine 100mg/m2 x 3 days continuous infusion.  11/12 Wbc 18k - cont last day of cytarabine. stop hydroxyurea. start gilteritinib w dexamethasone.   11/13 DC Cytarabine-pt  received 309 ml/509 ml of day 3 cytarabine   Lasix 20 mg iv x1 for poss fluid over load  Elevated INR: Vitamin K 11/11-11/13  DNR/DNI

## 2022-11-13 NOTE — PROGRESS NOTE ADULT - PROBLEM SELECTOR PLAN 7
VTE ppx:  Hold pharmacologic prophylaxis due to thrombocytopenia  Activity: OOB with assistance, fall precaution, bed alarm  Diet: DASH  PT evaluation VTE ppx:  Hold pharmacologic prophylaxis due to thrombocytopenia  Activity: OOB with assistance, fall precaution, bed alarm  PT consulted

## 2022-11-13 NOTE — PROGRESS NOTE ADULT - PROBLEM SELECTOR PLAN 2
Patient is neutropenic, febrile  CT chest shows scattered nodules/opacities in lung, + adrenal nodule, and liver lesion.  changed fluconazole to voriconazole  check fungitell, galactomanan  11/11 Switched Levaquin to Cefepime  11/11 Started Vancomycin  11/10  Blood Cx Staph epidermis/hominis - ? contaminant  Follow up repeat cultures 11/11, 11/12 Patient is neutropenic, febrile  CT chest shows scattered nodules/opacities in lung, + adrenal nodule, and liver lesion.  changed fluconazole to voriconazole  check fungitell, galactomanan  11/11 Switched Levaquin to Cefepime  11/11 Started Vancomycin  11/10  Blood Cx Staph epidermis/hominis - ? contaminant  Follow up repeat cultures 11/11, 11/12, NGTD  11/12 RVP(-)

## 2022-11-13 NOTE — PROGRESS NOTE ADULT - NS ATTEND AMEND GEN_ALL_CORE FT
Vital Signs Last 24 Hrs  T(C): 36.5 (12 Nov 2022 23:30), Max: 38.9 (12 Nov 2022 15:50)  T(F): 97.7 (12 Nov 2022 23:30), Max: 102 (12 Nov 2022 15:50)  HR: 69 (12 Nov 2022 23:30) (68 - 100)  BP: 92/55 (12 Nov 2022 23:30) (78/43 - 118/67)  BP(mean): --  RR: 18 (12 Nov 2022 23:30) (17 - 20)  SpO2: 96% (12 Nov 2022 23:30) (92% - 100%)    Parameters below as of 12 Nov 2022 23:30  Patient On (Oxygen Delivery Method): nasal cannula w/ humidification    MEDICATIONS  (STANDING):  acyclovir   Oral Tab/Cap 400 milliGRAM(s) Oral two times a day  allopurinol 300 milliGRAM(s) Oral daily  Biotene Dry Mouth Oral Rinse 15 milliLiter(s) Swish and Spit three times a day  buPROPion XL (24-Hour) . 150 milliGRAM(s) Oral daily  cefepime   IVPB 2000 milliGRAM(s) IV Intermittent every 8 hours  chlorhexidine 2% Cloths 1 Application(s) Topical daily  dexAMETHasone  Injectable 5 milliGRAM(s) IV Push every 12 hours  gabapentin 100 milliGRAM(s) Oral daily  gilteritinib 120 milliGRAM(s) Oral <User Schedule>  midodrine. 10 milliGRAM(s) Oral three times a day  ondansetron Injectable 8 milliGRAM(s) IV Push every 8 hours  oxybutynin 5 milliGRAM(s) Oral daily  phytonadione   Solution 5 milliGRAM(s) Oral daily  polyethylene glycol 3350 17 Gram(s) Oral two times a day  sodium chloride 0.9%. 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  Course complicated S. epi bacteremia? (11/9/22), continuous neutropenic fevers, pneumonia (concerning for fungus) and hypotension (11/12/22).     Onc History:  decitabine/lisa through 27 cycles,    Plan:  Heme: PLT goal > 10,000; Hgb goal > 7.0g/dL;   D/C Scarlet-C.   continue TLS labs and allopurinol   Continue gilteritinib with dex (to reduce the incidence of differentiation syndrome).     ID: cef, flu, acyclovir, Vanco, vori (11/12/22 - ) please check vori level in 4 days. Likely D/C evening dose of vanco   peripheral blood B-D glucan and galactomannan (11/12/22): pending    Radiographs: CT (11/12/22): Scattered nodular and small consolidative opacities peripherally and bilateral lower lobes; findings are favored to represent aspiration and/or infection.  Indeterminate right hepatic lobe 2 cm lesion and right adrenal 2.3 cm nodule; recommend correlation with MRI abdomen to more accurately characterize.    Nutrition: tolerating PO    DVT prophylaxis: ambulation.     Code status: DNR/DNI    Over 35 minutes were spent in direct patient care and care coordination. Vital Signs Last 24 Hrs  T(C): 36.5 (12 Nov 2022 23:30), Max: 38.9 (12 Nov 2022 15:50)  T(F): 97.7 (12 Nov 2022 23:30), Max: 102 (12 Nov 2022 15:50)  HR: 69 (12 Nov 2022 23:30) (68 - 100)  BP: 92/55 (12 Nov 2022 23:30) (78/43 - 118/67)  BP(mean): --  RR: 18 (12 Nov 2022 23:30) (17 - 20)  SpO2: 96% (12 Nov 2022 23:30) (92% - 100%)    Parameters below as of 12 Nov 2022 23:30  Patient On (Oxygen Delivery Method): nasal cannula w/ humidification    MEDICATIONS  (STANDING):  acyclovir   Oral Tab/Cap 400 milliGRAM(s) Oral two times a day  allopurinol 300 milliGRAM(s) Oral daily  Biotene Dry Mouth Oral Rinse 15 milliLiter(s) Swish and Spit three times a day  buPROPion XL (24-Hour) . 150 milliGRAM(s) Oral daily  cefepime   IVPB 2000 milliGRAM(s) IV Intermittent every 8 hours  chlorhexidine 2% Cloths 1 Application(s) Topical daily  dexAMETHasone  Injectable 5 milliGRAM(s) IV Push every 12 hours  gabapentin 100 milliGRAM(s) Oral daily  gilteritinib 120 milliGRAM(s) Oral <User Schedule>  midodrine. 10 milliGRAM(s) Oral three times a day  ondansetron Injectable 8 milliGRAM(s) IV Push every 8 hours  oxybutynin 5 milliGRAM(s) Oral daily  phytonadione   Solution 5 milliGRAM(s) Oral daily  polyethylene glycol 3350 17 Gram(s) Oral two times a day  sodium chloride 0.9%. 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  Course complicated S. epi bacteremia? (11/9/22), continuous neutropenic fevers, pneumonia (concerning for fungus) and hypotension (11/12/22), fluid overloaded state (11/13/22 - )     Onc History:  decitabine/lisa through 27 cycles,    Plan:  Heme: PLT goal > 10,000; Hgb goal > 7.0g/dL;   D/C Scarlet-C.   continue TLS labs and allopurinol   Continue gilteritinib with dex (to reduce the incidence of differentiation syndrome).     ID: cef, flu, acyclovir, Vanco, vori (11/12/22 - ) please check vori level in 4 days. Likely D/C evening dose of vanco   peripheral blood B-D glucan and galactomannan (11/12/22): pending    Radiographs: CT (11/12/22): Scattered nodular and small consolidative opacities peripherally and bilateral lower lobes; findings are favored to represent aspiration and/or infection.  Indeterminate right hepatic lobe 2 cm lesion and right adrenal 2.3 cm nodule; recommend correlation with MRI abdomen to more accurately characterize.    Pulmonary: add broncho dilator (history of reactive airway disease)     Nutrition: tolerating PO; goal net diuresis today.     DVT prophylaxis: ambulation.     Code status: DNR/DNI    Over 35 minutes were spent in direct patient care and care coordination.

## 2022-11-13 NOTE — PROGRESS NOTE ADULT - ASSESSMENT
Ms. 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 Started Cytarabine 100 mg/m2 on 11/10 x 3 days + BID hydroxyurea for cytoreduction, then transitioned to daily oral FLT3 inhibitor gilteritinib on 11/12. Hospital course complicated by Staph epidermis/hominis bacteriemia (?contaminant).  Patient presents with hyperleukocytosis, anemia, and thrombocytopenia due to disease process.    Ms. 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 Started Cytarabine 100 mg/m2 on 11/10 x 3 days + BID hydroxyurea for cytoreduction, then transitioned to daily oral FLT3 inhibitor gilteritinib on 11/12. Hospital course complicated by Staph epidermis/hominis bacteriemia (?contaminant).  Patient presents with pancytopenia  due to disease  and chemo.

## 2022-11-13 NOTE — PROGRESS NOTE ADULT - SUBJECTIVE AND OBJECTIVE BOX
Diagnosis: relapsed AML, FLT3 ITD+, NPM1, CEBPA, ASXL1 mutated    Protocol/Chemo Regimen: daily oral FLT3 inhibitor gilteritinib (cytarabine 100mg/m2 x 3 days continuous infusion prior to start of gilteritinib)    Day: 4 cytarabine; Day 2 gilteritinib     Pt endorsed: intermittent WALTERS; fever overnight    Review of Systems: denies  nausea, vomiting, diarrhea, abdominal pain, chest pain and headache.    Pain scale: 0                                           Diet: DASH    Allergies: No Known Allergies      ANTIMICROBIALS  acyclovir   Oral Tab/Cap 400 milliGRAM(s) Oral two times a day  cefepime   IVPB 2000 milliGRAM(s) IV Intermittent every 8 hours  vancomycin  IVPB 1000 milliGRAM(s) IV Intermittent every 12 hours  voriconazole 200 milliGRAM(s) Oral every 12 hours      HEME/ONC MEDICATIONS  gilteritinib 120 milliGRAM(s) Oral <User Schedule>      STANDING MEDICATIONS  allopurinol 300 milliGRAM(s) Oral daily  Biotene Dry Mouth Oral Rinse 15 milliLiter(s) Swish and Spit three times a day  buPROPion XL (24-Hour) . 150 milliGRAM(s) Oral daily  chlorhexidine 2% Cloths 1 Application(s) Topical daily  dexAMETHasone  Injectable 5 milliGRAM(s) IV Push every 12 hours  gabapentin 100 milliGRAM(s) Oral daily  midodrine. 10 milliGRAM(s) Oral three times a day  ondansetron Injectable 8 milliGRAM(s) IV Push every 8 hours  oxybutynin 5 milliGRAM(s) Oral daily  phytonadione   Solution 5 milliGRAM(s) Oral daily  polyethylene glycol 3350 17 Gram(s) Oral two times a day  sodium chloride 0.9%. 1000 milliLiter(s) IV Continuous <Continuous>  traZODone 150 milliGRAM(s) Oral at bedtime      PRN MEDICATIONS  acetaminophen     Tablet .. 650 milliGRAM(s) Oral every 6 hours PRN  aluminum hydroxide/magnesium hydroxide/simethicone Suspension 30 milliLiter(s) Oral every 4 hours PRN  melatonin 3 milliGRAM(s) Oral at bedtime PRN  ondansetron Injectable 4 milliGRAM(s) IV Push every 8 hours PRN        Vital Signs Last 24 Hrs  T(C): 36.5 (13 Nov 2022 04:30), Max: 38.9 (12 Nov 2022 15:50)  T(F): 97.7 (13 Nov 2022 04:30), Max: 102 (12 Nov 2022 15:50)  HR: 74 (13 Nov 2022 04:30) (68 - 96)  BP: 98/59 (13 Nov 2022 04:30) (78/43 - 98/59)  BP(mean): --  RR: 18 (13 Nov 2022 04:30) (17 - 19)  SpO2: 94% (13 Nov 2022 04:30) (94% - 100%)    Parameters below as of 13 Nov 2022 04:30  Patient On (Oxygen Delivery Method): nasal cannula w/ humidification        PHYSICAL EXAM  General: NAD  HEENT:  clear oropharynx, anicteric sclera  CV: (+) S1/S2 RRR  Lungs: Decreased breath sounds bilateral base   Abdomen: soft, non-tender, non-distended (+) BS  Ext: no  edema  Skin: no rash  Neuro: alert and oriented X 3  Central Line: LCW Mediport  CDI                  RECENT CULTURES:    11-11 @ 08:15  .Blood Blood-Peripheral  No growth to date.    11-11 @ 07:14  .Blood Blood-Catheter  No growth to date.    11-09 @ 17:44  .Blood Blood-Peripheral  Blood Culture PCR  Blood Culture PCR  PCR  Growth in aerobic bottle: Staphylococcus hominis  Coag Negative Staphylococcus  Single set isolate, possible contaminant. Contact  Microbiology if susceptibility testing clinically  indicated.  ***Blood Panel PCR results on this specimen are available  approximately 3 hours after the Gram stain result.***  Gram stain, PCR, and/or culture results may not always  correspond due to difference in methodologies.  ************************************************************  This PCR assay was performed by multiplex PCR. This  Assay tests for 66 bacterial and resistance gene targets.  Please refer to the [facility] test directory  at [email or web address] for details.        RADIOLOGY & ADDITIONAL STUDIES:      < from: CT Chest No Cont (11.12.22 @ 13:34) >  IMPRESSION:.  Scattered nodular and small consolidative opacities peripherally and   bilateral lower lobes; findings are favored to represent aspiration   and/or infection.  Indeterminate right hepatic lobe 2 cm lesion and right adrenal 2.3 cm   nodule; recommend correlation with MRI abdomen to more accurately   characterize.       Diagnosis: relapsed AML, FLT3 ITD+, NPM1, CEBPA, ASXL1 mutated    Protocol/Chemo Regimen: daily oral FLT3 inhibitor gilteritinib (cytarabine 100mg/m2 x 3 days continuous infusion prior to start of gilteritinib)    Day: 4 cytarabine; Day 2 gilteritinib     Pt endorsed: mild HA,; dry mouth; SOB/WALTERS; dry cough; numbness right 1st 2 fingers; loose BM x 1 over night     Review of Systems: Patient denied nausea, vomiting, odynophagia, chest pain, abdominal pain, constipation, diarrhea, rash, fatigue      Pain scale: 0                                           Diet: DASH/TLC    Allergies: No Known Allergies      ANTIMICROBIALS  acyclovir   Oral Tab/Cap 400 milliGRAM(s) Oral two times a day  cefepime   IVPB 2000 milliGRAM(s) IV Intermittent every 8 hours  vancomycin  IVPB 1000 milliGRAM(s) IV Intermittent every 12 hours  voriconazole 200 milliGRAM(s) Oral every 12 hours      HEME/ONC MEDICATIONS  gilteritinib 120 milliGRAM(s) Oral <User Schedule>      STANDING MEDICATIONS  allopurinol 300 milliGRAM(s) Oral daily  Biotene Dry Mouth Oral Rinse 15 milliLiter(s) Swish and Spit three times a day  buPROPion XL (24-Hour) . 150 milliGRAM(s) Oral daily  chlorhexidine 2% Cloths 1 Application(s) Topical daily  dexAMETHasone  Injectable 5 milliGRAM(s) IV Push every 12 hours  gabapentin 100 milliGRAM(s) Oral daily  midodrine. 10 milliGRAM(s) Oral three times a day  ondansetron Injectable 8 milliGRAM(s) IV Push every 8 hours  oxybutynin 5 milliGRAM(s) Oral daily  phytonadione   Solution 5 milliGRAM(s) Oral daily  polyethylene glycol 3350 17 Gram(s) Oral two times a day  sodium chloride 0.9%. 1000 milliLiter(s) IV Continuous <Continuous>  traZODone 150 milliGRAM(s) Oral at bedtime      PRN MEDICATIONS  acetaminophen     Tablet .. 650 milliGRAM(s) Oral every 6 hours PRN  aluminum hydroxide/magnesium hydroxide/simethicone Suspension 30 milliLiter(s) Oral every 4 hours PRN  melatonin 3 milliGRAM(s) Oral at bedtime PRN  ondansetron Injectable 4 milliGRAM(s) IV Push every 8 hours PRN      Vital Signs Last 24 Hrs  T(C): 36.5 (13 Nov 2022 04:30), Max: 38.9 (12 Nov 2022 15:50)  T(F): 97.7 (13 Nov 2022 04:30), Max: 102 (12 Nov 2022 15:50)  HR: 74 (13 Nov 2022 04:30) (68 - 96)  BP: 98/59 (13 Nov 2022 04:30) (78/43 - 98/59)  BP(mean): --  RR: 18 (13 Nov 2022 04:30) (17 - 19)  SpO2: 94% (13 Nov 2022 04:30) (94% - 100%)    Parameters below as of 13 Nov 2022 04:30  Patient On (Oxygen Delivery Method): nasal cannula w/ humidification      PHYSICAL EXAM  General: NAD  HEENT:  clear oropharynx, anicteric sclera  CV: (+) S1/S2 RRR  Lungs: coarse bs, rale 1/4 up   Abdomen: soft, + BS, non-tender, non-distended  Ext: trace carolyn LE   Skin: no rash  Neuro: alert and oriented X 3  Central Line: LCW Mediport  and PIV CDI       LABS:                          8.3    6.11  )-----------( 40       ( 13 Nov 2022 07:17 )             24.7         Mean Cell Volume : 93.2 fl  Mean Cell Hemoglobin : 31.3 pg  Mean Cell Hemoglobin Concentration : 33.6 gm/dL  Auto Neutrophil # : 0.00 K/uL  Auto Lymphocyte # : 0.05 K/uL  Auto Monocyte # : 0.00 K/uL  Auto Eosinophil # : 0.00 K/uL  Auto Basophil # : 0.00 K/uL  Auto Neutrophil % : 0.0 %  Auto Lymphocyte % : 0.9 %  Auto Monocyte % : 0.0 %  Auto Eosinophil % : 0.0 %  Auto Basophil % : 0.0 %      11-13    135  |  107  |  23  ----------------------------<  132<H>  4.4   |  17<L>  |  0.77    Ca    7.2<L>      13 Nov 2022 07:16  Phos  3.8     11-13  Mg     2.2     11-13    TPro  5.4<L>  /  Alb  2.6<L>  /  TBili  0.5  /  DBili  x   /  AST  24  /  ALT  27  /  AlkPhos  46  11-13      PT/INR - ( 13 Nov 2022 07:17 )   PT: 19.5 sec;   INR: 1.69 ratio         PTT - ( 13 Nov 2022 07:17 )  PTT:29.1 sec      Uric Acid 2.6    Vancomycin Level, Trough (11.12.22 @ 18:13)    Vancomycin Level, Trough: 11.9      RECENT CULTURES:    Respiratory Viral Panel with COVID-19 by LUIS MIGUEL (11.12.22 @ 20:43)    Rapid RVP Result: [facility]    SARS-CoV-2: NotDete    11-11 @ 08:15  .Blood Blood-Peripheral  No growth to date.    11-11 @ 07:14  .Blood Blood-Catheter  No growth to date.    11-09 @ 17:44  .Blood Blood-Peripheral  Blood Culture PCR  Blood Culture PCR  PCR  Growth in aerobic bottle: Staphylococcus hominis  Coag Negative Staphylococcus  Single set isolate, possible contaminant. Contact  Microbiology if susceptibility testing clinically  indicated.  ***Blood Panel PCR results on this specimen are available  approximately 3 hours after the Gram stain result.***  Gram stain, PCR, and/or culture results may not always  correspond due to difference in methodologies.  ************************************************************  This PCR assay was performed by multiplex PCR. This  Assay tests for 66 bacterial and resistance gene targets.  Please refer to the [facility] test directory  at [email or web address] for details.        RADIOLOGY & ADDITIONAL STUDIES:      < from: CT Chest No Cont (11.12.22 @ 13:34) >  IMPRESSION:.  Scattered nodular and small consolidative opacities peripherally and   bilateral lower lobes; findings are favored to represent aspiration   and/or infection.  Indeterminate right hepatic lobe 2 cm lesion and right adrenal 2.3 cm   nodule; recommend correlation with MRI abdomen to more accurately   characterize.    < from: Xray Chest 1 View- PORTABLE-Urgent (Xray Chest 1 View- PORTABLE-Urgent .) (11.11.22 @ 14:11) >  IMPRESSION:  Clear lungs.

## 2022-11-14 NOTE — CONSULT NOTE ADULT - ATTENDING COMMENTS
AML neutropenic fevers.   Cough - some small nodular opacities.   Diarrhea.   Otherwise nonfocal.   I don't think Vancomycin is indicated. Staph epidermidis appears to be contaminant. Mediport noted.     Bucky Bobby MD   Infectious Disease   Available on TEAMS. After 5PM and on weekends please page fellow on call or call [phone]

## 2022-11-14 NOTE — CONSULT NOTE ADULT - ASSESSMENT
WORK UP          DIAGNOSIS and IMPRESSION          RECOMMENDATIONS        PT TO BE SEEN. PRELIM NOTE  PENDING RECS. PLEASE WAIT FOR FINAL RECS AFTER DISCUSSION WITH ATTENDING#    Paul Davalos DO, PGY-4   ID fellow  [employer] Teams Preferred  After 5pm/weekends call [phone]   WORK UP  Initially afebrile, but became febrile 100.5F (11/11) and then 102F (11/13), on 5L NC  WBC 99 > 8.9 (11/12) > 0.49 (11/13), today 0.07  Cr 0.77  UA grossly negative  CXR (11/8) and (11/11) clear  CT Chest (11/12) with nodular opacities peripherally and bilateral lung bases ?aspiration  COVID negative and RVP negative x2  BCx (11/9) 1 out of 4 Staph epi and 1 out of 4 Staph hominis (likely contaminant)  BCx (11/11) NGTD    DIAGNOSIS and IMPRESSION  82F with AML (Dx 2020) s/p 27 cycles decitabine/venetoclax, breast cancer (Dx 12 years prior),  s/p treatment with tamoxifen, RT and R lumpectomy, who presents with AML in relapse and hyperleukocytosis Wbc > 100k, was started Cytarabine on 11/10 x 3 days + BID hydroxyurea for cytoreduction, then transitioned to daily oral FLT3 inhibitor gilteritinib on 11/12, complicated neutropenic fever. ID consulted for Neutropenic fever.     #Neutropenic Fever  #Relapsed AML on Chemotherapy  #Staph epi/Staph hominis blood culture (likely contaminant)    - denies any localizing symptoms aside from dry cough and loose stool  - s/p Levofloxacin (11/8 --- 11/10)  - s/p Fluconazole (11/8 --- 11/12 )  - currently on:  ---- acyclovir   Oral Tab/Cap 400 two times a day (11/8 --- )  ---- voriconazole 200 every 12 hours (11/12 --- )  ---- cefepime   IVPB 2000 every 8 hours (11/11 --- )  ---- vancomycin  IVPB 1000 every 12 hours (11/10 --- )    RECOMMENDATIONS        PT TO BE SEEN. PRELIM NOTE  PENDING RECS. PLEASE WAIT FOR FINAL RECS AFTER DISCUSSION WITH ATTENDINGAbby Davalos DO, PGY-4   ID fellow  [employer] Teams Preferred  After 5pm/weekends call [phone]   WORK UP  Initially afebrile, but became febrile 100.5F (11/11) and then 102F (11/13), on 5L NC  WBC 99 > 8.9 (11/12) > 0.49 (11/13), today 0.07  Cr 0.77  UA grossly negative  CXR (11/8) and (11/11) clear  CT Chest (11/12) with nodular opacities peripherally and bilateral lung bases ?aspiration  COVID negative and RVP negative x2  BCx (11/9) 1 out of 4 Staph epi and 1 out of 4 Staph hominis (likely contaminant)  BCx (11/11) NGTD  MRSA negative    DIAGNOSIS and IMPRESSION  82F with AML (Dx 2020) s/p 27 cycles decitabine/venetoclax, breast cancer (Dx 12 years prior),  s/p treatment with tamoxifen, RT and R lumpectomy, who presents with AML in relapse and hyperleukocytosis Wbc > 100k, was started Cytarabine on 11/10 x 3 days + BID hydroxyurea for cytoreduction, then transitioned to daily oral FLT3 inhibitor gilteritinib on 11/12, complicated neutropenic fever. ID consulted for Neutropenic fever.     #Neutropenic Fever  #Relapsed AML on Chemotherapy  #Staph epi/Staph hominis blood culture (likely contaminant)    - denies any localizing symptoms aside from dry cough and loose stool  - s/p Levofloxacin (11/8 --- 11/10)  - s/p Fluconazole (11/8 --- 11/12 )    RECOMMENDATIONS  - d/c vancomycin    - c/w cefepime IVPB 2000 every 8 hours (11/11 --- ), voriconazole 200 every 12 hours (11/12 --- ), acyclovir  Oral Tab/Cap 400 two times a day (11/8 --- )  - monitor for worsening GI symptoms, loose stool, if worsen, obtain Cdiff and GI PCR  - trend WBC and ANC level      Case d/w attending and primary team      Paul Davalos DO, PGY-4   ID fellow  [employer] Teams Preferred  After 5pm/weekends call [phone]

## 2022-11-14 NOTE — PROGRESS NOTE ADULT - PROBLEM SELECTOR PLAN 2
Patient is neutropenic, febrile  CT chest shows scattered nodules/opacities in lung, + adrenal nodule, and liver lesion.  changed fluconazole to voriconazole  check fungitell, galactomanan  11/11 Switched Levaquin to Cefepime  11/11 Started Vancomycin  11/10  Blood Cx Staph epidermis/hominis - ? contaminant  Follow up repeat cultures 11/11, 11/12, NGTD  11/12 RVP(-) Patient is neutropenic, febrile  CT chest shows scattered nodules/opacities in lung, + adrenal nodule, and liver lesion.  changed fluconazole to voriconazole  check fungitell, galactomanan  11/11 Switched Levaquin to Cefepime  11/10  Blood Cx Staph epidermis/hominis -Repeat cultures 11/11, 11/12, NGTD  11/12 RVP(-)  ID consult-Vanco stopped.

## 2022-11-14 NOTE — PROGRESS NOTE ADULT - PROBLEM SELECTOR PLAN 3
Started  home dose of amlodipine 5mg PO qd with hold parameters.  Hypotensive 11/12, now on Midodrine, amlodipine on hold Hypotensive 11/12, now on Midodrine, amlodipine on hold

## 2022-11-14 NOTE — PROGRESS NOTE ADULT - PROBLEM SELECTOR PLAN 5
11/11 reported reproductive chest pain  most likely pulmonary origin  11/11 CXR clear lungs   11/11 EKG no changes  11/12 CCT Scattered nodular and small consolidative opacities peripherally and   bilateral lower lobes; findings are favored to represent aspiration   and/or infection.  11/12 elevated trop 470, the rest of cardiac enzymes WNL  11/13 pt wo cp 11/11 EKG no changes  11/12 elevated trop the rest of cardiac enzymes WNL  Cardiology consult.

## 2022-11-14 NOTE — PROGRESS NOTE ADULT - NS ATTEND AMEND GEN_ALL_CORE FT
Vital Signs Last 24 Hrs  T(C): 36.5 (12 Nov 2022 23:30), Max: 38.9 (12 Nov 2022 15:50)  T(F): 97.7 (12 Nov 2022 23:30), Max: 102 (12 Nov 2022 15:50)  HR: 69 (12 Nov 2022 23:30) (68 - 100)  BP: 92/55 (12 Nov 2022 23:30) (78/43 - 118/67)  BP(mean): --  RR: 18 (12 Nov 2022 23:30) (17 - 20)  SpO2: 96% (12 Nov 2022 23:30) (92% - 100%)    Parameters below as of 12 Nov 2022 23:30  Patient On (Oxygen Delivery Method): nasal cannula w/ humidification    MEDICATIONS  (STANDING):  acyclovir   Oral Tab/Cap 400 milliGRAM(s) Oral two times a day  allopurinol 300 milliGRAM(s) Oral daily  Biotene Dry Mouth Oral Rinse 15 milliLiter(s) Swish and Spit three times a day  buPROPion XL (24-Hour) . 150 milliGRAM(s) Oral daily  cefepime   IVPB 2000 milliGRAM(s) IV Intermittent every 8 hours  chlorhexidine 2% Cloths 1 Application(s) Topical daily  dexAMETHasone  Injectable 5 milliGRAM(s) IV Push every 12 hours  gabapentin 100 milliGRAM(s) Oral daily  gilteritinib 120 milliGRAM(s) Oral <User Schedule>  midodrine. 10 milliGRAM(s) Oral three times a day  ondansetron Injectable 8 milliGRAM(s) IV Push every 8 hours  oxybutynin 5 milliGRAM(s) Oral daily  phytonadione   Solution 5 milliGRAM(s) Oral daily  polyethylene glycol 3350 17 Gram(s) Oral two times a day  sodium chloride 0.9%. 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  Course complicated S. epi bacteremia? (11/9/22), continuous neutropenic fevers, pneumonia (concerning for fungus) and hypotension (11/12/22), fluid overloaded state (11/13/22 - )     Onc History:  decitabine/lisa through 27 cycles,    Plan:  Heme: PLT goal > 10,000; Hgb goal > 7.0g/dL;   D/C Scarlet-C.   continue TLS labs and allopurinol   Continue gilteritinib with dex (to reduce the incidence of differentiation syndrome).     ID: cef, flu, acyclovir, Vanco, vori (11/12/22 - ) please check vori level in 4 days. Likely D/C evening dose of vanco   peripheral blood B-D glucan and galactomannan (11/12/22): pending    Radiographs: CT (11/12/22): Scattered nodular and small consolidative opacities peripherally and bilateral lower lobes; findings are favored to represent aspiration and/or infection.  Indeterminate right hepatic lobe 2 cm lesion and right adrenal 2.3 cm nodule; recommend correlation with MRI abdomen to more accurately characterize.    Pulmonary: add broncho dilator (history of reactive airway disease)     Nutrition: tolerating PO; goal net diuresis today.     DVT prophylaxis: ambulation.     Code status: DNR/DNI    Over 35 minutes were spent in direct patient care and care coordination. Vital Signs Last 24 Hrs  T(C): 36.8 (14 Nov 2022 09:15), Max: 38.6 (13 Nov 2022 15:35)  T(F): 98.2 (14 Nov 2022 09:15), Max: 101.5 (13 Nov 2022 15:35)  HR: 74 (14 Nov 2022 09:15) (69 - 98)  BP: 117/65 (14 Nov 2022 09:15) (93/56 - 117/65)  BP(mean): --  RR: 18 (14 Nov 2022 09:15) (17 - 22)  SpO2: 94% (14 Nov 2022 09:15) (94% - 97%)    Parameters below as of 14 Nov 2022 09:15  Patient On (Oxygen Delivery Method): nasal cannula w/ humidification  O2 Flow (L/min): 5    MEDICATIONS  (STANDING):  acyclovir   Oral Tab/Cap 400 milliGRAM(s) Oral two times a day  albuterol/ipratropium for Nebulization 3 milliLiter(s) Nebulizer every 6 hours  allopurinol 300 milliGRAM(s) Oral daily  Biotene Dry Mouth Oral Rinse 15 milliLiter(s) Swish and Spit three times a day  buPROPion XL (24-Hour) . 150 milliGRAM(s) Oral daily  cefepime   IVPB 2000 milliGRAM(s) IV Intermittent every 8 hours  chlorhexidine 2% Cloths 1 Application(s) Topical daily  dexAMETHasone  Injectable 5 milliGRAM(s) IV Push every 12 hours  gabapentin 100 milliGRAM(s) Oral daily  gilteritinib 120 milliGRAM(s) Oral <User Schedule>  midodrine. 10 milliGRAM(s) Oral three times a day  oxybutynin 5 milliGRAM(s) Oral daily  polyethylene glycol 3350 17 Gram(s) Oral two times a day  sodium bicarbonate 650 milliGRAM(s) Oral four times a day  sodium chloride 0.65% Nasal 1 Spray(s) Both Nostrils four times a day  sodium chloride 0.9%. 1000 milliLiter(s) (60 mL/Hr) IV Continuous <Continuous>  traZODone 150 milliGRAM(s) Oral at bedtime  vancomycin  IVPB 1000 milliGRAM(s) IV Intermittent every 12 hours  voriconazole 200 milliGRAM(s) Oral every 12 hours    was transiently hypotensive 11/12 >>>midodrine  intermittently febrile  c/o incr dyspnea  has mod AS on echo  CT lung nodules,  ? 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  Course complicated S. epi bacteremia? (11/9/22), continuous neutropenic fevers, pneumonia (concerning for fungus) and hypotension (11/12/22), fluid overloaded state (11/13/22 - )   will ask for ID input given fevers, lung findings    Onc History:  decitabine/lisa through 27 cycles,    Plan:  Heme: PLT goal > 10,000; Hgb goal > 7.0g/dL;   D/C Scarlet-C and HU  continue TLS labs and allopurinol   Continue gilteritinib with dex (to reduce the incidence of differentiation syndrome).     ID: cefepime, vori, acyclovir, Vanco, vori (11/12/22 - )will check vori level I in 3 days.    peripheral blood B-D glucan and galactomannan (11/12/22): pending    Radiographs: CT (11/12/22): Scattered nodular and small consolidative opacities peripherally and bilateral lower lobes; findings are favored to represent aspiration and/or infection.  Indeterminate right hepatic lobe 2 cm lesion and right adrenal 2.3 cm nodule; recommend correlation with MRI abdomen to more accurately characterize.    Pulmonary: add bronchodilator (history of reactive airway disease)     Nutrition: tolerating PO; cont net diuresis today.   cont to follow QTc (on vfend and gilteritinib)  DVT prophylaxis: ambulation.     Code status: DNR/DNI    Over 35 minutes were spent in direct patient care and care coordination. Vital Signs Last 24 Hrs  T(C): 36.8 (14 Nov 2022 09:15), Max: 38.6 (13 Nov 2022 15:35)  T(F): 98.2 (14 Nov 2022 09:15), Max: 101.5 (13 Nov 2022 15:35)  HR: 74 (14 Nov 2022 09:15) (69 - 98)  BP: 117/65 (14 Nov 2022 09:15) (93/56 - 117/65)  BP(mean): --  RR: 18 (14 Nov 2022 09:15) (17 - 22)  SpO2: 94% (14 Nov 2022 09:15) (94% - 97%)    Parameters below as of 14 Nov 2022 09:15  Patient On (Oxygen Delivery Method): nasal cannula w/ humidification  O2 Flow (L/min): 5    MEDICATIONS  (STANDING):  acyclovir   Oral Tab/Cap 400 milliGRAM(s) Oral two times a day  albuterol/ipratropium for Nebulization 3 milliLiter(s) Nebulizer every 6 hours  allopurinol 300 milliGRAM(s) Oral daily  Biotene Dry Mouth Oral Rinse 15 milliLiter(s) Swish and Spit three times a day  buPROPion XL (24-Hour) . 150 milliGRAM(s) Oral daily  cefepime   IVPB 2000 milliGRAM(s) IV Intermittent every 8 hours  chlorhexidine 2% Cloths 1 Application(s) Topical daily  dexAMETHasone  Injectable 5 milliGRAM(s) IV Push every 12 hours  gabapentin 100 milliGRAM(s) Oral daily  gilteritinib 120 milliGRAM(s) Oral <User Schedule>  midodrine. 10 milliGRAM(s) Oral three times a day  oxybutynin 5 milliGRAM(s) Oral daily  polyethylene glycol 3350 17 Gram(s) Oral two times a day  sodium bicarbonate 650 milliGRAM(s) Oral four times a day  sodium chloride 0.65% Nasal 1 Spray(s) Both Nostrils four times a day  sodium chloride 0.9%. 1000 milliLiter(s) (60 mL/Hr) IV Continuous <Continuous>  traZODone 150 milliGRAM(s) Oral at bedtime  vancomycin  IVPB 1000 milliGRAM(s) IV Intermittent every 12 hours  voriconazole 200 milliGRAM(s) Oral every 12 hours    was transiently hypotensive 11/12 >>>midodrine  intermittently febrile  c/o incr dyspnea  has mod AS on echo  CT lung nodules,  ? 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  Course complicated S. epi bacteremia? (11/9/22), continuous neutropenic fevers, pneumonia (concerning for fungus) and hypotension (11/12/22), fluid overloaded state (11/13/22 - )   note fibrinogen increasing now, d-dimer very high but also decreasing  will ask for ID input given fevers, lung findings    Onc History:  decitabine/lisa through 27 cycles,    Plan:  Heme: PLT goal > 10,000; Hgb goal > 7.0g/dL;   D/C Scarlet-C and HU  continue TLS labs and allopurinol   Continue gilteritinib with dex (to reduce the incidence of differentiation syndrome).     ID: cefepime, vori, acyclovir, Vanco, vori (11/12/22 - )will check vori level I in 3 days.    peripheral blood B-D glucan and galactomannan (11/12/22): pending    Radiographs: CT (11/12/22): Scattered nodular and small consolidative opacities peripherally and bilateral lower lobes; findings are favored to represent aspiration and/or infection.  Indeterminate right hepatic lobe 2 cm lesion and right adrenal 2.3 cm nodule; recommend correlation with MRI abdomen to more accurately characterize.    Pulmonary: add bronchodilator (history of reactive airway disease)     Nutrition: tolerating PO; cont net diuresis today.   cont to follow QTc (on vfend and gilteritinib)  DVT prophylaxis: ambulation.     Code status: DNR/DNI    Over 35 minutes were spent in direct patient care and care coordination.

## 2022-11-14 NOTE — PROGRESS NOTE ADULT - PROBLEM SELECTOR PLAN 1
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    TTE  11/9 - EF 70% mild AS   G6PD level was normal in 2020.   Trend CBC, CMP, TLS labs, DIC labs q12 hours.   If fibrinogen under 100, give 10 units of cryoprecipitate.  Transfuse for Hgb < 7 and platelet count < 10k, < 20k if febrile, < 50k if bleeding  continue allopurinol 300mg PO q daily.   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   Because gilteritinib can cause QTc prolongation, check  EKG prior to start, then daily x 7 days  dexamethasone 5 mg q12h to avoid differentiation syndrome.  11/10 Cytarabine 100mg/m2 x 3 days continuous infusion.  11/12 Wbc 18k - cont last day of cytarabine. stop hydroxyurea. start gilteritinib w dexamethasone.   11/13 DC Cytarabine-pt  received 309 ml/509 ml of day 3 cytarabine   Lasix 20 mg iv x1 for poss fluid over load  Elevated INR: Vitamin K 11/11-11/13  DNR/DNI Mutations identified in FLT3-ITD, NPM1, ASXL1 and CEBPA.    TTE  11/9 - EF 70% mild AS   G6PD level was normal in 2020.   Trend CBC, CMP, TLS labs, DIC labs q12 hours. , If fibrinogen under 100, give 10 units of cryoprecipitate.  Transfuse for Hgb < 7 and platelet count < 10k, < 20k if febrile, < 50k if bleeding, continue allopurinol 300mg PO q daily.   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   Because gilteritinib can cause QTc prolongation, Daily ekg for 7 days  dexamethasone 5 mg q12h to avoid differentiation syndrome.  11/10 Cytarabine 100mg/m2 x 3 days continuous infusion.  11/12 Cont last day of cytarabine. stop hydroxyurea. start gilteritinib w dexamethasone. 11/13 DC Cytarabine-pt  received 309 ml/509 ml of day 3 cytarabine.   11/14 Cardiology consult for AS.   DNR/DNI

## 2022-11-14 NOTE — PROGRESS NOTE ADULT - ASSESSMENT
Ms. 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 Started Cytarabine 100 mg/m2 on 11/10 x 3 days + BID hydroxyurea for cytoreduction, then transitioned to daily oral FLT3 inhibitor gilteritinib on 11/12. Hospital course complicated by Staph epidermis/hominis bacteriemia (?contaminant).  Patient presents with pancytopenia  due to disease  and chemo.    Ms. 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 Started Cytarabine 100 mg/m2 on 11/10 x 3 days + BID hydroxyurea for cytoreduction, then transitioned to daily oral FLT3 inhibitor gilteritinib on 11/12. Hospital course complicated by Staph epidermis/hominis bacteriemia (?contaminant). Patient presents with pancytopenia due to disease and chemo.

## 2022-11-14 NOTE — CONSULT NOTE ADULT - SUBJECTIVE AND OBJECTIVE BOX
Patient is a 82y old  Female who presents with a chief complaint of AML relapse (14 Nov 2022 08:21)    HPI:  82F with AML (Dx 2020) s/p 27 cycles decitabine/venetoclax, breast cancer (Dx 12 years prior),  s/p treatment with tamoxifen, RT and R lumpectomy, who presents with AML in relapse and hyperleukocytosis Wbc > 100k, was started Cytarabine on 11/10 x 3 days + BID hydroxyurea for cytoreduction, then transitioned to daily oral FLT3 inhibitor gilteritinib on 11/12, complicated by Staph epidermis/hominis bacteriemia (presumed contaminant) and neutropenic fever. ID consulted for Neutropenic fever.     Patient states ---              s/p Levofloxacin (11/8 --- 11/10)  s/p Fluconazole (11/8 --- 11/12 )    acyclovir   Oral Tab/Cap 400 two times a day (11/8 --- )  voriconazole 200 every 12 hours (11/12 --- )    cefepime   IVPB 2000 every 8 hours (11/11 --- )  vancomycin  IVPB 1000 every 12 hours (11/10 --- )      PAST MEDICAL & SURGICAL HISTORY:  Breast cancer  s/p lumpectomy, RT, tamoxifen  Hypertension  AML (acute myelogenous leukemia)  S/P hysterectomy          Allergies  No Known Allergies    ANTIMICROBIALS (past 90 days)  MEDICATIONS  (STANDING):    s/p Levofloxacin (11/8 --- 11/10)  s/p Fluconazole (11/8 --- 11/12 )    acyclovir   Oral Tab/Cap 400 two times a day (11/8 --- )  voriconazole 200 every 12 hours (11/12 --- )    cefepime   IVPB 2000 every 8 hours (11/11 --- )  vancomycin  IVPB 1000 every 12 hours (11/10 --- )      MEDICATIONS  (STANDING):  acetaminophen     Tablet .. 650 every 6 hours PRN  albuterol/ipratropium for Nebulization 3 every 6 hours  allopurinol 300 daily  aluminum hydroxide/magnesium hydroxide/simethicone Suspension 30 every 4 hours PRN  benzonatate 100 every 8 hours PRN  budesonide  80 MICROgram(s)/formoterol 4.5 MICROgram(s) Inhaler 2 daily  buPROPion XL (24-Hour) . 150 daily  dexAMETHasone  Injectable 5 every 12 hours  gabapentin 100 daily  gilteritinib 120 <User Schedule>  melatonin 3 at bedtime PRN  midodrine. 10 three times a day  ondansetron Injectable 4 every 8 hours PRN  oxybutynin 5 daily  polyethylene glycol 3350 17 two times a day  traZODone 150 at bedtime    SOCIAL HISTORY:       FAMILY HISTORY:  No pertinent family history in first degree relatives      REVIEW OF SYSTEMS  [  ] ROS unobtainable because:    [  ] All other systems negative except as noted below:	    Constitutional:  [ ] fever [ ] chills  [ ] weight loss  [ ] weakness  Skin:  [ ] rash [ ] phlebitis	  Eyes: [ ] icterus [ ] pain  [ ] discharge	  ENMT: [ ] sore throat  [ ] thrush [ ] ulcers [ ] exudates  Respiratory: [ ] dyspnea [ ] hemoptysis [ ] cough [ ] sputum	  Cardiovascular:  [ ] chest pain [ ] palpitations [ ] edema	  Gastrointestinal:  [ ] nausea [ ] vomiting [ ] diarrhea [ ] constipation [ ] pain	  Genitourinary:  [ ] dysuria [ ] frequency [ ] hematuria [ ] discharge [ ] flank pain  [ ] incontinence  Musculoskeletal:  [ ] myalgias [ ] arthralgias [ ] arthritis  [ ] back pain  Neurological:  [ ] headache [ ] seizures  [ ] confusion/altered mental status  Psychiatric:  [ ] anxiety [ ] depression	  Hematology/Lymphatics:  [ ] lymphadenopathy  Endocrine:  [ ] adrenal [ ] thyroid  Allergic/Immunologic:	 [ ] transplant [ ] seasonal    Vital Signs Last 24 Hrs  T(F): 98.2 (11-14-22 @ 09:15), Max: 103.1 (11-11-22 @ 15:45)  Vital Signs Last 24 Hrs  HR: 74 (11-14-22 @ 09:15) (69 - 98)  BP: 117/65 (11-14-22 @ 09:15) (93/56 - 117/65)  RR: 18 (11-14-22 @ 09:15)  SpO2: 94% (11-14-22 @ 09:15) (94% - 97%)  Wt(kg): --    Physical Exam:  Constitutional:  well preserved, comfortable  Head/Eyes: no icterus  ENT:  supple, no cervical lymphadenopathy   LUNGS:  CTA  CVS:  regular rhythm, no murmur  Abd:  soft, non-tender; non-distended  Ext:  no edema  Vascular:  IV site no erythema tenderness or discharge  MSK:  joints without swelling  Neuro: AAO X 3, non- focal                          7.0    0.07  )-----------( 22       ( 14 Nov 2022 06:40 )             21.1   11-14    137  |  111<H>  |  24<H>  ----------------------------<  182<H>  4.4   |  18<L>  |  0.77    Ca    7.2<L>      14 Nov 2022 06:40  Phos  2.7     11-14  Mg     2.4     11-14    TPro  5.1<L>  /  Alb  2.4<L>  /  TBili  0.2  /  DBili  x   /  AST  12  /  ALT  21  /  AlkPhos  41  11-14    MICROBIOLOGY:Vancomycin Level, Trough: 11.9 (11-12 @ 18:13)    Culture - Blood (collected 12 Nov 2022 15:49)  Source: .Blood Blood-Catheter  Preliminary Report (13 Nov 2022 19:02):    No growth to date.    Culture - Blood (collected 11 Nov 2022 08:15)  Source: .Blood Blood-Peripheral  Preliminary Report (12 Nov 2022 17:01):    No growth to date.    Culture - Blood (collected 11 Nov 2022 07:14)  Source: .Blood Blood-Catheter  Preliminary Report (12 Nov 2022 15:07):    No growth to date.    Culture - Blood (collected 09 Nov 2022 17:44)  Source: .Blood Blood-Peripheral  Gram Stain (10 Nov 2022 18:17):    Growth in aerobic bottle: Gram Positive Cocci in Clusters  Final Report (13 Nov 2022 17:30):    Growth in aerobic bottle: Staphylococcus hominis    Growth in aerobic bottle: Staphylococcus epidermidis    Coag Negative Staphylococcus    Single set isolate, possible contaminant. Contact    Microbiology if susceptibility testing clinically    indicated.    ***Blood Panel PCR results on this specimen are available    approximately 3 hours after the Gram stain result.***    Gram stain, PCR, and/or culture results may not always    correspond due to difference in methodologies.    ************************************************************    This PCR assay was performed by multiplex PCR. This    Assay tests for 66 bacterial and resistance gene targets.    Please refer to the [facility] test directory    at [email or web address] for details.  Organism: Blood Culture PCR (13 Nov 2022 17:30)  Organism: Blood Culture PCR (13 Nov 2022 17:30)      -  Staphylococcus epidermidis, Methicillin resistant: Detec      Method Type: PCR    Rapid RVP Result: NotDetec (11-12 @ 20:43)      RADIOLOGY:  imaging below personally reviewed and agree with findings  < from: Xray Chest 1 View- PORTABLE-Urgent (Xray Chest 1 View- PORTABLE-Urgent .) (11.08.22 @ 02:29) >  IMPRESSION:  Stable configuration and location of left chest wall CT compatible port.    Clear lungs.    < end of copied text >  < from: Xray Chest 1 View- PORTABLE-Urgent (Xray Chest 1 View- PORTABLE-Urgent .) (11.11.22 @ 14:11) >  IMPRESSION:  Clear lungs.    < end of copied text >  < from: CT Chest No Cont (11.12.22 @ 13:34) >  IMPRESSION:.    Scattered nodular and small consolidative opacities peripherally and   bilateral lower lobes; findings are favored to represent aspiration   and/or infection.    Indeterminate right hepatic lobe 2 cm lesion and right adrenal 2.3 cm   nodule; recommend correlation with MRI abdomen to more accurately   characterize.    < end of copied text >   Patient is a 82y old  Female who presents with a chief complaint of AML relapse (14 Nov 2022 08:21)    HPI:  82F with AML (Dx 2020) s/p 27 cycles decitabine/venetoclax, breast cancer (Dx 12 years prior),  s/p treatment with tamoxifen, RT and R lumpectomy, who presents with AML in relapse and hyperleukocytosis Wbc > 100k, was started Cytarabine on 11/10 x 3 days + BID hydroxyurea for cytoreduction, then transitioned to daily oral FLT3 inhibitor gilteritinib on 11/12, complicated neutropenic fever. ID consulted for Neutropenic fever.     Patient states she was diagnosed relapsed AML earlier this month and was immediately started on chemotherapy.   She denies any new symptoms. Has been having dry cough for months now, without fever or chills. Also noticed loose stool for a few days. Denies any n/v, or poor appetite. Denies any recent sick contact.    Initially afebrile, but became febrile 100.5F (11/11) and then 102F (11/13), on 5L NC  WBC 99 > 8.9 (11/12) > 0.49 (11/13), today 0.07  Cr 0.77  UA grossly negative  CXR (11/8) and (11/11) clear  CT Chest (11/12) with nodular opacities peripherally and bilateral lung bases ?aspiration  COVID negative and RVP negative x2  BCx (11/9) 1 out of 4 Staph epi and 1 out of 4 Staph hominis (likely contaminant)  BCx (11/11) NGTD    s/p Levofloxacin (11/8 --- 11/10)  s/p Fluconazole (11/8 --- 11/12 )      PAST MEDICAL & SURGICAL HISTORY:  Breast cancer  s/p lumpectomy, RT, tamoxifen  Hypertension  AML (acute myelogenous leukemia)  S/P hysterectomy      Allergies  No Known Allergies    ANTIMICROBIALS (past 90 days)  MEDICATIONS  (STANDING):    s/p Levofloxacin (11/8 --- 11/10)  s/p Fluconazole (11/8 --- 11/12 )    acyclovir   Oral Tab/Cap 400 two times a day (11/8 --- )  voriconazole 200 every 12 hours (11/12 --- )    cefepime   IVPB 2000 every 8 hours (11/11 --- )  vancomycin  IVPB 1000 every 12 hours (11/10 --- )      MEDICATIONS  (STANDING):  acetaminophen     Tablet .. 650 every 6 hours PRN  albuterol/ipratropium for Nebulization 3 every 6 hours  allopurinol 300 daily  aluminum hydroxide/magnesium hydroxide/simethicone Suspension 30 every 4 hours PRN  benzonatate 100 every 8 hours PRN  budesonide  80 MICROgram(s)/formoterol 4.5 MICROgram(s) Inhaler 2 daily  buPROPion XL (24-Hour) . 150 daily  dexAMETHasone  Injectable 5 every 12 hours  gabapentin 100 daily  gilteritinib 120 <User Schedule>  melatonin 3 at bedtime PRN  midodrine. 10 three times a day  ondansetron Injectable 4 every 8 hours PRN  oxybutynin 5 daily  polyethylene glycol 3350 17 two times a day  traZODone 150 at bedtime    SOCIAL HISTORY:   occasional alcohol, denies tobacco and recreational drug use  Lives with family, no recent travel      FAMILY HISTORY:  No pertinent family history in first degree relatives      REVIEW OF SYSTEMS  [  ] ROS unobtainable because:    [ x] All other systems negative except as noted below:	    Constitutional:  [x ] fever [ ] chills  [ ] weight loss  [ ] weakness  Skin:  [ ] rash [ ] phlebitis	  Eyes: [ ] icterus [ ] pain  [ ] discharge	  ENMT: [ ] sore throat  [ ] thrush [ ] ulcers [ ] exudates  Respiratory: [ ] dyspnea [ ] hemoptysis [x ] cough [ ] sputum	  Cardiovascular:  [ ] chest pain [ ] palpitations [ ] edema	  Gastrointestinal:  [ ] nausea [ ] vomiting [x ] diarrhea [ ] constipation [ ] pain	  Genitourinary:  [ ] dysuria [ ] frequency [ ] hematuria [ ] discharge [ ] flank pain  [ ] incontinence  Musculoskeletal:  [ ] myalgias [ ] arthralgias [ ] arthritis  [ ] back pain  Neurological:  [ ] headache [ ] seizures  [ ] confusion/altered mental status  Psychiatric:  [ ] anxiety [ ] depression	  Hematology/Lymphatics:  [ ] lymphadenopathy  Endocrine:  [ ] adrenal [ ] thyroid  Allergic/Immunologic:	 [ ] transplant [ ] seasonal    Vital Signs Last 24 Hrs  T(F): 98.2 (11-14-22 @ 09:15), Max: 103.1 (11-11-22 @ 15:45)  Vital Signs Last 24 Hrs  HR: 74 (11-14-22 @ 09:15) (69 - 98)  BP: 117/65 (11-14-22 @ 09:15) (93/56 - 117/65)  RR: 18 (11-14-22 @ 09:15)  SpO2: 94% (11-14-22 @ 09:15) (94% - 97%)  Wt(kg): --    Physical Exam:  Constitutional:  well preserved, comfortable  Head/Eyes: no icterus  ENT:  supple, no cervical lymphadenopathy   LUNGS:  diffuse crackles bibasilar +L chest wall chemoport c/d/i  CVS:  regular rhythm, no murmur  Abd:  soft, non-tender; non-distended  Ext:  no edema  Vascular:  IV site no erythema tenderness or discharge  MSK:  joints without swelling  Neuro: AAO X 3, non- focal                          7.0    0.07  )-----------( 22       ( 14 Nov 2022 06:40 )             21.1   11-14    137  |  111<H>  |  24<H>  ----------------------------<  182<H>  4.4   |  18<L>  |  0.77    Ca    7.2<L>      14 Nov 2022 06:40  Phos  2.7     11-14  Mg     2.4     11-14    TPro  5.1<L>  /  Alb  2.4<L>  /  TBili  0.2  /  DBili  x   /  AST  12  /  ALT  21  /  AlkPhos  41  11-14    MICROBIOLOGY:Vancomycin Level, Trough: 11.9 (11-12 @ 18:13)    Culture - Blood (collected 12 Nov 2022 15:49)  Source: .Blood Blood-Catheter  Preliminary Report (13 Nov 2022 19:02):    No growth to date.    Culture - Blood (collected 11 Nov 2022 08:15)  Source: .Blood Blood-Peripheral  Preliminary Report (12 Nov 2022 17:01):    No growth to date.    Culture - Blood (collected 11 Nov 2022 07:14)  Source: .Blood Blood-Catheter  Preliminary Report (12 Nov 2022 15:07):    No growth to date.    Culture - Blood (collected 09 Nov 2022 17:44)  Source: .Blood Blood-Peripheral  Gram Stain (10 Nov 2022 18:17):    Growth in aerobic bottle: Gram Positive Cocci in Clusters  Final Report (13 Nov 2022 17:30):    Growth in aerobic bottle: Staphylococcus hominis    Growth in aerobic bottle: Staphylococcus epidermidis    Coag Negative Staphylococcus    Single set isolate, possible contaminant. Contact    Microbiology if susceptibility testing clinically    indicated.    ***Blood Panel PCR results on this specimen are available    approximately 3 hours after the Gram stain result.***    Gram stain, PCR, and/or culture results may not always    correspond due to difference in methodologies.    ************************************************************    This PCR assay was performed by multiplex PCR. This    Assay tests for 66 bacterial and resistance gene targets.    Please refer to the [facility] Labs test directory    at [email or web address] for details.  Organism: Blood Culture PCR (13 Nov 2022 17:30)  Organism: Blood Culture PCR (13 Nov 2022 17:30)      -  Staphylococcus epidermidis, Methicillin resistant: Detec      Method Type: PCR    Rapid RVP Result: NotDetec (11-12 @ 20:43)      RADIOLOGY:  imaging below personally reviewed and agree with findings  < from: Xray Chest 1 View- PORTABLE-Urgent (Xray Chest 1 View- PORTABLE-Urgent .) (11.08.22 @ 02:29) >  IMPRESSION:  Stable configuration and location of left chest wall CT compatible port.    Clear lungs.    < end of copied text >  < from: Xray Chest 1 View- PORTABLE-Urgent (Xray Chest 1 View- PORTABLE-Urgent .) (11.11.22 @ 14:11) >  IMPRESSION:  Clear lungs.    < end of copied text >  < from: CT Chest No Cont (11.12.22 @ 13:34) >  IMPRESSION:.    Scattered nodular and small consolidative opacities peripherally and   bilateral lower lobes; findings are favored to represent aspiration   and/or infection.    Indeterminate right hepatic lobe 2 cm lesion and right adrenal 2.3 cm   nodule; recommend correlation with MRI abdomen to more accurately   characterize.    < end of copied text >   Patient is a 82y old  Female who presents with a chief complaint of AML relapse (14 Nov 2022 08:21)    HPI:  82F with AML (Dx 2020) s/p 27 cycles decitabine/venetoclax  breast cancer (Dx 12 years prior),  s/p treatment with tamoxifen, RT and R lumpectomy,   presents with AML in relapse and hyperleukocytosis Wbc > 100k, was started Cytarabine on 11/10 x 3 days + BID hydroxyurea for cytoreduction, then transitioned to daily oral FLT3 inhibitor gilteritinib on 11/12, complicated neutropenic fever. ID consulted for Neutropenic fever.     Patient states she was diagnosed relapsed AML earlier this month and was immediately started on chemotherapy.   She denies any new symptoms. Has been having dry cough for months now, without fever or chills. Also noticed loose stool for a few days. Denies any n/v, or poor appetite. Denies any recent sick contact.    Initially afebrile, but became febrile 100.5F (11/11) and then 102F (11/13), on 5L NC  WBC 99 > 8.9 (11/12) > 0.49 (11/13), today 0.07  Cr 0.77  UA grossly negative  CXR (11/8) and (11/11) clear  CT Chest (11/12) with nodular opacities peripherally and bilateral lung bases ?aspiration  COVID negative and RVP negative x2  BCx (11/9) 1 out of 4 Staph epi and 1 out of 4 Staph hominis (likely contaminant)  BCx (11/11) NGTD    s/p Levofloxacin (11/8 --- 11/10)  s/p Fluconazole (11/8 --- 11/12 )      PAST MEDICAL & SURGICAL HISTORY:  Breast cancer  s/p lumpectomy, RT, tamoxifen  Hypertension  AML (acute myelogenous leukemia)  S/P hysterectomy      Allergies  No Known Allergies    ANTIMICROBIALS (past 90 days)  MEDICATIONS  (STANDING):    s/p Levofloxacin (11/8 --- 11/10)  s/p Fluconazole (11/8 --- 11/12 )    acyclovir   Oral Tab/Cap 400 two times a day (11/8 --- )  voriconazole 200 every 12 hours (11/12 --- )    cefepime   IVPB 2000 every 8 hours (11/11 --- )  vancomycin  IVPB 1000 every 12 hours (11/10 --- )      MEDICATIONS  (STANDING):  acetaminophen     Tablet .. 650 every 6 hours PRN  albuterol/ipratropium for Nebulization 3 every 6 hours  allopurinol 300 daily  aluminum hydroxide/magnesium hydroxide/simethicone Suspension 30 every 4 hours PRN  benzonatate 100 every 8 hours PRN  budesonide  80 MICROgram(s)/formoterol 4.5 MICROgram(s) Inhaler 2 daily  buPROPion XL (24-Hour) . 150 daily  dexAMETHasone  Injectable 5 every 12 hours  gabapentin 100 daily  gilteritinib 120 <User Schedule>  melatonin 3 at bedtime PRN  midodrine. 10 three times a day  ondansetron Injectable 4 every 8 hours PRN  oxybutynin 5 daily  polyethylene glycol 3350 17 two times a day  traZODone 150 at bedtime    SOCIAL HISTORY:   occasional alcohol, denies tobacco and recreational drug use  Lives with family, no recent travel      FAMILY HISTORY:  No pertinent family history in first degree relatives      REVIEW OF SYSTEMS  [  ] ROS unobtainable because:    [ x] All other systems negative except as noted below:	    Constitutional:  [x ] fever [ ] chills  [ ] weight loss  [ ] weakness  Skin:  [ ] rash [ ] phlebitis	  Eyes: [ ] icterus [ ] pain  [ ] discharge	  ENMT: [ ] sore throat  [ ] thrush [ ] ulcers [ ] exudates  Respiratory: [ ] dyspnea [ ] hemoptysis [x ] cough [ ] sputum	  Cardiovascular:  [ ] chest pain [ ] palpitations [ ] edema	  Gastrointestinal:  [ ] nausea [ ] vomiting [x ] diarrhea [ ] constipation [ ] pain	  Genitourinary:  [ ] dysuria [ ] frequency [ ] hematuria [ ] discharge [ ] flank pain  [ ] incontinence  Musculoskeletal:  [ ] myalgias [ ] arthralgias [ ] arthritis  [ ] back pain  Neurological:  [ ] headache [ ] seizures  [ ] confusion/altered mental status  Psychiatric:  [ ] anxiety [ ] depression	  Hematology/Lymphatics:  [ ] lymphadenopathy  Endocrine:  [ ] adrenal [ ] thyroid  Allergic/Immunologic:	 [ ] transplant [ ] seasonal    Vital Signs Last 24 Hrs  T(F): 98.2 (11-14-22 @ 09:15), Max: 103.1 (11-11-22 @ 15:45)  Vital Signs Last 24 Hrs  HR: 74 (11-14-22 @ 09:15) (69 - 98)  BP: 117/65 (11-14-22 @ 09:15) (93/56 - 117/65)  RR: 18 (11-14-22 @ 09:15)  SpO2: 94% (11-14-22 @ 09:15) (94% - 97%)  Wt(kg): --    Physical Exam:  Constitutional:  well preserved, comfortable  Head/Eyes: no icterus  ENT:  supple, no cervical lymphadenopathy   LUNGS:  diffuse crackles bibasilar +L chest wall chemoport c/d/i  CVS:  regular rhythm, no murmur  Abd:  soft, non-tender; non-distended  Ext:  no edema  Vascular:  IV site no erythema tenderness or discharge  MSK:  joints without swelling  Neuro: AAO X 3, non- focal                          7.0    0.07  )-----------( 22       ( 14 Nov 2022 06:40 )             21.1   11-14    137  |  111<H>  |  24<H>  ----------------------------<  182<H>  4.4   |  18<L>  |  0.77    Ca    7.2<L>      14 Nov 2022 06:40  Phos  2.7     11-14  Mg     2.4     11-14    TPro  5.1<L>  /  Alb  2.4<L>  /  TBili  0.2  /  DBili  x   /  AST  12  /  ALT  21  /  AlkPhos  41  11-14    MICROBIOLOGY:Vancomycin Level, Trough: 11.9 (11-12 @ 18:13)    Culture - Blood (collected 12 Nov 2022 15:49)  Source: .Blood Blood-Catheter  Preliminary Report (13 Nov 2022 19:02):    No growth to date.    Culture - Blood (collected 11 Nov 2022 08:15)  Source: .Blood Blood-Peripheral  Preliminary Report (12 Nov 2022 17:01):    No growth to date.    Culture - Blood (collected 11 Nov 2022 07:14)  Source: .Blood Blood-Catheter  Preliminary Report (12 Nov 2022 15:07):    No growth to date.    Culture - Blood (collected 09 Nov 2022 17:44)  Source: .Blood Blood-Peripheral  Gram Stain (10 Nov 2022 18:17):    Growth in aerobic bottle: Gram Positive Cocci in Clusters  Final Report (13 Nov 2022 17:30):    Growth in aerobic bottle: Staphylococcus hominis    Growth in aerobic bottle: Staphylococcus epidermidis    Coag Negative Staphylococcus    Single set isolate, possible contaminant. Contact    Microbiology if susceptibility testing clinically    indicated.    ***Blood Panel PCR results on this specimen are available    approximately 3 hours after the Gram stain result.***    Gram stain, PCR, and/or culture results may not always    correspond due to difference in methodologies.    ************************************************************    This PCR assay was performed by multiplex PCR. This    Assay tests for 66 bacterial and resistance gene targets.    Please refer to the [facility] Labs test directory    at [email or web address] for details.  Organism: Blood Culture PCR (13 Nov 2022 17:30)  Organism: Blood Culture PCR (13 Nov 2022 17:30)      -  Staphylococcus epidermidis, Methicillin resistant: Detec      Method Type: PCR    Rapid RVP Result: NotDetec (11-12 @ 20:43)      RADIOLOGY:  imaging below personally reviewed and agree with findings  CT Chest No Cont (11.12.22 @ 13:34)   Scattered nodular and small consolidative opacities peripherally and   bilateral lower lobes; findings are favored to represent aspiration   and/or infection.  Indeterminate right hepatic lobe 2 cm lesion and right adrenal 2.3 cm   nodule; recommend correlation with MRI abdomen to more accurately   characterize.

## 2022-11-14 NOTE — CONSULT NOTE ADULT - SUBJECTIVE AND OBJECTIVE BOX
CARDIO-ONCOLOGY CONSULTATION NOTE                                                                                                                                                                                     HPI: 82 year old woman with history of breast cancer treated with radiation and tamoxifen, now with AML s/p decitabine/venetoclax and admitted for gliteritinib and cytarabine. On echocardiogram done 2022 there was mild aortic stenosis, with normal LV systolic function and GLS -20.  She     PAST MEDICAL & SURGICAL HISTORY:  Breast cancer  s/p lumpectomy, RT, tamoxifen      Hypertension      AML (acute myelogenous leukemia)      S/P hysterectomy          FAMILY HISTORY:  No pertinent family history in first degree relatives        Home Medications:  acyclovir 400 mg oral tablet: 1 tab(s) orally 2 times a day (2022 15:36)  amLODIPine 5 mg oral tablet: 1 tab(s) orally once a day (2022 13:00)  buPROPion 150 mg/12 hours (SR) oral tablet, extended release: 1 tab(s) orally once a day (2022 15:36)  fluconazole 200 mg oral tablet: 1 tab(s) orally once a day (2022 15:36)  gabapentin 100 mg oral tablet: 1 tab(s) orally once a day (2022 15:36)  levoFLOXacin 500 mg oral tablet: 1 tab(s) orally every 24 hours (2022 15:36)  oxybutynin 5 mg oral tablet: 1 tab(s) orally once a day (2022 15:36)  traZODone 150 mg oral tablet: 1 tab(s) orally once a day (at bedtime) (2022 15:36)      Social History:  She lives with her . Her  has prostate cancer and neuropathy. She is concerned about his health too.   She has an aide who assists 4 hrs x 3 days a week.   She ambulates with a cane or walker. She has not had any recent falls.   She wears a Life Alert pendant.   She denies tobacco or drug use. She has one serving of alcohol a day. (2022 13:07)      Medications:  acetaminophen     Tablet .. 650 milliGRAM(s) Oral every 6 hours PRN  acyclovir   Oral Tab/Cap 400 milliGRAM(s) Oral two times a day  albuterol/ipratropium for Nebulization 3 milliLiter(s) Nebulizer every 6 hours  allopurinol 300 milliGRAM(s) Oral daily  aluminum hydroxide/magnesium hydroxide/simethicone Suspension 30 milliLiter(s) Oral every 4 hours PRN  benzonatate 100 milliGRAM(s) Oral every 8 hours PRN  Biotene Dry Mouth Oral Rinse 15 milliLiter(s) Swish and Spit three times a day  budesonide  80 MICROgram(s)/formoterol 4.5 MICROgram(s) Inhaler 2 Puff(s) Inhalation two times a day  buPROPion XL (24-Hour) . 150 milliGRAM(s) Oral daily  cefepime   IVPB 2000 milliGRAM(s) IV Intermittent every 8 hours  chlorhexidine 2% Cloths 1 Application(s) Topical daily  dexAMETHasone  Injectable 5 milliGRAM(s) IV Push every 12 hours  gabapentin 100 milliGRAM(s) Oral daily  gilteritinib 120 milliGRAM(s) Oral <User Schedule>  melatonin 3 milliGRAM(s) Oral at bedtime PRN  midodrine. 10 milliGRAM(s) Oral three times a day  ondansetron Injectable 4 milliGRAM(s) IV Push every 8 hours PRN  oxybutynin 5 milliGRAM(s) Oral daily  polyethylene glycol 3350 17 Gram(s) Oral two times a day  sodium chloride 0.65% Nasal 1 Spray(s) Both Nostrils four times a day  sodium chloride 0.9%. 1000 milliLiter(s) IV Continuous <Continuous>  traZODone 150 milliGRAM(s) Oral at bedtime  voriconazole 200 milliGRAM(s) Oral every 12 hours      Review of systems:  10 point review of systems completed and are negative unless noted in HPI    Vitals:  T(C): 36.6 (22 @ 17:05), Max: 36.9 (22 @ 13:30)  HR: 80 (22 @ 17:05) (69 - 88)  BP: 117/72 (22 @ 17:05) (93/56 - 117/72)  BP(mean): --  RR: 19 (22 @ 17:05) (17 - 19)  SpO2: 97% (22 @ 17:05) (94% - 97%)    Daily     Daily Weight in k (2022 12:08)        I&O's Summary    2022 07:01  -  2022 07:00  --------------------------------------------------------  IN: 3116 mL / OUT: 1500 mL / NET: 1616 mL    2022 07:01  -  2022 18:06  --------------------------------------------------------  IN: 240 mL / OUT: 400 mL / NET: -160 mL        Physical Exam:  Appearance: No apparent distress  HEENT: moist mucosa, anicteric sclerae.   Neck:   Cardiovascular: regular rate and rhythm. No murmur. No gallop. No friction rub.  Respiratory: Clear to auscultation bilaterally  Gastrointestinal: Soft, Non-tender, benign	  Skin: no clubbing or cyanosis.   Neurologic: No focal deficit  Extremities: warm. No edema.  Psychiatry: A & O x 3, Mood & affect appropriate  Vascular: pulse regular, equal    Labs:                        7.0    0.07  )-----------(        ( 2022 06:40 )             21.1     -14    137  |  111<H>  |  24<H>  ----------------------------<  182<H>  4.4   |  18<L>  |  0.77    Ca    7.2<L>      2022 06:40  Phos  2.7     -14  Mg     2.4     -14    TPro  5.1<L>  /  Alb  2.4<L>  /  TBili  0.2  /  DBili  x   /  AST  12  /  ALT  21  /  AlkPhos  41  11-14    PT/INR - ( 2022 06:40 )   PT: 16.4 sec;   INR: 1.41 ratio         PTT - ( 2022 06:40 )  PTT:28.3 sec  CARDIAC MARKERS ( 2022 22:27 )  x     / x     / 102 U/L / x     / 5.2 ng/mL  CARDIAC MARKERS ( 2022 16:33 )  x     / x     / 127 U/L / x     / 4.8 ng/mL  CARDIAC MARKERS ( 2022 18:13 )  x     / x     / 153 U/L / x     / 3.6 ng/mL            TELEMETRY:    Echocardiogram:    Patient name: ALEX STOCK  YOB: 1940   Age: 82 (F)   MR#: [id number]  Study Date: 2022  Location: [address] [place], [place] [id number]: Diana Aguilar [facility]  Study quality: Technically fair  Referring Physician: Bradley Goldberg, md  Blood Pressure: 107/54 mmHg  Height: 163 cm  Weight: 56 kg  BSA: 1.6 m2  ------------------------------------------------------------------------  PROCEDURE: Transthoracic echocardiogram with 2-D, M-Mode  and complete spectral and color flow Doppler. Strain  Imaging.  INDICATION: Neoplasm of unspecified behavior of other  specified sites (D49.89)  ------------------------------------------------------------------------  Dimensions:    Normal Values:  LA:     3.1    2.0 - 4.0 cm  Ao:     3.1    2.0 - 3.8 cm  SEPTUM: 1.1    0.6 - 1.2 cm  PWT:    1.0    0.6 - 1.1 cm  LVIDd:  4.1    3.0 - 5.6 cm  LVIDs:  3.0    1.8 - 4.0 cm  Derived variables:  LVMI: 89 g/m2  RWT: 0.48  Fractional short: 27 %  EF (Marquez Rule): 70 %Doppler Peak Velocity (m/sec):  MV=1.5AoV=2.1  ------------------------------------------------------------------------  Observations:  Mitral Valve: Mitral annular calcification, otherwise  normal mitral valve. Mild mitral regurgitation.  Aortic Valve/Aorta: Calcified trileaflet aortic valve with decreased opening. Peak transaortic valve gradient equals  18 mm Hg, mean transaortic valve gradient equals 9 mm Hg,  estimated aortic valve area equals 2 sqcm (by continuity  equation), aortic valve velocity time integral equals 34  cm, consistent with mild aortic stenosis. Peak left  ventricular outflow tract gradient equals 5 mm Hg, mean  gradient is equal to 3 mm Hg, LVOT velocity time integral  equals 22 cm.  Aortic Root: 3.1 cm.  Left Atrium: Normal left atrium.  LA volume index = 26  cc/m2.  Left Ventricle: Normal left ventricular systolic function.  No segmental wall motion abnormalities. Increased relative  wall thickness with normal left ventricular mass index,  consistent with concentric left ventricular remodeling.  Mild diastolic dysfunction (Stage I).  Right Heart: Normal right atrium. Normal right ventricular  size and function. Normal tricuspid valve. Mild tricuspid  regurgitation. Normal pulmonic valve.  Pericardium/Pleura: Normal pericardium with no pericardial  effusion.  Hemodynamic: Estimated right atrial pressure is 8 mm Hg.  Estimated right ventricular systolic pressure equals 49 mm  Hg, assuming right atrial pressure equals 8 mm Hg,  consistent with mild pulmonary hypertension.  ------------------------------------------------------------------------  Conclusions:  1. Calcified trileaflet aortic valve with decreased  opening. Peak transaortic valve gradient equals 18 mm Hg,  mean transaortic valve gradient equals 9 mm Hg, estimated  aortic valve area equals 2 sqcm (by continuity equation),  aortic valve velocity time integral equals 34 cm,  consistent with mild aortic stenosis.  2. Increased relative wall thickness with normal left  ventricular mass index, consistent with concentric left  ventricular remodeling.  3. Normal left ventricular systolic function. No segmental  wall motion abnormalities.  4. Strain imaging was performed with a HR of 80 bpm and a  BP of 150/69. Global L. Strain= -20.3%.  5. Normal right ventricular size and function.  6. Estimated pulmonary artery systolic pressure equals 49  mm Hg, assuming right atrial pressure equals 8 mm Hg,  consistent with mild pulmonary pressures.  *** No recent echocardiogram for comparison.  ------------------------------------------------------------------------  Confirmed on  2022 - 11:51:15 by TOSHA Kennedy  ------------------------------------------------------------------------        EKG:     CXR/Radiology:   CARDIO-ONCOLOGY CONSULTATION NOTE                                                                                                                                                                                     HPI: 82 year old woman with history of breast cancer treated with radiation and tamoxifen, now with AML s/p decitabine/venetoclax and admitted for gliteritinib and cytarabine. On echocardiogram done 2022 there was mild aortic stenosis, with normal LV systolic function and GLS -20.  She has neutropenic fever associated with hypotension and is on midodrine.      PAST MEDICAL & SURGICAL HISTORY:  Breast cancer  s/p lumpectomy, RT, tamoxifen      Hypertension      AML (acute myelogenous leukemia)      S/P hysterectomy      FAMILY HISTORY:  No pertinent family history in first degree relatives    Home Medications:  acyclovir 400 mg oral tablet: 1 tab(s) orally 2 times a day (2022 15:36)  amLODIPine 5 mg oral tablet: 1 tab(s) orally once a day (2022 13:00)  buPROPion 150 mg/12 hours (SR) oral tablet, extended release: 1 tab(s) orally once a day (2022 15:36)  fluconazole 200 mg oral tablet: 1 tab(s) orally once a day (2022 15:36)  gabapentin 100 mg oral tablet: 1 tab(s) orally once a day (2022 15:36)  levoFLOXacin 500 mg oral tablet: 1 tab(s) orally every 24 hours (2022 15:36)  oxybutynin 5 mg oral tablet: 1 tab(s) orally once a day (2022 15:36)  traZODone 150 mg oral tablet: 1 tab(s) orally once a day (at bedtime) (2022 15:36)      Social History:  She lives with her . Her  has prostate cancer and neuropathy. She is concerned about his health too.   She has an aide who assists 4 hrs x 3 days a week.   She ambulates with a cane or walker. She has not had any recent falls.   She wears a Life Alert pendant.   She denies tobacco or drug use. She has one serving of alcohol a day. (2022 13:07)      Medications:  acetaminophen     Tablet .. 650 milliGRAM(s) Oral every 6 hours PRN  acyclovir   Oral Tab/Cap 400 milliGRAM(s) Oral two times a day  albuterol/ipratropium for Nebulization 3 milliLiter(s) Nebulizer every 6 hours  allopurinol 300 milliGRAM(s) Oral daily  aluminum hydroxide/magnesium hydroxide/simethicone Suspension 30 milliLiter(s) Oral every 4 hours PRN  benzonatate 100 milliGRAM(s) Oral every 8 hours PRN  Biotene Dry Mouth Oral Rinse 15 milliLiter(s) Swish and Spit three times a day  budesonide  80 MICROgram(s)/formoterol 4.5 MICROgram(s) Inhaler 2 Puff(s) Inhalation two times a day  buPROPion XL (24-Hour) . 150 milliGRAM(s) Oral daily  cefepime   IVPB 2000 milliGRAM(s) IV Intermittent every 8 hours  chlorhexidine 2% Cloths 1 Application(s) Topical daily  dexAMETHasone  Injectable 5 milliGRAM(s) IV Push every 12 hours  gabapentin 100 milliGRAM(s) Oral daily  gilteritinib 120 milliGRAM(s) Oral <User Schedule>  melatonin 3 milliGRAM(s) Oral at bedtime PRN  midodrine. 10 milliGRAM(s) Oral three times a day  ondansetron Injectable 4 milliGRAM(s) IV Push every 8 hours PRN  oxybutynin 5 milliGRAM(s) Oral daily  polyethylene glycol 3350 17 Gram(s) Oral two times a day  sodium chloride 0.65% Nasal 1 Spray(s) Both Nostrils four times a day  sodium chloride 0.9%. 1000 milliLiter(s) IV Continuous <Continuous>  traZODone 150 milliGRAM(s) Oral at bedtime  voriconazole 200 milliGRAM(s) Oral every 12 hours      Review of systems:  10 point review of systems completed and are negative unless noted in HPI    Vitals:  T(C): 36.6 (22 @ 17:05), Max: 36.9 (22 @ 13:30)  HR: 80 (22 @ 17:05) (69 - 88)  BP: 117/72 (22 @ 17:05) (93/56 - 117/72)  BP(mean): --  RR: 19 (22 @ 17:05) (17 - 19)  SpO2: 97% (22 @ 17:05) (94% - 97%)    Daily     Daily Weight in k (2022 12:08)        I&O's Summary    2022 07:01  -  2022 07:00  --------------------------------------------------------  IN: 3116 mL / OUT: 1500 mL / NET: 1616 mL    2022 07:01  -  2022 18:06  --------------------------------------------------------  IN: 240 mL / OUT: 400 mL / NET: -160 mL        Physical Exam:  Appearance: No apparent distress  HEENT: moist mucosa, anicteric sclerae.   Neck:   Cardiovascular: regular rate and rhythm. No murmur. No gallop. No friction rub.  Respiratory: Clear to auscultation bilaterally  Gastrointestinal: Soft, Non-tender, benign	  Skin: no clubbing or cyanosis.   Neurologic: No focal deficit  Extremities: warm. No edema.  Psychiatry: A & O x 3, Mood & affect appropriate  Vascular: pulse regular, equal    Labs:                        7.0    0.07  )-----------(        ( 2022 06:40 )             21.1     -14    137  |  111<H>  |  24<H>  ----------------------------<  182<H>  4.4   |  18<L>  |  0.77    Ca    7.2<L>      2022 06:40  Phos  2.7     11-14  Mg     2.4     11-14    TPro  5.1<L>  /  Alb  2.4<L>  /  TBili  0.2  /  DBili  x   /  AST  12  /  ALT  21  /  AlkPhos  41  11-14    PT/INR - ( 2022 06:40 )   PT: 16.4 sec;   INR: 1.41 ratio         PTT - ( 2022 06:40 )  PTT:28.3 sec  CARDIAC MARKERS ( 2022 22:27 )  x     / x     / 102 U/L / x     / 5.2 ng/mL  CARDIAC MARKERS ( 2022 16:33 )  x     / x     / 127 U/L / x     / 4.8 ng/mL  CARDIAC MARKERS ( 2022 18:13 )  x     / x     / 153 U/L / x     / 3.6 ng/mL            TELEMETRY:    Echocardiogram:    Patient name: ALEX STOCK  YOB: 1940   Age: 82 (F)   MR#: [id number]  Study Date: 2022  Location: [address] [place], [place] [id number]: Diana Aguilar [facility] [[email or web address]]  Study quality: Technically fair  Referring Physician: Bradley Goldberg, md  Blood Pressure: 107/54 mmHg  Height: 163 cm  Weight: 56 kg  BSA: 1.6 m2  ------------------------------------------------------------------------  PROCEDURE: Transthoracic echocardiogram with 2-D, M-Mode  and complete spectral and color flow Doppler. Strain  Imaging.  INDICATION: Neoplasm of unspecified behavior of other  specified sites (D49.89)  ------------------------------------------------------------------------  Dimensions:    Normal Values:  LA:     3.1    2.0 - 4.0 cm  Ao:     3.1    2.0 - 3.8 cm  SEPTUM: 1.1    0.6 - 1.2 cm  PWT:    1.0    0.6 - 1.1 cm  LVIDd:  4.1    3.0 - 5.6 cm  LVIDs:  3.0    1.8 - 4.0 cm  Derived variables:  LVMI: 89 g/m2  RWT: 0.48  Fractional short: 27 %  EF (Marquez Rule): 70 %Doppler Peak Velocity (m/sec):  MV=1.5AoV=2.1  ------------------------------------------------------------------------  Observations:  Mitral Valve: Mitral annular calcification, otherwise  normal mitral valve. Mild mitral regurgitation.  Aortic Valve/Aorta: Calcified trileaflet aortic valve with decreased opening. Peak transaortic valve gradient equals  18 mm Hg, mean transaortic valve gradient equals 9 mm Hg,  estimated aortic valve area equals 2 sqcm (by continuity  equation), aortic valve velocity time integral equals 34  cm, consistent with mild aortic stenosis. Peak left  ventricular outflow tract gradient equals 5 mm Hg, mean  gradient is equal to 3 mm Hg, LVOT velocity time integral  equals 22 cm.  Aortic Root: 3.1 cm.  Left Atrium: Normal left atrium.  LA volume index = 26  cc/m2.  Left Ventricle: Normal left ventricular systolic function.  No segmental wall motion abnormalities. Increased relative  wall thickness with normal left ventricular mass index,  consistent with concentric left ventricular remodeling.  Mild diastolic dysfunction (Stage I).  Right Heart: Normal right atrium. Normal right ventricular  size and function. Normal tricuspid valve. Mild tricuspid  regurgitation. Normal pulmonic valve.  Pericardium/Pleura: Normal pericardium with no pericardial  effusion.  Hemodynamic: Estimated right atrial pressure is 8 mm Hg.  Estimated right ventricular systolic pressure equals 49 mm  Hg, assuming right atrial pressure equals 8 mm Hg,  consistent with mild pulmonary hypertension.  ------------------------------------------------------------------------  Conclusions:  1. Calcified trileaflet aortic valve with decreased  opening. Peak transaortic valve gradient equals 18 mm Hg,  mean transaortic valve gradient equals 9 mm Hg, estimated  aortic valve area equals 2 sqcm (by continuity equation),  aortic valve velocity time integral equals 34 cm,  consistent with mild aortic stenosis.  2. Increased relative wall thickness with normal left  ventricular mass index, consistent with concentric left  ventricular remodeling.  3. Normal left ventricular systolic function. No segmental  wall motion abnormalities.  4. Strain imaging was performed with a HR of 80 bpm and a  BP of 150/69. Global L. Strain= -20.3%.  5. Normal right ventricular size and function.  6. Estimated pulmonary artery systolic pressure equals 49  mm Hg, assuming right atrial pressure equals 8 mm Hg,  consistent with mild pulmonary pressures.  *** No recent echocardiogram for comparison.  ------------------------------------------------------------------------  Confirmed on  2022 - 11:51:15 by TOSHA Kennedy  ------------------------------------------------------------------------    EKG: sinus with LAD, and LBBB    CXR/Radiology:  CT Chest 2022:  IMPRESSION:.    Scattered nodular and small consolidative opacities peripherally and bilateral lower lobes; findings are favored to represent aspiration and/or infection.    Indeterminate right hepatic lobe 2 cm lesion and right adrenal 2.3 cm nodule; recommend correlation with MRI abdomen to more accurately characterize.    --- End of Report ---     CARDIO-ONCOLOGY CONSULTATION NOTE                                                                                                                                                                                     HPI: 82 year old woman with history of hypertension, breast cancer treated with radiation and tamoxifen, LBBB, now with AML s/p decitabine/venetoclax and admitted for gliteritinib and cytarabine. On echocardiogram done 2022 there was mild aortic stenosis, with normal LV systolic function and GLS -20.  She has neutropenic fever associated with hypotension and is on midodrine. She reports shortness of breath, but denies any chest pain, palpitations, orthopnea.      PAST MEDICAL & SURGICAL HISTORY:  Breast cancer  s/p lumpectomy, RT, tamoxifen      Hypertension      AML (acute myelogenous leukemia)      S/P hysterectomy      FAMILY HISTORY:  No pertinent family history in first degree relatives    Home Medications:  acyclovir 400 mg oral tablet: 1 tab(s) orally 2 times a day (2022 15:36)  amLODIPine 5 mg oral tablet: 1 tab(s) orally once a day (2022 13:00)  buPROPion 150 mg/12 hours (SR) oral tablet, extended release: 1 tab(s) orally once a day (2022 15:36)  fluconazole 200 mg oral tablet: 1 tab(s) orally once a day (2022 15:36)  gabapentin 100 mg oral tablet: 1 tab(s) orally once a day (2022 15:36)  levoFLOXacin 500 mg oral tablet: 1 tab(s) orally every 24 hours (2022 15:36)  oxybutynin 5 mg oral tablet: 1 tab(s) orally once a day (2022 15:36)  traZODone 150 mg oral tablet: 1 tab(s) orally once a day (at bedtime) (2022 15:36)      Social History:  She lives with her . Her  has prostate cancer and neuropathy. She is concerned about his health too.   She has an aide who assists 4 hrs x 3 days a week.   She ambulates with a cane or walker. She has not had any recent falls.   She wears a Life Alert pendant.   She denies tobacco or drug use. She has one serving of alcohol a day. (2022 13:07)      Medications:  acetaminophen     Tablet .. 650 milliGRAM(s) Oral every 6 hours PRN  acyclovir   Oral Tab/Cap 400 milliGRAM(s) Oral two times a day  albuterol/ipratropium for Nebulization 3 milliLiter(s) Nebulizer every 6 hours  allopurinol 300 milliGRAM(s) Oral daily  aluminum hydroxide/magnesium hydroxide/simethicone Suspension 30 milliLiter(s) Oral every 4 hours PRN  benzonatate 100 milliGRAM(s) Oral every 8 hours PRN  Biotene Dry Mouth Oral Rinse 15 milliLiter(s) Swish and Spit three times a day  budesonide  80 MICROgram(s)/formoterol 4.5 MICROgram(s) Inhaler 2 Puff(s) Inhalation two times a day  buPROPion XL (24-Hour) . 150 milliGRAM(s) Oral daily  cefepime   IVPB 2000 milliGRAM(s) IV Intermittent every 8 hours  chlorhexidine 2% Cloths 1 Application(s) Topical daily  dexAMETHasone  Injectable 5 milliGRAM(s) IV Push every 12 hours  gabapentin 100 milliGRAM(s) Oral daily  gilteritinib 120 milliGRAM(s) Oral <User Schedule>  melatonin 3 milliGRAM(s) Oral at bedtime PRN  midodrine. 10 milliGRAM(s) Oral three times a day  ondansetron Injectable 4 milliGRAM(s) IV Push every 8 hours PRN  oxybutynin 5 milliGRAM(s) Oral daily  polyethylene glycol 3350 17 Gram(s) Oral two times a day  sodium chloride 0.65% Nasal 1 Spray(s) Both Nostrils four times a day  sodium chloride 0.9%. 1000 milliLiter(s) IV Continuous <Continuous>  traZODone 150 milliGRAM(s) Oral at bedtime  voriconazole 200 milliGRAM(s) Oral every 12 hours      Review of systems:  10 point review of systems completed and are negative unless noted in HPI    Vitals:  T(C): 36.6 (22 @ 17:05), Max: 36.9 (22 @ 13:30)  HR: 80 (22 @ 17:05) (69 - 88)  BP: 117/72 (22 @ 17:05) (93/56 - 117/72)  BP(mean): --  RR: 19 (22 @ 17:05) (17 - 19)  SpO2: 97% (11-14-22 @ 17:05) (94% - 97%)    Daily     Daily Weight in k (2022 12:08)        I&O's Summary    2022 07:01  -  2022 07:00  --------------------------------------------------------  IN: 3116 mL / OUT: 1500 mL / NET: 1616 mL    2022 07:01  -  2022 18:06  --------------------------------------------------------  IN: 240 mL / OUT: 400 mL / NET: -160 mL        Physical Exam:  Appearance: appears tired.  HEENT: moist mucosa, anicteric sclerae.   Neck:  No JVD  Cardiovascular: regular rate and rhythm. systolic murmur. No gallop. No friction rub.  Respiratory: Clear to auscultation bilaterally  Gastrointestinal: Soft, Non-tender, benign	  Skin: no clubbing or cyanosis.   Neurologic: No focal deficit  Extremities: warm. trace edema  Psychiatry: A & O x 3, Mood & affect appropriate  Vascular: pulse regular, equal    Labs:                        7.0    0.07  )-----------(        ( 2022 06:40 )             21.1     11-14    137  |  111<H>  |  24<H>  ----------------------------<  182<H>  4.4   |  18<L>  |  0.77    Ca    7.2<L>      2022 06:40  Phos  2.7     11-14  Mg     2.4     11-14    TPro  5.1<L>  /  Alb  2.4<L>  /  TBili  0.2  /  DBili  x   /  AST  12  /  ALT  21  /  AlkPhos  41  11-14    PT/INR - ( 2022 06:40 )   PT: 16.4 sec;   INR: 1.41 ratio         PTT - ( 2022 06:40 )  PTT:28.3 sec  CARDIAC MARKERS ( 2022 22:27 )  x     / x     / 102 U/L / x     / 5.2 ng/mL  CARDIAC MARKERS ( 2022 16:33 )  x     / x     / 127 U/L / x     / 4.8 ng/mL  CARDIAC MARKERS ( 2022 18:13 )  x     / x     / 153 U/L / x     / 3.6 ng/mL            TELEMETRY:    Echocardiogram:    Patient name: ALEX STOCK  YOB: 1940   Age: 82 (F)   MR#: [id number]  Study Date: 2022  Location: [address] [place], [place] [id number]: Diana Aguilar [facility]  Study quality: Technically fair  Referring Physician: Bradley Goldberg, md  Blood Pressure: 107/54 mmHg  Height: 163 cm  Weight: 56 kg  BSA: 1.6 m2  ------------------------------------------------------------------------  PROCEDURE: Transthoracic echocardiogram with 2-D, M-Mode  and complete spectral and color flow Doppler. Strain  Imaging.  INDICATION: Neoplasm of unspecified behavior of other  specified sites (D49.89)  ------------------------------------------------------------------------  Dimensions:    Normal Values:  LA:     3.1    2.0 - 4.0 cm  Ao:     3.1    2.0 - 3.8 cm  SEPTUM: 1.1    0.6 - 1.2 cm  PWT:    1.0    0.6 - 1.1 cm  LVIDd:  4.1    3.0 - 5.6 cm  LVIDs:  3.0    1.8 - 4.0 cm  Derived variables:  LVMI: 89 g/m2  RWT: 0.48  Fractional short: 27 %  EF (Marquez Rule): 70 %Doppler Peak Velocity (m/sec):  MV=1.5AoV=2.1  ------------------------------------------------------------------------  Observations:  Mitral Valve: Mitral annular calcification, otherwise  normal mitral valve. Mild mitral regurgitation.  Aortic Valve/Aorta: Calcified trileaflet aortic valve with decreased opening. Peak transaortic valve gradient equals  18 mm Hg, mean transaortic valve gradient equals 9 mm Hg,  estimated aortic valve area equals 2 sqcm (by continuity  equation), aortic valve velocity time integral equals 34  cm, consistent with mild aortic stenosis. Peak left  ventricular outflow tract gradient equals 5 mm Hg, mean  gradient is equal to 3 mm Hg, LVOT velocity time integral  equals 22 cm.  Aortic Root: 3.1 cm.  Left Atrium: Normal left atrium.  LA volume index = 26  cc/m2.  Left Ventricle: Normal left ventricular systolic function.  No segmental wall motion abnormalities. Increased relative  wall thickness with normal left ventricular mass index,  consistent with concentric left ventricular remodeling.  Mild diastolic dysfunction (Stage I).  Right Heart: Normal right atrium. Normal right ventricular  size and function. Normal tricuspid valve. Mild tricuspid  regurgitation. Normal pulmonic valve.  Pericardium/Pleura: Normal pericardium with no pericardial  effusion.  Hemodynamic: Estimated right atrial pressure is 8 mm Hg.  Estimated right ventricular systolic pressure equals 49 mm  Hg, assuming right atrial pressure equals 8 mm Hg,  consistent with mild pulmonary hypertension.  ------------------------------------------------------------------------  Conclusions:  1. Calcified trileaflet aortic valve with decreased  opening. Peak transaortic valve gradient equals 18 mm Hg,  mean transaortic valve gradient equals 9 mm Hg, estimated  aortic valve area equals 2 sqcm (by continuity equation),  aortic valve velocity time integral equals 34 cm,  consistent with mild aortic stenosis.  2. Increased relative wall thickness with normal left  ventricular mass index, consistent with concentric left  ventricular remodeling.  3. Normal left ventricular systolic function. No segmental  wall motion abnormalities.  4. Strain imaging was performed with a HR of 80 bpm and a  BP of 150/69. Global L. Strain= -20.3%.  5. Normal right ventricular size and function.  6. Estimated pulmonary artery systolic pressure equals 49  mm Hg, assuming right atrial pressure equals 8 mm Hg,  consistent with mild pulmonary pressures.  *** No recent echocardiogram for comparison.  ------------------------------------------------------------------------  Confirmed on  2022 - 11:51:15 by TOSHA Kennedy  ------------------------------------------------------------------------    EKG: sinus with LAD, and LBBB    CXR/Radiology:  CT Chest 2022:  IMPRESSION:.    Scattered nodular and small consolidative opacities peripherally and bilateral lower lobes; findings are favored to represent aspiration and/or infection.    Indeterminate right hepatic lobe 2 cm lesion and right adrenal 2.3 cm nodule; recommend correlation with MRI abdomen to more accurately characterize.    --- End of Report ---     CARDIO-ONCOLOGY CONSULTATION NOTE                                                                                                                                                                                     HPI: 82 year old woman with history of hypertension, breast cancer treated with radiation and tamoxifen, LBBB, now with AML s/p decitabine/venetoclax and admitted for gliteritinib and cytarabine. On echocardiogram done 2022 there was mild aortic stenosis, with normal LV systolic function and GLS -20.  She has neutropenic fever associated with hypotension and is on midodrine. She reports shortness of breath, but denies any chest pain, palpitations, orthopnea. Weight increased about 15 lbs from admission, downtrending today after diuretics.       PAST MEDICAL & SURGICAL HISTORY:  Breast cancer  s/p lumpectomy, RT, tamoxifen      Hypertension      AML (acute myelogenous leukemia)      S/P hysterectomy      FAMILY HISTORY:  No pertinent family history in first degree relatives    Home Medications:  acyclovir 400 mg oral tablet: 1 tab(s) orally 2 times a day (2022 15:36)  amLODIPine 5 mg oral tablet: 1 tab(s) orally once a day (2022 13:00)  buPROPion 150 mg/12 hours (SR) oral tablet, extended release: 1 tab(s) orally once a day (2022 15:36)  fluconazole 200 mg oral tablet: 1 tab(s) orally once a day (2022 15:36)  gabapentin 100 mg oral tablet: 1 tab(s) orally once a day (2022 15:36)  levoFLOXacin 500 mg oral tablet: 1 tab(s) orally every 24 hours (2022 15:36)  oxybutynin 5 mg oral tablet: 1 tab(s) orally once a day (2022 15:36)  traZODone 150 mg oral tablet: 1 tab(s) orally once a day (at bedtime) (2022 15:36)      Social History:  She lives with her . Her  has prostate cancer and neuropathy. She is concerned about his health too.   She has an aide who assists 4 hrs x 3 days a week.   She ambulates with a cane or walker. She has not had any recent falls.   She wears a Life Alert pendant.   She denies tobacco or drug use. She has one serving of alcohol a day. (2022 13:07)      Medications:  acetaminophen     Tablet .. 650 milliGRAM(s) Oral every 6 hours PRN  acyclovir   Oral Tab/Cap 400 milliGRAM(s) Oral two times a day  albuterol/ipratropium for Nebulization 3 milliLiter(s) Nebulizer every 6 hours  allopurinol 300 milliGRAM(s) Oral daily  aluminum hydroxide/magnesium hydroxide/simethicone Suspension 30 milliLiter(s) Oral every 4 hours PRN  benzonatate 100 milliGRAM(s) Oral every 8 hours PRN  Biotene Dry Mouth Oral Rinse 15 milliLiter(s) Swish and Spit three times a day  budesonide  80 MICROgram(s)/formoterol 4.5 MICROgram(s) Inhaler 2 Puff(s) Inhalation two times a day  buPROPion XL (24-Hour) . 150 milliGRAM(s) Oral daily  cefepime   IVPB 2000 milliGRAM(s) IV Intermittent every 8 hours  chlorhexidine 2% Cloths 1 Application(s) Topical daily  dexAMETHasone  Injectable 5 milliGRAM(s) IV Push every 12 hours  gabapentin 100 milliGRAM(s) Oral daily  gilteritinib 120 milliGRAM(s) Oral <User Schedule>  melatonin 3 milliGRAM(s) Oral at bedtime PRN  midodrine. 10 milliGRAM(s) Oral three times a day  ondansetron Injectable 4 milliGRAM(s) IV Push every 8 hours PRN  oxybutynin 5 milliGRAM(s) Oral daily  polyethylene glycol 3350 17 Gram(s) Oral two times a day  sodium chloride 0.65% Nasal 1 Spray(s) Both Nostrils four times a day  sodium chloride 0.9%. 1000 milliLiter(s) IV Continuous <Continuous>  traZODone 150 milliGRAM(s) Oral at bedtime  voriconazole 200 milliGRAM(s) Oral every 12 hours      Review of systems:  10 point review of systems completed and are negative unless noted in HPI    Vitals:  T(C): 36.6 (22 @ 17:05), Max: 36.9 (22 @ 13:30)  HR: 80 (22 @ 17:05) (69 - 88)  BP: 117/72 (22 @ 17:05) (93/56 - 117/72)  BP(mean): --  RR: 19 (22 @ 17:05) (17 - 19)  SpO2: 97% (22 @ 17:05) (94% - 97%)    Daily     Daily Weight in k (2022 12:08)        I&O's Summary    2022 07:01  -  2022 07:00  --------------------------------------------------------  IN: 3116 mL / OUT: 1500 mL / NET: 1616 mL    2022 07:01  -  2022 18:06  --------------------------------------------------------  IN: 240 mL / OUT: 400 mL / NET: -160 mL        Physical Exam:  Appearance: appears tired.  HEENT: moist mucosa, anicteric sclerae.   Neck:  No JVD  Cardiovascular: regular rate and rhythm. systolic murmur. No gallop. No friction rub.  Respiratory: Clear to auscultation bilaterally  Gastrointestinal: Soft, Non-tender, benign	  Skin: no clubbing or cyanosis.   Neurologic: No focal deficit  Extremities: warm. trace edema  Psychiatry: A & O x 3, Mood & affect appropriate  Vascular: pulse regular, equal    Labs:                        7.0    0.07  )-----------(        ( 2022 06:40 )             21.1         137  |  111<H>  |  24<H>  ----------------------------<  182<H>  4.4   |  18<L>  |  0.77    Ca    7.2<L>      2022 06:40  Phos  2.7       Mg     2.4         TPro  5.1<L>  /  Alb  2.4<L>  /  TBili  0.2  /  DBili  x   /  AST  12  /  ALT  21  /  AlkPhos  41  -14    PT/INR - ( 2022 06:40 )   PT: 16.4 sec;   INR: 1.41 ratio         PTT - ( 2022 06:40 )  PTT:28.3 sec  CARDIAC MARKERS ( 2022 22:27 )  x     / x     / 102 U/L / x     / 5.2 ng/mL  CARDIAC MARKERS ( 2022 16:33 )  x     / x     / 127 U/L / x     / 4.8 ng/mL  CARDIAC MARKERS ( 2022 18:13 )  x     / x     / 153 U/L / x     / 3.6 ng/mL            TELEMETRY:    Echocardiogram:    Patient name: ALEX STOCK  YOB: 1940   Age: 82 (F)   MR#: [id number]  Study Date: 2022  Location: [address] [place], [place] [id number]: Diana Aguilar RDCS  Study quality: Technically fair  Referring Physician: Bradley Goldberg, md  Blood Pressure: 107/54 mmHg  Height: 163 cm  Weight: 56 kg  BSA: 1.6 m2  ------------------------------------------------------------------------  PROCEDURE: Transthoracic echocardiogram with 2-D, M-Mode  and complete spectral and color flow Doppler. Strain  Imaging.  INDICATION: Neoplasm of unspecified behavior of other  specified sites (D49.89)  ------------------------------------------------------------------------  Dimensions:    Normal Values:  LA:     3.1    2.0 - 4.0 cm  Ao:     3.1    2.0 - 3.8 cm  SEPTUM: 1.1    0.6 - 1.2 cm  PWT:    1.0    0.6 - 1.1 cm  LVIDd:  4.1    3.0 - 5.6 cm  LVIDs:  3.0    1.8 - 4.0 cm  Derived variables:  LVMI: 89 g/m2  RWT: 0.48  Fractional short: 27 %  EF (Marquez Rule): 70 %Doppler Peak Velocity (m/sec):  MV=1.5AoV=2.1  ------------------------------------------------------------------------  Observations:  Mitral Valve: Mitral annular calcification, otherwise  normal mitral valve. Mild mitral regurgitation.  Aortic Valve/Aorta: Calcified trileaflet aortic valve with decreased opening. Peak transaortic valve gradient equals  18 mm Hg, mean transaortic valve gradient equals 9 mm Hg,  estimated aortic valve area equals 2 sqcm (by continuity  equation), aortic valve velocity time integral equals 34  cm, consistent with mild aortic stenosis. Peak left  ventricular outflow tract gradient equals 5 mm Hg, mean  gradient is equal to 3 mm Hg, LVOT velocity time integral  equals 22 cm.  Aortic Root: 3.1 cm.  Left Atrium: Normal left atrium.  LA volume index = 26  cc/m2.  Left Ventricle: Normal left ventricular systolic function.  No segmental wall motion abnormalities. Increased relative  wall thickness with normal left ventricular mass index,  consistent with concentric left ventricular remodeling.  Mild diastolic dysfunction (Stage I).  Right Heart: Normal right atrium. Normal right ventricular  size and function. Normal tricuspid valve. Mild tricuspid  regurgitation. Normal pulmonic valve.  Pericardium/Pleura: Normal pericardium with no pericardial  effusion.  Hemodynamic: Estimated right atrial pressure is 8 mm Hg.  Estimated right ventricular systolic pressure equals 49 mm  Hg, assuming right atrial pressure equals 8 mm Hg,  consistent with mild pulmonary hypertension.  ------------------------------------------------------------------------  Conclusions:  1. Calcified trileaflet aortic valve with decreased  opening. Peak transaortic valve gradient equals 18 mm Hg,  mean transaortic valve gradient equals 9 mm Hg, estimated  aortic valve area equals 2 sqcm (by continuity equation),  aortic valve velocity time integral equals 34 cm,  consistent with mild aortic stenosis.  2. Increased relative wall thickness with normal left  ventricular mass index, consistent with concentric left  ventricular remodeling.  3. Normal left ventricular systolic function. No segmental  wall motion abnormalities.  4. Strain imaging was performed with a HR of 80 bpm and a  BP of 150/69. Global L. Strain= -20.3%.  5. Normal right ventricular size and function.  6. Estimated pulmonary artery systolic pressure equals 49  mm Hg, assuming right atrial pressure equals 8 mm Hg,  consistent with mild pulmonary pressures.  *** No recent echocardiogram for comparison.  ------------------------------------------------------------------------  Confirmed on  2022 - 11:51:15 by TOSHA Kennedy  ------------------------------------------------------------------------    EKG: sinus with LAD, and LBBB    CXR/Radiology:  CT Chest 2022:  IMPRESSION:.    Scattered nodular and small consolidative opacities peripherally and bilateral lower lobes; findings are favored to represent aspiration and/or infection.    Indeterminate right hepatic lobe 2 cm lesion and right adrenal 2.3 cm nodule; recommend correlation with MRI abdomen to more accurately characterize.    --- End of Report ---

## 2022-11-14 NOTE — PROGRESS NOTE ADULT - PROBLEM SELECTOR PLAN 7
VTE ppx:  Hold pharmacologic prophylaxis due to thrombocytopenia  Activity: OOB with assistance, fall precaution, bed alarm  PT consulted VTE ppx:  Hold pharmacologic prophylaxis due to thrombocytopenia

## 2022-11-14 NOTE — PROGRESS NOTE ADULT - PROBLEM SELECTOR PLAN 8
pt requiring NC 6 L/M, o2 Sat 90%  11/12 CCT Scattered nodular and small consolidative opacities peripherally and   bilateral lower lobes; findings are favored to represent aspiration   and/or infection.  11/13 Added Duoneb, gentle diuresis as tolerated Increased o2 requirement.   11/12 CCT Scattered nodular and small consolidative opacities peripherally and   bilateral lower lobes; findings are favored to represent aspiration   and/or infection.  Added Duoneb, symbicort gentle diuresis as tolerated

## 2022-11-14 NOTE — CONSULT NOTE ADULT - ASSESSMENT
82 year old woman with mild AS, now with relapsed AML and neutropenic fever. Echo shows normal LV function and strain. CT chest with no pulmonary edema or signs of CHF.     82 year old woman with mild AS, now with relapsed AML and neutropenic fever. Echo shows normal LV function and strain. CT chest with no pulmonary edema or signs of CHF. She has shortness of breath in the setting of cytopenia and anemia.   1. Mild Aortic stenosis: unlikely contributes to current symptoms and is not a contraindication to fluid resuscitation  2. Serum Pro-Brain Natriuretic Peptide is elevated 3126 pg/mL (11.11.22 @ 12:44), so there may be a component of diastolic heart failure - though no pulmonary edema noted on the chest CT.   3. Chronic LBBB, of uncertain etiology. Echo performed when patient's BP was normal.  4. Hypotension - likely in setting of diuretics and neutropenic sepsis      Recommendations:  1. Would repeat echo and CXR given discrepancy between examination (rales?) and imaging and change in status since the echo was performed  2. Continue management of neutropenic fever, and can support BP with IV fluids as needed  3. Hold amlodipine for now  4. Keep electrolytes replaced, K > 4, Mag >2  5. Hold diuretics for now, monitor I/Os    R. MD [facility]  Cardio-Oncology       82 year old woman with mild AS, now with relapsed AML and neutropenic fever. Echo shows normal LV function and strain. CT chest with no pulmonary edema or signs of CHF. She has shortness of breath in the setting of cytopenia and anemia.   1. Mild Aortic stenosis: unlikely contributes to current symptoms and is not a contraindication to fluid resuscitation or diuresis.   2. Serum Pro-Brain Natriuretic Peptide is elevated 3126 pg/mL (11.11.22 @ 12:44), so there may be a component of diastolic heart failure - though no pulmonary edema noted on the chest CT from 11/12 - weight increased since then and there were rales noted on examination this AM.  3. Chronic LBBB, of uncertain etiology.   4. Hypotension - likely in setting of diuretics and neutropenic sepsis      Recommendations:  1. Would repeat echo and CXR given discrepancy between examination (rales?) and imaging and change in status since the echo was performed  2. Continue management of neutropenic fever, and can support BP with IV fluids as needed  3. Hold amlodipine for now  4. Keep electrolytes replaced, K > 4, Mag >2  5. Diurese for goal net even and monitor the weight daily    FABIO Navarrete MD [facility]  Cardio-Oncology

## 2022-11-14 NOTE — PROGRESS NOTE ADULT - SUBJECTIVE AND OBJECTIVE BOX
Diagnosis: relapsed AML, FLT3 ITD+, NPM1, CEBPA, ASXL1 mutated    Protocol/Chemo Regimen: daily oral FLT3 inhibitor gilteritinib (cytarabine 100mg/m2 x 3 days continuous infusion prior to start of gilteritinib)    Day: 5 cytarabine; Day 3 gilteritinib     Pt endorsed: mild HA,; dry mouth; SOB/WALTERS; dry cough; numbness right 1st 2 fingers; loose BM x 1 over night     Review of Systems: Patient denied nausea, vomiting, odynophagia, chest pain, abdominal pain, constipation, diarrhea, rash, fatigue      Pain scale: 0                                           Diet: DASH/TLC    Allergies    No Known Allergies    Intolerances      ANTIMICROBIALS  acyclovir   Oral Tab/Cap 400 milliGRAM(s) Oral two times a day  cefepime   IVPB 2000 milliGRAM(s) IV Intermittent every 8 hours  vancomycin  IVPB 1000 milliGRAM(s) IV Intermittent every 12 hours  voriconazole 200 milliGRAM(s) Oral every 12 hours      HEME/ONC MEDICATIONS  gilteritinib 120 milliGRAM(s) Oral <User Schedule>      STANDING MEDICATIONS  albuterol/ipratropium for Nebulization 3 milliLiter(s) Nebulizer every 6 hours  allopurinol 300 milliGRAM(s) Oral daily  Biotene Dry Mouth Oral Rinse 15 milliLiter(s) Swish and Spit three times a day  buPROPion XL (24-Hour) . 150 milliGRAM(s) Oral daily  chlorhexidine 2% Cloths 1 Application(s) Topical daily  dexAMETHasone  Injectable 5 milliGRAM(s) IV Push every 12 hours  gabapentin 100 milliGRAM(s) Oral daily  midodrine. 10 milliGRAM(s) Oral three times a day  oxybutynin 5 milliGRAM(s) Oral daily  polyethylene glycol 3350 17 Gram(s) Oral two times a day  sodium bicarbonate 650 milliGRAM(s) Oral four times a day  sodium chloride 0.65% Nasal 1 Spray(s) Both Nostrils four times a day  sodium chloride 0.9%. 1000 milliLiter(s) IV Continuous <Continuous>  traZODone 150 milliGRAM(s) Oral at bedtime      PRN MEDICATIONS  acetaminophen     Tablet .. 650 milliGRAM(s) Oral every 6 hours PRN  aluminum hydroxide/magnesium hydroxide/simethicone Suspension 30 milliLiter(s) Oral every 4 hours PRN  benzonatate 100 milliGRAM(s) Oral every 8 hours PRN  melatonin 3 milliGRAM(s) Oral at bedtime PRN  ondansetron Injectable 4 milliGRAM(s) IV Push every 8 hours PRN        Vital Signs Last 24 Hrs  T(C): 36.2 (14 Nov 2022 06:42), Max: 38.6 (13 Nov 2022 15:35)  T(F): 97.2 (14 Nov 2022 06:42), Max: 101.5 (13 Nov 2022 15:35)  HR: 74 (14 Nov 2022 06:42) (69 - 98)  BP: 107/71 (14 Nov 2022 06:42) (93/56 - 116/75)  BP(mean): --  RR: 17 (14 Nov 2022 06:42) (17 - 22)  SpO2: 96% (14 Nov 2022 06:42) (94% - 97%)    Parameters below as of 14 Nov 2022 06:42  Patient On (Oxygen Delivery Method): nasal cannula w/ humidification    PHYSICAL EXAM  General: NAD  HEENT:  clear oropharynx, anicteric sclera  CV: (+) S1/S2 RRR  Lungs: coarse bs, rale 1/4 up   Abdomen: soft, + BS, non-tender, non-distended  Ext: trace carolyn LE   Skin: no rash  Neuro: alert and oriented X 3  Central Line: LCW Mediport  and PIV CDI       Growth in aerobic bottle: Staphylococcus hominis  Growth in aerobic bottle: Staphylococcus epidermidis  Coag Negative Staphylococcus  Single set isolate, possible contaminant. Contact  Microbiology if susceptibility testing clinically  indicated.  ***Blood Panel PCR results on this specimen are available  approximately 3 hours after the Gram stain result.***  Gram stain, PCR, and/or culture results may not always  correspond due to difference in methodologies.  ************************************************************  This PCR assay was performed by multiplex PCR. This  Assay tests for 66 bacterial and resistance gene targets.  Please refer to the [facility] test directory  at [email or web address] for details.  --        LABS:                        7.0    0.07  )-----------( 22       ( 14 Nov 2022 06:40 )             21.1         Mean Cell Volume : 94.2 fl  Mean Cell Hemoglobin : 31.3 pg  Mean Cell Hemoglobin Concentration : 33.2 gm/dL  Auto Neutrophil # : x  Auto Lymphocyte # : x  Auto Monocyte # : x  Auto Eosinophil # : x  Auto Basophil # : x  Auto Neutrophil % : x  Auto Lymphocyte % : x  Auto Monocyte % : x  Auto Eosinophil % : x  Auto Basophil % : x      11-14    137  |  111<H>  |  24<H>  ----------------------------<  182<H>  4.4   |  18<L>  |  0.77    Ca    7.2<L>      14 Nov 2022 06:40  Phos  2.7     11-14  Mg     2.4     11-14    TPro  5.1<L>  /  Alb  2.4<L>  /  TBili  0.2  /  DBili  x   /  AST  12  /  ALT  21  /  AlkPhos  41  11-14  PT/INR - ( 14 Nov 2022 06:40 )   PT: 16.4 sec;   INR: 1.41 ratio     PTT - ( 14 Nov 2022 06:40 )  PTT:28.3 sec    Uric Acid 2.7          RADIOLOGY & ADDITIONAL STUDIES:         Diagnosis: relapsed AML, FLT3 ITD+, NPM1, CEBPA, ASXL1 mutated    Protocol/Chemo Regimen: daily oral FLT3 inhibitor gilteritinib (cytarabine 100mg/m2 x 3 days continuous infusion prior to start of gilteritinib)    Day: 5 cytarabine; Day 3 gilteritinib     Pt endorsed: Feels winded.     Review of Systems: Patient denied nausea, vomiting, odynophagia, chest pain, abdominal pain, constipation, diarrhea, rash, fatigue    Pain scale: 0                                           Diet: DASH/TLC    Allergies    No Known Allergies    ANTIMICROBIALS  acyclovir   Oral Tab/Cap 400 milliGRAM(s) Oral two times a day  cefepime   IVPB 2000 milliGRAM(s) IV Intermittent every 8 hours  vancomycin  IVPB 1000 milliGRAM(s) IV Intermittent every 12 hours  voriconazole 200 milliGRAM(s) Oral every 12 hours      HEME/ONC MEDICATIONS  gilteritinib 120 milliGRAM(s) Oral <User Schedule>      STANDING MEDICATIONS  albuterol/ipratropium for Nebulization 3 milliLiter(s) Nebulizer every 6 hours  allopurinol 300 milliGRAM(s) Oral daily  Biotene Dry Mouth Oral Rinse 15 milliLiter(s) Swish and Spit three times a day  buPROPion XL (24-Hour) . 150 milliGRAM(s) Oral daily  chlorhexidine 2% Cloths 1 Application(s) Topical daily  dexAMETHasone  Injectable 5 milliGRAM(s) IV Push every 12 hours  gabapentin 100 milliGRAM(s) Oral daily  midodrine. 10 milliGRAM(s) Oral three times a day  oxybutynin 5 milliGRAM(s) Oral daily  polyethylene glycol 3350 17 Gram(s) Oral two times a day  sodium bicarbonate 650 milliGRAM(s) Oral four times a day  sodium chloride 0.65% Nasal 1 Spray(s) Both Nostrils four times a day  sodium chloride 0.9%. 1000 milliLiter(s) IV Continuous <Continuous>  traZODone 150 milliGRAM(s) Oral at bedtime      PRN MEDICATIONS  acetaminophen     Tablet .. 650 milliGRAM(s) Oral every 6 hours PRN  aluminum hydroxide/magnesium hydroxide/simethicone Suspension 30 milliLiter(s) Oral every 4 hours PRN  benzonatate 100 milliGRAM(s) Oral every 8 hours PRN  melatonin 3 milliGRAM(s) Oral at bedtime PRN  ondansetron Injectable 4 milliGRAM(s) IV Push every 8 hours PRN      Vital Signs Last 24 Hrs  T(C): 36.2 (14 Nov 2022 06:42), Max: 38.6 (13 Nov 2022 15:35)  T(F): 97.2 (14 Nov 2022 06:42), Max: 101.5 (13 Nov 2022 15:35)  HR: 74 (14 Nov 2022 06:42) (69 - 98)  BP: 107/71 (14 Nov 2022 06:42) (93/56 - 116/75)  BP(mean): --  RR: 17 (14 Nov 2022 06:42) (17 - 22)  SpO2: 96% (14 Nov 2022 06:42) (94% - 97%)    Parameters below as of 14 Nov 2022 06:42  Patient On (Oxygen Delivery Method): nasal cannula w/ humidification    PHYSICAL EXAM  General: NAD  HEENT:  clear oropharynx  CV: (+) S1/S2 RRR  Lungs: coarse bs, rale 1/4 up   Abdomen: soft, + BS, non-tender, non-distended  Ext: trace carolyn LE   Skin: no rash  Neuro: alert and oriented X 3  Central Line: LCW Mediport  and PIV CDI     LABS:                        7.0    0.07  )-----------( 22       ( 14 Nov 2022 06:40 )             21.1         Mean Cell Volume : 94.2 fl  Mean Cell Hemoglobin : 31.3 pg  Mean Cell Hemoglobin Concentration : 33.2 gm/dL  Auto Neutrophil # : x  Auto Lymphocyte # : x  Auto Monocyte # : x  Auto Eosinophil # : x  Auto Basophil # : x  Auto Neutrophil % : x  Auto Lymphocyte % : x  Auto Monocyte % : x  Auto Eosinophil % : x  Auto Basophil % : x      11-14    137  |  111<H>  |  24<H>  ----------------------------<  182<H>  4.4   |  18<L>  |  0.77    Ca    7.2<L>      14 Nov 2022 06:40  Phos  2.7     11-14  Mg     2.4     11-14    TPro  5.1<L>  /  Alb  2.4<L>  /  TBili  0.2  /  DBili  x   /  AST  12  /  ALT  21  /  AlkPhos  41  11-14  PT/INR - ( 14 Nov 2022 06:40 )   PT: 16.4 sec;   INR: 1.41 ratio     PTT - ( 14 Nov 2022 06:40 )  PTT:28.3 sec    Uric Acid 2.7

## 2022-11-15 NOTE — DIETITIAN INITIAL EVALUATION ADULT - ENERGY INTAKE
Fair (50-75%) Patient is awake but appears fatigue during time of visit. Patient is currently on a DASH diet and reports fair intake of food. Reports being fatigue and not having the energy sometimes to eat.   - Recommended a Ensure HP supplement 1x daily to provide 350 kcal and 13g protein) and nutrient dense snacks like pudding and/or yogurt.

## 2022-11-15 NOTE — PROGRESS NOTE ADULT - ATTENDING COMMENTS
AML neutropenic fevers. No clear infection. Mild cough and diarrhea improved.   Would lower Cefepime to 2GM q12h based on age adjusted renal function.   Check a Voriconazole trough tomorrow.     Bucky Bobby MD   Infectious Disease   Available on TEAMS. After 5PM and on weekends please page fellow on call or call [phone] AML neutropenic fevers. No clear infection. Mild cough and diarrhea improved.   Would lower Cefepime to 2GM q12h based on age adjusted renal function.   Check a Voriconazole trough tomorrow.     Discussed with oncology     Bucky Bobby MD   Infectious Disease   Available on TEAMS. After 5PM and on weekends please page fellow on call or call [phone]

## 2022-11-15 NOTE — DIETITIAN INITIAL EVALUATION ADULT - NSFNSGIASSESSMENTFT_GEN_A_CORE
Patient reports that she has no nausea or vomiting, reports having diarrhea last week that has since subsided but also is experiencing constipation. Patient was also educated on incorporating more fiber into her diet (like prunes) to support her constipation. Last BM 11/14. Bowel regimen: none

## 2022-11-15 NOTE — DIETITIAN INITIAL EVALUATION ADULT - ADD RECOMMEND
1. Recommend adding additional snacks according to DASH diet on tray  2. Recommend pt to meet >75% of estimated needs 1. Recommend adding additional snacks according to DASH diet on tray like yogurt or pudding with every meal  2. Recommend pt to meet >75% of estimated needs 1. Recommend SLP eval for proper food consistency  2. Recommend pt to meet >75% of estimated needs 1. Recommend SLP evaluation for proper texture/consistency recommendations  2. Liberalize diet to optimize PO intake: low sodium. Monitor for need of additional restrictions.   3. Add oral nutrition supplement: Glucerna 2x/day (Pt preference)  4. Encourage adequate consumption of meals/supplements to optimize protein-energy intake.   5. Monitor nutritional intake/tolerance, weights, labs, hydration status, skin integrity, BM, GI symptoms.   6. New malnutrition notification sent.  1. Recommend SLP evaluation for proper texture/consistency recommendations.   2. Liberalize diet to optimize PO intake: low sodium. Defer texture/consistency needs to team.   3. Add oral nutrition supplement: Glucerna 2x/day (Pt preference)  4. Encourage adequate consumption of meals/supplements to optimize protein-energy intake.   5. Monitor nutritional intake/tolerance, weights, labs, hydration status, skin integrity, BM, GI symptoms.   6. New malnutrition notification sent.

## 2022-11-15 NOTE — PROGRESS NOTE ADULT - ASSESSMENT
Ms. 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 Started Cytarabine 100 mg/m2 on 11/10 x 3 days + BID hydroxyurea for cytoreduction, then transitioned to daily oral FLT3 inhibitor gilteritinib on 11/12. Hospital course complicated by Staph epidermis/hominis bacteriemia (?contaminant). Patient presents with pancytopenia due to disease and chemo.    Ms. 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 Started Cytarabine 100 mg/m2 on 11/10 x 3 days + BID hydroxyurea for cytoreduction, then transitioned to daily oral FLT3 inhibitor gilteritinib on 11/12. Hospital course complicated by Staph epidermis/hominis bacteremia on vanco which was stopped 11/14 likely contaminant on cefepime renally dosed. Patient presents with pancytopenia due to disease and chemo.

## 2022-11-15 NOTE — DIETITIAN INITIAL EVALUATION ADULT - NS FNS DIET ORDER
Diet, Soft and Bite Sized:   DASH/TLC {Sodium & Cholesterol Restricted} (DASH) (11-14-22 @ 01:02) [Active]

## 2022-11-15 NOTE — PROGRESS NOTE ADULT - SUBJECTIVE AND OBJECTIVE BOX
Follow Up:  Neutropenic fever, AML    Interval History/ROS: No acute complaints. Reports loose stool is presented but improved.      REVIEW OF SYSTEMS  [  ] ROS unobtainable because:    [ x ] All other systems negative except as noted below    Constitutional:  [ ] fever [ ] chills  [ ] weight loss  [ ]night sweat  [ ]poor appetite/PO intake [ ]fatigue   Skin:  [ ] rash [ ] phlebitis	  Eyes: [ ] icterus [ ] pain  [ ] discharge	  ENMT: [ ] sore throat  [ ] thrush [ ] ulcers [ ] exudates [ ]anosmia  Respiratory: [ ] dyspnea [ ] hemoptysis [ ] cough [ ] sputum	  Cardiovascular:  [ ] chest pain [ ] palpitations [ ] edema	  Gastrointestinal:  [ ] nausea [ ] vomiting [x ] diarrhea [ ] constipation [ ] pain	  Genitourinary:  [ ] dysuria [ ] frequency [ ] hematuria [ ] discharge [ ] flank pain  [ ] incontinence  Musculoskeletal:  [ ] myalgias [ ] arthralgias [ ] arthritis  [ ] back pain  Neurological:  [ ] headache [ ] weakness [ ] seizures  [ ] confusion/altered mental status    Allergies  No Known Allergies        ANTIMICROBIALS:    acyclovir   Oral Tab/Cap 400 two times a day  cefepime   IVPB 2000 every 8 hours  voriconazole 200 every 12 hours        OTHER MEDS: MEDICATIONS  (STANDING):  acetaminophen     Tablet .. 650 every 6 hours PRN  albuterol/ipratropium for Nebulization 3 every 6 hours  allopurinol 300 daily  aluminum hydroxide/magnesium hydroxide/simethicone Suspension 30 every 4 hours PRN  benzonatate 100 every 8 hours PRN  budesonide  80 MICROgram(s)/formoterol 4.5 MICROgram(s) Inhaler 2 two times a day  buPROPion XL (24-Hour) . 150 daily  dexAMETHasone  Injectable 5 every 12 hours  gabapentin 100 daily  gilteritinib 120 <User Schedule>  melatonin 3 at bedtime PRN  midodrine. 10 three times a day  ondansetron Injectable 4 every 8 hours PRN  oxybutynin 5 daily  polyethylene glycol 3350 17 two times a day  traZODone 150 at bedtime      Vital Signs Last 24 Hrs  T(F): 97.5 (11-15-22 @ 05:13), Max: 103.1 (11-11-22 @ 15:45)    Vital Signs Last 24 Hrs  HR: 75 (11-15-22 @ 05:13) (74 - 88)  BP: 105/68 (11-15-22 @ 05:13) (105/68 - 118/70)  RR: 16 (11-15-22 @ 05:13)  SpO2: 99% (11-15-22 @ 05:13) (94% - 99%)  Wt(kg): --    EXAM:  Constitutional:  well preserved, comfortable  Head/Eyes: no icterus  ENT:  supple, no cervical lymphadenopathy   LUNGS:  diffuse crackles bibasilar +L chest wall chemoport c/d/i  CVS:  regular rhythm, no murmur  Abd:  soft, non-tender; non-distended  Ext:  no edema  Vascular:  IV site no erythema tenderness or discharge  MSK:  joints without swelling  Neuro: AAO X 3, non- focal    Labs:                        6.1    0.07  )-----------( 22       ( 15 Nov 2022 07:46 )             18.9     11-15    145  |  116<H>  |  25<H>  ----------------------------<  135<H>  4.4   |  20<L>  |  0.66    Ca    7.5<L>      15 Nov 2022 07:33  Phos  3.1     11-15  Mg     2.6     11-15    TPro  5.3<L>  /  Alb  2.6<L>  /  TBili  0.4  /  DBili  x   /  AST  10  /  ALT  24  /  AlkPhos  42  11-15      WBC Trend:  WBC Count: 0.07 (11-15-22 @ 07:46)  WBC Count: 0.07 (11-14-22 @ 18:51)  WBC Count: 0.07 (11-14-22 @ 06:40)  WBC Count: 0.49 (11-13-22 @ 16:32)      Creatine Trend:  Creatinine, Serum: 0.66 (11-15)  Creatinine, Serum: 0.73 (11-14)  Creatinine, Serum: 0.77 (11-14)  Creatinine, Serum: 0.89 (11-13)      Liver Biochemical Testing Trend:  Alanine Aminotransferase (ALT/SGPT): 24 (11-15)  Alanine Aminotransferase (ALT/SGPT): 23 (11-14)  Alanine Aminotransferase (ALT/SGPT): 21 (11-14)  Alanine Aminotransferase (ALT/SGPT): 26 (11-13)  Alanine Aminotransferase (ALT/SGPT): 27 (11-13)  Aspartate Aminotransferase (AST/SGOT): 10 (11-15-22 @ 07:33)  Aspartate Aminotransferase (AST/SGOT): 15 (11-14-22 @ 18:51)  Aspartate Aminotransferase (AST/SGOT): 12 (11-14-22 @ 06:40)  Aspartate Aminotransferase (AST/SGOT): 16 (11-13-22 @ 16:33)  Aspartate Aminotransferase (AST/SGOT): 24 (11-13-22 @ 07:16)  Bilirubin Total, Serum: 0.4 (11-15)  Bilirubin Total, Serum: 0.4 (11-14)  Bilirubin Total, Serum: 0.2 (11-14)  Bilirubin Total, Serum: 0.5 (11-13)  Bilirubin Total, Serum: 0.5 (11-13)      Trend LDH  11-15-22 @ 07:33  308<H>  11-14-22 @ 18:51  355<H>  11-14-22 @ 06:40  358<H>  11-13-22 @ 16:33  422<H>  11-13-22 @ 07:16  447<H>      MICROBIOLOGY:  Vancomycin Level, Trough: 11.9 (11-12 @ 18:13)    MRSA PCR Result.: NotDetec (11-09-22 @ 17:44)      Culture - Blood (collected 13 Nov 2022 16:10)  Source: .Blood Blood-Catheter  Preliminary Report:    No growth to date.    Culture - Blood (collected 12 Nov 2022 15:49)  Source: .Blood Blood-Catheter  Preliminary Report:    No growth to date.    Culture - Blood (collected 11 Nov 2022 08:15)  Source: .Blood Blood-Peripheral  Preliminary Report:    No growth to date.    Culture - Blood (collected 11 Nov 2022 07:14)  Source: .Blood Blood-Catheter  Preliminary Report:    No growth to date.    Culture - Blood (collected 09 Nov 2022 17:44)  Source: .Blood Blood-Peripheral  Final Report:    Growth in aerobic bottle: Staphylococcus hominis    Growth in aerobic bottle: Staphylococcus epidermidis    Coag Negative Staphylococcus    Single set isolate, possible contaminant. Contact    Microbiology if susceptibility testing clinically    indicated.    ***Blood Panel PCR results on this specimen are available    approximately 3 hours after the Gram stain result.***    Gram stain, PCR, and/or culture results may not always    correspond due to difference in methodologies.    ************************************************************    This PCR assay was performed by multiplex PCR. This    Assay tests for 66 bacterial and resistance gene targets.    Please refer to the [facility] test directory    at [email or web address] for details.  Organism: Blood Culture PCR  Organism: Blood Culture PCR    Sensitivities:      -  Staphylococcus epidermidis, Methicillin resistant: Detec      Method Type: PCR      HIV-1/2 Combo Result: Nonreact (11-08-22 @ 15:50)        Glucose-6-Phosphate Dehydrogenase: 21.4 U/g Hgb (11-08-22 @ 15:50)      Rapid RVP Result: NotDetec (11-12 @ 20:43)  Fungitell: <31 pg/mL (11-12 @ 15:42)    COVID-19 PCR: NotDetec (11-08-22 @ 03:04)      D-Dimer Assay, Quantitative: 1679 (11-14)  D-Dimer Assay, Quantitative: 1072 (11-14)  D-Dimer Assay, Quantitative: 2124 (11-13)  D-Dimer Assay, Quantitative: 1083 (11-13)  D-Dimer Assay, Quantitative: 2003 (11-12)  D-Dimer Assay, Quantitative: 2102 (11-12)  D-Dimer Assay, Quantitative: 43273 (11-11)  D-Dimer Assay, Quantitative: 41161 (11-10)  D-Dimer Assay, Quantitative: 1523 (11-08)    Lactate Dehydrogenase, Serum: 308 (11-15)  Lactate Dehydrogenase, Serum: 355 (11-14)  Lactate Dehydrogenase, Serum: 358 (11-14)  Lactate Dehydrogenase, Serum: 422 (11-13)  Lactate Dehydrogenase, Serum: 447 (11-13)  Lactate Dehydrogenase, Serum: 465 (11-12)  Lactate Dehydrogenase, Serum: 554 (11-12)  Lactate Dehydrogenase, Serum: 697 (11-11)  Lactate Dehydrogenase, Serum: 827 (11-10)  Lactate Dehydrogenase, Serum: 786 (11-10)  Lactate Dehydrogenase, Serum: 842 (11-09)  Lactate Dehydrogenase, Serum: 880 (11-08)    Serum Pro-Brain Natriuretic Peptide: 3126 (11-11)    Troponin T, High Sensitivity Result: 281 (11-13)  Troponin T, High Sensitivity Result: 310 (11-13)  Troponin T, High Sensitivity Result: 470 (11-12)  Troponin T, High Sensitivity Result: 19 (11-11)  Troponin T, High Sensitivity Result: 50 (11-11)    Blood Gas Venous - Lactate: 1.7 (11-14 @ 08:00)  Lactate, Blood: 1.4 (11-12 @ 15:47)        RADIOLOGY:  imaging below personally reviewed    CT Chest No Cont:   ACC: [id number] EXAM:  CT CHEST                          PROCEDURE DATE:  11/12/2022          INTERPRETATION:  HISTORY: Admitting Dxs: D72.829 ELEVATED WHITE BLOOD   CELL COUNT, UNSPECIFIED. Neutropenic fever.    EXAMINATION: CT CHEST was performed without IV contrast.    COMPARISON: None available.    FINDINGS:    AIRWAYS, LUNGS, PLEURA: Clear central airways. Small bilateral pleural   effusions. Scattered nodular and small consolidative opacities primarily   in the bilateral lower lobes.    MEDIASTINUM: Normal heart size. No pericardial effusion. Aortic valve   calcifications. Thoracic aorta normal caliber.  No large mediastinal   lymph nodes. Esophagus is diffusely fluid-filled. Port-A-Cath tip   terminates in the SVC/right atrial junction.    IMAGED ABDOMEN: Right hepatic lobe 2 x 1.9 cm lesion (image 163, series   3). Right adrenal nodule measures 2.3 x 2 cm.    SOFT TISSUES: Unremarkable.    BONES: Age indeterminate L1 compression deformity.      IMPRESSION:.    Scattered nodular and small consolidative opacities peripherally and   bilateral lower lobes; findings are favored to represent aspiration   and/or infection.    Indeterminate right hepatic lobe 2 cm lesion and right adrenal 2.3 cm   nodule; recommend correlation with MRI abdomen to more accurately   characterize.    --- End of Report ---   Follow Up:  Neutropenic fever, AML    Interval History/ROS: No acute complaints. Reports loose stool is presented but improved. Cough is also better. Just tired.           Allergies  No Known Allergies        ANTIMICROBIALS:    acyclovir   Oral Tab/Cap 400 two times a day  cefepime   IVPB 2000 every 8 hours  voriconazole 200 every 12 hours        OTHER MEDS: MEDICATIONS  (STANDING):  acetaminophen     Tablet .. 650 every 6 hours PRN  albuterol/ipratropium for Nebulization 3 every 6 hours  allopurinol 300 daily  aluminum hydroxide/magnesium hydroxide/simethicone Suspension 30 every 4 hours PRN  benzonatate 100 every 8 hours PRN  budesonide  80 MICROgram(s)/formoterol 4.5 MICROgram(s) Inhaler 2 two times a day  buPROPion XL (24-Hour) . 150 daily  dexAMETHasone  Injectable 5 every 12 hours  gabapentin 100 daily  gilteritinib 120 <User Schedule>  melatonin 3 at bedtime PRN  midodrine. 10 three times a day  ondansetron Injectable 4 every 8 hours PRN  oxybutynin 5 daily  polyethylene glycol 3350 17 two times a day  traZODone 150 at bedtime      Vital Signs Last 24 Hrs  T(F): 97.5 (11-15-22 @ 05:13), Max: 103.1 (11-11-22 @ 15:45)    Vital Signs Last 24 Hrs  HR: 75 (11-15-22 @ 05:13) (74 - 88)  BP: 105/68 (11-15-22 @ 05:13) (105/68 - 118/70)  RR: 16 (11-15-22 @ 05:13)  SpO2: 99% (11-15-22 @ 05:13) (94% - 99%)  Wt(kg): --    EXAM:  Constitutional:  well preserved, comfortable  Head/Eyes: no icterus  ENT:  supple, no cervical lymphadenopathy   LUNGS:  nonlabored on nasal cannula, +L chest wall chemoport c/d/i  CVS:  regular rhythm, no murmur  Abd:  soft, non-tender; non-distended  Ext:  no edema  Vascular:  IV site no erythema tenderness or discharge  MSK:  joints without swelling  Neuro: AAO X 3, non- focal    Labs:                        6.1    0.07  )-----------( 22       ( 15 Nov 2022 07:46 )             18.9     11-15    145  |  116<H>  |  25<H>  ----------------------------<  135<H>  4.4   |  20<L>  |  0.66    Ca    7.5<L>      15 Nov 2022 07:33  Phos  3.1     11-15  Mg     2.6     11-15    TPro  5.3<L>  /  Alb  2.6<L>  /  TBili  0.4  /  DBili  x   /  AST  10  /  ALT  24  /  AlkPhos  42  11-15      WBC Trend:  WBC Count: 0.07 (11-15-22 @ 07:46)  WBC Count: 0.07 (11-14-22 @ 18:51)  WBC Count: 0.07 (11-14-22 @ 06:40)  WBC Count: 0.49 (11-13-22 @ 16:32)      Creatine Trend:  Creatinine, Serum: 0.66 (11-15)  Creatinine, Serum: 0.73 (11-14)  Creatinine, Serum: 0.77 (11-14)  Creatinine, Serum: 0.89 (11-13)      Liver Biochemical Testing Trend:  Alanine Aminotransferase (ALT/SGPT): 24 (11-15)  Alanine Aminotransferase (ALT/SGPT): 23 (11-14)  Alanine Aminotransferase (ALT/SGPT): 21 (11-14)  Alanine Aminotransferase (ALT/SGPT): 26 (11-13)  Alanine Aminotransferase (ALT/SGPT): 27 (11-13)  Aspartate Aminotransferase (AST/SGOT): 10 (11-15-22 @ 07:33)  Aspartate Aminotransferase (AST/SGOT): 15 (11-14-22 @ 18:51)  Aspartate Aminotransferase (AST/SGOT): 12 (11-14-22 @ 06:40)  Aspartate Aminotransferase (AST/SGOT): 16 (11-13-22 @ 16:33)  Aspartate Aminotransferase (AST/SGOT): 24 (11-13-22 @ 07:16)  Bilirubin Total, Serum: 0.4 (11-15)  Bilirubin Total, Serum: 0.4 (11-14)  Bilirubin Total, Serum: 0.2 (11-14)  Bilirubin Total, Serum: 0.5 (11-13)  Bilirubin Total, Serum: 0.5 (11-13)      Trend LDH  11-15-22 @ 07:33  308<H>  11-14-22 @ 18:51  355<H>  11-14-22 @ 06:40  358<H>  11-13-22 @ 16:33  422<H>  11-13-22 @ 07:16  447<H>      MICROBIOLOGY:  Vancomycin Level, Trough: 11.9 (11-12 @ 18:13)    MRSA PCR Result.: NotDetec (11-09-22 @ 17:44)      Culture - Blood (collected 13 Nov 2022 16:10)  Source: .Blood Blood-Catheter  Preliminary Report:    No growth to date.    Culture - Blood (collected 12 Nov 2022 15:49)  Source: .Blood Blood-Catheter  Preliminary Report:    No growth to date.    Culture - Blood (collected 11 Nov 2022 08:15)  Source: .Blood Blood-Peripheral  Preliminary Report:    No growth to date.    Culture - Blood (collected 11 Nov 2022 07:14)  Source: .Blood Blood-Catheter  Preliminary Report:    No growth to date.    Culture - Blood (collected 09 Nov 2022 17:44)  Source: .Blood Blood-Peripheral  Final Report:    Growth in aerobic bottle: Staphylococcus hominis    Growth in aerobic bottle: Staphylococcus epidermidis    Coag Negative Staphylococcus    Single set isolate, possible contaminant. Contact    Microbiology if susceptibility testing clinically    indicated.    ***Blood Panel PCR results on this specimen are available    approximately 3 hours after the Gram stain result.***    Gram stain, PCR, and/or culture results may not always    correspond due to difference in methodologies.    ************************************************************    This PCR assay was performed by multiplex PCR. This    Assay tests for 66 bacterial and resistance gene targets.    Please refer to the [facility] test directory    at [email or web address] for details.  Organism: Blood Culture PCR  Organism: Blood Culture PCR    Sensitivities:      -  Staphylococcus epidermidis, Methicillin resistant: Detec      Method Type: PCR      HIV-1/2 Combo Result: Nonreact (11-08-22 @ 15:50)        Glucose-6-Phosphate Dehydrogenase: 21.4 U/g Hgb (11-08-22 @ 15:50)      Rapid RVP Result: NotDetec (11-12 @ 20:43)  Fungitell: <31 pg/mL (11-12 @ 15:42)    COVID-19 PCR: Sanjana (11-08-22 @ 03:04)      D-Dimer Assay, Quantitative: 1679 (11-14)  D-Dimer Assay, Quantitative: 1072 (11-14)  D-Dimer Assay, Quantitative: 2124 (11-13)  D-Dimer Assay, Quantitative: 1083 (11-13)  D-Dimer Assay, Quantitative: 2003 (11-12)  D-Dimer Assay, Quantitative: 2102 (11-12)  D-Dimer Assay, Quantitative: 41788 (11-11)  D-Dimer Assay, Quantitative: 72290 (11-10)  D-Dimer Assay, Quantitative: 1523 (11-08)    Lactate Dehydrogenase, Serum: 308 (11-15)  Lactate Dehydrogenase, Serum: 355 (11-14)  Lactate Dehydrogenase, Serum: 358 (11-14)  Lactate Dehydrogenase, Serum: 422 (11-13)  Lactate Dehydrogenase, Serum: 447 (11-13)  Lactate Dehydrogenase, Serum: 465 (11-12)  Lactate Dehydrogenase, Serum: 554 (11-12)  Lactate Dehydrogenase, Serum: 697 (11-11)  Lactate Dehydrogenase, Serum: 827 (11-10)  Lactate Dehydrogenase, Serum: 786 (11-10)  Lactate Dehydrogenase, Serum: 842 (11-09)  Lactate Dehydrogenase, Serum: 880 (11-08)    Serum Pro-Brain Natriuretic Peptide: 3126 (11-11)    Troponin T, High Sensitivity Result: 281 (11-13)  Troponin T, High Sensitivity Result: 310 (11-13)  Troponin T, High Sensitivity Result: 470 (11-12)  Troponin T, High Sensitivity Result: 19 (11-11)  Troponin T, High Sensitivity Result: 50 (11-11)    Blood Gas Venous - Lactate: 1.7 (11-14 @ 08:00)  Lactate, Blood: 1.4 (11-12 @ 15:47)        RADIOLOGY:  imaging below personally reviewed    CT Chest No Cont:   ACC: [id number] EXAM:  CT CHEST                          PROCEDURE DATE:  11/12/2022          INTERPRETATION:  HISTORY: Admitting Dxs: D72.829 ELEVATED WHITE BLOOD   CELL COUNT, UNSPECIFIED. Neutropenic fever.    EXAMINATION: CT CHEST was performed without IV contrast.    COMPARISON: None available.    FINDINGS:    AIRWAYS, LUNGS, PLEURA: Clear central airways. Small bilateral pleural   effusions. Scattered nodular and small consolidative opacities primarily   in the bilateral lower lobes.    MEDIASTINUM: Normal heart size. No pericardial effusion. Aortic valve   calcifications. Thoracic aorta normal caliber.  No large mediastinal   lymph nodes. Esophagus is diffusely fluid-filled. Port-A-Cath tip   terminates in the SVC/right atrial junction.    IMAGED ABDOMEN: Right hepatic lobe 2 x 1.9 cm lesion (image 163, series   3). Right adrenal nodule measures 2.3 x 2 cm.    SOFT TISSUES: Unremarkable.    BONES: Age indeterminate L1 compression deformity.      IMPRESSION:.    Scattered nodular and small consolidative opacities peripherally and   bilateral lower lobes; findings are favored to represent aspiration   and/or infection.    Indeterminate right hepatic lobe 2 cm lesion and right adrenal 2.3 cm   nodule; recommend correlation with MRI abdomen to more accurately   characterize.    --- End of Report ---

## 2022-11-15 NOTE — PROGRESS NOTE ADULT - SUBJECTIVE AND OBJECTIVE BOX
Diagnosis: relapsed AML, FLT3 ITD+, NPM1, CEBPA, ASXL1 mutated    Protocol/Chemo Regimen: daily oral FLT3 inhibitor gilteritinib (cytarabine 100mg/m2 x 3 days continuous infusion prior to start of gilteritinib)    Day: 6 cytarabine; Day 4 gilteritinib     Pt endorsed: Feels winded.     Review of Systems: Patient denied nausea, vomiting, odynophagia, chest pain, abdominal pain, constipation, diarrhea, rash, fatigue    Pain scale: 0                                           Diet: DASH/TLC    Allergies    No Known Allergies    Intolerances    ANTIMICROBIALS  acyclovir   Oral Tab/Cap 400 milliGRAM(s) Oral two times a day  cefepime   IVPB 2000 milliGRAM(s) IV Intermittent every 8 hours  voriconazole 200 milliGRAM(s) Oral every 12 hours      HEME/ONC MEDICATIONS  gilteritinib 120 milliGRAM(s) Oral <User Schedule>      STANDING MEDICATIONS  albuterol/ipratropium for Nebulization 3 milliLiter(s) Nebulizer every 6 hours  allopurinol 300 milliGRAM(s) Oral daily  Biotene Dry Mouth Oral Rinse 15 milliLiter(s) Swish and Spit three times a day  budesonide  80 MICROgram(s)/formoterol 4.5 MICROgram(s) Inhaler 2 Puff(s) Inhalation two times a day  buPROPion XL (24-Hour) . 150 milliGRAM(s) Oral daily  chlorhexidine 2% Cloths 1 Application(s) Topical daily  dexAMETHasone  Injectable 5 milliGRAM(s) IV Push every 12 hours  gabapentin 100 milliGRAM(s) Oral daily  midodrine. 10 milliGRAM(s) Oral three times a day  oxybutynin 5 milliGRAM(s) Oral daily  polyethylene glycol 3350 17 Gram(s) Oral two times a day  sodium chloride 0.65% Nasal 1 Spray(s) Both Nostrils four times a day  sodium chloride 0.9%. 1000 milliLiter(s) IV Continuous <Continuous>  traZODone 150 milliGRAM(s) Oral at bedtime      PRN MEDICATIONS  acetaminophen     Tablet .. 650 milliGRAM(s) Oral every 6 hours PRN  aluminum hydroxide/magnesium hydroxide/simethicone Suspension 30 milliLiter(s) Oral every 4 hours PRN  benzonatate 100 milliGRAM(s) Oral every 8 hours PRN  melatonin 3 milliGRAM(s) Oral at bedtime PRN  ondansetron Injectable 4 milliGRAM(s) IV Push every 8 hours PRN        Vital Signs Last 24 Hrs  T(C): 36.4 (15 Nov 2022 05:13), Max: 36.9 (14 Nov 2022 13:30)  T(F): 97.5 (15 Nov 2022 05:13), Max: 98.4 (14 Nov 2022 13:30)  HR: 75 (15 Nov 2022 05:13) (74 - 88)  BP: 105/68 (15 Nov 2022 05:13) (105/68 - 118/70)  BP(mean): --  RR: 16 (15 Nov 2022 05:13) (16 - 20)  SpO2: 99% (15 Nov 2022 05:13) (94% - 99%)    Parameters below as of 15 Nov 2022 05:13  Patient On (Oxygen Delivery Method): nasal cannula w/ humidification  O2 Flow (L/min): 5    PHYSICAL EXAM  General: NAD  HEENT:  clear oropharynx  CV: (+) S1/S2 RRR  Lungs: coarse bs, rale 1/4 up   Abdomen: soft, + BS, non-tender, non-distended  Ext: trace carolyn LE   Skin: no rash  Neuro: alert and oriented X 3  Central Line: LCW Mediport  and PIV CDI    LABS: Diagnosis: relapsed AML, FLT3 ITD+, NPM1, CEBPA, ASXL1 mutated    Protocol/Chemo Regimen: daily oral FLT3 inhibitor gilteritinib (cytarabine 100mg/m2 x 3 days continuous infusion prior to start of gilteritinib)    Day: 6 cytarabine; Day 4 gilteritinib     Pt endorsed: Breathing is improved today. Less winded.     Review of Systems: Patient denied nausea, vomiting, odynophagia, chest pain, abdominal pain, constipation, diarrhea, rash, fatigue    Pain scale: 0                                           Diet: DASH/TLC    Allergies    No Known Allergies    Intolerances    ANTIMICROBIALS  acyclovir   Oral Tab/Cap 400 milliGRAM(s) Oral two times a day  cefepime   IVPB 2000 milliGRAM(s) IV Intermittent every 8 hours  voriconazole 200 milliGRAM(s) Oral every 12 hours      HEME/ONC MEDICATIONS  gilteritinib 120 milliGRAM(s) Oral <User Schedule>      STANDING MEDICATIONS  albuterol/ipratropium for Nebulization 3 milliLiter(s) Nebulizer every 6 hours  allopurinol 300 milliGRAM(s) Oral daily  Biotene Dry Mouth Oral Rinse 15 milliLiter(s) Swish and Spit three times a day  budesonide  80 MICROgram(s)/formoterol 4.5 MICROgram(s) Inhaler 2 Puff(s) Inhalation two times a day  buPROPion XL (24-Hour) . 150 milliGRAM(s) Oral daily  chlorhexidine 2% Cloths 1 Application(s) Topical daily  dexAMETHasone  Injectable 5 milliGRAM(s) IV Push every 12 hours  gabapentin 100 milliGRAM(s) Oral daily  midodrine. 10 milliGRAM(s) Oral three times a day  oxybutynin 5 milliGRAM(s) Oral daily  polyethylene glycol 3350 17 Gram(s) Oral two times a day  sodium chloride 0.65% Nasal 1 Spray(s) Both Nostrils four times a day  sodium chloride 0.9%. 1000 milliLiter(s) IV Continuous <Continuous>  traZODone 150 milliGRAM(s) Oral at bedtime      PRN MEDICATIONS  acetaminophen     Tablet .. 650 milliGRAM(s) Oral every 6 hours PRN  aluminum hydroxide/magnesium hydroxide/simethicone Suspension 30 milliLiter(s) Oral every 4 hours PRN  benzonatate 100 milliGRAM(s) Oral every 8 hours PRN  melatonin 3 milliGRAM(s) Oral at bedtime PRN  ondansetron Injectable 4 milliGRAM(s) IV Push every 8 hours PRN        Vital Signs Last 24 Hrs  T(C): 36.4 (15 Nov 2022 05:13), Max: 36.9 (14 Nov 2022 13:30)  T(F): 97.5 (15 Nov 2022 05:13), Max: 98.4 (14 Nov 2022 13:30)  HR: 75 (15 Nov 2022 05:13) (74 - 88)  BP: 105/68 (15 Nov 2022 05:13) (105/68 - 118/70)  BP(mean): --  RR: 16 (15 Nov 2022 05:13) (16 - 20)  SpO2: 99% (15 Nov 2022 05:13) (94% - 99%)    Parameters below as of 15 Nov 2022 05:13  Patient On (Oxygen Delivery Method): nasal cannula w/ humidification  O2 Flow (L/min): 5    PHYSICAL EXAM  General: NAD  HEENT:  clear oropharynx  CV: (+) S1/S2 RRR  Lungs: coarse bs, rale 1/4 up   Abdomen: soft, + BS, non-tender, non-distended  Ext: trace carolyn LE   Skin: no rash  Neuro: alert and oriented X 3  Central Line: LCW Mediport  and PIV CDI    LABS:    Blood Cultures:                           6.1    0.07  )-----------( 22       ( 15 Nov 2022 07:46 )             18.9         Mean Cell Volume : 97.4 fl  Mean Cell Hemoglobin : 31.4 pg  Mean Cell Hemoglobin Concentration : 32.3 gm/dL  Auto Neutrophil # : x  Auto Lymphocyte # : x  Auto Monocyte # : x  Auto Eosinophil # : x  Auto Basophil # : x  Auto Neutrophil % : x  Auto Lymphocyte % : x  Auto Monocyte % : x  Auto Eosinophil % : x  Auto Basophil % : x      11-15    145  |  116<H>  |  25<H>  ----------------------------<  135<H>  4.4   |  20<L>  |  0.66    Ca    7.5<L>      15 Nov 2022 07:33  Phos  3.1     11-15  Mg     2.6     11-15    TPro  5.3<L>  /  Alb  2.6<L>  /  TBili  0.4  /  DBili  x   /  AST  10  /  ALT  24  /  AlkPhos  42    PT/INR - ( 15 Nov 2022 07:39 )   PT: 14.2 sec;   INR: 1.23 ratio    PTT - ( 15 Nov 2022 07:39 )  PTT:28.2 sec    Uric Acid 2.3

## 2022-11-15 NOTE — DIETITIAN INITIAL EVALUATION ADULT - PERTINENT MEDS FT
MEDICATIONS  (STANDING):  acyclovir   Oral Tab/Cap 400 milliGRAM(s) Oral two times a day  albuterol/ipratropium for Nebulization 3 milliLiter(s) Nebulizer every 6 hours  allopurinol 300 milliGRAM(s) Oral daily  Biotene Dry Mouth Oral Rinse 15 milliLiter(s) Swish and Spit three times a day  budesonide  80 MICROgram(s)/formoterol 4.5 MICROgram(s) Inhaler 2 Puff(s) Inhalation two times a day  buPROPion XL (24-Hour) . 150 milliGRAM(s) Oral daily  cefepime   IVPB 2000 milliGRAM(s) IV Intermittent every 8 hours  chlorhexidine 2% Cloths 1 Application(s) Topical daily  dexAMETHasone  Injectable 2.5 milliGRAM(s) IV Push every 12 hours  gabapentin 100 milliGRAM(s) Oral daily  gilteritinib 120 milliGRAM(s) Oral <User Schedule>  midodrine. 10 milliGRAM(s) Oral three times a day  oxybutynin 5 milliGRAM(s) Oral daily  polyethylene glycol 3350 17 Gram(s) Oral two times a day  sodium chloride 0.65% Nasal 1 Spray(s) Both Nostrils four times a day  sodium chloride 0.9%. 1000 milliLiter(s) (60 mL/Hr) IV Continuous <Continuous>  traZODone 150 milliGRAM(s) Oral at bedtime  voriconazole 200 milliGRAM(s) Oral every 12 hours    MEDICATIONS  (PRN):  acetaminophen     Tablet .. 650 milliGRAM(s) Oral every 6 hours PRN Temp greater or equal to 38C (100.4F), Mild Pain (1 - 3)  aluminum hydroxide/magnesium hydroxide/simethicone Suspension 30 milliLiter(s) Oral every 4 hours PRN Dyspepsia  benzonatate 100 milliGRAM(s) Oral every 8 hours PRN Cough  melatonin 3 milliGRAM(s) Oral at bedtime PRN Insomnia  ondansetron Injectable 4 milliGRAM(s) IV Push every 8 hours PRN Nausea and/or Vomiting   Nutrition Pertinent Meds:  allopurinol 300 milliGRAM(s) Oral daily  buPROPion XL (24-Hour) . 150 milliGRAM(s) Oral daily  dexAMETHasone  Injectable 2.5 milliGRAM(s) IV Push every 12 hours  gabapentin 100 milliGRAM(s) Oral daily  midodrine. 10 milliGRAM(s) Oral three times a day  polyethylene glycol 3350 17 Gram(s) Oral two times a day  sodium chloride 0.9%. 1000 milliLiter(s) (60 mL/Hr) IV Continuous <Continuous>  traZODone 150 milliGRAM(s) Oral at bedtime  aluminum hydroxide/magnesium hydroxide/simethicone Suspension 30 milliLiter(s) Oral every 4 hours PRN Dyspepsia  ondansetron Injectable 4 milliGRAM(s) IV Push every 8 hours PRN Nausea and/or Vomiting

## 2022-11-15 NOTE — DIETITIAN INITIAL EVALUATION ADULT - ORAL INTAKE PTA/DIET HISTORY
Patient lives at home with her , however, has an aid that comes 3x week to cook and help out around the house. On the days that the aid is not there, patient reports eating food out. Patient reports no problems obtaining groceries. Patient reports UBW is 123 lbs (55.79 kg) and her height is 5'4".  Patient lives at home with her , however, has an aid that comes 3x week to cook and help out around the house. On the days that the aid is not there, patient reports eating take-out food. Patient reports no problems obtaining groceries. Patient reports UBW is 123 lbs (55.79 kg) and her height is 5'4".

## 2022-11-15 NOTE — DIETITIAN INITIAL EVALUATION ADULT - PERSON TAUGHT/METHOD
Patient educated on DASH diet (low sodium, high potasium, calcium and magnesium) and importance of consuming fiber to aid in constipation issues. Patient able to acknowledge what sodium is./verbal instruction/teach back - (Patient repeats in own words)/patient instructed Patient educated on DASH diet (low sodium, high potasium, calcium and magnesium) and importance of consuming fiber to aid in constipation issues. Patient able to acknowledge what sodium is.    Discussed importance of adequate consumption of meals/supplements to optimize protein-energy intake. Encouraged small/frequent meals, nutrient dense snacks, prioritizing protein foods at meal time./verbal instruction/teach back - (Patient repeats in own words)/patient instructed

## 2022-11-15 NOTE — PROGRESS NOTE ADULT - NS ATTEND AMEND GEN_ALL_CORE FT
Vital Signs Last 24 Hrs  T(C): 36.8 (14 Nov 2022 09:15), Max: 38.6 (13 Nov 2022 15:35)  T(F): 98.2 (14 Nov 2022 09:15), Max: 101.5 (13 Nov 2022 15:35)  HR: 74 (14 Nov 2022 09:15) (69 - 98)  BP: 117/65 (14 Nov 2022 09:15) (93/56 - 117/65)  BP(mean): --  RR: 18 (14 Nov 2022 09:15) (17 - 22)  SpO2: 94% (14 Nov 2022 09:15) (94% - 97%)    Parameters below as of 14 Nov 2022 09:15  Patient On (Oxygen Delivery Method): nasal cannula w/ humidification  O2 Flow (L/min): 5    MEDICATIONS  (STANDING):  acyclovir   Oral Tab/Cap 400 milliGRAM(s) Oral two times a day  albuterol/ipratropium for Nebulization 3 milliLiter(s) Nebulizer every 6 hours  allopurinol 300 milliGRAM(s) Oral daily  Biotene Dry Mouth Oral Rinse 15 milliLiter(s) Swish and Spit three times a day  buPROPion XL (24-Hour) . 150 milliGRAM(s) Oral daily  cefepime   IVPB 2000 milliGRAM(s) IV Intermittent every 8 hours  chlorhexidine 2% Cloths 1 Application(s) Topical daily  dexAMETHasone  Injectable 5 milliGRAM(s) IV Push every 12 hours  gabapentin 100 milliGRAM(s) Oral daily  gilteritinib 120 milliGRAM(s) Oral <User Schedule>  midodrine. 10 milliGRAM(s) Oral three times a day  oxybutynin 5 milliGRAM(s) Oral daily  polyethylene glycol 3350 17 Gram(s) Oral two times a day  sodium bicarbonate 650 milliGRAM(s) Oral four times a day  sodium chloride 0.65% Nasal 1 Spray(s) Both Nostrils four times a day  sodium chloride 0.9%. 1000 milliLiter(s) (60 mL/Hr) IV Continuous <Continuous>  traZODone 150 milliGRAM(s) Oral at bedtime  vancomycin  IVPB 1000 milliGRAM(s) IV Intermittent every 12 hours  voriconazole 200 milliGRAM(s) Oral every 12 hours    was transiently hypotensive 11/12 >>>midodrine  intermittently febrile  c/o incr dyspnea  has mod AS on echo  CT lung nodules,  ? 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  Course complicated S. epi bacteremia? (11/9/22), continuous neutropenic fevers, pneumonia (concerning for fungus) and hypotension (11/12/22), fluid overloaded state (11/13/22 - )   note fibrinogen increasing now, d-dimer very high but also decreasing  will ask for ID input given fevers, lung findings    Onc History:  decitabine/lisa through 27 cycles,    Plan:  Heme: PLT goal > 10,000; Hgb goal > 7.0g/dL;   D/C Scarlet-C and HU  continue TLS labs and allopurinol   Continue gilteritinib with dex (to reduce the incidence of differentiation syndrome).     ID: cefepime, vori, acyclovir, Vanco, vori (11/12/22 - )will check vori level I in 3 days.    peripheral blood B-D glucan and galactomannan (11/12/22): pending    Radiographs: CT (11/12/22): Scattered nodular and small consolidative opacities peripherally and bilateral lower lobes; findings are favored to represent aspiration and/or infection.  Indeterminate right hepatic lobe 2 cm lesion and right adrenal 2.3 cm nodule; recommend correlation with MRI abdomen to more accurately characterize.    Pulmonary: add bronchodilator (history of reactive airway disease)     Nutrition: tolerating PO; cont net diuresis today.   cont to follow QTc (on vfend and gilteritinib)  DVT prophylaxis: ambulation.     Code status: DNR/DNI    Over 35 minutes were spent in direct patient care and care coordination. Vital Signs Last 24 Hrs  T(C): 36.5 (15 Nov 2022 09:25), Max: 36.9 (14 Nov 2022 13:30)  T(F): 97.7 (15 Nov 2022 09:25), Max: 98.4 (14 Nov 2022 13:30)  HR: 82 (15 Nov 2022 09:25) (74 - 88)  BP: 125/58 (15 Nov 2022 09:25) (105/68 - 125/58)  BP(mean): --  RR: 18 (15 Nov 2022 09:25) (16 - 20)  SpO2: 94% (15 Nov 2022 09:25) (94% - 99%)    Parameters below as of 15 Nov 2022 09:25  Patient On (Oxygen Delivery Method): nasal cannula w/ humidification  O2 Flow (L/min): 5  MEDICATIONS  (STANDING):  acyclovir   Oral Tab/Cap 400 milliGRAM(s) Oral two times a day  albuterol/ipratropium for Nebulization 3 milliLiter(s) Nebulizer every 6 hours  allopurinol 300 milliGRAM(s) Oral daily  Biotene Dry Mouth Oral Rinse 15 milliLiter(s) Swish and Spit three times a day  budesonide  80 MICROgram(s)/formoterol 4.5 MICROgram(s) Inhaler 2 Puff(s) Inhalation two times a day  buPROPion XL (24-Hour) . 150 milliGRAM(s) Oral daily  cefepime   IVPB 2000 milliGRAM(s) IV Intermittent every 8 hours  chlorhexidine 2% Cloths 1 Application(s) Topical daily  dexAMETHasone  Injectable 5 milliGRAM(s) IV Push every 12 hours  furosemide   Injectable 40 milliGRAM(s) IV Push once  gabapentin 100 milliGRAM(s) Oral daily  gilteritinib 120 milliGRAM(s) Oral <User Schedule>  midodrine. 10 milliGRAM(s) Oral three times a day  oxybutynin 5 milliGRAM(s) Oral daily  polyethylene glycol 3350 17 Gram(s) Oral two times a day  sodium chloride 0.65% Nasal 1 Spray(s) Both Nostrils four times a day  sodium chloride 0.9%. 1000 milliLiter(s) (60 mL/Hr) IV Continuous <Continuous>  traZODone 150 milliGRAM(s) Oral at bedtime  voriconazole 200 milliGRAM(s) Oral every 12 hours                        6.1    0.07  )-----------( 22       ( 15 Nov 2022 07:46 )             18.9     was transiently hypotensive 11/12 >>>midodrine  intermittently febrile >>> afeb last 24 hrs  c/o incr dyspnea  has mod AS on echo; appreciate Cardiology input  TTE and CXR to be repeated  CT lung nodules,  ? 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  Course complicated S. epi bacteremia? (11/9/22), continuous neutropenic fevers, pneumonia (concerning for fungus) and hypotension (11/12/22), fluid overloaded state (11/13/22 - )   note fibrinogen increasing now, d-dimer very high but now  decreasing    ID: cont present abs, d/c vanco, ID input appreciated     Onc History:  decitabine/lisa through 27 cycles,    Plan:  Heme: PLT goal > 10,000; Hgb goal > 7.0g/dL;   D/C Scarlet-C and HU  continue TLS labs and allopurinol   Continue gilteritinib with dex (to reduce the incidence of differentiation syndrome).   reduce decadron to 2.5 mg 2x/day    ID: cefepime, vori, acyclovir; will check vori level    peripheral blood fungitell neg and galactomannan (11/12/22): pending    Radiographs: CT (11/12/22): Scattered nodular and small consolidative opacities peripherally and bilateral lower lobes; findings are favored to represent aspiration and/or infection.  Indeterminate right hepatic lobe 2 cm lesion and right adrenal 2.3 cm nodule; recommend correlation with MRI abdomen to more accurately characterize.    Pulmonary:  symbicort added, may have been helpful  (history of reactive airway disease)     Nutrition: tolerating PO; cont net diuresis today.   cont to follow QTc (on vfend and gilteritinib)  DVT prophylaxis: ambulation.     Code status: DNR/DNI    Over 35 minutes were spent in direct patient care and care coordination.

## 2022-11-15 NOTE — DIETITIAN INITIAL EVALUATION ADULT - LITERATURE/VIDEOS GIVEN
Low-Sodium Nutrition Therapy educational material and Hypertension Nutrition Therapy given and explained to patient. Answered all of patients questions or concerns.  HTN nutrition therapy and low sodium nutrition therapy

## 2022-11-15 NOTE — DIETITIAN INITIAL EVALUATION ADULT - ETIOLOGY
Poor PO intake due to pt feeling fatigue Decreased ability to consume adequate nutrition in the setting of chronic illness

## 2022-11-15 NOTE — DIETITIAN INITIAL EVALUATION ADULT - NSFNSNUTRHOMESUPPLEMENTFT_GEN_A_CORE
Patient reports taking a Vitamin D3 supplement. Patient reports taking a Vitamin D3 supplement. Protein-energy supplementation: Amelie ibarra PTA

## 2022-11-15 NOTE — DIETITIAN INITIAL EVALUATION ADULT - NSFNSNUTRCHEWSWALLOWFT_GEN_A_CORE
Denies any chewing problems but reports having swallowing problems and has to 'focus' while she is eating. She feels that the food gets stuck in her throat sometimes. No report of an SLP jessica per chart review, however, noted that diet has changed from soft and bite sized to DASH diet.

## 2022-11-15 NOTE — DIETITIAN INITIAL EVALUATION ADULT - SIGNS/SYMPTOMS
Moderate muscle loss and moderate body fat loss <75% EER x 1 week , mild-moderate muscle/fat depletion

## 2022-11-15 NOTE — PROGRESS NOTE ADULT - PROBLEM SELECTOR PLAN 8
Increased o2 requirement.   11/12 CCT Scattered nodular and small consolidative opacities peripherally and   bilateral lower lobes; findings are favored to represent aspiration   and/or infection.  Added Duoneb, symbicort gentle diuresis as tolerated 11/11 EKG no changes  11/12 elevated trop the rest of cardiac enzymes WNL  11/14 Cardiology consult for AS- be gentle with diuresis. repeat echo and cxr given change in clinical status (rales)  Continue with midodrine. 11/11 EKG no changes  11/12 elevated trop the rest of cardiac enzymes WNL  11/14 Cardiology consult for AS- be gentle with diuresis.   11/15 Repeat echo showed decrease in EF 39% with wall motion abnormality.  Cxr given change in clinical status (rales)-  Continue with midodrine.

## 2022-11-15 NOTE — PROGRESS NOTE ADULT - ASSESSMENT
WORK UP  UA grossly negative  CXR (11/8) and (11/11) clear  CT Chest (11/12) with nodular opacities peripherally and bilateral lung bases ?aspiration  COVID negative and RVP negative x2  BCx (11/9) 1 out of 4 Staph epi and 1 out of 4 Staph hominis (likely contaminant)  BCx (11/11) NGTD  MRSA negative    Antibiotic course:  - s/p Levofloxacin (11/8 --- 11/10)  - s/p Fluconazole (11/8 --- 11/12 )    DIAGNOSIS and IMPRESSION  82F with AML (Dx 2020) s/p 27 cycles decitabine/venetoclax, breast cancer (Dx 12 years prior),  s/p treatment with tamoxifen, RT and R lumpectomy, who presents with AML in relapse and hyperleukocytosis Wbc > 100k, was started Cytarabine on 11/10 x 3 days + BID hydroxyurea for cytoreduction, then transitioned to daily oral FLT3 inhibitor gilteritinib on 11/12, complicated neutropenic fever. ID consulted for Neutropenic fever.     #Neutropenic Fever  #Relapsed AML on Chemotherapy  #Staph epi/Staph hominis blood culture (likely contaminant)    - denies any localizing symptoms, loose stool improving  - still requiring 5L NC but denies any dyspnea  - Fungitell negative    RECOMMENDATIONS  - c/w cefepime IVPB 2000 every 8 hours (11/11 --- ), voriconazole 200 every 12 hours (11/12 --- ), acyclovir  Oral Tab/Cap 400 two times a day (11/8 --- )  - monitor for worsening GI symptoms, loose stool, if worsen, obtain Cdiff and GI PCR  - trend WBC and ANC level      PT TO BE SEEN. PRELIM NOTE  PENDING RECS. PLEASE WAIT FOR FINAL RECS AFTER DISCUSSION WITH ATTENDING#    Paul Davalos DO, PGY-4   ID fellow  [employer] Preferred  After 5pm/weekends call [phone]   WORK UP  UA grossly negative  CXR (11/8) and (11/11) clear  CT Chest (11/12) with nodular opacities peripherally and bilateral lung bases ?aspiration  COVID negative and RVP negative x2  BCx (11/9) 1 out of 4 Staph epi and 1 out of 4 Staph hominis (likely contaminant)  BCx (11/11) NGTD  MRSA negative    Antibiotic course:  - s/p Levofloxacin (11/8 --- 11/10)  - s/p Fluconazole (11/8 --- 11/12 )    DIAGNOSIS and IMPRESSION  82F with AML (Dx 2020) s/p 27 cycles decitabine/venetoclax, breast cancer (Dx 12 years prior),  s/p treatment with tamoxifen, RT and R lumpectomy, who presents with AML in relapse and hyperleukocytosis Wbc > 100k, was started Cytarabine on 11/10 x 3 days + BID hydroxyurea for cytoreduction, then transitioned to daily oral FLT3 inhibitor gilteritinib on 11/12, complicated neutropenic fever. ID consulted for Neutropenic fever.     #Neutropenic Fever  #Relapsed AML on Chemotherapy  #Staph epi/Staph hominis blood culture (likely contaminant)    - denies any localizing symptoms, loose stool improving  - still requiring 5L NC but denies any dyspnea  - Fungitell negative    RECOMMENDATIONS  - c/w cefepime but lower to 2GM q12h (11/11 --- ), voriconazole 200 every 12 hours (11/12 --- ) and check a trough tomorrow   - monitor for worsening diarrhea - improved   - trend WBC and ANC level    Paul Davalos DO, PGY-4   ID fellow  [employer] Preferred  After 5pm/weekends call [phone]

## 2022-11-15 NOTE — PROVIDER CONTACT NOTE (CRITICAL VALUE NOTIFICATION) - ASSESSMENT
No active signs of bleeding.  BP: 133/77 HR: 79 O2: 94% room air T 98.1 F Patient lying in bed. A&OX4.   No active signs of bleeding.  BP: 133/77 HR: 79 O2: 94% room air T 98.1 F

## 2022-11-15 NOTE — DIETITIAN INITIAL EVALUATION ADULT - REASON INDICATOR FOR ASSESSMENT
Initial Visit / Collateral obtained from chart review and patient RD assessment warranted for: length of stay   Source: EMR, Pt interview

## 2022-11-15 NOTE — PROGRESS NOTE ADULT - PROBLEM SELECTOR PLAN 2
Patient is neutropenic, febrile  CT chest shows scattered nodules/opacities in lung, + adrenal nodule, and liver lesion.  changed fluconazole to voriconazole  check fungitell, galactomanan  11/11 Switched Levaquin to Cefepime  11/10  Blood Cx Staph epidermis/hominis -Repeat cultures 11/11, 11/12, NGTD  11/12 RVP(-)  ID consult-Vanco stopped. Patient is neutropenic, afebrile  CT chest shows scattered nodules/opacities in lung, + adrenal nodule, and liver lesion.  changed fluconazole to voriconazole  check fungitell, galactomanan  11/11 Switched Levaquin to Cefepime  11/10  Blood Cx Staph epidermis/hominis -Repeat cultures 11/11, 11/12, NGTD  11/12 RVP(-)  ID consult-Vanco stopped.

## 2022-11-15 NOTE — DIETITIAN INITIAL EVALUATION ADULT - FACTORS AFF FOOD INTAKE
NPO Patient reports to be very fatigue which sometimes limits the amount of PO intake./difficulty swallowing/other (specify) none

## 2022-11-15 NOTE — DIETITIAN INITIAL EVALUATION ADULT - OTHER INFO
Patient is awake but appears fatigue during time of visit. Patient is currently on a DASH diet and reports fair intake of food. Reports being fatigue and not having the energy sometimes to eat. Recommended a Ensure HP supplement 1x daily to provide 350 kcal and 13g protein) and nutrient dense snacks like pudding and/or yogurt. Patient was educated on the importance of eating a high protein/caloric dense meal and to use the oral supplements and nutrient dense snacks to fill in any nutrient gaps. Patient reports that she has no nausea or vomiting, reports having diarrhea last week that has since subsided but also is experiencing constipation. Patient was also educated on incorporating more fiber into her diet (like prunes) to support her constipation. Denies any chewing problems but reports having swallowing problems and has to 'focus' while she is eating. She feels that the food gets stuck in her throat sometimes. No report of an SLP eval per chart review, however, noted that diet has changed from soft and bite sized to DASH diet.

## 2022-11-15 NOTE — DIETITIAN INITIAL EVALUATION ADULT - NSICDXPASTMEDICALHX_GEN_ALL_CORE_FT
PAST MEDICAL HISTORY:  AML (acute myelogenous leukemia)     Breast cancer s/p lumpectomy, RT, tamoxifen    Hypertension

## 2022-11-15 NOTE — PROGRESS NOTE ADULT - PROBLEM SELECTOR PLAN 5
11/11 EKG no changes  11/12 elevated trop the rest of cardiac enzymes WNL  Cardiology consult. Continue oxybutynin 5mg qdaily.

## 2022-11-15 NOTE — DIETITIAN INITIAL EVALUATION ADULT - PERTINENT LABORATORY DATA
11-15    145  |  116<H>  |  25<H>  ----------------------------<  135<H>  4.4   |  20<L>  |  0.66    Ca    7.5<L>      15 Nov 2022 07:33  Phos  3.1     11-15  Mg     2.6     11-15    TPro  5.3<L>  /  Alb  2.6<L>  /  TBili  0.4  /  DBili  x   /  AST  10  /  ALT  24  /  AlkPhos  42  11-15

## 2022-11-15 NOTE — DIETITIAN INITIAL EVALUATION ADULT - REASON FOR ADMISSION
Per chart review, patient is an 82 year old female with AML (Dx 2020) s/p 27 cycles decitabine/venetoclax, breast cancer (Dx 12 years prior),  s/p treatment with tamoxifen, RT and R lumpectomy, who presents with AML in relapse and hyperleukocytosis Wbc > 100k, was started Cytarabine on 11/10 x 3 days + BID hydroxyurea for cytoreduction, then transitioned to daily oral FLT3 inhibitor gilteritinib on 11/12, complicated neutropenic fever. ID consulted for Neutropenic fever.    Per chart review," patient is an 82 year old female with AML (Dx 2020) s/p 27 cycles decitabine/venetoclax, breast cancer (Dx 12 years prior),  s/p treatment with tamoxifen, RT and R lumpectomy, who presents with AML in relapse and hyperleukocytosis Wbc > 100k, was started Cytarabine on 11/10 x 3 days + BID hydroxyurea for cytoreduction, then transitioned to daily oral FLT3 inhibitor gilteritinib on 11/12, complicated neutropenic fever. ID consulted for Neutropenic fever. "

## 2022-11-15 NOTE — PROGRESS NOTE ADULT - PROBLEM SELECTOR PLAN 7
VTE ppx:  Hold pharmacologic prophylaxis due to thrombocytopenia Increased o2 requirement.   11/12 CCT Scattered nodular and small consolidative opacities peripherally and   bilateral lower lobes; findings are favored to represent aspiration   and/or infection.  Continue with Duoneb  11/14 symbicort added gentle diuresis as tolerated

## 2022-11-15 NOTE — PROGRESS NOTE ADULT - PROBLEM SELECTOR PLAN 1
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    TTE  11/9 - EF 70% mild AS   G6PD level was normal in 2020.   Trend CBC, CMP, TLS labs, DIC labs q12 hours. , If fibrinogen under 100, give 10 units of cryoprecipitate.  Transfuse for Hgb < 7 and platelet count < 10k, < 20k if febrile, < 50k if bleeding, continue allopurinol 300mg PO q daily.   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   Because gilteritinib can cause QTc prolongation, Daily ekg for 7 days  dexamethasone 5 mg q12h to avoid differentiation syndrome.  11/10 Cytarabine 100mg/m2 x 3 days continuous infusion.  11/12 Cont last day of cytarabine. stop hydroxyurea. start gilteritinib w dexamethasone. 11/13 DC Cytarabine-pt  received 309 ml/509 ml of day 3 cytarabine.   11/14 Cardiology consult for AS.   DNR/DNI Mutations identified in FLT3-ITD, NPM1, ASXL1 and CEBPA.    TTE  11/9 - EF 70% mild AS   G6PD level was normal in 2020.   Trend CBC, CMP, TLS labs, DIC labs q12 hours. , If fibrinogen under 100, give 10 units of cryoprecipitate.  Transfuse for Hgb < 7 and platelet count < 10k, < 20k if febrile, < 50k if bleeding, continue allopurinol 300mg PO q daily.   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   Because gilteritinib can cause QTc prolongation, Daily ekg for 7 days  dexamethasone to prevent differentiation syndrome was decreased from 5 mg q12h to 2.5 q12 on 11/15   11/10 Cytarabine 100mg/m2 x 3 days continuous infusion.  11/12 Cont last day of cytarabine. stop hydroxyurea. start gilteritinib w dexamethasone. 11/13 DC Cytarabine-pt  received 309 ml/509 ml of day 3 cytarabine.   speech and swallow eval for difficulty swallowing.   Anemia-replace prbc, lasix 40mg iv x1 today.   DNR/DNI

## 2022-11-16 NOTE — SWALLOW BEDSIDE ASSESSMENT ADULT - COMMENTS
hx con't Dietician> reports fatigue which limits amount of PO intake. "Denies any chewing problems but reports having swallowing problems and has to 'focus' while she is eating. She feels that the food gets stuck in her throat sometimes. No report of an SLP eval per chart review, however, noted that diet has changed from soft and bite sized to DASH diet."    CTchest 11/12 IMPRESSION:. Scattered nodular and small consolidative opacities peripherally and bilateral lower lobes; findings are favored to represent aspiration and/or infection.  Indeterminate right hepatic lobe 2 cm lesion and right adrenal 2.3 cm nodule; recommend correlation with MRI abdomen to more accurately characterize.    CXR 11/15 IMPRESSION: New streaky right basilar and retrocardiac opacities, may be infectious in appropriate clinical setting.    No prior SLP evaluations in [facility] or [facility] in PACS

## 2022-11-16 NOTE — SWALLOW BEDSIDE ASSESSMENT ADULT - PHARYNGEAL PHASE
No overt s/sx of aspiration; +hyo-laryngeal elevation upon digital palpation/Within functional limits

## 2022-11-16 NOTE — PROGRESS NOTE ADULT - ASSESSMENT
82F relapsed AML.   Neutropenic fevers.   Maybe pneumonia. Small bibasilar opacities. Negative RVP, MRSA PCR, fungal markers. Cough improved.   Diarrhea resolved too.   CoNS on blood culture likely contaminant. Noted mediport.     Suggest  -empiric Cefepime 2GM q12h and Voriconazole 200mg q12h   -check Voriconazole trough tomorrow, goal 1-5.5mcg/mL   -monitor cultures     I will be away tomorrow, back 11/18. Please call the office if follow up is needed tomorrow, [phone]    Bucky Bobby MD   Infectious Disease   Available on TEAMS. After 5PM and on weekends please page fellow on call or call [phone]

## 2022-11-16 NOTE — PROGRESS NOTE ADULT - SUBJECTIVE AND OBJECTIVE BOX
Follow Up: neutropenic fever    Interval History/ROS: Afebrile. Not coughing or having diarrhea anymore. No pain. Just tired. No rash.     Allergies  No Known Allergies        ANTIMICROBIALS:  acyclovir   Oral Tab/Cap 400 two times a day  cefepime   IVPB 2000 every 12 hours  voriconazole 200 every 12 hours      OTHER MEDS:  acetaminophen     Tablet .. 650 milliGRAM(s) Oral every 6 hours PRN  albuterol/ipratropium for Nebulization 3 milliLiter(s) Nebulizer every 6 hours  allopurinol 300 milliGRAM(s) Oral daily  aluminum hydroxide/magnesium hydroxide/simethicone Suspension 30 milliLiter(s) Oral every 4 hours PRN  benzonatate 100 milliGRAM(s) Oral every 8 hours PRN  Biotene Dry Mouth Oral Rinse 15 milliLiter(s) Swish and Spit three times a day  budesonide  80 MICROgram(s)/formoterol 4.5 MICROgram(s) Inhaler 2 Puff(s) Inhalation two times a day  buPROPion XL (24-Hour) . 150 milliGRAM(s) Oral daily  chlorhexidine 2% Cloths 1 Application(s) Topical daily  dexAMETHasone  Injectable 2.5 milliGRAM(s) IV Push every 12 hours  gabapentin 100 milliGRAM(s) Oral daily  gilteritinib 120 milliGRAM(s) Oral <User Schedule>  melatonin 3 milliGRAM(s) Oral at bedtime PRN  midodrine. 5 milliGRAM(s) Oral every 8 hours  ondansetron Injectable 4 milliGRAM(s) IV Push every 8 hours PRN  oxybutynin 5 milliGRAM(s) Oral daily  polyethylene glycol 3350 17 Gram(s) Oral two times a day  sodium chloride 0.65% Nasal 1 Spray(s) Both Nostrils four times a day  sodium chloride 0.9%. 1000 milliLiter(s) IV Continuous <Continuous>  traZODone 150 milliGRAM(s) Oral at bedtime      Vital Signs Last 24 Hrs  T(C): 36.3 (2022 14:00), Max: 36.8 (15 Nov 2022 23:20)  T(F): 97.4 (2022 14:00), Max: 98.2 (15 Nov 2022 23:20)  HR: 84 (2022 14:00) (67 - 84)  BP: 144/69 (2022 14:00) (110/66 - 145/81)  BP(mean): --  RR: 18 (2022 14:00) (18 - 19)  SpO2: 97% (2022 14:00) (93% - 97%)    Parameters below as of 2022 14:00  Patient On (Oxygen Delivery Method): nasal cannula  O2 Flow (L/min): 3      Physical Exam:  General: non toxic  Cardio: regular rate   Respiratory: nonlabored   abd: nondistended  Musculoskeletal: no focal joint swelling  vascular: left mediport no phlebitis   Skin: no rash                          7.1    0.09  )-----------( 26       ( 2022 07:10 )             21.1           141  |  111<H>  |  25<H>  ----------------------------<  112<H>  4.2   |  22  |  0.64    Ca    7.7<L>      2022 07:10  Phos  3.7       Mg     2.4         TPro  5.1<L>  /  Alb  2.7<L>  /  TBili  0.7  /  DBili  x   /  AST  17  /  ALT  29  /  AlkPhos  48        Urinalysis Basic - ( 15 Nov 2022 15:29 )    Color: Colorless / Appearance: Clear / S.009 / pH: x  Gluc: x / Ketone: Negative  / Bili: Negative / Urobili: Negative   Blood: x / Protein: Negative / Nitrite: Negative   Leuk Esterase: Negative / RBC: 1 /hpf / WBC 2 /HPF   Sq Epi: x / Non Sq Epi: 1 /hpf / Bacteria: Negative        MICROBIOLOGY:  Culture - Urine (collected 11-15-22 @ 15:29)  Source: Clean Catch Clean Catch (Midstream)  Final Report (22 @ 14:04):    No growth    Culture - Blood (collected 22 @ 16:10)  Source: .Blood Blood-Catheter  Preliminary Report (11-15-22 @ 02:02):    No growth to date.    Culture - Blood (collected 22 @ 15:49)  Source: .Blood Blood-Catheter  Preliminary Report (22 @ 19:02):    No growth to date.    Culture - Blood (collected 22 @ 08:15)  Source: .Blood Blood-Peripheral  Final Report (22 @ 17:00):    No Growth Final    Culture - Blood (collected 22 @ 07:14)  Source: .Blood Blood-Catheter  Final Report (22 @ 15:05):    No Growth Final    Culture - Blood (collected 22 @ 17:44)  Source: .Blood Blood-Peripheral  Gram Stain (11-10-22 @ 18:17):    Growth in aerobic bottle: Gram Positive Cocci in Clusters  Final Report (22 @ 17:30):    Growth in aerobic bottle: Staphylococcus hominis    Growth in aerobic bottle: Staphylococcus epidermidis    Coag Negative Staphylococcus    Single set isolate, possible contaminant. Contact    Microbiology if susceptibility testing clinically    indicated.    ***Blood Panel PCR results on this specimen are available    approximately 3 hours after the Gram stain result.***    Gram stain, PCR, and/or culture results may not always    correspond due to difference in methodologies.    ************************************************************    This PCR assay was performed by multiplex PCR. This    Assay tests for 66 bacterial and resistance gene targets.    Please refer to the [facility] test directory    at [email or web address] for details.  Organism: Blood Culture PCR (22 @ 17:30)  Organism: Blood Culture PCR (22 @ 17:30)      -  Staphylococcus epidermidis, Methicillin resistant: Detec      Method Type: PCR      Rapid RVP Result: NotDetec ( 20:43)      RADIOLOGY:  Images below reviewed personally  Xray Chest 1 View- PORTABLE-Urgent (Xray Chest 1 View- PORTABLE-Urgent .) (11.15.22 @ 10:46)   New streaky right basilar and retrocardiac opacities, may be infectious   in appropriate clinical setting.    CT Chest No Cont (22 @ 13:34)   Scattered nodular and small consolidative opacities peripherally and   bilateral lower lobes; findings are favored to represent aspiration   and/or infection.  Indeterminate right hepatic lobe 2 cm lesion and right adrenal 2.3 cm   nodule; recommend correlation with MRI abdomen to more accurately   characterize.

## 2022-11-16 NOTE — PROGRESS NOTE ADULT - PROBLEM SELECTOR PLAN 8
11/11 EKG no changes  11/12 elevated trop the rest of cardiac enzymes WNL  11/14 Cardiology consult for AS- be gentle with diuresis.   11/15 Repeat echo showed decrease in EF 39% with wall motion abnormality.  Cxr given change in clinical status (rales)-  Continue with midodrine. TTE  11/9 - EF 70% mild AS   11/11 EKG no changes  11/12 elevated trop in setting of hypotension.   11/14 Cardiology consult   11/15 Repeat echo showed decrease in EF 39% with wall motion abnormality.  11/15 Cxr given change in clinical status-New streaky right basilar and retrocardiac opacities, may be infectious in appropriate clinical setting.

## 2022-11-16 NOTE — PROGRESS NOTE ADULT - ASSESSMENT
82 year old woman with mild AS, now with relapsed AML and neutropenic fever with pancytopenia. Following new HsTnT elevation to 450 ng/L on 4/12, new CHF. On admission, Echo showed normal LV function and strain. CT chest with no pulmonary edema or signs of CHF. Weight and pro-BNP now rising. Follow up echo done 11/15 with new segmental impairment of LV systolic function.  Differential for acute systolic HF includes stress cardiomyopathy or acute myocardial infarction due to CAD. ECG is not diagnostic due to LBBB. She currently denies any chest pain, but has signs of heart failure +/- superimposed pneumonia on chest films. Weight up further today.  Mild Aortic stenosis unlikely contributes to current symptoms and is not a contraindication to fluid resuscitation or diuresis.     Recommendations:  1. Diurese with Lasix 40 IV today - goal net negative 1-2 Liters  2. Would DC midodrine as this is deleterious in the setting of heart failure  3. Monitor lactate, Cr, UOP to assess tissue perfusion  4. if hypotensive off midodrine with evidence of end organ perfusion would escalate care to CICU.  5. Daily weights and strict I/Os  6. Repeat troponin with next labs  7. Cardiac cath when platelet count recovers  8. start statin if feasible, can reduce dose to minimize interaction with voriconazole  9. Keep electrolytes replaced  (K > 4, Mag > 2) especially with diuresis.    FABIO Navarrete MD [facility]  Cardio-Oncology

## 2022-11-16 NOTE — PROGRESS NOTE ADULT - ASSESSMENT
Ms. 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 Started Cytarabine 100 mg/m2 on 11/10 x 3 days + BID hydroxyurea for cytoreduction, then transitioned to daily oral FLT3 inhibitor gilteritinib on 11/12. Hospital course complicated by Staph epidermis/hominis bacteremia on vanco which was stopped 11/14 likely contaminant on cefepime renally dosed. Patient presents with pancytopenia due to disease and chemo.    Ms. 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 Started Cytarabine 100 mg/m2 on 11/10 x 3 days + BID hydroxyurea for cytoreduction, then transitioned to daily oral FLT3 inhibitor gilteritinib on 11/12. Hospital course complicated by Staph epidermis/hominis bacteremia on vanco which was stopped 11/14 likely contaminant on cefepime renally dosed, increased work of breathing with cardiology following and repeat echo on 11/15 showing a decrease in EF 39% with wall motion abnormality. Patient presents with pancytopenia due to disease and chemo.

## 2022-11-16 NOTE — PROGRESS NOTE ADULT - PROBLEM SELECTOR PLAN 2
Patient is neutropenic, febrile  CT chest shows scattered nodules/opacities in lung, + adrenal nodule, and liver lesion.  changed fluconazole to voriconazole  check fungitell, galactomanan  11/11 Switched Levaquin to Cefepime  11/10  Blood Cx Staph epidermis/hominis -Repeat cultures 11/11, 11/12, NGTD  11/12 RVP(-)  ID consult-Vanco stopped. Patient is neutropenic, afebrile  CT chest shows scattered nodules/opacities in lung, + adrenal nodule, and liver lesion.  changed fluconazole to voriconazole  (-) fungitell, galactomanan  11/11 Switched Levaquin to Cefepime (renally dosed)  11/10  Blood Cx Staph epidermis/hominis -Repeat cultures 11/11, 11/12, NGTD  11/12 RVP(-)  ID consult-Vanco stopped.

## 2022-11-16 NOTE — SWALLOW BEDSIDE ASSESSMENT ADULT - SLP GENERAL OBSERVATIONS
[facility] A[facility]; follows all directives and verbally expresses all wants/needs. Reports generalized weakness and preference for softer/bite-sized foods since diagnosis of AML in 2020 and that dry/hard foods are uncomfortable to swallow but she does not have a choking concern when attempting regular texture items. RN Yessenia reporting excellent tolerance of current diet and all medication. Per discussion w/ ACP Raiza; aspiration not a primary concern at this time, with improving supplemental oxygen requirements and rx for continued monitoring for indication for objective swallow testing and r/o cardiac component to SOB.

## 2022-11-16 NOTE — PROGRESS NOTE ADULT - SUBJECTIVE AND OBJECTIVE BOX
Diagnosis: relapsed AML, FLT3 ITD+, NPM1, CEBPA, ASXL1 mutated    Protocol/Chemo Regimen: daily oral FLT3 inhibitor gilteritinib (cytarabine 100mg/m2 x 3 days continuous infusion prior to start of gilteritinib)    Day: 7 cytarabine; Day 5 gilteritinib     Pt endorsed: Breathing is improved today. Less winded.     Review of Systems: Patient denied nausea, vomiting, odynophagia, chest pain, abdominal pain, constipation, diarrhea, rash, fatigue    Pain scale: 0                                           Diet: DASH/TLC    Allergies    No Known Allergies    Intolerances        ANTIMICROBIALS  acyclovir   Oral Tab/Cap 400 milliGRAM(s) Oral two times a day  cefepime   IVPB 2000 milliGRAM(s) IV Intermittent every 12 hours  voriconazole 200 milliGRAM(s) Oral every 12 hours      HEME/ONC MEDICATIONS  gilteritinib 120 milliGRAM(s) Oral <User Schedule>      STANDING MEDICATIONS  albuterol/ipratropium for Nebulization 3 milliLiter(s) Nebulizer every 6 hours  allopurinol 300 milliGRAM(s) Oral daily  Biotene Dry Mouth Oral Rinse 15 milliLiter(s) Swish and Spit three times a day  budesonide  80 MICROgram(s)/formoterol 4.5 MICROgram(s) Inhaler 2 Puff(s) Inhalation two times a day  buPROPion XL (24-Hour) . 150 milliGRAM(s) Oral daily  chlorhexidine 2% Cloths 1 Application(s) Topical daily  dexAMETHasone  Injectable 2.5 milliGRAM(s) IV Push every 12 hours  gabapentin 100 milliGRAM(s) Oral daily  midodrine. 10 milliGRAM(s) Oral three times a day  oxybutynin 5 milliGRAM(s) Oral daily  polyethylene glycol 3350 17 Gram(s) Oral two times a day  sodium chloride 0.65% Nasal 1 Spray(s) Both Nostrils four times a day  sodium chloride 0.9%. 1000 milliLiter(s) IV Continuous <Continuous>  traZODone 150 milliGRAM(s) Oral at bedtime      PRN MEDICATIONS  acetaminophen     Tablet .. 650 milliGRAM(s) Oral every 6 hours PRN  aluminum hydroxide/magnesium hydroxide/simethicone Suspension 30 milliLiter(s) Oral every 4 hours PRN  benzonatate 100 milliGRAM(s) Oral every 8 hours PRN  melatonin 3 milliGRAM(s) Oral at bedtime PRN  ondansetron Injectable 4 milliGRAM(s) IV Push every 8 hours PRN        Vital Signs Last 24 Hrs  T(C): 36.3 (16 Nov 2022 05:22), Max: 36.8 (15 Nov 2022 23:20)  T(F): 97.4 (16 Nov 2022 05:22), Max: 98.2 (15 Nov 2022 23:20)  HR: 73 (16 Nov 2022 05:22) (67 - 84)  BP: 123/70 (16 Nov 2022 05:22) (110/66 - 145/81)  BP(mean): --  RR: 18 (16 Nov 2022 05:22) (18 - 19)  SpO2: 95% (16 Nov 2022 05:22) (93% - 95%)    Parameters below as of 16 Nov 2022 05:22  Patient On (Oxygen Delivery Method): room air    PHYSICAL EXAM  General: NAD  HEENT:  clear oropharynx  CV: (+) S1/S2 RRR  Lungs: coarse bs, rale 1/4 up   Abdomen: soft, + BS, non-tender, non-distended  Ext: trace carolyn LE   Skin: no rash  Neuro: alert and oriented X 3  Central Line: LCW Mediport  and PIV CDI      LABS:                        7.1    0.09  )-----------( 26       ( 16 Nov 2022 07:10 )             21.1         Mean Cell Volume : 93.4 fl  Mean Cell Hemoglobin : 31.4 pg  Mean Cell Hemoglobin Concentration : 33.6 gm/dL  Auto Neutrophil # : x  Auto Lymphocyte # : x  Auto Monocyte # : x  Auto Eosinophil # : x  Auto Basophil # : x  Auto Neutrophil % : x  Auto Lymphocyte % : x  Auto Monocyte % : x  Auto Eosinophil % : x  Auto Basophil % : x      11-16    141  |  111<H>  |  25<H>  ----------------------------<  112<H>  4.2   |  22  |  0.64    Ca    7.7<L>      16 Nov 2022 07:10  Phos  3.7     11-16  Mg     2.4     11-16  TPro  5.1<L>  /  Alb  2.7<L>  /  TBili  0.7  /  DBili  x   /  AST  17  /  ALT  29  /  AlkPhos  48  11-16  Mg 2.4  Phos 3.7  PT/INR - ( 16 Nov 2022 07:10 )   PT: 14.7 sec;   INR: 1.28 ratio    PTT - ( 16 Nov 2022 07:10 )  PTT:27.2 sec    Uric Acid 1.9                     Diagnosis: relapsed AML, FLT3 ITD+, NPM1, CEBPA, ASXL1 mutated    Protocol/Chemo Regimen: daily oral FLT3 inhibitor gilteritinib (cytarabine 100mg/m2 x 3 days continuous infusion prior to start of gilteritinib)    Day: 7 cytarabine; Day 5 gilteritinib     Pt endorsed: Breathing is unchanged but states she is Less winded.     Review of Systems: Patient denied nausea, vomiting, odynophagia, chest pain, abdominal pain, constipation, diarrhea, rash, fatigue    Pain scale: 0                                           Diet: DASH/TLC    Allergies    No Known Allergies    Intolerances      ANTIMICROBIALS  acyclovir   Oral Tab/Cap 400 milliGRAM(s) Oral two times a day  cefepime   IVPB 2000 milliGRAM(s) IV Intermittent every 12 hours  voriconazole 200 milliGRAM(s) Oral every 12 hours      HEME/ONC MEDICATIONS  gilteritinib 120 milliGRAM(s) Oral <User Schedule>      STANDING MEDICATIONS  albuterol/ipratropium for Nebulization 3 milliLiter(s) Nebulizer every 6 hours  allopurinol 300 milliGRAM(s) Oral daily  Biotene Dry Mouth Oral Rinse 15 milliLiter(s) Swish and Spit three times a day  budesonide  80 MICROgram(s)/formoterol 4.5 MICROgram(s) Inhaler 2 Puff(s) Inhalation two times a day  buPROPion XL (24-Hour) . 150 milliGRAM(s) Oral daily  chlorhexidine 2% Cloths 1 Application(s) Topical daily  dexAMETHasone  Injectable 2.5 milliGRAM(s) IV Push every 12 hours  gabapentin 100 milliGRAM(s) Oral daily  midodrine. 10 milliGRAM(s) Oral three times a day  oxybutynin 5 milliGRAM(s) Oral daily  polyethylene glycol 3350 17 Gram(s) Oral two times a day  sodium chloride 0.65% Nasal 1 Spray(s) Both Nostrils four times a day  sodium chloride 0.9%. 1000 milliLiter(s) IV Continuous <Continuous>  traZODone 150 milliGRAM(s) Oral at bedtime      PRN MEDICATIONS  acetaminophen     Tablet .. 650 milliGRAM(s) Oral every 6 hours PRN  aluminum hydroxide/magnesium hydroxide/simethicone Suspension 30 milliLiter(s) Oral every 4 hours PRN  benzonatate 100 milliGRAM(s) Oral every 8 hours PRN  melatonin 3 milliGRAM(s) Oral at bedtime PRN  ondansetron Injectable 4 milliGRAM(s) IV Push every 8 hours PRN        Vital Signs Last 24 Hrs  T(C): 36.3 (16 Nov 2022 05:22), Max: 36.8 (15 Nov 2022 23:20)  T(F): 97.4 (16 Nov 2022 05:22), Max: 98.2 (15 Nov 2022 23:20)  HR: 73 (16 Nov 2022 05:22) (67 - 84)  BP: 123/70 (16 Nov 2022 05:22) (110/66 - 145/81)  BP(mean): --  RR: 18 (16 Nov 2022 05:22) (18 - 19)  SpO2: 95% (16 Nov 2022 05:22) (93% - 95%)    Parameters below as of 16 Nov 2022 05:22  Patient On (Oxygen Delivery Method): room air    PHYSICAL EXAM  General: NAD  HEENT:  clear oropharynx  CV: (+) S1/S2 RRR  Lungs: coarse bs, rale 1/4 up   Abdomen: soft, + BS, non-tender, non-distended  Ext: trace carolyn LE   Skin: no rash, left arm bruising, non tender.   Neuro: alert and oriented X 3  Central Line: LCW Mediport  and PIV CDI      LABS:                        7.1    0.09  )-----------( 26       ( 16 Nov 2022 07:10 )             21.1         Mean Cell Volume : 93.4 fl  Mean Cell Hemoglobin : 31.4 pg  Mean Cell Hemoglobin Concentration : 33.6 gm/dL  Auto Neutrophil # : x  Auto Lymphocyte # : x  Auto Monocyte # : x  Auto Eosinophil # : x  Auto Basophil # : x  Auto Neutrophil % : x  Auto Lymphocyte % : x  Auto Monocyte % : x  Auto Eosinophil % : x  Auto Basophil % : x      11-16    141  |  111<H>  |  25<H>  ----------------------------<  112<H>  4.2   |  22  |  0.64    Ca    7.7<L>      16 Nov 2022 07:10  Phos  3.7     11-16  Mg     2.4     11-16  TPro  5.1<L>  /  Alb  2.7<L>  /  TBili  0.7  /  DBili  x   /  AST  17  /  ALT  29  /  AlkPhos  48  11-16  Mg 2.4  Phos 3.7  PT/INR - ( 16 Nov 2022 07:10 )   PT: 14.7 sec;   INR: 1.28 ratio    PTT - ( 16 Nov 2022 07:10 )  PTT:27.2 sec    Uric Acid 1.9

## 2022-11-16 NOTE — SWALLOW BEDSIDE ASSESSMENT ADULT - H & P REVIEW
Ms. 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 Started Cytarabine 100 mg/m2 on 11/10. Hospital course complicated by Staph epidermis bacteriemia and neutropenic fever. Patient presents with hyperleukocytosis, anemia, and thrombocytopenia due to disease process. Heme/Onc actively following for AML in relapse, on therapy. ID c/s for Neutropenic fever, c/w cefepime, coriconazole, acyclovir; monitor for worsening GI symptoms, loose stool, if worsen, obtain cdiff and GI PCR. Cardiology/Onc c/s for aortic stenosis; Echo shows normal LV function and strain. CT chest with no pulmonary edema or signs of CHF. She has shortness of breath in the setting of cytopenia and anemia and notes that mild aortic stenosis unlikely to contribute to current symptoms. There may be a component of diastolic heart failure but there is no pulmonary edema on chest CT 11/12. Notes hypotension likely in setting of diuretics and neutropenic sepsis; Rx to repeat echo and CXR, c/w management of neutropenic fever./yes

## 2022-11-16 NOTE — PROGRESS NOTE ADULT - NS ATTEND AMEND GEN_ALL_CORE FT
Vital Signs Last 24 Hrs  T(C): 36.5 (15 Nov 2022 09:25), Max: 36.9 (14 Nov 2022 13:30)  T(F): 97.7 (15 Nov 2022 09:25), Max: 98.4 (14 Nov 2022 13:30)  HR: 82 (15 Nov 2022 09:25) (74 - 88)  BP: 125/58 (15 Nov 2022 09:25) (105/68 - 125/58)  BP(mean): --  RR: 18 (15 Nov 2022 09:25) (16 - 20)  SpO2: 94% (15 Nov 2022 09:25) (94% - 99%)    Parameters below as of 15 Nov 2022 09:25  Patient On (Oxygen Delivery Method): nasal cannula w/ humidification  O2 Flow (L/min): 5  MEDICATIONS  (STANDING):  acyclovir   Oral Tab/Cap 400 milliGRAM(s) Oral two times a day  albuterol/ipratropium for Nebulization 3 milliLiter(s) Nebulizer every 6 hours  allopurinol 300 milliGRAM(s) Oral daily  Biotene Dry Mouth Oral Rinse 15 milliLiter(s) Swish and Spit three times a day  budesonide  80 MICROgram(s)/formoterol 4.5 MICROgram(s) Inhaler 2 Puff(s) Inhalation two times a day  buPROPion XL (24-Hour) . 150 milliGRAM(s) Oral daily  cefepime   IVPB 2000 milliGRAM(s) IV Intermittent every 8 hours  chlorhexidine 2% Cloths 1 Application(s) Topical daily  dexAMETHasone  Injectable 5 milliGRAM(s) IV Push every 12 hours  furosemide   Injectable 40 milliGRAM(s) IV Push once  gabapentin 100 milliGRAM(s) Oral daily  gilteritinib 120 milliGRAM(s) Oral <User Schedule>  midodrine. 10 milliGRAM(s) Oral three times a day  oxybutynin 5 milliGRAM(s) Oral daily  polyethylene glycol 3350 17 Gram(s) Oral two times a day  sodium chloride 0.65% Nasal 1 Spray(s) Both Nostrils four times a day  sodium chloride 0.9%. 1000 milliLiter(s) (60 mL/Hr) IV Continuous <Continuous>  traZODone 150 milliGRAM(s) Oral at bedtime  voriconazole 200 milliGRAM(s) Oral every 12 hours                        6.1    0.07  )-----------( 22       ( 15 Nov 2022 07:46 )             18.9     was transiently hypotensive 11/12 >>>midodrine  intermittently febrile >>> afeb last 24 hrs  c/o incr dyspnea  has mod AS on echo; appreciate Cardiology input  TTE and CXR to be repeated  CT lung nodules,  ? 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  Course complicated S. epi bacteremia? (11/9/22), continuous neutropenic fevers, pneumonia (concerning for fungus) and hypotension (11/12/22), fluid overloaded state (11/13/22 - )   note fibrinogen increasing now, d-dimer very high but now  decreasing    ID: cont present abs, d/c vanco, ID input appreciated     Onc History:  decitabine/lisa through 27 cycles,    Plan:  Heme: PLT goal > 10,000; Hgb goal > 7.0g/dL;   D/C Scarlet-C and HU  continue TLS labs and allopurinol   Continue gilteritinib with dex (to reduce the incidence of differentiation syndrome).   reduce decadron to 2.5 mg 2x/day    ID: cefepime, vori, acyclovir; will check vori level    peripheral blood fungitell neg and galactomannan (11/12/22): pending    Radiographs: CT (11/12/22): Scattered nodular and small consolidative opacities peripherally and bilateral lower lobes; findings are favored to represent aspiration and/or infection.  Indeterminate right hepatic lobe 2 cm lesion and right adrenal 2.3 cm nodule; recommend correlation with MRI abdomen to more accurately characterize.    Pulmonary:  symbicort added, may have been helpful  (history of reactive airway disease)     Nutrition: tolerating PO; cont net diuresis today.   cont to follow QTc (on vfend and gilteritinib)  DVT prophylaxis: ambulation.     Code status: DNR/DNI    Over 35 minutes were spent in direct patient care and care coordination. Vital Signs Last 24 Hrs  T(C): 36.5 (16 Nov 2022 09:50), Max: 36.8 (15 Nov 2022 23:20)  T(F): 97.7 (16 Nov 2022 09:50), Max: 98.2 (15 Nov 2022 23:20)  HR: 72 (16 Nov 2022 09:50) (67 - 84)  BP: 135/75 (16 Nov 2022 09:50) (110/66 - 145/81)  BP(mean): --  RR: 18 (16 Nov 2022 09:50) (18 - 19)  SpO2: 94% (16 Nov 2022 09:50) (93% - 95%)    Parameters below as of 16 Nov 2022 09:50  Patient On (Oxygen Delivery Method): room air    MEDICATIONS  (STANDING):  acyclovir   Oral Tab/Cap 400 milliGRAM(s) Oral two times a day  albuterol/ipratropium for Nebulization 3 milliLiter(s) Nebulizer every 6 hours  allopurinol 300 milliGRAM(s) Oral daily  Biotene Dry Mouth Oral Rinse 15 milliLiter(s) Swish and Spit three times a day  budesonide  80 MICROgram(s)/formoterol 4.5 MICROgram(s) Inhaler 2 Puff(s) Inhalation two times a day  buPROPion XL (24-Hour) . 150 milliGRAM(s) Oral daily  cefepime   IVPB 2000 milliGRAM(s) IV Intermittent every 12 hours  chlorhexidine 2% Cloths 1 Application(s) Topical daily  dexAMETHasone  Injectable 2.5 milliGRAM(s) IV Push every 12 hours  gabapentin 100 milliGRAM(s) Oral daily  gilteritinib 120 milliGRAM(s) Oral <User Schedule>  midodrine. 10 milliGRAM(s) Oral three times a day  oxybutynin 5 milliGRAM(s) Oral daily  polyethylene glycol 3350 17 Gram(s) Oral two times a day  sodium chloride 0.65% Nasal 1 Spray(s) Both Nostrils four times a day  sodium chloride 0.9%. 1000 milliLiter(s) (60 mL/Hr) IV Continuous <Continuous>  traZODone 150 milliGRAM(s) Oral at bedtime  voriconazole 200 milliGRAM(s) Oral every 12 hours                          7.1    0.09  )-----------( 26       ( 16 Nov 2022 07:10 )             21.1         was transiently hypotensive 11/12 >>>midodrine  intermittently febrile >>> afeb last 24 hrs  c/o incr dyspnea  has mod AS on echo; appreciate Cardiology input  TTE and CXR to be repeated  CT lung nodules,  ? 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  Course complicated S. epi bacteremia? (11/9/22), continuous neutropenic fevers, pneumonia (concerning for fungus) and hypotension (11/12/22), fluid overloaded state (11/13/22 - )   note fibrinogen increasing now, d-dimer very high but now  decreasing    ID: cont present abs, d/c vanco, ID input appreciated     Onc History:  decitabine/lisa through 27 cycles,    Plan:  Heme: PLT goal > 10,000; Hgb goal > 7.0g/dL;   D/C Scarlet-C and HU  continue TLS labs and allopurinol   Continue gilteritinib with dex (to reduce the incidence of differentiation syndrome).   reduce decadron to 2.5 mg 2x/day    ID: cefepime, vori, acyclovir; will check vori level    peripheral blood fungitell neg and galactomannan (11/12/22): pending    Radiographs: CT (11/12/22): Scattered nodular and small consolidative opacities peripherally and bilateral lower lobes; findings are favored to represent aspiration and/or infection.  Indeterminate right hepatic lobe 2 cm lesion and right adrenal 2.3 cm nodule; recommend correlation with MRI abdomen to more accurately characterize.    Pulmonary:  symbicort added, may have been helpful  (history of reactive airway disease)     Nutrition: tolerating PO; cont net diuresis today.   cont to follow QTc (on vfend and gilteritinib)  DVT prophylaxis: ambulation.     Code status: DNR/DNI    Over 35 minutes were spent in direct patient care and care coordination.

## 2022-11-16 NOTE — PROGRESS NOTE ADULT - PROBLEM SELECTOR PLAN 7
Increased o2 requirement.   11/12 CCT Scattered nodular and small consolidative opacities peripherally and   bilateral lower lobes; findings are favored to represent aspiration   and/or infection.  Continue with Duoneb  11/14 symbicort added gentle diuresis as tolerated

## 2022-11-16 NOTE — PROGRESS NOTE ADULT - SUBJECTIVE AND OBJECTIVE BOX
CARDIO-ONCOLOGY FOLLOW UP NOTE                                                                                                                                                                                     Interval History:   Echo with new segmental impairment in left ventricular systolic function. EF 39 %, reduced from last week.   Remains pancytopenic  Weight increased      PAST MEDICAL & SURGICAL HISTORY:  Breast cancer  s/p lumpectomy, RT, tamoxifen      Hypertension      AML (acute myelogenous leukemia)      S/P hysterectomy          FAMILY HISTORY:  No pertinent family history in first degree relatives        Home Medications:  acyclovir 400 mg oral tablet: 1 tab(s) orally 2 times a day (08 Nov 2022 15:36)  amLODIPine 5 mg oral tablet: 1 tab(s) orally once a day (09 Nov 2022 13:00)  buPROPion 150 mg/12 hours (SR) oral tablet, extended release: 1 tab(s) orally once a day (08 Nov 2022 15:36)  fluconazole 200 mg oral tablet: 1 tab(s) orally once a day (08 Nov 2022 15:36)  gabapentin 100 mg oral tablet: 1 tab(s) orally once a day (08 Nov 2022 15:36)  levoFLOXacin 500 mg oral tablet: 1 tab(s) orally every 24 hours (08 Nov 2022 15:36)  oxybutynin 5 mg oral tablet: 1 tab(s) orally once a day (08 Nov 2022 15:36)  traZODone 150 mg oral tablet: 1 tab(s) orally once a day (at bedtime) (08 Nov 2022 15:36)      Social History:  She lives with her . Her  has prostate cancer and neuropathy. She is concerned about his health too.   She has an aide who assists 4 hrs x 3 days a week.   She ambulates with a cane or walker. She has not had any recent falls.   She wears a Life Alert pendant.   She denies tobacco or drug use. She has one serving of alcohol a day. (08 Nov 2022 13:07)      Medications:  acetaminophen     Tablet .. 650 milliGRAM(s) Oral every 6 hours PRN  acyclovir   Oral Tab/Cap 400 milliGRAM(s) Oral two times a day  albuterol/ipratropium for Nebulization 3 milliLiter(s) Nebulizer every 6 hours  allopurinol 300 milliGRAM(s) Oral daily  aluminum hydroxide/magnesium hydroxide/simethicone Suspension 30 milliLiter(s) Oral every 4 hours PRN  benzonatate 100 milliGRAM(s) Oral every 8 hours PRN  Biotene Dry Mouth Oral Rinse 15 milliLiter(s) Swish and Spit three times a day  budesonide  80 MICROgram(s)/formoterol 4.5 MICROgram(s) Inhaler 2 Puff(s) Inhalation two times a day  buPROPion XL (24-Hour) . 150 milliGRAM(s) Oral daily  cefepime   IVPB 2000 milliGRAM(s) IV Intermittent every 12 hours  chlorhexidine 2% Cloths 1 Application(s) Topical daily  dexAMETHasone  Injectable 2.5 milliGRAM(s) IV Push every 12 hours  gabapentin 100 milliGRAM(s) Oral daily  gilteritinib 120 milliGRAM(s) Oral <User Schedule>  melatonin 3 milliGRAM(s) Oral at bedtime PRN  midodrine. 10 milliGRAM(s) Oral three times a day  ondansetron Injectable 4 milliGRAM(s) IV Push every 8 hours PRN  oxybutynin 5 milliGRAM(s) Oral daily  polyethylene glycol 3350 17 Gram(s) Oral two times a day  sodium chloride 0.65% Nasal 1 Spray(s) Both Nostrils four times a day  sodium chloride 0.9%. 1000 milliLiter(s) IV Continuous <Continuous>  traZODone 150 milliGRAM(s) Oral at bedtime  voriconazole 200 milliGRAM(s) Oral every 12 hours      Review of systems:  10 point review of systems completed and are negative unless noted in HPI    Vitals:  T(C): 36.5 (11-16-22 @ 09:50), Max: 36.8 (11-15-22 @ 23:20)  HR: 72 (11-16-22 @ 09:50) (67 - 84)  BP: 135/75 (11-16-22 @ 09:50) (110/66 - 145/81)  BP(mean): --  RR: 18 (11-16-22 @ 09:50) (18 - 19)  SpO2: 94% (11-16-22 @ 09:50) (93% - 95%)    Daily     Daily         I&O's Summary    15 Nov 2022 07:01  -  16 Nov 2022 07:00  --------------------------------------------------------  IN: 1070 mL / OUT: 2300 mL / NET: -1230 mL    16 Nov 2022 07:01  -  16 Nov 2022 12:45  --------------------------------------------------------  IN: 180 mL / OUT: 550 mL / NET: -370 mL        Physical Exam:  Appearance: No apparent distress  HEENT: moist mucosa, anicteric sclerae.   Neck:   Cardiovascular: regular rate and rhythm. No murmur. No gallop. No friction rub.  Respiratory: Clear to auscultation bilaterally  Gastrointestinal: Soft, Non-tender, benign	  Skin: no clubbing or cyanosis.   Neurologic: No focal deficit  Extremities: warm. No edema.  Psychiatry: A & O x 3, Mood & affect appropriate  Vascular: pulse regular, equal    Labs:                        7.1    0.09  )-----------( 26       ( 16 Nov 2022 07:10 )             21.1     11-16    141  |  111<H>  |  25<H>  ----------------------------<  112<H>  4.2   |  22  |  0.64    Ca    7.7<L>      16 Nov 2022 07:10  Phos  3.7     11-16  Mg     2.4     11-16    TPro  5.1<L>  /  Alb  2.7<L>  /  TBili  0.7  /  DBili  x   /  AST  17  /  ALT  29  /  AlkPhos  48  11-16    PT/INR - ( 16 Nov 2022 07:10 )   PT: 14.7 sec;   INR: 1.28 ratio         PTT - ( 16 Nov 2022 07:10 )  PTT:27.2 sec      TELEMETRY:    Echocardiogram:    Patient name: ALEX STOCK  YOB: 1940   Age: 82 (F)   MR#: [id number]  Study Date: 11/15/2022  Location: [address] [place], [place] [id number]: Tanvi Peterson RDCS  Study quality: Technically good  Referring Physician: Bradley Goldberg, md  Blood Pressure: 125/88 mmHg  Height: 163 cm  Weight: 59 kg  BSA: 1.6 m2  Heart Rate: 78 mmHg  ------------------------------------------------------------------------  PROCEDURE: Limited transthoracic echocardiogram with 2-D.  M-Mode and spectral and color flow Doppler.  INDICATION: Chest Pain (R07.9)  ------------------------------------------------------------------------  Dimensions:    Normal Values:  LA:            2.0 - 4.0 cm  Ao:            2.0 - 3.8 cm  SEPTUM:        0.6 - 1.2 cm  PWT: 0.6 - 1.1 cm  LVIDd:         3.0 - 5.6 cm  LVIDs:         1.8 - 4.0 cm  EF (Modified Marquez Rule): 39 %  ------------------------------------------------------------------------  Observations:  Mitral Valve: Normal mitral valve.  Aortic Valve/Aorta: Normal trileaflet aortic valve.  Left Ventricle: Moderate segmental left ventricular  systolic dysfunction. Hypokinesis of the inferior, base to  mid septum, and inferolateral wall.   LVEF calculated using  biplane Marquez's method si 39%. Unable toperform strain  imaging due to poor image quality. No left ventricular  thrombus. Normal left ventricular internal dimensions and  wall thicknesses.  Right Heart: Normal right atrium. Normal right ventricular  size and function. Normal tricuspid valve. Normal pulmonic  valve.  Pericardium/Pleura: No pericardial effusion seen.  Hemodynamic: Estimated right atrial pressure is 8 mm Hg.  ------------------------------------------------------------------------  Conclusions:  1. Normal left ventricular internal dimensions and wall thicknesses.  2. Moderate segmental left ventricular systolic  dysfunction. Hypokinesis of the inferior, base to mid  septum, and inferolateral wall.   No left ventricular  thrombus. LVEF calculated using biplane Marquez's method is 39%.   Unable to perform strain imaging due to poor image quality.  3. No pericardial effusion seen.  *** Compared with echocardiogram of 11/9/2022, there is a  significant decrease in LVEF and new wall motion abnormalities.  Discussed findings with Raiza Palumbo NP.  ------------------------------------------------------------------------  Confirmed on  11/15/2022 - 17:06:09 by Jess Ladd M.D.  ------------------------------------------------------------------------      EKG: NSR, LAD, LBBB    CXR/Radiology:   CARDIO-ONCOLOGY FOLLOW UP NOTE                                                                                                                                                                                     Interval History:   Echo with new segmental impairment in left ventricular systolic function. EF 39 %, reduced from last week.   Remains pancytopenic  Weight increased  Pro-BNP increased further to 9000 pg/mL  No chest pain, but breathing is labored      PAST MEDICAL & SURGICAL HISTORY:  Breast cancer  s/p lumpectomy, RT, tamoxifen      Hypertension      AML (acute myelogenous leukemia)      S/P hysterectomy          FAMILY HISTORY:  No pertinent family history in first degree relatives        Home Medications:  acyclovir 400 mg oral tablet: 1 tab(s) orally 2 times a day (08 Nov 2022 15:36)  amLODIPine 5 mg oral tablet: 1 tab(s) orally once a day (09 Nov 2022 13:00)  buPROPion 150 mg/12 hours (SR) oral tablet, extended release: 1 tab(s) orally once a day (08 Nov 2022 15:36)  fluconazole 200 mg oral tablet: 1 tab(s) orally once a day (08 Nov 2022 15:36)  gabapentin 100 mg oral tablet: 1 tab(s) orally once a day (08 Nov 2022 15:36)  levoFLOXacin 500 mg oral tablet: 1 tab(s) orally every 24 hours (08 Nov 2022 15:36)  oxybutynin 5 mg oral tablet: 1 tab(s) orally once a day (08 Nov 2022 15:36)  traZODone 150 mg oral tablet: 1 tab(s) orally once a day (at bedtime) (08 Nov 2022 15:36)      Social History:  She lives with her . Her  has prostate cancer and neuropathy. She is concerned about his health too.   She has an aide who assists 4 hrs x 3 days a week.   She ambulates with a cane or walker. She has not had any recent falls.   She wears a Life Alert pendant.   She denies tobacco or drug use. She has one serving of alcohol a day. (08 Nov 2022 13:07)      Medications:  acetaminophen     Tablet .. 650 milliGRAM(s) Oral every 6 hours PRN  acyclovir   Oral Tab/Cap 400 milliGRAM(s) Oral two times a day  albuterol/ipratropium for Nebulization 3 milliLiter(s) Nebulizer every 6 hours  allopurinol 300 milliGRAM(s) Oral daily  aluminum hydroxide/magnesium hydroxide/simethicone Suspension 30 milliLiter(s) Oral every 4 hours PRN  benzonatate 100 milliGRAM(s) Oral every 8 hours PRN  Biotene Dry Mouth Oral Rinse 15 milliLiter(s) Swish and Spit three times a day  budesonide  80 MICROgram(s)/formoterol 4.5 MICROgram(s) Inhaler 2 Puff(s) Inhalation two times a day  buPROPion XL (24-Hour) . 150 milliGRAM(s) Oral daily  cefepime   IVPB 2000 milliGRAM(s) IV Intermittent every 12 hours  chlorhexidine 2% Cloths 1 Application(s) Topical daily  dexAMETHasone  Injectable 2.5 milliGRAM(s) IV Push every 12 hours  gabapentin 100 milliGRAM(s) Oral daily  gilteritinib 120 milliGRAM(s) Oral <User Schedule>  melatonin 3 milliGRAM(s) Oral at bedtime PRN  midodrine. 10 milliGRAM(s) Oral three times a day  ondansetron Injectable 4 milliGRAM(s) IV Push every 8 hours PRN  oxybutynin 5 milliGRAM(s) Oral daily  polyethylene glycol 3350 17 Gram(s) Oral two times a day  sodium chloride 0.65% Nasal 1 Spray(s) Both Nostrils four times a day  sodium chloride 0.9%. 1000 milliLiter(s) IV Continuous <Continuous>  traZODone 150 milliGRAM(s) Oral at bedtime  voriconazole 200 milliGRAM(s) Oral every 12 hours      Review of systems:  10 point review of systems completed and are negative unless noted in HPI    Vitals:  T(C): 36.5 (11-16-22 @ 09:50), Max: 36.8 (11-15-22 @ 23:20)  HR: 72 (11-16-22 @ 09:50) (67 - 84)  BP: 135/75 (11-16-22 @ 09:50) (110/66 - 145/81)  BP(mean): --  RR: 18 (11-16-22 @ 09:50) (18 - 19)  SpO2: 94% (11-16-22 @ 09:50) (93% - 95%)    Daily     Daily         I&O's Summary    15 Nov 2022 07:01  -  16 Nov 2022 07:00  --------------------------------------------------------  IN: 1070 mL / OUT: 2300 mL / NET: -1230 mL    16 Nov 2022 07:01  -  16 Nov 2022 12:45  --------------------------------------------------------  IN: 180 mL / OUT: 550 mL / NET: -370 mL        Physical Exam:  Appearance: Ill appearing  HEENT: moist mucosa, anicteric sclerae.   Neck: No JVD  Cardiovascular: regular rate and rhythm. Systolic murmur. No gallop. No friction rub.  Respiratory: faint crackles at bases bilaterally  Gastrointestinal: Soft, Non-tender, benign	  Skin: no clubbing or cyanosis.   Neurologic: No focal deficit  Extremities: warm. 1+ edema  Psychiatry: A & O x 3, Mood & affect appropriate  Vascular: pulse regular, equal    Labs:                        7.1    0.09  )-----------( 26       ( 16 Nov 2022 07:10 )             21.1     11-16    141  |  111<H>  |  25<H>  ----------------------------<  112<H>  4.2   |  22  |  0.64    Ca    7.7<L>      16 Nov 2022 07:10  Phos  3.7     11-16  Mg     2.4     11-16    TPro  5.1<L>  /  Alb  2.7<L>  /  TBili  0.7  /  DBili  x   /  AST  17  /  ALT  29  /  AlkPhos  48  11-16    PT/INR - ( 16 Nov 2022 07:10 )   PT: 14.7 sec;   INR: 1.28 ratio         PTT - ( 16 Nov 2022 07:10 )  PTT:27.2 sec      TELEMETRY:    Echocardiogram:    Patient name: ALEX TSOCK  YOB: 1940   Age: 82 (F)   MR#: [id number]  Study Date: 11/15/2022  Location: [address] [place], [place] [id number]: Tanvi Peterson RDCS  Study quality: Technically good  Referring Physician: Bradley Goldberg, md  Blood Pressure: 125/88 mmHg  Height: 163 cm  Weight: 59 kg  BSA: 1.6 m2  Heart Rate: 78 mmHg  ------------------------------------------------------------------------  PROCEDURE: Limited transthoracic echocardiogram with 2-D.  M-Mode and spectral and color flow Doppler.  INDICATION: Chest Pain (R07.9)  ------------------------------------------------------------------------  Dimensions:    Normal Values:  LA:            2.0 - 4.0 cm  Ao:            2.0 - 3.8 cm  SEPTUM:        0.6 - 1.2 cm  PWT: 0.6 - 1.1 cm  LVIDd:         3.0 - 5.6 cm  LVIDs:         1.8 - 4.0 cm  EF (Modified Marquez Rule): 39 %  ------------------------------------------------------------------------  Observations:  Mitral Valve: Normal mitral valve.  Aortic Valve/Aorta: Normal trileaflet aortic valve.  Left Ventricle: Moderate segmental left ventricular  systolic dysfunction. Hypokinesis of the inferior, base to  mid septum, and inferolateral wall.   LVEF calculated using  biplane Marquez's method si 39%. Unable toperform strain  imaging due to poor image quality. No left ventricular  thrombus. Normal left ventricular internal dimensions and  wall thicknesses.  Right Heart: Normal right atrium. Normal right ventricular  size and function. Normal tricuspid valve. Normal pulmonic  valve.  Pericardium/Pleura: No pericardial effusion seen.  Hemodynamic: Estimated right atrial pressure is 8 mm Hg.  ------------------------------------------------------------------------  Conclusions:  1. Normal left ventricular internal dimensions and wall thicknesses.  2. Moderate segmental left ventricular systolic  dysfunction. Hypokinesis of the inferior, base to mid  septum, and inferolateral wall.   No left ventricular  thrombus. LVEF calculated using biplane Marquez's method is 39%.   Unable to perform strain imaging due to poor image quality.  3. No pericardial effusion seen.  *** Compared with echocardiogram of 11/9/2022, there is a  significant decrease in LVEF and new wall motion abnormalities.  Discussed findings with Raiza Palumbo NP.  ------------------------------------------------------------------------  Confirmed on  11/15/2022 - 17:06:09 by Jess Ladd M.D.  ------------------------------------------------------------------------      EKG: NSR, LAD, LBBB    CXR/Radiology:   CARDIO-ONCOLOGY FOLLOW UP NOTE                                                                                                                                                                                     Interval History:   Echo with new segmental impairment in left ventricular systolic function. EF 39 %, reduced from last week.   Remains pancytopenic  Weight increased  Pro-BNP increased further to 9000 pg/mL  No chest pain, but breathing is labored  CXR increased congestion compared to prior      PAST MEDICAL & SURGICAL HISTORY:  Breast cancer  s/p lumpectomy, RT, tamoxifen      Hypertension      AML (acute myelogenous leukemia)      S/P hysterectomy          FAMILY HISTORY:  No pertinent family history in first degree relatives        Home Medications:  acyclovir 400 mg oral tablet: 1 tab(s) orally 2 times a day (08 Nov 2022 15:36)  amLODIPine 5 mg oral tablet: 1 tab(s) orally once a day (09 Nov 2022 13:00)  buPROPion 150 mg/12 hours (SR) oral tablet, extended release: 1 tab(s) orally once a day (08 Nov 2022 15:36)  fluconazole 200 mg oral tablet: 1 tab(s) orally once a day (08 Nov 2022 15:36)  gabapentin 100 mg oral tablet: 1 tab(s) orally once a day (08 Nov 2022 15:36)  levoFLOXacin 500 mg oral tablet: 1 tab(s) orally every 24 hours (08 Nov 2022 15:36)  oxybutynin 5 mg oral tablet: 1 tab(s) orally once a day (08 Nov 2022 15:36)  traZODone 150 mg oral tablet: 1 tab(s) orally once a day (at bedtime) (08 Nov 2022 15:36)      Social History:  She lives with her . Her  has prostate cancer and neuropathy. She is concerned about his health too.   She has an aide who assists 4 hrs x 3 days a week.   She ambulates with a cane or walker. She has not had any recent falls.   She wears a Life Alert pendant.   She denies tobacco or drug use. She has one serving of alcohol a day. (08 Nov 2022 13:07)      Medications:  acetaminophen     Tablet .. 650 milliGRAM(s) Oral every 6 hours PRN  acyclovir   Oral Tab/Cap 400 milliGRAM(s) Oral two times a day  albuterol/ipratropium for Nebulization 3 milliLiter(s) Nebulizer every 6 hours  allopurinol 300 milliGRAM(s) Oral daily  aluminum hydroxide/magnesium hydroxide/simethicone Suspension 30 milliLiter(s) Oral every 4 hours PRN  benzonatate 100 milliGRAM(s) Oral every 8 hours PRN  Biotene Dry Mouth Oral Rinse 15 milliLiter(s) Swish and Spit three times a day  budesonide  80 MICROgram(s)/formoterol 4.5 MICROgram(s) Inhaler 2 Puff(s) Inhalation two times a day  buPROPion XL (24-Hour) . 150 milliGRAM(s) Oral daily  cefepime   IVPB 2000 milliGRAM(s) IV Intermittent every 12 hours  chlorhexidine 2% Cloths 1 Application(s) Topical daily  dexAMETHasone  Injectable 2.5 milliGRAM(s) IV Push every 12 hours  gabapentin 100 milliGRAM(s) Oral daily  gilteritinib 120 milliGRAM(s) Oral <User Schedule>  melatonin 3 milliGRAM(s) Oral at bedtime PRN  midodrine. 10 milliGRAM(s) Oral three times a day  ondansetron Injectable 4 milliGRAM(s) IV Push every 8 hours PRN  oxybutynin 5 milliGRAM(s) Oral daily  polyethylene glycol 3350 17 Gram(s) Oral two times a day  sodium chloride 0.65% Nasal 1 Spray(s) Both Nostrils four times a day  sodium chloride 0.9%. 1000 milliLiter(s) IV Continuous <Continuous>  traZODone 150 milliGRAM(s) Oral at bedtime  voriconazole 200 milliGRAM(s) Oral every 12 hours      Review of systems:  10 point review of systems completed and are negative unless noted in HPI    Vitals:  T(C): 36.5 (11-16-22 @ 09:50), Max: 36.8 (11-15-22 @ 23:20)  HR: 72 (11-16-22 @ 09:50) (67 - 84)  BP: 135/75 (11-16-22 @ 09:50) (110/66 - 145/81)  BP(mean): --  RR: 18 (11-16-22 @ 09:50) (18 - 19)  SpO2: 94% (11-16-22 @ 09:50) (93% - 95%)    Daily     Daily         I&O's Summary    15 Nov 2022 07:01  -  16 Nov 2022 07:00  --------------------------------------------------------  IN: 1070 mL / OUT: 2300 mL / NET: -1230 mL    16 Nov 2022 07:01  -  16 Nov 2022 12:45  --------------------------------------------------------  IN: 180 mL / OUT: 550 mL / NET: -370 mL        Physical Exam:  Appearance: Ill appearing  HEENT: moist mucosa, anicteric sclerae.   Neck: No JVD  Cardiovascular: regular rate and rhythm. Systolic murmur. No gallop. No friction rub.  Respiratory: faint crackles at bases bilaterally  Gastrointestinal: Soft, Non-tender, benign	  Skin: no clubbing or cyanosis.   Neurologic: No focal deficit  Extremities: warm. 1+ edema  Psychiatry: A & O x 3, Mood & affect appropriate  Vascular: pulse regular, equal    Labs:                        7.1    0.09  )-----------( 26       ( 16 Nov 2022 07:10 )             21.1     11-16    141  |  111<H>  |  25<H>  ----------------------------<  112<H>  4.2   |  22  |  0.64    Ca    7.7<L>      16 Nov 2022 07:10  Phos  3.7     11-16  Mg     2.4     11-16    TPro  5.1<L>  /  Alb  2.7<L>  /  TBili  0.7  /  DBili  x   /  AST  17  /  ALT  29  /  AlkPhos  48  11-16    PT/INR - ( 16 Nov 2022 07:10 )   PT: 14.7 sec;   INR: 1.28 ratio         PTT - ( 16 Nov 2022 07:10 )  PTT:27.2 sec      TELEMETRY:    Echocardiogram:    Patient name: ALEX STOCK  YOB: 1940   Age: 82 (F)   MR#: [id number]  Study Date: 11/15/2022  Location: [address] [place], [place] [id number]: Tanvi Peterson [facility]  Study quality: Technically good  Referring Physician: Bradley Goldberg, md  Blood Pressure: 125/88 mmHg  Height: 163 cm  Weight: 59 kg  BSA: 1.6 m2  Heart Rate: 78 mmHg  ------------------------------------------------------------------------  PROCEDURE: Limited transthoracic echocardiogram with 2-D.  M-Mode and spectral and color flow Doppler.  INDICATION: Chest Pain (R07.9)  ------------------------------------------------------------------------  Dimensions:    Normal Values:  LA:            2.0 - 4.0 cm  Ao:            2.0 - 3.8 cm  SEPTUM:        0.6 - 1.2 cm  PWT: 0.6 - 1.1 cm  LVIDd:         3.0 - 5.6 cm  LVIDs:         1.8 - 4.0 cm  EF (Modified Marquez Rule): 39 %  ------------------------------------------------------------------------  Observations:  Mitral Valve: Normal mitral valve.  Aortic Valve/Aorta: Normal trileaflet aortic valve.  Left Ventricle: Moderate segmental left ventricular  systolic dysfunction. Hypokinesis of the inferior, base to  mid septum, and inferolateral wall.   LVEF calculated using  biplane Marquez's method si 39%. Unable toperform strain  imaging due to poor image quality. No left ventricular  thrombus. Normal left ventricular internal dimensions and  wall thicknesses.  Right Heart: Normal right atrium. Normal right ventricular  size and function. Normal tricuspid valve. Normal pulmonic  valve.  Pericardium/Pleura: No pericardial effusion seen.  Hemodynamic: Estimated right atrial pressure is 8 mm Hg.  ------------------------------------------------------------------------  Conclusions:  1. Normal left ventricular internal dimensions and wall thicknesses.  2. Moderate segmental left ventricular systolic  dysfunction. Hypokinesis of the inferior, base to mid  septum, and inferolateral wall.   No left ventricular  thrombus. LVEF calculated using biplane Marquez's method is 39%.   Unable to perform strain imaging due to poor image quality.  3. No pericardial effusion seen.  *** Compared with echocardiogram of 11/9/2022, there is a  significant decrease in LVEF and new wall motion abnormalities.  Discussed findings with Raiza Palumbo NP.  ------------------------------------------------------------------------  Confirmed on  11/15/2022 - 17:06:09 by Jess Ladd M.D.  ------------------------------------------------------------------------      EKG: NSR, LAD, LBBB    CXR/Radiology:    ACC: [id number] EXAM:  XR CHEST PORTABLE URGENT 1V                          PROCEDURE DATE:  11/15/2022          INTERPRETATION:  EXAMINATION: XR CHEST URGENT    CLINICAL INDICATION: rales on exam    TECHNIQUE: Single frontal portable view of the chest from 11/15/2022   10:46 AM    COMPARISON: Chest x-ray 11/11/2022    FINDINGS:  Left-sided Chemo-Port is seen with tip at the distal SVC.  The heart size is normal.  Streaky right basilar and retrocardiac opacities are new compared to   prior radiograph..  There is no pneumothorax or pleural effusion.    IMPRESSION:  New streaky right basilar and retrocardiac opacities, may be infectious   in appropriate clinical setting.    --- End of Report ---       FAHEEM CHEN MD; Resident Radiologist  This document has been electronically signed.  BROCK DUQUE MD; Attending Radiologist  This document has been electronically signed. Nov 15 2022 10:58PM

## 2022-11-16 NOTE — SWALLOW BEDSIDE ASSESSMENT ADULT - ASPIRATION PRECAUTIONS
Monitor for s/s aspiration/laryngeal penetration. If noted:  D/C p.o. intake, provide non-oral nutrition/hydration/meds, and contact this service @ [id number]

## 2022-11-16 NOTE — PROGRESS NOTE ADULT - PROBLEM SELECTOR PLAN 1
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    TTE  11/9 - EF 70% mild AS   G6PD level was normal in 2020.   Trend CBC, CMP, TLS labs, DIC labs q12 hours. , If fibrinogen under 100, give 10 units of cryoprecipitate.  Transfuse for Hgb < 7 and platelet count < 10k, < 20k if febrile, < 50k if bleeding, continue allopurinol 300mg PO q daily.   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   Because gilteritinib can cause QTc prolongation, Daily ekg for 7 days  dexamethasone to prevent differentiation syndrome was decreased from 5 mg q12h to 2.5 q12 on 11/15   11/10 Cytarabine 100mg/m2 x 3 days continuous infusion.  11/12 Cont last day of cytarabine. stop hydroxyurea. start gilteritinib w dexamethasone. 11/13 DC Cytarabine-pt  received 309 ml/509 ml of day 3 cytarabine.   speech and swallow eval for difficulty swallowing.   Anemia-replace prbc, lasix 40mg iv x1 today.   DNR/DNI Mutations identified in FLT3-ITD, NPM1, ASXL1 and CEBPA.    G6PD level was normal in 2020.   Trend CBC, CMP, TLS labs, DIC labs daily. , If fibrinogen under 100, give 10 units of cryoprecipitate.  Transfuse for Hgb < 7 and platelet count < 10k, < 20k if febrile, < 50k if bleeding, continue allopurinol 300mg PO q daily.   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   Daily ekg through 11/18 (7 days of gilt).   dexamethasone to prevent differentiation syndrome was decreased from 5 mg q12h to 2.5 q12 on 11/15   11/10 Cytarabine 100mg/m2 x 3 days continuous infusion., 11/12 Cont last day of cytarabine. stop hydroxyurea. start gilteritinib w dexamethasone. 11/13 DC Cytarabine-pt  received 309 ml/509 ml of day 3 cytarabine.   speech and swallow eval for difficulty swallowing.   DNR/DNI Mutations identified in FLT3-ITD, NPM1, ASXL1 and CEBPA.    G6PD level was normal in 2020.   Trend CBC, CMP, TLS labs, DIC labs daily. , If fibrinogen under 100, give 10 units of cryoprecipitate.  Transfuse for Hgb < 7 and platelet count < 10k, < 20k if febrile, < 50k if bleeding, continue allopurinol 300mg PO q daily.   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   Daily ekg through 11/18 (7 days of gilt).   dexamethasone to prevent differentiation syndrome was decreased from 5 mg q12h to 2.5 q12 on 11/15   11/10 Cytarabine 100mg/m2 x 3 days continuous infusion., 11/12 Cont last day of cytarabine. stop hydroxyurea. start gilteritinib w dexamethasone. 11/13 DC Cytarabine-pt  received 309 ml/509 ml of day 3 cytarabine.   speech and swallow eval for difficulty swallowing.   DNR/DNI  lasix 40mg iv x1 today  fu lactate and fu bnp

## 2022-11-16 NOTE — SWALLOW BEDSIDE ASSESSMENT ADULT - SWALLOW EVAL: DIAGNOSIS
Pt is an 82yoF with pmh of AML, breast cancer, RT and R lumpectomy, who presents with AML in relapse. Pt p/w overtly functional oropharyngeal swallow. Swallow characterized by timely and efficient mastication, timely initiation of swallow, with no overt s/sx of aspiration across all administered consistencies. Given pt reports of generalized weakness and preference for soft-bite-sized diet vs regular texture, rx to continue current texture diet.

## 2022-11-16 NOTE — SWALLOW BEDSIDE ASSESSMENT ADULT - SLP PERTINENT HISTORY OF CURRENT PROBLEM
hx con't AML hx: In 2/2020, the patient had BMBx which found AML, On 2/4/2020, she started C1 dacogen and venetoclax, which she tolerated, other than some dypsnea which was attributed to pulmonary edema and L pleural effusion, and treated with diuretics. Patient was discharged home on 2/11/2020. BMBx on 3/2/2020 found no blasts. Patient thereafter was continued on maintenance treatment with dacogen and venetoclax (10 days) every 5 weeks, with onpro as needed. 6 month interval BMBx in 10/2020 found hypocellular marrow with focal erythropoiesis, markedly diminished myelopoiesis, rare megakaryocyte and aspicular aspirate, and no increase in blast population. On 8/2022, pt noted to not have count recovery between cycles and was pancytopenic. Repeat BMBx performed on 9/7/2022. Results were compatible with relapse of known AML. Patient was sent to [facility] with plan to initiate therapy with single agent gilteritinib given presence of FLT3 mutation, and loss of response to dacogen and venetoclax.

## 2022-11-16 NOTE — PROGRESS NOTE ADULT - PROBLEM SELECTOR PLAN 3
Hypotensive 11/12, now on Midodrine, amlodipine on hold Hypotensive 11/12, 11/16 weaning off Midodrine, amlodipine on hold

## 2022-11-17 NOTE — PROGRESS NOTE ADULT - PROBLEM SELECTOR PLAN 8
TTE  11/9 - EF 70% mild AS   11/11 EKG no changes  11/12 elevated trop in setting of hypotension.   11/14 Cardiology consult   11/15 Repeat echo showed decrease in EF 39% with wall motion abnormality.  11/15 Cxr given change in clinical status-New streaky right basilar and retrocardiac opacities, may be infectious in appropriate clinical setting.

## 2022-11-17 NOTE — PROGRESS NOTE ADULT - NS ATTEND AMEND GEN_ALL_CORE FT
Vital Signs Last 24 Hrs  T(C): 36.5 (16 Nov 2022 09:50), Max: 36.8 (15 Nov 2022 23:20)  T(F): 97.7 (16 Nov 2022 09:50), Max: 98.2 (15 Nov 2022 23:20)  HR: 72 (16 Nov 2022 09:50) (67 - 84)  BP: 135/75 (16 Nov 2022 09:50) (110/66 - 145/81)  BP(mean): --  RR: 18 (16 Nov 2022 09:50) (18 - 19)  SpO2: 94% (16 Nov 2022 09:50) (93% - 95%)    Parameters below as of 16 Nov 2022 09:50  Patient On (Oxygen Delivery Method): room air    MEDICATIONS  (STANDING):  acyclovir   Oral Tab/Cap 400 milliGRAM(s) Oral two times a day  albuterol/ipratropium for Nebulization 3 milliLiter(s) Nebulizer every 6 hours  allopurinol 300 milliGRAM(s) Oral daily  Biotene Dry Mouth Oral Rinse 15 milliLiter(s) Swish and Spit three times a day  budesonide  80 MICROgram(s)/formoterol 4.5 MICROgram(s) Inhaler 2 Puff(s) Inhalation two times a day  buPROPion XL (24-Hour) . 150 milliGRAM(s) Oral daily  cefepime   IVPB 2000 milliGRAM(s) IV Intermittent every 12 hours  chlorhexidine 2% Cloths 1 Application(s) Topical daily  dexAMETHasone  Injectable 2.5 milliGRAM(s) IV Push every 12 hours  gabapentin 100 milliGRAM(s) Oral daily  gilteritinib 120 milliGRAM(s) Oral <User Schedule>  midodrine. 10 milliGRAM(s) Oral three times a day  oxybutynin 5 milliGRAM(s) Oral daily  polyethylene glycol 3350 17 Gram(s) Oral two times a day  sodium chloride 0.65% Nasal 1 Spray(s) Both Nostrils four times a day  sodium chloride 0.9%. 1000 milliLiter(s) (60 mL/Hr) IV Continuous <Continuous>  traZODone 150 milliGRAM(s) Oral at bedtime  voriconazole 200 milliGRAM(s) Oral every 12 hours                          7.1    0.09  )-----------( 26       ( 16 Nov 2022 07:10 )             21.1         was transiently hypotensive 11/12 >>>midodrine  intermittently febrile >>> afeb last 24 hrs  c/o incr dyspnea  has mod AS on echo; appreciate Cardiology input  TTE and CXR to be repeated  CT lung nodules,  ? infection vs aspiration    Repeat TTE shows decr in LVEF to 39%, wall motion abnl  Cardiac MRI being considered  addl Cardiology input appreciated, to be seen later today  Pulm status stable today     Assessment: 81 yo day + 5 gilteritinib post HU/Scarlet-C cytoreduction for progressive FLT-3, NPM1, ASXL1, WT1, CEBPA AML.  Course complicated S. epi bacteremia? (11/9/22), continuous neutropenic fevers, pneumonia (concerning for fungus) and hypotension (11/12/22), fluid overloaded state (11/13/22 - )   note fibrinogen increasing now, d-dimer very high but now  decreasing    ID: cont present abs, d/c vanco, ID input appreciated     Onc History:  decitabine/lisa through 27 cycles,    Plan:  Heme: PLT goal > 10,000; Hgb goal > 7.0g/dL;   D/C Scarlet-C and HU  continue TLS labs and allopurinol   Continue gilteritinib with dex (to reduce the incidence of differentiation syndrome).   reduce decadron to 2.5 mg 2x/day    ID: cefepime, vori, acyclovir; will check vori level    peripheral blood fungitell neg and galactomannan (11/12/22): pending    Radiographs: CT (11/12/22): Scattered nodular and small consolidative opacities peripherally and bilateral lower lobes; findings are favored to represent aspiration and/or infection.  Indeterminate right hepatic lobe 2 cm lesion and right adrenal 2.3 cm nodule; recommend correlation with MRI abdomen to more accurately characterize.    Pulmonary:  symbicort added, may have been helpful  (history of reactive airway disease)     Nutrition: tolerating PO; cont net diuresis today.   cont to follow QTc (on vfend and gilteritinib)  DVT prophylaxis: ambulation.     Code status: DNR/DNI    Over 35 minutes were spent in direct patient care and care coordination. Vital Signs Last 24 Hrs  T(C): 36.3 (17 Nov 2022 09:00), Max: 37 (16 Nov 2022 17:05)  T(F): 97.4 (17 Nov 2022 09:00), Max: 98.6 (16 Nov 2022 17:05)  HR: 66 (17 Nov 2022 09:00) (65 - 89)  BP: 139/72 (17 Nov 2022 09:00) (135/73 - 151/81)  BP(mean): --  RR: 18 (17 Nov 2022 09:00) (18 - 18)  SpO2: 98% (17 Nov 2022 09:00) (95% - 98%)    Parameters below as of 17 Nov 2022 09:00  Patient On (Oxygen Delivery Method): nasal cannula    Parameters below as of 16 Nov 2022 09:50  Patient On (Oxygen Delivery Method): room air    MEDICATIONS  (STANDING):  acyclovir   Oral Tab/Cap 400 milliGRAM(s) Oral two times a day  albuterol/ipratropium for Nebulization 3 milliLiter(s) Nebulizer every 6 hours  allopurinol 300 milliGRAM(s) Oral daily  Biotene Dry Mouth Oral Rinse 15 milliLiter(s) Swish and Spit three times a day  budesonide  80 MICROgram(s)/formoterol 4.5 MICROgram(s) Inhaler 2 Puff(s) Inhalation two times a day  buPROPion XL (24-Hour) . 150 milliGRAM(s) Oral daily  cefepime   IVPB 2000 milliGRAM(s) IV Intermittent every 12 hours  chlorhexidine 2% Cloths 1 Application(s) Topical daily  dexAMETHasone  Injectable 2.5 milliGRAM(s) IV Push every 12 hours  furosemide   Injectable 40 milliGRAM(s) IV Push once  gabapentin 100 milliGRAM(s) Oral daily  gilteritinib 120 milliGRAM(s) Oral <User Schedule>  oxybutynin 5 milliGRAM(s) Oral daily  polyethylene glycol 3350 17 Gram(s) Oral two times a day  sodium chloride 0.65% Nasal 1 Spray(s) Both Nostrils four times a day  sodium chloride 0.9%. 1000 milliLiter(s) (60 mL/Hr) IV Continuous <Continuous>  traZODone 150 milliGRAM(s) Oral at bedtime  voriconazole 200 milliGRAM(s) Oral every 12 hours                            7.5    0.07  )-----------( 12       ( 17 Nov 2022 07:29 )             22.5                        was transiently hypotensive 11/12 >>>midodrine  intermittently febrile >>> afeb last 24 hrs  c/o incr dyspnea less now  has mod AS on echo; appreciate Cardiology input  TTE -repeat-shows EF reduced to 39% with abnl wall motion  CT lung nodules,  ? infection vs aspiration     Cardiac MRI being considered  addl Cardiology input appreciated, to be seen later today  Pulm status stable today     Assessment: 81 yo day + 6 gilteritinib post HU/Scarlet-C cytoreduction for progressive FLT-3, NPM1, ASXL1, WT1, CEBPA AML.  Course complicated S. epi bacteremia? (11/9/22), continuous neutropenic fevers, pneumonia (concerning for fungus) and hypotension (11/12/22), fluid overloaded state (11/13/22 - )      ID: cont present abs, d/c vanco, ID input appreciated     Onc History:  decitabine/lisa through 27 cycles,    Plan:  Heme: PLT goal > 10,000; Hgb goal > 7.0g/dL;      d/c allopurinol   Continue gilteritinib with dex (to reduce the incidence of differentiation syndrome), 2.5 mg 2x/d       ID: cefepime, vori, acyclovir; check vori level    peripheral blood fungitell neg and galactomannan (11/12/22): pending    Radiographs: CT (11/12/22): Scattered nodular and small consolidative opacities peripherally and bilateral lower lobes; findings are favored to represent aspiration and/or infection.  Indeterminate right hepatic lobe 2 cm lesion and right adrenal 2.3 cm nodule; recommend correlation with MRI abdomen to more accurately characterize.  CXR 11/15/22 right basilar, retrocardiac infiltrates    Pulmonary:  symbicort added, may have been helpful  (history of reactive airway disease)     Nutrition: tolerating PO; cont net diuresis today.   cont to follow QTc (on vfend and gilteritinib)  DVT prophylaxis: ambulation.     Code status: DNR/DNI    Over 35 minutes were spent in direct patient care and care coordination.

## 2022-11-17 NOTE — PROGRESS NOTE ADULT - SUBJECTIVE AND OBJECTIVE BOX
Diagnosis: relapsed AML, FLT3 ITD+, NPM1, CEBPA, ASXL1 mutated    Protocol/Chemo Regimen: daily oral FLT3 inhibitor gilteritinib (cytarabine 100mg/m2 x 3 days continuous infusion prior to start of gilteritinib)    Day: 8 cytarabine; Day 6 gilteritinib     Pt endorsed:     Review of Systems:     Pain scale: 0                                           Diet: DASH/TLC    Allergies    No Known Allergies    Intolerances      MEDICATIONS  (STANDING):  acyclovir   Oral Tab/Cap 400 milliGRAM(s) Oral two times a day  albuterol/ipratropium for Nebulization 3 milliLiter(s) Nebulizer every 6 hours  allopurinol 300 milliGRAM(s) Oral daily  Biotene Dry Mouth Oral Rinse 15 milliLiter(s) Swish and Spit three times a day  budesonide  80 MICROgram(s)/formoterol 4.5 MICROgram(s) Inhaler 2 Puff(s) Inhalation two times a day  buPROPion XL (24-Hour) . 150 milliGRAM(s) Oral daily  cefepime   IVPB 2000 milliGRAM(s) IV Intermittent every 12 hours  chlorhexidine 2% Cloths 1 Application(s) Topical daily  dexAMETHasone  Injectable 2.5 milliGRAM(s) IV Push every 12 hours  gabapentin 100 milliGRAM(s) Oral daily  gilteritinib 120 milliGRAM(s) Oral <User Schedule>  oxybutynin 5 milliGRAM(s) Oral daily  polyethylene glycol 3350 17 Gram(s) Oral two times a day  sodium chloride 0.65% Nasal 1 Spray(s) Both Nostrils four times a day  sodium chloride 0.9%. 1000 milliLiter(s) (60 mL/Hr) IV Continuous <Continuous>  traZODone 150 milliGRAM(s) Oral at bedtime  voriconazole 200 milliGRAM(s) Oral every 12 hours    MEDICATIONS  (PRN):  acetaminophen     Tablet .. 650 milliGRAM(s) Oral every 6 hours PRN Temp greater or equal to 38C (100.4F), Mild Pain (1 - 3)  aluminum hydroxide/magnesium hydroxide/simethicone Suspension 30 milliLiter(s) Oral every 4 hours PRN Dyspepsia  benzonatate 100 milliGRAM(s) Oral every 8 hours PRN Cough  melatonin 3 milliGRAM(s) Oral at bedtime PRN Insomnia  ondansetron Injectable 4 milliGRAM(s) IV Push every 8 hours PRN Nausea and/or Vomiting    Vital Signs Last 24 Hrs  T(C): 36.6 (17 Nov 2022 05:00), Max: 37 (16 Nov 2022 17:05)  T(F): 97.8 (17 Nov 2022 05:00), Max: 98.6 (16 Nov 2022 17:05)  HR: 65 (17 Nov 2022 05:00) (65 - 89)  BP: 135/73 (17 Nov 2022 05:00) (135/73 - 151/81)  BP(mean): --  RR: 18 (17 Nov 2022 05:00) (18 - 18)  SpO2: 98% (17 Nov 2022 05:00) (94% - 98%)    Parameters below as of 17 Nov 2022 05:00  Patient On (Oxygen Delivery Method): nasal cannula  O2 Flow (L/min): 3    I&O's Summary    16 Nov 2022 07:01  -  17 Nov 2022 07:00  --------------------------------------------------------  IN: 2921 mL / OUT: 3450 mL / NET: -529 mL      PHYSICAL EXAM  General: NAD  HEENT:  clear oropharynx  CV: (+) S1/S2 RRR  Lungs: coarse bs, rale 1/4 up   Abdomen: soft, + BS, non-tender, non-distended  Ext: trace carolyn LE   Skin: no rash, left arm bruising, non tender.   Neuro: alert and oriented X 3  Central Line: LCW Mediport  and PIV CDI      LABS:             ----------    Micro/Cx's      Culture - Urine (11.15.22 @ 15:29)   Specimen Source: Clean Catch Clean Catch (Midstream)   Culture Results: No growth     Culture - Blood (11.13.22 @ 16:10)   Specimen Source: .Blood Blood-Catheter   Culture Results: No growth to date.   Culture - Blood (11.12.22 @ 15:49)   Specimen Source: .Blood Blood-Catheter   Culture Results: No growth to date.    Diagnosis: relapsed AML, FLT3 ITD+, NPM1, CEBPA, ASXL1 mutated    Protocol/Chemo Regimen: daily oral FLT3 inhibitor gilteritinib (cytarabine 100mg/m2 x 3 days continuous infusion prior to start of gilteritinib)    Day: 8 cytarabine; Day 6 gilteritinib     Pt endorsed: No overnight events, afebrile, breathing slightly better, no BM x 1week (per pt endorsement)    Review of Systems: Denies n/v, CP,acute SOB, HA or dizziness,  abdominal pain    Pain scale: 0                                           Diet: DASH/TLC    Allergies    No Known Allergies    Intolerances      MEDICATIONS  (STANDING):  acyclovir   Oral Tab/Cap 400 milliGRAM(s) Oral two times a day  albuterol/ipratropium for Nebulization 3 milliLiter(s) Nebulizer every 6 hours  allopurinol 300 milliGRAM(s) Oral daily  Biotene Dry Mouth Oral Rinse 15 milliLiter(s) Swish and Spit three times a day  budesonide  80 MICROgram(s)/formoterol 4.5 MICROgram(s) Inhaler 2 Puff(s) Inhalation two times a day  buPROPion XL (24-Hour) . 150 milliGRAM(s) Oral daily  cefepime   IVPB 2000 milliGRAM(s) IV Intermittent every 12 hours  chlorhexidine 2% Cloths 1 Application(s) Topical daily  dexAMETHasone  Injectable 2.5 milliGRAM(s) IV Push every 12 hours  gabapentin 100 milliGRAM(s) Oral daily  gilteritinib 120 milliGRAM(s) Oral <User Schedule>  oxybutynin 5 milliGRAM(s) Oral daily  polyethylene glycol 3350 17 Gram(s) Oral two times a day  sodium chloride 0.65% Nasal 1 Spray(s) Both Nostrils four times a day  sodium chloride 0.9%. 1000 milliLiter(s) (60 mL/Hr) IV Continuous <Continuous>  traZODone 150 milliGRAM(s) Oral at bedtime  voriconazole 200 milliGRAM(s) Oral every 12 hours    MEDICATIONS  (PRN):  acetaminophen     Tablet .. 650 milliGRAM(s) Oral every 6 hours PRN Temp greater or equal to 38C (100.4F), Mild Pain (1 - 3)  aluminum hydroxide/magnesium hydroxide/simethicone Suspension 30 milliLiter(s) Oral every 4 hours PRN Dyspepsia  benzonatate 100 milliGRAM(s) Oral every 8 hours PRN Cough  melatonin 3 milliGRAM(s) Oral at bedtime PRN Insomnia  ondansetron Injectable 4 milliGRAM(s) IV Push every 8 hours PRN Nausea and/or Vomiting    Vital Signs Last 24 Hrs  T(C): 36.6 (2022 05:00), Max: 37 (2022 17:05)  T(F): 97.8 (2022 05:00), Max: 98.6 (2022 17:05)  HR: 65 (2022 05:00) (65 - 89)  BP: 135/73 (2022 05:00) (135/73 - 151/81)  BP(mean): --  RR: 18 (2022 05:00) (18 - 18)  SpO2: 98% (2022 05:00) (94% - 98%)    Parameters below as of 2022 05:00  Patient On (Oxygen Delivery Method): nasal cannula  O2 Flow (L/min): 3    I&O's Summary    2022 07:01  -  2022 07:00  --------------------------------------------------------  IN: 2921 mL / OUT: 3450 mL / NET: -529 mL      PHYSICAL EXAM  General: Sitting up in bed NAD  HEENT: sclerae anicteric, clear oropharynx  CV: (+) S1/S2 RRR  Lungs: +bibasilar crackles, no wheezes   Abdomen:+ BS, soft, +distended, non-tender  Ext: trace edema BLE's   Skin: no rash, left arm bruising, non tender  Neuro: alert and oriented X 3  Central Line: LCW Mediport  and PIV CDI      LABS:             Lactate - 1.7                 7.5    0.07  )-----------( 12       ( 2022 07:29 )             22.5     2022 07:40    139    |  108    |  21     ----------------------------<  110    4.0     |  23     |  0.54     Ca    7.8        2022 07:40  Phos  3.1       2022 07:40  Mg     2.1       2022 07:40    TPro  5.1    /  Alb  2.8    /  TBili  0.4    /  DBili  x      /  AST  23     /  ALT  41     /  AlkPhos  49     2022 07:40    PT/INR - ( 2022 07:29 )   PT: 14.8 sec;   INR: 1.27 ratio    PTT - ( 2022 07:29 )  PTT:26.9 sec      LIVER FUNCTIONS - ( 2022 07:40 )  Alb: 2.8 g/dL / Pro: 5.1 g/dL / ALK PHOS: 49 U/L / ALT: 41 U/L / AST: 23 U/L / GGT: x           Urinalysis Basic - ( 15 Nov 2022 15:29 )    Color: Colorless / Appearance: Clear / S.009 / pH: x  Gluc: x / Ketone: Negative  / Bili: Negative / Urobili: Negative   Blood: x / Protein: Negative / Nitrite: Negative   Leuk Esterase: Negative / RBC: 1 /hpf / WBC 2 /HPF   Sq Epi: x / Non Sq Epi: 1 /hpf / Bacteria: Negative          Micro/Cx's    Culture - Urine (11.15.22 @ 15:29)   Specimen Source: Clean Catch Clean Catch (Midstream)   Culture Results: No growth     Culture - Blood (22 @ 16:10)   Specimen Source: .Blood Blood-Catheter   Culture Results: No growth to date.   Culture - Blood (22 @ 15:49)   Specimen Source: .Blood Blood-Catheter   Culture Results: No growth to date.

## 2022-11-17 NOTE — PROGRESS NOTE ADULT - PROBLEM SELECTOR PLAN 2
Patient is neutropenic, afebrile  CT chest shows scattered nodules/opacities in lung, + adrenal nodule, and liver lesion.  changed fluconazole to voriconazole  (-) fungitell, galactomanan  11/11 Switched Levaquin to Cefepime (renally dosed)  11/10  Blood Cx Staph epidermis/hominis -Repeat cultures 11/11, 11/12, NGTD  11/12 RVP(-)  ID consult-Vanco stopped.

## 2022-11-17 NOTE — PROGRESS NOTE ADULT - PROBLEM SELECTOR PLAN 1
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    G6PD level was normal in 2020.   Trend CBC, CMP, TLS labs, DIC labs daily. , If fibrinogen under 100, give 10 units of cryoprecipitate.  Transfuse for Hgb < 7 and platelet count < 10k, < 20k if febrile, < 50k if bleeding, continue allopurinol 300mg PO q daily.   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   Daily ekg through 11/18 (7 days of gilt).   dexamethasone to prevent differentiation syndrome was decreased from 5 mg q12h to 2.5 q12 on 11/15   11/10 Cytarabine 100mg/m2 x 3 days continuous infusion., 11/12 Cont last day of cytarabine. stop hydroxyurea. start gilteritinib w dexamethasone. 11/13 DC Cytarabine-pt  received 309 ml/509 ml of day 3 cytarabine.   speech and swallow eval for difficulty swallowing.   DNR/DNI  lasix 40mg iv x1 today  fu lactate and fu bnp Mutations identified in FLT3-ITD, NPM1, ASXL1 and CEBPA.    G6PD level was normal in 2020.   Trend CBC, CMP, TLS labs, DIC labs daily. , If fibrinogen under 100, give 10 units of cryoprecipitate.  Transfuse for Hgb < 7 and platelet count < 10k, < 20k if febrile, < 50k if bleeding, continue allopurinol 300mg PO q daily.   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   Daily ekg through 11/18 (7 days of gilt).   dexamethasone to prevent differentiation syndrome was decreased from 5 mg q12h to 2.5 q12 on 11/15   11/10 Cytarabine 100mg/m2 x 3 days continuous infusion., 11/12 Cont last day of cytarabine. stop hydroxyurea. start gilteritinib w dexamethasone. 11/13 DC Cytarabine-pt  received 309 ml/509 ml of day 3 cytarabine.   speech and swallow eval for difficulty swallowing.   DNR/DNI  11/17 Lasix 40mg iv x1 today, BNP improving. Lactulose q1H until BM

## 2022-11-17 NOTE — PROGRESS NOTE ADULT - ASSESSMENT
Ms. 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 Started Cytarabine 100 mg/m2 on 11/10 x 3 days + BID hydroxyurea for cytoreduction, then transitioned to daily oral FLT3 inhibitor gilteritinib on 11/12. Hospital course complicated by Staph epidermis/hominis bacteremia on vanco which was stopped 11/14 likely contaminant on cefepime renally dosed, increased work of breathing with cardiology following and repeat echo on 11/15 showing a decrease in EF 39% with wall motion abnormality. Patient presents with pancytopenia due to disease and chemo.

## 2022-11-18 NOTE — PROGRESS NOTE ADULT - PROBLEM SELECTOR PLAN 6
VTE ppx:  Hold pharmacologic prophylaxis due to thrombocytopenia Increased o2 requirement.   11/12 CCT Scattered nodular and small consolidative opacities peripherally and   bilateral lower lobes; findings are favored to represent aspiration   and/or infection.  Continue with Duoneb  11/14 symbicort added gentle diuresis as tolerated Increased O2 requirement.   11/12 Chest CT Scattered nodular and small consolidative opacities peripherally and   bilateral lower lobes; findings are favored to represent aspiration   and/or infection.  Continue with Duoneb  11/14 symbicort added gentle diuresis as tolerated

## 2022-11-18 NOTE — PROGRESS NOTE ADULT - PROBLEM SELECTOR PLAN 1
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    G6PD level was normal in 2020.   Trend CBC, CMP, TLS labs, DIC labs daily. , If fibrinogen under 100, give 10 units of cryoprecipitate.  Transfuse for Hgb < 7 and platelet count < 10k, < 20k if febrile, < 50k if bleeding, continue allopurinol 300mg PO q daily.   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   Daily ekg through 11/18 (7 days of gilt).   dexamethasone to prevent differentiation syndrome was decreased from 5 mg q12h to 2.5 q12 on 11/15   11/10 Cytarabine 100mg/m2 x 3 days continuous infusion., 11/12 Cont last day of cytarabine. stop hydroxyurea. start gilteritinib w dexamethasone. 11/13 DC Cytarabine-pt  received 309 ml/509 ml of day 3 cytarabine.   speech and swallow eval for difficulty swallowing.   DNR/DNI  11/17 Lasix 40mg iv x1 today, BNP improving. Lactulose q1H until BM Mutations identified in FLT3-ITD, NPM1, ASXL1 and CEBPA.    G6PD level was normal in 2020.   Trend CBC, CMP, TLS labs, DIC labs daily. , If fibrinogen under 100, give 10 units of cryoprecipitate.  Transfuse for Hgb < 7 and platelet count < 10k, < 20k if febrile, < 50k if bleeding, continue allopurinol 300mg PO q daily.   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   Daily ekg through 11/18 (7 days of gilt).   dexamethasone to prevent differentiation syndrome was decreased from 5 mg q12h to 2.5 q12 on 11/15   11/10 Cytarabine 100mg/m2 x 3 days continuous infusion., 11/12 Cont last day of cytarabine. stop hydroxyurea. start gilteritinib w dexamethasone. 11/13 DC Cytarabine-pt  received 309 ml/509 ml of day 3 cytarabine.   speech and swallow eval for difficulty swallowing.   11/18 Lasix 40mg iv x2 today, for net NEG 1-2 L/day  DNR/DNI Mutations identified in FLT3-ITD, NPM1, ASXL1 and CEBPA.    G6PD level was normal in 2020.   Trend CBC, CMP, TLS labs, DIC labs daily. , If fibrinogen under 100, give 10 units of cryoprecipitate.  Transfuse for Hgb < 7 and platelet count < 10k, < 20k if febrile, < 50k if bleeding, continue allopurinol 300mg PO q daily.   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   Daily ekg through 11/18 (7 days of gilt).   dexamethasone to prevent differentiation syndrome was decreased from 5 mg q12h to 2.5 q12 on 11/15   11/10 Cytarabine 100mg/m2 x 3 days continuous infusion., 11/12 Cont last day of cytarabine. stop hydroxyurea. start gilteritinib w dexamethasone. 11/13 DC Cytarabine-pt  received 309 ml/509 ml of day 3 cytarabine.   speech and swallow eval for difficulty swallowing.   11/18 Lasix 40mg iv x2 today, for net NEG 1 L/day  DNR/DNI Mutations identified in FLT3-ITD, NPM1, ASXL1 and CEBPA.    G6PD level was normal in 2020.   Trend CBC, CMP, TLS labs, DIC labs daily. , If fibrinogen under 100, give 10 units of cryoprecipitate.  Transfuse for Hgb < 7 and platelet count < 10k, < 20k if febrile, < 50k if bleeding, continue allopurinol 300mg PO q daily.   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   Daily ekg through 11/18 (7 days of gilt).   dexamethasone to prevent differentiation syndrome was decreased from 5 mg q12h to 2.5 q12 on 11/15   11/10 Cytarabine 100mg/m2 x 3 days continuous infusion., 11/12 Cont last day of cytarabine. stop hydroxyurea. start gilteritinib w dexamethasone. 11/13 DC Cytarabine-pt  received 309 ml/509 ml of day 3 cytarabine.   speech and swallow eval for difficulty swallowing.   11/18 Lasix 40mg iv x2 today, for net NEG 1-2 L/day (per d/w Cardiology)  DNR/DNI Mutations identified in FLT3-ITD, NPM1, ASXL1 and CEBPA.    G6PD level was normal in 2020.   Trend CBC, CMP, TLS labs, DIC labs daily. , If fibrinogen under 100, give 10 units of cryoprecipitate.  Transfuse for Hgb < 7 and platelet count < 10k, < 20k if febrile, < 50k if bleeding, continue allopurinol 300mg PO q daily.   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   Daily ekg through 11/18 (7 days of gilt).   dexamethasone to ppx differentiation syndrome was decreased from 5mg q12h to 2.5 q12 on 11/15  11/10 Cytarabine 100mg/m2 x 3 days continuous infusion., 11/12 Cont last day of cytarabine. stop hydroxyurea. start gilteritinib w dexamethasone. 11/13 DC Cytarabine-pt  received 309 ml/509 ml of day 3 cytarabine.   speech and swallow eval for difficulty swallowing.   11/18 Lasix 40mg iv x2 today, for net NEG 1-2 L/day (per d/w Cardiology). D/C'd ppx Dexamethasone  DNR/DNI

## 2022-11-18 NOTE — PROGRESS NOTE ADULT - ASSESSMENT
82 year old woman with mild AS, now with relapsed AML and neutropenic fever with pancytopenia. Following new HsTnT elevation to 450 ng/L on 4/12, new CHF. On admission, Echo showed normal LV function and strain. CT chest with no pulmonary edema or signs of CHF.  Follow up echo done 11/15 with new segmental impairment of LV systolic function.  Differential for acute systolic HF includes stress cardiomyopathy or acute myocardial infarction due to CAD. Follow up troponin 281, still elevated but downtrending.  ECG is not diagnostic due to LBBB. Mild Aortic stenosis unlikely contributes to current symptoms and is not a contraindication to fluid resuscitation or diuresis.   pro-BNP downtrending, weight up.  Recommendations:  1. Repeat weight this AM to guide diuresis  2. Continue Lasix 40 IV 1-3 x daily for goal net negative 1 Liter  3. hold midodrine as this is deleterious in the setting of heart failure  4. Monitor lactate, Cr, UOP to assess tissue perfusion  5. if hypotensive off midodrine with evidence of end organ perfusion would escalate care to CICU.  6. Daily weights and strict I/Os  7. Cardiac cath when platelet count recovers  8. start statin if feasible, can reduce dose to minimize interaction with voriconazole  9. Keep electrolytes replaced  (K > 4, Mag > 2) especially with diuresis.    FABIO Navarrete MD [facility]  Cardio-Oncology

## 2022-11-18 NOTE — PROGRESS NOTE ADULT - PROBLEM SELECTOR PLAN 3
Hypotensive 11/12, 11/16 weaning off Midodrine, amlodipine on hold Cardiology Following  Continue diuresis for NET neg 1-2L  starting low dose statin  Trend BNP, Daily weights, strict I/O's Cardiology Following  Continue diuresis for NET neg 1L  starting low dose statin  Trend BNP, Daily weights, strict I/O's Cardiology Following  Continue diuresis for NET neg 1-2L/day (per d/w Cardiology)  starting low dose statin  Trend BNP, Daily weights, strict I/O's

## 2022-11-18 NOTE — PROGRESS NOTE ADULT - ASSESSMENT
Ms. 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 Started Cytarabine 100 mg/m2 on 11/10 x 3 days + BID hydroxyurea for cytoreduction, then transitioned to daily oral FLT3 inhibitor gilteritinib on 11/12. Hospital course complicated by Staph epidermis/hominis bacteremia on vanco which was stopped 11/14 likely contaminant on cefepime renally dosed, increased work of breathing with cardiology following and repeat echo on 11/15 showing a decrease in EF 39% with wall motion abnormality. Patient presents with pancytopenia due to disease and chemo.

## 2022-11-18 NOTE — PROGRESS NOTE ADULT - SUBJECTIVE AND OBJECTIVE BOX
CARDIO-ONCOLOGY FOLLOW UP NOTE                                                                                                                                                                                     Interval History: pro BNP downtrending.     PAST MEDICAL & SURGICAL HISTORY:  Breast cancer  s/p lumpectomy, RT, tamoxifen  Hypertension  AML (acute myelogenous leukemia)  S/P hysterectomy          FAMILY HISTORY:  No pertinent family history in first degree relatives        Home Medications:  acyclovir 400 mg oral tablet: 1 tab(s) orally 2 times a day (08 Nov 2022 15:36)  amLODIPine 5 mg oral tablet: 1 tab(s) orally once a day (09 Nov 2022 13:00)  buPROPion 150 mg/12 hours (SR) oral tablet, extended release: 1 tab(s) orally once a day (08 Nov 2022 15:36)  fluconazole 200 mg oral tablet: 1 tab(s) orally once a day (08 Nov 2022 15:36)  gabapentin 100 mg oral tablet: 1 tab(s) orally once a day (08 Nov 2022 15:36)  levoFLOXacin 500 mg oral tablet: 1 tab(s) orally every 24 hours (08 Nov 2022 15:36)  oxybutynin 5 mg oral tablet: 1 tab(s) orally once a day (08 Nov 2022 15:36)  traZODone 150 mg oral tablet: 1 tab(s) orally once a day (at bedtime) (08 Nov 2022 15:36)      Social History:  She lives with her . Her  has prostate cancer and neuropathy. She is concerned about his health too.   She has an aide who assists 4 hrs x 3 days a week.   She ambulates with a cane or walker. She has not had any recent falls.   She wears a Life Alert pendant.   She denies tobacco or drug use. She has one serving of alcohol a day. (08 Nov 2022 13:07)      Medications:  acetaminophen     Tablet .. 650 milliGRAM(s) Oral every 6 hours PRN  acyclovir   Oral Tab/Cap 400 milliGRAM(s) Oral two times a day  albuterol/ipratropium for Nebulization 3 milliLiter(s) Nebulizer every 6 hours  aluminum hydroxide/magnesium hydroxide/simethicone Suspension 30 milliLiter(s) Oral every 4 hours PRN  benzonatate 100 milliGRAM(s) Oral every 8 hours PRN  Biotene Dry Mouth Oral Rinse 15 milliLiter(s) Swish and Spit three times a day  budesonide  80 MICROgram(s)/formoterol 4.5 MICROgram(s) Inhaler 2 Puff(s) Inhalation two times a day  buPROPion XL (24-Hour) . 150 milliGRAM(s) Oral daily  cefepime   IVPB 2000 milliGRAM(s) IV Intermittent every 12 hours  chlorhexidine 2% Cloths 1 Application(s) Topical daily  dexAMETHasone  Injectable 2.5 milliGRAM(s) IV Push every 12 hours  gabapentin 100 milliGRAM(s) Oral daily  gilteritinib 120 milliGRAM(s) Oral <User Schedule>  melatonin 3 milliGRAM(s) Oral at bedtime PRN  ondansetron Injectable 4 milliGRAM(s) IV Push every 8 hours PRN  oxybutynin 5 milliGRAM(s) Oral daily  polyethylene glycol 3350 17 Gram(s) Oral two times a day  senna 2 Tablet(s) Oral at bedtime  sodium chloride 0.65% Nasal 1 Spray(s) Both Nostrils four times a day  sodium chloride 0.9%. 1000 milliLiter(s) IV Continuous <Continuous>  traZODone 150 milliGRAM(s) Oral at bedtime  voriconazole 200 milliGRAM(s) Oral every 12 hours      Review of systems:  10 point review of systems completed and are negative unless noted in HPI    Vitals:  T(C): 36.3 (11-18-22 @ 05:27), Max: 36.5 (11-17-22 @ 13:00)  HR: 69 (11-18-22 @ 05:27) (66 - 76)  BP: 152/82 (11-18-22 @ 05:27) (123/71 - 152/82)  BP(mean): --  RR: 18 (11-18-22 @ 05:27) (18 - 18)  SpO2: 95% (11-18-22 @ 05:27) (95% - 99%)    Daily     Daily         I&O's Summary    17 Nov 2022 07:01  -  18 Nov 2022 07:00  --------------------------------------------------------  IN: 1402 mL / OUT: 1600 mL / NET: -198 mL        Physical Exam:  Appearance: No apparent distress  HEENT: moist mucosa, anicteric sclerae.   Neck:   Cardiovascular: regular rate and rhythm. No murmur. No gallop. No friction rub.  Respiratory: Clear to auscultation bilaterally  Gastrointestinal: Soft, Non-tender, benign	  Skin: no clubbing or cyanosis.   Neurologic: No focal deficit  Extremities: warm. No edema.  Psychiatry: A & O x 3, Mood & affect appropriate  Vascular: pulse regular, equal    Labs:                        7.5    0.07  )-----------( 12       ( 17 Nov 2022 07:29 )             22.5     11-17    139  |  108  |  21  ----------------------------<  110<H>  4.0   |  23  |  0.54    Ca    7.8<L>      17 Nov 2022 07:40  Phos  3.1     11-17  Mg     2.1     11-17    TPro  5.1<L>  /  Alb  2.8<L>  /  TBili  0.4  /  DBili  x   /  AST  23  /  ALT  41  /  AlkPhos  49  11-17    PT/INR - ( 17 Nov 2022 07:29 )   PT: 14.8 sec;   INR: 1.27 ratio         PTT - ( 17 Nov 2022 07:29 )  PTT:26.9 sec        Serum Pro-Brain Natriuretic Peptide: 7165 pg/mL [0 - 300] (11-17-22 @ 07:40)  Serum Pro-Brain Natriuretic Peptide: 9379 pg/mL [0 - 300] (11-16-22 @ 12:36)      TELEMETRY:    Echocardiogram:        EKG:     CXR/Radiology:   CARDIO-ONCOLOGY FOLLOW UP NOTE                                                                                                                                                                                     Interval History: Feels quite a bit better this AM. Large BM last evening. No chest pain. Breathing less labored.     PAST MEDICAL & SURGICAL HISTORY:  Breast cancer  s/p lumpectomy, RT, tamoxifen  Hypertension  AML (acute myelogenous leukemia)  S/P hysterectomy          FAMILY HISTORY:  No pertinent family history in first degree relatives        Home Medications:  acyclovir 400 mg oral tablet: 1 tab(s) orally 2 times a day (08 Nov 2022 15:36)  amLODIPine 5 mg oral tablet: 1 tab(s) orally once a day (09 Nov 2022 13:00)  buPROPion 150 mg/12 hours (SR) oral tablet, extended release: 1 tab(s) orally once a day (08 Nov 2022 15:36)  fluconazole 200 mg oral tablet: 1 tab(s) orally once a day (08 Nov 2022 15:36)  gabapentin 100 mg oral tablet: 1 tab(s) orally once a day (08 Nov 2022 15:36)  levoFLOXacin 500 mg oral tablet: 1 tab(s) orally every 24 hours (08 Nov 2022 15:36)  oxybutynin 5 mg oral tablet: 1 tab(s) orally once a day (08 Nov 2022 15:36)  traZODone 150 mg oral tablet: 1 tab(s) orally once a day (at bedtime) (08 Nov 2022 15:36)      Social History:  She lives with her . Her  has prostate cancer and neuropathy. She is concerned about his health too.   She has an aide who assists 4 hrs x 3 days a week.   She ambulates with a cane or walker. She has not had any recent falls.   She wears a Life Alert pendant.   She denies tobacco or drug use. She has one serving of alcohol a day. (08 Nov 2022 13:07)      Medications:  acetaminophen     Tablet .. 650 milliGRAM(s) Oral every 6 hours PRN  acyclovir   Oral Tab/Cap 400 milliGRAM(s) Oral two times a day  albuterol/ipratropium for Nebulization 3 milliLiter(s) Nebulizer every 6 hours  aluminum hydroxide/magnesium hydroxide/simethicone Suspension 30 milliLiter(s) Oral every 4 hours PRN  benzonatate 100 milliGRAM(s) Oral every 8 hours PRN  Biotene Dry Mouth Oral Rinse 15 milliLiter(s) Swish and Spit three times a day  budesonide  80 MICROgram(s)/formoterol 4.5 MICROgram(s) Inhaler 2 Puff(s) Inhalation two times a day  buPROPion XL (24-Hour) . 150 milliGRAM(s) Oral daily  cefepime   IVPB 2000 milliGRAM(s) IV Intermittent every 12 hours  chlorhexidine 2% Cloths 1 Application(s) Topical daily  dexAMETHasone  Injectable 2.5 milliGRAM(s) IV Push every 12 hours  gabapentin 100 milliGRAM(s) Oral daily  gilteritinib 120 milliGRAM(s) Oral <User Schedule>  melatonin 3 milliGRAM(s) Oral at bedtime PRN  ondansetron Injectable 4 milliGRAM(s) IV Push every 8 hours PRN  oxybutynin 5 milliGRAM(s) Oral daily  polyethylene glycol 3350 17 Gram(s) Oral two times a day  senna 2 Tablet(s) Oral at bedtime  sodium chloride 0.65% Nasal 1 Spray(s) Both Nostrils four times a day  sodium chloride 0.9%. 1000 milliLiter(s) IV Continuous <Continuous>  traZODone 150 milliGRAM(s) Oral at bedtime  voriconazole 200 milliGRAM(s) Oral every 12 hours      Review of systems:  10 point review of systems completed and are negative unless noted in HPI    Vitals:  T(C): 36.3 (11-18-22 @ 05:27), Max: 36.5 (11-17-22 @ 13:00)  HR: 69 (11-18-22 @ 05:27) (66 - 76)  BP: 152/82 (11-18-22 @ 05:27) (123/71 - 152/82)  BP(mean): --  RR: 18 (11-18-22 @ 05:27) (18 - 18)  SpO2: 95% (11-18-22 @ 05:27) (95% - 99%)    Daily     Daily         I&O's Summary    17 Nov 2022 07:01  -  18 Nov 2022 07:00  --------------------------------------------------------  IN: 1402 mL / OUT: 1600 mL / NET: -198 mL        Physical Exam:  Appearance: No apparent distress  HEENT: moist mucosa, anicteric sclerae.   Neck: No JVD  Cardiovascular: regular rate and rhythm. systolic murmur, No gallop. No friction rub.  Respiratory: Clear to auscultation bilaterally  Gastrointestinal: Soft, Non-tender	  Skin: no clubbing or cyanosis.   Neurologic: No focal deficit  Extremities: warm. trace edema.  Psychiatry: A & O x 3, Mood & affect appropriate  Vascular: pulse regular, equal    Labs:                        7.5    0.07  )-----------( 12       ( 17 Nov 2022 07:29 )             22.5     11-17    139  |  108  |  21  ----------------------------<  110<H>  4.0   |  23  |  0.54    Ca    7.8<L>      17 Nov 2022 07:40  Phos  3.1     11-17  Mg     2.1     11-17    TPro  5.1<L>  /  Alb  2.8<L>  /  TBili  0.4  /  DBili  x   /  AST  23  /  ALT  41  /  AlkPhos  49  11-17    PT/INR - ( 17 Nov 2022 07:29 )   PT: 14.8 sec;   INR: 1.27 ratio         PTT - ( 17 Nov 2022 07:29 )  PTT:26.9 sec        Serum Pro-Brain Natriuretic Peptide: 7165 pg/mL [0 - 300] (11-17-22 @ 07:40)  Serum Pro-Brain Natriuretic Peptide: 9379 pg/mL [0 - 300] (11-16-22 @ 12:36)      TELEMETRY:    Echocardiogram:        EKG:     CXR/Radiology:

## 2022-11-18 NOTE — PROGRESS NOTE ADULT - PROBLEM SELECTOR PLAN 7
Increased o2 requirement.   11/12 CCT Scattered nodular and small consolidative opacities peripherally and   bilateral lower lobes; findings are favored to represent aspiration   and/or infection.  Continue with Duoneb  11/14 symbicort added gentle diuresis as tolerated Continue  home dose of trazodone 150mg PO qhs.   Continue  home bupropion 150mg PO q daily.

## 2022-11-18 NOTE — PROGRESS NOTE ADULT - SUBJECTIVE AND OBJECTIVE BOX
Diagnosis: relapsed AML, FLT3 ITD+, NPM1, CEBPA, ASXL1 mutated    Protocol/Chemo Regimen: daily oral FLT3 inhibitor gilteritinib (cytarabine 100mg/m2 x 3 days continuous infusion prior to start of gilteritinib)    Day: 9 cytarabine; Day 7 gilteritinib     Pt endorsed:     Review of Systems:     Pain scale: 0                                           Diet: DASH/TLC    Allergies    No Known Allergies    Intolerances      MEDICATIONS  (STANDING):  acyclovir   Oral Tab/Cap 400 milliGRAM(s) Oral two times a day  albuterol/ipratropium for Nebulization 3 milliLiter(s) Nebulizer every 6 hours  Biotene Dry Mouth Oral Rinse 15 milliLiter(s) Swish and Spit three times a day  budesonide  80 MICROgram(s)/formoterol 4.5 MICROgram(s) Inhaler 2 Puff(s) Inhalation two times a day  buPROPion XL (24-Hour) . 150 milliGRAM(s) Oral daily  cefepime   IVPB 2000 milliGRAM(s) IV Intermittent every 12 hours  chlorhexidine 2% Cloths 1 Application(s) Topical daily  dexAMETHasone  Injectable 2.5 milliGRAM(s) IV Push every 12 hours  gabapentin 100 milliGRAM(s) Oral daily  gilteritinib 120 milliGRAM(s) Oral <User Schedule>  oxybutynin 5 milliGRAM(s) Oral daily  polyethylene glycol 3350 17 Gram(s) Oral two times a day  senna 2 Tablet(s) Oral at bedtime  sodium chloride 0.65% Nasal 1 Spray(s) Both Nostrils four times a day  sodium chloride 0.9%. 1000 milliLiter(s) (20 mL/Hr) IV Continuous <Continuous>  traZODone 150 milliGRAM(s) Oral at bedtime  voriconazole 200 milliGRAM(s) Oral every 12 hours    MEDICATIONS  (PRN):  acetaminophen     Tablet .. 650 milliGRAM(s) Oral every 6 hours PRN Temp greater or equal to 38C (100.4F), Mild Pain (1 - 3)  aluminum hydroxide/magnesium hydroxide/simethicone Suspension 30 milliLiter(s) Oral every 4 hours PRN Dyspepsia  benzonatate 100 milliGRAM(s) Oral every 8 hours PRN Cough  melatonin 3 milliGRAM(s) Oral at bedtime PRN Insomnia  ondansetron Injectable 4 milliGRAM(s) IV Push every 8 hours PRN Nausea and/or Vomiting      Vital Signs Last 24 Hrs  T(C): 36.3 (18 Nov 2022 05:27), Max: 36.5 (17 Nov 2022 13:00)  T(F): 97.4 (18 Nov 2022 05:27), Max: 97.7 (17 Nov 2022 13:00)  HR: 69 (18 Nov 2022 05:27) (66 - 76)  BP: 152/82 (18 Nov 2022 05:27) (123/71 - 152/82)  BP(mean): --  RR: 18 (18 Nov 2022 05:27) (18 - 18)  SpO2: 95% (18 Nov 2022 05:27) (95% - 99%)    Parameters below as of 18 Nov 2022 05:27  Patient On (Oxygen Delivery Method): nasal cannula  O2 Flow (L/min): 3    I&O's Summary    17 Nov 2022 07:01  -  18 Nov 2022 07:00  --------------------------------------------------------  IN: 1402 mL / OUT: 1600 mL / NET: -198 mL      PHYSICAL EXAM  General: Sitting up in bed NAD  HEENT: sclerae anicteric, clear oropharynx  CV: (+) S1/S2 RRR  Lungs: +bibasilar crackles, no wheezes   Abdomen:+ BS, soft, +distended, non-tender  Ext: trace edema BLE's   Skin: no rash, left arm bruising, non tender  Neuro: alert and oriented X 3  Central Line: LCW Mediport  and PIV CDI      LABS:                 ---------        Micro/Cx's    Culture - Urine (11.15.22 @ 15:29)   Specimen Source: Clean Catch Clean Catch (Midstream)   Culture Results: No growth     Culture - Blood (11.13.22 @ 16:10)   Specimen Source: .Blood Blood-Catheter   Culture Results: No growth to date.   Culture - Blood (11.12.22 @ 15:49)   Specimen Source: .Blood Blood-Catheter   Culture Results: No growth to date.    Diagnosis: relapsed AML, FLT3 ITD+, NPM1, CEBPA, ASXL1 mutated    Protocol/Chemo Regimen: daily oral FLT3 inhibitor gilteritinib (cytarabine 100mg/m2 x 3 days continuous infusion prior to start of gilteritinib)    Day: 9 cytarabine; Day 7 gilteritinib     Pt endorsed: No overnight events, afebrile, +BM (x2) overnight    Review of Systems: Denies SOB, CP, n/v, HA or dizziness    Pain scale: 0                                           Diet: DASH/TLC    Allergies    No Known Allergies    Intolerances      MEDICATIONS  (STANDING):  acyclovir   Oral Tab/Cap 400 milliGRAM(s) Oral two times a day  albuterol/ipratropium for Nebulization 3 milliLiter(s) Nebulizer every 6 hours  Biotene Dry Mouth Oral Rinse 15 milliLiter(s) Swish and Spit three times a day  budesonide  80 MICROgram(s)/formoterol 4.5 MICROgram(s) Inhaler 2 Puff(s) Inhalation two times a day  buPROPion XL (24-Hour) . 150 milliGRAM(s) Oral daily  cefepime   IVPB 2000 milliGRAM(s) IV Intermittent every 12 hours  chlorhexidine 2% Cloths 1 Application(s) Topical daily  dexAMETHasone  Injectable 2.5 milliGRAM(s) IV Push every 12 hours  gabapentin 100 milliGRAM(s) Oral daily  gilteritinib 120 milliGRAM(s) Oral <User Schedule>  oxybutynin 5 milliGRAM(s) Oral daily  polyethylene glycol 3350 17 Gram(s) Oral two times a day  senna 2 Tablet(s) Oral at bedtime  sodium chloride 0.65% Nasal 1 Spray(s) Both Nostrils four times a day  sodium chloride 0.9%. 1000 milliLiter(s) (20 mL/Hr) IV Continuous <Continuous>  traZODone 150 milliGRAM(s) Oral at bedtime  voriconazole 200 milliGRAM(s) Oral every 12 hours    MEDICATIONS  (PRN):  acetaminophen     Tablet .. 650 milliGRAM(s) Oral every 6 hours PRN Temp greater or equal to 38C (100.4F), Mild Pain (1 - 3)  aluminum hydroxide/magnesium hydroxide/simethicone Suspension 30 milliLiter(s) Oral every 4 hours PRN Dyspepsia  benzonatate 100 milliGRAM(s) Oral every 8 hours PRN Cough  melatonin 3 milliGRAM(s) Oral at bedtime PRN Insomnia  ondansetron Injectable 4 milliGRAM(s) IV Push every 8 hours PRN Nausea and/or Vomiting      Vital Signs Last 24 Hrs  T(C): 36.3 (18 Nov 2022 05:27), Max: 36.5 (17 Nov 2022 13:00)  T(F): 97.4 (18 Nov 2022 05:27), Max: 97.7 (17 Nov 2022 13:00)  HR: 69 (18 Nov 2022 05:27) (66 - 76)  BP: 152/82 (18 Nov 2022 05:27) (123/71 - 152/82)  BP(mean): --  RR: 18 (18 Nov 2022 05:27) (18 - 18)  SpO2: 95% (18 Nov 2022 05:27) (95% - 99%)    Parameters below as of 18 Nov 2022 05:27  Patient On (Oxygen Delivery Method): nasal cannula  O2 Flow (L/min): 3    I&O's Summary    17 Nov 2022 07:01  -  18 Nov 2022 07:00  --------------------------------------------------------  IN: 1402 mL / OUT: 1600 mL / NET: -198 mL      PHYSICAL EXAM  General: Sitting up in bed NAD  HEENT: sclerae anicteric, clear oropharynx  CV: (+) S1/S2 RRR  Lungs: +mild bibasilar crackles, decreased bs at bases, no wheezes   Abdomen:+ BS, soft, +distended, non-tender  Ext: trace edema BLE's   Skin: no rash, left arm bruising, non tender  Neuro: alert and oriented X 3  Central Line: LCW Mediport  and PIV CDI      LABS:                        7.9    <0.1  )-----------( 7        ( 18 Nov 2022 07:16 )             23.9     18 Nov 2022 07:16    138    |  105    |  24     ----------------------------<  132    3.7     |  23     |  0.56     Ca    8.2        18 Nov 2022 07:16  Phos  3.4       18 Nov 2022 07:16  Mg     2.2       18 Nov 2022 07:16    TPro  5.1    /  Alb  2.8    /  TBili  0.3    /  DBili  x      /  AST  20     /  ALT  50     /  AlkPhos  56     18 Nov 2022 07:16    PT/INR - ( 18 Nov 2022 07:18 )   PT: 14.8 sec;   INR: 1.28 ratio    PTT - ( 18 Nov 2022 07:18 )  PTT:25.5 sec      LIVER FUNCTIONS - ( 18 Nov 2022 07:16 )  Alb: 2.8 g/dL / Pro: 5.1 g/dL / ALK PHOS: 56 U/L / ALT: 50 U/L / AST: 20 U/L / GGT: x             Micro/Cx's    Culture - Urine (11.15.22 @ 15:29)   Specimen Source: Clean Catch Clean Catch (Midstream)   Culture Results: No growth     Culture - Blood (11.13.22 @ 16:10)   Specimen Source: .Blood Blood-Catheter   Culture Results: No growth to date.   Culture - Blood (11.12.22 @ 15:49)   Specimen Source: .Blood Blood-Catheter   Culture Results: No growth to date.    Diagnosis: relapsed AML, FLT3 ITD+, NPM1, CEBPA, ASXL1 mutated    Protocol/Chemo Regimen: daily oral FLT3 inhibitor gilteritinib (cytarabine 100mg/m2 x 3 days continuous infusion prior to start of gilteritinib)    Day: 9 cytarabine; Day 7 gilteritinib     Pt endorsed: No overnight events, afebrile, +BM (x2) overnight    Review of Systems: Denies SOB, CP, n/v, HA or dizziness    Pain scale: 0                                           Diet: DASH/TLC    Allergies  No Known Allergies    Intolerances      MEDICATIONS  (STANDING):  acyclovir   Oral Tab/Cap 400 milliGRAM(s) Oral two times a day  albuterol/ipratropium for Nebulization 3 milliLiter(s) Nebulizer every 6 hours  Biotene Dry Mouth Oral Rinse 15 milliLiter(s) Swish and Spit three times a day  budesonide  80 MICROgram(s)/formoterol 4.5 MICROgram(s) Inhaler 2 Puff(s) Inhalation two times a day  buPROPion XL (24-Hour) . 150 milliGRAM(s) Oral daily  cefepime   IVPB 2000 milliGRAM(s) IV Intermittent every 12 hours  chlorhexidine 2% Cloths 1 Application(s) Topical daily  dexAMETHasone  Injectable 2.5 milliGRAM(s) IV Push every 12 hours  gabapentin 100 milliGRAM(s) Oral daily  gilteritinib 120 milliGRAM(s) Oral <User Schedule>  oxybutynin 5 milliGRAM(s) Oral daily  polyethylene glycol 3350 17 Gram(s) Oral two times a day  senna 2 Tablet(s) Oral at bedtime  sodium chloride 0.65% Nasal 1 Spray(s) Both Nostrils four times a day  sodium chloride 0.9%. 1000 milliLiter(s) (20 mL/Hr) IV Continuous <Continuous>  traZODone 150 milliGRAM(s) Oral at bedtime  voriconazole 200 milliGRAM(s) Oral every 12 hours    MEDICATIONS  (PRN):  acetaminophen     Tablet .. 650 milliGRAM(s) Oral every 6 hours PRN Temp greater or equal to 38C (100.4F), Mild Pain (1 - 3)  aluminum hydroxide/magnesium hydroxide/simethicone Suspension 30 milliLiter(s) Oral every 4 hours PRN Dyspepsia  benzonatate 100 milliGRAM(s) Oral every 8 hours PRN Cough  melatonin 3 milliGRAM(s) Oral at bedtime PRN Insomnia  ondansetron Injectable 4 milliGRAM(s) IV Push every 8 hours PRN Nausea and/or Vomiting      Vital Signs Last 24 Hrs  T(C): 36.3 (18 Nov 2022 05:27), Max: 36.5 (17 Nov 2022 13:00)  T(F): 97.4 (18 Nov 2022 05:27), Max: 97.7 (17 Nov 2022 13:00)  HR: 69 (18 Nov 2022 05:27) (66 - 76)  BP: 152/82 (18 Nov 2022 05:27) (123/71 - 152/82)  BP(mean): --  RR: 18 (18 Nov 2022 05:27) (18 - 18)  SpO2: 95% (18 Nov 2022 05:27) (95% - 99%)    Parameters below as of 18 Nov 2022 05:27  Patient On (Oxygen Delivery Method): nasal cannula  O2 Flow (L/min): 3    I&O's Summary    17 Nov 2022 07:01  -  18 Nov 2022 07:00  --------------------------------------------------------  IN: 1402 mL / OUT: 1600 mL / NET: -198 mL      PHYSICAL EXAM  General: Sitting up in bed NAD  HEENT: sclerae anicteric, clear oropharynx  CV: (+) S1/S2 RRR  Lungs: +mild bibasilar crackles, decreased bs at bases, no wheezes   Abdomen:+ BS, soft, +distended, non-tender  Ext: trace edema BLE's   Skin: no rash, left arm bruising, non tender  Neuro: alert and oriented X 3  Central Line: LCW Mediport  and PIV CDI      LABS:                        7.9    <0.1  )-----------( 7        ( 18 Nov 2022 07:16 )             23.9     18 Nov 2022 07:16    138    |  105    |  24     ----------------------------<  132    3.7     |  23     |  0.56     Ca    8.2        18 Nov 2022 07:16  Phos  3.4       18 Nov 2022 07:16  Mg     2.2       18 Nov 2022 07:16    TPro  5.1    /  Alb  2.8    /  TBili  0.3    /  DBili  x      /  AST  20     /  ALT  50     /  AlkPhos  56     18 Nov 2022 07:16    PT/INR - ( 18 Nov 2022 07:18 )   PT: 14.8 sec;   INR: 1.28 ratio    PTT - ( 18 Nov 2022 07:18 )  PTT:25.5 sec      LIVER FUNCTIONS - ( 18 Nov 2022 07:16 )  Alb: 2.8 g/dL / Pro: 5.1 g/dL / ALK PHOS: 56 U/L / ALT: 50 U/L / AST: 20 U/L / GGT: x             Micro/Cx's    Culture - Urine (11.15.22 @ 15:29)   Specimen Source: Clean Catch Clean Catch (Midstream)   Culture Results: No growth     Culture - Blood (11.13.22 @ 16:10)   Specimen Source: .Blood Blood-Catheter   Culture Results: No growth to date.   Culture - Blood (11.12.22 @ 15:49)   Specimen Source: .Blood Blood-Catheter   Culture Results: No growth to date.

## 2022-11-18 NOTE — PROGRESS NOTE ADULT - NS ATTEND AMEND GEN_ALL_CORE FT
Vital Signs Last 24 Hrs  T(C): 36.3 (17 Nov 2022 09:00), Max: 37 (16 Nov 2022 17:05)  T(F): 97.4 (17 Nov 2022 09:00), Max: 98.6 (16 Nov 2022 17:05)  HR: 66 (17 Nov 2022 09:00) (65 - 89)  BP: 139/72 (17 Nov 2022 09:00) (135/73 - 151/81)  BP(mean): --  RR: 18 (17 Nov 2022 09:00) (18 - 18)  SpO2: 98% (17 Nov 2022 09:00) (95% - 98%)    Parameters below as of 17 Nov 2022 09:00  Patient On (Oxygen Delivery Method): nasal cannula    Parameters below as of 16 Nov 2022 09:50  Patient On (Oxygen Delivery Method): room air    MEDICATIONS  (STANDING):  acyclovir   Oral Tab/Cap 400 milliGRAM(s) Oral two times a day  albuterol/ipratropium for Nebulization 3 milliLiter(s) Nebulizer every 6 hours  allopurinol 300 milliGRAM(s) Oral daily  Biotene Dry Mouth Oral Rinse 15 milliLiter(s) Swish and Spit three times a day  budesonide  80 MICROgram(s)/formoterol 4.5 MICROgram(s) Inhaler 2 Puff(s) Inhalation two times a day  buPROPion XL (24-Hour) . 150 milliGRAM(s) Oral daily  cefepime   IVPB 2000 milliGRAM(s) IV Intermittent every 12 hours  chlorhexidine 2% Cloths 1 Application(s) Topical daily  dexAMETHasone  Injectable 2.5 milliGRAM(s) IV Push every 12 hours  furosemide   Injectable 40 milliGRAM(s) IV Push once  gabapentin 100 milliGRAM(s) Oral daily  gilteritinib 120 milliGRAM(s) Oral <User Schedule>  oxybutynin 5 milliGRAM(s) Oral daily  polyethylene glycol 3350 17 Gram(s) Oral two times a day  sodium chloride 0.65% Nasal 1 Spray(s) Both Nostrils four times a day  sodium chloride 0.9%. 1000 milliLiter(s) (60 mL/Hr) IV Continuous <Continuous>  traZODone 150 milliGRAM(s) Oral at bedtime  voriconazole 200 milliGRAM(s) Oral every 12 hours                            7.5    0.07  )-----------( 12       ( 17 Nov 2022 07:29 )             22.5                        was transiently hypotensive 11/12 >>>midodrine  intermittently febrile >>> afeb last 24 hrs  c/o incr dyspnea less now  has mod AS on echo; appreciate Cardiology input  TTE -repeat-shows EF reduced to 39% with abnl wall motion  CT lung nodules,  ? infection vs aspiration     Cardiac MRI being considered  addl Cardiology input appreciated, to be seen later today  Pulm status stable today     Assessment: 81 yo day + 6 gilteritinib post HU/Scarlet-C cytoreduction for progressive FLT-3, NPM1, ASXL1, WT1, CEBPA AML.  Course complicated S. epi bacteremia? (11/9/22), continuous neutropenic fevers, pneumonia (concerning for fungus) and hypotension (11/12/22), fluid overloaded state (11/13/22 - )      ID: cont present abs, d/c vanco, ID input appreciated     Onc History:  decitabine/lisa through 27 cycles,    Plan:  Heme: PLT goal > 10,000; Hgb goal > 7.0g/dL;      d/c allopurinol   Continue gilteritinib with dex (to reduce the incidence of differentiation syndrome), 2.5 mg 2x/d       ID: cefepime, vori, acyclovir; check vori level    peripheral blood fungitell neg and galactomannan (11/12/22): pending    Radiographs: CT (11/12/22): Scattered nodular and small consolidative opacities peripherally and bilateral lower lobes; findings are favored to represent aspiration and/or infection.  Indeterminate right hepatic lobe 2 cm lesion and right adrenal 2.3 cm nodule; recommend correlation with MRI abdomen to more accurately characterize.  CXR 11/15/22 right basilar, retrocardiac infiltrates    Pulmonary:  symbicort added, may have been helpful  (history of reactive airway disease)     Nutrition: tolerating PO; cont net diuresis today.   cont to follow QTc (on vfend and gilteritinib)  DVT prophylaxis: ambulation.     Code status: DNR/DNI    Over 35 minutes were spent in direct patient care and care coordination. Relapsed AML  Onc History:  decitabine/lisa through 27 cycles,  now on gilteritinib post cytoreduction with HU and Scarlet-C    Vital Signs Last 24 Hrs  T(C): 36.6 (18 Nov 2022 10:10), Max: 36.6 (18 Nov 2022 10:10)  T(F): 97.9 (18 Nov 2022 10:10), Max: 97.9 (18 Nov 2022 10:10)  HR: 73 (18 Nov 2022 10:10) (67 - 76)  BP: 144/77 (18 Nov 2022 10:10) (123/71 - 152/82)  BP(mean): --  RR: 18 (18 Nov 2022 10:10) (18 - 18)  SpO2: 96% (18 Nov 2022 10:10) (95% - 99%)    Parameters below as of 18 Nov 2022 10:10  Patient On (Oxygen Delivery Method): nasal cannula w/ humidification  O2 Flow (L/min): 2    MEDICATIONS  (STANDING):  acyclovir   Oral Tab/Cap 400 milliGRAM(s) Oral two times a day  albuterol/ipratropium for Nebulization 3 milliLiter(s) Nebulizer every 6 hours  Biotene Dry Mouth Oral Rinse 15 milliLiter(s) Swish and Spit three times a day  budesonide  80 MICROgram(s)/formoterol 4.5 MICROgram(s) Inhaler 2 Puff(s) Inhalation two times a day  buPROPion XL (24-Hour) . 150 milliGRAM(s) Oral daily  cefepime   IVPB 2000 milliGRAM(s) IV Intermittent every 12 hours  chlorhexidine 2% Cloths 1 Application(s) Topical daily  dexAMETHasone  Injectable 2.5 milliGRAM(s) IV Push every 12 hours  gabapentin 100 milliGRAM(s) Oral daily  gilteritinib 120 milliGRAM(s) Oral <User Schedule>  oxybutynin 5 milliGRAM(s) Oral daily  polyethylene glycol 3350 17 Gram(s) Oral two times a day  potassium chloride    Tablet ER 20 milliEquivalent(s) Oral every 2 hours  senna 2 Tablet(s) Oral at bedtime  sodium chloride 0.65% Nasal 1 Spray(s) Both Nostrils four times a day  sodium chloride 0.9%. 1000 milliLiter(s) (20 mL/Hr) IV Continuous <Continuous>  traZODone 150 milliGRAM(s) Oral at bedtime  voriconazole 200 milliGRAM(s) Oral every 12 hours                      7.9    <0.1  )-----------( 7        ( 18 Nov 2022 07:16 )             23.9     was transiently hypotensive 11/12 >>>midodrine  intermittently febrile >>> afeb last 24 hrs  dyspnea less now  has mod AS on echo; appreciate Cardiology input  TTE -repeat-shows EF reduced to 39% with abnl wall motion  CT lung nodules,  ? infection vs aspiration     Cardiac MRI being considered  Pulm status stable today     Assessment: 81 yo day + 7 gilteritinib post HU/Scarlet-C cytoreduction for progressive FLT-3, NPM1, ASXL1, WT1, CEBPA AML.  Course complicated S. epi bacteremia? (11/9/22), had continuous neutropenic fevers, which resolved after 11/13/22; pneumonia (concerning for fungus) and hypotension (11/12/22), fluid overloaded state (11/13/22 - )      ID: cont present abs, d/c vanco, ID input appreciated     Plan:  Heme: PLT goal > 10,000; Hgb goal > 7.0g/dL;      Continue gilteritinib with dex (to reduce the incidence of differentiation syndrome), 2.5 mg 2x/d    ID: cefepime, vori, acyclovir; check vori level    peripheral blood fungitell neg and galactomannan (11/12/22) 0.03 (wnl)    Radiographs: CT (11/12/22): Scattered nodular and small consolidative opacities peripherally and bilateral lower lobes; findings are favored to represent aspiration and/or infection.  Indeterminate right hepatic lobe 2 cm lesion and right adrenal 2.3 cm nodule; recommend correlation with MRI abdomen to more accurately characterize.  CXR 11/15/22 right basilar, retrocardiac infiltrates    Pulmonary:  symbicort added, may have been helpful  (history of reactive airway disease)     Nutrition: tolerating PO; cont net diuresis today.   cont to follow QTc (on vfend and gilteritinib)  DVT prophylaxis: ambulation.     Code status: DNR/DNI    Over 35 minutes were spent in direct patient care and care coordination. Relapsed AML  Onc History:  decitabine/lisa through 27 cycles,  now on gilteritinib post cytoreduction with HU and Scarlet-C    Vital Signs Last 24 Hrs  T(C): 36.6 (18 Nov 2022 10:10), Max: 36.6 (18 Nov 2022 10:10)  T(F): 97.9 (18 Nov 2022 10:10), Max: 97.9 (18 Nov 2022 10:10)  HR: 73 (18 Nov 2022 10:10) (67 - 76)  BP: 144/77 (18 Nov 2022 10:10) (123/71 - 152/82)  BP(mean): --  RR: 18 (18 Nov 2022 10:10) (18 - 18)  SpO2: 96% (18 Nov 2022 10:10) (95% - 99%)    Parameters below as of 18 Nov 2022 10:10  Patient On (Oxygen Delivery Method): nasal cannula w/ humidification  O2 Flow (L/min): 2    MEDICATIONS  (STANDING):  acyclovir   Oral Tab/Cap 400 milliGRAM(s) Oral two times a day  albuterol/ipratropium for Nebulization 3 milliLiter(s) Nebulizer every 6 hours  Biotene Dry Mouth Oral Rinse 15 milliLiter(s) Swish and Spit three times a day  budesonide  80 MICROgram(s)/formoterol 4.5 MICROgram(s) Inhaler 2 Puff(s) Inhalation two times a day  buPROPion XL (24-Hour) . 150 milliGRAM(s) Oral daily  cefepime   IVPB 2000 milliGRAM(s) IV Intermittent every 12 hours  chlorhexidine 2% Cloths 1 Application(s) Topical daily  dexAMETHasone  Injectable 2.5 milliGRAM(s) IV Push every 12 hours  gabapentin 100 milliGRAM(s) Oral daily  gilteritinib 120 milliGRAM(s) Oral <User Schedule>  oxybutynin 5 milliGRAM(s) Oral daily  polyethylene glycol 3350 17 Gram(s) Oral two times a day  potassium chloride    Tablet ER 20 milliEquivalent(s) Oral every 2 hours  senna 2 Tablet(s) Oral at bedtime  sodium chloride 0.65% Nasal 1 Spray(s) Both Nostrils four times a day  sodium chloride 0.9%. 1000 milliLiter(s) (20 mL/Hr) IV Continuous <Continuous>  traZODone 150 milliGRAM(s) Oral at bedtime  voriconazole 200 milliGRAM(s) Oral every 12 hours                      7.9    <0.1  )-----------( 7        ( 18 Nov 2022 07:16 )             23.9     was transiently hypotensive 11/12 >>>midodrine  intermittently febrile >>> afeb last 24 hrs  dyspnea less now  has mod AS on echo; appreciate Cardiology input  TTE -repeat-shows EF reduced to 39% with abnl wall motion  CT lung nodules,  ? infection vs aspiration     Cardiac MRI being considered  Pulm status stable today     Assessment: 81 yo day + 7 gilteritinib post HU/Scarlet-C cytoreduction for progressive FLT-3, NPM1, ASXL1, WT1, CEBPA AML.  Course complicated S. epi bacteremia? (11/9/22), had continuous neutropenic fevers, which resolved after 11/13/22; pneumonia (concerning for fungus) and hypotension (11/12/22), fluid overloaded state (11/13/22 - )      ID: cont present abs, d/c vanco, ID input appreciated     Plan:  Heme: PLT goal > 10,000; Hgb goal > 7.0g/dL;      Continue gilteritinib with dex (to reduce the incidence of differentiation syndrome), 2.5 mg 2x/d    ID: cefepime, vori, acyclovir; check vori level    peripheral blood fungitell neg and galactomannan (11/12/22) 0.03 (wnl)    Radiographs: CT (11/12/22): Scattered nodular and small consolidative opacities peripherally and bilateral lower lobes; findings are favored to represent aspiration and/or infection.  Indeterminate right hepatic lobe 2 cm lesion and right adrenal 2.3 cm nodule; recommend correlation with MRI abdomen to more accurately characterize.  CXR 11/15/22 right basilar, retrocardiac infiltrates    Pulmonary: symbicort added, may have been helpful  (history of reactive airway disease)     Nutrition: tolerating PO; cont net diuresis today.   cont to follow QTc (on vfend and gilteritinib)  DVT prophylaxis: ambulation.     Code status: DNR/DNI    Over 35 minutes were spent in direct patient care and care coordination.

## 2022-11-18 NOTE — PROGRESS NOTE ADULT - ASSESSMENT
82F relapsed AML.   Neutropenic fevers.   Maybe pneumonia. Small bibasilar opacities. Negative RVP, MRSA PCR, fungal markers. Cough resolved.   Had diarrhea, resolved too.   CoNS on blood culture likely contaminant. Noted mediport.   Clinically stable, fevers stopped since 11/13.     Suggest  -empiric Cefepime 2GM q12h and Voriconazole 200mg q12h - trough at goal 2.6   -monitor cultures   -will considering narrowing next week     Bucky Bobby MD   Infectious Disease   Available on TEAMS. After 5PM and on weekends please page fellow on call or call [phone]

## 2022-11-18 NOTE — PROGRESS NOTE ADULT - PROBLEM SELECTOR PLAN 5
Continue oxybutynin 5mg qdaily. TTE  11/9 - EF 70% mild AS   11/11 EKG no changes  11/12 elevated trop in setting of hypotension.   11/14 Cardiology consult   11/15 Repeat echo showed decrease in EF 39% with wall motion abnormality.  11/15 Cxr given change in clinical status-New streaky right basilar and retrocardiac opacities, may be infectious in appropriate clinical setting. TTE  11/9 - EF 70% mild AS   11/11 EKG no changes  11/12 elevated trop in setting of hypotension  11/14 Cardiology consult   11/15 Repeat echo showed decrease in EF 39% with wall motion abnormality.  11/15 CXR given change in clinical status-New streaky right basilar and retrocardiac opacities, may be infectious in appropriate clinical setting.

## 2022-11-18 NOTE — PROGRESS NOTE ADULT - PROBLEM SELECTOR PLAN 8
TTE  11/9 - EF 70% mild AS   11/11 EKG no changes  11/12 elevated trop in setting of hypotension.   11/14 Cardiology consult   11/15 Repeat echo showed decrease in EF 39% with wall motion abnormality.  11/15 Cxr given change in clinical status-New streaky right basilar and retrocardiac opacities, may be infectious in appropriate clinical setting. Continue oxybutynin 5mg qdaily.

## 2022-11-18 NOTE — PROGRESS NOTE ADULT - SUBJECTIVE AND OBJECTIVE BOX
Follow Up: neutropenic fevers    Interval History/ROS: Afebrile. Just tired. No cough or diarrhea or dysuria. No pain.     Allergies  No Known Allergies        ANTIMICROBIALS:  acyclovir   Oral Tab/Cap 400 two times a day  cefepime   IVPB 2000 every 12 hours  voriconazole 200 every 12 hours      OTHER MEDS:  acetaminophen     Tablet .. 650 milliGRAM(s) Oral every 6 hours PRN  albuterol/ipratropium for Nebulization 3 milliLiter(s) Nebulizer every 6 hours  aluminum hydroxide/magnesium hydroxide/simethicone Suspension 30 milliLiter(s) Oral every 4 hours PRN  atorvastatin 10 milliGRAM(s) Oral at bedtime  benzonatate 100 milliGRAM(s) Oral every 8 hours PRN  Biotene Dry Mouth Oral Rinse 15 milliLiter(s) Swish and Spit three times a day  budesonide  80 MICROgram(s)/formoterol 4.5 MICROgram(s) Inhaler 2 Puff(s) Inhalation two times a day  buPROPion XL (24-Hour) . 150 milliGRAM(s) Oral daily  chlorhexidine 2% Cloths 1 Application(s) Topical daily  furosemide   Injectable 40 milliGRAM(s) IV Push once  gabapentin 100 milliGRAM(s) Oral daily  gilteritinib 120 milliGRAM(s) Oral <User Schedule>  melatonin 3 milliGRAM(s) Oral at bedtime PRN  ondansetron Injectable 4 milliGRAM(s) IV Push every 8 hours PRN  oxybutynin 5 milliGRAM(s) Oral daily  polyethylene glycol 3350 17 Gram(s) Oral two times a day  senna 2 Tablet(s) Oral at bedtime  sodium chloride 0.65% Nasal 1 Spray(s) Both Nostrils four times a day  sodium chloride 0.9%. 1000 milliLiter(s) IV Continuous <Continuous>  traZODone 150 milliGRAM(s) Oral at bedtime      Vital Signs Last 24 Hrs  T(C): 36.7 (18 Nov 2022 12:15), Max: 36.7 (18 Nov 2022 12:15)  T(F): 98 (18 Nov 2022 12:15), Max: 98 (18 Nov 2022 12:15)  HR: 70 (18 Nov 2022 12:15) (67 - 73)  BP: 136/71 (18 Nov 2022 12:15) (131/77 - 152/82)  BP(mean): --  RR: 18 (18 Nov 2022 12:15) (18 - 18)  SpO2: 94% (18 Nov 2022 12:15) (94% - 97%)    Parameters below as of 18 Nov 2022 12:15  Patient On (Oxygen Delivery Method): nasal cannula        Physical Exam:  General: non toxic  Cardio: regular rate   Respiratory: nonlabored on nasal cannula   abd: nondistended  Musculoskeletal: no focal joint swelling, no edema  vascular: no phlebitis. left chest mediport   Skin: no rash                          7.9    <0.1  )-----------( 7        ( 18 Nov 2022 07:16 )             23.9       11-18    138  |  105  |  24<H>  ----------------------------<  132<H>  3.7   |  23  |  0.56    Ca    8.2<L>      18 Nov 2022 07:16  Phos  3.4     11-18  Mg     2.2     11-18    TPro  5.1<L>  /  Alb  2.8<L>  /  TBili  0.3  /  DBili  x   /  AST  20  /  ALT  50<H>  /  AlkPhos  56  11-18          MICROBIOLOGY:  Culture - Urine (collected 11-15-22 @ 15:29)  Source: Clean Catch Clean Catch (Midstream)  Final Report (11-16-22 @ 14:04):    No growth    Culture - Blood (collected 11-13-22 @ 16:10)  Source: .Blood Blood-Catheter  Preliminary Report (11-15-22 @ 02:02):    No growth to date.    Culture - Blood (collected 11-12-22 @ 15:49)  Source: .Blood Blood-Catheter  Final Report (11-17-22 @ 19:01):    No Growth Final    Rapid RVP Result: NotDetec (11-12 @ 20:43)      RADIOLOGY:  Images below reviewed personally  Xray Abdomen 1 View PORTABLE -Urgent (Xray Abdomen 1 View PORTABLE -Urgent .) (11.18.22 @ 00:36)   No definite bowel obstruction.  Moderate stool in the colon.

## 2022-11-18 NOTE — PROGRESS NOTE ADULT - PROBLEM SELECTOR PLAN 4
Continue  home dose of trazodone 150mg PO qhs.   Continue  home bupropion 150mg PO q daily. Hypotensive 11/12, 11/16 weaning off Midodrine, amlodipine on hold

## 2022-11-19 NOTE — PROGRESS NOTE ADULT - PROBLEM SELECTOR PLAN 4
Hypotensive 11/12, 11/16 weaning off Midodrine, amlodipine on hold Hypotensive 11/12, 11/16 weaned off Midodrine, amlodipine on hold

## 2022-11-19 NOTE — PROGRESS NOTE ADULT - PROBLEM SELECTOR PLAN 5
TTE  11/9 - EF 70% mild AS   11/11 EKG no changes  11/12 elevated trop in setting of hypotension  11/14 Cardiology consult   11/15 Repeat echo showed decrease in EF 39% with wall motion abnormality.  11/15 CXR given change in clinical status-New streaky right basilar and retrocardiac opacities, may be infectious in appropriate clinical setting.

## 2022-11-19 NOTE — PROGRESS NOTE ADULT - SUBJECTIVE AND OBJECTIVE BOX
Diagnosis: relapsed AML, FLT3 ITD+, NPM1, CEBPA, ASXL1 mutated    Protocol/Chemo Regimen: daily oral FLT3 inhibitor gilteritinib (cytarabine 100mg/m2 x 3 days continuous infusion prior to start of gilteritinib)    Day: 10 cytarabine; Day 8 gilteritinib     Pt endorsed:     Review of Systems: Denies SOB, CP, n/v, HA or dizziness    Pain scale: 0                                           Diet: DASH/TLC    Allergies  No Known Allergies    ANTIMICROBIALS  acyclovir   Oral Tab/Cap 400 milliGRAM(s) Oral two times a day  cefepime   IVPB 2000 milliGRAM(s) IV Intermittent every 12 hours  voriconazole 200 milliGRAM(s) Oral every 12 hours      HEME/ONC MEDICATIONS  gilteritinib 120 milliGRAM(s) Oral <User Schedule>      STANDING MEDICATIONS  albuterol/ipratropium for Nebulization 3 milliLiter(s) Nebulizer every 6 hours  atorvastatin 10 milliGRAM(s) Oral at bedtime  Biotene Dry Mouth Oral Rinse 15 milliLiter(s) Swish and Spit three times a day  budesonide  80 MICROgram(s)/formoterol 4.5 MICROgram(s) Inhaler 2 Puff(s) Inhalation two times a day  buPROPion XL (24-Hour) . 150 milliGRAM(s) Oral daily  chlorhexidine 2% Cloths 1 Application(s) Topical daily  furosemide   Injectable 40 milliGRAM(s) IV Push two times a day  gabapentin 100 milliGRAM(s) Oral daily  oxybutynin 5 milliGRAM(s) Oral daily  polyethylene glycol 3350 17 Gram(s) Oral two times a day  senna 2 Tablet(s) Oral at bedtime  sodium chloride 0.65% Nasal 1 Spray(s) Both Nostrils four times a day  sodium chloride 0.9%. 1000 milliLiter(s) IV Continuous <Continuous>  traZODone 150 milliGRAM(s) Oral at bedtime      PRN MEDICATIONS  acetaminophen     Tablet .. 650 milliGRAM(s) Oral every 6 hours PRN  aluminum hydroxide/magnesium hydroxide/simethicone Suspension 30 milliLiter(s) Oral every 4 hours PRN  benzonatate 100 milliGRAM(s) Oral every 8 hours PRN  melatonin 3 milliGRAM(s) Oral at bedtime PRN  ondansetron Injectable 4 milliGRAM(s) IV Push every 8 hours PRN      Vital Signs Last 24 Hrs  T(C): 36.4 (19 Nov 2022 06:33), Max: 36.7 (18 Nov 2022 12:15)  T(F): 97.5 (19 Nov 2022 06:33), Max: 98 (18 Nov 2022 12:15)  HR: 71 (19 Nov 2022 06:33) (68 - 73)  BP: 145/79 (19 Nov 2022 06:33) (113/82 - 146/82)  RR: 18 (19 Nov 2022 06:33) (18 - 18)  SpO2: 98% (19 Nov 2022 06:33) (94% - 98%)    Parameters below as of 19 Nov 2022 06:33  Patient On (Oxygen Delivery Method): nasal cannula  O2 Flow (L/min): 2      PHYSICAL EXAM  General: Sitting up in bed NAD  HEENT: sclerae anicteric, clear oropharynx  CV: (+) S1/S2 RRR  Lungs: +mild bibasilar crackles, decreased bs at bases, no wheezes   Abdomen:+ BS, soft, +distended, non-tender  Ext: trace edema BLE's   Skin: no rash, left arm bruising, non tender  Neuro: alert and oriented X 3  Central Line: LCW Mediport  and PIV CDI      LABS:                         Micro/Cx's  Culture - Urine (11.15.22 @ 15:29)   Specimen Source: Clean Catch Clean Catch (Midstream)   Culture Results: No growth     Culture - Blood (11.13.22 @ 16:10)  Specimen Source: .Blood Blood-Catheter   Culture Results: No growth to date.     Culture - Blood (11.12.22 @ 15:49)   Specimen Source: .Blood Blood-Catheter   Culture Results: No growth to date.    Diagnosis: relapsed AML, FLT3 ITD+, NPM1, CEBPA, ASXL1 mutated    Protocol/Chemo Regimen: daily oral FLT3 inhibitor gilteritinib (cytarabine 100mg/m2 x 3 days continuous infusion prior to start of gilteritinib)    Day: 10 cytarabine; Day 8 gilteritinib     Pt endorsed: breathing better; stomach bloated    Review of Systems: Denies SOB, CP, n/v, HA or dizziness    Pain scale: 0                                           Diet: DASH/TLC    Allergies  No Known Allergies    ANTIMICROBIALS  acyclovir   Oral Tab/Cap 400 milliGRAM(s) Oral two times a day  cefepime   IVPB 2000 milliGRAM(s) IV Intermittent every 12 hours  voriconazole 200 milliGRAM(s) Oral every 12 hours      HEME/ONC MEDICATIONS  gilteritinib 120 milliGRAM(s) Oral <User Schedule>      STANDING MEDICATIONS  albuterol/ipratropium for Nebulization 3 milliLiter(s) Nebulizer every 6 hours  atorvastatin 10 milliGRAM(s) Oral at bedtime  Biotene Dry Mouth Oral Rinse 15 milliLiter(s) Swish and Spit three times a day  budesonide  80 MICROgram(s)/formoterol 4.5 MICROgram(s) Inhaler 2 Puff(s) Inhalation two times a day  buPROPion XL (24-Hour) . 150 milliGRAM(s) Oral daily  chlorhexidine 2% Cloths 1 Application(s) Topical daily  furosemide   Injectable 40 milliGRAM(s) IV Push two times a day  gabapentin 100 milliGRAM(s) Oral daily  oxybutynin 5 milliGRAM(s) Oral daily  polyethylene glycol 3350 17 Gram(s) Oral two times a day  senna 2 Tablet(s) Oral at bedtime  sodium chloride 0.65% Nasal 1 Spray(s) Both Nostrils four times a day  sodium chloride 0.9%. 1000 milliLiter(s) IV Continuous <Continuous>  traZODone 150 milliGRAM(s) Oral at bedtime      PRN MEDICATIONS  acetaminophen     Tablet .. 650 milliGRAM(s) Oral every 6 hours PRN  aluminum hydroxide/magnesium hydroxide/simethicone Suspension 30 milliLiter(s) Oral every 4 hours PRN  benzonatate 100 milliGRAM(s) Oral every 8 hours PRN  melatonin 3 milliGRAM(s) Oral at bedtime PRN  ondansetron Injectable 4 milliGRAM(s) IV Push every 8 hours PRN      Vital Signs Last 24 Hrs  T(C): 36.4 (19 Nov 2022 06:33), Max: 36.7 (18 Nov 2022 12:15)  T(F): 97.5 (19 Nov 2022 06:33), Max: 98 (18 Nov 2022 12:15)  HR: 71 (19 Nov 2022 06:33) (68 - 73)  BP: 145/79 (19 Nov 2022 06:33) (113/82 - 146/82)  RR: 18 (19 Nov 2022 06:33) (18 - 18)  SpO2: 98% (19 Nov 2022 06:33) (94% - 98%)    Parameters below as of 19 Nov 2022 06:33  Patient On (Oxygen Delivery Method): nasal cannula  O2 Flow (L/min): 2      PHYSICAL EXAM  General: Sitting up in bed NAD  HEENT: sclerae anicteric, clear oropharynx  CV: (+) S1/S2 RRR  Lungs: +mild bibasilar crackles, decreased bs at bases, no wheezes   Abdomen:+ BS, soft, +distended, non-tender  Ext: trace edema BLE's   Skin: no rash, left arm bruising, non tender  Neuro: alert and oriented X 3  Central Line: LCW Mediport  and PIV CDI    LABS:                        8.7    0.16  )-----------( 25       ( 19 Nov 2022 07:43 )             25.3     19 Nov 2022 07:43    138    |  104    |  21     ----------------------------<  89     3.6     |  28     |  0.52     Ca    7.9        19 Nov 2022 07:43  Phos  2.7       19 Nov 2022 07:43  Mg     2.0       19 Nov 2022 07:43    TPro  5.0    /  Alb  2.8    /  TBili  0.3    /  DBili  x      /  AST  16     /  ALT  36     /  AlkPhos  56     19 Nov 2022 07:43    PT/INR - ( 19 Nov 2022 07:43 )   PT: 14.1 sec;   INR: 1.21 ratio    PTT - ( 19 Nov 2022 07:43 )  PTT:24.6 sec    LIVER FUNCTIONS - ( 19 Nov 2022 07:43 )  Alb: 2.8 g/dL / Pro: 5.0 g/dL / ALK PHOS: 56 U/L / ALT: 36 U/L / AST: 16 U/L / GGT: x           Cultures:    Culture - Urine (11.15.22 @ 15:29)   Specimen Source: Clean Catch Clean Catch (Midstream)   Culture Results: No growth     Culture - Blood (11.13.22 @ 16:10)  Specimen Source: .Blood Blood-Catheter   Culture Results: No growth to date.     Culture - Blood (11.12.22 @ 15:49)   Specimen Source: .Blood Blood-Catheter   Culture Results: No growth to date.

## 2022-11-19 NOTE — PROGRESS NOTE ADULT - PROBLEM SELECTOR PLAN 6
Increased O2 requirement.   11/12 Chest CT Scattered nodular and small consolidative opacities peripherally and   bilateral lower lobes; findings are favored to represent aspiration   and/or infection.  Continue with Duoneb  11/14 symbicort added gentle diuresis as tolerated

## 2022-11-19 NOTE — PROGRESS NOTE ADULT - PROBLEM SELECTOR PLAN 3
Cardiology Following  Continue diuresis for NET neg 1-2L/day (per d/w Cardiology)  starting low dose statin  Trend BNP, Daily weights, strict I/O's Cardiology Following  Continue diuresis for NET neg 1-2L/day (per d/w Cardiology)  starting low dose statin  Trend BNP- downtrending  Daily weights, strict I/O's

## 2022-11-19 NOTE — PROGRESS NOTE ADULT - PROBLEM SELECTOR PLAN 1
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    G6PD level was normal in 2020.   Trend CBC, CMP, TLS labs, DIC labs daily. , If fibrinogen under 100, give 10 units of cryoprecipitate.  Transfuse for Hgb < 7 and platelet count < 10k, < 20k if febrile, < 50k if bleeding, continue allopurinol 300mg PO q daily.   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   Daily ekg through 11/18 (7 days of gilt).   dexamethasone to ppx differentiation syndrome was decreased from 5mg q12h to 2.5 q12 on 11/15  11/10 Cytarabine 100mg/m2 x 3 days continuous infusion., 11/12 Cont last day of cytarabine. stop hydroxyurea. start gilteritinib w dexamethasone. 11/13 DC Cytarabine-pt  received 309 ml/509 ml of day 3 cytarabine.   speech and swallow eval for difficulty swallowing.   11/18 Lasix 40mg iv x2 today, for net NEG 1-2 L/day (per d/w Cardiology). D/C'd ppx Dexamethasone  DNR/DNI Mutations identified in FLT3-ITD, NPM1, ASXL1 and CEBPA.    G6PD level was normal in 2020.   Trend CBC, CMP, TLS labs, DIC labs daily. , If fibrinogen under 100, give 10 units of cryoprecipitate.  Transfuse for Hgb < 7 and platelet count < 10k, < 20k if febrile, < 50k if bleeding, continue allopurinol 300mg PO q daily.   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   Daily ekg through 11/18 (7 days of gilt).   dexamethasone to ppx differentiation syndrome was decreased from 5mg q12h to 2.5 q12 on 11/15  11/10 Cytarabine 100mg/m2 x 3 days continuous infusion., 11/12 Cont last day of cytarabine. stop hydroxyurea. start gilteritinib w dexamethasone. 11/13 DC Cytarabine-pt  received 309 ml/509 ml of day 3 cytarabine.   speech and swallow eval for difficulty swallowing.   11/18 Lasix 40mg IV BID, for net NEG 1-2 L/day (per d/w Cardiology). D/C'd ppx Dexamethasone  DNR/DNI

## 2022-11-19 NOTE — PROVIDER CONTACT NOTE (CRITICAL VALUE NOTIFICATION) - ASSESSMENT
Patient A&Ox4, vitals within range of baseline. No reports of pain, no signs of distress. Safety maintained, call bell within reach. Nursing care on-going.

## 2022-11-20 NOTE — PROGRESS NOTE ADULT - NS ATTEND AMEND GEN_ALL_CORE FT
Relapsed AML  Onc History:  decitabine/lisa through 27 cycles,  now on gilteritinib post cytoreduction with HU and Scarlet-C      Vital Signs Last 24 Hrs  T(C): 36.4 (19 Nov 2022 06:33), Max: 36.7 (18 Nov 2022 12:15)  T(F): 97.5 (19 Nov 2022 06:33), Max: 98 (18 Nov 2022 12:15)  HR: 71 (19 Nov 2022 06:33) (68 - 73)  BP: 145/79 (19 Nov 2022 06:33) (113/82 - 146/82)  RR: 18 (19 Nov 2022 06:33) (18 - 18)  SpO2: 98% (19 Nov 2022 06:33) (94% - 98%)    MEDICATIONS  (STANDING):  acyclovir   Oral Tab/Cap 400 milliGRAM(s) Oral two times a day  albuterol/ipratropium for Nebulization 3 milliLiter(s) Nebulizer every 6 hours  Biotene Dry Mouth Oral Rinse 15 milliLiter(s) Swish and Spit three times a day  budesonide  80 MICROgram(s)/formoterol 4.5 MICROgram(s) Inhaler 2 Puff(s) Inhalation two times a day  buPROPion XL (24-Hour) . 150 milliGRAM(s) Oral daily  cefepime   IVPB 2000 milliGRAM(s) IV Intermittent every 12 hours  chlorhexidine 2% Cloths 1 Application(s) Topical daily  dexAMETHasone  Injectable 2.5 milliGRAM(s) IV Push every 12 hours  gabapentin 100 milliGRAM(s) Oral daily  gilteritinib 120 milliGRAM(s) Oral <User Schedule>  oxybutynin 5 milliGRAM(s) Oral daily  polyethylene glycol 3350 17 Gram(s) Oral two times a day  potassium chloride    Tablet ER 20 milliEquivalent(s) Oral every 2 hours  senna 2 Tablet(s) Oral at bedtime  sodium chloride 0.65% Nasal 1 Spray(s) Both Nostrils four times a day  sodium chloride 0.9%. 1000 milliLiter(s) (20 mL/Hr) IV Continuous <Continuous>  traZODone 150 milliGRAM(s) Oral at bedtime  voriconazole 200 milliGRAM(s) Oral every 12 hours               8.7    0.16  )-----------( 25       ( 19 Nov 2022 07:43 )             25.3           was transiently hypotensive 11/12 >>>midodrine  intermittently febrile >>> afeb last 24 hrs  dyspnea less now  has mod AS on echo; appreciate Cardiology input  TTE -repeat-shows EF reduced to 39% with abnl wall motion  CT lung nodules,  ? 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  Course complicated S. epi bacteremia? (11/9/22), had continuous neutropenic fevers, which resolved after 11/13/22; pneumonia (concerning for fungus) and hypotension (11/12/22), fluid overloaded state (11/13/22 - )      ID: cont present abs, d/c vanco, ID input appreciated     Plan:  Heme: PLT goal > 10,000; Hgb goal > 7.0g/dL;      Continue gilteritinib with dex (to reduce the incidence of differentiation syndrome), 2.5 mg 2x/d    ID: cefepime, vori, acyclovir; check vori level    peripheral blood fungitell neg and galactomannan (11/12/22) 0.03 (wnl)    Radiographs: CT (11/12/22): Scattered nodular and small consolidative opacities peripherally and bilateral lower lobes; findings are favored to represent aspiration and/or infection.  Indeterminate right hepatic lobe 2 cm lesion and right adrenal 2.3 cm nodule; recommend correlation with MRI abdomen to more accurately characterize.  CXR 11/15/22 right basilar, retrocardiac infiltrates    Pulmonary: symbicort added, may have been helpful  (history of reactive airway disease)     Nutrition: tolerating PO; cont net diuresis today.   cont to follow QTc (on vfend and gilteritinib)  DVT prophylaxis: ambulation.     Code status: DNR/DNI    Over 35 minutes were spent in direct patient care and care coordination. Relapsed AML  Onc History:  decitabine/lisa through 27 cycles,  now on gilteritinib day 9 post cytoreduction with HU and Scarlet-C    Vital Signs Last 24 Hrs  T(C): 36.5 (20 Nov 2022 04:59), Max: 37.2 (19 Nov 2022 13:40)  T(F): 97.7 (20 Nov 2022 04:59), Max: 99 (19 Nov 2022 13:40)  HR: 72 (20 Nov 2022 04:59) (72 - 81)  BP: 126/77 (20 Nov 2022 04:59) (100/69 - 146/75)  RR: 16 (20 Nov 2022 04:59) (16 - 18)  SpO2: 99% (20 Nov 2022 04:59) (95% - 99%)    Parameters below as of 20 Nov 2022 04:59  Patient On (Oxygen Delivery Method): nasal cannula    MEDICATIONS  (STANDING):  acyclovir   Oral Tab/Cap 400 milliGRAM(s) Oral two times a day  albuterol/ipratropium for Nebulization 3 milliLiter(s) Nebulizer every 6 hours  Biotene Dry Mouth Oral Rinse 15 milliLiter(s) Swish and Spit three times a day  budesonide  80 MICROgram(s)/formoterol 4.5 MICROgram(s) Inhaler 2 Puff(s) Inhalation two times a day  buPROPion XL (24-Hour) . 150 milliGRAM(s) Oral daily  cefepime   IVPB 2000 milliGRAM(s) IV Intermittent every 12 hours  chlorhexidine 2% Cloths 1 Application(s) Topical daily  dexAMETHasone  Injectable 2.5 milliGRAM(s) IV Push every 12 hours  gabapentin 100 milliGRAM(s) Oral daily  gilteritinib 120 milliGRAM(s) Oral <User Schedule>  oxybutynin 5 milliGRAM(s) Oral daily  polyethylene glycol 3350 17 Gram(s) Oral two times a day  potassium chloride    Tablet ER 20 milliEquivalent(s) Oral every 2 hours  senna 2 Tablet(s) Oral at bedtime  sodium chloride 0.65% Nasal 1 Spray(s) Both Nostrils four times a day  sodium chloride 0.9%. 1000 milliLiter(s) (20 mL/Hr) IV Continuous <Continuous>  traZODone 150 milliGRAM(s) Oral at bedtime  voriconazole 200 milliGRAM(s) Oral every 12 hours         was transiently hypotensive 11/12 >>>midodrine  intermittently febrile >>> afeb last 24 hrs  dyspnea less now  has mod AS on echo; appreciate Cardiology input  TTE -repeat-shows EF reduced to 39% with abnl wall motion  CT lung nodules,  ? infection vs aspiration     Cardiac MRI being considered  Pulm status stable today       8.6    0.19  )-----------( 30       ( 20 Nov 2022 06:55 )             25.6        Assessment: 83 yo day + 9 gilteritinib post HU/Scarlet-C cytoreduction for progressive FLT-3, NPM1, ASXL1, WT1, CEBPA AML.  Course complicated S. epi bacteremia? (11/9/22), had continuous neutropenic fevers, which resolved after 11/13/22; pneumonia (concerning for fungus) and hypotension (11/12/22), fluid overloaded state (11/13/22 - )      ID: cont present abs, d/c vanco, ID input appreciated     Plan:  Heme: PLT goal > 10,000; Hgb goal > 7.0g/dL;      Continue gilteritinib with dex (to reduce the incidence of differentiation syndrome), 2.5 mg 2x/d    ID: cefepime, vori, acyclovir; check vori level    peripheral blood fungitell neg and galactomannan (11/12/22) 0.03 (wnl)    Radiographs: CT (11/12/22): Scattered nodular and small consolidative opacities peripherally and bilateral lower lobes; findings are favored to represent aspiration and/or infection.  Indeterminate right hepatic lobe 2 cm lesion and right adrenal 2.3 cm nodule; recommend correlation with MRI abdomen to more accurately characterize.  CXR 11/15/22 right basilar, retrocardiac infiltrates    Pulmonary: symbicort added, may have been helpful  (history of reactive airway disease)     Nutrition: tolerating PO; cont net diuresis today.   cont to follow QTc (on vfend and gilteritinib)  DVT prophylaxis: ambulation.     Code status: DNR/DNI    Over 35 minutes were spent in direct patient care and care coordination. Relapsed AML  Onc History:  decitabine/lisa through 27 cycles,  now on gilteritinib day 9 post cytoreduction with HU and Scarlet-C    Vital Signs Last 24 Hrs  T(C): 36.3 (20 Nov 2022 13:00), Max: 36.8 (20 Nov 2022 01:00)  T(F): 97.4 (20 Nov 2022 13:00), Max: 98.2 (20 Nov 2022 01:00)  HR: 79 (20 Nov 2022 13:00) (72 - 85)  BP: 130/72 (20 Nov 2022 13:00) (116/76 - 146/75)  RR: 16 (20 Nov 2022 13:00) (16 - 18)  SpO2: 98% (20 Nov 2022 13:00) (95% - 100%)    Parameters below as of 20 Nov 2022 13:00  Patient On (Oxygen Delivery Method): nasal cannula  O2 Flow (L/min): 3    MEDICATIONS  (STANDING):  acyclovir   Oral Tab/Cap 400 milliGRAM(s) Oral two times a day  albuterol/ipratropium for Nebulization 3 milliLiter(s) Nebulizer every 6 hours  Biotene Dry Mouth Oral Rinse 15 milliLiter(s) Swish and Spit three times a day  budesonide  80 MICROgram(s)/formoterol 4.5 MICROgram(s) Inhaler 2 Puff(s) Inhalation two times a day  buPROPion XL (24-Hour) . 150 milliGRAM(s) Oral daily  cefepime   IVPB 2000 milliGRAM(s) IV Intermittent every 12 hours  chlorhexidine 2% Cloths 1 Application(s) Topical daily  dexAMETHasone  Injectable 2.5 milliGRAM(s) IV Push every 12 hours  gabapentin 100 milliGRAM(s) Oral daily  gilteritinib 120 milliGRAM(s) Oral <User Schedule>  oxybutynin 5 milliGRAM(s) Oral daily  polyethylene glycol 3350 17 Gram(s) Oral two times a day  potassium chloride    Tablet ER 20 milliEquivalent(s) Oral every 2 hours  senna 2 Tablet(s) Oral at bedtime  sodium chloride 0.65% Nasal 1 Spray(s) Both Nostrils four times a day  sodium chloride 0.9%. 1000 milliLiter(s) (20 mL/Hr) IV Continuous <Continuous>  traZODone 150 milliGRAM(s) Oral at bedtime  voriconazole 200 milliGRAM(s) Oral every 12 hours       was transiently hypotensive 11/12 >>>midodrine  intermittently febrile >>> afeb last 24 hrs  dyspnea less now  has mod AS on echo; appreciate Cardiology input  TTE -repeat-shows EF reduced to 39% with abnl wall motion  CT lung nodules,  ? infection vs aspiration     Cardiac MRI being considered  Pulm status stable today               8.6    0.19  )-----------( 30       ( 20 Nov 2022 06:55 )             25.6        Assessment: 83 yo day + 9 gilteritinib post HU/Scarlet-C cytoreduction for progressive FLT-3, NPM1, ASXL1, WT1, CEBPA AML.  Course complicated S. epi bacteremia? (11/9/22), had continuous neutropenic fevers, which resolved after 11/13/22; pneumonia (concerning for fungus) and hypotension (11/12/22), fluid overloaded state (11/13/22 - )      ID: cont present abs, d/c vanco, ID input appreciated     Plan:  Heme: PLT goal > 10,000; Hgb goal > 7.0g/dL;      Continue gilteritinib with dex (to reduce the incidence of differentiation syndrome), 2.5 mg 2x/d    ID: cefepime, vori, acyclovir; check vori level    peripheral blood fungitell neg and galactomannan (11/12/22) 0.03 (wnl)    Radiographs: CT (11/12/22): Scattered nodular and small consolidative opacities peripherally and bilateral lower lobes; findings are favored to represent aspiration and/or infection.  Indeterminate right hepatic lobe 2 cm lesion and right adrenal 2.3 cm nodule; recommend correlation with MRI abdomen to more accurately characterize.  CXR 11/15/22 right basilar, retrocardiac infiltrates    Pulmonary: symbicort added, may have been helpful  (history of reactive airway disease)     Nutrition: tolerating PO; cont net diuresis today.   cont to follow QTc (on vfend and gilteritinib)  DVT prophylaxis: ambulation.     Code status: DNR/DNI    Over 35 minutes were spent in direct patient care and care coordination.

## 2022-11-20 NOTE — PROGRESS NOTE ADULT - SUBJECTIVE AND OBJECTIVE BOX
Diagnosis: relapsed AML, FLT3 ITD+, NPM1, CEBPA, ASXL1 mutated    Protocol/Chemo Regimen: daily oral FLT3 inhibitor gilteritinib (cytarabine 100mg/m2 x 3 days continuous infusion prior to start of gilteritinib)    Day: 11 cytarabine; Day 9 gilteritinib     Pt endorsed: breathing better    Review of Systems: Denies SOB, CP, n/v, HA or dizziness    Pain scale: 0                                           Diet: DASH/TLC    Allergies  No Known Allergies    ANTIMICROBIALS  acyclovir   Oral Tab/Cap 400 milliGRAM(s) Oral two times a day  cefepime   IVPB 2000 milliGRAM(s) IV Intermittent every 12 hours  voriconazole 200 milliGRAM(s) Oral every 12 hours      HEME/ONC MEDICATIONS  gilteritinib 120 milliGRAM(s) Oral <User Schedule>      STANDING MEDICATIONS  albuterol/ipratropium for Nebulization 3 milliLiter(s) Nebulizer every 6 hours  atorvastatin 10 milliGRAM(s) Oral at bedtime  Biotene Dry Mouth Oral Rinse 15 milliLiter(s) Swish and Spit three times a day  budesonide  80 MICROgram(s)/formoterol 4.5 MICROgram(s) Inhaler 2 Puff(s) Inhalation two times a day  buPROPion XL (24-Hour) . 150 milliGRAM(s) Oral daily  chlorhexidine 2% Cloths 1 Application(s) Topical daily  furosemide   Injectable 40 milliGRAM(s) IV Push two times a day  gabapentin 100 milliGRAM(s) Oral daily  oxybutynin 5 milliGRAM(s) Oral daily  polyethylene glycol 3350 17 Gram(s) Oral two times a day  senna 2 Tablet(s) Oral at bedtime  sodium chloride 0.65% Nasal 1 Spray(s) Both Nostrils four times a day  sodium chloride 0.9%. 1000 milliLiter(s) IV Continuous <Continuous>  traZODone 150 milliGRAM(s) Oral at bedtime      PRN MEDICATIONS  acetaminophen     Tablet .. 650 milliGRAM(s) Oral every 6 hours PRN  aluminum hydroxide/magnesium hydroxide/simethicone Suspension 30 milliLiter(s) Oral every 4 hours PRN  benzonatate 100 milliGRAM(s) Oral every 8 hours PRN  melatonin 3 milliGRAM(s) Oral at bedtime PRN  ondansetron Injectable 4 milliGRAM(s) IV Push every 8 hours PRN      Vital Signs Last 24 Hrs  T(C): 36.5 (20 Nov 2022 04:59), Max: 37.2 (19 Nov 2022 13:40)  T(F): 97.7 (20 Nov 2022 04:59), Max: 99 (19 Nov 2022 13:40)  HR: 72 (20 Nov 2022 04:59) (72 - 81)  BP: 126/77 (20 Nov 2022 04:59) (100/69 - 146/75)  RR: 16 (20 Nov 2022 04:59) (16 - 18)  SpO2: 99% (20 Nov 2022 04:59) (95% - 99%)    Parameters below as of 20 Nov 2022 04:59  Patient On (Oxygen Delivery Method): nasal cannula        PHYSICAL EXAM  General: Sitting up in bed NAD  HEENT: sclerae anicteric, clear oropharynx  CV: (+) S1/S2 RRR  Lungs: +mild bibasilar crackles, decreased bs at bases, no wheezes   Abdomen:+ BS, soft, +distended, non-tender  Ext: trace edema BLE's   Skin: no rash, left arm bruising, non tender  Neuro: alert and oriented X 3  Central Line: LCW Mediport  and PIV CDI    LABS:                Cultures:    Culture - Urine (11.15.22 @ 15:29)   Specimen Source: Clean Catch Clean Catch (Midstream)   Culture Results: No growth     Culture - Blood (11.13.22 @ 16:10)  Specimen Source: .Blood Blood-Catheter   Culture Results: No growth to date.     Culture - Blood (11.12.22 @ 15:49)   Specimen Source: .Blood Blood-Catheter   Culture Results: No growth to date.     RADIOLOGY & ADDITIONAL STUDIES:         Diagnosis: relapsed AML, FLT3 ITD+, NPM1, CEBPA, ASXL1 mutated    Protocol/Chemo Regimen: daily oral FLT3 inhibitor gilteritinib (cytarabine 100mg/m2 x 3 days continuous infusion prior to start of gilteritinib)    Day: 11 cytarabine; Day 9 gilteritinib     Pt endorsed: breathing better    Review of Systems: Denies SOB, CP, n/v, HA or dizziness    Pain scale: 0                                           Diet: DASH/TLC    Allergies  No Known Allergies    ANTIMICROBIALS  acyclovir   Oral Tab/Cap 400 milliGRAM(s) Oral two times a day  cefepime   IVPB 2000 milliGRAM(s) IV Intermittent every 12 hours  voriconazole 200 milliGRAM(s) Oral every 12 hours      HEME/ONC MEDICATIONS  gilteritinib 120 milliGRAM(s) Oral <User Schedule>      STANDING MEDICATIONS  albuterol/ipratropium for Nebulization 3 milliLiter(s) Nebulizer every 6 hours  atorvastatin 10 milliGRAM(s) Oral at bedtime  Biotene Dry Mouth Oral Rinse 15 milliLiter(s) Swish and Spit three times a day  budesonide  80 MICROgram(s)/formoterol 4.5 MICROgram(s) Inhaler 2 Puff(s) Inhalation two times a day  buPROPion XL (24-Hour) . 150 milliGRAM(s) Oral daily  chlorhexidine 2% Cloths 1 Application(s) Topical daily  furosemide   Injectable 40 milliGRAM(s) IV Push two times a day  gabapentin 100 milliGRAM(s) Oral daily  oxybutynin 5 milliGRAM(s) Oral daily  polyethylene glycol 3350 17 Gram(s) Oral two times a day  senna 2 Tablet(s) Oral at bedtime  sodium chloride 0.65% Nasal 1 Spray(s) Both Nostrils four times a day  sodium chloride 0.9%. 1000 milliLiter(s) IV Continuous <Continuous>  traZODone 150 milliGRAM(s) Oral at bedtime      PRN MEDICATIONS  acetaminophen     Tablet .. 650 milliGRAM(s) Oral every 6 hours PRN  aluminum hydroxide/magnesium hydroxide/simethicone Suspension 30 milliLiter(s) Oral every 4 hours PRN  benzonatate 100 milliGRAM(s) Oral every 8 hours PRN  melatonin 3 milliGRAM(s) Oral at bedtime PRN  ondansetron Injectable 4 milliGRAM(s) IV Push every 8 hours PRN      Vital Signs Last 24 Hrs  T(C): 36.5 (20 Nov 2022 04:59), Max: 37.2 (19 Nov 2022 13:40)  T(F): 97.7 (20 Nov 2022 04:59), Max: 99 (19 Nov 2022 13:40)  HR: 72 (20 Nov 2022 04:59) (72 - 81)  BP: 126/77 (20 Nov 2022 04:59) (100/69 - 146/75)  RR: 16 (20 Nov 2022 04:59) (16 - 18)  SpO2: 99% (20 Nov 2022 04:59) (95% - 99%)    Parameters below as of 20 Nov 2022 04:59  Patient On (Oxygen Delivery Method): nasal cannula    PHYSICAL EXAM  General: Sitting up in bed NAD  HEENT: sclerae anicteric, clear oropharynx  CV: (+) S1/S2 RRR  Lungs: +mild bibasilar crackles, decreased bs at bases, no wheezes   Abdomen:+ BS, soft, +distended, non-tender  Ext: trace edema BLE's   Skin: no rash, left arm bruising, non tender  Neuro: alert and oriented X 3  Central Line: LCW Mediport  and PIV CDI    LABS:               8.6    0.19  )-----------( 30       ( 20 Nov 2022 06:55 )             25.6     20 Nov 2022 06:53    137    |  101    |  24     ----------------------------<  106    3.9     |  27     |  0.61     Ca    8.0        20 Nov 2022 06:53  Phos  3.7       20 Nov 2022 06:53  Mg     2.0       20 Nov 2022 06:53    TPro  4.9    /  Alb  2.8    /  TBili  0.4    /  DBili  x      /  AST  14     /  ALT  30     /  AlkPhos  64     20 Nov 2022 06:53    PT/INR - ( 20 Nov 2022 06:57 )   PT: 19.0 sec;   INR: 1.63 ratio    PTT - ( 20 Nov 2022 06:57 )  PTT:31.9 sec    LIVER FUNCTIONS - ( 20 Nov 2022 06:53 )  Alb: 2.8 g/dL / Pro: 4.9 g/dL / ALK PHOS: 64 U/L / ALT: 30 U/L / AST: 14 U/L / GGT: x           Cultures:  Culture - Urine (11.15.22 @ 15:29)   Specimen Source: Clean Catch Clean Catch (Midstream)   Culture Results: No growth     Culture - Blood (11.13.22 @ 16:10)  Specimen Source: .Blood Blood-Catheter   Culture Results: No growth to date.     Culture - Blood (11.12.22 @ 15:49)   Specimen Source: .Blood Blood-Catheter   Culture Results: No growth to date.     RADIOLOGY & ADDITIONAL STUDIES:  from: Xray Abdomen 1 View PORTABLE -Urgent (Xray Abdomen 1 View PORTABLE -Urgent .) (11.18.22 @ 00:36)   FINDINGS:  Air distended loops of small and large bowel with air in the rectum.   Moderate stool scattered throughout the colon  There is no evidence of intraperitoneal free air.  The visualized osseous structures demonstrate no acute pathology.  The lung bases are clear.  IMPRESSION:  No definite bowel obstruction.  Moderate stool in the colon.           Diagnosis: relapsed AML, FLT3 ITD+, NPM1, CEBPA, ASXL1 mutated    Protocol/Chemo Regimen: daily oral FLT3 inhibitor gilteritinib (cytarabine 100mg/m2 x 3 days 11/10-11/12 continuous infusion prior to start of gilteritinib)    Day:  9 gilteritinib     Pt endorsed: breathing better; slept on and off; no new fevers, + BM yesterday    Review of Systems: Denies SOB, CP, n/v, HA or dizziness    Pain scale: 0                                           Diet: DASH/TLC    Allergies  No Known Allergies    ANTIMICROBIALS  acyclovir   Oral Tab/Cap 400 milliGRAM(s) Oral two times a day  cefepime   IVPB 2000 milliGRAM(s) IV Intermittent every 12 hours  voriconazole 200 milliGRAM(s) Oral every 12 hours      HEME/ONC MEDICATIONS  gilteritinib 120 milliGRAM(s) Oral <User Schedule>      STANDING MEDICATIONS  albuterol/ipratropium for Nebulization 3 milliLiter(s) Nebulizer every 6 hours  atorvastatin 10 milliGRAM(s) Oral at bedtime  Biotene Dry Mouth Oral Rinse 15 milliLiter(s) Swish and Spit three times a day  budesonide  80 MICROgram(s)/formoterol 4.5 MICROgram(s) Inhaler 2 Puff(s) Inhalation two times a day  buPROPion XL (24-Hour) . 150 milliGRAM(s) Oral daily  chlorhexidine 2% Cloths 1 Application(s) Topical daily  furosemide   Injectable 40 milliGRAM(s) IV Push two times a day  gabapentin 100 milliGRAM(s) Oral daily  oxybutynin 5 milliGRAM(s) Oral daily  polyethylene glycol 3350 17 Gram(s) Oral two times a day  senna 2 Tablet(s) Oral at bedtime  sodium chloride 0.65% Nasal 1 Spray(s) Both Nostrils four times a day  sodium chloride 0.9%. 1000 milliLiter(s) IV Continuous <Continuous>  traZODone 150 milliGRAM(s) Oral at bedtime      PRN MEDICATIONS  acetaminophen     Tablet .. 650 milliGRAM(s) Oral every 6 hours PRN  aluminum hydroxide/magnesium hydroxide/simethicone Suspension 30 milliLiter(s) Oral every 4 hours PRN  benzonatate 100 milliGRAM(s) Oral every 8 hours PRN  melatonin 3 milliGRAM(s) Oral at bedtime PRN  ondansetron Injectable 4 milliGRAM(s) IV Push every 8 hours PRN      Vital Signs Last 24 Hrs  T(C): 36.5 (20 Nov 2022 04:59), Max: 37.2 (19 Nov 2022 13:40)  T(F): 97.7 (20 Nov 2022 04:59), Max: 99 (19 Nov 2022 13:40)  HR: 72 (20 Nov 2022 04:59) (72 - 81)  BP: 126/77 (20 Nov 2022 04:59) (100/69 - 146/75)  RR: 16 (20 Nov 2022 04:59) (16 - 18)  SpO2: 99% (20 Nov 2022 04:59) (95% - 99%)    Parameters below as of 20 Nov 2022 04:59  Patient On (Oxygen Delivery Method): nasal cannula    PHYSICAL EXAM  General: Sitting up in bed NAD  HEENT: sclerae anicteric, clear oropharynx  CV: (+) S1/S2 RRR  Lungs: +mild bibasilar crackles, decreased bs at bases, no wheezes   Abdomen:+ BS, soft, +distended, non-tender  Ext: trace edema BLE's   Skin: no rash, left arm bruising, non tender  Neuro: alert and oriented X 3  Central Line: LCW Mediport  and PIV CDI    LABS:               8.6    0.19  )-----------( 30       ( 20 Nov 2022 06:55 )             25.6     20 Nov 2022 06:53    137    |  101    |  24     ----------------------------<  106    3.9     |  27     |  0.61     Ca    8.0        20 Nov 2022 06:53  Phos  3.7       20 Nov 2022 06:53  Mg     2.0       20 Nov 2022 06:53    TPro  4.9    /  Alb  2.8    /  TBili  0.4    /  DBili  x      /  AST  14     /  ALT  30     /  AlkPhos  64     20 Nov 2022 06:53    PT/INR - ( 20 Nov 2022 06:57 )   PT: 19.0 sec;   INR: 1.63 ratio    PTT - ( 20 Nov 2022 06:57 )  PTT:31.9 sec    LIVER FUNCTIONS - ( 20 Nov 2022 06:53 )  Alb: 2.8 g/dL / Pro: 4.9 g/dL / ALK PHOS: 64 U/L / ALT: 30 U/L / AST: 14 U/L / GGT: x           Cultures:  Culture - Urine (11.15.22 @ 15:29)   Specimen Source: Clean Catch Clean Catch (Midstream)   Culture Results: No growth     Culture - Blood (11.13.22 @ 16:10)  Specimen Source: .Blood Blood-Catheter   Culture Results: No growth to date.     Culture - Blood (11.12.22 @ 15:49)   Specimen Source: .Blood Blood-Catheter   Culture Results: No growth to date.     RADIOLOGY & ADDITIONAL STUDIES:  from: Xray Abdomen 1 View PORTABLE -Urgent (Xray Abdomen 1 View PORTABLE -Urgent .) (11.18.22 @ 00:36)   FINDINGS:  Air distended loops of small and large bowel with air in the rectum.   Moderate stool scattered throughout the colon  There is no evidence of intraperitoneal free air.  The visualized osseous structures demonstrate no acute pathology.  The lung bases are clear.  IMPRESSION:  No definite bowel obstruction.  Moderate stool in the colon.           Diagnosis: relapsed AML, FLT3 ITD+, NPM1, CEBPA, ASXL1 mutated    Protocol/Chemo Regimen: daily FLT3 inhibitor gilteritinib (s/p IV cytarabine 100mg/m2 x 3days 11/10-11/12 prior to start of gilteritinib for cytoreduction)    Day:  9 gilteritinib     Pt endorsed: breathing better; slept on and off; no new fevers, + BM yesterday    Review of Systems: Denies SOB, CP, n/v, HA or dizziness    Pain scale: 0                                           Diet: DASH/TLC    Allergies  No Known Allergies    ANTIMICROBIALS  acyclovir   Oral Tab/Cap 400 milliGRAM(s) Oral two times a day  cefepime   IVPB 2000 milliGRAM(s) IV Intermittent every 12 hours  voriconazole 200 milliGRAM(s) Oral every 12 hours      HEME/ONC MEDICATIONS  gilteritinib 120 milliGRAM(s) Oral <User Schedule>      STANDING MEDICATIONS  albuterol/ipratropium for Nebulization 3 milliLiter(s) Nebulizer every 6 hours  atorvastatin 10 milliGRAM(s) Oral at bedtime  Biotene Dry Mouth Oral Rinse 15 milliLiter(s) Swish and Spit three times a day  budesonide  80 MICROgram(s)/formoterol 4.5 MICROgram(s) Inhaler 2 Puff(s) Inhalation two times a day  buPROPion XL (24-Hour) . 150 milliGRAM(s) Oral daily  chlorhexidine 2% Cloths 1 Application(s) Topical daily  furosemide   Injectable 40 milliGRAM(s) IV Push two times a day  gabapentin 100 milliGRAM(s) Oral daily  oxybutynin 5 milliGRAM(s) Oral daily  polyethylene glycol 3350 17 Gram(s) Oral two times a day  senna 2 Tablet(s) Oral at bedtime  sodium chloride 0.65% Nasal 1 Spray(s) Both Nostrils four times a day  sodium chloride 0.9%. 1000 milliLiter(s) IV Continuous <Continuous>  traZODone 150 milliGRAM(s) Oral at bedtime      PRN MEDICATIONS  acetaminophen     Tablet .. 650 milliGRAM(s) Oral every 6 hours PRN  aluminum hydroxide/magnesium hydroxide/simethicone Suspension 30 milliLiter(s) Oral every 4 hours PRN  benzonatate 100 milliGRAM(s) Oral every 8 hours PRN  melatonin 3 milliGRAM(s) Oral at bedtime PRN  ondansetron Injectable 4 milliGRAM(s) IV Push every 8 hours PRN      Vital Signs Last 24 Hrs  T(C): 36.5 (20 Nov 2022 04:59), Max: 37.2 (19 Nov 2022 13:40)  T(F): 97.7 (20 Nov 2022 04:59), Max: 99 (19 Nov 2022 13:40)  HR: 72 (20 Nov 2022 04:59) (72 - 81)  BP: 126/77 (20 Nov 2022 04:59) (100/69 - 146/75)  RR: 16 (20 Nov 2022 04:59) (16 - 18)  SpO2: 99% (20 Nov 2022 04:59) (95% - 99%)    Parameters below as of 20 Nov 2022 04:59  Patient On (Oxygen Delivery Method): nasal cannula    PHYSICAL EXAM  General: Sitting up in bed NAD  HEENT: sclerae anicteric, clear oropharynx  CV: (+) S1/S2 RRR  Lungs: +mild bibasilar crackles, decreased bs at bases, no wheezes   Abdomen:+ BS, soft, +distended, non-tender  Ext: trace edema BLE's   Skin: no rash, left arm bruising, non tender  Neuro: alert and oriented X 3  Central Line: LCW Mediport  and PIV CDI    LABS:               8.6    0.19  )-----------( 30       ( 20 Nov 2022 06:55 )             25.6     20 Nov 2022 06:53    137    |  101    |  24     ----------------------------<  106    3.9     |  27     |  0.61     Ca    8.0        20 Nov 2022 06:53  Phos  3.7       20 Nov 2022 06:53  Mg     2.0       20 Nov 2022 06:53    TPro  4.9    /  Alb  2.8    /  TBili  0.4    /  DBili  x      /  AST  14     /  ALT  30     /  AlkPhos  64     20 Nov 2022 06:53    PT/INR - ( 20 Nov 2022 06:57 )   PT: 19.0 sec;   INR: 1.63 ratio    PTT - ( 20 Nov 2022 06:57 )  PTT:31.9 sec    LIVER FUNCTIONS - ( 20 Nov 2022 06:53 )  Alb: 2.8 g/dL / Pro: 4.9 g/dL / ALK PHOS: 64 U/L / ALT: 30 U/L / AST: 14 U/L / GGT: x           Cultures:  Culture - Urine (11.15.22 @ 15:29)   Specimen Source: Clean Catch Clean Catch (Midstream)   Culture Results: No growth     Culture - Blood (11.13.22 @ 16:10)  Specimen Source: .Blood Blood-Catheter   Culture Results: No growth to date.     Culture - Blood (11.12.22 @ 15:49)   Specimen Source: .Blood Blood-Catheter   Culture Results: No growth to date.     RADIOLOGY & ADDITIONAL STUDIES:  from: Xray Abdomen 1 View PORTABLE -Urgent (Xray Abdomen 1 View PORTABLE -Urgent .) (11.18.22 @ 00:36)   FINDINGS:  Air distended loops of small and large bowel with air in the rectum.   Moderate stool scattered throughout the colon  There is no evidence of intraperitoneal free air.  The visualized osseous structures demonstrate no acute pathology.  The lung bases are clear.  IMPRESSION:  No definite bowel obstruction.  Moderate stool in the colon.

## 2022-11-20 NOTE — PROGRESS NOTE ADULT - PROBLEM SELECTOR PLAN 3
Cardiology Following  Continue diuresis for NET neg 1-2L/day (per d/w Cardiology)  starting low dose statin  Trend BNP- downtrending  Daily weights, strict I/O's

## 2022-11-20 NOTE — PROGRESS NOTE ADULT - PROBLEM SELECTOR PLAN 1
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    G6PD level was normal in 2020.   Trend CBC, CMP, TLS labs, DIC labs daily. , If fibrinogen under 100, give 10 units of cryoprecipitate.  Transfuse for Hgb < 7 and platelet count < 10k, < 20k if febrile, < 50k if bleeding, continue allopurinol 300mg PO q daily.   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   Daily ekg through 11/18 (7 days of gilt).   dexamethasone to ppx differentiation syndrome was decreased from 5mg q12h to 2.5 q12 on 11/15  11/10 Cytarabine 100mg/m2 x 3 days continuous infusion., 11/12 Cont last day of cytarabine. stop hydroxyurea. start gilteritinib w dexamethasone. 11/13 DC Cytarabine-pt  received 309 ml/509 ml of day 3 cytarabine.   speech and swallow eval for difficulty swallowing.   11/18 cont Lasix 40mg IV BID, for net NEG 1-2 L/day (per d/w Cardiology). D/C'd ppx Dexamethasone  DNR/DNI Mutations identified in FLT3-ITD, NPM1, ASXL1 and CEBPA.    G6PD level was normal in 2020.   Trend CBC, CMP, TLS labs, DIC labs daily. , If fibrinogen under 100, give 10 units of cryoprecipitate.  Transfuse for Hgb < 7 and platelet count < 10k, < 20k if febrile, < 50k if bleeding, continue allopurinol 300mg PO q daily.   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   Daily ekg through 11/18 (7 days of gilt).   dexamethasone to ppx differentiation syndrome was decreased from 5mg q12h to 2.5 q12 on 11/15  11/10 Cytarabine 100mg/m2 x 3 days continuous infusion. stopped hydroxyurea.   11/12 started gilteritinib w dexamethasone. 11/13 DC Cytarabine-pt  received 309 ml/509 ml of day 3 cytarabine.   speech and swallow eval for difficulty swallowing.   11/18 Lasix 40mg IV BID, for net NEG 1-2 L/day (per d/w Cardiology). D/C'd ppx Dexamethasone  11/19 K+/Mg supplemented. Cryo for low fibrinogen. Vitamin K x 3 days PT/INR 19/1.63. BNP trending down, continue BID Lasix. weights trending down.  DNR/DNI Mutations identified in FLT3-ITD, NPM1, ASXL1 and CEBPA.    G6PD level was normal in 2020.   Trend CBC, CMP, TLS labs, DIC labs daily. , If fibrinogen under 100, give 10 units of cryoprecipitate.  Transfuse for Hgb < 7 and platelet count < 10k, < 20k if febrile, < 50k if bleeding, continue allopurinol 300mg PO q daily.   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    dexamethasone to ppx differentiation syndrome was decreased from 5mg q12h to 2.5 q12 on 11/15  11/10 Cytarabine 100mg/m2 x 3 days continuous infusion. stopped hydroxyurea.   11/12 started gilteritinib w dexamethasone. 11/13 DC Cytarabine-pt  received 309 ml/509 ml of day 3 cytarabine.   speech and swallow eval for difficulty swallowing.   EKG biweekly check for QTc on giliteritinib and voriconazole  11/18 Lasix 40mg IV BID, for net NEG 1-2 L/day (per d/w Cardiology). D/C'd ppx Dexamethasone  11/20 K+/Mg supplemented. Cryo for low fibrinogen. Vitamin K x 3 days PT/INR 19/1.63. Pro BNP trending down, continue BID Lasix. weights trending down.  overall DNR/DNI Mutations identified in FLT3-ITD, NPM1, ASXL1 and CEBPA.    CBC, CMP, TLS labs, DIC labs daily. , If fibrinogen under 100, give 10 units of cryoprecipitate.  Transfuse for Hgb < 7 and platelet count < 10k, < 20k if febrile, < 50k if bleeding  11/10 Cytarabine 100mg/m2 x 3 days for cytoreduction. stopped hydroxyurea 11/12.   11/12 started gilteritinib   EKG biweekly check for QTc on giliteritinib and voriconazole  11/18 Lasix 40mg IV BID, for net NEG 1-2 L/day (per d/w Cardiology). D/C'd ppx Dexamethasone  11/20 K+/Mg supplemented. Cryo for low fibrinogen. Vitamin K x 3 days PT/INR 19/1.63. Pro BNP trending down, continue BID Lasix. weights trending down.  overall DNR/DNI

## 2022-11-21 NOTE — PROGRESS NOTE ADULT - NS ATTEND AMEND GEN_ALL_CORE FT
Primary:    Vital Signs Last 24 Hrs  T(C): 36.7 (21 Nov 2022 00:39), Max: 36.7 (20 Nov 2022 17:36)  T(F): 98 (21 Nov 2022 00:39), Max: 98.1 (20 Nov 2022 17:36)  HR: 77 (21 Nov 2022 00:39) (75 - 85)  BP: 139/63 (21 Nov 2022 00:39) (116/76 - 139/63)  BP(mean): --  RR: 16 (21 Nov 2022 00:39) (16 - 16)  SpO2: 97% (21 Nov 2022 00:39) (97% - 100%)    Parameters below as of 21 Nov 2022 00:39  Patient On (Oxygen Delivery Method): nasal cannula  O2 Flow (L/min): 3    MEDICATIONS  (STANDING):  acyclovir   Oral Tab/Cap 400 milliGRAM(s) Oral two times a day  albuterol/ipratropium for Nebulization 3 milliLiter(s) Nebulizer every 6 hours  atorvastatin 10 milliGRAM(s) Oral at bedtime  Biotene Dry Mouth Oral Rinse 15 milliLiter(s) Swish and Spit three times a day  budesonide  80 MICROgram(s)/formoterol 4.5 MICROgram(s) Inhaler 2 Puff(s) Inhalation two times a day  buPROPion XL (24-Hour) . 150 milliGRAM(s) Oral daily  cefepime   IVPB 2000 milliGRAM(s) IV Intermittent every 12 hours  chlorhexidine 2% Cloths 1 Application(s) Topical daily  furosemide   Injectable 40 milliGRAM(s) IV Push two times a day  gabapentin 100 milliGRAM(s) Oral daily  gilteritinib 120 milliGRAM(s) Oral <User Schedule>  oxybutynin 5 milliGRAM(s) Oral daily  phytonadione   Solution 10 milliGRAM(s) Oral daily  polyethylene glycol 3350 17 Gram(s) Oral two times a day  senna 2 Tablet(s) Oral at bedtime  sodium chloride 0.65% Nasal 1 Spray(s) Both Nostrils four times a day  sodium chloride 0.9%. 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  Course complicated S. epi bacteremia? (11/9/22), had continuous neutropenic fevers, which resolved after 11/13/22; pneumonia (concerning for fungus) and hypotension (11/12/22), fluid overloaded state (11/13/22 - )     PMHx: reactive airway disease, aortic stenosis.      ID: cont present abs, d/c vanco, ID input appreciated     Plan:  Heme: PLT goal > 10,000; Hgb goal > 7.0g/dL;      Continue gilteritinib with dex (to reduce the incidence of differentiation syndrome), 2.5 mg 2x/d    ID: cefepime, vori, acyclovir; vori level (XX:YY)     peripheral blood fungitell neg and galactomannan (11/12/22) 0.03 (wnl)    Radiographs: CT (11/12/22): Scattered nodular and small consolidative opacities peripherally and bilateral lower lobes; findings are favored to represent aspiration and/or infection.  Indeterminate right hepatic lobe 2 cm lesion and right adrenal 2.3 cm nodule; recommend correlation with MRI abdomen to more accurately characterize.  CXR 11/15/22 right basilar, retrocardiac infiltrates    Pulmonary: Symbicort (history of reactive airway disease)     Nutrition: tolerating PO; cont net diuresis today.   cont to follow QTc (on vfend and gilteritinib)  DVT prophylaxis: ambulation.     Code status: DNR/DNI    Over 35 minutes were spent in direct patient care and care coordination.

## 2022-11-21 NOTE — PROGRESS NOTE ADULT - SUBJECTIVE AND OBJECTIVE BOX
Diagnosis:     Protocol/Chemo Regimen:    Day: 10    Subjective:    Review of Systems:    Pain scale:                     Diet:     Allergies    No Known Allergies    Intolerances      ANTIMICROBIALS  acyclovir   Oral Tab/Cap 400 milliGRAM(s) Oral two times a day  cefepime   IVPB 2000 milliGRAM(s) IV Intermittent every 12 hours  voriconazole 200 milliGRAM(s) Oral every 12 hours    HEME/ONC MEDICATIONS  gilteritinib 120 milliGRAM(s) Oral <User Schedule>    STANDING MEDICATIONS  albuterol/ipratropium for Nebulization 3 milliLiter(s) Nebulizer every 6 hours  atorvastatin 10 milliGRAM(s) Oral at bedtime  Biotene Dry Mouth Oral Rinse 15 milliLiter(s) Swish and Spit three times a day  budesonide  80 MICROgram(s)/formoterol 4.5 MICROgram(s) Inhaler 2 Puff(s) Inhalation two times a day  buPROPion XL (24-Hour) . 150 milliGRAM(s) Oral daily  chlorhexidine 2% Cloths 1 Application(s) Topical daily  furosemide   Injectable 40 milliGRAM(s) IV Push two times a day  gabapentin 100 milliGRAM(s) Oral daily  oxybutynin 5 milliGRAM(s) Oral daily  phytonadione   Solution 10 milliGRAM(s) Oral daily  polyethylene glycol 3350 17 Gram(s) Oral two times a day  senna 2 Tablet(s) Oral at bedtime  sodium chloride 0.65% Nasal 1 Spray(s) Both Nostrils four times a day  sodium chloride 0.9%. 1000 milliLiter(s) IV Continuous <Continuous>  traZODone 150 milliGRAM(s) Oral at bedtime    PRN MEDICATIONS  acetaminophen     Tablet .. 650 milliGRAM(s) Oral every 6 hours PRN  aluminum hydroxide/magnesium hydroxide/simethicone Suspension 30 milliLiter(s) Oral every 4 hours PRN  benzonatate 100 milliGRAM(s) Oral every 8 hours PRN  melatonin 3 milliGRAM(s) Oral at bedtime PRN  ondansetron Injectable 4 milliGRAM(s) IV Push every 8 hours PRN    Vital Signs Last 24 Hrs  T(C): 36.6 (21 Nov 2022 05:38), Max: 36.7 (20 Nov 2022 17:36)  T(F): 97.8 (21 Nov 2022 05:38), Max: 98.1 (20 Nov 2022 17:36)  HR: 71 (21 Nov 2022 05:38) (71 - 85)  BP: 145/81 (21 Nov 2022 05:38) (116/76 - 145/81)  BP(mean): --  RR: 16 (21 Nov 2022 05:38) (16 - 16)  SpO2: 99% (21 Nov 2022 05:38) (97% - 100%)    Parameters below as of 21 Nov 2022 05:38  Patient On (Oxygen Delivery Method): nasal cannula  O2 Flow (L/min): 3    I&O's Summary    20 Nov 2022 07:01  -  21 Nov 2022 07:00  --------------------------------------------------------  IN: 840 mL / OUT: 2900 mL / NET: -2060 mL    wght:  (    PHYSICAL EXAM  General:   supple:  Neck: supple  CV: normal S1, S2, RRR  Lungs: clear to auscultation, no wheezes, no rales  Abdomen: soft, nontender, nondistended, normal BS  Ext: no edema  Skin:   Neuro:   Central line:    LABS:             -----------    RADIOLOGY & ADDITIONAL STUDIES:    < from: Xray Abdomen 1 View PORTABLE -Urgent (Xray Abdomen 1 View PORTABLE -Urgent .) (11.18.22 @ 00:36) >  IMPRESSION:    No definite bowel obstruction.  Moderate stool in the colon.    < end of copied text >

## 2022-11-21 NOTE — PROGRESS NOTE ADULT - SUBJECTIVE AND OBJECTIVE BOX
Follow Up: neutropenic fevers    Interval History/ROS: Afebrile, just very tired. No diarrhea recurrence. No cough or dysuria. No pain.     Allergies  No Known Allergies        ANTIMICROBIALS:  acyclovir   Oral Tab/Cap 400 two times a day  cefepime   IVPB 2000 every 12 hours  voriconazole 200 every 12 hours      OTHER MEDS:  acetaminophen     Tablet .. 650 milliGRAM(s) Oral every 6 hours PRN  albuterol/ipratropium for Nebulization 3 milliLiter(s) Nebulizer every 6 hours  aluminum hydroxide/magnesium hydroxide/simethicone Suspension 30 milliLiter(s) Oral every 4 hours PRN  atorvastatin 10 milliGRAM(s) Oral at bedtime  benzonatate 100 milliGRAM(s) Oral every 8 hours PRN  Biotene Dry Mouth Oral Rinse 15 milliLiter(s) Swish and Spit three times a day  budesonide  80 MICROgram(s)/formoterol 4.5 MICROgram(s) Inhaler 2 Puff(s) Inhalation two times a day  buPROPion XL (24-Hour) . 150 milliGRAM(s) Oral daily  chlorhexidine 2% Cloths 1 Application(s) Topical daily  furosemide   Injectable 40 milliGRAM(s) IV Push two times a day  gabapentin 100 milliGRAM(s) Oral daily  gilteritinib 120 milliGRAM(s) Oral <User Schedule>  lisinopril 5 milliGRAM(s) Oral daily  melatonin 3 milliGRAM(s) Oral at bedtime PRN  ondansetron Injectable 4 milliGRAM(s) IV Push every 8 hours PRN  oxybutynin 5 milliGRAM(s) Oral daily  phytonadione   Solution 10 milliGRAM(s) Oral daily  polyethylene glycol 3350 17 Gram(s) Oral two times a day  potassium chloride    Tablet ER 20 milliEquivalent(s) Oral daily  potassium chloride  20 mEq/100 mL IVPB 20 milliEquivalent(s) IV Intermittent every 2 hours  senna 2 Tablet(s) Oral at bedtime  sodium chloride 0.65% Nasal 1 Spray(s) Both Nostrils four times a day  sodium chloride 0.9%. 1000 milliLiter(s) IV Continuous <Continuous>  traZODone 150 milliGRAM(s) Oral at bedtime      Vital Signs Last 24 Hrs  T(C): 36.3 (22 Nov 2022 05:00), Max: 36.9 (22 Nov 2022 00:54)  T(F): 97.4 (22 Nov 2022 05:00), Max: 98.4 (22 Nov 2022 00:54)  HR: 72 (22 Nov 2022 05:00) (71 - 81)  BP: 145/75 (22 Nov 2022 05:00) (129/81 - 145/75)  BP(mean): --  RR: 18 (22 Nov 2022 05:00) (16 - 18)  SpO2: 99% (22 Nov 2022 05:00) (97% - 99%)    Parameters below as of 22 Nov 2022 05:00  Patient On (Oxygen Delivery Method): nasal cannula        Physical Exam:  General: non toxic  Cardio: regular rate   Respiratory: nonlabored on nasal cannula   abd: nondistended  vascular: mediport   Skin: no rash                          7.5    0.19  )-----------( 11       ( 22 Nov 2022 07:01 )             22.3       11-22    139  |  102  |  13  ----------------------------<  99  3.6   |  33<H>  |  0.60    Ca    8.1<L>      22 Nov 2022 07:07  Phos  2.8     11-22  Mg     2.2     11-22    TPro  5.0<L>  /  Alb  2.7<L>  /  TBili  0.2  /  DBili  x   /  AST  27  /  ALT  32  /  AlkPhos  73  11-22          MICROBIOLOGY:  Culture - Urine (collected 11-15-22 @ 15:29)  Source: Clean Catch Clean Catch (Midstream)  Final Report (11-16-22 @ 14:04):    No growth      RADIOLOGY:  Images below reviewed personally  Xray Abdomen 1 View PORTABLE -Urgent (Xray Abdomen 1 View PORTABLE -Urgent .) (11.18.22 @ 00:36)   No definite bowel obstruction.  Moderate stool in the colon.    CT Chest No Cont (11.12.22 @ 13:34)   Scattered nodular and small consolidative opacities peripherally and   bilateral lower lobes; findings are favored to represent aspiration   and/or infection.  Indeterminate right hepatic lobe 2 cm lesion and right adrenal 2.3 cm   nodule; recommend correlation with MRI abdomen to more accurately   characterize.

## 2022-11-21 NOTE — PROGRESS NOTE ADULT - SUBJECTIVE AND OBJECTIVE BOX
CARDIO-ONCOLOGY FOLLOW UP NOTE                                                                                                                                                                                       Interval History: Pro-BNP and weight downtrending.     PAST MEDICAL & SURGICAL HISTORY:  Breast cancer  s/p lumpectomy, RT, tamoxifen  Hypertension  AML (acute myelogenous leukemia)  S/P hysterectomy          FAMILY HISTORY:  No pertinent family history in first degree relatives        Home Medications:  acyclovir 400 mg oral tablet: 1 tab(s) orally 2 times a day (08 Nov 2022 15:36)  amLODIPine 5 mg oral tablet: 1 tab(s) orally once a day (09 Nov 2022 13:00)  buPROPion 150 mg/12 hours (SR) oral tablet, extended release: 1 tab(s) orally once a day (08 Nov 2022 15:36)  gabapentin 100 mg oral tablet: 1 tab(s) orally once a day (08 Nov 2022 15:36)  levoFLOXacin 500 mg oral tablet: 1 tab(s) orally every 24 hours (08 Nov 2022 15:36)  oxybutynin 5 mg oral tablet: 1 tab(s) orally once a day (08 Nov 2022 15:36)  traZODone 150 mg oral tablet: 1 tab(s) orally once a day (at bedtime) (08 Nov 2022 15:36)      Social History:  She lives with her . Her  has prostate cancer and neuropathy. She is concerned about his health too.   She has an aide who assists 4 hrs x 3 days a week.   She ambulates with a cane or walker. She has not had any recent falls.   She wears a Life Alert pendant.   She denies tobacco or drug use. She has one serving of alcohol a day. (08 Nov 2022 13:07)      Medications:  acetaminophen     Tablet .. 650 milliGRAM(s) Oral every 6 hours PRN  acyclovir   Oral Tab/Cap 400 milliGRAM(s) Oral two times a day  albuterol/ipratropium for Nebulization 3 milliLiter(s) Nebulizer every 6 hours  aluminum hydroxide/magnesium hydroxide/simethicone Suspension 30 milliLiter(s) Oral every 4 hours PRN  atorvastatin 10 milliGRAM(s) Oral at bedtime  benzonatate 100 milliGRAM(s) Oral every 8 hours PRN  Biotene Dry Mouth Oral Rinse 15 milliLiter(s) Swish and Spit three times a day  budesonide  80 MICROgram(s)/formoterol 4.5 MICROgram(s) Inhaler 2 Puff(s) Inhalation two times a day  buPROPion XL (24-Hour) . 150 milliGRAM(s) Oral daily  cefepime   IVPB 2000 milliGRAM(s) IV Intermittent every 12 hours  chlorhexidine 2% Cloths 1 Application(s) Topical daily  furosemide   Injectable 40 milliGRAM(s) IV Push two times a day  gabapentin 100 milliGRAM(s) Oral daily  gilteritinib 120 milliGRAM(s) Oral <User Schedule>  melatonin 3 milliGRAM(s) Oral at bedtime PRN  ondansetron Injectable 4 milliGRAM(s) IV Push every 8 hours PRN  oxybutynin 5 milliGRAM(s) Oral daily  phytonadione   Solution 10 milliGRAM(s) Oral daily  polyethylene glycol 3350 17 Gram(s) Oral two times a day  senna 2 Tablet(s) Oral at bedtime  sodium chloride 0.65% Nasal 1 Spray(s) Both Nostrils four times a day  sodium chloride 0.9%. 1000 milliLiter(s) IV Continuous <Continuous>  traZODone 150 milliGRAM(s) Oral at bedtime  voriconazole 200 milliGRAM(s) Oral every 12 hours      Review of systems:  10 point review of systems completed and are negative unless noted in HPI    Vitals:  T(C): 36.6 (11-21-22 @ 05:38), Max: 36.7 (11-20-22 @ 17:36)  HR: 71 (11-21-22 @ 05:38) (71 - 85)  BP: 145/81 (11-21-22 @ 05:38) (116/76 - 145/81)  BP(mean): --  RR: 16 (11-21-22 @ 05:38) (16 - 16)  SpO2: 99% (11-21-22 @ 05:38) (97% - 100%)    Daily     Daily         I&O's Summary    20 Nov 2022 07:01  -  21 Nov 2022 07:00  --------------------------------------------------------  IN: 840 mL / OUT: 2900 mL / NET: -2060 mL        Physical Exam:  Appearance: No apparent distress  HEENT: moist mucosa, anicteric sclerae.   Neck:   Cardiovascular: regular rate and rhythm. No murmur. No gallop. No friction rub.  Respiratory: Clear to auscultation bilaterally  Gastrointestinal: Soft, Non-tender, benign	  Skin: no clubbing or cyanosis.   Neurologic: No focal deficit  Extremities: warm. No edema.  Psychiatry: A & O x 3, Mood & affect appropriate  Vascular: pulse regular, equal    Labs:                        8.6    0.19  )-----------( 30       ( 20 Nov 2022 06:55 )             25.6     11-20    137  |  101  |  24<H>  ----------------------------<  106<H>  3.9   |  27  |  0.61    Ca    8.0<L>      20 Nov 2022 06:53  Phos  3.7     11-20  Mg     2.0     11-20    TPro  4.9<L>  /  Alb  2.8<L>  /  TBili  0.4  /  DBili  x   /  AST  14  /  ALT  30  /  AlkPhos  64  11-20    PT/INR - ( 20 Nov 2022 06:57 )   PT: 19.0 sec;   INR: 1.63 ratio         PTT - ( 20 Nov 2022 06:57 )  PTT:31.9 sec        Serum Pro-Brain Natriuretic Peptide: 2252 pg/mL [0 - 300] (11-20-22 @ 06:53)  Serum Pro-Brain Natriuretic Peptide: 3493 pg/mL [0 - 300] (11-19-22 @ 07:43)      TELEMETRY:    Echocardiogram:        EKG:     CXR/Radiology:   CARDIO-ONCOLOGY FOLLOW UP NOTE                                                                                                                                                                                       Interval History: Pro-BNP and weight downtrending. Breathing improved, but still too weak to do more than sit in chair. No chest pain.    PAST MEDICAL & SURGICAL HISTORY:  Breast cancer  s/p lumpectomy, RT, tamoxifen  Hypertension  AML (acute myelogenous leukemia)  S/P hysterectomy          FAMILY HISTORY:  No pertinent family history in first degree relatives        Home Medications:  acyclovir 400 mg oral tablet: 1 tab(s) orally 2 times a day (08 Nov 2022 15:36)  amLODIPine 5 mg oral tablet: 1 tab(s) orally once a day (09 Nov 2022 13:00)  buPROPion 150 mg/12 hours (SR) oral tablet, extended release: 1 tab(s) orally once a day (08 Nov 2022 15:36)  gabapentin 100 mg oral tablet: 1 tab(s) orally once a day (08 Nov 2022 15:36)  levoFLOXacin 500 mg oral tablet: 1 tab(s) orally every 24 hours (08 Nov 2022 15:36)  oxybutynin 5 mg oral tablet: 1 tab(s) orally once a day (08 Nov 2022 15:36)  traZODone 150 mg oral tablet: 1 tab(s) orally once a day (at bedtime) (08 Nov 2022 15:36)      Social History:  She lives with her . Her  has prostate cancer and neuropathy. She is concerned about his health too.   She has an aide who assists 4 hrs x 3 days a week.   She ambulates with a cane or walker. She has not had any recent falls.   She wears a Life Alert pendant.   She denies tobacco or drug use. She has one serving of alcohol a day. (08 Nov 2022 13:07)      Medications:  acetaminophen     Tablet .. 650 milliGRAM(s) Oral every 6 hours PRN  acyclovir   Oral Tab/Cap 400 milliGRAM(s) Oral two times a day  albuterol/ipratropium for Nebulization 3 milliLiter(s) Nebulizer every 6 hours  aluminum hydroxide/magnesium hydroxide/simethicone Suspension 30 milliLiter(s) Oral every 4 hours PRN  atorvastatin 10 milliGRAM(s) Oral at bedtime  benzonatate 100 milliGRAM(s) Oral every 8 hours PRN  Biotene Dry Mouth Oral Rinse 15 milliLiter(s) Swish and Spit three times a day  budesonide  80 MICROgram(s)/formoterol 4.5 MICROgram(s) Inhaler 2 Puff(s) Inhalation two times a day  buPROPion XL (24-Hour) . 150 milliGRAM(s) Oral daily  cefepime   IVPB 2000 milliGRAM(s) IV Intermittent every 12 hours  chlorhexidine 2% Cloths 1 Application(s) Topical daily  furosemide   Injectable 40 milliGRAM(s) IV Push two times a day  gabapentin 100 milliGRAM(s) Oral daily  gilteritinib 120 milliGRAM(s) Oral <User Schedule>  melatonin 3 milliGRAM(s) Oral at bedtime PRN  ondansetron Injectable 4 milliGRAM(s) IV Push every 8 hours PRN  oxybutynin 5 milliGRAM(s) Oral daily  phytonadione   Solution 10 milliGRAM(s) Oral daily  polyethylene glycol 3350 17 Gram(s) Oral two times a day  senna 2 Tablet(s) Oral at bedtime  sodium chloride 0.65% Nasal 1 Spray(s) Both Nostrils four times a day  sodium chloride 0.9%. 1000 milliLiter(s) IV Continuous <Continuous>  traZODone 150 milliGRAM(s) Oral at bedtime  voriconazole 200 milliGRAM(s) Oral every 12 hours      Review of systems:  10 point review of systems completed and are negative unless noted in HPI    Vitals:  T(C): 36.6 (11-21-22 @ 05:38), Max: 36.7 (11-20-22 @ 17:36)  HR: 71 (11-21-22 @ 05:38) (71 - 85)  BP: 145/81 (11-21-22 @ 05:38) (116/76 - 145/81)  BP(mean): --  RR: 16 (11-21-22 @ 05:38) (16 - 16)  SpO2: 99% (11-21-22 @ 05:38) (97% - 100%)    Daily     Daily         I&O's Summary    20 Nov 2022 07:01  -  21 Nov 2022 07:00  --------------------------------------------------------  IN: 840 mL / OUT: 2900 mL / NET: -2060 mL        Physical Exam:  Appearance: No apparent distress  HEENT: moist mucosa, anicteric sclerae.   Neck: No JVD  Cardiovascular: regular rate and rhythm. systolic murmur No gallop. No friction rub.  Respiratory: decreased breath sounds at bases  Gastrointestinal: Soft, Non-tender, benign	  Skin: no clubbing or cyanosis.   Neurologic: No focal deficit  Extremities: warm. trace edema  Psychiatry: A & O x 3, Mood & affect appropriate  Vascular: pulse regular, equal    Labs:                        8.6    0.19  )-----------( 30       ( 20 Nov 2022 06:55 )             25.6     11-20    137  |  101  |  24<H>  ----------------------------<  106<H>  3.9   |  27  |  0.61    Ca    8.0<L>      20 Nov 2022 06:53  Phos  3.7     11-20  Mg     2.0     11-20    TPro  4.9<L>  /  Alb  2.8<L>  /  TBili  0.4  /  DBili  x   /  AST  14  /  ALT  30  /  AlkPhos  64  11-20    PT/INR - ( 20 Nov 2022 06:57 )   PT: 19.0 sec;   INR: 1.63 ratio         PTT - ( 20 Nov 2022 06:57 )  PTT:31.9 sec        Serum Pro-Brain Natriuretic Peptide: 2252 pg/mL [0 - 300] (11-20-22 @ 06:53)  Serum Pro-Brain Natriuretic Peptide: 3493 pg/mL [0 - 300] (11-19-22 @ 07:43)      TELEMETRY:    Echocardiogram:    EKG:     CXR/Radiology:  ACC: [id number] EXAM:  CT CHEST                          PROCEDURE DATE:  11/21/2022          INTERPRETATION:  CLINICAL INFORMATION: AML currently neutropenic.   Evaluate for fungal pneumonia.    COMPARISON: CT chest 11/12/2022.    CONTRAST/COMPLICATIONS:  IV Contrast: NONE  Oral Contrast: NONE  Complications: None reported at time of study completion    PROCEDURE:  CT scan of the chest was obtained without intravenous contrast.    FINDINGS:    MEDIASTINUM: Redemonstration of fluid-filled esophagus.    LYMPH NODES: No lymphadenopathy.    HEART/VASCULATURE: Heart size within normal limits. Trace pericardial   effusion. Calcifications of the coronary arteries and aorta.    AIRWAYS/LUNGS/PLEURA: Increase in ill-defined nodules with new   surrounding halo of groundglass opacities as compared to CT chest on   11/12/2022. Increase in size of small bilateral pleural effusions, right   more than left.    UPPER ABDOMEN: Redemonstration of 2.3 x 1.9 cm right hepatic lobe   hypodensity. Redemonstration of indeterminate 2.3 cm right adrenal nodule   with internal calcification.    BONES/SOFT TISSUES: Left chest wall Mediport tip is in the SVC.    IMPRESSION:    1.  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  Increase in small bilateral pleural effusions, right more than left.    3.  Redemonstration of indeterminate right hepatic lobe and right adrenal   lesions, as described above. MRI of the abdomen is again recommended for   further evaluation.    --- End of Report ---           ROCIO ROSALES MD; Resident Radiologist  This document has been electronically signed.   ADEOLA SEPULVEDA M.D., Attending Radiologist

## 2022-11-21 NOTE — CONSULT NOTE ADULT - PROBLEM SELECTOR RECOMMENDATION 6
Current functional PPSv2: 40-50  Nursing care required: Some assistance ADLs  A review of the paper chart has been conducted  HCP form present:               Yes and valid []               Yes but invalid []                No [x]   Incapacity form present:   Yes and valid []                Yes but invalid []                No []                 N/A [x]  Living Will:                            Yes and valid []                Yes but invalid []                No [x]      Family Health Care Decision Act (FHCDA) Surrogate Decision Maker Hierarchy in the absence of a health care agent  [facility] Article 81 Guardian-->Spouse or domestic partner--> Adult child-->Parent-->Sibling--> Close friend

## 2022-11-21 NOTE — CONSULT NOTE ADULT - PROBLEM SELECTOR RECOMMENDATION 2
Condition: HF  Active treatment: diuresis per primary team  Clinical impact on complexity: Cardio-Onc following

## 2022-11-21 NOTE — PROGRESS NOTE ADULT - NS ATTEND AMEND GEN_ALL_CORE FT
Primary:    Vital Signs Last 24 Hrs  T(C): 36.7 (21 Nov 2022 00:39), Max: 36.7 (20 Nov 2022 17:36)  T(F): 98 (21 Nov 2022 00:39), Max: 98.1 (20 Nov 2022 17:36)  HR: 77 (21 Nov 2022 00:39) (75 - 85)  BP: 139/63 (21 Nov 2022 00:39) (116/76 - 139/63)  BP(mean): --  RR: 16 (21 Nov 2022 00:39) (16 - 16)  SpO2: 97% (21 Nov 2022 00:39) (97% - 100%)    Parameters below as of 21 Nov 2022 00:39  Patient On (Oxygen Delivery Method): nasal cannula  O2 Flow (L/min): 3    MEDICATIONS  (STANDING):  acyclovir   Oral Tab/Cap 400 milliGRAM(s) Oral two times a day  albuterol/ipratropium for Nebulization 3 milliLiter(s) Nebulizer every 6 hours  atorvastatin 10 milliGRAM(s) Oral at bedtime  Biotene Dry Mouth Oral Rinse 15 milliLiter(s) Swish and Spit three times a day  budesonide  80 MICROgram(s)/formoterol 4.5 MICROgram(s) Inhaler 2 Puff(s) Inhalation two times a day  buPROPion XL (24-Hour) . 150 milliGRAM(s) Oral daily  cefepime   IVPB 2000 milliGRAM(s) IV Intermittent every 12 hours  chlorhexidine 2% Cloths 1 Application(s) Topical daily  furosemide   Injectable 40 milliGRAM(s) IV Push two times a day  gabapentin 100 milliGRAM(s) Oral daily  gilteritinib 120 milliGRAM(s) Oral <User Schedule>  oxybutynin 5 milliGRAM(s) Oral daily  phytonadione   Solution 10 milliGRAM(s) Oral daily  polyethylene glycol 3350 17 Gram(s) Oral two times a day  senna 2 Tablet(s) Oral at bedtime  sodium chloride 0.65% Nasal 1 Spray(s) Both Nostrils four times a day  sodium chloride 0.9%. 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  Course complicated S. epi bacteremia? (11/9/22), had continuous neutropenic fevers, which resolved after 11/13/22; pneumonia (concerning for fungus) and hypotension (11/12/22), fluid overloaded state (11/13/22 - )     PMHx: reactive airway disease, aortic stenosis.      ID: cont present abs, d/c vanco, ID input appreciated     Plan:  Heme: PLT goal > 10,000; Hgb goal > 7.0g/dL;      Continue gilteritinib with dex (to reduce the incidence of differentiation syndrome), 2.5 mg 2x/d    ID: cefepime, vori, acyclovir; vori level (XX:YY)     peripheral blood fungitell neg and galactomannan (11/12/22) 0.03 (wnl)    Radiographs: CT (11/12/22): Scattered nodular and small consolidative opacities peripherally and bilateral lower lobes; findings are favored to represent aspiration and/or infection.  Indeterminate right hepatic lobe 2 cm lesion and right adrenal 2.3 cm nodule; recommend correlation with MRI abdomen to more accurately characterize.  CXR 11/15/22 right basilar, retrocardiac infiltrates    Pulmonary: Symbicort (history of reactive airway disease)     Nutrition: tolerating PO; cont net diuresis today.   cont to follow QTc (on vfend and gilteritinib)  DVT prophylaxis: ambulation.     Code status: DNR/DNI    Over 35 minutes were spent in direct patient care and care coordination. Primary:    Vital Signs Last 24 Hrs  T(C): 36.7 (21 Nov 2022 00:39), Max: 36.7 (20 Nov 2022 17:36)  T(F): 98 (21 Nov 2022 00:39), Max: 98.1 (20 Nov 2022 17:36)  HR: 77 (21 Nov 2022 00:39) (75 - 85)  BP: 139/63 (21 Nov 2022 00:39) (116/76 - 139/63)  BP(mean): --  RR: 16 (21 Nov 2022 00:39) (16 - 16)  SpO2: 97% (21 Nov 2022 00:39) (97% - 100%)    Parameters below as of 21 Nov 2022 00:39  Patient On (Oxygen Delivery Method): nasal cannula  O2 Flow (L/min): 3    MEDICATIONS  (STANDING):  acyclovir   Oral Tab/Cap 400 milliGRAM(s) Oral two times a day  albuterol/ipratropium for Nebulization 3 milliLiter(s) Nebulizer every 6 hours  atorvastatin 10 milliGRAM(s) Oral at bedtime  Biotene Dry Mouth Oral Rinse 15 milliLiter(s) Swish and Spit three times a day  budesonide  80 MICROgram(s)/formoterol 4.5 MICROgram(s) Inhaler 2 Puff(s) Inhalation two times a day  buPROPion XL (24-Hour) . 150 milliGRAM(s) Oral daily  cefepime   IVPB 2000 milliGRAM(s) IV Intermittent every 12 hours  chlorhexidine 2% Cloths 1 Application(s) Topical daily  furosemide   Injectable 40 milliGRAM(s) IV Push two times a day  gabapentin 100 milliGRAM(s) Oral daily  gilteritinib 120 milliGRAM(s) Oral <User Schedule>  oxybutynin 5 milliGRAM(s) Oral daily  phytonadione   Solution 10 milliGRAM(s) Oral daily  polyethylene glycol 3350 17 Gram(s) Oral two times a day  senna 2 Tablet(s) Oral at bedtime  sodium chloride 0.65% Nasal 1 Spray(s) Both Nostrils four times a day  sodium chloride 0.9%. 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  Course complicated S. epi bacteremia? (11/9/22), had continuous neutropenic fevers, which resolved after 11/13/22; pneumonia (concerning for fungus) and hypotension (11/12/22), fluid overloaded state (11/13/22 - )     PMHx: reactive airway disease, aortic stenosis.      Plan:  Heme: PLT goal > 10,000; Hgb goal > 7.0g/dL;      Continue gilteritinib with dex (to reduce the incidence of differentiation syndrome), 2.5 mg 2x/d    ID: cefepime, vori, acyclovir; vori level (XX:YY)     peripheral blood fungitell neg and galactomannan (11/12/22) 0.03 (wnl)    Radiographs: CT (11/12/22): Scattered nodular and small consolidative opacities peripherally and bilateral lower lobes; findings are favored to represent aspiration and/or infection.  Indeterminate right hepatic lobe 2 cm lesion and right adrenal 2.3 cm nodule; recommend correlation with MRI abdomen to more accurately characterize.    Pulmonary: Symbicort (history of reactive airway disease)     Nutrition: tolerating PO    DVT prophylaxis: ambulation.     Code status: DNR/DNI    Over 35 minutes were spent in direct patient care and care coordination. Primary:     Vital Signs Last 24 Hrs  T(C): 36.7 (21 Nov 2022 00:39), Max: 36.7 (20 Nov 2022 17:36)  T(F): 98 (21 Nov 2022 00:39), Max: 98.1 (20 Nov 2022 17:36)  HR: 77 (21 Nov 2022 00:39) (75 - 85)  BP: 139/63 (21 Nov 2022 00:39) (116/76 - 139/63)  BP(mean): --  RR: 16 (21 Nov 2022 00:39) (16 - 16)  SpO2: 97% (21 Nov 2022 00:39) (97% - 100%)    Parameters below as of 21 Nov 2022 00:39  Patient On (Oxygen Delivery Method): nasal cannula  O2 Flow (L/min): 3    MEDICATIONS  (STANDING):  acyclovir   Oral Tab/Cap 400 milliGRAM(s) Oral two times a day  albuterol/ipratropium for Nebulization 3 milliLiter(s) Nebulizer every 6 hours  atorvastatin 10 milliGRAM(s) Oral at bedtime  Biotene Dry Mouth Oral Rinse 15 milliLiter(s) Swish and Spit three times a day  budesonide  80 MICROgram(s)/formoterol 4.5 MICROgram(s) Inhaler 2 Puff(s) Inhalation two times a day  buPROPion XL (24-Hour) . 150 milliGRAM(s) Oral daily  cefepime   IVPB 2000 milliGRAM(s) IV Intermittent every 12 hours  chlorhexidine 2% Cloths 1 Application(s) Topical daily  furosemide   Injectable 40 milliGRAM(s) IV Push two times a day  gabapentin 100 milliGRAM(s) Oral daily  gilteritinib 120 milliGRAM(s) Oral <User Schedule>  oxybutynin 5 milliGRAM(s) Oral daily  phytonadione   Solution 10 milliGRAM(s) Oral daily  polyethylene glycol 3350 17 Gram(s) Oral two times a day  senna 2 Tablet(s) Oral at bedtime  sodium chloride 0.65% Nasal 1 Spray(s) Both Nostrils four times a day  sodium chloride 0.9%. 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  Course complicated S. epi bacteremia? (11/9/22), had continuous neutropenic fevers, which resolved after 11/13/22; pneumonia (concerning for fungus) and hypotension (11/12/22), fluid overloaded state (11/13/22 - )     PMHx: reactive airway disease, aortic stenosis.      Plan:  Heme: PLT goal > 10,000; Hgb goal > 7.0g/dL;   Continue gilteritinib    ID: cefepime, vori, acyclovir; vori level (11/17/22): 2.6     peripheral blood fungitell neg and galactomannan (11/12/22) 0.03 (wnl)    Radiographs: CT (11/12/22): Scattered nodular and small consolidative opacities peripherally and bilateral lower lobes; findings are favored to represent aspiration and/or infection.  Indeterminate right hepatic lobe 2 cm lesion and right adrenal 2.3 cm nodule    Pulmonary: Symbicort (history of reactive airway disease)     Nutrition: tolerating PO; goal O>I by 1L    DVT prophylaxis: ambulation.     Code status: DNR/DNI; palliative medicine consult requested ( has advanced pancreatic carcinoma.)    Over 35 minutes were spent in direct patient care and care coordination.

## 2022-11-21 NOTE — PROGRESS NOTE ADULT - PROBLEM SELECTOR PLAN 2
Patient is neutropenic, afebrile  CT chest shows scattered nodules/opacities in lung, + adrenal nodule, and liver lesion.  changed fluconazole to voriconazole  (-) fungitell, galactomanan  11/11 Switched Levaquin to Cefepime (renally dosed)  11/10  Blood Cx Staph epidermis/hominis -Repeat cultures 11/11, 11/12, NGTD  11/12 RVP(-)  ID consult-Vanco stopped.  11/21 CT Chest for evaluation

## 2022-11-21 NOTE — PROGRESS NOTE ADULT - ASSESSMENT
82 year old woman with mild AS, now with relapsed AML and neutropenic fever with pancytopenia. Following new HsTnT elevation to 450 ng/L on 4/12, new CHF. On admission, Echo showed normal LV function and strain. CT chest with no pulmonary edema or signs of CHF.  Follow up echo done 11/15 with new segmental impairment of LV systolic function.  Differential for acute systolic HF includes stress cardiomyopathy or acute myocardial infarction due to CAD. Follow up troponin 281, downtrending.  ECG is not diagnostic due to LBBB. Mild Aortic stenosis unlikely contributes to current symptoms and is not a contraindication to fluid resuscitation or diuresis.   Recommendations:  1. Repeat weight this AM to guide diuresis  2. Continue Lasix 40 IV for goal net negative 1 Liter  3. hold midodrine as this is deleterious in the setting of heart failure  4. Monitor lactate, Cr, UOP to assess tissue perfusion  5. if hypotensive off midodrine with evidence of end organ perfusion would escalate care to CICU.  6. Daily weights and strict I/Os  7. Cardiac cath when platelet count recovers  8. start statin if feasible, can reduce dose to minimize interaction with voriconazole  9. Keep electrolytes replaced  (K > 4, Mag > 2) especially with diuresis.  10. Add Lisinopril 5 mg daily    FABIO Navarrete MD [facility]  Cardio-Oncology      82 year old woman with mild AS, now with relapsed AML and neutropenic fever with pancytopenia. Following new HsTnT elevation to 450 ng/L on 4/12, new CHF. On admission, Echo showed normal LV function and strain. CT chest with no pulmonary edema or signs of CHF.  Follow up echo done 11/15 with new segmental impairment of LV systolic function.  Differential for acute systolic HF includes stress cardiomyopathy or acute myocardial infarction due to CAD. Follow up troponin 281, downtrending.  ECG is not diagnostic due to LBBB. Mild Aortic stenosis unlikely contributes to current symptoms and is not a contraindication to fluid resuscitation or diuresis.   Recommendations:  1. Repeat weight this AM to guide diuresis  2. Continue Lasix 40 IV 2-3 x daily for goal net negative 1 Liter  3. Monitor lactate, Cr, UOP to assess tissue perfusion  4. Daily weights and strict I/Os  5. Cardiac cath when platelet count recovers  6. continue low dose atorvastatin  7. Keep electrolytes replaced  (K > 4, Mag > 2) especially with diuresis.  8. Add Lisinopril 5 mg daily    FABIO Navarrete MD [facility]  Cardio-Oncology

## 2022-11-21 NOTE — PROGRESS NOTE ADULT - PROBLEM SELECTOR PLAN 1
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    CBC, CMP, TLS labs, DIC labs daily. , If fibrinogen under 100, give 10 units of cryoprecipitate.  Transfuse for Hgb < 7 and platelet count < 10k, < 20k if febrile, < 50k if bleeding  11/10 Cytarabine 100mg/m2 x 3 days for cytoreduction. stopped hydroxyurea 11/12.   11/12 started gilteritinib   EKG biweekly check for QTc on giliteritinib and voriconazole  11/18 Lasix 40mg IV BID, for net NEG 1-2 L/day (per d/w Cardiology). D/C'd ppx Dexamethasone  11/20 K+/Mg supplemented. Cryo for low fibrinogen. Vitamin K x 3 days PT/INR 19/1.63. Pro BNP trending down, continue BID Lasix. weights trending down.  overall DNR/DNI

## 2022-11-21 NOTE — CONSULT NOTE ADULT - PROBLEM SELECTOR RECOMMENDATION 9
Condition: AML  Previous malignancy: Breast cancer  Active treatment: gilteritinib  Clinical impact on complexity: R/R FLT3+ AML, high complexity

## 2022-11-21 NOTE — PROGRESS NOTE ADULT - SUBJECTIVE AND OBJECTIVE BOX
Diagnosis: relapsed AML, FLT3 ITD+, NPM1, CEBPA, ASXL1 mutated    Protocol/Chemo Regimen: daily FLT3 inhibitor gilteritinib (s/p IV cytarabine 100mg/m2 x 3days 11/10-11/12 prior to start of gilteritinib for cytoreduction)    Day: 10 gilteritinib     Pt endorsed: No overnight events, afebrile. Last BM 2 days ago    Review of Systems: Denies SOB, abdominal pain, n/v, HA or dizziness    Pain scale: 0                                           Diet: DASH/TLC    Allergies  No Known Allergies    ANTIMICROBIALS  acyclovir   Oral Tab/Cap 400 milliGRAM(s) Oral two times a day  cefepime   IVPB 2000 milliGRAM(s) IV Intermittent every 12 hours  voriconazole 200 milliGRAM(s) Oral every 12 hours      HEME/ONC MEDICATIONS  gilteritinib 120 milliGRAM(s) Oral <User Schedule>      STANDING MEDICATIONS  albuterol/ipratropium for Nebulization 3 milliLiter(s) Nebulizer every 6 hours  atorvastatin 10 milliGRAM(s) Oral at bedtime  Biotene Dry Mouth Oral Rinse 15 milliLiter(s) Swish and Spit three times a day  budesonide  80 MICROgram(s)/formoterol 4.5 MICROgram(s) Inhaler 2 Puff(s) Inhalation two times a day  buPROPion XL (24-Hour) . 150 milliGRAM(s) Oral daily  chlorhexidine 2% Cloths 1 Application(s) Topical daily  furosemide   Injectable 40 milliGRAM(s) IV Push two times a day  gabapentin 100 milliGRAM(s) Oral daily  oxybutynin 5 milliGRAM(s) Oral daily  polyethylene glycol 3350 17 Gram(s) Oral two times a day  senna 2 Tablet(s) Oral at bedtime  sodium chloride 0.65% Nasal 1 Spray(s) Both Nostrils four times a day  sodium chloride 0.9%. 1000 milliLiter(s) IV Continuous <Continuous>  traZODone 150 milliGRAM(s) Oral at bedtime      PRN MEDICATIONS  acetaminophen     Tablet .. 650 milliGRAM(s) Oral every 6 hours PRN  aluminum hydroxide/magnesium hydroxide/simethicone Suspension 30 milliLiter(s) Oral every 4 hours PRN  benzonatate 100 milliGRAM(s) Oral every 8 hours PRN  melatonin 3 milliGRAM(s) Oral at bedtime PRN  ondansetron Injectable 4 milliGRAM(s) IV Push every 8 hours PRN      Vital Signs Last 24 Hrs  T(C): 36 (21 Nov 2022 09:45), Max: 36.7 (20 Nov 2022 17:36)  T(F): 96.8 (21 Nov 2022 09:45), Max: 98.1 (20 Nov 2022 17:36)  HR: 81 (21 Nov 2022 09:45) (71 - 84)  BP: 129/81 (21 Nov 2022 09:45) (129/78 - 145/81)  BP(mean): --  RR: 16 (21 Nov 2022 09:45) (16 - 16)  SpO2: 97% (21 Nov 2022 09:45) (97% - 99%)    Parameters below as of 21 Nov 2022 09:45  Patient On (Oxygen Delivery Method): nasal cannula    I&O's Summary    20 Nov 2022 07:01  -  21 Nov 2022 07:00  --------------------------------------------------------  IN: 840 mL / OUT: 2900 mL / NET: -2060 mL    PHYSICAL EXAM  General: Sitting up in bed NAD  HEENT: sclerae anicteric, clear oropharynx  CV: (+) S1/S2 RRR  Lungs: +mild bibasilar crackles (R>L), decreased bs at bases, no wheezes   Abdomen:+ BS, soft, +distended, non-tender  Ext: trace edema BLE's   Skin: no rash, left arm bruising, non tender  Neuro: alert and oriented X 3  Central Line: LCW Mediport  and PIV CDI    LABS:                          9.5    0.25  )-----------( 26       ( 21 Nov 2022 08:47 )             27.9     21 Nov 2022 08:47    139    |  99     |  16     ----------------------------<  97     3.3     |  31     |  0.62     Ca    8.7        21 Nov 2022 08:47  Phos  2.7       21 Nov 2022 08:47  Mg     2.3       21 Nov 2022 08:47    TPro  5.6    /  Alb  3.1    /  TBili  0.5    /  DBili  x      /  AST  27     /  ALT  35     /  AlkPhos  76     21 Nov 2022 08:47    PT/INR - ( 21 Nov 2022 08:47 )   PT: 13.5 sec;   INR: 1.16 ratio    PTT - ( 21 Nov 2022 08:47 )  PTT:25.1 sec      LIVER FUNCTIONS - ( 21 Nov 2022 08:47 )  Alb: 3.1 g/dL / Pro: 5.6 g/dL / ALK PHOS: 76 U/L / ALT: 35 U/L / AST: 27 U/L / GGT: x               Cultures:    Culture - Urine (11.15.22 @ 15:29)   Specimen Source: Clean Catch Clean Catch (Midstream)   Culture Results: No growth     Culture - Blood (11.13.22 @ 16:10)  Specimen Source: .Blood Blood-Catheter   Culture Results: No growth to date.     Culture - Blood (11.12.22 @ 15:49)   Specimen Source: .Blood Blood-Catheter   Culture Results: No growth to date.     RADIOLOGY & ADDITIONAL STUDIES:    from: Xray Abdomen 1 View PORTABLE -Urgent (Xray Abdomen 1 View PORTABLE -Urgent .) (11.18.22 @ 00:36)   FINDINGS:  Air distended loops of small and large bowel with air in the rectum.   Moderate stool scattered throughout the colon  There is no evidence of intraperitoneal free air.  The visualized osseous structures demonstrate no acute pathology.  The lung bases are clear.  IMPRESSION:  No definite bowel obstruction.  Moderate stool in the colon.

## 2022-11-21 NOTE — PROGRESS NOTE ADULT - PROBLEM SELECTOR PLAN 3
Cardiology Following  Continue diuresis for NET neg 1-2L/day (per d/w Cardiology)  starting low dose statin  Trend BNP- downtrending  Daily weights, strict I/O's  11/21 started ACE-I (per Cardiology)

## 2022-11-21 NOTE — PROGRESS NOTE ADULT - ASSESSMENT
82F relapsed AML.   Neutropenic fevers.   Maybe pneumonia. Small bibasilar opacities. Negative RVP, MRSA PCR, fungal markers. Cough resolved.   Had diarrhea, resolved too.   CoNS on blood culture likely contaminant. Noted mediport.   Clinically stable, fevers stopped since 11/13.   Planned for repeat CT chest prior to further chemotherapy options.     Suggest  -empiric Cefepime 2GM q12h and Voriconazole 200mg q12h   -monitor cultures   -f/u repeat CT chest     Discussed with heme onc    Bucky Bobby MD   Infectious Disease   Available on TEAMS. After 5PM and on weekends please page fellow on call or call [phone]

## 2022-11-21 NOTE — CONSULT NOTE ADULT - CONVERSATION DETAILS
A conversation was held to address the patient's communication preferences and medical decision making structure.  Preferences for receiving information: She would like to obtain this information with  present (? telephone)  Preferences for who can receive this information: Her son Rodriguez can obtain this information also  Preferences for information about prognosis: She would like access to all information about her illness   Preferences for making medical decisions: She prefers a collaborative approach to making medical decisions  Health care agent or Surrogate identification:  is the surrogate  Review of the Family Health Care Decision Act: [ ] Yes  [ x] No  Advance directive completion [ x] Yes  [ ] No [ ] Completed previously and is in the chart/EHR  [ ] Other  MOLST in the chart  No HCP in the chart    Several questions were posed to gauge the patient's prognostic awareness and she demonstrated limited insight regarding treatment impact.  Will work to enhance prognostic understanding  d/w primary team

## 2022-11-21 NOTE — CONSULT NOTE ADULT - SUBJECTIVE AND OBJECTIVE BOX
HPI:  This patient is an 81yo lady with PMH of AML, breast cancer s/p treatment with tamoxifen, RT and R lumpectomy, who presents with AML relapse.    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 On 2/4/2020, she started C1 dacogen and venetoclax, which she tolerated, other than some dypsnea which was attributed to pulmonary edema and L pleural effusion, and treated with diuretics. Patient was discharged home on 2/11/2020.  BMBx on 3/2/2020 found no blasts. Patient thereafter was continued on maintenance treatment with dacogen and venetoclax (10 days) every 5 weeks, with onpro as needed.    6 month interval BMBx in 10/2020 found hypocellular marrow with focal erythropoiesis, markedly diminished myelopoiesis, rare megakaryocyte and aspicular aspirate, and no increase in blast population.      In August 2022, she was noted to not have count recovery between cycles and was pancytopenic.   Repeat BMBx performed on 9/7/2022. Limited bone marrow tissue shows erythroid predominance, decreased myeloid maturation, increased blasts (5% by flow and in peripheral blood), decreased megakaryocytes. 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   Flow cytometry found 5% myeloblasts, positive for HLA-DR, CD34, , CD33, CD13. Negative for CD2, CD7, CD15,CD19, CD38 and CD56.   Karyotype normal.  Results were compatible with relapse of known AML.   Last dose of pegfilgrastim was on 12/10/2021.   C27D1 of decitabine and venetoclax was on 10/5-10/9.  Patient was sent to [facility] with plan to initiate therapy with single agent gilteritinib given presence of FLT3 mutation, and loss of response to dacogen and venetoclax.     Patient reports worsening fatigue over the last few weeks. She denies dizziness or lightheadedness, headaches, vision changes. She denies dyspnea, coughing, chest pain or palpitations. She reports decreased appetite. She has maintained her weight at 123lbs.  She endorses constipation. She has had intermittent drenching night sweats for over 1 month. She denies fevers or chills.     Today, CBC today found WBC of 101.6, Hgb of 8.5, MCV of 102, plt count of 38k. She has 90% of blasts in peripheral blood.   CMP notable for elevated K of 3.2.   LDH is high at 859. Serum creatinine, Uric acid, Ca, phos, magnesium are all WNL.  Lactate also WNL.     INR is 1.28, PT 14.9, PTT 27.4. Fibrinogen elevated 542. D-dimer 1076 elevated.  (08 Nov 2022 13:07)        Family Heatlh Care Decision Act Surrogate Decision Maker Hierarchy  [facility] 81 Guardian-->Spouse or domestic partner--> Adult child-->Parent-->Sibling--> Close friend      Contacts listed in the paper or electronic chart  Name Marshall Tobar  Relationship   Number(s) in EMR  Translation Required: none  Spouse or Partner: Above  Family unit:  and son  Family history of hematologic malignancy: none but personal HX of breast cancer s/p XRT and tamoxifen  Residence: [ ] Apartment   [x] House  [ ] Other  Degree of functionality in the home: moderate   Performance Status (PPSv2): 50   BMI: 22.4  Engagement in the home:  Employment: [ ] Currently employed [ ] Exited workforce due to illness  [ ] Retired prior to illness  [ ] No/distant history of employment TBD  Support network (degree):  ---Family:        [ ] Low  [ ] Moderate  [ x] High  ---Neighbors: [ ] Low  [ ] Moderate  [ ] High  ---Social:         [ ] Low  [ ] Moderate  [ ] High  ---Spiritual:    [ ] Low  [ ] Moderate  [ ] High  Financial concerns: TBD  Coping Strategies: TBD  Caregiver burden/fatigue/needs: Significant,  is being treated for pancreatic cancer and has debilitating CIPN  Grief identified: [] Yes [x] No  Medical communication and decision making preferences: See below          PERTINENT PM/SXH:   Breast cancer    Hypertension    AML (acute myelogenous leukemia)      S/P hysterectomy      FAMILY HISTORY:  No pertinent family history in first degree relatives      Family Hx substance abuse [ ]yes [ ]no  ITEMS NOT CHECKED ARE NOT PRESENT    SOCIAL HISTORY:   Significant other/partner[ ]  Children[ ]  Christianity/Spirituality:  Substance hx:  [ ]   Tobacco hx:  [ ]   Alcohol hx: [ ]   Home Opioid hx:  [ ] I-Stop Reference No:  Living Situation: [ ]Home  [ ]Long term care  [ ]Rehab [ ]Other    ADVANCE DIRECTIVES:    DNR/MOLST  [ ]  Living Will  [ ]   DECISION MAKER(s):  [ ] Health Care Proxy(s)  [ ] Surrogate(s)  [ ] Guardian           Name(s): Phone Number(s):    BASELINE (I)ADL(s) (prior to admission):  Leslie: [ ]Total  [ ] Moderate [ ]Dependent    Allergies    No Known Allergies    Intolerances    MEDICATIONS  (STANDING):  acyclovir   Oral Tab/Cap 400 milliGRAM(s) Oral two times a day  albuterol/ipratropium for Nebulization 3 milliLiter(s) Nebulizer every 6 hours  atorvastatin 10 milliGRAM(s) Oral at bedtime  Biotene Dry Mouth Oral Rinse 15 milliLiter(s) Swish and Spit three times a day  budesonide  80 MICROgram(s)/formoterol 4.5 MICROgram(s) Inhaler 2 Puff(s) Inhalation two times a day  buPROPion XL (24-Hour) . 150 milliGRAM(s) Oral daily  cefepime   IVPB 2000 milliGRAM(s) IV Intermittent every 12 hours  chlorhexidine 2% Cloths 1 Application(s) Topical daily  furosemide   Injectable 40 milliGRAM(s) IV Push two times a day  gabapentin 100 milliGRAM(s) Oral daily  gilteritinib 120 milliGRAM(s) Oral <User Schedule>  lisinopril 5 milliGRAM(s) Oral daily  oxybutynin 5 milliGRAM(s) Oral daily  phytonadione   Solution 10 milliGRAM(s) Oral daily  polyethylene glycol 3350 17 Gram(s) Oral two times a day  senna 2 Tablet(s) Oral at bedtime  sodium chloride 0.65% Nasal 1 Spray(s) Both Nostrils four times a day  sodium chloride 0.9%. 1000 milliLiter(s) (20 mL/Hr) IV Continuous <Continuous>  traZODone 150 milliGRAM(s) Oral at bedtime  voriconazole 200 milliGRAM(s) Oral every 12 hours    MEDICATIONS  (PRN):  acetaminophen     Tablet .. 650 milliGRAM(s) Oral every 6 hours PRN Temp greater or equal to 38C (100.4F), Mild Pain (1 - 3)  aluminum hydroxide/magnesium hydroxide/simethicone Suspension 30 milliLiter(s) Oral every 4 hours PRN Dyspepsia  benzonatate 100 milliGRAM(s) Oral every 8 hours PRN Cough  melatonin 3 milliGRAM(s) Oral at bedtime PRN Insomnia  ondansetron Injectable 4 milliGRAM(s) IV Push every 8 hours PRN Nausea and/or Vomiting    PRESENT SYMPTOMS: [ ]Unable to self-report  [ ] CPOT [ ] PAINADs [ ] RDOS  Source if other than patient:  [ ]Family   [ ]Team     Pain: [ ]yes [ ]no  QOL impact -   Location -                    Aggravating factors -  Quality -  Radiation -  Timing-  Severity (0-10 scale):  Minimal acceptable level (0-10 scale):     CPOT:    https://www.[facility].org/getattachment/[email or web address]/Critical-Care-Pain-Observation-Tool-(CPOT)    PAIN AD Score:   http://geriatrictoolkit.[facility]/cog/painad.pdf (press ctrl +  left click to view)    Dyspnea:                           [ ]Mild [ ]Moderate [ ]Severe      RDOS:  0 to 2  minimal or no respiratory distress   3  mild distress  4 to 6 moderate distress  >7 severe distress  https://homecareinformation.net/handouts/hen/Respiratory_Distress_Observation_Scale.pdf (Ctrl +  left click to view)     Anxiety:                             [ ]Mild [ ]Moderate [ ]Severe  Fatigue:                             [ ]Mild [ ]Moderate [ ]Severe  Nausea:                             [ ]Mild [ ]Moderate [ ]Severe  Loss of appetite:              [ ]Mild [ ]Moderate [ ]Severe  Constipation:                    [ ]Mild [ ]Moderate [ ]Severe    PCSSQ[Palliative Care Spiritual Screening Question]   Severity (0-10):  Score of 4 or > indicate consideration of Chaplaincy referral.  Chaplaincy Referral: [ ] yes [ ] refused [ ] following [ ] Deferred     Caregiver [place]? : [ ] yes [ ] no [ ] Deferred [ ] Declined             Social work referral [ ] Patient & Family Centered Care Referral [ ]     Anticipatory Grief present?:  [ ] yes [ ] no  [ ] Deferred                  Social work referral [ ] Chaplaincy Referral[ ]      Other Symptoms:  [ ]All other review of systems negative     Palliative Performance Status Version 2:         %    http://npcrc.org/files/news/palliative_performance_scale_ppsv2.pdf  PHYSICAL EXAM:  Vital Signs Last 24 Hrs  T(C): 36.5 (21 Nov 2022 13:50), Max: 36.7 (20 Nov 2022 17:36)  T(F): 97.7 (21 Nov 2022 13:50), Max: 98.1 (20 Nov 2022 17:36)  HR: 77 (21 Nov 2022 13:50) (71 - 84)  BP: 137/78 (21 Nov 2022 13:50) (129/78 - 145/81)  BP(mean): --  RR: 18 (21 Nov 2022 13:50) (16 - 18)  SpO2: 99% (21 Nov 2022 13:50) (97% - 99%)    Parameters below as of 21 Nov 2022 13:50  Patient On (Oxygen Delivery Method): nasal cannula     I&O's Summary    20 Nov 2022 07:01  -  21 Nov 2022 07:00  --------------------------------------------------------  IN: 840 mL / OUT: 2900 mL / NET: -2060 mL    21 Nov 2022 07:01  -  21 Nov 2022 14:29  --------------------------------------------------------  IN: 611 mL / OUT: 1300 mL / NET: -689 mL      GENERAL: [ ]Cachexia    [ ]Alert  [ ]Oriented x   [ ]Lethargic  [ ]Unarousable  [ ]Verbal  [ ]Non-Verbal  Behavioral:   [ ] Anxiety  [ ] Delirium [ ] Agitation [ ] Other  HEENT:  [ ]Normal   [ ]Dry mouth   [ ]ET Tube/Trach  [ ]Oral lesions  PULMONARY:   [ ]Clear [ ]Tachypnea  [ ]Audible excessive secretions   [ ]Rhonchi        [ ]Right [ ]Left [ ]Bilateral  [ ]Crackles        [ ]Right [ ]Left [ ]Bilateral  [ ]Wheezing     [ ]Right [ ]Left [ ]Bilateral  [ ]Diminished breath sounds [ ]right [ ]left [ ]bilateral  CARDIOVASCULAR:    [ ]Regular [ ]Irregular [ ]Tachy  [ ]Christian [ ]Murmur [ ]Other  GASTROINTESTINAL:  [ ]Soft  [ ]Distended   [ ]+BS  [ ]Non tender [ ]Tender  [ ]Other [ ]PEG [ ]OGT/ NGT  Last BM:  GENITOURINARY:  [ ]Normal [ ] Incontinent   [ ]Oliguria/Anuria   [ ][place]  MUSCULOSKELETAL:   [ ]Normal   [ ]Weakness  [ ]Bed/Wheelchair bound [ ]Edema  NEUROLOGIC:   [ ]No focal deficits  [ ]Cognitive impairment  [ ]Dysphagia [ ]Dysarthria [ ]Paresis [ ]Other   SKIN:   [ ]Normal  [ ]Rash  [ ]Other  [ ]Pressure ulcer(s)       Present on admission [ ]y [ ]n    CRITICAL CARE:  [ ] Shock Present  [ ]Septic [ ]Cardiogenic [ ]Neurologic [ ]Hypovolemic  [ ]  Vasopressors [ ]  Inotropes   [ ]Respiratory failure present [ ]Mechanical ventilation [ ]Non-invasive ventilatory support [ ]High flow    [ ]Acute  [ ]Chronic [ ]Hypoxic  [ ]Hypercarbic [ ]Other  [ ]Other organ failure     LABS:                        9.5    0.25  )-----------( 26       ( 21 Nov 2022 08:47 )             27.9   11-21    139  |  99  |  16  ----------------------------<  97  3.3<L>   |  31  |  0.62    Ca    8.7      21 Nov 2022 08:47  Phos  2.7     11-21  Mg     2.3     11-21    TPro  5.6<L>  /  Alb  3.1<L>  /  TBili  0.5  /  DBili  x   /  AST  27  /  ALT  35  /  AlkPhos  76  11-21  PT/INR - ( 21 Nov 2022 08:47 )   PT: 13.5 sec;   INR: 1.16 ratio         PTT - ( 21 Nov 2022 08:47 )  PTT:25.1 sec      RADIOLOGY & ADDITIONAL STUDIES:    PROTEIN CALORIE MALNUTRITION PRESENT: [ ]mild [ ]moderate [ ]severe [ ]underweight [ ]morbid obesity  https://www.andeal.org/vault/2440/web/files/ONC/Table_Clinical%20Characteristics%20to%20Document%20Malnutrition-White%20JV%20et%20al%202012.pdf    Height (cm): 163 (11-10-22 @ 12:15), 162 (07-18-22 @ 13:42), 162.6 (12-22-21 @ 14:17)  Weight (kg): 59.5 (11-10-22 @ 12:15), 58.0009 (09-12-22 @ 12:00), 59.9 (12-22-21 @ 14:17)  BMI (kg/m2): 22.4 (11-10-22 @ 12:15), 22.1 (09-12-22 @ 12:00), 22.8 (07-18-22 @ 13:42)    [ ]PPSV2 < or = to 30% [ ]significant weight loss  [ ]poor nutritional intake  [ ]anasarca[ ]Artificial Nutrition      Other REFERRALS:  [ ]Hospice  [ ]Child Life  [ ]Social Work  [ ]Case management [ ]Holistic Therapy     Goals of Care Document:

## 2022-11-21 NOTE — PROGRESS NOTE ADULT - PROBLEM SELECTOR PLAN 1
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    CBC, CMP, TLS labs, DIC labs daily. , If fibrinogen under 100, give 10 units of cryoprecipitate.  Transfuse for Hgb < 7 and platelet count < 10k, < 20k if febrile, < 50k if bleeding  11/10 Cytarabine 100mg/m2 x 3 days for cytoreduction. stopped hydroxyurea 11/12.   11/12 started gilteritinib   EKG biweekly check for QTc on giliteritinib and voriconazole  11/18 Lasix 40mg IV BID, for net NEG 1-2 L/day (per d/w Cardiology). D/C'd ppx Dexamethasone  11/20 K+/Mg supplemented. Cryo for low fibrinogen. Vitamin K x 3 days PT/INR 19/1.63. Pro BNP trending down, continue BID Lasix. weights trending down.  11/21 Replete K (po), Palliative Care consult  overall DNR/DNI

## 2022-11-21 NOTE — CONSULT NOTE ADULT - CONSULT REASON
Aortic stenosis
Neutropenic fever
consulted for assistance with complex medical decision making in the context of a patient with relapsed AML

## 2022-11-22 NOTE — CHART NOTE - NSCHARTNOTEFT_GEN_A_CORE
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 Started Cytarabine 100 mg/m2 on 11/10 x 3 days + BID hydroxyurea for cytoreduction, then transitioned to daily oral FLT3 inhibitor gilteritinib on "; Patient currently "Day: 11 gilteritinib" as of today .    Source: [x] Patient       [x] EMR        [] RN        [] Family at bedside       [] Other:    -If unable to interview patient: [] Trach/Vent/BiPAP  [] Disoriented/confused/inappropriate to interview    Diet Order: Diet, Soft and Bite Sized:   Low Sodium  Supplement Feeding Modality:  Oral  Glucerna Shake Cans or Servings Per Day:  2       Frequency:  Daily (11-15-22 @ 15:56) [Active]    - Is current order appropriate/adequate? [x] Yes  []  No:     - PO intake :   [] >75%  Adequate    [x] 50-75%  Fair       [] <50%  Poor  -Pt endorses 50-75% intake of meals and supplements    - Nutrition-related concerns:      -Pt with fair, fluctuating appetite      -Emphasized protein intake, however patient with difficulty swallowing meat as of a few days ago      -Pt reports liking and drinking Glucerna, would like chocolate flavor more than vanilla flavor        GI:  Last BM  or  (2 or 3 days ago per pt).   Bowel Regimen? [x] Yes: senna and polyethylene glycol  -No n/v; endorses both d/c, patient interested in puree prunes for constipation; patient with difficulty swallowing specifically meat.    Weights:   Dosing weigh in kGt: 59.5 (11-10)  Daily Weight in k.1 (-19), Weight in k.8 (11-18), Weight in k (11-16), Weight in k (11-15), Weight in k (11-14), Weight in k.8 (11-13), Weight in k (11-12)  Change in weights likely due to IVF and diuretics.     Nutritionally Pertinent MEDICATIONS  (STANDING):  acyclovir   Oral Tab/Cap  atorvastatin  cefepime   IVPB  furosemide   Injectable  gilteritinib  lisinopril  phytonadione   Solution  polyethylene glycol 3350  potassium chloride    Tablet ER  potassium chloride  20 mEq/100 mL IVPB  senna  sodium chloride 0.9%.  voriconazole    Pertinent Labs:  @ 07:07: Na 139, BUN 13, Cr 0.60, BG 99, K+ 3.6, Phos 2.8, Mg 2.2, Alk Phos 73, ALT/SGPT 32, AST/SGOT 27, HbA1c --      Skin per nursing documentation: No pressure injuries per flowsheets  Edema: No noted edema as per flowsheets.     Estimated Needs:   [x] no change since previous assessment based on dosing wt 130.7lb/59.2kg :  25-30 kcal/ [id number] kcal  1-1.3 Gm pro/ k..54 Gm pro   Defer fluid needs to medical team    Previous Nutrition Diagnosis: Moderate acute on chronic malnutrition  Nutrition Diagnosis is: [x] ongoing- encouraged protein intake and small frequent meals with patient     New Nutrition Diagnosis: [x] Not applicable    Nutrition Care Plan:  [x] In Progress- Patient currently meeting 50-75% of estimated nutrient needs  [] Achieved  [] Not applicable    Nutrition Interventions:     Education Provided:       [x] Yes:  Discussed importance of protein intake as well as small frequent meals to circumvent fluctuating appetite    Recommendations      Monitoring and Evaluation:   Continue to monitor nutritional intake, tolerance to diet prescription, weights, labs, skin integrity      RD remains available upon request and will follow up per protocol  Corinne Lima, Dietetic Intern, Pager #: [phone] Nutrition Follow Up Note  Patient seen for: Malnutrition follow up    Chart reviewed, events noted: "83 yo female with 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 Started Cytarabine 100 mg/m2 on 11/10 x 3 days + BID hydroxyurea for cytoreduction, then transitioned to daily oral FLT3 inhibitor gilteritinib on "; Patient currently "Day: 11 gilteritinib" as of today .    Source: [x] Patient       [x] EMR        [] RN        [] Family at bedside       [] Other:    -If unable to interview patient: [] Trach/Vent/BiPAP  [] Disoriented/confused/inappropriate to interview    Diet Order: Diet, Soft and Bite Sized:   Low Sodium  Supplement Feeding Modality:  Oral  Glucerna Shake Cans or Servings Per Day:  2       Frequency:  Daily (11-15-22 @ 15:56) [Active]    - Is current order appropriate/adequate? [x] Yes  []  No:     - PO intake :   [] >75%  Adequate    [x] 50-75%  Fair       [] <50%  Poor  -Pt endorses 50-75% intake of meals and supplements    - Nutrition-related concerns:      -Pt with fair, fluctuating appetite      -Emphasized protein intake, however patient with difficulty swallowing meat as of a few days ago      -Pt reports liking and drinking Glucerna, would like chocolate flavor more than vanilla flavor        GI:  Last BM  or  (2 or 3 days ago per pt).   Bowel Regimen? [x] Yes: senna and polyethylene glycol  -No n/v; endorses both d/c, patient interested in puree prunes for constipation; patient with difficulty swallowing specifically meat, unsure if this was before or after swallow evaluation.    Weights:   Dosing weigh in kGt: 59.5 (11-10)  Daily Weight in k.1 (11-19), Weight in k.8 (11-18), Weight in k (11-16), Weight in k (11-15), Weight in k (11-14), Weight in k.8 (11-13), Weight in k (11-12)  Change in weights likely due to IVF and diuretics.     Nutritionally Pertinent MEDICATIONS  (STANDING):  acyclovir   Oral Tab/Cap  atorvastatin  cefepime   IVPB  furosemide   Injectable  gilteritinib  lisinopril  phytonadione   Solution  polyethylene glycol 3350  potassium chloride    Tablet ER  potassium chloride  20 mEq/100 mL IVPB  senna  sodium chloride 0.9%.  voriconazole    Pertinent Labs:  @ 07:07: Na 139, BUN 13, Cr 0.60, BG 99, K+ 3.6, Phos 2.8, Mg 2.2, Alk Phos 73, ALT/SGPT 32, AST/SGOT 27, HbA1c --      Skin per nursing documentation: No pressure injuries per flowsheets  Edema: No noted edema as per flowsheets.     Estimated Needs:   [x] no change since previous assessment based on dosing wt 130.7lb/59.2kg :  25-30 kcal/ [id number] kcal  1-1.3 Gm pro/ k..54 Gm pro   Defer fluid needs to medical team    Previous Nutrition Diagnosis: Moderate acute on chronic malnutrition  Nutrition Diagnosis is: [x] ongoing- encouraged protein intake and small frequent meals with patient     New Nutrition Diagnosis: [x] Not applicable    Nutrition Care Plan:  [x] In Progress- Patient currently meeting 50-75% of estimated nutrient needs  [] Achieved  [] Not applicable    Nutrition Interventions:     Education Provided:       [x] Yes:  Discussed importance of protein intake as well as small frequent meals to circumvent fluctuating appetite      Recommendations:      1) Continue current diet order + Glucerna 2x/day as tolerated and medically feasible  2) Continue to provide assistance and encouragement with PO intake  3) Continue to monitor nutritional intake, tolerance to diet prescription, weights, labs, skin integrity, bowel movements   4) RD will remain available for reiteration of education as appropriate    Monitoring and Evaluation:   Continue to monitor nutritional intake, tolerance to diet prescription, weights, labs, skin integrity      RD remains available upon request and will follow up per protocol  Corinne Lima, Dietetic Intern, Pager #: [phone] Nutrition Follow Up Note  Patient seen for: Malnutrition follow up    Chart reviewed, events noted: "83 yo female with 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 Started Cytarabine 100 mg/m2 on 11/10 x 3 days + BID hydroxyurea for cytoreduction, then transitioned to daily oral FLT3 inhibitor gilteritinib on "; Patient currently "Day: 11 gilteritinib" as of today .    Source: [x] Patient       [x] EMR        [] RN        [] Family at bedside       [] Other:    -If unable to interview patient: [] Trach/Vent/BiPAP  [] Disoriented/confused/inappropriate to interview    Diet Order: Diet, Soft and Bite Sized:   Low Sodium  Supplement Feeding Modality:  Oral  Glucerna Shake Cans or Servings Per Day:  2       Frequency:  Daily (11-15-22 @ 15:56) [Active]    - Is current order appropriate/adequate? [x] Yes  []  No:     - PO intake :   [] >75%  Adequate    [x] 50-75%  Fair       [] <50%  Poor  -Pt endorses 50-75% intake of meals and supplements    - Nutrition-related concerns:      -Pt with fair, fluctuating appetite      -Emphasized protein intake, however patient with difficulty swallowing meat as of a few days ago      -Pt reports liking and drinking Glucerna, would like chocolate flavor more than vanilla flavor        GI:  Last BM  or  (2 or 3 days ago per pt).   Bowel Regimen? [x] Yes: senna and polyethylene glycol  -No n/v; endorses both d/c, patient interested in puree prunes for constipation; patient with difficulty swallowing specifically meat, unsure if this was before or after swallow evaluation.    Weights:   Dosing weigh in kGt: 59.5 (11-10)  Daily Weight in k.1 (11-19), Weight in k.8 (11-18), Weight in k (11-16), Weight in k (11-15), Weight in k (11-14), Weight in k.8 (11-13), Weight in k (11-12)  Change in weights likely due to IVF and diuretics.     Nutritionally Pertinent MEDICATIONS  (STANDING):  acyclovir   Oral Tab/Cap  atorvastatin  cefepime   IVPB  furosemide   Injectable  gilteritinib  lisinopril  phytonadione   Solution  polyethylene glycol 3350  potassium chloride    Tablet ER  potassium chloride  20 mEq/100 mL IVPB  senna  sodium chloride 0.9%.  voriconazole    Pertinent Labs:  @ 07:07: Na 139, BUN 13, Cr 0.60, BG 99, K+ 3.6, Phos 2.8, Mg 2.2, Alk Phos 73, ALT/SGPT 32, AST/SGOT 27, HbA1c --      Skin per nursing documentation: No pressure injuries per flowsheets  Edema: No noted edema as per flowsheets.     Estimated Needs:   [x] no change since previous assessment based on dosing wt 130.7lb/59.2kg :  25-30 kcal/ k6176-5782 kcal  1-1.3 Gm pro/ k..54 Gm pro   Defer fluid needs to medical team    Previous Nutrition Diagnosis: Moderate acute on chronic malnutrition  Nutrition Diagnosis is: [x] ongoing- encouraged protein intake and small frequent meals with patient, liberalize diet to regular soft and bite size    New Nutrition Diagnosis: [x] Not applicable    Nutrition Care Plan:  [x] In Progress- Patient currently meeting 50-75% of estimated nutrient needs  [] Achieved  [] Not applicable    Nutrition Interventions:     Education Provided:       [x] Yes:  Discussed importance of protein intake as well as small frequent meals to circumvent fluctuating appetite      Recommendations:      1) Recommend change diet order to regular with soft and bite sized consistency  2) Continue supplement Glucerna 2x/day as tolerated and medically feasible  3) Continue to provide assistance and encouragement with PO intake  4) Monitor chewing and swallowing as patient reported difficulty swallowing  5) Continue to monitor nutritional intake, tolerance to diet prescription, weights, labs, skin integrity, bowel movements   6) RD will remain available for reiteration of education as appropriate    Monitoring and Evaluation:   Continue to monitor nutritional intake, tolerance to diet prescription, weights, labs, skin integrity      RD remains available upon request and will follow up per protocol  Corinne Lima, Dietetic Intern, Pager #: [phone] Nutrition Follow Up Note  Patient seen for: Malnutrition follow up    Chart reviewed, events noted: "81 yo female with 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 Started Cytarabine 100 mg/m2 on 11/10 x 3 days + BID hydroxyurea for cytoreduction, then transitioned to daily oral FLT3 inhibitor gilteritinib on "; Patient currently "Day: 11 gilteritinib" as of today .    Source: [x] Patient       [x] EMR        [] RN        [] Family at bedside       [] Other:    -If unable to interview patient: [] Trach/Vent/BiPAP  [] Disoriented/confused/inappropriate to interview    Diet Order: Diet, Soft and Bite Sized:   Low Sodium  Supplement Feeding Modality:  Oral  Glucerna Shake Cans or Servings Per Day:  2       Frequency:  Daily (11-15-22 @ 15:56) [Active]    - Is current order appropriate/adequate? [x] Yes  []  No:     - PO intake :   [] >75%  Adequate    [x] 50-75%  Fair       [] <50%  Poor  -Pt endorses 50-75% intake of meals and supplements    - Nutrition-related concerns:      -Pt with fair, fluctuating appetite      -Emphasized protein intake, however patient with difficulty swallowing meat as of a few days ago      -Pt reports liking and drinking Glucerna, would like chocolate flavor more than vanilla flavor        GI:  Last BM  or  (2 or 3 days ago per pt).   Bowel Regimen? [x] Yes: senna and polyethylene glycol  -No n/v; endorses both d/c, patient interested in puree prunes for constipation; patient with difficulty swallowing specifically meat, unsure if this was before or after swallow evaluation.    Weights:   Dosing weigh in kGt: 59.5 (11-10)  Daily Weight in k.1 (11-19), Weight in k.8 (11-18), Weight in k (11-16), Weight in k (11-15), Weight in k (11-14), Weight in k.8 (11-13), Weight in k (11-12)  Change in weights likely due to IVF and diuretics.     Nutritionally Pertinent MEDICATIONS  (STANDING):  acyclovir   Oral Tab/Cap  atorvastatin  cefepime   IVPB  furosemide   Injectable  gilteritinib  lisinopril  phytonadione   Solution  polyethylene glycol 3350  potassium chloride    Tablet ER  potassium chloride  20 mEq/100 mL IVPB  senna  sodium chloride 0.9%.  voriconazole    Pertinent Labs:  @ 07:07: Na 139, BUN 13, Cr 0.60, BG 99, K+ 3.6, Phos 2.8, Mg 2.2, Alk Phos 73, ALT/SGPT 32, AST/SGOT 27, HbA1c --      Skin per nursing documentation: No pressure injuries per flowsheets  Edema: No noted edema as per flowsheets.     Estimated Needs:   [x] no change since previous assessment based on dosing wt 130.7lb/59.2kg :  25-30 kcal/ [id number] kcal  1-1.3 Gm pro/ k..54 Gm pro   Defer fluid needs to medical team    Previous Nutrition Diagnosis: Moderate acute on chronic malnutrition  Nutrition Diagnosis is: [x] ongoing- encouraged protein intake and small frequent meals with patient, liberalize diet to regular soft and bite size    New Nutrition Diagnosis: [x] Not applicable    Nutrition Care Plan:  [x] In Progress- Patient currently meeting 50-75% of estimated nutrient needs  [] Achieved  [] Not applicable    Nutrition Interventions:     Education Provided:       [x] Yes:  Discussed importance of protein intake as well as small frequent meals to circumvent fluctuating appetite      Recommendations:      1) Recommend change diet order to regular with soft and bite sized consistency  2) Continue with chocolate flavor Glucerna 2x/day as tolerated and medically feasible  3) Continue to provide assistance and encouragement with PO intake  4) Monitor chewing and swallowing as patient reported difficulty swallowing  5) Continue to monitor nutritional intake, tolerance to diet prescription, weights, labs, skin integrity, bowel movements   6) RD will remain available for reiteration of education as appropriate    Monitoring and Evaluation:   Continue to monitor nutritional intake, tolerance to diet prescription, weights, labs, skin integrity      RD remains available upon request and will follow up per protocol  Corinne Lima, Dietetic Intern, Pager #: [phone] Nutrition Follow Up Note  Patient seen for: Malnutrition follow up    Chart reviewed, events noted: "83 yo female with 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 Started Cytarabine 100 mg/m2 on 11/10 x 3 days + BID hydroxyurea for cytoreduction, then transitioned to daily oral FLT3 inhibitor gilteritinib on "; Patient currently "Day: 11 gilteritinib" as of today .    Source: [x] Patient       [x] EMR        [] RN        [] Family at bedside       [] Other:    -If unable to interview patient: [] Trach/Vent/BiPAP  [] Disoriented/confused/inappropriate to interview    Diet Order: Diet, Soft and Bite Sized:   Low Sodium  Supplement Feeding Modality:  Oral  Glucerna Shake Cans or Servings Per Day:  2       Frequency:  Daily (11-15-22 @ 15:56) [Active]    - Is current order appropriate/adequate? [] Yes  [X]  No: recommend liberalizing diet order to optimize PO intake     - PO intake :   [] >75%  Adequate    [x] 50-75%  Fair       [] <50%  Poor    - Past Nutrition-related events:       - S/p swallow evaluation  recommending soft and bite sized foods, thin liquids     - Nutrition-related concerns:      - Pt endorses variable appetite/PO intake, 50-75% intake of meals and supplements x > 7 days.       - Tolerating soft and bite sized diet texture at this time       - Drinking Glucerna 2x/day, would like to change flavor to chocolate.      - Food preferences obtained; RD to honor as able.       - K+ Phos and Mg WNL today . Of note K+ low yesterday, s/p repletion.       - Micronutrient/Other supplementation: vitamin K      - IVF: NS @ 20 ml/hr     GI:  Last BM  or  (2 or 3 days ago per pt).   Bowel Regimen? [x] Yes: senna and polyethylene glycol  - No N/V; reports diarrhea and constipation (would like puree prunes daily)    Weights:   Dosing weight in k.5 (11-10)  Daily Weight in k.1 (11-19), Weight in k.8 (11-18), Weight in k (11-16), Weight in k (11-15), Weight in k (11-14), Weight in k.8 (11-13), Weight in k (11-12)  - Weight change likely fluid shifts due to IVF and diuretics.     Nutritionally Pertinent MEDICATIONS  (STANDING):  acyclovir   Oral Tab/Cap  atorvastatin  cefepime   IVPB  furosemide   Injectable  gilteritinib  lisinopril  phytonadione   Solution  polyethylene glycol 3350  potassium chloride    Tablet ER  potassium chloride  20 mEq/100 mL IVPB  senna  sodium chloride 0.9%.  voriconazole    Pertinent Labs:  @ 07:07: Na 139, BUN 13, Cr 0.60, BG 99, K+ 3.6, Phos 2.8, Mg 2.2, Alk Phos 73, ALT/SGPT 32, AST/SGOT 27, HbA1c --    Skin per nursing documentation: No pressure injuries per flowsheets  Edema: 1+ dilip. feet    Estimated Needs:   [x] no change since previous assessment based on dosing wt 130.7lb/59.2kg :  25-30 kcal/ k3501-3245 kcal  1-1.3 Gm pro/ k..54 Gm pro   Defer fluid needs to medical team    Previous Nutrition Diagnosis: Moderate acute on chronic malnutrition  Nutrition Diagnosis is: [x] ongoing- encouraged protein intake and small frequent meals with patient, liberalize diet to regular soft and bite size    New Nutrition Diagnosis: [x] Not applicable    Nutrition Care Plan:  [x] In Progress- Patient currently meeting 50-75% of estimated nutrient needs  [] Achieved  [] Not applicable    Nutrition Interventions:     Education Provided:       [x] Yes:  Discussed importance of protein intake, small frequent meals - in setting of fluctuating appetite      Recommendations:      1) Liberalize diet to optimize PO intake: Regular. Defer texture/consistency needs to team.   2) Continue oral nutritional supplement: Glucerna 2x/day   3) Continue to provide assistance and encouragement with PO intake  4) Continue to monitor nutritional intake, tolerance to diet prescription, weights, labs, skin integrity, bowel movements   5) RD will remain available for reiteration of education as appropriate    Monitoring and Evaluation:   Continue to monitor nutritional intake, tolerance to diet prescription, weights, labs, skin integrity    RD remains available upon request and will follow up per protocol  Corinne Lima, Dietetic Intern, Pager #: [phone]

## 2022-11-22 NOTE — PROGRESS NOTE ADULT - CONVERSATION DETAILS
[place]: Prognostic awareness    Topic discussed previously:    Yes [x]      No []    Participants aware of Palliative involvement prior to call/meeting  Yes []   No  []  N/A [x]    Complexity:        Low[]              Medium []      High [x]       Unknown []    Potential barriers to expeditious decision making: prognostic awareness    Objectives defined in premeeting huddle: none    Provider: Raiza Brownlee    Content:  When asked about her perception about her illness sjscott expressed feeling sluggish and described herself as "melting". I asked her to clarify what that meant and and she said she infers that she is getting worse.    “What are your most important goals if your health situation worsens?”  She would want to ambulate if and move as close to independence as possible.  The patient would like to be at a tenth of what she was previously.    “What gives you strength as you think about the future with your illness?”  She finds strength in having the same degree of cognition and the support of her . This is a substantial concern due to his own ongoing cancer treatment.    “What abilities are so critical to your life that you can’t imagine living without them?”   She feels that she is so impacted by her illness that she cannot participate in something as simple as performing a crossword puzzle, which was significant to her.    “If you become sicker, how much are you willing to go through for the possibility of  gaining more time?”   She is willing to spend more time in the hospital to reduce the caregiver burden to her .          Tone: Neutral     Summary:   Patient's clinical decline seems to have helped her recognize the downward trajectory    Action Items:  d/w primary team    Follow up:  She was open to another meeting with her  present to hear the recommendations of the hematology and palliative teams. She stated, "Just tell me like it is."    d/w ACP to help schedule meeting

## 2022-11-22 NOTE — PROGRESS NOTE ADULT - PROBLEM SELECTOR PLAN 2
Patient is neutropenic, afebrile  CT chest shows scattered nodules/opacities in lung, + adrenal nodule, and liver lesion.  changed fluconazole to voriconazole  (-) fungitell, galactomanan  11/11 Switched Levaquin to Cefepime (renally dosed)  11/10  Blood Cx Staph epidermis/hominis -Repeat cultures 11/11, 11/12, NGTD  11/12 RVP(-)  ID consult-Vanco stopped.  11/21 CT Chest for evaluation Patient is neutropenic, afebrile  changed fluconazole to voriconazole  (-) fungitell, (-) galactomanan  11/11 Switched Levaquin to Cefepime (renally dosed)  11/10  Blood Cx Staph epidermis/hominis -Repeat cultures 11/11, 11/12, NGTD  11/12 RVP(-)  ID consult-Vanco stopped.  11/21 CT Chest for evaluation- worsening nodules concern for fungal pna.

## 2022-11-22 NOTE — PROGRESS NOTE ADULT - SUBJECTIVE AND OBJECTIVE BOX
HPI:  This patient is an 83yo lady with PMH of AML, breast cancer s/p treatment with tamoxifen, RT and R lumpectomy, who presents with AML relapse.    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 On 2/4/2020, she started C1 dacogen and venetoclax, which she tolerated, other than some dypsnea which was attributed to pulmonary edema and L pleural effusion, and treated with diuretics. Patient was discharged home on 2/11/2020.  BMBx on 3/2/2020 found no blasts. Patient thereafter was continued on maintenance treatment with dacogen and venetoclax (10 days) every 5 weeks, with onpro as needed.    6 month interval BMBx in 10/2020 found hypocellular marrow with focal erythropoiesis, markedly diminished myelopoiesis, rare megakaryocyte and aspicular aspirate, and no increase in blast population.      In August 2022, she was noted to not have count recovery between cycles and was pancytopenic.   Repeat BMBx performed on 9/7/2022. Limited bone marrow tissue shows erythroid predominance, decreased myeloid maturation, increased blasts (5% by flow and in peripheral blood), decreased megakaryocytes. 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   Flow cytometry found 5% myeloblasts, positive for HLA-DR, CD34, , CD33, CD13. Negative for CD2, CD7, CD15,CD19, CD38 and CD56.   Karyotype normal.  Results were compatible with relapse of known AML.   Last dose of pegfilgrastim was on 12/10/2021.   C27D1 of decitabine and venetoclax was on 10/5-10/9.  Patient was sent to [facility] with plan to initiate therapy with single agent gilteritinib given presence of FLT3 mutation, and loss of response to dacogen and venetoclax.     Patient reports worsening fatigue over the last few weeks. She denies dizziness or lightheadedness, headaches, vision changes. She denies dyspnea, coughing, chest pain or palpitations. She reports decreased appetite. She has maintained her weight at 123lbs.  She endorses constipation. She has had intermittent drenching night sweats for over 1 month. She denies fevers or chills.     Today, CBC today found WBC of 101.6, Hgb of 8.5, MCV of 102, plt count of 38k. She has 90% of blasts in peripheral blood.   CMP notable for elevated K of 3.2.   LDH is high at 859. Serum creatinine, Uric acid, Ca, phos, magnesium are all WNL.  Lactate also WNL.     INR is 1.28, PT 14.9, PTT 27.4. Fibrinogen elevated 542. D-dimer 1076 elevated.  (08 Nov 2022 13:07)        11-22-22 @ 14:43  [facility] 7MON 701 W1    Overnight events: No major events  Staff reports: d/w ACP, who had spoken with the patient's  about the case  Patient: She feels more fatigued. See [facility] note to regarding a serious illness conversation held today.           RECENT VITALS/LABS/MEDICATIONS/ASSAYS     11-22-22 @ 14:43    T(C): 36.6 (11-22-22 @ 13:30), Max: 36.9 (11-22-22 @ 00:54)  HR: 75 (11-22-22 @ 13:30) (71 - 79)  BP: 135/78 (11-22-22 @ 13:30) (123/77 - 145/75)  RR: 18 (11-22-22 @ 13:30) (16 - 18)  SpO2: 97% (11-22-22 @ 13:30) (97% - 99%)  Wt(kg): --      21 Nov 2022 07:01  -  22 Nov 2022 07:00  --------------------------------------------------------  IN:    Oral Fluid: 458 mL    sodium chloride 0.9%: 832 mL  Total IN: 1290 mL    OUT:    Voided (mL): 3450 mL  Total OUT: 3450 mL    Total NET: -2160 mL      22 Nov 2022 07:01  -  22 Nov 2022 14:43  --------------------------------------------------------  IN:    Oral Fluid: 240 mL    sodium chloride 0.9%: 294 mL  Total IN: 534 mL    OUT:    Voided (mL): 900 mL  Total OUT: 900 mL    Total NET: -366 mL          11-21 @ 07:01 - 11-22 @ 07:00  --------------------------------------------------------  IN: 1290 mL / OUT: 3450 mL / NET: -2160 mL    11-22 @ 07:01 - 11-22 @ 14:43  --------------------------------------------------------  IN: 534 mL / OUT: 900 mL / NET: -366 mL      CAPILLARY BLOOD GLUCOSE            acetaminophen     Tablet .. 650 milliGRAM(s) Oral every 6 hours PRN  acyclovir   Oral Tab/Cap 400 milliGRAM(s) Oral two times a day  albuterol/ipratropium for Nebulization 3 milliLiter(s) Nebulizer every 6 hours  aluminum hydroxide/magnesium hydroxide/simethicone Suspension 30 milliLiter(s) Oral every 4 hours PRN  atorvastatin 10 milliGRAM(s) Oral at bedtime  benzonatate 100 milliGRAM(s) Oral every 8 hours PRN  Biotene Dry Mouth Oral Rinse 15 milliLiter(s) Swish and Spit three times a day  budesonide  80 MICROgram(s)/formoterol 4.5 MICROgram(s) Inhaler 2 Puff(s) Inhalation two times a day  buPROPion XL (24-Hour) . 150 milliGRAM(s) Oral daily  cefepime   IVPB 2000 milliGRAM(s) IV Intermittent every 12 hours  chlorhexidine 2% Cloths 1 Application(s) Topical daily  furosemide   Injectable 40 milliGRAM(s) IV Push two times a day  gabapentin 100 milliGRAM(s) Oral daily  gilteritinib 120 milliGRAM(s) Oral <User Schedule>  lisinopril 5 milliGRAM(s) Oral daily  melatonin 3 milliGRAM(s) Oral at bedtime PRN  ondansetron Injectable 4 milliGRAM(s) IV Push every 8 hours PRN  oxybutynin 5 milliGRAM(s) Oral daily  polyethylene glycol 3350 17 Gram(s) Oral two times a day  potassium chloride    Tablet ER 20 milliEquivalent(s) Oral daily  senna 2 Tablet(s) Oral at bedtime  sodium chloride 0.65% Nasal 1 Spray(s) Both Nostrils four times a day  sodium chloride 0.9%. 1000 milliLiter(s) IV Continuous <Continuous>  traZODone 150 milliGRAM(s) Oral at bedtime  voriconazole 200 milliGRAM(s) Oral every 12 hours          11-22    139  |  102  |  13  ----------------------------<  99  3.6   |  33<H>  |  0.60    Ca    8.1<L>      22 Nov 2022 07:07  Phos  2.8     11-22  Mg     2.2     11-22    TPro  5.0<L>  /  Alb  2.7<L>  /  TBili  0.2  /  DBili  x   /  AST  27  /  ALT  32  /  AlkPhos  73  11-22      Procalc  BNPSerum Pro-Brain Natriuretic Peptide: 1665 pg/mL (11-22-22 @ 07:07)  Serum Pro-Brain Natriuretic Peptide: 2456 pg/mL (11-21-22 @ 08:47)    ABG                          7.5    0.19  )-----------( 11       ( 22 Nov 2022 07:01 )             22.3   PT/INR - ( 22 Nov 2022 07:07 )   PT: 13.2 sec;   INR: 1.15 ratio         PTT - ( 21 Nov 2022 08:47 )  PTT:25.1 sec        blood and urine cultures                          Family Heatlh Care Decision Act Surrogate Decision Maker Hierarchy  [facility] Guardian-->Spouse or domestic partner--> Adult child-->Parent-->Sibling--> Close friend      Contacts listed in the paper or electronic chart  Name Marshall Tobar  Relationship   Number(s) in EMR  Translation Required: none  Spouse or Partner: Above  Family unit:  and son  Family history of hematologic malignancy: none but personal HX of breast cancer s/p XRT and tamoxifen  Residence: [ ] Apartment   [x] House  [ ] Other  Degree of functionality in the home: moderate   Performance Status (PPSv2): 50   BMI: 22.4  Engagement in the home:  Employment: [ ] Currently employed [ ] Exited workforce due to illness  [ ] Retired prior to illness  [ ] No/distant history of employment TBD  Support network (degree):  ---Family:        [ ] Low  [ ] Moderate  [ x] High  ---Neighbors: [ ] Low  [ ] Moderate  [ ] High  ---Social:         [ ] Low  [ ] Moderate  [ ] High  ---Spiritual:    [ ] Low  [ ] Moderate  [ ] High  Financial concerns: TBD  Coping Strategies: TBD  Caregiver burden/fatigue/needs: Significant,  is being treated for pancreatic cancer and has debilitating CIPN  Grief identified: [] Yes [x] No  Medical communication and decision making preferences: See below          PERTINENT PM/SXH:   Breast cancer    Hypertension    AML (acute myelogenous leukemia)      S/P hysterectomy      FAMILY HISTORY:  No pertinent family history in first degree relatives      Family Hx substance abuse [ ]yes [ ]no  ITEMS NOT CHECKED ARE NOT PRESENT    SOCIAL HISTORY:   Significant other/partner[ ]  Children[ ]  Anabaptism/Spirituality:  Substance hx:  [ ]   Tobacco hx:  [ ]   Alcohol hx: [ ]   Home Opioid hx:  [ ] I-Stop Reference No:  Living Situation: [ ]Home  [ ]Long term care  [ ]Rehab [ ]Other    ADVANCE DIRECTIVES:    DNR/MOLST  [ ]  Living Will  [ ]   DECISION MAKER(s):  [ ] Health Care Proxy(s)  [ ] Surrogate(s)  [ ] Guardian           Name(s): Phone Number(s):    BASELINE (I)ADL(s) (prior to admission):  [place]: [ ]Total  [ ] Moderate [ ]Dependent    Allergies    No Known Allergies    Intolerances      PRESENT SYMPTOMS: [ ]Unable to self-report  [ ] CPOT [ ] PAINADs [ ] RDOS  Source if other than patient:  [ ]Family   [ ]Team     Pain: [ ]yes [xx]no  QOL impact -   Location -                    Aggravating factors -  Quality -  Radiation -  Timing-  Severity (0-10 scale):  Minimal acceptable level (0-10 scale):     CPOT:    https://[email or web address]/getattachment/[email or web address]/Critical-Care-Pain-Observation-Tool-(CPOT)    PAIN AD Score:   http://geriatrictoolkit.[facility]/cog/painad.pdf (press ctrl +  left click to view)    Dyspnea:                           [ ]Mild [ ]Moderate [ ]Severe      RDOS:  0 to 2  minimal or no respiratory distress   3  mild distress  4 to 6 moderate distress  >7 severe distress  https://homecareinformation.net/handouts/hen/Respiratory_Distress_Observation_Scale.pdf (Ctrl +  left click to view)     Anxiety:                             [ ]Mild [ ]Moderate [ ]Severe  Fatigue:                             [ ]Mild [ ]Moderate [ ]Severe  Nausea:                             [ ]Mild [ ]Moderate [ ]Severe  Loss of appetite:              [ ]Mild [ ]Moderate [ ]Severe  Constipation:                    [ ]Mild [ ]Moderate [ ]Severe    PCSSQ[Palliative Care Spiritual Screening Question]   Severity (0-10):  Score of 4 or > indicate consideration of Chaplaincy referral.  Chaplaincy Referral: [ ] yes [ ] refused [ ] following [ ] Deferred     Caregiver Samaria? : [ ] yes [ ] no [ ] Deferred [ ] Declined             Social work referral [ ] Patient & Family Centered Care Referral [ ]     Anticipatory Grief present?:  [ ] yes [ ] no  [ ] Deferred                  Social work referral [ ] Chaplaincy Referral[ ]      Other Symptoms:  [ ]All other review of systems negative     Palliative Performance Status Version 2:         %    http://npcrc.org/files/news/palliative_performance_scale_ppsv2.pdf  PHYSICAL EXAM:  Vital Signs Last 24 Hrs  T(C): 36.5 (21 Nov 2022 13:50), Max: 36.7 (20 Nov 2022 17:36)  T(F): 97.7 (21 Nov 2022 13:50), Max: 98.1 (20 Nov 2022 17:36)  HR: 77 (21 Nov 2022 13:50) (71 - 84)  BP: 137/78 (21 Nov 2022 13:50) (129/78 - 145/81)  BP(mean): --  RR: 18 (21 Nov 2022 13:50) (16 - 18)  SpO2: 99% (21 Nov 2022 13:50) (97% - 99%)    Parameters below as of 21 Nov 2022 13:50  Patient On (Oxygen Delivery Method): nasal cannula     I&O's Summary    20 Nov 2022 07:01  -  21 Nov 2022 07:00  --------------------------------------------------------  IN: 840 mL / OUT: 2900 mL / NET: -2060 mL    21 Nov 2022 07:01  -  21 Nov 2022 14:29  --------------------------------------------------------  IN: 611 mL / OUT: 1300 mL / NET: -689 mL      GENERAL: [ ]Cachexia    [ ]Alert  [ ]Oriented x   [ ]Lethargic  [ ]Unarousable  [ ]Verbal  [ ]Non-Verbal  Behavioral:   [ ] Anxiety  [ ] Delirium [ ] Agitation [ ] Other  HEENT:  [ ]Normal   [ ]Dry mouth   [ ]ET Tube/Trach  [ ]Oral lesions  PULMONARY:   [ ]Clear [ ]Tachypnea  [ ]Audible excessive secretions   [ ]Rhonchi        [ ]Right [ ]Left [ ]Bilateral  [ ]Crackles        [ ]Right [ ]Left [ ]Bilateral  [ ]Wheezing     [ ]Right [ ]Left [ ]Bilateral  [ ]Diminished breath sounds [ ]right [ ]left [ ]bilateral  CARDIOVASCULAR:    [ ]Regular [ ]Irregular [ ]Tachy  [ ]Christian [ ]Murmur [ ]Other  GASTROINTESTINAL:  [ ]Soft  [ ]Distended   [ ]+BS  [ ]Non tender [ ]Tender  [ ]Other [ ]PEG [ ]OGT/ NGT  Last BM:  GENITOURINARY:  [ ]Normal [ ] Incontinent   [ ]Oliguria/Anuria   [ ][place]  MUSCULOSKELETAL:   [ ]Normal   [ ]Weakness  [ ]Bed/Wheelchair bound [ ]Edema  NEUROLOGIC:   [ ]No focal deficits  [ ]Cognitive impairment  [ ]Dysphagia [ ]Dysarthria [ ]Paresis [ ]Other   SKIN:   [ ]Normal  [ ]Rash  [ ]Other  [ ]Pressure ulcer(s)       Present on admission [ ]y [ ]n    CRITICAL CARE:  [ ] Shock Present  [ ]Septic [ ]Cardiogenic [ ]Neurologic [ ]Hypovolemic  [ ]  Vasopressors [ ]  Inotropes   [ ]Respiratory failure present [ ]Mechanical ventilation [ ]Non-invasive ventilatory support [ ]High flow    [ ]Acute  [ ]Chronic [ ]Hypoxic  [ ]Hypercarbic [ ]Other  [ ]Other organ failure     LABS:                        9.5    0.25  )-----------( 26       ( 21 Nov 2022 08:47 )             27.9   11-21    139  |  99  |  16  ----------------------------<  97  3.3<L>   |  31  |  0.62    Ca    8.7      21 Nov 2022 08:47  Phos  2.7     11-21  Mg     2.3     11-21    TPro  5.6<L>  /  Alb  3.1<L>  /  TBili  0.5  /  DBili  x   /  AST  27  /  ALT  35  /  AlkPhos  76  11-21  PT/INR - ( 21 Nov 2022 08:47 )   PT: 13.5 sec;   INR: 1.16 ratio         PTT - ( 21 Nov 2022 08:47 )  PTT:25.1 sec      RADIOLOGY & ADDITIONAL STUDIES:    PROTEIN CALORIE MALNUTRITION PRESENT: [ ]mild [ ]moderate [ ]severe [ ]underweight [ ]morbid obesity  https://www.andeal.org/vault/2440/web/files/ONC/Table_Clinical%20Characteristics%20to%20Document%20Malnutrition-White%20JV%20et%20al%202012.pdf    Height (cm): 163 (11-10-22 @ 12:15), 162 (07-18-22 @ 13:42), 162.6 (12-22-21 @ 14:17)  Weight (kg): 59.5 (11-10-22 @ 12:15), 58.0009 (09-12-22 @ 12:00), 59.9 (12-22-21 @ 14:17)  BMI (kg/m2): 22.4 (11-10-22 @ 12:15), 22.1 (09-12-22 @ 12:00), 22.8 (07-18-22 @ 13:42)    [ ]PPSV2 < or = to 30% [ ]significant weight loss  [ ]poor nutritional intake  [ ]anasarca[ ]Artificial Nutrition      Other REFERRALS:  [ ]Hospice  [ ]Child Life  [ ]Social Work  [ ]Case management [ ]Holistic Therapy     Goals of Care Document:

## 2022-11-22 NOTE — PROGRESS NOTE ADULT - ASSESSMENT
82F relapsed AML.   Neutropenic fevers.   Radiographic concern for pulmonary aspergillosis.   CoNS on initial blood culture likely contaminant. Noted mediport.   Clinically stable, afebrile since 11/13 but poor prognosis, family meeting planned.     Suggest  -continue Voriconazole 200mg q12h - anticipate 6-12 week course if within goals of care   -empiric Cefepime 2GM q12h for neutropenia, can likely narrow soon     Discussed with heme onc    Bucky Bobby MD   Infectious Disease   Available on TEAMS. After 5PM and on weekends please page fellow on call or call [phone]

## 2022-11-22 NOTE — PROGRESS NOTE ADULT - PROBLEM SELECTOR PLAN 1
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    CBC, CMP, TLS labs, DIC labs daily. , If fibrinogen under 100, give 10 units of cryoprecipitate.  Transfuse for Hgb < 7 and platelet count < 10k, < 20k if febrile, < 50k if bleeding  11/10 Cytarabine 100mg/m2 x 3 days for cytoreduction. stopped hydroxyurea 11/12.   11/12 started gilteritinib   EKG biweekly check for QTc on giliteritinib and voriconazole  11/18 Lasix 40mg IV BID, for net NEG 1-2 L/day (per d/w Cardiology). D/C'd ppx Dexamethasone  11/20 K+/Mg supplemented. Cryo for low fibrinogen. Vitamin K x 3 days PT/INR 19/1.63. Pro BNP trending down, continue BID Lasix. weights trending down.  11/21 Replete K (po), Palliative Care consult  overall DNR/DNI Mutations identified in FLT3-ITD, NPM1, ASXL1 and CEBPA.    CBC, CMP, TLS labs, DIC labs daily. , If fibrinogen under 100, give 10 units of cryoprecipitate.  Transfuse for Hgb < 7 and platelet count < 10k, < 20k if febrile, < 50k if bleeding  11/10 Cytarabine 100mg/m2 x 3 days for cytoreduction. stopped hydroxyurea 11/12.   11/12 started gilteritinib   EKG biweekly check for QTc on giliteritinib and voriconazole  D/C'd ppx Dexamethasone (was on for differentiation after starting gilt).   overall DNR/DNI  hypokalemia-replace k.

## 2022-11-22 NOTE — PROGRESS NOTE ADULT - PROBLEM SELECTOR PLAN 6
Increased O2 requirement.   11/12 Chest CT Scattered nodular and small consolidative opacities peripherally and   bilateral lower lobes; findings are favored to represent aspiration   and/or infection.  Continue with Duoneb  11/14 symbicort added gentle diuresis as tolerated Increased O2 requirement.   Continue with Duoneb  11/14 symbicort added gentle diuresis as tolerated  11/21 CT chest shows worsening nodules with concern for fungal pna.

## 2022-11-22 NOTE — PROGRESS NOTE ADULT - SUBJECTIVE AND OBJECTIVE BOX
Follow Up: neutropenic fever    Interval History/ROS: No cough. Feels fine. Just tired as usual. Spilled some of her strawberry [employer] ice cream. I assisted. We discussed her CT chest findings of fungal pneumonia.     Allergies  No Known Allergies        ANTIMICROBIALS:  acyclovir   Oral Tab/Cap 400 two times a day  cefepime   IVPB 2000 every 12 hours  voriconazole 200 every 12 hours      OTHER MEDS:  acetaminophen     Tablet .. 650 milliGRAM(s) Oral every 6 hours PRN  albuterol/ipratropium for Nebulization 3 milliLiter(s) Nebulizer every 6 hours  aluminum hydroxide/magnesium hydroxide/simethicone Suspension 30 milliLiter(s) Oral every 4 hours PRN  atorvastatin 10 milliGRAM(s) Oral at bedtime  benzonatate 100 milliGRAM(s) Oral every 8 hours PRN  Biotene Dry Mouth Oral Rinse 15 milliLiter(s) Swish and Spit three times a day  budesonide  80 MICROgram(s)/formoterol 4.5 MICROgram(s) Inhaler 2 Puff(s) Inhalation two times a day  buPROPion XL (24-Hour) . 150 milliGRAM(s) Oral daily  chlorhexidine 2% Cloths 1 Application(s) Topical daily  furosemide   Injectable 40 milliGRAM(s) IV Push two times a day  gabapentin 100 milliGRAM(s) Oral daily  gilteritinib 120 milliGRAM(s) Oral <User Schedule>  lisinopril 5 milliGRAM(s) Oral daily  melatonin 3 milliGRAM(s) Oral at bedtime PRN  ondansetron Injectable 4 milliGRAM(s) IV Push every 8 hours PRN  oxybutynin 5 milliGRAM(s) Oral daily  polyethylene glycol 3350 17 Gram(s) Oral two times a day  potassium chloride    Tablet ER 20 milliEquivalent(s) Oral daily  senna 2 Tablet(s) Oral at bedtime  sodium chloride 0.65% Nasal 1 Spray(s) Both Nostrils four times a day  sodium chloride 0.9%. 1000 milliLiter(s) IV Continuous <Continuous>  traZODone 150 milliGRAM(s) Oral at bedtime      Vital Signs Last 24 Hrs  T(C): 36.6 (22 Nov 2022 13:30), Max: 36.9 (22 Nov 2022 00:54)  T(F): 97.9 (22 Nov 2022 13:30), Max: 98.4 (22 Nov 2022 00:54)  HR: 75 (22 Nov 2022 13:30) (71 - 79)  BP: 135/78 (22 Nov 2022 13:30) (123/77 - 145/75)  BP(mean): --  RR: 18 (22 Nov 2022 13:30) (17 - 18)  SpO2: 97% (22 Nov 2022 13:30) (97% - 99%)    Parameters below as of 22 Nov 2022 13:30  Patient On (Oxygen Delivery Method): nasal cannula        Physical Exam:  General: non toxic, eating lunch   Cardio: regular rate   Respiratory: nonlabored   abd: nondistended  vascular: mediport   Skin: no rash                          7.5    0.19  )-----------( 11       ( 22 Nov 2022 07:01 )             22.3       11-22    139  |  102  |  13  ----------------------------<  99  3.6   |  33<H>  |  0.60    Ca    8.1<L>      22 Nov 2022 07:07  Phos  2.8     11-22  Mg     2.2     11-22    TPro  5.0<L>  /  Alb  2.7<L>  /  TBili  0.2  /  DBili  x   /  AST  27  /  ALT  32  /  AlkPhos  73  11-22          MICROBIOLOGY:    RADIOLOGY:  Images below reviewed personally  CT Chest No Cont (11.21.22 @ 16:18)   1.  Increase in number of nodules in both lungs with new surrounding   groundglass halos suggestive of invasive fungal infection/invasive   aspergillosis  2.  Increase in small bilateral pleural effusions, right more than left.  3.  Redemonstration of indeterminate right hepatic lobe and right adrenal   lesions, as described above.MRI of the abdomen is again recommended for   further evaluation.

## 2022-11-22 NOTE — PROGRESS NOTE ADULT - NS ATTEND AMEND GEN_ALL_CORE FT
Primary:     Vital Signs Last 24 Hrs  T(C): 36.7 (21 Nov 2022 00:39), Max: 36.7 (20 Nov 2022 17:36)  T(F): 98 (21 Nov 2022 00:39), Max: 98.1 (20 Nov 2022 17:36)  HR: 77 (21 Nov 2022 00:39) (75 - 85)  BP: 139/63 (21 Nov 2022 00:39) (116/76 - 139/63)  BP(mean): --  RR: 16 (21 Nov 2022 00:39) (16 - 16)  SpO2: 97% (21 Nov 2022 00:39) (97% - 100%)    Parameters below as of 21 Nov 2022 00:39  Patient On (Oxygen Delivery Method): nasal cannula  O2 Flow (L/min): 3    MEDICATIONS  (STANDING):  acyclovir   Oral Tab/Cap 400 milliGRAM(s) Oral two times a day  albuterol/ipratropium for Nebulization 3 milliLiter(s) Nebulizer every 6 hours  atorvastatin 10 milliGRAM(s) Oral at bedtime  Biotene Dry Mouth Oral Rinse 15 milliLiter(s) Swish and Spit three times a day  budesonide  80 MICROgram(s)/formoterol 4.5 MICROgram(s) Inhaler 2 Puff(s) Inhalation two times a day  buPROPion XL (24-Hour) . 150 milliGRAM(s) Oral daily  cefepime   IVPB 2000 milliGRAM(s) IV Intermittent every 12 hours  chlorhexidine 2% Cloths 1 Application(s) Topical daily  furosemide   Injectable 40 milliGRAM(s) IV Push two times a day  gabapentin 100 milliGRAM(s) Oral daily  gilteritinib 120 milliGRAM(s) Oral <User Schedule>  oxybutynin 5 milliGRAM(s) Oral daily  phytonadione   Solution 10 milliGRAM(s) Oral daily  polyethylene glycol 3350 17 Gram(s) Oral two times a day  senna 2 Tablet(s) Oral at bedtime  sodium chloride 0.65% Nasal 1 Spray(s) Both Nostrils four times a day  sodium chloride 0.9%. 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  Course complicated S. epi bacteremia? (11/9/22), had continuous neutropenic fevers, which resolved after 11/13/22; pneumonia (concerning for fungus) and hypotension (11/12/22), fluid overloaded state (11/13/22 - )     PMHx: reactive airway disease, aortic stenosis.      Plan:  Heme: PLT goal > 10,000; Hgb goal > 7.0g/dL;   Continue gilteritinib    ID: cefepime, vori, acyclovir; vori level (11/17/22): 2.6     peripheral blood fungitell neg and galactomannan (11/12/22) 0.03 (wnl)    Radiographs: CT (11/12/22): Scattered nodular and small consolidative opacities peripherally and bilateral lower lobes; findings are favored to represent aspiration and/or infection.  Indeterminate right hepatic lobe 2 cm lesion and right adrenal 2.3 cm nodule    Pulmonary: Symbicort (history of reactive airway disease)     Nutrition: tolerating PO; goal O>I by 1L    DVT prophylaxis: ambulation.     Code status: DNR/DNI; palliative medicine consulted ( has advanced pancreatic carcinoma.)    Over 35 minutes were spent in direct patient care and care coordination. Primary:     Vital Signs Last 24 Hrs  T(C): 36.3 (22 Nov 2022 05:00), Max: 36.9 (22 Nov 2022 00:54)  T(F): 97.4 (22 Nov 2022 05:00), Max: 98.4 (22 Nov 2022 00:54)  HR: 72 (22 Nov 2022 05:00) (71 - 81)  BP: 145/75 (22 Nov 2022 05:00) (129/81 - 145/75)  BP(mean): --  RR: 18 (22 Nov 2022 05:00) (16 - 18)  SpO2: 99% (22 Nov 2022 05:00) (97% - 99%)    Parameters below as of 22 Nov 2022 05:00  Patient On (Oxygen Delivery Method): nasal cannula    MEDICATIONS  (STANDING):  acyclovir   Oral Tab/Cap 400 milliGRAM(s) Oral two times a day  albuterol/ipratropium for Nebulization 3 milliLiter(s) Nebulizer every 6 hours  atorvastatin 10 milliGRAM(s) Oral at bedtime  Biotene Dry Mouth Oral Rinse 15 milliLiter(s) Swish and Spit three times a day  budesonide  80 MICROgram(s)/formoterol 4.5 MICROgram(s) Inhaler 2 Puff(s) Inhalation two times a day  buPROPion XL (24-Hour) . 150 milliGRAM(s) Oral daily  cefepime   IVPB 2000 milliGRAM(s) IV Intermittent every 12 hours  chlorhexidine 2% Cloths 1 Application(s) Topical daily  furosemide   Injectable 40 milliGRAM(s) IV Push two times a day  gabapentin 100 milliGRAM(s) Oral daily  gilteritinib 120 milliGRAM(s) Oral <User Schedule>  lisinopril 5 milliGRAM(s) Oral daily  oxybutynin 5 milliGRAM(s) Oral daily  phytonadione   Solution 10 milliGRAM(s) Oral daily  polyethylene glycol 3350 17 Gram(s) Oral two times a day  potassium chloride    Tablet ER 20 milliEquivalent(s) Oral daily  senna 2 Tablet(s) Oral at bedtime  sodium chloride 0.65% Nasal 1 Spray(s) Both Nostrils four times a day  sodium chloride 0.9%. 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  Course complicated S. epi bacteremia? (11/9/22), had continuous neutropenic fevers, which resolved after 11/13/22; pneumonia (concerning for fungus) and hypotension (11/12/22), fluid overloaded state (11/13/22 - )     PMHx: reactive airway disease, aortic stenosis.      Plan:  Heme: PLT goal > 10,000; Hgb goal > 7.0g/dL;   Continue gilteritinib    ID: cefepime, vori, acyclovir; vori level (11/17/22): 2.6     peripheral blood fungitell neg and galactomannan (11/12/22) 0.03 (wnl)    Radiographs: CT (11/12/22): Scattered nodular and small consolidative opacities peripherally and bilateral lower lobes; findings are favored to represent aspiration and/or infection.  Indeterminate right hepatic lobe 2 cm lesion and right adrenal 2.3 cm nodule    Pulmonary: Symbicort (history of reactive airway disease)     Nutrition: tolerating PO; goal O>I by 1L    DVT prophylaxis: ambulation.     Code status: DNR/DNI; palliative medicine consulted ( has advanced pancreatic carcinoma.)    Over 35 minutes were spent in direct patient care and care coordination. Primary:     Vital Signs Last 24 Hrs  T(C): 36.3 (22 Nov 2022 05:00), Max: 36.9 (22 Nov 2022 00:54)  T(F): 97.4 (22 Nov 2022 05:00), Max: 98.4 (22 Nov 2022 00:54)  HR: 72 (22 Nov 2022 05:00) (71 - 81)  BP: 145/75 (22 Nov 2022 05:00) (129/81 - 145/75)  BP(mean): --  RR: 18 (22 Nov 2022 05:00) (16 - 18)  SpO2: 99% (22 Nov 2022 05:00) (97% - 99%)    Parameters below as of 22 Nov 2022 05:00  Patient On (Oxygen Delivery Method): nasal cannula    MEDICATIONS  (STANDING):  acyclovir   Oral Tab/Cap 400 milliGRAM(s) Oral two times a day  albuterol/ipratropium for Nebulization 3 milliLiter(s) Nebulizer every 6 hours  atorvastatin 10 milliGRAM(s) Oral at bedtime  Biotene Dry Mouth Oral Rinse 15 milliLiter(s) Swish and Spit three times a day  budesonide  80 MICROgram(s)/formoterol 4.5 MICROgram(s) Inhaler 2 Puff(s) Inhalation two times a day  buPROPion XL (24-Hour) . 150 milliGRAM(s) Oral daily  cefepime   IVPB 2000 milliGRAM(s) IV Intermittent every 12 hours  chlorhexidine 2% Cloths 1 Application(s) Topical daily  furosemide   Injectable 40 milliGRAM(s) IV Push two times a day  gabapentin 100 milliGRAM(s) Oral daily  gilteritinib 120 milliGRAM(s) Oral <User Schedule>  lisinopril 5 milliGRAM(s) Oral daily  oxybutynin 5 milliGRAM(s) Oral daily  phytonadione   Solution 10 milliGRAM(s) Oral daily  polyethylene glycol 3350 17 Gram(s) Oral two times a day  potassium chloride    Tablet ER 20 milliEquivalent(s) Oral daily  senna 2 Tablet(s) Oral at bedtime  sodium chloride 0.65% Nasal 1 Spray(s) Both Nostrils four times a day  sodium chloride 0.9%. 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  Course complicated S. epi bacteremia? (11/9/22), had continuous neutropenic fevers, which resolved after 11/13/22; pneumonia (concerning for fungus) and hypotension (11/12/22), fluid overloaded state (11/13/22 - )     PMHx: reactive airway disease, aortic stenosis.      Plan:  Heme: PLT goal > 10,000; Hgb goal > 7.0g/dL;   Continue gilteritinib    ID: cefepime, vori, acyclovir; vori level (11/17/22): 2.6     peripheral blood fungitell neg and galactomannan (11/12/22) 0.03 (wnl)    Radiographs: CT (11/21/22): increased nodules c/w progressive fungal disease.     Pulmonary: Symbicort (history of reactive airway disease)     Nutrition: tolerating PO; goal O>I by 1L    DVT prophylaxis: ambulation.     Code status: DNR/DNI; palliative medicine consulted ( has advanced pancreatic carcinoma.)    Over 35 minutes were spent in direct patient care and care coordination.

## 2022-11-22 NOTE — PROGRESS NOTE ADULT - SUBJECTIVE AND OBJECTIVE BOX
Diagnosis: relapsed AML, FLT3 ITD+, NPM1, CEBPA, ASXL1 mutated    Protocol/Chemo Regimen: daily FLT3 inhibitor gilteritinib (s/p IV cytarabine 100mg/m2 x 3days 11/10-11/12 prior to start of gilteritinib for cytoreduction)    Day: 11 gilteritinib     Pt endorsed: No overnight events, afebrile. Last BM 2 days ago    Review of Systems: Denies SOB, abdominal pain, n/v, HA or dizziness    Pain scale: 0                                           Diet: DASH/TLC    Allergies    No Known Allergies    Intolerances        ANTIMICROBIALS  acyclovir   Oral Tab/Cap 400 milliGRAM(s) Oral two times a day  cefepime   IVPB 2000 milliGRAM(s) IV Intermittent every 12 hours  voriconazole 200 milliGRAM(s) Oral every 12 hours      HEME/ONC MEDICATIONS  gilteritinib 120 milliGRAM(s) Oral <User Schedule>      STANDING MEDICATIONS  albuterol/ipratropium for Nebulization 3 milliLiter(s) Nebulizer every 6 hours  atorvastatin 10 milliGRAM(s) Oral at bedtime  Biotene Dry Mouth Oral Rinse 15 milliLiter(s) Swish and Spit three times a day  budesonide  80 MICROgram(s)/formoterol 4.5 MICROgram(s) Inhaler 2 Puff(s) Inhalation two times a day  buPROPion XL (24-Hour) . 150 milliGRAM(s) Oral daily  chlorhexidine 2% Cloths 1 Application(s) Topical daily  furosemide   Injectable 40 milliGRAM(s) IV Push two times a day  gabapentin 100 milliGRAM(s) Oral daily  lisinopril 5 milliGRAM(s) Oral daily  oxybutynin 5 milliGRAM(s) Oral daily  phytonadione   Solution 10 milliGRAM(s) Oral daily  polyethylene glycol 3350 17 Gram(s) Oral two times a day  potassium chloride    Tablet ER 20 milliEquivalent(s) Oral daily  senna 2 Tablet(s) Oral at bedtime  sodium chloride 0.65% Nasal 1 Spray(s) Both Nostrils four times a day  sodium chloride 0.9%. 1000 milliLiter(s) IV Continuous <Continuous>  traZODone 150 milliGRAM(s) Oral at bedtime      PRN MEDICATIONS  acetaminophen     Tablet .. 650 milliGRAM(s) Oral every 6 hours PRN  aluminum hydroxide/magnesium hydroxide/simethicone Suspension 30 milliLiter(s) Oral every 4 hours PRN  benzonatate 100 milliGRAM(s) Oral every 8 hours PRN  melatonin 3 milliGRAM(s) Oral at bedtime PRN  ondansetron Injectable 4 milliGRAM(s) IV Push every 8 hours PRN        Vital Signs Last 24 Hrs  T(C): 36.3 (22 Nov 2022 05:00), Max: 36.9 (22 Nov 2022 00:54)  T(F): 97.4 (22 Nov 2022 05:00), Max: 98.4 (22 Nov 2022 00:54)  HR: 72 (22 Nov 2022 05:00) (71 - 81)  BP: 145/75 (22 Nov 2022 05:00) (129/81 - 145/75)  BP(mean): --  RR: 18 (22 Nov 2022 05:00) (16 - 18)  SpO2: 99% (22 Nov 2022 05:00) (97% - 99%)    Parameters below as of 22 Nov 2022 05:00  Patient On (Oxygen Delivery Method): nasal cannula    PHYSICAL EXAM  General: Sitting up in bed NAD  HEENT: sclerae anicteric, clear oropharynx  CV: (+) S1/S2 RRR  Lungs: +mild bibasilar crackles (R>L), decreased bs at bases, no wheezes   Abdomen:+ BS, soft, +distended, non-tender  Ext: trace edema BLE's   Skin: no rash, left arm bruising, non tender  Neuro: alert and oriented X 3  Central Line: LCW Mediport  and PIV CDI     Diagnosis: relapsed AML, FLT3 ITD+, NPM1, CEBPA, ASXL1 mutated    Protocol/Chemo Regimen: daily FLT3 inhibitor gilteritinib (s/p IV cytarabine 100mg/m2 x 3days 11/10-11/12 prior to start of gilteritinib for cytoreduction)    Day: 11 gilteritinib     Pt endorsed: No overnight events, afebrile.   Review of Systems: Denies SOB, abdominal pain, n/v, HA or dizziness    Pain scale: 0                                           Diet: DASH/TLC    Allergies    No Known Allergies    Intolerances        ANTIMICROBIALS  acyclovir   Oral Tab/Cap 400 milliGRAM(s) Oral two times a day  cefepime   IVPB 2000 milliGRAM(s) IV Intermittent every 12 hours  voriconazole 200 milliGRAM(s) Oral every 12 hours      HEME/ONC MEDICATIONS  gilteritinib 120 milliGRAM(s) Oral <User Schedule>      STANDING MEDICATIONS  albuterol/ipratropium for Nebulization 3 milliLiter(s) Nebulizer every 6 hours  atorvastatin 10 milliGRAM(s) Oral at bedtime  Biotene Dry Mouth Oral Rinse 15 milliLiter(s) Swish and Spit three times a day  budesonide  80 MICROgram(s)/formoterol 4.5 MICROgram(s) Inhaler 2 Puff(s) Inhalation two times a day  buPROPion XL (24-Hour) . 150 milliGRAM(s) Oral daily  chlorhexidine 2% Cloths 1 Application(s) Topical daily  furosemide   Injectable 40 milliGRAM(s) IV Push two times a day  gabapentin 100 milliGRAM(s) Oral daily  lisinopril 5 milliGRAM(s) Oral daily  oxybutynin 5 milliGRAM(s) Oral daily  phytonadione   Solution 10 milliGRAM(s) Oral daily  polyethylene glycol 3350 17 Gram(s) Oral two times a day  potassium chloride    Tablet ER 20 milliEquivalent(s) Oral daily  senna 2 Tablet(s) Oral at bedtime  sodium chloride 0.65% Nasal 1 Spray(s) Both Nostrils four times a day  sodium chloride 0.9%. 1000 milliLiter(s) IV Continuous <Continuous>  traZODone 150 milliGRAM(s) Oral at bedtime      PRN MEDICATIONS  acetaminophen     Tablet .. 650 milliGRAM(s) Oral every 6 hours PRN  aluminum hydroxide/magnesium hydroxide/simethicone Suspension 30 milliLiter(s) Oral every 4 hours PRN  benzonatate 100 milliGRAM(s) Oral every 8 hours PRN  melatonin 3 milliGRAM(s) Oral at bedtime PRN  ondansetron Injectable 4 milliGRAM(s) IV Push every 8 hours PRN        Vital Signs Last 24 Hrs  T(C): 36.3 (22 Nov 2022 05:00), Max: 36.9 (22 Nov 2022 00:54)  T(F): 97.4 (22 Nov 2022 05:00), Max: 98.4 (22 Nov 2022 00:54)  HR: 72 (22 Nov 2022 05:00) (71 - 81)  BP: 145/75 (22 Nov 2022 05:00) (129/81 - 145/75)  BP(mean): --  RR: 18 (22 Nov 2022 05:00) (16 - 18)  SpO2: 99% (22 Nov 2022 05:00) (97% - 99%)    Parameters below as of 22 Nov 2022 05:00  Patient On (Oxygen Delivery Method): nasal cannula    PHYSICAL EXAM  General: Sitting up in bed NAD  HEENT: sclerae anicteric, clear oropharynx  CV: (+) S1/S2 RRR  Lungs: +mild bibasilar crackles (R>L), decreased bs at bases, no wheezes   Abdomen:+ BS, soft, +distended, non-tender  Ext: trace edema BLE's   Skin: no rash, left arm bruising, non tender  Neuro: alert and oriented X 3  Central Line: LCW Mediport  and PIV CDI    LABS:    Blood Cultures:                           7.5    0.19  )-----------( 11       ( 22 Nov 2022 07:01 )             22.3         Mean Cell Volume : 93.3 fl  Mean Cell Hemoglobin : 31.4 pg  Mean Cell Hemoglobin Concentration : 33.6 gm/dL  Auto Neutrophil # : x  Auto Lymphocyte # : x  Auto Monocyte # : x  Auto Eosinophil # : x  Auto Basophil # : x  Auto Neutrophil % : x  Auto Lymphocyte % : x  Auto Monocyte % : x  Auto Eosinophil % : x  Auto Basophil % : x      11-22    139  |  102  |  13  ----------------------------<  99  3.6   |  33<H>  |  0.60    Ca    8.1<L>      22 Nov 2022 07:07  Phos  2.8     11-22  Mg     2.2     11-22    TPro  5.0<L>  /  Alb  2.7<L>  /  TBili  0.2  /  DBili  x   /  AST  27  /  ALT  32  /  AlkPhos  73  11-22  PT/INR - ( 22 Nov 2022 07:07 )   PT: 13.2 sec;   INR: 1.15 ratio    PTT - ( 21 Nov 2022 08:47 )  PTT:25.1 sec    Uric Acid 2.6

## 2022-11-22 NOTE — PROGRESS NOTE ADULT - PROBLEM SELECTOR PLAN 5
TTE  11/9 - EF 70% mild AS   11/11 EKG no changes  11/12 elevated trop in setting of hypotension  11/14 Cardiology consult   11/15 Repeat echo showed decrease in EF 39% with wall motion abnormality.  11/15 CXR given change in clinical status-New streaky right basilar and retrocardiac opacities, may be infectious in appropriate clinical setting. TTE  11/9 - EF 70% mild AS   11/11 EKG no changes  11/12 elevated trop in setting of hypotension  11/14 Cardiology consult   11/15 Repeat echo showed decrease in EF 39% with wall motion abnormality.

## 2022-11-23 NOTE — PROGRESS NOTE ADULT - NS ATTEND AMEND GEN_ALL_CORE FT
.  Primary: Goldebrg    Vital Signs Last 24 Hrs  T(C): 36.4 (23 Nov 2022 04:59), Max: 36.8 (22 Nov 2022 17:08)  T(F): 97.6 (23 Nov 2022 04:59), Max: 98.2 (22 Nov 2022 17:08)  HR: 73 (23 Nov 2022 04:59) (72 - 78)  BP: 118/70 (23 Nov 2022 04:59) (106/61 - 135/78)  BP(mean): --  RR: 18 (23 Nov 2022 04:59) (18 - 18)  SpO2: 98% (23 Nov 2022 04:59) (97% - 99%)    Parameters below as of 23 Nov 2022 04:59  Patient On (Oxygen Delivery Method): nasal cannula    MEDICATIONS  (STANDING):  acyclovir   Oral Tab/Cap 400 milliGRAM(s) Oral two times a day  albuterol/ipratropium for Nebulization 3 milliLiter(s) Nebulizer every 6 hours  atorvastatin 10 milliGRAM(s) Oral at bedtime  Biotene Dry Mouth Oral Rinse 15 milliLiter(s) Swish and Spit three times a day  budesonide  80 MICROgram(s)/formoterol 4.5 MICROgram(s) Inhaler 2 Puff(s) Inhalation two times a day  buPROPion XL (24-Hour) . 150 milliGRAM(s) Oral daily  cefepime   IVPB 2000 milliGRAM(s) IV Intermittent every 12 hours  chlorhexidine 2% Cloths 1 Application(s) Topical daily  furosemide   Injectable 40 milliGRAM(s) IV Push two times a day  gabapentin 100 milliGRAM(s) Oral daily  gilteritinib 120 milliGRAM(s) Oral <User Schedule>  lisinopril 5 milliGRAM(s) Oral daily  oxybutynin 5 milliGRAM(s) Oral daily  polyethylene glycol 3350 17 Gram(s) Oral two times a day  potassium chloride    Tablet ER 20 milliEquivalent(s) Oral daily  senna 2 Tablet(s) Oral at bedtime  sodium chloride 0.65% Nasal 1 Spray(s) Both Nostrils four times a day  sodium chloride 0.9%. 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  Course complicated S. epi bacteremia? (11/9/22), had continuous neutropenic fevers, which resolved after 11/13/22; pneumonia (concerning for fungus) and hypotension (11/12/22), fluid overloaded state (11/13/22 - )     PMHx: reactive airway disease, aortic stenosis.      Plan:  Heme: PLT goal > 10,000; Hgb goal > 7.0g/dL;   Continue gilteritinib  Current plan is one month of gilteritinib with a marrow at that time.     ID: cefepime, vori, acyclovir; vori level (11/17/22): 2.6     peripheral blood fungitell neg and galactomannan (11/12/22) 0.03 (wnl)    Radiographs: CT (11/21/22): increased nodules c/w progressive fungal disease.     Pulmonary: Symbicort (history of reactive airway disease), continue supplemental oxygen.    Nutrition: tolerating PO; goal O>I by 1L    DVT prophylaxis: ambulation.     Code status: DNR/DNI; palliative medicine consulted ( has advanced prostate carcinoma and cannot care for his wife)    Over 35 minutes were spent in direct patient care and care coordination.

## 2022-11-23 NOTE — PROGRESS NOTE ADULT - PROBLEM SELECTOR PLAN 5
TTE  11/9 - EF 70% mild AS   11/11 EKG no changes  11/12 elevated trop in setting of hypotension  11/14 Cardiology consult   11/15 Repeat echo showed decrease in EF 39% with wall motion abnormality.

## 2022-11-23 NOTE — PROGRESS NOTE ADULT - SUBJECTIVE AND OBJECTIVE BOX
CARDIO-ONCOLOGY FOLLOW UP NOTE                                                                                                                                                                                     Interval History    PAST MEDICAL & SURGICAL HISTORY:  Breast cancer  s/p lumpectomy, RT, tamoxifen      Hypertension      AML (acute myelogenous leukemia)      S/P hysterectomy          FAMILY HISTORY:  No pertinent family history in first degree relatives        Home Medications:  acyclovir 400 mg oral tablet: 1 tab(s) orally 2 times a day (08 Nov 2022 15:36)  amLODIPine 5 mg oral tablet: 1 tab(s) orally once a day (09 Nov 2022 13:00)  buPROPion 150 mg/12 hours (SR) oral tablet, extended release: 1 tab(s) orally once a day (08 Nov 2022 15:36)  gabapentin 100 mg oral tablet: 1 tab(s) orally once a day (08 Nov 2022 15:36)  levoFLOXacin 500 mg oral tablet: 1 tab(s) orally every 24 hours (08 Nov 2022 15:36)  oxybutynin 5 mg oral tablet: 1 tab(s) orally once a day (08 Nov 2022 15:36)  traZODone 150 mg oral tablet: 1 tab(s) orally once a day (at bedtime) (08 Nov 2022 15:36)      Social History:  She lives with her . Her  has prostate cancer and neuropathy. She is concerned about his health too.   She has an aide who assists 4 hrs x 3 days a week.   She ambulates with a cane or walker. She has not had any recent falls.   She wears a Life Alert pendant.   She denies tobacco or drug use. She has one serving of alcohol a day. (08 Nov 2022 13:07)      Medications:  acetaminophen     Tablet .. 650 milliGRAM(s) Oral every 6 hours PRN  acyclovir   Oral Tab/Cap 400 milliGRAM(s) Oral two times a day  albuterol/ipratropium for Nebulization 3 milliLiter(s) Nebulizer every 6 hours  aluminum hydroxide/magnesium hydroxide/simethicone Suspension 30 milliLiter(s) Oral every 4 hours PRN  atorvastatin 10 milliGRAM(s) Oral at bedtime  benzonatate 100 milliGRAM(s) Oral every 8 hours PRN  Biotene Dry Mouth Oral Rinse 15 milliLiter(s) Swish and Spit three times a day  budesonide  80 MICROgram(s)/formoterol 4.5 MICROgram(s) Inhaler 2 Puff(s) Inhalation two times a day  buPROPion XL (24-Hour) . 150 milliGRAM(s) Oral daily  cefepime   IVPB 2000 milliGRAM(s) IV Intermittent every 12 hours  chlorhexidine 2% Cloths 1 Application(s) Topical daily  furosemide   Injectable 40 milliGRAM(s) IV Push two times a day  gabapentin 100 milliGRAM(s) Oral daily  gilteritinib 120 milliGRAM(s) Oral <User Schedule>  lisinopril 5 milliGRAM(s) Oral daily  melatonin 3 milliGRAM(s) Oral at bedtime PRN  ondansetron Injectable 4 milliGRAM(s) IV Push every 8 hours PRN  oxybutynin 5 milliGRAM(s) Oral daily  polyethylene glycol 3350 17 Gram(s) Oral two times a day  potassium chloride    Tablet ER 20 milliEquivalent(s) Oral daily  senna 2 Tablet(s) Oral at bedtime  sodium chloride 0.65% Nasal 1 Spray(s) Both Nostrils four times a day  sodium chloride 0.9%. 1000 milliLiter(s) IV Continuous <Continuous>  traZODone 150 milliGRAM(s) Oral at bedtime  voriconazole 200 milliGRAM(s) Oral every 12 hours      Review of systems:  10 point review of systems completed and are negative unless noted in HPI    Vitals:  T(C): 36.5 (11-23-22 @ 08:55), Max: 36.8 (11-22-22 @ 17:08)  HR: 76 (11-23-22 @ 08:55) (72 - 76)  BP: 115/71 (11-23-22 @ 08:55) (106/61 - 135/78)  BP(mean): --  RR: 18 (11-23-22 @ 08:55) (18 - 18)  SpO2: 100% (11-23-22 @ 08:55) (97% - 100%)    Daily     Daily         I&O's Summary    22 Nov 2022 07:01  -  23 Nov 2022 07:00  --------------------------------------------------------  IN: 724 mL / OUT: 1550 mL / NET: -826 mL    23 Nov 2022 07:01  -  23 Nov 2022 10:55  --------------------------------------------------------  IN: 0 mL / OUT: 1000 mL / NET: -1000 mL        Physical Exam:  Appearance: No apparent distress  HEENT: moist mucosa, anicteric sclerae.   Neck:   Cardiovascular: regular rate and rhythm. No murmur. No gallop. No friction rub.  Respiratory: Clear to auscultation bilaterally  Gastrointestinal: Soft, Non-tender, benign	  Skin: no clubbing or cyanosis.   Neurologic: No focal deficit  Extremities: warm. No edema.  Psychiatry: A & O x 3, Mood & affect appropriate  Vascular: pulse regular, equal    Labs:                        7.6    0.24  )-----------( 8        ( 23 Nov 2022 07:08 )             23.3     11-23    139  |  102  |  16  ----------------------------<  99  4.2   |  30  |  0.63    Ca    7.9<L>      23 Nov 2022 07:05  Phos  2.9     11-23  Mg     2.1     11-23    TPro  5.0<L>  /  Alb  2.7<L>  /  TBili  0.2  /  DBili  x   /  AST  25  /  ALT  27  /  AlkPhos  79  11-23    PT/INR - ( 23 Nov 2022 07:05 )   PT: 12.3 sec;   INR: 1.06 ratio         PTT - ( 23 Nov 2022 07:05 )  PTT:25.1 sec        Serum Pro-Brain Natriuretic Peptide: 1078 pg/mL [0 - 300] (11-23-22 @ 07:05)  Serum Pro-Brain Natriuretic Peptide: 1665 pg/mL [0 - 300] (11-22-22 @ 07:07)  Serum Pro-Brain Natriuretic Peptide: 2456 pg/mL [0 - 300] (11-21-22 @ 08:47)      TELEMETRY:    Echocardiogram:        EKG:     CXR/Radiology:   CARDIO-ONCOLOGY FOLLOW UP NOTE                                                                                                                                                                                     Interval History: Breathing about the same. + Cough, no fever. Complains of constipation today.    PAST MEDICAL & SURGICAL HISTORY:  Breast cancer  s/p lumpectomy, RT, tamoxifen      Hypertension      AML (acute myelogenous leukemia)      S/P hysterectomy          FAMILY HISTORY:  No pertinent family history in first degree relatives        Home Medications:  acyclovir 400 mg oral tablet: 1 tab(s) orally 2 times a day (08 Nov 2022 15:36)  amLODIPine 5 mg oral tablet: 1 tab(s) orally once a day (09 Nov 2022 13:00)  buPROPion 150 mg/12 hours (SR) oral tablet, extended release: 1 tab(s) orally once a day (08 Nov 2022 15:36)  gabapentin 100 mg oral tablet: 1 tab(s) orally once a day (08 Nov 2022 15:36)  levoFLOXacin 500 mg oral tablet: 1 tab(s) orally every 24 hours (08 Nov 2022 15:36)  oxybutynin 5 mg oral tablet: 1 tab(s) orally once a day (08 Nov 2022 15:36)  traZODone 150 mg oral tablet: 1 tab(s) orally once a day (at bedtime) (08 Nov 2022 15:36)      Social History:  She lives with her . Her  has prostate cancer and neuropathy. She is concerned about his health too.   She has an aide who assists 4 hrs x 3 days a week.   She ambulates with a cane or walker. She has not had any recent falls.   She wears a Life Alert pendant.   She denies tobacco or drug use. She has one serving of alcohol a day. (08 Nov 2022 13:07)      Medications:  acetaminophen     Tablet .. 650 milliGRAM(s) Oral every 6 hours PRN  acyclovir   Oral Tab/Cap 400 milliGRAM(s) Oral two times a day  albuterol/ipratropium for Nebulization 3 milliLiter(s) Nebulizer every 6 hours  aluminum hydroxide/magnesium hydroxide/simethicone Suspension 30 milliLiter(s) Oral every 4 hours PRN  atorvastatin 10 milliGRAM(s) Oral at bedtime  benzonatate 100 milliGRAM(s) Oral every 8 hours PRN  Biotene Dry Mouth Oral Rinse 15 milliLiter(s) Swish and Spit three times a day  budesonide  80 MICROgram(s)/formoterol 4.5 MICROgram(s) Inhaler 2 Puff(s) Inhalation two times a day  buPROPion XL (24-Hour) . 150 milliGRAM(s) Oral daily  cefepime   IVPB 2000 milliGRAM(s) IV Intermittent every 12 hours  chlorhexidine 2% Cloths 1 Application(s) Topical daily  furosemide   Injectable 40 milliGRAM(s) IV Push two times a day  gabapentin 100 milliGRAM(s) Oral daily  gilteritinib 120 milliGRAM(s) Oral <User Schedule>  lisinopril 5 milliGRAM(s) Oral daily  melatonin 3 milliGRAM(s) Oral at bedtime PRN  ondansetron Injectable 4 milliGRAM(s) IV Push every 8 hours PRN  oxybutynin 5 milliGRAM(s) Oral daily  polyethylene glycol 3350 17 Gram(s) Oral two times a day  potassium chloride    Tablet ER 20 milliEquivalent(s) Oral daily  senna 2 Tablet(s) Oral at bedtime  sodium chloride 0.65% Nasal 1 Spray(s) Both Nostrils four times a day  sodium chloride 0.9%. 1000 milliLiter(s) IV Continuous <Continuous>  traZODone 150 milliGRAM(s) Oral at bedtime  voriconazole 200 milliGRAM(s) Oral every 12 hours      Review of systems:  10 point review of systems completed and are negative unless noted in HPI    Vitals:  T(C): 36.5 (11-23-22 @ 08:55), Max: 36.8 (11-22-22 @ 17:08)  HR: 76 (11-23-22 @ 08:55) (72 - 76)  BP: 115/71 (11-23-22 @ 08:55) (106/61 - 135/78)  BP(mean): --  RR: 18 (11-23-22 @ 08:55) (18 - 18)  SpO2: 100% (11-23-22 @ 08:55) (97% - 100%)    Daily     Daily         I&O's Summary    22 Nov 2022 07:01  -  23 Nov 2022 07:00  --------------------------------------------------------  IN: 724 mL / OUT: 1550 mL / NET: -826 mL    23 Nov 2022 07:01  -  23 Nov 2022 10:55  --------------------------------------------------------  IN: 0 mL / OUT: 1000 mL / NET: -1000 mL        Physical Exam:  Appearance: ill appearing  HEENT: moist mucosa, anicteric sclerae.   Neck: no JVD  Cardiovascular: regular rate and rhythm. systolic murmur. No gallop. No friction rub.  Respiratory: decreased breath sounds at bases. No rales  Gastrointestinal: distended. Soft	  Skin: no clubbing or cyanosis.   Neurologic: No focal deficit  Extremities: warm. trace edema  Psychiatry: A & O x 3, Mood & affect appropriate  Vascular: pulse regular, equal    Labs:                        7.6    0.24  )-----------( 8        ( 23 Nov 2022 07:08 )             23.3     11-23    139  |  102  |  16  ----------------------------<  99  4.2   |  30  |  0.63    Ca    7.9<L>      23 Nov 2022 07:05  Phos  2.9     11-23  Mg     2.1     11-23    TPro  5.0<L>  /  Alb  2.7<L>  /  TBili  0.2  /  DBili  x   /  AST  25  /  ALT  27  /  AlkPhos  79  11-23    PT/INR - ( 23 Nov 2022 07:05 )   PT: 12.3 sec;   INR: 1.06 ratio         PTT - ( 23 Nov 2022 07:05 )  PTT:25.1 sec      Serum Pro-Brain Natriuretic Peptide: 1078 pg/mL [0 - 300] (11-23-22 @ 07:05)  Serum Pro-Brain Natriuretic Peptide: 1665 pg/mL [0 - 300] (11-22-22 @ 07:07)  Serum Pro-Brain Natriuretic Peptide: 2456 pg/mL [0 - 300] (11-21-22 @ 08:47)      TELEMETRY:    Echocardiogram:    EKG:     CXR/Radiology:

## 2022-11-23 NOTE — PROGRESS NOTE ADULT - PROBLEM SELECTOR PLAN 1
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    CBC, CMP, TLS labs, DIC labs daily. , If fibrinogen under 100, give 10 units of cryoprecipitate.  Transfuse for Hgb < 7 and platelet count < 10k, < 20k if febrile, < 50k if bleeding  11/10 Cytarabine 100mg/m2 x 3 days for cytoreduction. stopped hydroxyurea 11/12.   11/12 started gilteritinib   EKG biweekly check for QTc on giliteritinib and voriconazole  D/C'd ppx Dexamethasone (was on for differentiation after starting gilt).   overall DNR/DNI  hypokalemia-replace k. 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    CBC, CMP, TLS labs, DIC labs daily. , If fibrinogen under 100, give 10 units of cryoprecipitate.  Transfuse for Hgb < 7 and platelet count < 10k, < 20k if febrile, < 50k if bleeding  11/10 Cytarabine 100mg/m2 x 3 days for cytoreduction. stopped hydroxyurea 11/12.   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    CBC, CMP, TLS labs, DIC labs daily. , If fibrinogen under 100, give 10 units of cryoprecipitate.  Transfuse for Hgb < 7 and platelet count < 10k, < 20k if febrile, < 50k if bleeding  11/10 Cytarabine 100mg/m2 x 3 days for cytoreduction. stopped hydroxyurea 11/12.   11/12 started Gilteritinib   EKG biweekly check for QTc on Gilteritinib and Voriconazole  D/C'd ppx Dexamethasone (was on for differentiation after starting gilt)  11/23 Thrombocytopenia, Platelets x 1bag   DNR/DNI, Palliative Care following for support, GOC, discussions with family

## 2022-11-23 NOTE — PROGRESS NOTE ADULT - ASSESSMENT
82F relapsed AML.   Neutropenic fevers.   Radiographic concern for pulmonary aspergillosis.   CoNS on initial blood culture likely contaminant despite mediport.   Clinically stable, afebrile since 11/13 but poor prognosis, family meeting planned.     Suggest  -continue Voriconazole 200mg q12h - anticipate 6-12 week course if within goals of care; I discussed common adverse reactions   -monitor LFTs periodically   -empiric Cefepime 2GM q12h for neutropenia, can likely narrow     Bucky Bobby MD   Infectious Disease   Available on TEAMS. After 5PM and on weekends please page fellow on call or call [phone]

## 2022-11-23 NOTE — PROGRESS NOTE ADULT - PROBLEM SELECTOR PLAN 6
Increased O2 requirement.   Continue with Duoneb  11/14 symbicort added gentle diuresis as tolerated  11/21 CT chest shows worsening nodules with concern for fungal pna.

## 2022-11-23 NOTE — PROGRESS NOTE ADULT - SUBJECTIVE AND OBJECTIVE BOX
Follow Up: neutropenic fever    Interval History/ROS: Afebrile. No pain, cough or diarrhea or dysuria. Tired.   Denies visual changes, confusion or hallucinations or joint pain.   No nausea vomiting or abdominal pain.     Allergies  No Known Allergies        ANTIMICROBIALS:  acyclovir   Oral Tab/Cap 400 two times a day  cefepime   IVPB 2000 every 12 hours  voriconazole 200 every 12 hours      OTHER MEDS:  acetaminophen     Tablet .. 650 milliGRAM(s) Oral every 6 hours PRN  albuterol/ipratropium for Nebulization 3 milliLiter(s) Nebulizer every 6 hours  aluminum hydroxide/magnesium hydroxide/simethicone Suspension 30 milliLiter(s) Oral every 4 hours PRN  atorvastatin 10 milliGRAM(s) Oral at bedtime  benzonatate 100 milliGRAM(s) Oral every 8 hours PRN  Biotene Dry Mouth Oral Rinse 15 milliLiter(s) Swish and Spit three times a day  budesonide  80 MICROgram(s)/formoterol 4.5 MICROgram(s) Inhaler 2 Puff(s) Inhalation two times a day  buPROPion XL (24-Hour) . 150 milliGRAM(s) Oral daily  chlorhexidine 2% Cloths 1 Application(s) Topical daily  furosemide   Injectable 40 milliGRAM(s) IV Push two times a day  gabapentin 100 milliGRAM(s) Oral daily  gilteritinib 120 milliGRAM(s) Oral <User Schedule>  lisinopril 5 milliGRAM(s) Oral daily  melatonin 3 milliGRAM(s) Oral at bedtime PRN  ondansetron Injectable 4 milliGRAM(s) IV Push every 8 hours PRN  oxybutynin 5 milliGRAM(s) Oral daily  polyethylene glycol 3350 17 Gram(s) Oral two times a day  potassium chloride    Tablet ER 20 milliEquivalent(s) Oral daily  senna 2 Tablet(s) Oral at bedtime  sodium chloride 0.65% Nasal 1 Spray(s) Both Nostrils four times a day  sodium chloride 0.9%. 1000 milliLiter(s) IV Continuous <Continuous>  traZODone 150 milliGRAM(s) Oral at bedtime      Vital Signs Last 24 Hrs  T(C): 36.5 (23 Nov 2022 13:28), Max: 36.8 (22 Nov 2022 17:08)  T(F): 97.7 (23 Nov 2022 13:28), Max: 98.2 (22 Nov 2022 17:08)  HR: 105 (23 Nov 2022 13:28) (72 - 105)  BP: 118/83 (23 Nov 2022 13:28) (106/61 - 118/83)  BP(mean): --  RR: 18 (23 Nov 2022 13:28) (18 - 18)  SpO2: 96% (23 Nov 2022 13:28) (96% - 100%)    Parameters below as of 23 Nov 2022 13:28  Patient On (Oxygen Delivery Method): nasal cannula        Physical Exam:  General: non toxic  Cardio: regular rate   Respiratory: nonlabored on nasal cannula   abd: nondistended  vascular: no phlebitis   Skin: no rash                          7.6    0.24  )-----------( 8        ( 23 Nov 2022 07:08 )             23.3       11-23    139  |  102  |  16  ----------------------------<  99  4.2   |  30  |  0.63    Ca    7.9<L>      23 Nov 2022 07:05  Phos  2.9     11-23  Mg     2.1     11-23    TPro  5.0<L>  /  Alb  2.7<L>  /  TBili  0.2  /  DBili  x   /  AST  25  /  ALT  27  /  AlkPhos  79  11-23          MICROBIOLOGY:         RADIOLOGY:  Images below reviewed personally  CT Chest No Cont (11.21.22 @ 16:18)   1.  Increase in number of nodules in both lungs with new surrounding   groundglass halos suggestive of invasive fungal infection/invasive   aspergillosis  2.  Increase in small bilateral pleural effusions, right more than left.  3.  Redemonstration of indeterminate right hepatic lobe and right adrenal   lesions, as described above.MRI of the abdomen is again recommended for   further evaluation.

## 2022-11-23 NOTE — PROGRESS NOTE ADULT - PROBLEM SELECTOR PLAN 2
Patient is neutropenic, afebrile  changed fluconazole to voriconazole  (-) fungitell, (-) galactomanan  11/11 Switched Levaquin to Cefepime (renally dosed)  11/10  Blood Cx Staph epidermis/hominis -Repeat cultures 11/11, 11/12, NGTD  11/12 RVP(-)  ID consult-Vanco stopped.  11/21 CT Chest for evaluation- worsening nodules concern for fungal pna.

## 2022-11-23 NOTE — PROGRESS NOTE ADULT - ASSESSMENT
82 year old woman with mild AS, now with relapsed AML and neutropenic fever with pancytopenia. Following new HsTnT elevation to 450 ng/L on 4/12, new CHF. On admission, Echo showed normal LV function and strain. CT chest with no pulmonary edema or signs of CHF.  Follow up echo done 11/15 with new segmental impairment of LV systolic function, EF 39 %. Follow up troponin 281, downtrending.  Differential for acute systolic HF includes stress cardiomyopathy or acute myocardial infarction due to CAD. ECG is not diagnostic due to LBBB. Mild Aortic stenosis unlikely contributes to current symptoms and is not a contraindication to fluid resuscitation or diuresis.   Ongoing pancytopenia in setting of high d-dimer and low/normal fibrinogen.   Non-contrast CT with findings compatible with pneumonia due to OI - but afebrile and no increase in O2 requirement.  Heart failure appears improved, with downtrending pro-BNP on furosemide.  Recommendations:  1. Needs repeat weight today (after BM)  2. Continue furosemide to maintain negative fluid balance  3. Daily weights and strict I/Os to guide diuresis  4. Cardiac cath if platelet count recovers, pending clinical course  5. continue low dose atorvastatin  6. Keep electrolytes replaced  (K > 4, Mag > 2) especially with diuresis.  7. continue lisinopril 5 mg daily  8. add Toprol XL 25 mg daily    FABIO Navarrete MD [facility]  Cardio-Oncology

## 2022-11-23 NOTE — PROGRESS NOTE ADULT - SUBJECTIVE AND OBJECTIVE BOX
Diagnosis: relapsed AML, FLT3 ITD+, NPM1, CEBPA, ASXL1 mutated    Protocol/Chemo Regimen: daily FLT3 inhibitor gilteritinib (s/p IV cytarabine 100mg/m2 x 3days 11/10-11/12 prior to start of gilteritinib for cytoreduction)    Day: 12 gilteritinib     Pt endorsed:    Review of Systems:     Pain scale: 0                                           Diet: DASH/TLC    Allergies    No Known Allergies    Intolerances      MEDICATIONS  (STANDING):  acyclovir   Oral Tab/Cap 400 milliGRAM(s) Oral two times a day  albuterol/ipratropium for Nebulization 3 milliLiter(s) Nebulizer every 6 hours  atorvastatin 10 milliGRAM(s) Oral at bedtime  Biotene Dry Mouth Oral Rinse 15 milliLiter(s) Swish and Spit three times a day  budesonide  80 MICROgram(s)/formoterol 4.5 MICROgram(s) Inhaler 2 Puff(s) Inhalation two times a day  buPROPion XL (24-Hour) . 150 milliGRAM(s) Oral daily  cefepime   IVPB 2000 milliGRAM(s) IV Intermittent every 12 hours  chlorhexidine 2% Cloths 1 Application(s) Topical daily  furosemide   Injectable 40 milliGRAM(s) IV Push two times a day  gabapentin 100 milliGRAM(s) Oral daily  gilteritinib 120 milliGRAM(s) Oral <User Schedule>  lisinopril 5 milliGRAM(s) Oral daily  oxybutynin 5 milliGRAM(s) Oral daily  polyethylene glycol 3350 17 Gram(s) Oral two times a day  potassium chloride    Tablet ER 20 milliEquivalent(s) Oral daily  senna 2 Tablet(s) Oral at bedtime  sodium chloride 0.65% Nasal 1 Spray(s) Both Nostrils four times a day  sodium chloride 0.9%. 1000 milliLiter(s) (20 mL/Hr) IV Continuous <Continuous>  traZODone 150 milliGRAM(s) Oral at bedtime  voriconazole 200 milliGRAM(s) Oral every 12 hours    MEDICATIONS  (PRN):  acetaminophen     Tablet .. 650 milliGRAM(s) Oral every 6 hours PRN Temp greater or equal to 38C (100.4F), Mild Pain (1 - 3)  aluminum hydroxide/magnesium hydroxide/simethicone Suspension 30 milliLiter(s) Oral every 4 hours PRN Dyspepsia  benzonatate 100 milliGRAM(s) Oral every 8 hours PRN Cough  melatonin 3 milliGRAM(s) Oral at bedtime PRN Insomnia  ondansetron Injectable 4 milliGRAM(s) IV Push every 8 hours PRN Nausea and/or Vomiting      Vital Signs Last 24 Hrs  T(C): 36.4 (23 Nov 2022 04:59), Max: 36.8 (22 Nov 2022 17:08)  T(F): 97.6 (23 Nov 2022 04:59), Max: 98.2 (22 Nov 2022 17:08)  HR: 73 (23 Nov 2022 04:59) (72 - 78)  BP: 118/70 (23 Nov 2022 04:59) (106/61 - 135/78)  BP(mean): --  RR: 18 (23 Nov 2022 04:59) (18 - 18)  SpO2: 98% (23 Nov 2022 04:59) (97% - 99%)    Parameters below as of 23 Nov 2022 04:59  Patient On (Oxygen Delivery Method): nasal cannula    I&O's Summary    22 Nov 2022 07:01  -  23 Nov 2022 07:00  --------------------------------------------------------  IN: 534 mL / OUT: 1550 mL / NET: -1016 mL      PHYSICAL EXAM  General: Sitting up in bed NAD  HEENT: sclerae anicteric, clear oropharynx  CV: (+) S1/S2 RRR  Lungs: +mild bibasilar crackles (R>L), decreased bs at bases, no wheezes   Abdomen:+ BS, soft, +distended, non-tender  Ext: trace edema BLE's   Skin: no rash, left arm bruising, non tender  Neuro: alert and oriented X 3  Central Line: LCW Mediport  and PIV CDI    LABS:      -------            Blood Cultures:              Diagnosis: relapsed AML, FLT3 ITD+, NPM1, CEBPA, ASXL1 mutated    Protocol/Chemo Regimen: daily FLT3 inhibitor gilteritinib (s/p IV cytarabine 100mg/m2 x 3days 11/10-11/12 prior to start of gilteritinib for cytoreduction)    Day: 12 gilteritinib     Pt endorsed: No overnight events, taking some po's. Breathing a little better    Review of Systems: Denies CP, abdominal pain, n/v, HA or dizziness     Pain scale: 0                                           Diet: DASH/TLC    Allergies  No Known Allergies    Intolerances      MEDICATIONS  (STANDING):  acyclovir   Oral Tab/Cap 400 milliGRAM(s) Oral two times a day  albuterol/ipratropium for Nebulization 3 milliLiter(s) Nebulizer every 6 hours  atorvastatin 10 milliGRAM(s) Oral at bedtime  Biotene Dry Mouth Oral Rinse 15 milliLiter(s) Swish and Spit three times a day  budesonide  80 MICROgram(s)/formoterol 4.5 MICROgram(s) Inhaler 2 Puff(s) Inhalation two times a day  buPROPion XL (24-Hour) . 150 milliGRAM(s) Oral daily  cefepime   IVPB 2000 milliGRAM(s) IV Intermittent every 12 hours  chlorhexidine 2% Cloths 1 Application(s) Topical daily  furosemide   Injectable 40 milliGRAM(s) IV Push two times a day  gabapentin 100 milliGRAM(s) Oral daily  gilteritinib 120 milliGRAM(s) Oral <User Schedule>  lisinopril 5 milliGRAM(s) Oral daily  oxybutynin 5 milliGRAM(s) Oral daily  polyethylene glycol 3350 17 Gram(s) Oral two times a day  potassium chloride    Tablet ER 20 milliEquivalent(s) Oral daily  senna 2 Tablet(s) Oral at bedtime  sodium chloride 0.65% Nasal 1 Spray(s) Both Nostrils four times a day  sodium chloride 0.9%. 1000 milliLiter(s) (20 mL/Hr) IV Continuous <Continuous>  traZODone 150 milliGRAM(s) Oral at bedtime  voriconazole 200 milliGRAM(s) Oral every 12 hours    MEDICATIONS  (PRN):  acetaminophen     Tablet .. 650 milliGRAM(s) Oral every 6 hours PRN Temp greater or equal to 38C (100.4F), Mild Pain (1 - 3)  aluminum hydroxide/magnesium hydroxide/simethicone Suspension 30 milliLiter(s) Oral every 4 hours PRN Dyspepsia  benzonatate 100 milliGRAM(s) Oral every 8 hours PRN Cough  melatonin 3 milliGRAM(s) Oral at bedtime PRN Insomnia  ondansetron Injectable 4 milliGRAM(s) IV Push every 8 hours PRN Nausea and/or Vomiting      Vital Signs Last 24 Hrs  T(C): 36.4 (23 Nov 2022 04:59), Max: 36.8 (22 Nov 2022 17:08)  T(F): 97.6 (23 Nov 2022 04:59), Max: 98.2 (22 Nov 2022 17:08)  HR: 73 (23 Nov 2022 04:59) (72 - 78)  BP: 118/70 (23 Nov 2022 04:59) (106/61 - 135/78)  BP(mean): --  RR: 18 (23 Nov 2022 04:59) (18 - 18)  SpO2: 98% (23 Nov 2022 04:59) (97% - 99%)    Parameters below as of 23 Nov 2022 04:59  Patient On (Oxygen Delivery Method): nasal cannula    I&O's Summary    22 Nov 2022 07:01  -  23 Nov 2022 07:00  --------------------------------------------------------  IN: 534 mL / OUT: 1550 mL / NET: -1016 mL      PHYSICAL EXAM  General: Sitting up in bed NAD  HEENT: sclerae anicteric, clear oropharynx  CV: (+) S1/S2 RRR  Lungs: decreased bs at bases, no wheezes or crackles  Abdomen:+ BS, soft, +distended, non-tender  Ext: trace edema BLE's   Skin: no rash, left arm bruising, non tender  Neuro: alert and oriented X 3  Central Line: LCW Mediport  and PIV CDI    LABS:             BNP - 1078               7.6    0.24  )-----------( 8        ( 23 Nov 2022 07:08 )             23.3     23 Nov 2022 07:05    139    |  102    |  16     ----------------------------<  99     4.2     |  30     |  0.63     Ca    7.9        23 Nov 2022 07:05  Phos  2.9       23 Nov 2022 07:05  Mg     2.1       23 Nov 2022 07:05    TPro  5.0    /  Alb  2.7    /  TBili  0.2    /  DBili  x      /  AST  25     /  ALT  27     /  AlkPhos  79     23 Nov 2022 07:05    PT/INR - ( 23 Nov 2022 07:05 )   PT: 12.3 sec;   INR: 1.06 ratio    PTT - ( 23 Nov 2022 07:05 )  PTT:25.1 sec        LIVER FUNCTIONS - ( 23 Nov 2022 07:05 )  Alb: 2.7 g/dL / Pro: 5.0 g/dL / ALK PHOS: 79 U/L / ALT: 27 U/L / AST: 25 U/L / GGT: x                 Blood Cultures:     Culture - Urine (11.15.22 @ 15:29)   Specimen Source: Clean Catch Clean Catch (Midstream)   Culture Results: No growth

## 2022-11-24 NOTE — PROGRESS NOTE ADULT - PROBLEM SELECTOR PLAN 3
Cardiology Following  Continue diuresis for NET neg 1-2L/day (per d/w Cardiology)  starting low dose statin  Trend BNP- downtrending  Daily weights, strict I/O's  11/21 started ACE-I (per Cardiology) Cardiology Following  Continue diuresis for NET neg 1-2L/day (per d/w Cardiology)  starting low dose statin  Trend BNP- downtrending  Daily weights, strict I/O's  11/21 started ACE-I (per Cardiology)  11/24- Follow CMP, mag tonight, keep K- >4.0, Mag - 2.0

## 2022-11-24 NOTE — PROGRESS NOTE ADULT - NS ATTEND AMEND GEN_ALL_CORE FT
.  Primary: Goldebrg    Vital Signs Last 24 Hrs  T(C): 36.4 (23 Nov 2022 04:59), Max: 36.8 (22 Nov 2022 17:08)  T(F): 97.6 (23 Nov 2022 04:59), Max: 98.2 (22 Nov 2022 17:08)  HR: 73 (23 Nov 2022 04:59) (72 - 78)  BP: 118/70 (23 Nov 2022 04:59) (106/61 - 135/78)  BP(mean): --  RR: 18 (23 Nov 2022 04:59) (18 - 18)  SpO2: 98% (23 Nov 2022 04:59) (97% - 99%)    Parameters below as of 23 Nov 2022 04:59  Patient On (Oxygen Delivery Method): nasal cannula    MEDICATIONS  (STANDING):  acyclovir   Oral Tab/Cap 400 milliGRAM(s) Oral two times a day  albuterol/ipratropium for Nebulization 3 milliLiter(s) Nebulizer every 6 hours  atorvastatin 10 milliGRAM(s) Oral at bedtime  Biotene Dry Mouth Oral Rinse 15 milliLiter(s) Swish and Spit three times a day  budesonide  80 MICROgram(s)/formoterol 4.5 MICROgram(s) Inhaler 2 Puff(s) Inhalation two times a day  buPROPion XL (24-Hour) . 150 milliGRAM(s) Oral daily  cefepime   IVPB 2000 milliGRAM(s) IV Intermittent every 12 hours  chlorhexidine 2% Cloths 1 Application(s) Topical daily  furosemide   Injectable 40 milliGRAM(s) IV Push two times a day  gabapentin 100 milliGRAM(s) Oral daily  gilteritinib 120 milliGRAM(s) Oral <User Schedule>  lisinopril 5 milliGRAM(s) Oral daily  oxybutynin 5 milliGRAM(s) Oral daily  polyethylene glycol 3350 17 Gram(s) Oral two times a day  potassium chloride    Tablet ER 20 milliEquivalent(s) Oral daily  senna 2 Tablet(s) Oral at bedtime  sodium chloride 0.65% Nasal 1 Spray(s) Both Nostrils four times a day  sodium chloride 0.9%. 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  Course complicated S. epi bacteremia? (11/9/22), had continuous neutropenic fevers, which resolved after 11/13/22; pneumonia (concerning for fungus) and hypotension (11/12/22), fluid overloaded state (11/13/22 - )     PMHx: reactive airway disease, aortic stenosis.      Plan:  Heme: PLT goal > 10,000; Hgb goal > 7.0g/dL;   Continue gilteritinib  Current plan is one month of gilteritinib with a marrow at that time.     ID: cefepime, vori, acyclovir; vori level (11/17/22): 2.6     peripheral blood fungitell neg and galactomannan (11/12/22) 0.03 (wnl)    Radiographs: CT (11/21/22): increased nodules c/w progressive fungal disease.     Pulmonary: Symbicort (history of reactive airway disease), continue supplemental oxygen.    Nutrition: tolerating PO; goal O>I by 1L    DVT prophylaxis: ambulation.     Code status: DNR/DNI; palliative medicine consulted ( has advanced prostate carcinoma and cannot care for his wife)    Over 35 minutes were spent in direct patient care and care coordination. .  Primary: Goldebrg       Assessment: 81 yo day + 12 gilteritinib post HU/Scarlet-C cytoreduction for progressive FLT-3, NPM1, ASXL1, WT1, CEBPA AML.  Course complicated S. epi bacteremia? (11/9/22), had continuous neutropenic fevers, which resolved after 11/13/22; pneumonia (concerning for fungus) and hypotension (11/12/22), fluid overloaded state (11/13/22 - )     PMHx: reactive airway disease, aortic stenosis.      Plan:  Heme: PLT goal > 10,000; Hgb goal > 7.0g/dL;   Continue gilteritinib  Current plan is one month of gilteritinib with a marrow at that time.     ID: cefepime, vori, acyclovir; vori level (11/17/22): 2.6     peripheral blood fungitell neg and galactomannan (11/12/22) 0.03 (wnl)    Radiographs: CT (11/21/22): increased nodules c/w progressive fungal disease.     Pulmonary: Symbicort (history of reactive airway disease), continue supplemental oxygen.    Nutrition: tolerating PO; goal O>I by 1L    DVT prophylaxis: ambulation.     Code status: DNR/DNI; palliative medicine consulted ( has advanced prostate carcinoma and cannot care for his wife), discussed with patient and , no plan for further care escalation (ie MICU) should her clinical status acutely changed, both patient and family are open to hospice if needed

## 2022-11-24 NOTE — PROGRESS NOTE ADULT - SUBJECTIVE AND OBJECTIVE BOX
Diagnosis:    Protocol/Chemo Regimen:    Day:     Pt endorsed:    Review of Systems:     Pain scale:     Diet:     Allergies    No Known Allergies    Intolerances        ANTIMICROBIALS  acyclovir   Oral Tab/Cap 400 milliGRAM(s) Oral two times a day  cefepime   IVPB 2000 milliGRAM(s) IV Intermittent every 12 hours  voriconazole 200 milliGRAM(s) Oral every 12 hours      HEME/ONC MEDICATIONS  gilteritinib 120 milliGRAM(s) Oral <User Schedule>      STANDING MEDICATIONS  albuterol/ipratropium for Nebulization 3 milliLiter(s) Nebulizer every 6 hours  atorvastatin 10 milliGRAM(s) Oral at bedtime  Biotene Dry Mouth Oral Rinse 15 milliLiter(s) Swish and Spit three times a day  budesonide  80 MICROgram(s)/formoterol 4.5 MICROgram(s) Inhaler 2 Puff(s) Inhalation two times a day  buPROPion XL (24-Hour) . 150 milliGRAM(s) Oral daily  chlorhexidine 2% Cloths 1 Application(s) Topical daily  furosemide   Injectable 40 milliGRAM(s) IV Push daily  gabapentin 100 milliGRAM(s) Oral daily  lisinopril 5 milliGRAM(s) Oral daily  metoprolol succinate ER 25 milliGRAM(s) Oral daily  oxybutynin 5 milliGRAM(s) Oral daily  polyethylene glycol 3350 17 Gram(s) Oral two times a day  potassium chloride    Tablet ER 20 milliEquivalent(s) Oral daily  senna 2 Tablet(s) Oral at bedtime  sodium chloride 0.65% Nasal 1 Spray(s) Both Nostrils four times a day  sodium chloride 0.9%. 1000 milliLiter(s) IV Continuous <Continuous>  traZODone 150 milliGRAM(s) Oral at bedtime      PRN MEDICATIONS  acetaminophen     Tablet .. 650 milliGRAM(s) Oral every 6 hours PRN  aluminum hydroxide/magnesium hydroxide/simethicone Suspension 30 milliLiter(s) Oral every 4 hours PRN  benzonatate 100 milliGRAM(s) Oral every 8 hours PRN  melatonin 3 milliGRAM(s) Oral at bedtime PRN  ondansetron Injectable 4 milliGRAM(s) IV Push every 8 hours PRN        Vital Signs Last 24 Hrs  T(C): 36.7 (24 Nov 2022 05:00), Max: 36.8 (23 Nov 2022 17:20)  T(F): 98 (24 Nov 2022 05:00), Max: 98.3 (23 Nov 2022 21:12)  HR: 67 (24 Nov 2022 05:00) (67 - 105)  BP: 129/70 (24 Nov 2022 05:00) (115/71 - 135/72)  BP(mean): --  RR: 18 (24 Nov 2022 05:00) (18 - 18)  SpO2: 98% (24 Nov 2022 05:00) (96% - 100%)    Parameters below as of 24 Nov 2022 05:00  Patient On (Oxygen Delivery Method): nasal cannula        PHYSICAL EXAM  General: NAD  HEENT: PERRLA, EOMOI, clear oropharynx, anicteric sclera, pink conjunctiva  Neck: supple  CV: (+) S1/S2 RRR  Lungs: clear to auscultation, no wheezes or rales  Abdomen: soft, non-tender, non-distended (+) BS  Ext: no clubbing, cyanosis or edema  Skin: no rashes and no petechiae  Neuro: alert and oriented X 3, no focal deficits  Central Line:     RECENT CULTURES:        LABS:                        7.6    0.24  )-----------( 8        ( 23 Nov 2022 07:08 )             23.3         Mean Cell Volume : 95.5 fl  Mean Cell Hemoglobin : 31.1 pg  Mean Cell Hemoglobin Concentration : 32.6 gm/dL  Auto Neutrophil # : x  Auto Lymphocyte # : x  Auto Monocyte # : x  Auto Eosinophil # : x  Auto Basophil # : x  Auto Neutrophil % : x  Auto Lymphocyte % : x  Auto Monocyte % : x  Auto Eosinophil % : x  Auto Basophil % : x      11-23    139  |  102  |  16  ----------------------------<  99  4.2   |  30  |  0.63    Ca    7.9<L>      23 Nov 2022 07:05  Phos  2.9     11-23  Mg     2.1     11-23    TPro  5.0<L>  /  Alb  2.7<L>  /  TBili  0.2  /  DBili  x   /  AST  25  /  ALT  27  /  AlkPhos  79  11-23          PT/INR - ( 23 Nov 2022 07:05 )   PT: 12.3 sec;   INR: 1.06 ratio         PTT - ( 23 Nov 2022 07:05 )  PTT:25.1 sec        RADIOLOGY & ADDITIONAL STUDIES:           Diagnosis: relapsed AML, FLT3 ITD+, NPM1, CEBPA, ASXL1 mutated    Protocol/Chemo Regimen: daily FLT3 inhibitor gilteritinib (s/p IV cytarabine 100mg/m2 x 3days 11/10-11/12 prior to start of gilteritinib for cytoreduction)    Day: 13 Gilteritinib     Pt endorsed: No overnight events O/N, feeling better today.    Review of Systems: Denies any nausea, vomiting diarrhea, chest pain, palpitation, SOB or abdominal pain.    Pain scale: Deneis                                       Diet: DASH/TLC    Allergies: No Known Allergies    ANTIMICROBIALS  acyclovir   Oral Tab/Cap 400 milliGRAM(s) Oral two times a day  cefepime   IVPB 2000 milliGRAM(s) IV Intermittent every 12 hours  voriconazole 200 milliGRAM(s) Oral every 12 hours    HEME/ONC MEDICATIONS  gilteritinib 120 milliGRAM(s) Oral <User Schedule>    STANDING MEDICATIONS  albuterol/ipratropium for Nebulization 3 milliLiter(s) Nebulizer every 6 hours  atorvastatin 10 milliGRAM(s) Oral at bedtime  Biotene Dry Mouth Oral Rinse 15 milliLiter(s) Swish and Spit three times a day  budesonide  80 MICROgram(s)/formoterol 4.5 MICROgram(s) Inhaler 2 Puff(s) Inhalation two times a day  buPROPion XL (24-Hour) . 150 milliGRAM(s) Oral daily  chlorhexidine 2% Cloths 1 Application(s) Topical daily  furosemide   Injectable 40 milliGRAM(s) IV Push daily  gabapentin 100 milliGRAM(s) Oral daily  lisinopril 5 milliGRAM(s) Oral daily  metoprolol succinate ER 25 milliGRAM(s) Oral daily  oxybutynin 5 milliGRAM(s) Oral daily  polyethylene glycol 3350 17 Gram(s) Oral two times a day  potassium chloride    Tablet ER 20 milliEquivalent(s) Oral daily  senna 2 Tablet(s) Oral at bedtime  sodium chloride 0.65% Nasal 1 Spray(s) Both Nostrils four times a day  sodium chloride 0.9%. 1000 milliLiter(s) IV Continuous <Continuous>  traZODone 150 milliGRAM(s) Oral at bedtime    PRN MEDICATIONS  acetaminophen     Tablet .. 650 milliGRAM(s) Oral every 6 hours PRN  aluminum hydroxide/magnesium hydroxide/simethicone Suspension 30 milliLiter(s) Oral every 4 hours PRN  benzonatate 100 milliGRAM(s) Oral every 8 hours PRN  melatonin 3 milliGRAM(s) Oral at bedtime PRN  ondansetron Injectable 4 milliGRAM(s) IV Push every 8 hours PRN    Vital Signs Last 24 Hrs  T(C): 36.7 (24 Nov 2022 05:00), Max: 36.8 (23 Nov 2022 17:20)  T(F): 98 (24 Nov 2022 05:00), Max: 98.3 (23 Nov 2022 21:12)  HR: 67 (24 Nov 2022 05:00) (67 - 105)  BP: 129/70 (24 Nov 2022 05:00) (115/71 - 135/72)  BP(mean): --  RR: 18 (24 Nov 2022 05:00) (18 - 18)  SpO2: 98% (24 Nov 2022 05:00) (96% - 100%)    Parameters below as of 24 Nov 2022 05:00  Patient On (Oxygen Delivery Method): nasal cannula    PHYSICAL EXAM  General: NAD  HEENT: clear oropharynx, anicteric sclera  CV: (+) S1/S2 RRR  Lungs: clear to auscultation, no wheezes or rales  Abdomen: soft, non-tender, non-distended (+) BS  Ext: no clubbing, cyanosis or edema  Skin: no rashes and no petechiae  Neuro: alert and oriented X 3, no focal deficits  Central Line: LCW medi port CDI    RECENT CULTURES:  Culture - Blood (11.13.22 @ 16:10)    Specimen Source: .Blood Blood-Catheter    Culture Results:   No Growth Final    Culture - Urine (11.15.22 @ 15:29)    Specimen Source: Clean Catch Clean Catch (Midstream)    Culture Results:   No growth    LABS:                        6.7    0.17  )-----------( 24       ( 24 Nov 2022 07:37 )             20.5     Mean Cell Volume : 93.2 fl  Mean Cell Hemoglobin : 30.5 pg  Mean Cell Hemoglobin Concentration : 32.7 gm/dL  Auto Neutrophil # : x  Auto Lymphocyte # : x  Auto Monocyte # : x  Auto Eosinophil # : x  Auto Basophil # : x  Auto Neutrophil % : x  Auto Lymphocyte % : x  Auto Monocyte % : x  Auto Eosinophil % : x  Auto Basophil % : x    11-24    138  |  100  |  14  ----------------------------<  95  3.6   |  31  |  0.62    Ca    8.1<L>      24 Nov 2022 07:37  Phos  2.8     11-24  Mg     2.1     11-24    TPro  5.0<L>  /  Alb  2.8<L>  /  TBili  0.2  /  DBili  x   /  AST  27  /  ALT  26  /  AlkPhos  82  11-24  Mg 2.1  Phos 2.8  PT/INR - ( 24 Nov 2022 07:37 )   PT: 12.9 sec;   INR: 1.12 ratio    PTT - ( 24 Nov 2022 07:37 )  PTT:25.0 sec    Uric Acid 2.9    RADIOLOGY & ADDITIONAL STUDIES:  CT Chest No Cont (11.21.22 @ 16:18)   1.  Increase in number of nodules in both lungs with new surrounding   groundglass halos suggestive of invasive fungal infection/invasive   aspergillosis  2.  Increase in small bilateral pleural effusions, right more than left.  3.  Re demonstration of indeterminate right hepatic lobe and right adrenal   lesions, as described above. MRI of the abdomen is again recommended for   further evaluation.

## 2022-11-24 NOTE — PROGRESS NOTE ADULT - PROBLEM SELECTOR PLAN 1
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    CBC, CMP, TLS labs, DIC labs daily. , If fibrinogen under 100, give 10 units of cryoprecipitate.  Transfuse for Hgb < 7 and platelet count < 10k, < 20k if febrile, < 50k if bleeding  11/10 Cytarabine 100mg/m2 x 3 days for cytoreduction. stopped hydroxyurea 11/12.   11/12 started Gilteritinib   EKG biweekly check for QTc on Gilteritinib and Voriconazole  D/C'd ppx Dexamethasone (was on for differentiation after starting gilt)  11/23 Thrombocytopenia, Platelets x 1bag   DNR/DNI, Palliative Care following for support, GOC, discussions with family Mutations identified in FLT3-ITD, NPM1, ASXL1 and CEBPA.    CBC, CMP, TLS labs, DIC labs daily. , If fibrinogen under 100, give 10 units of cryoprecipitate.  Transfuse for Hgb < 7 and platelet count < 10k, < 20k if febrile, < 50k if bleeding  11/10 Cytarabine 100mg/m2 x 3 days for cytoreduction. stopped hydroxyurea 11/12.   11/12 started Gilteritinib   EKG biweekly check for QTc on Gilteritinib and Voriconazole  D/C'd ppx Dexamethasone (was on for differentiation after starting gilt)   DNR/DNI, Palliative Care following for support, GOC, discussions with family.  11/24 - Hgb - 6.7, transfuse 2 half units PRBC each to infuse over with Lasix 40 mg IV x1 dose in between transfusions.

## 2022-11-25 NOTE — PROGRESS NOTE ADULT - PROBLEM SELECTOR PLAN 6
Increased O2 requirement.   Continue with Duoneb  11/14 symbicort added gentle diuresis as tolerated  11/21 CT chest shows worsening nodules with concern for fungal pna. Increased O2 requirement.   Continue with Duoneb  11/14 Symbicort added gentle diuresis as tolerated  11/21 CT chest shows worsening nodules with concern for fungal pna- started on Voriconazole.

## 2022-11-25 NOTE — PROGRESS NOTE ADULT - ASSESSMENT
Ms. 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 Started Cytarabine 100 mg/m2 on 11/10 x 3 days + BID hydroxyurea for cytoreduction, then transitioned to daily oral FLT3 inhibitor gilteritinib on 11/12. Hospital course complicated by Staph epidermis/hominis bacteremia on vanco which was stopped 11/14 likely contaminant on cefepime renally dosed, increased work of breathing with cardiology following and repeat echo on 11/15 showing a decrease in EF 39% with wall motion abnormality. Patient presents with pancytopenia due to disease and chemo.    Ms. 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 Started Cytarabine 100 mg/m2 on 11/10 x 3 days + BID hydroxyurea for cytoreduction, then transitioned to daily oral FLT3 inhibitor gilteritinib on 11/12. Hospital course complicated by Staph epidermis/hominis bacteremia on vanco which was stopped 11/14 likely contaminant on cefepime renally dosed, increased work of breathing with cardiology following and repeat echo on 11/15 showing a decrease in EF 39% with wall motion abnormality, CT chest (11/21) with worsening nodules concerning for fungal pna- on Voriconazole. Patient presents with pancytopenia due to disease and chemo.

## 2022-11-25 NOTE — PROGRESS NOTE ADULT - SUBJECTIVE AND OBJECTIVE BOX
Follow Up: Neutropenic fevers    Interval History/ROS: Afebrile. Feels fine. No cough or diarrhea. No visual hallucinations. No vomiting or abdominal pain. No joint or bone pain.     Allergies  No Known Allergies        ANTIMICROBIALS:  acyclovir   Oral Tab/Cap 400 two times a day  cefepime   IVPB 2000 every 12 hours  voriconazole 200 every 12 hours      OTHER MEDS:  acetaminophen     Tablet .. 650 milliGRAM(s) Oral every 6 hours PRN  albuterol/ipratropium for Nebulization 3 milliLiter(s) Nebulizer every 6 hours  aluminum hydroxide/magnesium hydroxide/simethicone Suspension 30 milliLiter(s) Oral every 4 hours PRN  atorvastatin 10 milliGRAM(s) Oral at bedtime  benzonatate 100 milliGRAM(s) Oral every 8 hours PRN  Biotene Dry Mouth Oral Rinse 15 milliLiter(s) Swish and Spit three times a day  budesonide  80 MICROgram(s)/formoterol 4.5 MICROgram(s) Inhaler 2 Puff(s) Inhalation two times a day  buPROPion XL (24-Hour) . 150 milliGRAM(s) Oral daily  chlorhexidine 2% Cloths 1 Application(s) Topical daily  furosemide   Injectable 40 milliGRAM(s) IV Push daily  gabapentin 100 milliGRAM(s) Oral daily  gilteritinib 120 milliGRAM(s) Oral <User Schedule>  lisinopril 5 milliGRAM(s) Oral daily  melatonin 3 milliGRAM(s) Oral at bedtime PRN  metoprolol succinate ER 25 milliGRAM(s) Oral daily  ondansetron Injectable 4 milliGRAM(s) IV Push every 8 hours PRN  oxybutynin 5 milliGRAM(s) Oral daily  polyethylene glycol 3350 17 Gram(s) Oral two times a day  potassium chloride    Tablet ER 20 milliEquivalent(s) Oral daily  senna 2 Tablet(s) Oral at bedtime  sodium chloride 0.65% Nasal 1 Spray(s) Both Nostrils four times a day  sodium chloride 0.9%. 1000 milliLiter(s) IV Continuous <Continuous>  traZODone 150 milliGRAM(s) Oral at bedtime      Vital Signs Last 24 Hrs  T(C): 36.5 (25 Nov 2022 13:48), Max: 36.8 (24 Nov 2022 21:48)  T(F): 97.7 (25 Nov 2022 13:48), Max: 98.3 (24 Nov 2022 21:48)  HR: 67 (25 Nov 2022 13:48) (58 - 73)  BP: 117/64 (25 Nov 2022 13:48) (117/64 - 146/70)  BP(mean): --  RR: 18 (25 Nov 2022 13:48) (18 - 18)  SpO2: 99% (25 Nov 2022 13:48) (96% - 100%)    Parameters below as of 25 Nov 2022 13:48  Patient On (Oxygen Delivery Method): nasal cannula  O2 Flow (L/min): 3      Physical Exam:  General: non toxic  Cardio: regular rate   Respiratory: nonlabored on nasal cannula, grossly clear   abd: nondistended  vascular: mediport   Skin: no rash                          8.9    0.25  )-----------( 15       ( 25 Nov 2022 06:45 )             26.3       11-25    141  |  101  |  14  ----------------------------<  96  3.2<L>   |  32<H>  |  0.67    Ca    8.3<L>      25 Nov 2022 06:45  Phos  3.5     11-25  Mg     2.0     11-25    TPro  5.5<L>  /  Alb  2.9<L>  /  TBili  0.3  /  DBili  x   /  AST  32  /  ALT  26  /  AlkPhos  95  11-25          MICROBIOLOGY:      RADIOLOGY:  Images below reviewed personally  CT Chest No Cont (11.21.22 @ 16:18)   1.  Increase in number of nodules in both lungs with new surrounding   groundglass halos suggestive of invasive fungal infection/invasive   aspergillosis  2.  Increase in small bilateral pleural effusions, right more than left.  3.  Redemonstration of indeterminate right hepatic lobe and right adrenal   lesions, as described above.MRI of the abdomen is again recommended for   further evaluation.

## 2022-11-25 NOTE — PROGRESS NOTE ADULT - ASSESSMENT
82F relapsed AML.   Neutropenic fevers.   Radiographic concern for pulmonary aspergillosis, on Voriconazole, cough resolved.   CoNS on initial blood culture likely contaminant despite mediport.   Clinically stable, afebrile since 11/13 but poor prognosis, hospice discussed.     Suggest  -continue Voriconazole 200mg q12h - anticipate 6-12 week course if within goals of care  -monitor LFTs periodically   -empiric Cefepime 2GM q12h for neutropenic prophylaxis, plan to narrow when closer to discharge, likely Augmentin over quinolones as QTc has been on the longer side     Discussed with heme onc     Bucky Bobby MD   Infectious Disease   Available on TEAMS. After 5PM and on weekends please page fellow on call or call [phone]

## 2022-11-25 NOTE — PROGRESS NOTE ADULT - SUBJECTIVE AND OBJECTIVE BOX
Diagnosis: relapsed AML, FLT3 ITD+, NPM1, CEBPA, ASXL1 mutated    Protocol/Chemo Regimen: daily FLT3 inhibitor gilteritinib (s/p IV cytarabine 100mg/m2 x 3days 11/10-11/12 prior to start of gilteritinib for cytoreduction)    Day: 14 Gilteritinib     Pt endorsed: No overnight events O/N, feeling better today.    Review of Systems: Denies any nausea, vomiting diarrhea, chest pain, palpitation, SOB or abdominal pain.    Pain scale: Deneis                                       Diet: DASH/TLC    Allergies: No Known Allergies    ANTIMICROBIALS  acyclovir   Oral Tab/Cap 400 milliGRAM(s) Oral two times a day  cefepime   IVPB 2000 milliGRAM(s) IV Intermittent every 12 hours  voriconazole 200 milliGRAM(s) Oral every 12 hours    HEME/ONC MEDICATIONS  gilteritinib 120 milliGRAM(s) Oral <User Schedule>    STANDING MEDICATIONS  albuterol/ipratropium for Nebulization 3 milliLiter(s) Nebulizer every 6 hours  atorvastatin 10 milliGRAM(s) Oral at bedtime  Biotene Dry Mouth Oral Rinse 15 milliLiter(s) Swish and Spit three times a day  budesonide  80 MICROgram(s)/formoterol 4.5 MICROgram(s) Inhaler 2 Puff(s) Inhalation two times a day  buPROPion XL (24-Hour) . 150 milliGRAM(s) Oral daily  chlorhexidine 2% Cloths 1 Application(s) Topical daily  furosemide   Injectable 40 milliGRAM(s) IV Push daily  gabapentin 100 milliGRAM(s) Oral daily  lisinopril 5 milliGRAM(s) Oral daily  metoprolol succinate ER 25 milliGRAM(s) Oral daily  oxybutynin 5 milliGRAM(s) Oral daily  polyethylene glycol 3350 17 Gram(s) Oral two times a day  potassium chloride    Tablet ER 20 milliEquivalent(s) Oral daily  senna 2 Tablet(s) Oral at bedtime  sodium chloride 0.65% Nasal 1 Spray(s) Both Nostrils four times a day  sodium chloride 0.9%. 1000 milliLiter(s) IV Continuous <Continuous>  traZODone 150 milliGRAM(s) Oral at bedtime    PRN MEDICATIONS  acetaminophen     Tablet .. 650 milliGRAM(s) Oral every 6 hours PRN  aluminum hydroxide/magnesium hydroxide/simethicone Suspension 30 milliLiter(s) Oral every 4 hours PRN  benzonatate 100 milliGRAM(s) Oral every 8 hours PRN  melatonin 3 milliGRAM(s) Oral at bedtime PRN  ondansetron Injectable 4 milliGRAM(s) IV Push every 8 hours PRN    Vital Signs Last 24 Hrs  T(C): 36.7 (24 Nov 2022 05:00), Max: 36.8 (23 Nov 2022 17:20)  T(F): 98 (24 Nov 2022 05:00), Max: 98.3 (23 Nov 2022 21:12)  HR: 67 (24 Nov 2022 05:00) (67 - 105)  BP: 129/70 (24 Nov 2022 05:00) (115/71 - 135/72)  BP(mean): --  RR: 18 (24 Nov 2022 05:00) (18 - 18)  SpO2: 98% (24 Nov 2022 05:00) (96% - 100%)    Parameters below as of 24 Nov 2022 05:00  Patient On (Oxygen Delivery Method): nasal cannula    PHYSICAL EXAM  General: NAD  HEENT: clear oropharynx, anicteric sclera  CV: (+) S1/S2 RRR  Lungs: clear to auscultation, no wheezes or rales  Abdomen: soft, non-tender, non-distended (+) BS  Ext: no clubbing, cyanosis or edema  Skin: no rashes and no petechiae  Neuro: alert and oriented X 3, no focal deficits  Central Line: LCW medi port CDI    RECENT CULTURES:  Culture - Blood (11.13.22 @ 16:10)    Specimen Source: .Blood Blood-Catheter    Culture Results:   No Growth Final    Culture - Urine (11.15.22 @ 15:29)    Specimen Source: Clean Catch Clean Catch (Midstream)    Culture Results:   No growth    LABS:                        6.7    0.17  )-----------( 24       ( 24 Nov 2022 07:37 )             20.5     Mean Cell Volume : 93.2 fl  Mean Cell Hemoglobin : 30.5 pg  Mean Cell Hemoglobin Concentration : 32.7 gm/dL  Auto Neutrophil # : x  Auto Lymphocyte # : x  Auto Monocyte # : x  Auto Eosinophil # : x  Auto Basophil # : x  Auto Neutrophil % : x  Auto Lymphocyte % : x  Auto Monocyte % : x  Auto Eosinophil % : x  Auto Basophil % : x    11-24    138  |  100  |  14  ----------------------------<  95  3.6   |  31  |  0.62    Ca    8.1<L>      24 Nov 2022 07:37  Phos  2.8     11-24  Mg     2.1     11-24    TPro  5.0<L>  /  Alb  2.8<L>  /  TBili  0.2  /  DBili  x   /  AST  27  /  ALT  26  /  AlkPhos  82  11-24  Mg 2.1  Phos 2.8  PT/INR - ( 24 Nov 2022 07:37 )   PT: 12.9 sec;   INR: 1.12 ratio    PTT - ( 24 Nov 2022 07:37 )  PTT:25.0 sec    Uric Acid 2.9    RADIOLOGY & ADDITIONAL STUDIES:  CT Chest No Cont (11.21.22 @ 16:18)   1.  Increase in number of nodules in both lungs with new surrounding   groundglass halos suggestive of invasive fungal infection/invasive   aspergillosis  2.  Increase in small bilateral pleural effusions, right more than left.  3.  Re demonstration of indeterminate right hepatic lobe and right adrenal   lesions, as described above. MRI of the abdomen is again recommended for   further evaluation.           Diagnosis: relapsed AML, FLT3 ITD+, NPM1, CEBPA, ASXL1 mutated    Protocol/Chemo Regimen: daily FLT3 inhibitor gilteritinib (s/p IV cytarabine 100mg/m2 x 3days 11/10-11/12 prior to start of gilteritinib for cytoreduction)    Day: 14 Gilteritinib     Pt endorsed: Intermittent SOB    Review of Systems: Denies any nausea, vomiting diarrhea, chest pain, palpitation, or abdominal pain.    Pain scale: Denies                                       Diet: DASH/TLC    Allergies: No Known Allergies    ANTIMICROBIALS  acyclovir   Oral Tab/Cap 400 milliGRAM(s) Oral two times a day  cefepime   IVPB 2000 milliGRAM(s) IV Intermittent every 12 hours  voriconazole 200 milliGRAM(s) Oral every 12 hours    HEME/ONC MEDICATIONS  gilteritinib 120 milliGRAM(s) Oral <User Schedule>    MEDICATIONS  (STANDING):  acyclovir   Oral Tab/Cap 400 milliGRAM(s) Oral two times a day  albuterol/ipratropium for Nebulization 3 milliLiter(s) Nebulizer every 6 hours  atorvastatin 10 milliGRAM(s) Oral at bedtime  Biotene Dry Mouth Oral Rinse 15 milliLiter(s) Swish and Spit three times a day  budesonide  80 MICROgram(s)/formoterol 4.5 MICROgram(s) Inhaler 2 Puff(s) Inhalation two times a day  buPROPion XL (24-Hour) . 150 milliGRAM(s) Oral daily  cefepime   IVPB 2000 milliGRAM(s) IV Intermittent every 12 hours  chlorhexidine 2% Cloths 1 Application(s) Topical daily  furosemide   Injectable 40 milliGRAM(s) IV Push daily  furosemide   Injectable 40 milliGRAM(s) IV Push once  gabapentin 100 milliGRAM(s) Oral daily  gilteritinib 120 milliGRAM(s) Oral <User Schedule>  lisinopril 5 milliGRAM(s) Oral daily  metoprolol succinate ER 25 milliGRAM(s) Oral daily  oxybutynin 5 milliGRAM(s) Oral daily  polyethylene glycol 3350 17 Gram(s) Oral two times a day  potassium chloride    Tablet ER 20 milliEquivalent(s) Oral daily  potassium chloride    Tablet ER 20 milliEquivalent(s) Oral every 2 hours  potassium chloride  20 mEq/100 mL IVPB 20 milliEquivalent(s) IV Intermittent every 2 hours  senna 2 Tablet(s) Oral at bedtime  sodium chloride 0.65% Nasal 1 Spray(s) Both Nostrils four times a day  sodium chloride 0.9%. 1000 milliLiter(s) (20 mL/Hr) IV Continuous <Continuous>  traZODone 150 milliGRAM(s) Oral at bedtime  voriconazole 200 milliGRAM(s) Oral every 12 hours    MEDICATIONS  (PRN):  acetaminophen     Tablet .. 650 milliGRAM(s) Oral every 6 hours PRN Temp greater or equal to 38C (100.4F), Mild Pain (1 - 3)  aluminum hydroxide/magnesium hydroxide/simethicone Suspension 30 milliLiter(s) Oral every 4 hours PRN Dyspepsia  benzonatate 100 milliGRAM(s) Oral every 8 hours PRN Cough  melatonin 3 milliGRAM(s) Oral at bedtime PRN Insomnia  ondansetron Injectable 4 milliGRAM(s) IV Push every 8 hours PRN Nausea and/or Vomiting    Vital Signs Last 24 Hrs  T(C): 36.4 (25 Nov 2022 09:07), Max: 36.9 (24 Nov 2022 14:16)  T(F): 97.6 (25 Nov 2022 09:07), Max: 98.5 (24 Nov 2022 14:16)  HR: 59 (25 Nov 2022 09:07) (58 - 73)  BP: 124/74 (25 Nov 2022 09:07) (124/74 - 152/77)  BP(mean): --  RR: 18 (25 Nov 2022 09:07) (18 - 18)  SpO2: 98% (25 Nov 2022 09:07) (96% - 100%)    Parameters below as of 25 Nov 2022 09:07  Patient On (Oxygen Delivery Method): nasal cannula  O2 Flow (L/min): 4    PHYSICAL EXAM  General: NAD, laying in bed  HEENT: clear oropharynx, anicteric sclera  CV: (+) S1/S2 RRR  Lungs: diminished in right lower lobe, otherwise clear to auscultation, no wheezes, rales, or crackles  Abdomen: soft, non-tender, non-distended (+) BS  Ext: no edema  Skin: no rashes   Neuro: alert and oriented X 3  Central Line: LCW medi port CDI    RECENT CULTURES:  Culture - Blood (11.13.22 @ 16:10)    Specimen Source: .Blood Blood-Catheter    Culture Results:   No Growth Final    Culture - Urine (11.15.22 @ 15:29)    Specimen Source: Clean Catch Clean Catch (Midstream)    Culture Results:   No growth    LABS:                        8.9    0.25  )-----------( 15       ( 25 Nov 2022 06:45 )             26.3     25 Nov 2022 06:45    141    |  101    |  14     ----------------------------<  96     3.2     |  32     |  0.67     Ca    8.3        25 Nov 2022 06:45  Phos  3.5       25 Nov 2022 06:45  Mg     2.0       25 Nov 2022 06:45    TPro  5.5    /  Alb  2.9    /  TBili  0.3    /  DBili  x      /  AST  32     /  ALT  26     /  AlkPhos  95     25 Nov 2022 06:45    PT/INR - ( 25 Nov 2022 06:45 )   PT: 13.3 sec;   INR: 1.15 ratio       PTT - ( 25 Nov 2022 06:45 )  PTT:25.2 sec    LIVER FUNCTIONS - ( 25 Nov 2022 06:45 )  Alb: 2.9 g/dL / Pro: 5.5 g/dL / ALK PHOS: 95 U/L / ALT: 26 U/L / AST: 32 U/L / GGT: x             RADIOLOGY & ADDITIONAL STUDIES:  CT Chest No Cont (11.21.22 @ 16:18)   1.  Increase in number of nodules in both lungs with new surrounding   groundglass halos suggestive of invasive fungal infection/invasive   aspergillosis  2.  Increase in small bilateral pleural effusions, right more than left.  3.  Re demonstration of indeterminate right hepatic lobe and right adrenal   lesions, as described above. MRI of the abdomen is again recommended for   further evaluation.

## 2022-11-25 NOTE — PROGRESS NOTE ADULT - PROBLEM SELECTOR PLAN 5
TTE  11/9 - EF 70% mild AS   11/11 EKG no changes  11/12 elevated trop in setting of hypotension  11/15 Repeat echo showed decrease in EF 39% with wall motion abnormality.

## 2022-11-25 NOTE — CHART NOTE - NSCHARTNOTEFT_GEN_A_CORE
ALEX STOCK is an 83 yo female with 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     S&S c/s due to dietician report of pt reporting fatigue which limits amount of PO intake. "Denies any chewing problems but reports having swallowing problems and has to 'focus' while she is eating. She feels that the food gets stuck in her throat sometimes."     11/16/22 Initial bedside swallow evaluation completed. Pt p/w overtly functional oropharyngeal swallow with regular solids and thin liquids, but expressing preference for soft-bite-sized. Reports generalized weakness and preference for softer/bite-sized foods since diagnosis of AML in 2020 and that dry/hard foods are uncomfortable to swallow but she does not have a choking concern when attempting regular texture items. On diet monitor 11/18 (flowsheet), swallow profile with no change and no change in pt reports.    TODAY, pt seen for diet monitor. Pt reports dislike of soft-bite-sized but reporting she is aware that due to energy levels and eventual fatigue across meal, that she requires soft-bite-sized,  present at bedside corroborates pt statement. Pt p/w regular solids, soft-bite-sized, and thin liquids. Pt w/ prolonged, but functional oral transport, with subjectively timely initiation of swallow, no overt s/sx of aspiration/penetration and reports of no globus sensation. Pt reporting continued to express preference to continue with soft-bite-sized and thin liquid diet.    No further SLP intervention indicated at this time; team agreeable for SLP to sign off. Please re-consult if clinically indicated.    IMPRESSIONS: pt p/w overtly functional swallow, however, with superimposed generalized weakness and fatigue w/ exertion. Although no overt s/sx of aspiration w/ regular texture/thin liquid diet, pt expresses preference for modified diet due to above. No further SLP intervention or w/u indicated; SLP to sign off.     RECOMMENDATIONS:  >Soft and bite sized/thin liquids per pt preference  >Maintain strict aspiration precautions  >Maintain oral hygiene  >Monitor for s/sx of aspiration/laryngeal penetration. If noted, d/c P.O. intake, provide non-oral nutrition/hydration/meds and consult this service [phone]  >D/c rehab    D?W ANNIE Hughes covering [place]; MIREYA Smith covering [facility]    Speech Pathology  Fabian Miller CCC-SLP *Teams preferred* ([phone])

## 2022-11-25 NOTE — PROGRESS NOTE ADULT - PROBLEM SELECTOR PLAN 2
Patient is neutropenic, afebrile  Continue voriconazole, acyclovir, cefepime (renally dosed)  (-) fungitell, (-) galactomanan  11/10  Blood Cx Staph epidermis/hominis -Repeat cultures 11/11, 11/12, NGTD  11/21 CT Chest for evaluation- worsening nodules concern for fungal pna.  ID following

## 2022-11-25 NOTE — PROGRESS NOTE ADULT - PROBLEM SELECTOR PLAN 7
Continue  home dose of trazodone 150mg PO qhs.   Continue  home bupropion 150mg PO q daily. Continue home dose of trazodone 150mg PO qhs.   Continue home bupropion 150mg PO q daily.

## 2022-11-25 NOTE — PROGRESS NOTE ADULT - PROBLEM SELECTOR PLAN 3
Cardiology Following  Continue diuresis for NET neg 1-2L/day (per d/w Cardiology)  K>4, Mg>2  continue low dose statin  Trend BNP- downtrending  Daily weights, strict I/O's  11/21 started ACE-I (per Cardiology) Cardiology Following  Continue diuresis for NET neg 1-2L/day (per Cardiology)  K>4, Mg>2  continue low dose statin  Trend BNP- downtrending  Daily weights, strict I/O's  11/21 started ACE-I (per Cardiology)  11/25 increasing BNP- additional 40mg IVP Lasix x 1.

## 2022-11-25 NOTE — PROGRESS NOTE ADULT - NS ATTEND AMEND GEN_ALL_CORE FT
.  Primary: [facility]       Assessment: 81 yo day + 12 gilteritinib post HU/Scarlet-C cytoreduction for progressive FLT-3, NPM1, ASXL1, WT1, CEBPA AML.  Course complicated S. epi bacteremia? (11/9/22), had continuous neutropenic fevers, which resolved after 11/13/22; pneumonia (concerning for fungus) and hypotension (11/12/22), fluid overloaded state (11/13/22 - )     PMHx: reactive airway disease, aortic stenosis.      Plan:  Heme: PLT goal > 10,000; Hgb goal > 7.0g/dL;   Continue gilteritinib  Current plan is one month of gilteritinib with a marrow at that time.     ID: cefepime, vori, acyclovir; vori level (11/17/22): 2.6     peripheral blood fungitell neg and galactomannan (11/12/22) 0.03 (wnl)    Radiographs: CT (11/21/22): increased nodules c/w progressive fungal disease.     Pulmonary: Symbicort (history of reactive airway disease), continue supplemental oxygen.    Nutrition: tolerating PO; goal O>I by 1L    DVT prophylaxis: ambulation.     Code status: DNR/DNI; palliative medicine consulted ( has advanced prostate carcinoma and cannot care for his wife), discussed with patient and , no plan for further care escalation (ie MICU) should her clinical status acutely changed, both patient and family are open to hospice if needed Primary: [facility]       Assessment: 83 yo day + 13 gilteritinib post HU/Scarlet-C cytoreduction for progressive FLT-3, NPM1, ASXL1, WT1, CEBPA AML.  Course complicated S. epi bacteremia? (11/9/22), had continuous neutropenic fevers, which resolved after 11/13/22; pneumonia (concerning for fungus) and hypotension (11/12/22), fluid overloaded state (11/13/22 - )     PMHx: reactive airway disease, aortic stenosis.      Plan:  Heme: PLT goal > 10,000; Hgb goal > 7.0g/dL;   Continue gilteritinib  Current plan is one month of gilteritinib with a marrow at that time.     ID: cefepime, vori, acyclovir; vori level (11/17/22): 2.6     peripheral blood fungitell neg and galactomannan (11/12/22) 0.03 (wnl)    Radiographs: CT (11/21/22): increased nodules c/w progressive fungal disease.     Pulmonary: Symbicort (history of reactive airway disease), continue supplemental oxygen.    Nutrition: tolerating PO; goal O>I by 1L    DVT prophylaxis: ambulation.     Code status: DNR/DNI; palliative medicine consulted ( has advanced prostate carcinoma and cannot care for his wife), discussed with patient and , no plan for further care escalation (ie MICU) should her clinical status acutely changed, both patient and family are open to hospice if needed

## 2022-11-25 NOTE — PROGRESS NOTE ADULT - PROBLEM SELECTOR PLAN 1
Encounter Date: 9/21/2018       History     Chief Complaint   Patient presents with    Generalized Body Aches     Pt c/o body aches, runny nose and rash to back x 3 days.  Denies any OTC medications to treat symptoms.      28-year-old male presents emergency department for evaluation of 3 day history of generalized body aches, nasal congestion, runny nose and a rash to his back.  He reports that the symptoms began gradually 3 days ago and a constant since onset.  He does report associated feelings of inability to catch his breath when he is walking as he feels mildly drained.  He reports that he had a history of asthma growing up, and feels as though he may have been wheezing over the last 2 days.  He denies any fever, headache, dizziness, vision changes, neck pain, chest pain, shortness of breath, palpitations, abdominal pain, nausea, vomiting or diarrhea.  He reports he 1 of his children had similar symptoms recently.  No treatment was attempted prior to arrival.            Review of patient's allergies indicates:  No Known Allergies  History reviewed. No pertinent past medical history.  History reviewed. No pertinent surgical history.  Family History   Problem Relation Age of Onset    Migraines Mother      Social History     Tobacco Use    Smoking status: Never Smoker    Smokeless tobacco: Never Used   Substance Use Topics    Alcohol use: No    Drug use: No     Review of Systems   Constitutional: Negative for activity change, appetite change and fever.   HENT: Positive for congestion, rhinorrhea and sore throat. Negative for ear discharge, facial swelling, nosebleeds, postnasal drip, sinus pressure, sinus pain, trouble swallowing and voice change.    Eyes: Negative for photophobia, redness and visual disturbance.   Respiratory: Positive for wheezing. Negative for cough, chest tightness and shortness of breath.    Cardiovascular: Negative for chest pain, palpitations and leg swelling.   Gastrointestinal:  Negative for abdominal pain, constipation, diarrhea, nausea and vomiting.   Genitourinary: Negative for decreased urine volume, dysuria, flank pain, hematuria and penile swelling.   Musculoskeletal: Negative for back pain, joint swelling, neck pain and neck stiffness.   Skin: Negative for rash.   Neurological: Negative for dizziness, weakness, light-headedness, numbness and headaches.   Hematological: Does not bruise/bleed easily.       Physical Exam     Initial Vitals [09/21/18 1614]   BP Pulse Resp Temp SpO2   130/78 91 18 98.4 °F (36.9 °C) 98 %      MAP       --         Physical Exam    Nursing note and vitals reviewed.  Constitutional: He appears well-developed and well-nourished. He is not diaphoretic. No distress.   HENT:   Head: Normocephalic and atraumatic.   Right Ear: External ear normal.   Left Ear: External ear normal.   Mouth/Throat: Oropharynx is clear and moist. No oropharyngeal exudate.   Eyes: Conjunctivae and EOM are normal. Pupils are equal, round, and reactive to light.   Neck: Normal range of motion. Neck supple.   Cardiovascular: Normal rate, regular rhythm and normal heart sounds. Exam reveals no gallop and no friction rub.    No murmur heard.  Pulmonary/Chest: Breath sounds normal. No respiratory distress. He has no wheezes. He has no rhonchi. He has no rales. He exhibits no tenderness.   Abdominal: Soft. Bowel sounds are normal. He exhibits no distension. There is no tenderness. There is no guarding.   Musculoskeletal: Normal range of motion.   Lymphadenopathy:     He has no cervical adenopathy.   Neurological: He is alert and oriented to person, place, and time.   Skin: Skin is warm and dry.        Psychiatric: He has a normal mood and affect.         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    CBC, CMP, TLS labs, DIC labs daily. , If fibrinogen under 100, give 10 units of cryoprecipitate.  Transfuse for Hgb < 7 and platelet count < 10k, < 20k if febrile, < 50k if bleeding  11/10 Cytarabine 100mg/m2 x 3 days for cytoreduction. stopped hydroxyurea 11/12.   11/12 started Gilteritinib   EKG biweekly check for QTc on Gilteritinib and Voriconazole  D/C'd ppx Dexamethasone (was on for differentiation after starting gilt)   DNR/DNI, Palliative Care following for support, GOC, discussions with family.  11/24 - Hgb - 6.7, transfuse 2 half units PRBC each to infuse over with Lasix 40 mg IV x1 dose in between transfusions. evaluation of generalized body aches, nasal congestion, and rash. Physical exam reveals a nontoxic-appearing female in no acute distress.  Patient is afebrile and vital signs are within normal limits. Neurological exam reveals an alert and oriented patient.  TMs reveal no erythema.  Posterior pharynx reveals no erythema, edema or tonsillar exudate. No uvular edema or deviation noted. No trismus, stridor drooling noted.  Lungs clear to auscultation bilaterally.  No respiratory distress or accessory muscleUse noted.  Abdominal exam reveals a soft abdomen, nontender palpation. No CVA tenderness noted. Skin exam reveals an erythematous mild sandpaper rash noted to the upper back.  No vesicles, pustules or bulla noted.  No ulcerations, vesicles or induration noted.  Differential Diagnosis:   Streptococcal pharyngitis  Viral URI  Influenza  Wheezing  I carefully considered but doubt serious etiology including pneumonia or consolidation.  No imaging indicated at this time.  ED Management:  Rapid strep negative. Influenza negative. Patient given albuterol in the emergency department.  Reauscultation of the lungs reveals lungs clear to auscultation bilaterally. No respiratory distress or accessory muscle use noted. Probable URI of likely viral etiology.  No antibiotics are indicated at this time.  Discussed these findings at length with the patient verbalizes understanding and agreement course of treatment.  Instructed the patient to follow up with his primary care provider for re-evaluation and to return to the emergency department immediately for any new or worsening symptoms.                      Clinical Impression:   The primary encounter diagnosis was URI, acute. A diagnosis of Rash was also pertinent to this visit.                             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    CBC, CMP, TLS labs, DIC labs daily. , If fibrinogen under 100, give 10 units of cryoprecipitate.  Transfuse for Hgb < 7 and platelet count < 10k, < 20k if febrile, < 50k if bleeding  11/10 Cytarabine 100mg/m2 x 3 days for cytoreduction. stopped hydroxyurea 11/12.   11/12 started Gilteritinib   EKG biweekly check for QTc on Gilteritinib and Voriconazole   DNR/DNI, Palliative Care following for support, GOC, discussions with family.  11/25 hypokalemia- repleted; elevated BNP- additional 40mg IVP Lasix X 1.

## 2022-11-26 NOTE — PROGRESS NOTE ADULT - PROBLEM SELECTOR PLAN 1
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    CBC, CMP, TLS labs, DIC labs daily. , If fibrinogen under 100, give 10 units of cryoprecipitate.  Transfuse for Hgb < 7 and platelet count < 10k, < 20k if febrile, < 50k if bleeding  11/10 Cytarabine 100mg/m2 x 3 days for cytoreduction. stopped hydroxyurea 11/12.   11/12 started Gilteritinib   EKG biweekly check for QTc on Gilteritinib and Voriconazole   DNR/DNI, Palliative Care following for support, GOC, discussions with family.  11/25 hypokalemia- repleted; elevated BNP- additional 40mg IVP Lasix X 1. 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    CBC, CMP, TLS labs, DIC labs daily. , If fibrinogen under 100, give 10 units of cryoprecipitate.  Transfuse for Hgb < 7 and platelet count < 10k, < 20k if febrile, < 50k if bleeding  11/10 Cytarabine 100mg/m2 x 3 days for cytoreduction. stopped hydroxyurea 11/12.   11/12 started Gilteritinib   EKG biweekly check for QTc on Gilteritinib and Voriconazole   DNR/DNI, Palliative Care following for support, GOC, discussions with family.  11/26 hypokalemia- repleted  Plan for family meeting with Dr. Peralta on Monday 11/28

## 2022-11-26 NOTE — PROGRESS NOTE ADULT - PROBLEM SELECTOR PLAN 6
Increased O2 requirement.   Continue with Duoneb  11/14 Symbicort added gentle diuresis as tolerated  11/21 CT chest shows worsening nodules with concern for fungal pna- started on Voriconazole.

## 2022-11-26 NOTE — PROGRESS NOTE ADULT - SUBJECTIVE AND OBJECTIVE BOX
Diagnosis: relapsed AML, FLT3 ITD+, NPM1, CEBPA, ASXL1 mutated    Protocol/Chemo Regimen: daily FLT3 inhibitor gilteritinib (s/p IV cytarabine 100mg/m2 x 3days 11/10-11/12 prior to start of gilteritinib for cytoreduction)    Day: 15 Gilteritinib     Pt endorsed: Intermittent SOB    Review of Systems: Denies any nausea, vomiting diarrhea, chest pain, palpitation, or abdominal pain.    Pain scale: Denies                                       Diet: DASH/TLC    Allergies: No Known Allergies    ANTIMICROBIALS  acyclovir   Oral Tab/Cap 400 milliGRAM(s) Oral two times a day  cefepime   IVPB 2000 milliGRAM(s) IV Intermittent every 12 hours  voriconazole 200 milliGRAM(s) Oral every 12 hours    HEME/ONC MEDICATIONS  gilteritinib 120 milliGRAM(s) Oral <User Schedule>    MEDICATIONS  (STANDING):  acyclovir   Oral Tab/Cap 400 milliGRAM(s) Oral two times a day  albuterol/ipratropium for Nebulization 3 milliLiter(s) Nebulizer every 6 hours  atorvastatin 10 milliGRAM(s) Oral at bedtime  Biotene Dry Mouth Oral Rinse 15 milliLiter(s) Swish and Spit three times a day  budesonide  80 MICROgram(s)/formoterol 4.5 MICROgram(s) Inhaler 2 Puff(s) Inhalation two times a day  buPROPion XL (24-Hour) . 150 milliGRAM(s) Oral daily  cefepime   IVPB 2000 milliGRAM(s) IV Intermittent every 12 hours  chlorhexidine 2% Cloths 1 Application(s) Topical daily  furosemide   Injectable 40 milliGRAM(s) IV Push daily  furosemide   Injectable 40 milliGRAM(s) IV Push once  gabapentin 100 milliGRAM(s) Oral daily  gilteritinib 120 milliGRAM(s) Oral <User Schedule>  lisinopril 5 milliGRAM(s) Oral daily  metoprolol succinate ER 25 milliGRAM(s) Oral daily  oxybutynin 5 milliGRAM(s) Oral daily  polyethylene glycol 3350 17 Gram(s) Oral two times a day  potassium chloride    Tablet ER 20 milliEquivalent(s) Oral daily  potassium chloride    Tablet ER 20 milliEquivalent(s) Oral every 2 hours  potassium chloride  20 mEq/100 mL IVPB 20 milliEquivalent(s) IV Intermittent every 2 hours  senna 2 Tablet(s) Oral at bedtime  sodium chloride 0.65% Nasal 1 Spray(s) Both Nostrils four times a day  sodium chloride 0.9%. 1000 milliLiter(s) (20 mL/Hr) IV Continuous <Continuous>  traZODone 150 milliGRAM(s) Oral at bedtime  voriconazole 200 milliGRAM(s) Oral every 12 hours    MEDICATIONS  (PRN):  acetaminophen     Tablet .. 650 milliGRAM(s) Oral every 6 hours PRN Temp greater or equal to 38C (100.4F), Mild Pain (1 - 3)  aluminum hydroxide/magnesium hydroxide/simethicone Suspension 30 milliLiter(s) Oral every 4 hours PRN Dyspepsia  benzonatate 100 milliGRAM(s) Oral every 8 hours PRN Cough  melatonin 3 milliGRAM(s) Oral at bedtime PRN Insomnia  ondansetron Injectable 4 milliGRAM(s) IV Push every 8 hours PRN Nausea and/or Vomiting    Vital Signs Last 24 Hrs  T(C): 36.4 (25 Nov 2022 09:07), Max: 36.9 (24 Nov 2022 14:16)  T(F): 97.6 (25 Nov 2022 09:07), Max: 98.5 (24 Nov 2022 14:16)  HR: 59 (25 Nov 2022 09:07) (58 - 73)  BP: 124/74 (25 Nov 2022 09:07) (124/74 - 152/77)  BP(mean): --  RR: 18 (25 Nov 2022 09:07) (18 - 18)  SpO2: 98% (25 Nov 2022 09:07) (96% - 100%)    Parameters below as of 25 Nov 2022 09:07  Patient On (Oxygen Delivery Method): nasal cannula  O2 Flow (L/min): 4    PHYSICAL EXAM  General: NAD, laying in bed  HEENT: clear oropharynx, anicteric sclera  CV: (+) S1/S2 RRR  Lungs: diminished in right lower lobe, otherwise clear to auscultation, no wheezes, rales, or crackles  Abdomen: soft, non-tender, non-distended (+) BS  Ext: no edema  Skin: no rashes   Neuro: alert and oriented X 3  Central Line: LCW medi port CDI    RECENT CULTURES:  Culture - Blood (11.13.22 @ 16:10)    Specimen Source: .Blood Blood-Catheter    Culture Results:   No Growth Final    Culture - Urine (11.15.22 @ 15:29)    Specimen Source: Clean Catch Clean Catch (Midstream)    Culture Results:   No growth    LABS:                        8.9    0.25  )-----------( 15       ( 25 Nov 2022 06:45 )             26.3     25 Nov 2022 06:45    141    |  101    |  14     ----------------------------<  96     3.2     |  32     |  0.67     Ca    8.3        25 Nov 2022 06:45  Phos  3.5       25 Nov 2022 06:45  Mg     2.0       25 Nov 2022 06:45    TPro  5.5    /  Alb  2.9    /  TBili  0.3    /  DBili  x      /  AST  32     /  ALT  26     /  AlkPhos  95     25 Nov 2022 06:45    PT/INR - ( 25 Nov 2022 06:45 )   PT: 13.3 sec;   INR: 1.15 ratio       PTT - ( 25 Nov 2022 06:45 )  PTT:25.2 sec    LIVER FUNCTIONS - ( 25 Nov 2022 06:45 )  Alb: 2.9 g/dL / Pro: 5.5 g/dL / ALK PHOS: 95 U/L / ALT: 26 U/L / AST: 32 U/L / GGT: x             RADIOLOGY & ADDITIONAL STUDIES:  CT Chest No Cont (11.21.22 @ 16:18)   1.  Increase in number of nodules in both lungs with new surrounding   groundglass halos suggestive of invasive fungal infection/invasive   aspergillosis  2.  Increase in small bilateral pleural effusions, right more than left.  3.  Re demonstration of indeterminate right hepatic lobe and right adrenal   lesions, as described above. MRI of the abdomen is again recommended for   further evaluation.           Diagnosis: relapsed AML, FLT3 ITD+, NPM1, CEBPA, ASXL1 mutated    Protocol/Chemo Regimen: daily FLT3 inhibitor gilteritinib (s/p IV cytarabine 100mg/m2 x 3days 11/10-11/12 prior to start of gilteritinib for cytoreduction)    Day: 15 Gilteritinib     Pt endorsed: pt states "I feel much better today"    Review of Systems: Denies any nausea, vomiting diarrhea, chest pain, palpitation, or abdominal pain.    Pain scale: Denies                                       Diet: DASH/TLC    Allergies: No Known Allergies    ANTIMICROBIALS  acyclovir   Oral Tab/Cap 400 milliGRAM(s) Oral two times a day  cefepime   IVPB 2000 milliGRAM(s) IV Intermittent every 12 hours  voriconazole 200 milliGRAM(s) Oral every 12 hours    HEME/ONC MEDICATIONS  gilteritinib 120 milliGRAM(s) Oral <User Schedule>    MEDICATIONS  (STANDING):  acyclovir   Oral Tab/Cap 400 milliGRAM(s) Oral two times a day  albuterol/ipratropium for Nebulization 3 milliLiter(s) Nebulizer every 6 hours  atorvastatin 10 milliGRAM(s) Oral at bedtime  Biotene Dry Mouth Oral Rinse 15 milliLiter(s) Swish and Spit three times a day  budesonide  80 MICROgram(s)/formoterol 4.5 MICROgram(s) Inhaler 2 Puff(s) Inhalation two times a day  buPROPion XL (24-Hour) . 150 milliGRAM(s) Oral daily  cefepime   IVPB 2000 milliGRAM(s) IV Intermittent every 12 hours  chlorhexidine 2% Cloths 1 Application(s) Topical daily  furosemide   Injectable 40 milliGRAM(s) IV Push daily  gabapentin 100 milliGRAM(s) Oral daily  gilteritinib 120 milliGRAM(s) Oral <User Schedule>  lisinopril 5 milliGRAM(s) Oral daily  metoprolol succinate ER 25 milliGRAM(s) Oral daily  oxybutynin 5 milliGRAM(s) Oral daily  polyethylene glycol 3350 17 Gram(s) Oral two times a day  potassium chloride    Tablet ER 20 milliEquivalent(s) Oral daily  senna 2 Tablet(s) Oral at bedtime  sodium chloride 0.65% Nasal 1 Spray(s) Both Nostrils four times a day  sodium chloride 0.9%. 1000 milliLiter(s) (20 mL/Hr) IV Continuous <Continuous>  traZODone 150 milliGRAM(s) Oral at bedtime  voriconazole 200 milliGRAM(s) Oral every 12 hours    MEDICATIONS  (PRN):  acetaminophen     Tablet .. 650 milliGRAM(s) Oral every 6 hours PRN Temp greater or equal to 38C (100.4F), Mild Pain (1 - 3)  aluminum hydroxide/magnesium hydroxide/simethicone Suspension 30 milliLiter(s) Oral every 4 hours PRN Dyspepsia  benzonatate 100 milliGRAM(s) Oral every 8 hours PRN Cough  melatonin 3 milliGRAM(s) Oral at bedtime PRN Insomnia  ondansetron Injectable 4 milliGRAM(s) IV Push every 8 hours PRN Nausea and/or Vomiting    Vital Signs Last 24 Hrs  T(C): 36.4 (26 Nov 2022 09:45), Max: 37 (26 Nov 2022 05:16)  T(F): 97.5 (26 Nov 2022 09:45), Max: 98.6 (26 Nov 2022 05:16)  HR: 61 (26 Nov 2022 09:45) (61 - 68)  BP: 124/68 (26 Nov 2022 09:45) (117/64 - 150/78)  BP(mean): --  RR: 18 (26 Nov 2022 09:45) (18 - 18)  SpO2: 100% (26 Nov 2022 09:45) (98% - 100%)    Parameters below as of 26 Nov 2022 09:45  Patient On (Oxygen Delivery Method): nasal cannula    PHYSICAL EXAM  General: NAD, laying in bed  HEENT: clear oropharynx, anicteric sclera  CV: (+) S1/S2 RRR  Lungs: clear to auscultation, no wheezes, rales, or crackles  Abdomen: soft, non-tender, non-distended (+) BS  Ext: no edema  Skin: no rashes   Neuro: alert and oriented X 3  Central Line: LCW medi port CDI    RECENT CULTURES:  Culture - Blood (11.13.22 @ 16:10)    Specimen Source: .Blood Blood-Catheter    Culture Results:   No Growth Final    Culture - Urine (11.15.22 @ 15:29)    Specimen Source: Clean Catch Clean Catch (Midstream)    Culture Results:   No growth    LABS:                        8.0    0.21  )-----------( 10       ( 26 Nov 2022 06:58 )             24.1     26 Nov 2022 06:57    140    |  103    |  16     ----------------------------<  101    3.9     |  29     |  0.60     Ca    8.2        26 Nov 2022 06:57  Phos  2.9       26 Nov 2022 06:57  Mg     2.2       26 Nov 2022 06:57    TPro  5.2    /  Alb  2.9    /  TBili  0.2    /  DBili  x      /  AST  31     /  ALT  25     /  AlkPhos  93     26 Nov 2022 06:57    PT/INR - ( 26 Nov 2022 06:58 )   PT: 13.4 sec;   INR: 1.16 ratio       PTT - ( 26 Nov 2022 06:58 )  PTT:25.7 sec    LIVER FUNCTIONS - ( 26 Nov 2022 06:57 )  Alb: 2.9 g/dL / Pro: 5.2 g/dL / ALK PHOS: 93 U/L / ALT: 25 U/L / AST: 31 U/L / GGT: x                 RADIOLOGY & ADDITIONAL STUDIES:  CT Chest No Cont (11.21.22 @ 16:18)   1.  Increase in number of nodules in both lungs with new surrounding   groundglass halos suggestive of invasive fungal infection/invasive   aspergillosis  2.  Increase in small bilateral pleural effusions, right more than left.  3.  Re demonstration of indeterminate right hepatic lobe and right adrenal   lesions, as described above. MRI of the abdomen is again recommended for   further evaluation.

## 2022-11-26 NOTE — PROGRESS NOTE ADULT - ASSESSMENT
Ms. 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 Started Cytarabine 100 mg/m2 on 11/10 x 3 days + BID hydroxyurea for cytoreduction, then transitioned to daily oral FLT3 inhibitor gilteritinib on 11/12. Hospital course complicated by Staph epidermis/hominis bacteremia on vanco which was stopped 11/14 likely contaminant on cefepime renally dosed, increased work of breathing with cardiology following and repeat echo on 11/15 showing a decrease in EF 39% with wall motion abnormality, CT chest (11/21) with worsening nodules concerning for fungal pna- on Voriconazole. Patient presents with pancytopenia due to disease and chemo.

## 2022-11-26 NOTE — PROGRESS NOTE ADULT - NS ATTEND AMEND GEN_ALL_CORE FT
Primary: [facility]       Assessment: 83 yo day + 13 gilteritinib post HU/Scarlet-C cytoreduction for progressive FLT-3, NPM1, ASXL1, WT1, CEBPA AML.  Course complicated S. epi bacteremia? (11/9/22), had continuous neutropenic fevers, which resolved after 11/13/22; pneumonia (concerning for fungus) and hypotension (11/12/22), fluid overloaded state (11/13/22 - )     PMHx: reactive airway disease, aortic stenosis.      Plan:  Heme: PLT goal > 10,000; Hgb goal > 7.0g/dL;   Continue gilteritinib  Current plan is one month of gilteritinib with a marrow at that time.     ID: cefepime, vori, acyclovir; vori level (11/17/22): 2.6     peripheral blood fungitell neg and galactomannan (11/12/22) 0.03 (wnl)    Radiographs: CT (11/21/22): increased nodules c/w progressive fungal disease.     Pulmonary: Symbicort (history of reactive airway disease), continue supplemental oxygen.    Nutrition: tolerating PO; goal O>I by 1L    DVT prophylaxis: ambulation.     Code status: DNR/DNI; palliative medicine consulted ( has advanced prostate carcinoma and cannot care for his wife), discussed with patient and , no plan for further care escalation (ie MICU) should her clinical status acutely changed, both patient and family are open to hospice if needed Primary: [facility]       Assessment: 83 yo day + 15 gilteritinib post HU/Scarlet-C cytoreduction for progressive FLT-3, NPM1, ASXL1, WT1, CEBPA AML.  Course complicated S. epi bacteremia? (11/9/22), had continuous neutropenic fevers, which resolved after 11/13/22; pneumonia (concerning for fungus) and hypotension (11/12/22), fluid overloaded state (11/13/22 - )     PMHx: reactive airway disease, aortic stenosis.      Plan:  Heme: PLT goal > 10,000; Hgb goal > 7.0g/dL;   Continue gilteritinib  Current plan is one month of gilteritinib with a marrow at that time.     ID: cefepime, vori, acyclovir; vori level (11/17/22): 2.6     peripheral blood fungitell neg and galactomannan (11/12/22) 0.03 (wnl)    Radiographs: CT (11/21/22): increased nodules c/w progressive fungal disease.     Pulmonary: Symbicort (history of reactive airway disease), continue supplemental oxygen.    Nutrition: tolerating PO; goal O>I by 1L    DVT prophylaxis: ambulation.     Code status: DNR/DNI; palliative medicine consulted ( has advanced prostate carcinoma and cannot care for his wife), discussed with patient and , no plan for further care escalation (ie MICU) should her clinical status acutely changed, both patient and family are open to hospice if needed, plan for family meeting on 11/28/22

## 2022-11-26 NOTE — PROGRESS NOTE ADULT - PROBLEM SELECTOR PLAN 3
Cardiology Following  Continue diuresis for NET neg 1-2L/day (per Cardiology)  K>4, Mg>2  continue low dose statin  Trend BNP- downtrending  Daily weights, strict I/O's  11/21 started ACE-I (per Cardiology)  11/25 increasing BNP- additional 40mg IVP Lasix x 1. Cardiology Following  Continue diuresis for NET neg 1-2L/day (per Cardiology)  K>4, Mg>2  continue low dose statin  Trend BNP- downtrending  Daily weights, strict I/O's  11/21 started ACE-I (per Cardiology)

## 2022-11-27 NOTE — PROGRESS NOTE ADULT - PROBLEM SELECTOR PLAN 3
Cardiology Following  Continue diuresis for NET neg 1-2L/day (per Cardiology)  K>4, Mg>2  continue low dose statin  Trend BNP- downtrending  Daily weights, strict I/O's  11/21 started ACE-I (per Cardiology)

## 2022-11-27 NOTE — PROGRESS NOTE ADULT - NS ATTEND AMEND GEN_ALL_CORE FT
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  Course complicated S. epi bacteremia? (11/9/22), had continuous neutropenic fevers, which resolved after 11/13/22; pneumonia (concerning for fungus) and hypotension (11/12/22), fluid overloaded state (11/13/22 - )     PMHx: reactive airway disease, aortic stenosis.      Plan:  Heme: PLT goal > 10,000; Hgb goal > 7.0g/dL;   Continue gilteritinib  Current plan is one month of gilteritinib with a marrow at that time.     ID: cefepime, vori, acyclovir; vori level (11/17/22): 2.6     peripheral blood fungitell neg and galactomannan (11/12/22) 0.03 (wnl)    Radiographs: CT (11/21/22): increased nodules c/w progressive fungal disease.     Pulmonary: Symbicort (history of reactive airway disease), continue supplemental oxygen.    Nutrition: tolerating PO; goal O>I by 1L    DVT prophylaxis: ambulation.     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  Course complicated S. epi bacteremia? (11/9/22), had continuous neutropenic fevers, which resolved after 11/13/22; pneumonia (concerning for fungus) and hypotension (11/12/22), fluid overloaded state (11/13/22 - )     PMHx: reactive airway disease, aortic stenosis.      Plan:  Heme: PLT goal > 10,000; Hgb goal > 7.0g/dL;   Continue gilteritinib  Current plan is one month of gilteritinib with a marrow at that time.     ID: cefepime, vori, acyclovir; vori level (11/17/22): 2.6     peripheral blood fungitell neg and galactomannan (11/12/22) 0.03 (wnl)    Radiographs: CT (11/21/22): increased nodules c/w progressive fungal disease.     Pulmonary: Symbicort (history of reactive airway disease), continue supplemental oxygen.    Nutrition: tolerating PO; goal O>I by 1L    DVT prophylaxis: ambulation.     Code status: DNR/DNI; palliative medicine consulted ( has advanced prostate carcinoma and cannot care for his wife), discussed with patient and , no plan for further care escalation (ie MICU) should her clinical status acutely changed, both patient and family are open to hospice if needed, plan for family meeting on 11/28/22, discussed extensively at bedside today with son and

## 2022-11-27 NOTE — PROGRESS NOTE ADULT - SUBJECTIVE AND OBJECTIVE BOX
Diagnosis: relapsed AML, FLT3 ITD+, NPM1, CEBPA, ASXL1 mutated    Protocol/Chemo Regimen: daily FLT3 inhibitor gilteritinib (s/p IV cytarabine 100mg/m2 x 3days 11/10-11/12 prior to start of gilteritinib for cytoreduction)    Day: 16 Gilteritinib     Pt endorsed: pt states "I feel much better today"    Review of Systems: Denies any nausea, vomiting diarrhea, chest pain, palpitation, or abdominal pain.    Pain scale: Denies                                       Diet: DASH/TLC    Allergies: No Known Allergies    ANTIMICROBIALS  acyclovir   Oral Tab/Cap 400 milliGRAM(s) Oral two times a day  cefepime   IVPB 2000 milliGRAM(s) IV Intermittent every 12 hours  voriconazole 200 milliGRAM(s) Oral every 12 hours    HEME/ONC MEDICATIONS  gilteritinib 120 milliGRAM(s) Oral <User Schedule>    MEDICATIONS  (STANDING):  acyclovir   Oral Tab/Cap 400 milliGRAM(s) Oral two times a day  albuterol/ipratropium for Nebulization 3 milliLiter(s) Nebulizer every 6 hours  atorvastatin 10 milliGRAM(s) Oral at bedtime  Biotene Dry Mouth Oral Rinse 15 milliLiter(s) Swish and Spit three times a day  budesonide  80 MICROgram(s)/formoterol 4.5 MICROgram(s) Inhaler 2 Puff(s) Inhalation two times a day  buPROPion XL (24-Hour) . 150 milliGRAM(s) Oral daily  cefepime   IVPB 2000 milliGRAM(s) IV Intermittent every 12 hours  chlorhexidine 2% Cloths 1 Application(s) Topical daily  furosemide   Injectable 40 milliGRAM(s) IV Push daily  gabapentin 100 milliGRAM(s) Oral daily  gilteritinib 120 milliGRAM(s) Oral <User Schedule>  lisinopril 5 milliGRAM(s) Oral daily  metoprolol succinate ER 25 milliGRAM(s) Oral daily  oxybutynin 5 milliGRAM(s) Oral daily  polyethylene glycol 3350 17 Gram(s) Oral two times a day  potassium chloride    Tablet ER 20 milliEquivalent(s) Oral daily  senna 2 Tablet(s) Oral at bedtime  sodium chloride 0.65% Nasal 1 Spray(s) Both Nostrils four times a day  sodium chloride 0.9%. 1000 milliLiter(s) (20 mL/Hr) IV Continuous <Continuous>  traZODone 150 milliGRAM(s) Oral at bedtime  voriconazole 200 milliGRAM(s) Oral every 12 hours    MEDICATIONS  (PRN):  acetaminophen     Tablet .. 650 milliGRAM(s) Oral every 6 hours PRN Temp greater or equal to 38C (100.4F), Mild Pain (1 - 3)  aluminum hydroxide/magnesium hydroxide/simethicone Suspension 30 milliLiter(s) Oral every 4 hours PRN Dyspepsia  benzonatate 100 milliGRAM(s) Oral every 8 hours PRN Cough  melatonin 3 milliGRAM(s) Oral at bedtime PRN Insomnia  ondansetron Injectable 4 milliGRAM(s) IV Push every 8 hours PRN Nausea and/or Vomiting    Vital Signs Last 24 Hrs  T(C): 36.4 (26 Nov 2022 09:45), Max: 37 (26 Nov 2022 05:16)  T(F): 97.5 (26 Nov 2022 09:45), Max: 98.6 (26 Nov 2022 05:16)  HR: 61 (26 Nov 2022 09:45) (61 - 68)  BP: 124/68 (26 Nov 2022 09:45) (117/64 - 150/78)  BP(mean): --  RR: 18 (26 Nov 2022 09:45) (18 - 18)  SpO2: 100% (26 Nov 2022 09:45) (98% - 100%)    Parameters below as of 26 Nov 2022 09:45  Patient On (Oxygen Delivery Method): nasal cannula    PHYSICAL EXAM  General: NAD, laying in bed  HEENT: clear oropharynx, anicteric sclera  CV: (+) S1/S2 RRR  Lungs: clear to auscultation, no wheezes, rales, or crackles  Abdomen: soft, non-tender, non-distended (+) BS  Ext: no edema  Skin: no rashes   Neuro: alert and oriented X 3  Central Line: LCW medi port CDI    RECENT CULTURES:  Culture - Blood (11.13.22 @ 16:10)    Specimen Source: .Blood Blood-Catheter    Culture Results:   No Growth Final    Culture - Urine (11.15.22 @ 15:29)    Specimen Source: Clean Catch Clean Catch (Midstream)    Culture Results:   No growth    LABS:                        8.0    0.21  )-----------( 10       ( 26 Nov 2022 06:58 )             24.1     26 Nov 2022 06:57    140    |  103    |  16     ----------------------------<  101    3.9     |  29     |  0.60     Ca    8.2        26 Nov 2022 06:57  Phos  2.9       26 Nov 2022 06:57  Mg     2.2       26 Nov 2022 06:57    TPro  5.2    /  Alb  2.9    /  TBili  0.2    /  DBili  x      /  AST  31     /  ALT  25     /  AlkPhos  93     26 Nov 2022 06:57    PT/INR - ( 26 Nov 2022 06:58 )   PT: 13.4 sec;   INR: 1.16 ratio       PTT - ( 26 Nov 2022 06:58 )  PTT:25.7 sec    LIVER FUNCTIONS - ( 26 Nov 2022 06:57 )  Alb: 2.9 g/dL / Pro: 5.2 g/dL / ALK PHOS: 93 U/L / ALT: 25 U/L / AST: 31 U/L / GGT: x                 RADIOLOGY & ADDITIONAL STUDIES:  CT Chest No Cont (11.21.22 @ 16:18)   1.  Increase in number of nodules in both lungs with new surrounding   groundglass halos suggestive of invasive fungal infection/invasive   aspergillosis  2.  Increase in small bilateral pleural effusions, right more than left.  3.  Re demonstration of indeterminate right hepatic lobe and right adrenal   lesions, as described above. MRI of the abdomen is again recommended for   further evaluation.           Diagnosis: relapsed AML, FLT3 ITD+, NPM1, CEBPA, ASXL1 mutated    Protocol/Chemo Regimen: daily FLT3 inhibitor gilteritinib (s/p IV cytarabine 100mg/m2 x 3days 11/10- prior to start of gilteritinib for cytoreduction)    Day: 16 Gilteritinib     Pt endorsed: no complaints at this time    Review of Systems: Denies any nausea, vomiting diarrhea, chest pain, palpitation,  or abdominal pain.    Pain scale: Denies                                       Diet: DASH/TLC    Allergies: No Known Allergies    ANTIMICROBIALS  acyclovir   Oral Tab/Cap 400 milliGRAM(s) Oral two times a day  cefepime   IVPB 2000 milliGRAM(s) IV Intermittent every 12 hours  voriconazole 200 milliGRAM(s) Oral every 12 hours    HEME/ONC MEDICATIONS  gilteritinib 120 milliGRAM(s) Oral <User Schedule>    MEDICATIONS  (STANDING):  acyclovir   Oral Tab/Cap 400 milliGRAM(s) Oral two times a day  albuterol/ipratropium for Nebulization 3 milliLiter(s) Nebulizer every 6 hours  atorvastatin 10 milliGRAM(s) Oral at bedtime  Biotene Dry Mouth Oral Rinse 15 milliLiter(s) Swish and Spit three times a day  budesonide  80 MICROgram(s)/formoterol 4.5 MICROgram(s) Inhaler 2 Puff(s) Inhalation two times a day  buPROPion XL (24-Hour) . 150 milliGRAM(s) Oral daily  cefepime   IVPB 2000 milliGRAM(s) IV Intermittent every 12 hours  chlorhexidine 2% Cloths 1 Application(s) Topical daily  furosemide   Injectable 40 milliGRAM(s) IV Push daily  gabapentin 100 milliGRAM(s) Oral daily  gilteritinib 120 milliGRAM(s) Oral <User Schedule>  lisinopril 5 milliGRAM(s) Oral daily  metoprolol succinate ER 25 milliGRAM(s) Oral daily  oxybutynin 5 milliGRAM(s) Oral daily  polyethylene glycol 3350 17 Gram(s) Oral two times a day  potassium chloride    Tablet ER 20 milliEquivalent(s) Oral daily  senna 2 Tablet(s) Oral at bedtime  sodium chloride 0.65% Nasal 1 Spray(s) Both Nostrils four times a day  sodium chloride 0.9%. 1000 milliLiter(s) (20 mL/Hr) IV Continuous <Continuous>  traZODone 150 milliGRAM(s) Oral at bedtime  voriconazole 200 milliGRAM(s) Oral every 12 hours    MEDICATIONS  (PRN):  acetaminophen     Tablet .. 650 milliGRAM(s) Oral every 6 hours PRN Temp greater or equal to 38C (100.4F), Mild Pain (1 - 3)  aluminum hydroxide/magnesium hydroxide/simethicone Suspension 30 milliLiter(s) Oral every 4 hours PRN Dyspepsia  benzonatate 100 milliGRAM(s) Oral every 8 hours PRN Cough  melatonin 3 milliGRAM(s) Oral at bedtime PRN Insomnia  ondansetron Injectable 4 milliGRAM(s) IV Push every 8 hours PRN Nausea and/or Vomiting    Vital Signs Last 24 Hrs  T(C): 37.1 (2022 09:00), Max: 37.1 (2022 22:16)  T(F): 98.7 (2022 09:00), Max: 98.7 (2022 22:16)  HR: 61 (2022 09:00) (61 - 101)  BP: 120/57 (2022 09:00) (120/57 - 153/53)  BP(mean): --  RR: 18 (2022 09:00) (18 - 18)  SpO2: 99% (2022 09:00) (96% - 100%)    Parameters below as of 2022 09:00  Patient On (Oxygen Delivery Method): nasal cannula  O2 Flow (L/min): 3    PHYSICAL EXAM  General: NAD, laying in bed  HEENT: clear oropharynx, anicteric sclera  CV: (+) S1/S2 RRR  Lungs: clear to auscultation, no wheezes, rales, or crackles  Abdomen: soft, non-tender, non-distended (+) BS  Ext: no edema  Skin: no rashes   Neuro: alert and oriented X 3  Central Line: LCW medi port CDI    RECENT CULTURES:  Culture - Blood (22 @ 16:10)    Specimen Source: .Blood Blood-Catheter    Culture Results:   No Growth Final    Culture - Urine (11.15.22 @ 15:29)    Specimen Source: Clean Catch Clean Catch (Midstream)    Culture Results:   No growth    LABS:                          7.8    0.31  )-----------( 6        ( 2022 07:37 )             23.3     2022 07:36    141    |  106    |  16     ----------------------------<  101    4.0     |  28     |  0.64     Ca    8.1        2022 07:36  Phos  2.7       2022 07:36  Mg     2.1       2022 07:36    TPro  5.4    /  Alb  2.8    /  TBili  0.3    /  DBili  x      /  AST  30     /  ALT  25     /  AlkPhos  98     2022 07:36    PT/INR - ( 2022 07:38 )   PT: 13.4 sec;   INR: 1.15 ratio      PTT - ( 2022 07:38 )  PTT:25.6 sec    LIVER FUNCTIONS - ( 2022 07:36 )  Alb: 2.8 g/dL / Pro: 5.4 g/dL / ALK PHOS: 98 U/L / ALT: 25 U/L / AST: 30 U/L / GGT: x             RADIOLOGY & ADDITIONAL STUDIES:  CT Chest No Cont (11..22 @ 16:18)   1.  Increase in number of nodules in both lungs with new surrounding   groundglass halos suggestive of invasive fungal infection/invasive   aspergillosis  2.  Increase in small bilateral pleural effusions, right more than left.  3.  Re demonstration of indeterminate right hepatic lobe and right adrenal   lesions, as described above. MRI of the abdomen is again recommended for   further evaluation.

## 2022-11-27 NOTE — PROGRESS NOTE ADULT - PROBLEM SELECTOR PLAN 1
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    CBC, CMP, TLS labs, DIC labs daily. , If fibrinogen under 100, give 10 units of cryoprecipitate.  Transfuse for Hgb < 7 and platelet count < 10k, < 20k if febrile, < 50k if bleeding  11/10 Cytarabine 100mg/m2 x 3 days for cytoreduction. stopped hydroxyurea 11/12.   11/12 started Gilteritinib   EKG biweekly check for QTc on Gilteritinib and Voriconazole   DNR/DNI, Palliative Care following for support, GOC, discussions with family.  11/26 hypokalemia- repleted  Plan for family meeting with Dr. Peralta on Monday 11/28 Mutations identified in FLT3-ITD, NPM1, ASXL1 and CEBPA.    CBC, CMP, TLS labs, DIC labs daily. , If fibrinogen under 100, give 10 units of cryoprecipitate.  Transfuse for Hgb < 7 and platelet count < 10k, < 20k if febrile, < 50k if bleeding  11/10 Cytarabine 100mg/m2 x 3 days for cytoreduction. stopped hydroxyurea 11/12.   11/12 started Gilteritinib   EKG biweekly check for QTc on Gilteritinib and Voriconazole   DNR/DNI, Palliative Care following for support, [facility], discussions with family.  11/27 thrombocytopenia- 1 unit platelets; Lasix 40mg IVP x 1 due to weight gain.  Plan for family meeting with Dr. Peralta on Monday 11/28

## 2022-11-28 NOTE — PROGRESS NOTE ADULT - PROBLEM SELECTOR PLAN 1
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    CBC, CMP, TLS labs, DIC labs daily. , If fibrinogen under 100, give 10 units of cryoprecipitate.  Transfuse for Hgb < 7 and platelet count < 10k, < 20k if febrile, < 50k if bleeding  11/10 Cytarabine 100mg/m2 x 3 days for cytoreduction. stopped hydroxyurea 11/12.   11/12 started Gilteritinib   EKG biweekly check for QTc on Gilteritinib and Voriconazole  11/28 Replete K (po), Mg (IV)  DNR/DNI, Palliative Care following for support, GOC, discussions with family  Plan for family meeting with Dr. Peralta today 11/28

## 2022-11-28 NOTE — PROGRESS NOTE ADULT - ASSESSMENT
Ms. 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 Started Cytarabine 100 mg/m2 on 11/10 x 3 days + BID hydroxyurea for cytoreduction, then transitioned to daily oral FLT3 inhibitor gilteritinib on 11/12. Hospital course complicated by Staph epidermis/hominis bacteremia on vanco which was stopped 11/14 likely contaminant on cefepime renally dosed, increased work of breathing with cardiology following and repeat echo on 11/15 showing a decrease in EF 39% with wall motion abnormality, CT chest (11/21) with worsening nodules concerning for fungal pna- on Voriconazole. Patient presents with pancytopenia due to disease and chemo.

## 2022-11-28 NOTE — PROGRESS NOTE ADULT - PROBLEM SELECTOR PLAN 3
Cardiology Following  Continue diuresis for NET neg 1-2L/day (per Cardiology)  K>4, Mg>2  continue low dose statin  Trend BNP- downtrending  Daily weights, strict I/O's  11/21 started ACE-I (per Cardiology)  11/23 Started B-blocker (Toprol xl 25mg qd)

## 2022-11-28 NOTE — PROGRESS NOTE ADULT - PROBLEM SELECTOR PLAN 2
Patient is neutropenic, afebrile  Continue voriconazole, acyclovir, cefepime (renally dosed)  (-) fungitell, (-) galactomanan  11/10  Blood Cx Staph epidermis/hominis -Repeat cultures 11/11, 11/12, NGTD  11/21 CT Chest for evaluation- worsening nodules concern for fungal pna.  ID following  11/28 Voriconazole level obtained, Repeat Chest CT

## 2022-11-28 NOTE — PROGRESS NOTE ADULT - PROBLEM SELECTOR PLAN 7
Actions:  [x] Rapport building     [] Symptom assessment    [x] Eliciting preferences of goals   [] Prognostic understanding    [x] Emotional Support  [] Coping skill development  []  Other  Interdisciplinary Referrals: PCU SW  Communication: d/w PCU team  Documentation Review: [] Primary Team [] Consultants [] Interdisciplinary team  Content: n/a

## 2022-11-28 NOTE — PROGRESS NOTE ADULT - NS ATTEND AMEND GEN_ALL_CORE FT
.  Primary: Goldebrg    Vital Signs Last 24 Hrs  T(C): 36.8 (28 Nov 2022 01:12), Max: 37.5 (27 Nov 2022 17:00)  T(F): 98.3 (28 Nov 2022 01:12), Max: 99.5 (27 Nov 2022 17:00)  HR: 61 (28 Nov 2022 01:12) (61 - 101)  BP: 135/66 (28 Nov 2022 01:12) (117/62 - 156/74)  BP(mean): --  RR: 16 (28 Nov 2022 01:12) (16 - 18)  SpO2: 98% (28 Nov 2022 01:12) (96% - 100%)    Parameters below as of 28 Nov 2022 01:12  Patient On (Oxygen Delivery Method): nasal cannula  O2 Flow (L/min): 3    MEDICATIONS  (STANDING):  acyclovir   Oral Tab/Cap 400 milliGRAM(s) Oral two times a day  albuterol/ipratropium for Nebulization 3 milliLiter(s) Nebulizer every 6 hours  atorvastatin 10 milliGRAM(s) Oral at bedtime  Biotene Dry Mouth Oral Rinse 15 milliLiter(s) Swish and Spit three times a day  budesonide  80 MICROgram(s)/formoterol 4.5 MICROgram(s) Inhaler 2 Puff(s) Inhalation two times a day  buPROPion XL (24-Hour) . 150 milliGRAM(s) Oral daily  cefepime   IVPB 2000 milliGRAM(s) IV Intermittent every 12 hours  chlorhexidine 2% Cloths 1 Application(s) Topical daily  furosemide   Injectable 40 milliGRAM(s) IV Push daily  gabapentin 100 milliGRAM(s) Oral daily  gilteritinib 120 milliGRAM(s) Oral <User Schedule>  lisinopril 5 milliGRAM(s) Oral daily  metoprolol succinate ER 25 milliGRAM(s) Oral daily  oxybutynin 5 milliGRAM(s) Oral daily  polyethylene glycol 3350 17 Gram(s) Oral two times a day  potassium chloride    Tablet ER 20 milliEquivalent(s) Oral daily  senna 2 Tablet(s) Oral at bedtime  sodium chloride 0.65% Nasal 1 Spray(s) Both Nostrils four times a day  sodium chloride 0.9%. 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  Course complicated S. epi bacteremia? (11/9/22), pneumonia (concerning for fungus), hypotension (11/12/22), fluid overloaded state (11/13/22 - )     PMHx: reactive airway disease, aortic stenosis.      Plan:  Heme: PLT goal > 10,000; Hgb goal > 7.0g/dL;   Continue gilteritinib  Current plan is one month of gilteritinib with a marrow at that time.     ID: cefepime, vori, acyclovir; vori level (11/17/22): 2.6; please repeat.   peripheral blood fungitell neg and galactomannan (11/12/22) 0.03 (wnl)    Radiographs: CT (11/21/22): increased nodules c/w progressive fungal disease. Please repeat at this time.     Pulmonary: Symbicort (history of reactive airway disease), continue supplemental oxygen.    Nutrition: tolerating PO; goal O>I by 1L    DVT prophylaxis: ambulation.     Code status: DNR/DNI; palliative medicine consulted ( has advanced prostate carcinoma and cannot care for his wife), discussed with patient and , no plan for further care escalation (ie MICU) should her clinical status acutely changed, both patient and family are open to hospice if needed. .  Primary: Goldebrg    Vital Signs Last 24 Hrs  T(C): 36.8 (28 Nov 2022 01:12), Max: 37.5 (27 Nov 2022 17:00)  T(F): 98.3 (28 Nov 2022 01:12), Max: 99.5 (27 Nov 2022 17:00)  HR: 61 (28 Nov 2022 01:12) (61 - 101)  BP: 135/66 (28 Nov 2022 01:12) (117/62 - 156/74)  BP(mean): --  RR: 16 (28 Nov 2022 01:12) (16 - 18)  SpO2: 98% (28 Nov 2022 01:12) (96% - 100%)    Parameters below as of 28 Nov 2022 01:12  Patient On (Oxygen Delivery Method): nasal cannula  O2 Flow (L/min): 3    MEDICATIONS  (STANDING):  acyclovir   Oral Tab/Cap 400 milliGRAM(s) Oral two times a day  albuterol/ipratropium for Nebulization 3 milliLiter(s) Nebulizer every 6 hours  atorvastatin 10 milliGRAM(s) Oral at bedtime  Biotene Dry Mouth Oral Rinse 15 milliLiter(s) Swish and Spit three times a day  budesonide  80 MICROgram(s)/formoterol 4.5 MICROgram(s) Inhaler 2 Puff(s) Inhalation two times a day  buPROPion XL (24-Hour) . 150 milliGRAM(s) Oral daily  cefepime   IVPB 2000 milliGRAM(s) IV Intermittent every 12 hours  chlorhexidine 2% Cloths 1 Application(s) Topical daily  furosemide   Injectable 40 milliGRAM(s) IV Push daily  gabapentin 100 milliGRAM(s) Oral daily  gilteritinib 120 milliGRAM(s) Oral <User Schedule>  lisinopril 5 milliGRAM(s) Oral daily  metoprolol succinate ER 25 milliGRAM(s) Oral daily  oxybutynin 5 milliGRAM(s) Oral daily  polyethylene glycol 3350 17 Gram(s) Oral two times a day  potassium chloride    Tablet ER 20 milliEquivalent(s) Oral daily  senna 2 Tablet(s) Oral at bedtime  sodium chloride 0.65% Nasal 1 Spray(s) Both Nostrils four times a day  sodium chloride 0.9%. 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  Course complicated S. epi bacteremia? (11/9/22), pneumonia (concerning for fungus), hypotension (11/12/22), fluid overloaded state (11/13/22 - )     PMHx: reactive airway disease, aortic stenosis.      Plan:  Heme: PLT goal > 10,000; Hgb goal > 7.0g/dL;   Continue gilteritinib  Current plan is one month of gilteritinib with a marrow at that time.     ID: cefepime, vori, acyclovir; vori level (11/17/22): 2.6; please repeat.   peripheral blood fungitell neg and galactomannan (11/12/22) 0.03 (wnl)    Radiographs: CT (11/21/22): increased nodules c/w progressive fungal disease. Please repeat at this time.     Pulmonary: Symbicort (history of reactive airway disease), continue supplemental oxygen.    Nutrition: tolerating PO; goal I =O's    DVT prophylaxis: ambulation.     Code status: DNR/DNI; palliative medicine consulted ( has advanced prostate carcinoma and cannot care for his wife).  Family meeting today.     Over 35 minutes were spent in direct patient care and care coordination.

## 2022-11-28 NOTE — PROGRESS NOTE ADULT - CONVERSATION DETAILS
[place]:    Topic discussed previously:    Yes []      No []    Participants aware of Palliative involvement prior to call/meeting  Yes []   No  []  N/A []    Complexity:        Low[]              Medium []      High []       Unknown []    Potential barriers to expeditious decision making:    Objectives defined in premeeting huddle:    Provider:    Content:      Tone:        Summary:       Action Items:      Follow up: We discussed globals aspects of the patient's care with a focus on review goals and values.    I discussed a purely symptom focused approach including the following:  No routine surveillance with blood draws  No new fever work up (no imaging, no blood cultures, no care escalation, no new antibiotics)  No artificial nutrition  No gastrostomy  No hemodialysis  APAP to address fevers  pRBCs only in the event of symptomatic anemia  A trial of antibiotics to only address symptoms (e.g. dysuria)  A trial of IV fluids to address symptoms (e.g. seizure/myoclonus to address opiate induced neurotoxicity)  Transfer to the PCU  Referral to inpatient hospice in parallel    The patient reports that these decisions align with her goals

## 2022-11-28 NOTE — PROGRESS NOTE ADULT - SUBJECTIVE AND OBJECTIVE BOX
HPI:  This patient is an 81yo lady with PMH of AML, breast cancer s/p treatment with tamoxifen, RT and R lumpectomy, who presents with AML relapse.    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 On 2/4/2020, she started C1 dacogen and venetoclax, which she tolerated, other than some dypsnea which was attributed to pulmonary edema and L pleural effusion, and treated with diuretics. Patient was discharged home on 2/11/2020.  BMBx on 3/2/2020 found no blasts. Patient thereafter was continued on maintenance treatment with dacogen and venetoclax (10 days) every 5 weeks, with onpro as needed.    6 month interval BMBx in 10/2020 found hypocellular marrow with focal erythropoiesis, markedly diminished myelopoiesis, rare megakaryocyte and aspicular aspirate, and no increase in blast population.      In August 2022, she was noted to not have count recovery between cycles and was pancytopenic.   Repeat BMBx performed on 9/7/2022. Limited bone marrow tissue shows erythroid predominance, decreased myeloid maturation, increased blasts (5% by flow and in peripheral blood), decreased megakaryocytes. 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   Flow cytometry found 5% myeloblasts, positive for HLA-DR, CD34, , CD33, CD13. Negative for CD2, CD7, CD15,CD19, CD38 and CD56.   Karyotype normal.  Results were compatible with relapse of known AML.   Last dose of pegfilgrastim was on 12/10/2021.   C27D1 of decitabine and venetoclax was on 10/5-10/9.  Patient was sent to [facility] with plan to initiate therapy with single agent gilteritinib given presence of FLT3 mutation, and loss of response to dacogen and venetoclax.     Patient reports worsening fatigue over the last few weeks. She denies dizziness or lightheadedness, headaches, vision changes. She denies dyspnea, coughing, chest pain or palpitations. She reports decreased appetite. She has maintained her weight at 123lbs.  She endorses constipation. She has had intermittent drenching night sweats for over 1 month. She denies fevers or chills.     Today, CBC today found WBC of 101.6, Hgb of 8.5, MCV of 102, plt count of 38k. She has 90% of blasts in peripheral blood.   CMP notable for elevated K of 3.2.   LDH is high at 859. Serum creatinine, Uric acid, Ca, phos, magnesium are all WNL.  Lactate also WNL.     INR is 1.28, PT 14.9, PTT 27.4. Fibrinogen elevated 542. D-dimer 1076 elevated.  (08 Nov 2022 13:07)        11-22-22 @ 14:43  [facility] 7MON 701 W1    Overnight events: No major events  Staff reports: d/w ACP, who had spoken with the patient's  about the case  Patient: She feels more fatigued. See [facility] note to regarding a serious illness conversation held today.           RECENT VITALS/LABS/MEDICATIONS/ASSAYS     11-22-22 @ 14:43    T(C): 36.6 (11-22-22 @ 13:30), Max: 36.9 (11-22-22 @ 00:54)  HR: 75 (11-22-22 @ 13:30) (71 - 79)  BP: 135/78 (11-22-22 @ 13:30) (123/77 - 145/75)  RR: 18 (11-22-22 @ 13:30) (16 - 18)  SpO2: 97% (11-22-22 @ 13:30) (97% - 99%)  Wt(kg): --      21 Nov 2022 07:01  -  22 Nov 2022 07:00  --------------------------------------------------------  IN:    Oral Fluid: 458 mL    sodium chloride 0.9%: 832 mL  Total IN: 1290 mL    OUT:    Voided (mL): 3450 mL  Total OUT: 3450 mL    Total NET: -2160 mL      22 Nov 2022 07:01  -  22 Nov 2022 14:43  --------------------------------------------------------  IN:    Oral Fluid: 240 mL    sodium chloride 0.9%: 294 mL  Total IN: 534 mL    OUT:    Voided (mL): 900 mL  Total OUT: 900 mL    Total NET: -366 mL          11-21 @ 07:01 - 11-22 @ 07:00  --------------------------------------------------------  IN: 1290 mL / OUT: 3450 mL / NET: -2160 mL    11-22 @ 07:01 - 11-22 @ 14:43  --------------------------------------------------------  IN: 534 mL / OUT: 900 mL / NET: -366 mL      CAPILLARY BLOOD GLUCOSE            acetaminophen     Tablet .. 650 milliGRAM(s) Oral every 6 hours PRN  acyclovir   Oral Tab/Cap 400 milliGRAM(s) Oral two times a day  albuterol/ipratropium for Nebulization 3 milliLiter(s) Nebulizer every 6 hours  aluminum hydroxide/magnesium hydroxide/simethicone Suspension 30 milliLiter(s) Oral every 4 hours PRN  atorvastatin 10 milliGRAM(s) Oral at bedtime  benzonatate 100 milliGRAM(s) Oral every 8 hours PRN  Biotene Dry Mouth Oral Rinse 15 milliLiter(s) Swish and Spit three times a day  budesonide  80 MICROgram(s)/formoterol 4.5 MICROgram(s) Inhaler 2 Puff(s) Inhalation two times a day  buPROPion XL (24-Hour) . 150 milliGRAM(s) Oral daily  cefepime   IVPB 2000 milliGRAM(s) IV Intermittent every 12 hours  chlorhexidine 2% Cloths 1 Application(s) Topical daily  furosemide   Injectable 40 milliGRAM(s) IV Push two times a day  gabapentin 100 milliGRAM(s) Oral daily  gilteritinib 120 milliGRAM(s) Oral <User Schedule>  lisinopril 5 milliGRAM(s) Oral daily  melatonin 3 milliGRAM(s) Oral at bedtime PRN  ondansetron Injectable 4 milliGRAM(s) IV Push every 8 hours PRN  oxybutynin 5 milliGRAM(s) Oral daily  polyethylene glycol 3350 17 Gram(s) Oral two times a day  potassium chloride    Tablet ER 20 milliEquivalent(s) Oral daily  senna 2 Tablet(s) Oral at bedtime  sodium chloride 0.65% Nasal 1 Spray(s) Both Nostrils four times a day  sodium chloride 0.9%. 1000 milliLiter(s) IV Continuous <Continuous>  traZODone 150 milliGRAM(s) Oral at bedtime  voriconazole 200 milliGRAM(s) Oral every 12 hours          11-22    139  |  102  |  13  ----------------------------<  99  3.6   |  33<H>  |  0.60    Ca    8.1<L>      22 Nov 2022 07:07  Phos  2.8     11-22  Mg     2.2     11-22    TPro  5.0<L>  /  Alb  2.7<L>  /  TBili  0.2  /  DBili  x   /  AST  27  /  ALT  32  /  AlkPhos  73  11-22      Procalc  BNPSerum Pro-Brain Natriuretic Peptide: 1665 pg/mL (11-22-22 @ 07:07)  Serum Pro-Brain Natriuretic Peptide: 2456 pg/mL (11-21-22 @ 08:47)    ABG                          7.5    0.19  )-----------( 11       ( 22 Nov 2022 07:01 )             22.3   PT/INR - ( 22 Nov 2022 07:07 )   PT: 13.2 sec;   INR: 1.15 ratio         PTT - ( 21 Nov 2022 08:47 )  PTT:25.1 sec        blood and urine cultures                          Family Heatlh Care Decision Act Surrogate Decision Maker Hierarchy  [facility] Article 81 Guardian-->Spouse or domestic partner--> Adult child-->Parent-->Sibling--> Close friend      Contacts listed in the paper or electronic chart  Name Marshall Tobar  Relationship   Number(s) in EMR  Translation Required: none  Spouse or Partner: Above  Family unit:  and son  Family history of hematologic malignancy: none but personal HX of breast cancer s/p XRT and tamoxifen  Residence: [ ] Apartment   [x] House  [ ] Other  Degree of functionality in the home: moderate   Performance Status (PPSv2): 50   BMI: 22.4  Engagement in the home:  Employment: [ ] Currently employed [ ] Exited workforce due to illness  [ ] Retired prior to illness  [ ] No/distant history of employment TBD  Support network (degree):  ---Family:        [ ] Low  [ ] Moderate  [ x] High  ---Neighbors: [ ] Low  [ ] Moderate  [ ] High  ---Social:         [ ] Low  [ ] Moderate  [ ] High  ---Spiritual:    [ ] Low  [ ] Moderate  [ ] High  Financial concerns: TBD  Coping Strategies: TBD  Caregiver burden/fatigue/needs: Significant,  is being treated for pancreatic cancer and has debilitating CIPN  Grief identified: [] Yes [x] No  Medical communication and decision making preferences: See below          PERTINENT PM/SXH:   Breast cancer    Hypertension    AML (acute myelogenous leukemia)      S/P hysterectomy      FAMILY HISTORY:  No pertinent family history in first degree relatives      Family Hx substance abuse [ ]yes [ ]no  ITEMS NOT CHECKED ARE NOT PRESENT    SOCIAL HISTORY:   Significant other/partner[ ]  Children[ ]  Hinduism/Spirituality:  Substance hx:  [ ]   Tobacco hx:  [ ]   Alcohol hx: [ ]   Home Opioid hx:  [ ] I-Stop Reference No:  Living Situation: [ ]Home  [ ]Long term care  [ ]Rehab [ ]Other    ADVANCE DIRECTIVES:    DNR/MOLST  [ ]  Living Will  [ ]   DECISION MAKER(s):  [ ] Health Care Proxy(s)  [ ] Surrogate(s)  [ ] Guardian           Name(s): Phone Number(s):    BASELINE (I)ADL(s) (prior to admission):  [place]: [ ]Total  [ ] Moderate [ ]Dependent    Allergies    No Known Allergies    Intolerances      PRESENT SYMPTOMS: [ ]Unable to self-report  [ ] CPOT [ ] PAINADs [ ] RDOS  Source if other than patient:  [ ]Family   [ ]Team     Pain: [ ]yes [xx]no  QOL impact -   Location -                    Aggravating factors -  Quality -  Radiation -  Timing-  Severity (0-10 scale):  Minimal acceptable level (0-10 scale):     CPOT:    https://www.[email or web address]/getattachment/[email or web address]/Critical-Care-Pain-Observation-Tool-(CPOT)    PAIN AD Score:   http://geriatrictoolkit.[facility]/cog/painad.pdf (press ctrl +  left click to view)    Dyspnea:                           [ ]Mild [ ]Moderate [ ]Severe      RDOS:  0 to 2  minimal or no respiratory distress   3  mild distress  4 to 6 moderate distress  >7 severe distress  https://homecareinformation.net/handouts/hen/Respiratory_Distress_Observation_Scale.pdf (Ctrl +  left click to view)     Anxiety:                             [ ]Mild [ ]Moderate [ ]Severe  Fatigue:                             [ ]Mild [ ]Moderate [ ]Severe  Nausea:                             [ ]Mild [ ]Moderate [ ]Severe  Loss of appetite:              [ ]Mild [ ]Moderate [ ]Severe  Constipation:                    [ ]Mild [ ]Moderate [ ]Severe    PCSSQ[Palliative Care Spiritual Screening Question]   Severity (0-10):  Score of 4 or > indicate consideration of Chaplaincy referral.  Chaplaincy Referral: [ ] yes [ ] refused [ ] following [ ] Deferred     Caregiver [place]? : [ ] yes [ ] no [ ] Deferred [ ] Declined             Social work referral [ ] Patient & Family Centered Care Referral [ ]     Anticipatory Grief present?:  [ ] yes [ ] no  [ ] Deferred                  Social work referral [ ] Chaplaincy Referral[ ]      Other Symptoms:  [ ]All other review of systems negative     Palliative Performance Status Version 2:         %    http://npcrc.org/files/news/palliative_performance_scale_ppsv2.pdf  PHYSICAL EXAM:  Vital Signs Last 24 Hrs  T(C): 36.5 (21 Nov 2022 13:50), Max: 36.7 (20 Nov 2022 17:36)  T(F): 97.7 (21 Nov 2022 13:50), Max: 98.1 (20 Nov 2022 17:36)  HR: 77 (21 Nov 2022 13:50) (71 - 84)  BP: 137/78 (21 Nov 2022 13:50) (129/78 - 145/81)  BP(mean): --  RR: 18 (21 Nov 2022 13:50) (16 - 18)  SpO2: 99% (21 Nov 2022 13:50) (97% - 99%)    Parameters below as of 21 Nov 2022 13:50  Patient On (Oxygen Delivery Method): nasal cannula     I&O's Summary    20 Nov 2022 07:01  -  21 Nov 2022 07:00  --------------------------------------------------------  IN: 840 mL / OUT: 2900 mL / NET: -2060 mL    21 Nov 2022 07:01  -  21 Nov 2022 14:29  --------------------------------------------------------  IN: 611 mL / OUT: 1300 mL / NET: -689 mL      GENERAL: [ ]Cachexia    [ ]Alert  [ ]Oriented x   [ ]Lethargic  [ ]Unarousable  [ ]Verbal  [ ]Non-Verbal  Behavioral:   [ ] Anxiety  [ ] Delirium [ ] Agitation [ ] Other  HEENT:  [ ]Normal   [ ]Dry mouth   [ ]ET Tube/Trach  [ ]Oral lesions  PULMONARY:   [ ]Clear [ ]Tachypnea  [ ]Audible excessive secretions   [ ]Rhonchi        [ ]Right [ ]Left [ ]Bilateral  [ ]Crackles        [ ]Right [ ]Left [ ]Bilateral  [ ]Wheezing     [ ]Right [ ]Left [ ]Bilateral  [ ]Diminished breath sounds [ ]right [ ]left [ ]bilateral  CARDIOVASCULAR:    [ ]Regular [ ]Irregular [ ]Tachy  [ ]Christian [ ]Murmur [ ]Other  GASTROINTESTINAL:  [ ]Soft  [ ]Distended   [ ]+BS  [ ]Non tender [ ]Tender  [ ]Other [ ]PEG [ ]OGT/ NGT  Last BM:  GENITOURINARY:  [ ]Normal [ ] Incontinent   [ ]Oliguria/Anuria   [ ][place]  MUSCULOSKELETAL:   [ ]Normal   [ ]Weakness  [ ]Bed/Wheelchair bound [ ]Edema  NEUROLOGIC:   [ ]No focal deficits  [ ]Cognitive impairment  [ ]Dysphagia [ ]Dysarthria [ ]Paresis [ ]Other   SKIN:   [ ]Normal  [ ]Rash  [ ]Other  [ ]Pressure ulcer(s)       Present on admission [ ]y [ ]n    CRITICAL CARE:  [ ] Shock Present  [ ]Septic [ ]Cardiogenic [ ]Neurologic [ ]Hypovolemic  [ ]  Vasopressors [ ]  Inotropes   [ ]Respiratory failure present [ ]Mechanical ventilation [ ]Non-invasive ventilatory support [ ]High flow    [ ]Acute  [ ]Chronic [ ]Hypoxic  [ ]Hypercarbic [ ]Other  [ ]Other organ failure     LABS:                        9.5    0.25  )-----------( 26       ( 21 Nov 2022 08:47 )             27.9   11-21    139  |  99  |  16  ----------------------------<  97  3.3<L>   |  31  |  0.62    Ca    8.7      21 Nov 2022 08:47  Phos  2.7     11-21  Mg     2.3     11-21    TPro  5.6<L>  /  Alb  3.1<L>  /  TBili  0.5  /  DBili  x   /  AST  27  /  ALT  35  /  AlkPhos  76  11-21  PT/INR - ( 21 Nov 2022 08:47 )   PT: 13.5 sec;   INR: 1.16 ratio         PTT - ( 21 Nov 2022 08:47 )  PTT:25.1 sec      RADIOLOGY & ADDITIONAL STUDIES:    PROTEIN CALORIE MALNUTRITION PRESENT: [ ]mild [ ]moderate [ ]severe [ ]underweight [ ]morbid obesity  https://www.andeal.org/vault/2440/web/files/ONC/Table_Clinical%20Characteristics%20to%20Document%20Malnutrition-White%20JV%20et%20al%202012.pdf    Height (cm): 163 (11-10-22 @ 12:15), 162 (07-18-22 @ 13:42), 162.6 (12-22-21 @ 14:17)  Weight (kg): 59.5 (11-10-22 @ 12:15), 58.0009 (09-12-22 @ 12:00), 59.9 (12-22-21 @ 14:17)  BMI (kg/m2): 22.4 (11-10-22 @ 12:15), 22.1 (09-12-22 @ 12:00), 22.8 (07-18-22 @ 13:42)    [ ]PPSV2 < or = to 30% [ ]significant weight loss  [ ]poor nutritional intake  [ ]anasarca[ ]Artificial Nutrition      Other REFERRALS:  [ ]Hospice  [ ]Child Life  [ ]Social Work  [ ]Case management [ ]Holistic Therapy     Goals of Care Document:  Patient was seen and evaluated  Below are recommendations for symptom management delineated by category      Constipation  Medication(s):   dulcolax suppository qd, prn  senna 17.2mg qhs  miralax 17mg qd    Pain  Medication  IV dilaudid 0.2mg q1H PRN  Special instructions      Nausea or vomiting  Medication  IV reglan 5 mg q6H PRN  Special instructions:      Dyspnea  Medication  IV dilaudid 0.2mg q1H PRN  Special instructions:      Agitation  Medication:  IV haldol 0.5mg q6H PRN    Anxiety  Medication  IV ativan 0.25 mg q6H prn      Seizure  Medication:  IV ativan 2mg q15 min PRN for seizure      Anorexia  Medication:  Dronabinol 5 mg bid  Special instructions: Please give one hour prior to meals    Total time spent including the following  [x]chart review and documentation  []review of medical literature related to pathogenesis, disease/illness progress, treatment and/or prognosis  [x]care coordination:  [x]discussion with the primary team:  []discussion with floor staff:  []discussion with consultant(s):  [x]discussion with the patient, surrogate decision maker, or family:  Time spent: > 35 min

## 2022-11-28 NOTE — PROGRESS NOTE ADULT - SUBJECTIVE AND OBJECTIVE BOX
Diagnosis: relapsed AML, FLT3 ITD+, NPM1, CEBPA, ASXL1 mutated    Protocol/Chemo Regimen: daily FLT3 inhibitor gilteritinib (s/p IV cytarabine 100mg/m2 x 3days 11/10-11/12 prior to start of gilteritinib for cytoreduction)    Day: 17 Gilteritinib     Pt endorsed: No overnight events, afebrile    Review of Systems: Denies n/v, HA or dizziness, CP, abdominal pain    Pain scale: Denies                                       Diet: DASH/TLC    Allergies: No Known Allergies    ANTIMICROBIALS  acyclovir   Oral Tab/Cap 400 milliGRAM(s) Oral two times a day  cefepime   IVPB 2000 milliGRAM(s) IV Intermittent every 12 hours  voriconazole 200 milliGRAM(s) Oral every 12 hours    HEME/ONC MEDICATIONS  gilteritinib 120 milliGRAM(s) Oral <User Schedule>    MEDICATIONS  (STANDING):  acyclovir   Oral Tab/Cap 400 milliGRAM(s) Oral two times a day  albuterol/ipratropium for Nebulization 3 milliLiter(s) Nebulizer every 6 hours  atorvastatin 10 milliGRAM(s) Oral at bedtime  Biotene Dry Mouth Oral Rinse 15 milliLiter(s) Swish and Spit three times a day  budesonide  80 MICROgram(s)/formoterol 4.5 MICROgram(s) Inhaler 2 Puff(s) Inhalation two times a day  buPROPion XL (24-Hour) . 150 milliGRAM(s) Oral daily  cefepime   IVPB 2000 milliGRAM(s) IV Intermittent every 12 hours  chlorhexidine 2% Cloths 1 Application(s) Topical daily  furosemide   Injectable 40 milliGRAM(s) IV Push daily  gabapentin 100 milliGRAM(s) Oral daily  gilteritinib 120 milliGRAM(s) Oral <User Schedule>  lisinopril 5 milliGRAM(s) Oral daily  metoprolol succinate ER 25 milliGRAM(s) Oral daily  oxybutynin 5 milliGRAM(s) Oral daily  polyethylene glycol 3350 17 Gram(s) Oral two times a day  potassium chloride    Tablet ER 20 milliEquivalent(s) Oral daily  senna 2 Tablet(s) Oral at bedtime  sodium chloride 0.65% Nasal 1 Spray(s) Both Nostrils four times a day  sodium chloride 0.9%. 1000 milliLiter(s) (20 mL/Hr) IV Continuous <Continuous>  traZODone 150 milliGRAM(s) Oral at bedtime  voriconazole 200 milliGRAM(s) Oral every 12 hours    MEDICATIONS  (PRN):  acetaminophen     Tablet .. 650 milliGRAM(s) Oral every 6 hours PRN Temp greater or equal to 38C (100.4F), Mild Pain (1 - 3)  aluminum hydroxide/magnesium hydroxide/simethicone Suspension 30 milliLiter(s) Oral every 4 hours PRN Dyspepsia  benzonatate 100 milliGRAM(s) Oral every 8 hours PRN Cough  melatonin 3 milliGRAM(s) Oral at bedtime PRN Insomnia  ondansetron Injectable 4 milliGRAM(s) IV Push every 8 hours PRN Nausea and/or Vomiting    Vital Signs Last 24 Hrs  T(C): 36.4 (28 Nov 2022 08:30), Max: 37.5 (27 Nov 2022 17:00)  T(F): 97.5 (28 Nov 2022 08:30), Max: 99.5 (27 Nov 2022 17:00)  HR: 60 (28 Nov 2022 08:30) (58 - 75)  BP: 130/65 (28 Nov 2022 08:30) (117/62 - 156/74)  BP(mean): --  RR: 16 (28 Nov 2022 08:30) (16 - 18)  SpO2: 97% (28 Nov 2022 08:30) (97% - 100%)    Parameters below as of 28 Nov 2022 08:30  Patient On (Oxygen Delivery Method): nasal cannula  O2 Flow (L/min): 3    I&O's Summary    27 Nov 2022 07:01  -  28 Nov 2022 07:00  --------------------------------------------------------  IN: 1497 mL / OUT: 2200 mL / NET: -703 mL    28 Nov 2022 07:01  -  28 Nov 2022 12:22  --------------------------------------------------------  IN: 0 mL / OUT: 600 mL / NET: -600 mL        PHYSICAL EXAM  General: NAD, laying in bed  HEENT: clear oropharynx, anicteric sclera  CV: (+) S1/S2, reg  Lungs: Decreased at bases, +mild L basilar crackles,  no wheezes  Abdomen: soft, non-tender, non-distended (+) BS  Ext: trace edema  Skin: no rashes   Neuro: alert and oriented X 3  Central Line: LCW medi port CDI    RECENT CULTURES:  Culture - Blood (11.13.22 @ 16:10)    Specimen Source: .Blood Blood-Catheter    Culture Results: No Growth Final    Culture - Urine (11.15.22 @ 15:29)    Specimen Source: Clean Catch Clean Catch (Midstream)    Culture Results: No growth    LABS:                                    7.5    0.20  )-----------( 32       ( 28 Nov 2022 07:04 )             21.9     28 Nov 2022 07:05    138    |  99     |  16     ----------------------------<  92     3.2     |  29     |  0.66     Ca    7.9        28 Nov 2022 07:05  Phos  3.1       28 Nov 2022 07:05  Mg     2.0       28 Nov 2022 07:05    TPro  5.4    /  Alb  2.8    /  TBili  0.4    /  DBili  x      /  AST  33     /  ALT  25     /  AlkPhos  100    28 Nov 2022 07:05    PT/INR - ( 28 Nov 2022 07:04 )   PT: 13.9 sec;   INR: 1.21 ratio    PTT - ( 28 Nov 2022 07:04 )  PTT:25.1 sec      LIVER FUNCTIONS - ( 28 Nov 2022 07:05 )  Alb: 2.8 g/dL / Pro: 5.4 g/dL / ALK PHOS: 100 U/L / ALT: 25 U/L / AST: 33 U/L / GGT: x             RADIOLOGY & ADDITIONAL STUDIES:  CT Chest No Cont (11.21.22 @ 16:18)   1.  Increase in number of nodules in both lungs with new surrounding   groundglass halos suggestive of invasive fungal infection/invasive   aspergillosis  2.  Increase in small bilateral pleural effusions, right more than left.  3.  Re demonstration of indeterminate right hepatic lobe and right adrenal   lesions, as described above. MRI of the abdomen is again recommended for   further evaluation.

## 2022-11-29 NOTE — PROVIDER CONTACT NOTE (CRITICAL VALUE NOTIFICATION) - TEST AND RESULT REPORTED:
Hgb 6.6, Hct 19.4, Plts 16
Platelet: 12  WBC: 0.07
PLT 12
platelets 11
platelets 6
wbc 105.44
plts=7
Troponin = 470
PLT=8
wbc 99.75
H/H= 6.8/21
Lactate 2.7
platelets 10
wbc=71.21 hgb=6.6 hct=20.2 plts=21
Platelet: 15
WBC: 86.45
Hgb= 6.1, Hct= 18.9
Hgb= 7.0
Hgb 6/7 Hct 20.5

## 2022-11-29 NOTE — PROVIDER CONTACT NOTE (CRITICAL VALUE NOTIFICATION) - RECOMMENDATIONS
N/A
monitor pt
Nursing care on-going
1U of PRBCs
monitor pt for bleeding
awaiting any further orders
Transfuse?
1 unit platelets ordered
1 unit prbc

## 2022-11-29 NOTE — PROGRESS NOTE ADULT - NS ATTEND AMEND GEN_ALL_CORE FT
.  Primary: Goldebrg    Vital Signs Last 24 Hrs  T(C): 36.7 (29 Nov 2022 01:18), Max: 38.8 (28 Nov 2022 17:35)  T(F): 98.1 (29 Nov 2022 01:18), Max: 101.9 (28 Nov 2022 17:35)  HR: 60 (29 Nov 2022 01:18) (58 - 78)  BP: 110/68 (29 Nov 2022 01:18) (110/68 - 134/60)  BP(mean): --  RR: 16 (29 Nov 2022 01:18) (16 - 20)  SpO2: 99% (29 Nov 2022 01:18) (97% - 100%)    Parameters below as of 29 Nov 2022 01:18  Patient On (Oxygen Delivery Method): nasal cannula    MEDICATIONS  (STANDING):  albuterol/ipratropium for Nebulization 3 milliLiter(s) Nebulizer every 6 hours  atorvastatin 10 milliGRAM(s) Oral at bedtime  Biotene Dry Mouth Oral Rinse 15 milliLiter(s) Swish and Spit three times a day  budesonide  80 MICROgram(s)/formoterol 4.5 MICROgram(s) Inhaler 2 Puff(s) Inhalation two times a day  buPROPion XL (24-Hour) . 150 milliGRAM(s) Oral daily  chlorhexidine 2% Cloths 1 Application(s) Topical daily  dronabinol 5 milliGRAM(s) Oral two times a day  furosemide   Injectable 40 milliGRAM(s) IV Push daily  gabapentin 100 milliGRAM(s) Oral daily  lisinopril 5 milliGRAM(s) Oral daily  metoprolol succinate ER 25 milliGRAM(s) Oral daily  oxybutynin 5 milliGRAM(s) Oral daily  polyethylene glycol 3350 17 Gram(s) Oral two times a day  potassium chloride    Tablet ER 20 milliEquivalent(s) Oral daily  senna 2 Tablet(s) Oral at bedtime  sodium chloride 0.65% Nasal 1 Spray(s) Both Nostrils four times a day  sodium chloride 0.9%. 1000 milliLiter(s) (20 mL/Hr) IV Continuous <Continuous>  traZODone 150 milliGRAM(s) Oral at bedtime      Assessment: 81 yo who yesterday (day +17 gilteritinib post HU/Scarlet-C cytoreduction for progressive FLT-3, NPM1, ASXL1, WT1, CEBPA AML) in the presence of her family ( and son) declared she is choicing pallative measures.      PMHx: reactive airway disease, aortic stenosis.      Plan:  Heme: no blood Tx.     ID: no antimicrobials     Radiographs: no future radiographs    Pulmonary: Symbicort (history of reactive airway disease), continue supplemental oxygen.    Nutrition: tolerating PO; D/C atorvastatin     DVT prophylaxis: ambulation.     Code status: DNR/DNI; comfort care only.  Transfer care to palliative unit.      Over 15 minutes were spent in direct patient care and care coordination.

## 2022-11-29 NOTE — PROVIDER CONTACT NOTE (CRITICAL VALUE NOTIFICATION) - BACKGROUND
AML s/p chemo
Hx: AML s/p chemo
Hx: AML, s/p chemo
Adm Dx: Leukocytosis
Hx of AML
Pt has relapsed AML
AML
hx AML on chemo
relapse AML
aml
relapse AML
AML, s/p chemo
AML
Leukocytosis, H/O AML
pt DX relapsed AML
aml
AML, s/p chemo  [facility] meeting 11/28, pending PCU bed
AML
Hx of AML on chemo.

## 2022-11-29 NOTE — PROGRESS NOTE ADULT - SUBJECTIVE AND OBJECTIVE BOX
Diagnosis: relapsed AML, FLT3 ITD+, NPM1, CEBPA, ASXL1 mutated    Protocol/Chemo Regimen: daily FLT3 inhibitor gilteritinib (s/p IV cytarabine 100mg/m2 x 3days 11/10-11/12 prior to start of gilteritinib for cytoreduction), stopped on Day: 17      Pt endorsed: No overnight events, +febrile    Review of Systems: Denies SOB, n/v, CP, abdominal pain, constipation    Pain scale: Denies                                       Diet: DASH/TLC    Allergies: No Known Allergies    MEDICATIONS  (STANDING):  albuterol/ipratropium for Nebulization 3 milliLiter(s) Nebulizer every 6 hours  atorvastatin 10 milliGRAM(s) Oral at bedtime  Biotene Dry Mouth Oral Rinse 15 milliLiter(s) Swish and Spit three times a day  budesonide  80 MICROgram(s)/formoterol 4.5 MICROgram(s) Inhaler 2 Puff(s) Inhalation two times a day  buPROPion XL (24-Hour) . 150 milliGRAM(s) Oral daily  chlorhexidine 2% Cloths 1 Application(s) Topical daily  dronabinol 5 milliGRAM(s) Oral two times a day  furosemide   Injectable 40 milliGRAM(s) IV Push daily  gabapentin 100 milliGRAM(s) Oral daily  lisinopril 5 milliGRAM(s) Oral daily  metoprolol succinate ER 25 milliGRAM(s) Oral daily  oxybutynin 5 milliGRAM(s) Oral daily  polyethylene glycol 3350 17 Gram(s) Oral two times a day  potassium chloride    Tablet ER 20 milliEquivalent(s) Oral daily  senna 2 Tablet(s) Oral at bedtime  sodium chloride 0.65% Nasal 1 Spray(s) Both Nostrils four times a day  sodium chloride 0.9%. 1000 milliLiter(s) (20 mL/Hr) IV Continuous <Continuous>  traZODone 150 milliGRAM(s) Oral at bedtime    MEDICATIONS  (PRN):  acetaminophen     Tablet .. 650 milliGRAM(s) Oral every 6 hours PRN Temp greater or equal to 38C (100.4F), Mild Pain (1 - 3)  aluminum hydroxide/magnesium hydroxide/simethicone Suspension 30 milliLiter(s) Oral every 4 hours PRN Dyspepsia  benzonatate 100 milliGRAM(s) Oral every 8 hours PRN Cough  haloperidol    Injectable 0.5 milliGRAM(s) IntraMuscular every 6 hours PRN agitation  HYDROmorphone  Injectable 0.2 milliGRAM(s) IV Push every 1 hour PRN Severe Pain (7 - 10)  LORazepam   Injectable 0.25 milliGRAM(s) IV Push every 6 hours PRN Anxiety  melatonin 3 milliGRAM(s) Oral at bedtime PRN Insomnia  metoclopramide 5 milliGRAM(s) Oral every 6 hours PRN nausea  ondansetron Injectable 4 milliGRAM(s) IV Push every 8 hours PRN Nausea and/or Vomiting      Vital Signs Last 24 Hrs  T(C): 36.6 (29 Nov 2022 06:16), Max: 38.8 (28 Nov 2022 17:35)  T(F): 97.9 (29 Nov 2022 06:16), Max: 101.9 (28 Nov 2022 17:35)  HR: 67 (29 Nov 2022 06:16) (60 - 78)  BP: 124/67 (29 Nov 2022 06:16) (110/68 - 134/60)  BP(mean): --  RR: 18 (29 Nov 2022 06:16) (16 - 20)  SpO2: 100% (29 Nov 2022 06:16) (98% - 100%)    Parameters below as of 29 Nov 2022 06:16  Patient On (Oxygen Delivery Method): nasal cannula      I&O's Summary    28 Nov 2022 07:01  -  29 Nov 2022 07:00  --------------------------------------------------------  IN: 865 mL / OUT: 1550 mL / NET: -685 mL    29 Nov 2022 07:01  -  29 Nov 2022 09:19  --------------------------------------------------------  IN: 0 mL / OUT: 1200 mL / NET: -1200 mL        PHYSICAL EXAM  General: NAD, laying in bed  HEENT: clear oropharynx, anicteric sclera  CV: (+) S1/S2, reg  Lungs: Decreased at bases, +mild L basilar crackles,  no wheezes  Abdomen: soft, non-tender, non-distended (+) BS  Ext: trace edema  Skin: no rashes   Neuro: alert and oriented X 3  Central Line: LCW medi port CDI    RECENT CULTURES:  Culture - Blood (11.13.22 @ 16:10)    Specimen Source: .Blood Blood-Catheter    Culture Results: No Growth Final    Culture - Urine (11.15.22 @ 15:29)    Specimen Source: Clean Catch Clean Catch (Midstream)    Culture Results: No growth    LABS:                        6.8    0.25  )-----------( 21       ( 29 Nov 2022 07:12 )             21.0     29 Nov 2022 07:13    140    |  105    |  17     ----------------------------<  90     4.0     |  28     |  0.67     Ca    7.9        29 Nov 2022 07:13  Phos  3.0       29 Nov 2022 07:13  Mg     2.5       29 Nov 2022 07:13    TPro  5.1    /  Alb  2.8    /  TBili  0.3    /  DBili  x      /  AST  31     /  ALT  24     /  AlkPhos  93     29 Nov 2022 07:13    PT/INR - ( 29 Nov 2022 07:12 )   PT: 14.3 sec;   INR: 1.23 ratio    PTT - ( 28 Nov 2022 07:04 )  PTT:25.1 sec      LIVER FUNCTIONS - ( 29 Nov 2022 07:13 )  Alb: 2.8 g/dL / Pro: 5.1 g/dL / ALK PHOS: 93 U/L / ALT: 24 U/L / AST: 31 U/L / GGT: x               RADIOLOGY & ADDITIONAL STUDIES:  CT Chest No Cont (11.21.22 @ 16:18)   1.  Increase in number of nodules in both lungs with new surrounding   groundglass halos suggestive of invasive fungal infection/invasive   aspergillosis  2.  Increase in small bilateral pleural effusions, right more than left.  3.  Re demonstration of indeterminate right hepatic lobe and right adrenal   lesions, as described above. MRI of the abdomen is again recommended for   further evaluation.

## 2022-11-29 NOTE — PROGRESS NOTE ADULT - PROBLEM SELECTOR PLAN 7
Link to GOC note  Emotional support provided Symptoms are well controlled  Discussed PCU has no residential component during the last encounter

## 2022-11-29 NOTE — PROGRESS NOTE ADULT - PROBLEM SELECTOR PLAN 2
Patient is neutropenic, afebrile  Continue voriconazole, acyclovir, cefepime (renally dosed)  (-) fungitell, (-) galactomanan  11/10  Blood Cx Staph epidermis/hominis -Repeat cultures 11/11, 11/12, NGTD  11/21 CT Chest for evaluation- worsening nodules concern for fungal pna.  ID following  11/28 Voriconazole level obtained, Repeat Chest CT  11/29 Cancelled Chest CT, d/c'd abx (on 11/28) Patient is neutropenic, febrile, No more lab draws as of 11/29  voriconazole, acyclovir, cefepime d/c'd  (-) fungitell, (-) galactomanan  11/10  Blood Cx Staph epidermis/hominis -Repeat cultures 11/11, 11/12, NGTD  11/21 CT Chest for evaluation- worsening nodules concern for fungal pna.  ID following  11/28 Voriconazole level obtained, Repeat Chest CT  11/29 Cancelled Chest CT, d/c'd abx (on 11/28)

## 2022-11-29 NOTE — PROGRESS NOTE ADULT - SUBJECTIVE AND OBJECTIVE BOX
Follow Up: neutropenic fevers    Interval History/ROS: Going to PCU. Denies pain, feels ok. Febrile last night but denies associated chills or malaise.     Allergies  No Known Allergies        ANTIMICROBIALS:      OTHER MEDS:  acetaminophen     Tablet .. 650 milliGRAM(s) Oral every 6 hours PRN  albuterol/ipratropium for Nebulization 3 milliLiter(s) Nebulizer every 6 hours  aluminum hydroxide/magnesium hydroxide/simethicone Suspension 30 milliLiter(s) Oral every 4 hours PRN  benzonatate 100 milliGRAM(s) Oral every 8 hours PRN  Biotene Dry Mouth Oral Rinse 15 milliLiter(s) Swish and Spit three times a day  budesonide  80 MICROgram(s)/formoterol 4.5 MICROgram(s) Inhaler 2 Puff(s) Inhalation two times a day  buPROPion XL (24-Hour) . 150 milliGRAM(s) Oral daily  chlorhexidine 2% Cloths 1 Application(s) Topical daily  dronabinol 5 milliGRAM(s) Oral two times a day  furosemide   Injectable 40 milliGRAM(s) IV Push daily  gabapentin 100 milliGRAM(s) Oral daily  haloperidol    Injectable 0.5 milliGRAM(s) IV Push every 6 hours PRN  HYDROmorphone  Injectable 0.2 milliGRAM(s) IV Push every 1 hour PRN  lisinopril 5 milliGRAM(s) Oral daily  LORazepam   Injectable 0.25 milliGRAM(s) IV Push every 6 hours PRN  melatonin 3 milliGRAM(s) Oral at bedtime PRN  metoclopramide 5 milliGRAM(s) Oral every 6 hours PRN  metoprolol succinate ER 25 milliGRAM(s) Oral daily  ondansetron Injectable 4 milliGRAM(s) IV Push every 8 hours PRN  oxybutynin 5 milliGRAM(s) Oral daily  polyethylene glycol 3350 17 Gram(s) Oral two times a day  potassium chloride    Tablet ER 20 milliEquivalent(s) Oral daily  senna 2 Tablet(s) Oral at bedtime  sodium chloride 0.65% Nasal 1 Spray(s) Both Nostrils four times a day  sodium chloride 0.9%. 1000 milliLiter(s) IV Continuous <Continuous>  traZODone 150 milliGRAM(s) Oral at bedtime      Vital Signs Last 24 Hrs  T(C): 36.4 (29 Nov 2022 14:23), Max: 38.8 (28 Nov 2022 17:35)  T(F): 97.5 (29 Nov 2022 14:23), Max: 101.9 (28 Nov 2022 17:35)  HR: 66 (29 Nov 2022 14:23) (59 - 78)  BP: 142/66 (29 Nov 2022 14:23) (102/61 - 152/53)  BP(mean): --  RR: 18 (29 Nov 2022 14:23) (16 - 20)  SpO2: 100% (29 Nov 2022 14:23) (98% - 100%)    Parameters below as of 29 Nov 2022 14:23  Patient On (Oxygen Delivery Method): nasal cannula  O2 Flow (L/min): 2      Physical Exam:  General: non toxic alert   Cardio: regular rate   Respiratory: nonlabored on nasal cannula   abd: nondistended  vascular: mediport   Skin: no rash                          6.8    0.25  )-----------( 21       ( 29 Nov 2022 07:12 )             21.0       11-29    140  |  105  |  17  ----------------------------<  90  4.0   |  28  |  0.67    Ca    7.9<L>      29 Nov 2022 07:13  Phos  3.0     11-29  Mg     2.5     11-29    TPro  5.1<L>  /  Alb  2.8<L>  /  TBili  0.3  /  DBili  x   /  AST  31  /  ALT  24  /  AlkPhos  93  11-29          MICROBIOLOGY:      RADIOLOGY:  Images below reviewed personally  CT Chest No Cont (11.21.22 @ 16:18)   1.  Increase in number of nodules in both lungs with new surrounding   groundglass halos suggestive of invasive fungal infection/invasive   aspergillosis  2.  Increase in small bilateral pleural effusions, right more than left.  3.  Redemonstration of indeterminate right hepatic lobe and right adrenal   lesions, as described above.MRI of the abdomen is again recommended for   further evaluation.

## 2022-11-29 NOTE — PROGRESS NOTE ADULT - PROBLEM SELECTOR PLAN 3
Cardiology Following  Continue diuresis for NET neg 1-2L/day (per Cardiology)  K>4, Mg>2  continue low dose statin  Trend BNP- downtrending  Daily weights, strict I/O's  11/21 started ACE-I (per Cardiology)  11/23 Started B-blocker (Toprol xl 25mg qd) Cardiology Following  Continue diuresis for NET neg 1-2L/day (per Cardiology)  K>4, Mg>2  Trend BNP- downtrending  Daily weights, strict I/O's  11/21 started ACE-I (per Cardiology)  11/23 Started B-blocker (Toprol xl 25mg qd)  No more lab draws as of 11/29

## 2022-11-29 NOTE — PROVIDER CONTACT NOTE (CRITICAL VALUE NOTIFICATION) - PERSON GIVING RESULT:
Norberto Power
Sheila Amin
leta mcintyre
[facility]
Kathia Dixon, LAB
Tomás Hallman
Shaquille Hallman
Gustavo
Kathia
Norberto Power
Viji
randi pathak
Lab
Viji (bonnie)
Bubba
Kathia DAWSON
henri king, lab
Catarino, lab
lorelei samuel

## 2022-11-29 NOTE — PROVIDER CONTACT NOTE (CRITICAL VALUE NOTIFICATION) - NAME OF MD/NP/PA/DO NOTIFIED:
GERA Reynolds PA
MD Lillie
NICOLAS Eastman
Raiza Palumbo NP
Sarah Nguyen NP
Raiza Palumbo NP
Dany RONQUILLO
Elicia Goldman
SHIMA Gallo
NP
Paz, NP
NP
SHIMA Ayala
enmanuel carolina
Paz Mason, NP
enmanuel RONQUILLO
enmanuel carolina
Patrick VALENZUELA
SHIMA Cisneros

## 2022-11-29 NOTE — PROGRESS NOTE ADULT - PROBLEM SELECTOR PLAN 1
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    CBC, CMP, TLS labs, DIC labs daily. , If fibrinogen under 100, give 10 units of cryoprecipitate.  Transfuse for Hgb < 7 and platelet count < 10k, < 20k if febrile, < 50k if bleeding  11/10 Cytarabine 100mg/m2 x 3 days for cytoreduction. stopped hydroxyurea 11/12.   11/12 started Gilteritinib   EKG biweekly check for QTc on Gilteritinib and Voriconazole  11/28 Replete K (po), Mg (IV)  DNR/DNI, Palliative Care following for support, GOC, discussions with family  Plan for family meeting with Dr. Peralta today 11/28 - Will transition to comfort measures, awaiting transfer to PCU Mutations identified in FLT3-ITD, NPM1, ASXL1 and CEBPA.    CBC, CMP, TLS labs, DIC labs daily. , If fibrinogen under 100, give 10 units of cryoprecipitate.  Transfuse for Hgb < 7 and platelet count < 10k, < 20k if febrile, < 50k if bleeding  11/10 Cytarabine 100mg/m2 x 3 days for cytoreduction. stopped hydroxyurea 11/12.   11/12 started Gilteritinib   EKG biweekly check for QTc on Gilteritinib and Voriconazole  11/28 Replete K (po), Mg (IV)  DNR/DNI, Palliative Care following for support, GOC, discussions with family  Family meeting with Dr. Peralta on 11/28 - Pt and family in agreement, Will transition to comfort measures, awaiting transfer to PCU

## 2022-11-29 NOTE — PROGRESS NOTE ADULT - ASSESSMENT
82F relapsed AML.   Complicated by neutropenic fevers and possible invasive pulmonary aspergillosis.   Fever recurrence yesterday for the first time in about two weeks.   She felt fine though and is going to palliative care unit.   She does not want further blood draws or imaging.   Given fever and neutropenia, if within goals of care can give oral Augmentin 875mg PO BID (QTc prolonged) indefinitely.     Discussed with heme onc   Will sign off, call back if needed     Bucky Bobby MD   Infectious Disease   Available on TEAMS. After 5PM and on weekends please page fellow on call or call [phone] 82F relapsed AML.   Complicated by neutropenic fevers and possible invasive pulmonary aspergillosis.   Fever recurrence yesterday for the first time in about two weeks.   She felt fine though and is going to palliative care unit.   She does not want further blood draws or imaging.   Given fever and neutropenia, if within goals of care can give oral Augmentin 875mg PO BID (QTc prolonged) indefinitely.   Wouldn't continue anti-mold therapy in this setting.     Discussed with heme onc   Will sign off, call back if needed     Bucky Bobby MD   Infectious Disease   Available on TEAMS. After 5PM and on weekends please page fellow on call or call [phone]

## 2022-11-29 NOTE — PROGRESS NOTE ADULT - PROBLEM SELECTOR PLAN 1
On gilteritinib  Will confer with Heme regarding impact of treatment Will discontinue DMT  Plan is for transfer to PCU  No further routine blood draws and routine transfusion

## 2022-11-29 NOTE — PROVIDER CONTACT NOTE (CRITICAL VALUE NOTIFICATION) - ACTION/TREATMENT ORDERED:
no new order at this time
no orders for now
Give 1u PRBC and 1u Plts
Continue to monitor
no new order
Patrick VALENZUELA notified and aware. No new orders @ this time.
None at this time
500 mg hydroxyurea STAT
No new orders at this time
1 unit prbc
awaiting new PA orders
no new orders
no new orders at this time
transfusion
1 unit platelets ordered
awaiting any further orders  will continue to monitor
transfuse prbc
1U of PRBCs

## 2022-11-29 NOTE — PROVIDER CONTACT NOTE (CRITICAL VALUE NOTIFICATION) - SITUATION
low cbc
wbc 105.44
Hgb 6.6, Hct 19.4, Plts 16
H/H= 6.8/21
PLT 12
PLT=8
Troponin = 470
Platelet: 15
wbc 99.75
Hgb= 6.1, Hct= 18.9
WBC: 86.45
Hgb 6/7 Hct 20.5
Hgb= 7.0
Lactate 2.7
low plts

## 2022-11-29 NOTE — PROGRESS NOTE ADULT - SUBJECTIVE AND OBJECTIVE BOX
HPI:  This patient is an 83yo lady with PMH of AML, breast cancer s/p treatment with tamoxifen, RT and R lumpectomy, who presents with AML relapse.         11-29-22 @ 12:46  [facility] 7MON 701 [address]1    Overnight events:  Staff reports:  Patient:  PRN use:        RECENT VITALS/LABS/MEDICATIONS/ASSAYS     11-29-22 @ 12:46    T(C): 36.4 (11-29-22 @ 11:15), Max: 38.8 (11-28-22 @ 17:35)  HR: 60 (11-29-22 @ 11:15) (59 - 78)  BP: 127/69 (11-29-22 @ 11:15) (102/61 - 134/60)  RR: 18 (11-29-22 @ 11:15) (16 - 20)  SpO2: 99% (11-29-22 @ 11:15) (98% - 100%)  Wt(kg): --      28 Nov 2022 07:01  -  29 Nov 2022 07:00  --------------------------------------------------------  IN:    Oral Fluid: 240 mL    sodium chloride 0.9%: 625 mL  Total IN: 865 mL    OUT:    Voided (mL): 1550 mL  Total OUT: 1550 mL    Total NET: -685 mL      29 Nov 2022 07:01  -  29 Nov 2022 12:46  --------------------------------------------------------  IN:  Total IN: 0 mL    OUT:    Voided (mL): 1200 mL  Total OUT: 1200 mL    Total NET: -1200 mL          11-28 @ 07:01  -  11-29 @ 07:00  --------------------------------------------------------  IN: 865 mL / OUT: 1550 mL / NET: -685 mL    11-29 @ 07:01 - 11-29 @ 12:46  --------------------------------------------------------  IN: 0 mL / OUT: 1200 mL / NET: -1200 mL      CAPILLARY BLOOD GLUCOSE            acetaminophen     Tablet .. 650 milliGRAM(s) Oral every 6 hours PRN  albuterol/ipratropium for Nebulization 3 milliLiter(s) Nebulizer every 6 hours  aluminum hydroxide/magnesium hydroxide/simethicone Suspension 30 milliLiter(s) Oral every 4 hours PRN  benzonatate 100 milliGRAM(s) Oral every 8 hours PRN  Biotene Dry Mouth Oral Rinse 15 milliLiter(s) Swish and Spit three times a day  budesonide  80 MICROgram(s)/formoterol 4.5 MICROgram(s) Inhaler 2 Puff(s) Inhalation two times a day  buPROPion XL (24-Hour) . 150 milliGRAM(s) Oral daily  chlorhexidine 2% Cloths 1 Application(s) Topical daily  dronabinol 5 milliGRAM(s) Oral two times a day  furosemide   Injectable 40 milliGRAM(s) IV Push once  furosemide   Injectable 40 milliGRAM(s) IV Push daily  gabapentin 100 milliGRAM(s) Oral daily  haloperidol    Injectable 0.5 milliGRAM(s) IV Push every 6 hours PRN  HYDROmorphone  Injectable 0.2 milliGRAM(s) IV Push every 1 hour PRN  lisinopril 5 milliGRAM(s) Oral daily  LORazepam   Injectable 0.25 milliGRAM(s) IV Push every 6 hours PRN  melatonin 3 milliGRAM(s) Oral at bedtime PRN  metoclopramide 5 milliGRAM(s) Oral every 6 hours PRN  metoprolol succinate ER 25 milliGRAM(s) Oral daily  ondansetron Injectable 4 milliGRAM(s) IV Push every 8 hours PRN  oxybutynin 5 milliGRAM(s) Oral daily  polyethylene glycol 3350 17 Gram(s) Oral two times a day  potassium chloride    Tablet ER 20 milliEquivalent(s) Oral daily  senna 2 Tablet(s) Oral at bedtime  sodium chloride 0.65% Nasal 1 Spray(s) Both Nostrils four times a day  sodium chloride 0.9%. 1000 milliLiter(s) IV Continuous <Continuous>  traZODone 150 milliGRAM(s) Oral at bedtime          11-29    140  |  105  |  17  ----------------------------<  90  4.0   |  28  |  0.67    Ca    7.9<L>      29 Nov 2022 07:13  Phos  3.0     11-29  Mg     2.5     11-29    TPro  5.1<L>  /  Alb  2.8<L>  /  TBili  0.3  /  DBili  x   /  AST  31  /  ALT  24  /  AlkPhos  93  11-29      Procalc  BNPSerum Pro-Brain Natriuretic Peptide: 2620 pg/mL (11-29-22 @ 07:13)  Serum Pro-Brain Natriuretic Peptide: 2748 pg/mL (11-28-22 @ 07:05)    ABG                          6.8    0.25  )-----------( 21       ( 29 Nov 2022 07:12 )             21.0   PT/INR - ( 29 Nov 2022 07:12 )   PT: 14.3 sec;   INR: 1.23 ratio         PTT - ( 28 Nov 2022 07:04 )  PTT:25.1 sec        blood and urine cultures                                 Family Health Care Decision Act Surrogate Decision Maker Hierarchy  [facility] Article 81 Guardian-->Spouse or domestic partner--> Adult child-->Parent-->Sibling--> Close friend      Contacts listed in the paper or electronic chart  Name Marshall Tobar  Relationship   Number(s) in EMR  Translation Required: none  Spouse or Partner: Above  Family unit:  and son  Family history of hematologic malignancy: none but personal HX of breast cancer s/p XRT and tamoxifen  Residence: [ ] Apartment   [x] House  [ ] Other  Degree of functionality in the home: moderate   Performance Status (PPSv2): 50   BMI: 22.4  Engagement in the home:  Employment: [ ] Currently employed [ ] Exited workforce due to illness  [ ] Retired prior to illness  [ ] No/distant history of employment TBD  Support network (degree):  ---Family:        [ ] Low  [ ] Moderate  [ x] High  ---Neighbors: [ ] Low  [ ] Moderate  [ ] High  ---Social:         [ ] Low  [ ] Moderate  [ ] High  ---Spiritual:    [ ] Low  [ ] Moderate  [ ] High  Financial concerns: TBD  Coping Strategies: TBD  Caregiver burden/fatigue/needs: Significant,  is being treated for pancreatic cancer and has debilitating CIPN  Grief identified: [] Yes [x] No  Medical communication and decision making preferences: See below          PERTINENT PM/SXH:   Breast cancer    Hypertension    AML (acute myelogenous leukemia)      S/P hysterectomy      FAMILY HISTORY:  No pertinent family history in first degree relatives      Family Hx substance abuse [ ]yes [ ]no  ITEMS NOT CHECKED ARE NOT PRESENT    SOCIAL HISTORY:   Significant other/partner[ ]  Children[ ]  Hoahaoism/Spirituality:  Substance hx:  [ ]   Tobacco hx:  [ ]   Alcohol hx: [ ]   Home Opioid hx:  [ ] I-Stop Reference No:  Living Situation: [ ]Home  [ ]Long term care  [ ]Rehab [ ]Other    ADVANCE DIRECTIVES:    DNR/MOLST  [ ]  Living Will  [ ]   DECISION MAKER(s):  [ ] Health Care Proxy(s)  [ ] Surrogate(s)  [ ] Guardian           Name(s): Phone Number(s):    BASELINE (I)ADL(s) (prior to admission):  [place]: [ ]Total  [ ] Moderate [ ]Dependent    Allergies    No Known Allergies    Intolerances      PRESENT SYMPTOMS: [ ]Unable to self-report  [ ] CPOT [ ] PAINADs [ ] RDOS  Source if other than patient:  [ ]Family   [ ]Team     Pain: [ ]yes [xx]no  QOL impact -   Location -                    Aggravating factors -  Quality -  Radiation -  Timing-  Severity (0-10 scale):  Minimal acceptable level (0-10 scale):     CPOT:    https://www.sccm.org/getattachment/[email or web address]/Critical-Care-Pain-Observation-Tool-(CPOT)    PAIN AD Score:   http://geriatrictoolkit.[place].[facility]/cog/painad.pdf (press ctrl +  left click to view)    Dyspnea:                           [ ]Mild [ ]Moderate [ ]Severe      RDOS:  0 to 2  minimal or no respiratory distress   3  mild distress  4 to 6 moderate distress  >7 severe distress  https://[email or web address]/handouts/hen/Respiratory_Distress_Observation_Scale.pdf (Ctrl +  left click to view)     Anxiety:                             [ ]Mild [ ]Moderate [ ]Severe  Fatigue:                             [ ]Mild [ ]Moderate [ ]Severe  Nausea:                             [ ]Mild [ ]Moderate [ ]Severe  Loss of appetite:              [ ]Mild [ ]Moderate [ ]Severe  Constipation:                    [ ]Mild [ ]Moderate [ ]Severe    PCSSQ[Palliative Care Spiritual Screening Question]   Severity (0-10):  Score of 4 or > indicate consideration of Chaplaincy referral.  Chaplaincy Referral: [ ] yes [ ] refused [ ] following [ ] Deferred     Caregiver Crawford? : [ ] yes [ ] no [ ] Deferred [ ] Declined             Social work referral [ ] Patient & Family Centered Care Referral [ ]     Anticipatory Grief present?:  [ ] yes [ ] no  [ ] Deferred                  Social work referral [ ] Chaplaincy Referral[ ]      Other Symptoms:  [ ]All other review of systems negative     Palliative Performance Status Version 2:         %    http://[email or web address].org/files/news/palliative_performance_scale_ppsv2.pdf  PHYSICAL EXAM:  Vital Signs Last 24 Hrs  T(C): 36.5 (21 Nov 2022 13:50), Max: 36.7 (20 Nov 2022 17:36)  T(F): 97.7 (21 Nov 2022 13:50), Max: 98.1 (20 Nov 2022 17:36)  HR: 77 (21 Nov 2022 13:50) (71 - 84)  BP: 137/78 (21 Nov 2022 13:50) (129/78 - 145/81)  BP(mean): --  RR: 18 (21 Nov 2022 13:50) (16 - 18)  SpO2: 99% (21 Nov 2022 13:50) (97% - 99%)    Parameters below as of 21 Nov 2022 13:50  Patient On (Oxygen Delivery Method): nasal cannula     I&O's Summary    20 Nov 2022 07:01  -  21 Nov 2022 07:00  --------------------------------------------------------  IN: 840 mL / OUT: 2900 mL / NET: -2060 mL    21 Nov 2022 07:01  -  21 Nov 2022 14:29  --------------------------------------------------------  IN: 611 mL / OUT: 1300 mL / NET: -689 mL      GENERAL: [ ]Cachexia    [ ]Alert  [ ]Oriented x   [ ]Lethargic  [ ]Unarousable  [ ]Verbal  [ ]Non-Verbal  Behavioral:   [ ] Anxiety  [ ] Delirium [ ] Agitation [ ] Other  HEENT:  [ ]Normal   [ ]Dry mouth   [ ]ET Tube/Trach  [ ]Oral lesions  PULMONARY:   [ ]Clear [ ]Tachypnea  [ ]Audible excessive secretions   [ ]Rhonchi        [ ]Right [ ]Left [ ]Bilateral  [ ]Crackles        [ ]Right [ ]Left [ ]Bilateral  [ ]Wheezing     [ ]Right [ ]Left [ ]Bilateral  [ ]Diminished breath sounds [ ]right [ ]left [ ]bilateral  CARDIOVASCULAR:    [ ]Regular [ ]Irregular [ ]Tachy  [ ]Christian [ ]Murmur [ ]Other  GASTROINTESTINAL:  [ ]Soft  [ ]Distended   [ ]+BS  [ ]Non tender [ ]Tender  [ ]Other [ ]PEG [ ]OGT/ NGT  Last BM:  GENITOURINARY:  [ ]Normal [ ] Incontinent   [ ]Oliguria/Anuria   [ ]Lemos  MUSCULOSKELETAL:   [ ]Normal   [ ]Weakness  [ ]Bed/Wheelchair bound [ ]Edema  NEUROLOGIC:   [ ]No focal deficits  [ ]Cognitive impairment  [ ]Dysphagia [ ]Dysarthria [ ]Paresis [ ]Other   SKIN:   [ ]Normal  [ ]Rash  [ ]Other  [ ]Pressure ulcer(s)       Present on admission [ ]y [ ]n    CRITICAL CARE:  [ ] Shock Present  [ ]Septic [ ]Cardiogenic [ ]Neurologic [ ]Hypovolemic  [ ]  Vasopressors [ ]  Inotropes   [ ]Respiratory failure present [ ]Mechanical ventilation [ ]Non-invasive ventilatory support [ ]High flow    [ ]Acute  [ ]Chronic [ ]Hypoxic  [ ]Hypercarbic [ ]Other  [ ]Other organ failure     LABS:                        9.5    0.25  )-----------( 26 ( 21 Nov 2022 08:47 )             27.9   11-21    139  |  99  |  16  ----------------------------<  97  3.3<L>   |  31  |  0.62    Ca    8.7      21 Nov 2022 08:47  Phos  2.7     11-21  Mg     2.3     11-21    TPro  5.6<L>  /  Alb  3.1<L>  /  TBili  0.5  /  DBili  x   /  AST  27  /  ALT  35  /  AlkPhos  76  11-21  PT/INR - ( 21 Nov 2022 08:47 )   PT: 13.5 sec;   INR: 1.16 ratio         PTT - ( 21 Nov 2022 08:47 )  PTT:25.1 sec      RADIOLOGY & ADDITIONAL STUDIES:    PROTEIN CALORIE MALNUTRITION PRESENT: [ ]mild [ ]moderate [ ]severe [ ]underweight [ ]morbid obesity  https://www.andeal.org/vault/2440/web/files/ONC/Table_Clinical%20Characteristics%20to%20Document%20Malnutrition-White%20JV%20et%20al%202012.pdf    Height (cm): 163 (11-10-22 @ 12:15), 162 (07-18-22 @ 13:42), 162.6 (12-22-21 @ 14:17)  Weight (kg): 59.5 (11-10-22 @ 12:15), 58.0009 (09-12-22 @ 12:00), 59.9 (12-22-21 @ 14:17)  BMI (kg/m2): 22.4 (11-10-22 @ 12:15), 22.1 (09-12-22 @ 12:00), 22.8 (07-18-22 @ 13:42)    [ ]PPSV2 < or = to 30% [ ]significant weight loss  [ ]poor nutritional intake  [ ]anasarca[ ]Artificial Nutrition      Other REFERRALS:  [ ]Hospice  [ ]Child Life  [ ]Social Work  [ ]Case management [ ]Holistic Therapy     Goals of Care Document:  HPI:  This patient is an 81yo lady with PMH of AML, breast cancer s/p treatment with tamoxifen, RT and R lumpectomy, who presents with AML relapse.         11-29-22 @ 12:46  [facility] 7MON [address]1    Overnight events: No major events  Staff reports: Report of call from the son.  Patient: PT is feeling okay, receiving transfusion while I am in the room.  No pain or dyspnea  PRN use: n/a        RECENT VITALS/LABS/MEDICATIONS/ASSAYS     11-29-22 @ 12:46    T(C): 36.4 (11-29-22 @ 11:15), Max: 38.8 (11-28-22 @ 17:35)  HR: 60 (11-29-22 @ 11:15) (59 - 78)  BP: 127/69 (11-29-22 @ 11:15) (102/61 - 134/60)  RR: 18 (11-29-22 @ 11:15) (16 - 20)  SpO2: 99% (11-29-22 @ 11:15) (98% - 100%)  Wt(kg): --      28 Nov 2022 07:01  -  29 Nov 2022 07:00  --------------------------------------------------------  IN:    Oral Fluid: 240 mL    sodium chloride 0.9%: 625 mL  Total IN: 865 mL    OUT:    Voided (mL): 1550 mL  Total OUT: 1550 mL    Total NET: -685 mL      29 Nov 2022 07:01  -  29 Nov 2022 12:46  --------------------------------------------------------  IN:  Total IN: 0 mL    OUT:    Voided (mL): 1200 mL  Total OUT: 1200 mL    Total NET: -1200 mL          11-28 @ 07:01  -  11-29 @ 07:00  --------------------------------------------------------  IN: 865 mL / OUT: 1550 mL / NET: -685 mL    11-29 @ 07:01 - 11-29 @ 12:46  --------------------------------------------------------  IN: 0 mL / OUT: 1200 mL / NET: -1200 mL      CAPILLARY BLOOD GLUCOSE            acetaminophen     Tablet .. 650 milliGRAM(s) Oral every 6 hours PRN  albuterol/ipratropium for Nebulization 3 milliLiter(s) Nebulizer every 6 hours  aluminum hydroxide/magnesium hydroxide/simethicone Suspension 30 milliLiter(s) Oral every 4 hours PRN  benzonatate 100 milliGRAM(s) Oral every 8 hours PRN  Biotene Dry Mouth Oral Rinse 15 milliLiter(s) Swish and Spit three times a day  budesonide  80 MICROgram(s)/formoterol 4.5 MICROgram(s) Inhaler 2 Puff(s) Inhalation two times a day  buPROPion XL (24-Hour) . 150 milliGRAM(s) Oral daily  chlorhexidine 2% Cloths 1 Application(s) Topical daily  dronabinol 5 milliGRAM(s) Oral two times a day  furosemide   Injectable 40 milliGRAM(s) IV Push once  furosemide   Injectable 40 milliGRAM(s) IV Push daily  gabapentin 100 milliGRAM(s) Oral daily  haloperidol    Injectable 0.5 milliGRAM(s) IV Push every 6 hours PRN  HYDROmorphone  Injectable 0.2 milliGRAM(s) IV Push every 1 hour PRN  lisinopril 5 milliGRAM(s) Oral daily  LORazepam   Injectable 0.25 milliGRAM(s) IV Push every 6 hours PRN  melatonin 3 milliGRAM(s) Oral at bedtime PRN  metoclopramide 5 milliGRAM(s) Oral every 6 hours PRN  metoprolol succinate ER 25 milliGRAM(s) Oral daily  ondansetron Injectable 4 milliGRAM(s) IV Push every 8 hours PRN  oxybutynin 5 milliGRAM(s) Oral daily  polyethylene glycol 3350 17 Gram(s) Oral two times a day  potassium chloride    Tablet ER 20 milliEquivalent(s) Oral daily  senna 2 Tablet(s) Oral at bedtime  sodium chloride 0.65% Nasal 1 Spray(s) Both Nostrils four times a day  sodium chloride 0.9%. 1000 milliLiter(s) IV Continuous <Continuous>  traZODone 150 milliGRAM(s) Oral at bedtime          11-29    140  |  105  |  17  ----------------------------<  90  4.0   |  28  |  0.67    Ca    7.9<L>      29 Nov 2022 07:13  Phos  3.0     11-29  Mg     2.5     11-29    TPro  5.1<L>  /  Alb  2.8<L>  /  TBili  0.3  /  DBili  x   /  AST  31  /  ALT  24  /  AlkPhos  93  11-29      Procalc  BNPSerum Pro-Brain Natriuretic Peptide: 2620 pg/mL (11-29-22 @ 07:13)  Serum Pro-Brain Natriuretic Peptide: 2748 pg/mL (11-28-22 @ 07:05)    ABG                          6.8    0.25  )-----------( 21       ( 29 Nov 2022 07:12 )             21.0   PT/INR - ( 29 Nov 2022 07:12 )   PT: 14.3 sec;   INR: 1.23 ratio         PTT - ( 28 Nov 2022 07:04 )  PTT:25.1 sec        blood and urine cultures                                 Family Health Care Decision Act Surrogate Decision Maker [facility] Article 81 Guardian-->Spouse or domestic partner--> Adult child-->Parent-->Sibling--> Close friend      Contacts listed in the paper or electronic chart  Name Marshall Tobar  Relationship   Number(s) in EMR  Translation Required: none  Spouse or Partner: Above  Family unit:  and son  Family history of hematologic malignancy: none but personal HX of breast cancer s/p XRT and tamoxifen  Residence: [ ] Apartment   [x] House  [ ] Other  Degree of functionality in the home: moderate   Performance Status (PPSv2): 50   BMI: 22.4  Engagement in the home:  Employment: [ ] Currently employed [ ] Exited workforce due to illness  [ ] Retired prior to illness  [ ] No/distant history of employment TBD  Support network (degree):  ---Family:        [ ] Low  [ ] Moderate  [ x] High  ---Neighbors: [ ] Low  [ ] Moderate  [ ] High  ---Social:         [ ] Low  [ ] Moderate  [ ] High  ---Spiritual:    [ ] Low  [ ] Moderate  [ ] High  Financial concerns: TBD  Coping Strategies: TBD  Caregiver burden/fatigue/needs: Significant,  is being treated for pancreatic cancer and has debilitating CIPN  Grief identified: [] Yes [x] No  Medical communication and decision making preferences: See below          PERTINENT PM/SXH:   Breast cancer    Hypertension    AML (acute myelogenous leukemia)      S/P hysterectomy      FAMILY HISTORY:  No pertinent family history in first degree relatives      Family Hx substance abuse [ ]yes [ ]no  ITEMS NOT CHECKED ARE NOT PRESENT    SOCIAL HISTORY:   Significant other/partner[ ]  Children[ ]  Roman Catholic/Spirituality:  Substance hx:  [ ]   Tobacco hx:  [ ]   Alcohol hx: [ ]   Home Opioid hx:  [ ] I-Stop Reference No:  Living Situation: [ ]Home  [ ]Long term care  [ ]Rehab [ ]Other    ADVANCE DIRECTIVES:    DNR/MOLST  [ ]  Living Will  [ ]   DECISION MAKER(s):  [ ] Health Care Proxy(s)  [ ] Surrogate(s)  [ ] Guardian           Name(s): Phone Number(s):    BASELINE (I)ADL(s) (prior to admission):  [place]: [ ]Total  [ ] Moderate [ ]Dependent    Allergies    No Known Allergies    Intolerances      PRESENT SYMPTOMS: [ ]Unable to self-report  [ ] CPOT [ ] PAINADs [ ] RDOS  Source if other than patient:  [ ]Family   [ ]Team     Pain: [ ]yes [xx]no  QOL impact -   Location -                    Aggravating factors -  Quality -  Radiation -  Timing-  Severity (0-10 scale):  Minimal acceptable level (0-10 scale):     CPOT:    https://www.sccm.org/getattachment/[email or web address]/Critical-Care-Pain-Observation-Tool-(CPOT)    PAIN AD Score:   http://geriatrictoolkit.[facility]/cog/painad.pdf (press ctrl +  left click to view)    Dyspnea:                           [ ]Mild [ ]Moderate [ ]Severe      RDOS:  0 to 2  minimal or no respiratory distress   3  mild distress  4 to 6 moderate distress  >7 severe distress  https://[email or web address]/handouts/hen/Respiratory_Distress_Observation_Scale.pdf (Ctrl +  left click to view)     Anxiety:                             [ ]Mild [ ]Moderate [ ]Severe  Fatigue:                             [ ]Mild [ ]Moderate [ ]Severe  Nausea:                             [ ]Mild [ ]Moderate [ ]Severe  Loss of appetite:              [ ]Mild [ ]Moderate [ ]Severe  Constipation:                    [ ]Mild [ ]Moderate [ ]Severe    PCSSQ[Palliative Care Spiritual Screening Question]   Severity (0-10):  Score of 4 or > indicate consideration of Chaplaincy referral.  Chaplaincy Referral: [ ] yes [ ] refused [ ] following [ ] Deferred     Caregiver Cascilla? : [ ] yes [ ] no [ ] Deferred [ ] Declined             Social work referral [ ] Patient & Family Centered Care Referral [ ]     Anticipatory Grief present?:  [ ] yes [ ] no  [ ] Deferred                  Social work referral [ ] Chaplaincy Referral[ ]      Other Symptoms:  [ ]All other review of systems negative     Palliative Performance Status Version 2:     50    %    http://npcrc.org/files/news/palliative_performance_scale_ppsv2.pdf  PHYSICAL EXAM:  Vital Signs Last 24 Hrs  T(C): 36.5 (21 Nov 2022 13:50), Max: 36.7 (20 Nov 2022 17:36)  T(F): 97.7 (21 Nov 2022 13:50), Max: 98.1 (20 Nov 2022 17:36)  HR: 77 (21 Nov 2022 13:50) (71 - 84)  BP: 137/78 (21 Nov 2022 13:50) (129/78 - 145/81)  BP(mean): --  RR: 18 (21 Nov 2022 13:50) (16 - 18)  SpO2: 99% (21 Nov 2022 13:50) (97% - 99%)    Parameters below as of 21 Nov 2022 13:50  Patient On (Oxygen Delivery Method): nasal cannula     I&O's Summary    20 Nov 2022 07:01  -  21 Nov 2022 07:00  --------------------------------------------------------  IN: 840 mL / OUT: 2900 mL / NET: -2060 mL    21 Nov 2022 07:01  -  21 Nov 2022 14:29  --------------------------------------------------------  IN: 611 mL / OUT: 1300 mL / NET: -689 mL      GENERAL: [ ]Cachexia    [x ]Alert  [ ]Oriented x   [ ]Lethargic  [ ]Unarousable  [ ]Verbal  [ ]Non-Verbal  Behavioral:   [ ] Anxiety  [ ] Delirium [ ] Agitation [ x] Other Calm  HEENT:  [x ]Normal   [ ]Dry mouth   [ ]ET Tube/Trach  [ ]Oral lesions  PULMONARY:   [x ]Clear [ ]Tachypnea  [ ]Audible excessive secretions   [ ]Rhonchi        [ ]Right [ ]Left [ ]Bilateral  [ ]Crackles        [ ]Right [ ]Left [ ]Bilateral  [ ]Wheezing     [ ]Right [ ]Left [ ]Bilateral  [ ]Diminished breath sounds [ ]right [ ]left [ ]bilateral  CARDIOVASCULAR:    [x ]Regular [ ]Irregular [ ]Tachy  [ ]Christian [ ]Murmur [ ]Other  GASTROINTESTINAL:  [ x]Soft  [ ]Distended   [ ]+BS  [ ]Non tender [ ]Tender  [ ]Other [ ]PEG [ ]OGT/ NGT  Last BM:  GENITOURINARY:  [x ]Normal [ ] Incontinent   [ ]Oliguria/Anuria   [ ]Lemos  MUSCULOSKELETAL:   [x ]Normal   [ ]Weakness  [ ]Bed/Wheelchair bound [ ]Edema  NEUROLOGIC:   [ x]No focal deficits  [ ]Cognitive impairment  [ ]Dysphagia [ ]Dysarthria [ ]Paresis [ ]Other   SKIN:   [ ]Normal  [ ]Rash  [ ]Other  [ ]Pressure ulcer(s)       Present on admission [ ]y [ ]n    CRITICAL CARE:  [ ] Shock Present  [ ]Septic [ ]Cardiogenic [ ]Neurologic [ ]Hypovolemic  [ ]  Vasopressors [ ]  Inotropes   [ ]Respiratory failure present [ ]Mechanical ventilation [ ]Non-invasive ventilatory support [ ]High flow    [ ]Acute  [ ]Chronic [ ]Hypoxic  [ ]Hypercarbic [ ]Other  [ ]Other organ failure           PTT - ( 21 Nov 2022 08:47 )  PTT:25.1 sec      RADIOLOGY & ADDITIONAL STUDIES:    PROTEIN CALORIE MALNUTRITION PRESENT: [ ]mild [ ]moderate [ ]severe [ ]underweight [ ]morbid obesity  https://www.andeal.org/vault/9390/web/files/ONC/Table_Clinical%20Characteristics%20to%20Document%20Malnutrition-White%20JV%20et%20al%202012.pdf    Height (cm): 163 (11-10-22 @ 12:15), 162 (07-18-22 @ 13:42), 162.6 (12-22-21 @ 14:17)  Weight (kg): 59.5 (11-10-22 @ 12:15), 58.0009 (09-12-22 @ 12:00), 59.9 (12-22-21 @ 14:17)  BMI (kg/m2): 22.4 (11-10-22 @ 12:15), 22.1 (09-12-22 @ 12:00), 22.8 (07-18-22 @ 13:42)    [ ]PPSV2 < or = to 30% [ ]significant weight loss  [ ]poor nutritional intake  [ ]anasarca[ ]Artificial Nutrition      Other REFERRALS:  [ ]Hospice  [ ]Child Life  [ ]Social Work  [ ]Case management [ ]Holistic Therapy     Goals of Care Document:

## 2022-11-29 NOTE — PROGRESS NOTE ADULT - NS ATTEND OPT1A GEN_ALL_CORE
Exam/Medical decision making

## 2022-11-30 NOTE — PROGRESS NOTE ADULT - SUBJECTIVE AND OBJECTIVE BOX
HPI:  This patient is an 83yo lady with PMH of AML, breast cancer s/p treatment with tamoxifen, RT and R lumpectomy, who presents with AML relapse.            11-30-22 @ 16:11  [facility] PCU1 04    Overnight events:  Staff reports:  Patient:  PRN use:        RECENT VITALS/LABS/MEDICATIONS/ASSAYS     11-30-22 @ 16:11    T(C): 36.8 (11-30-22 @ 06:23), Max: 36.8 (11-30-22 @ 06:23)  HR: 66 (11-30-22 @ 06:23) (64 - 66)  BP: 156/63 (11-30-22 @ 06:23) (144/67 - 156/63)  RR: 18 (11-30-22 @ 06:23) (18 - 18)  SpO2: 98% (11-30-22 @ 06:23) (98% - 100%)  Wt(kg): --      29 Nov 2022 07:01  -  30 Nov 2022 07:00  --------------------------------------------------------  IN:    sodium chloride 0.9%: 140 mL  Total IN: 140 mL    OUT:    Voided (mL): 2700 mL  Total OUT: 2700 mL    Total NET: -2560 mL          11-29 @ 07:01  -  11-30 @ 07:00  --------------------------------------------------------  IN: 140 mL / OUT: 2700 mL / NET: -2560 mL      CAPILLARY BLOOD GLUCOSE            acetaminophen     Tablet .. 650 milliGRAM(s) Oral every 6 hours PRN  albuterol/ipratropium for Nebulization 3 milliLiter(s) Nebulizer every 6 hours  aluminum hydroxide/magnesium hydroxide/simethicone Suspension 30 milliLiter(s) Oral every 4 hours PRN  benzonatate 100 milliGRAM(s) Oral every 8 hours PRN  Biotene Dry Mouth Oral Rinse 15 milliLiter(s) Swish and Spit three times a day  budesonide  80 MICROgram(s)/formoterol 4.5 MICROgram(s) Inhaler 2 Puff(s) Inhalation two times a day  buPROPion XL (24-Hour) . 150 milliGRAM(s) Oral daily  chlorhexidine 2% Cloths 1 Application(s) Topical daily  dronabinol 5 milliGRAM(s) Oral two times a day  furosemide   Injectable 40 milliGRAM(s) IV Push daily  gabapentin 100 milliGRAM(s) Oral daily  haloperidol    Injectable 0.5 milliGRAM(s) IV Push every 6 hours PRN  HYDROmorphone  Injectable 0.2 milliGRAM(s) IV Push every 1 hour PRN  lisinopril 5 milliGRAM(s) Oral daily  LORazepam   Injectable 0.25 milliGRAM(s) IV Push every 6 hours PRN  melatonin 3 milliGRAM(s) Oral at bedtime PRN  metoclopramide 5 milliGRAM(s) Oral every 6 hours PRN  metoprolol succinate ER 25 milliGRAM(s) Oral daily  oxybutynin 5 milliGRAM(s) Oral daily  polyethylene glycol 3350 17 Gram(s) Oral two times a day  potassium chloride    Tablet ER 20 milliEquivalent(s) Oral daily  senna 2 Tablet(s) Oral at bedtime  sodium chloride 0.65% Nasal 1 Spray(s) Both Nostrils four times a day  sodium chloride 0.9%. 1000 milliLiter(s) IV Continuous <Continuous>  traZODone 150 milliGRAM(s) Oral at bedtime          11-29    140  |  105  |  17  ----------------------------<  90  4.0   |  28  |  0.67    Ca    7.9<L>      29 Nov 2022 07:13  Phos  3.0     11-29  Mg     2.5     11-29    TPro  5.1<L>  /  Alb  2.8<L>  /  TBili  0.3  /  DBili  x   /  AST  31  /  ALT  24  /  AlkPhos  93  11-29      Procalc  BNPSerum Pro-Brain Natriuretic Peptide: 2620 pg/mL (11-29-22 @ 07:13)  Serum Pro-Brain Natriuretic Peptide: 2748 pg/mL (11-28-22 @ 07:05)    ABG                          6.8    0.25  )-----------( 21       ( 29 Nov 2022 07:12 )             21.0   PT/INR - ( 29 Nov 2022 07:12 )   PT: 14.3 sec;   INR: 1.23 ratio                 blood and urine cultures                                     Family Health Care Decision Act Surrogate Decision Maker Hierarchy  [facility] Article 81 Guardian-->Spouse or domestic partner--> Adult child-->Parent-->Sibling--> Close friend      Contacts listed in the paper or electronic chart  Name Marshall Tobar  Relationship   Number(s) in EMR  Translation Required: none  Spouse or Partner: Above  Family unit:  and son  Family history of hematologic malignancy: none but personal HX of breast cancer s/p XRT and tamoxifen  Residence: [ ] Apartment   [x] House  [ ] Other  Degree of functionality in the home: moderate   Performance Status (PPSv2): 50   BMI: 22.4  Engagement in the home:  Employment: [ ] Currently employed [ ] Exited workforce due to illness  [ ] Retired prior to illness  [ ] No/distant history of employment TBD  Support network (degree):  ---Family:        [ ] Low  [ ] Moderate  [ x] High  ---Neighbors: [ ] Low  [ ] Moderate  [ ] High  ---Social:         [ ] Low  [ ] Moderate  [ ] High  ---Spiritual:    [ ] Low  [ ] Moderate  [ ] High  Financial concerns: TBD  Coping Strategies: TBD  Caregiver burden/fatigue/needs: Significant,  is being treated for pancreatic cancer and has debilitating CIPN  Grief identified: [] Yes [x] No  Medical communication and decision making preferences: See below          PERTINENT PM/SXH:   Breast cancer    Hypertension    AML (acute myelogenous leukemia)      S/P hysterectomy      FAMILY HISTORY:  No pertinent family history in first degree relatives      Family Hx substance abuse [ ]yes [ ]no  ITEMS NOT CHECKED ARE NOT PRESENT    SOCIAL HISTORY:   Significant other/partner[ ]  Children[ ]  Denominational/Spirituality:  Substance hx:  [ ]   Tobacco hx:  [ ]   Alcohol hx: [ ]   Home Opioid hx:  [ ] I-Stop Reference No:  Living Situation: [ ]Home  [ ]Long term care  [ ]Rehab [ ]Other    ADVANCE DIRECTIVES:    DNR/MOLST  [ ]  Living Will  [ ]   DECISION MAKER(s):  [ ] Health Care Proxy(s)  [ ] Surrogate(s)  [ ] Guardian           Name(s): Phone Number(s):    BASELINE (I)ADL(s) (prior to admission):  [place]: [ ]Total  [ ] Moderate [ ]Dependent    Allergies    No Known Allergies    Intolerances      PRESENT SYMPTOMS: [ ]Unable to self-report  [ ] CPOT [ ] PAINADs [ ] RDOS  Source if other than patient:  [ ]Family   [ ]Team     Pain: [ ]yes [xx]no  QOL impact -   Location -                    Aggravating factors -  Quality -  Radiation -  Timing-  Severity (0-10 scale):  Minimal acceptable level (0-10 scale):     CPOT:    https://www.[facility].org/getattachment/[email or web address]/Critical-Care-Pain-Observation-Tool-(CPOT)    PAIN AD Score:   http://geriatrictoolkit.[facility]/cog/painad.pdf (press ctrl +  left click to view)    Dyspnea:                           [ ]Mild [ ]Moderate [ ]Severe      RDOS:  0 to 2  minimal or no respiratory distress   3  mild distress  4 to 6 moderate distress  >7 severe distress  https://[email or web address].net/handouts/hen/Respiratory_Distress_Observation_Scale.pdf (Ctrl +  left click to view)     Anxiety:                             [ ]Mild [ ]Moderate [ ]Severe  Fatigue:                             [ ]Mild [ ]Moderate [ ]Severe  Nausea:                             [ ]Mild [ ]Moderate [ ]Severe  Loss of appetite:              [ ]Mild [ ]Moderate [ ]Severe  Constipation:                    [ ]Mild [ ]Moderate [ ]Severe    PCSSQ[Palliative Care Spiritual Screening Question]   Severity (0-10):  Score of 4 or > indicate consideration of Chaplaincy referral.  Chaplaincy Referral: [ ] yes [ ] refused [ ] following [ ] Deferred     Caregiver [place]? : [ ] yes [ ] no [ ] Deferred [ ] Declined             Social work referral [ ] Patient & Family Centered Care Referral [ ]     Anticipatory Grief present?:  [ ] yes [ ] no  [ ] Deferred                  Social work referral [ ] Chaplaincy Referral[ ]      Other Symptoms:  [ ]All other review of systems negative     Palliative Performance Status Version 2:     50    %    http://[email or web address].org/files/news/palliative_performance_scale_ppsv2.pdf  PHYSICAL EXAM:  Vital Signs Last 24 Hrs  T(C): 36.5 (21 Nov 2022 13:50), Max: 36.7 (20 Nov 2022 17:36)  T(F): 97.7 (21 Nov 2022 13:50), Max: 98.1 (20 Nov 2022 17:36)  HR: 77 (21 Nov 2022 13:50) (71 - 84)  BP: 137/78 (21 Nov 2022 13:50) (129/78 - 145/81)  BP(mean): --  RR: 18 (21 Nov 2022 13:50) (16 - 18)  SpO2: 99% (21 Nov 2022 13:50) (97% - 99%)    Parameters below as of 21 Nov 2022 13:50  Patient On (Oxygen Delivery Method): nasal cannula     I&O's Summary    20 Nov 2022 07:01  -  21 Nov 2022 07:00  --------------------------------------------------------  IN: 840 mL / OUT: 2900 mL / NET: -2060 mL    21 Nov 2022 07:01  -  21 Nov 2022 14:29  --------------------------------------------------------  IN: 611 mL / OUT: 1300 mL / NET: -689 mL      GENERAL: [ ]Cachexia    [x ]Alert  [ ]Oriented x   [ ]Lethargic  [ ]Unarousable  [ ]Verbal  [ ]Non-Verbal  Behavioral:   [ ] Anxiety  [ ] Delirium [ ] Agitation [ x] Other Calm  HEENT:  [x ]Normal   [ ]Dry mouth   [ ]ET Tube/Trach  [ ]Oral lesions  PULMONARY:   [x ]Clear [ ]Tachypnea  [ ]Audible excessive secretions   [ ]Rhonchi        [ ]Right [ ]Left [ ]Bilateral  [ ]Crackles        [ ]Right [ ]Left [ ]Bilateral  [ ]Wheezing     [ ]Right [ ]Left [ ]Bilateral  [ ]Diminished breath sounds [ ]right [ ]left [ ]bilateral  CARDIOVASCULAR:    [x ]Regular [ ]Irregular [ ]Tachy  [ ]Christian [ ]Murmur [ ]Other  GASTROINTESTINAL:  [ x]Soft  [ ]Distended   [ ]+BS  [ ]Non tender [ ]Tender  [ ]Other [ ]PEG [ ]OGT/ NGT  Last BM:  GENITOURINARY:  [x ]Normal [ ] Incontinent   [ ]Oliguria/Anuria   [ ][place]  MUSCULOSKELETAL:   [x ]Normal   [ ]Weakness  [ ]Bed/Wheelchair bound [ ]Edema  NEUROLOGIC:   [ x]No focal deficits  [ ]Cognitive impairment  [ ]Dysphagia [ ]Dysarthria [ ]Paresis [ ]Other   SKIN:   [ ]Normal  [ ]Rash  [ ]Other  [ ]Pressure ulcer(s)       Present on admission [ ]y [ ]n    CRITICAL CARE:  [ ] Shock Present  [ ]Septic [ ]Cardiogenic [ ]Neurologic [ ]Hypovolemic  [ ]  Vasopressors [ ]  Inotropes   [ ]Respiratory failure present [ ]Mechanical ventilation [ ]Non-invasive ventilatory support [ ]High flow    [ ]Acute  [ ]Chronic [ ]Hypoxic  [ ]Hypercarbic [ ]Other  [ ]Other organ failure           PTT - ( 21 Nov 2022 08:47 )  PTT:25.1 sec      RADIOLOGY & ADDITIONAL STUDIES:    PROTEIN CALORIE MALNUTRITION PRESENT: [ ]mild [ ]moderate [ ]severe [ ]underweight [ ]morbid obesity  https://www.andeal.org/vault/2440/web/files/ONC/Table_Clinical%20Characteristics%20to%20Document%20Malnutrition-White%20JV%20et%20al%924145.pdf    Height (cm): 163 (11-10-22 @ 12:15), 162 (07-18-22 @ 13:42), 162.6 (12-22-21 @ 14:17)  Weight (kg): 59.5 (11-10-22 @ 12:15), 58.0009 (09-12-22 @ 12:00), 59.9 (12-22-21 @ 14:17)  BMI (kg/m2): 22.4 (11-10-22 @ 12:15), 22.1 (09-12-22 @ 12:00), 22.8 (07-18-22 @ 13:42)    [ ]PPSV2 < or = to 30% [ ]significant weight loss  [ ]poor nutritional intake  [ ]anasarca[ ]Artificial Nutrition      Other REFERRALS:  [ ]Hospice  [ ]Child Life  [ ]Social Work  [ ]Case management [ ]Holistic Therapy     Goals of Care Document:  HPI:  This patient is an 83yo lady with PMH of AML, breast cancer s/p treatment with tamoxifen, RT and R lumpectomy, who presents with AML relapse.            11-30-22 @ 16:11  [facility] PCU1 04    Overnight events: No major events  Staff reports:n/A  Patient: PT feels fine  PRN use: No        RECENT VITALS/LABS/MEDICATIONS/ASSAYS     11-30-22 @ 16:11    T(C): 36.8 (11-30-22 @ 06:23), Max: 36.8 (11-30-22 @ 06:23)  HR: 66 (11-30-22 @ 06:23) (64 - 66)  BP: 156/63 (11-30-22 @ 06:23) (144/67 - 156/63)  RR: 18 (11-30-22 @ 06:23) (18 - 18)  SpO2: 98% (11-30-22 @ 06:23) (98% - 100%)  Wt(kg): --      29 Nov 2022 07:01  -  30 Nov 2022 07:00  --------------------------------------------------------  IN:    sodium chloride 0.9%: 140 mL  Total IN: 140 mL    OUT:    Voided (mL): 2700 mL  Total OUT: 2700 mL    Total NET: -2560 mL          11-29 @ 07:01  -  11-30 @ 07:00  --------------------------------------------------------  IN: 140 mL / OUT: 2700 mL / NET: -2560 mL      CAPILLARY BLOOD GLUCOSE            acetaminophen     Tablet .. 650 milliGRAM(s) Oral every 6 hours PRN  albuterol/ipratropium for Nebulization 3 milliLiter(s) Nebulizer every 6 hours  aluminum hydroxide/magnesium hydroxide/simethicone Suspension 30 milliLiter(s) Oral every 4 hours PRN  benzonatate 100 milliGRAM(s) Oral every 8 hours PRN  Biotene Dry Mouth Oral Rinse 15 milliLiter(s) Swish and Spit three times a day  budesonide  80 MICROgram(s)/formoterol 4.5 MICROgram(s) Inhaler 2 Puff(s) Inhalation two times a day  buPROPion XL (24-Hour) . 150 milliGRAM(s) Oral daily  chlorhexidine 2% Cloths 1 Application(s) Topical daily  dronabinol 5 milliGRAM(s) Oral two times a day  furosemide   Injectable 40 milliGRAM(s) IV Push daily  gabapentin 100 milliGRAM(s) Oral daily  haloperidol    Injectable 0.5 milliGRAM(s) IV Push every 6 hours PRN  HYDROmorphone  Injectable 0.2 milliGRAM(s) IV Push every 1 hour PRN  lisinopril 5 milliGRAM(s) Oral daily  LORazepam   Injectable 0.25 milliGRAM(s) IV Push every 6 hours PRN  melatonin 3 milliGRAM(s) Oral at bedtime PRN  metoclopramide 5 milliGRAM(s) Oral every 6 hours PRN  metoprolol succinate ER 25 milliGRAM(s) Oral daily  oxybutynin 5 milliGRAM(s) Oral daily  polyethylene glycol 3350 17 Gram(s) Oral two times a day  potassium chloride    Tablet ER 20 milliEquivalent(s) Oral daily  senna 2 Tablet(s) Oral at bedtime  sodium chloride 0.65% Nasal 1 Spray(s) Both Nostrils four times a day  sodium chloride 0.9%. 1000 milliLiter(s) IV Continuous <Continuous>  traZODone 150 milliGRAM(s) Oral at bedtime          11-29    140  |  105  |  17  ----------------------------<  90  4.0   |  28  |  0.67    Ca    7.9<L>      29 Nov 2022 07:13  Phos  3.0     11-29  Mg     2.5     11-29    TPro  5.1<L>  /  Alb  2.8<L>  /  TBili  0.3  /  DBili  x   /  AST  31  /  ALT  24  /  AlkPhos  93  11-29      Procalc  BNPSerum Pro-Brain Natriuretic Peptide: 2620 pg/mL (11-29-22 @ 07:13)  Serum Pro-Brain Natriuretic Peptide: 2748 pg/mL (11-28-22 @ 07:05)    ABG                          6.8    0.25  )-----------( 21       ( 29 Nov 2022 07:12 )             21.0   PT/INR - ( 29 Nov 2022 07:12 )   PT: 14.3 sec;   INR: 1.23 ratio                 blood and urine cultures                                     Family Health Care Decision Act Surrogate Decision Maker Hierarchy  [facility] Article 81 Guardian-->Spouse or domestic partner--> Adult child-->Parent-->Sibling--> Close friend      Contacts listed in the paper or electronic chart  Name Marshall Tobar  Relationship   Number(s) in EMR  Translation Required: none  Spouse or Partner: Above  Family unit:  and son  Family history of hematologic malignancy: none but personal HX of breast cancer s/p XRT and tamoxifen  Residence: [ ] Apartment   [x] House  [ ] Other  Degree of functionality in the home: moderate   Performance Status (PPSv2): 50   BMI: 22.4  Engagement in the home:  Employment: [ ] Currently employed [ ] Exited workforce due to illness  [ ] Retired prior to illness  [ ] No/distant history of employment TBD  Support network (degree):  ---Family:        [ ] Low  [ ] Moderate  [ x] High  ---Neighbors: [ ] Low  [ ] Moderate  [ ] High  ---Social:         [ ] Low  [ ] Moderate  [ ] High  ---Spiritual:    [ ] Low  [ ] Moderate  [ ] High  Financial concerns: TBD  Coping Strategies: TBD  Caregiver burden/fatigue/needs: Significant,  is being treated for pancreatic cancer and has debilitating CIPN  Grief identified: [] Yes [x] No  Medical communication and decision making preferences: See below          PERTINENT PM/SXH:   Breast cancer    Hypertension    AML (acute myelogenous leukemia)      S/P hysterectomy      FAMILY HISTORY:  No pertinent family history in first degree relatives      Family Hx substance abuse [ ]yes [ ]no  ITEMS NOT CHECKED ARE NOT PRESENT    SOCIAL HISTORY:   Significant other/partner[ ]  Children[ ]  Anabaptism/Spirituality:  Substance hx:  [ ]   Tobacco hx:  [ ]   Alcohol hx: [ ]   Home Opioid hx:  [ ] I-Stop Reference No:  Living Situation: [ ]Home  [ ]Long term care  [ ]Rehab [ ]Other    ADVANCE DIRECTIVES:    DNR/MOLST  [ ]  Living Will  [ ]   DECISION MAKER(s):  [ ] Health Care Proxy(s)  [ ] Surrogate(s)  [ ] Guardian           Name(s): Phone Number(s):    BASELINE (I)ADL(s) (prior to admission):  [place]: [ ]Total  [ ] Moderate [ ]Dependent    Allergies    No Known Allergies    Intolerances      PRESENT SYMPTOMS: [ ]Unable to self-report  [ ] CPOT [ ] PAINADs [ ] RDOS  Source if other than patient:  [ ]Family   [ ]Team     Pain: [ ]yes [xx]no  QOL impact -   Location -                    Aggravating factors -  Quality -  Radiation -  Timing-  Severity (0-10 scale):  Minimal acceptable level (0-10 scale):     CPOT:    https://www.[facility].org/getattachment/[email or web address]/Critical-Care-Pain-Observation-Tool-(CPOT)    PAIN AD Score:   http://geriatrictoolkit.[facility]/cog/painad.pdf (press ctrl +  left click to view)    Dyspnea:                           [ ]Mild [ ]Moderate [ ]Severe      RDOS:  0 to 2  minimal or no respiratory distress   3  mild distress  4 to 6 moderate distress  >7 severe distress  https://homecareinformation.net/handouts/hen/Respiratory_Distress_Observation_Scale.pdf (Ctrl +  left click to view)     Anxiety:                             [ ]Mild [ ]Moderate [ ]Severe  Fatigue:                             [ ]Mild [ ]Moderate [ ]Severe  Nausea:                             [ ]Mild [ ]Moderate [ ]Severe  Loss of appetite:              [ ]Mild [ ]Moderate [ ]Severe  Constipation:                    [ ]Mild [ ]Moderate [ ]Severe    PCSSQ[Palliative Care Spiritual Screening Question]   Severity (0-10):  Score of 4 or > indicate consideration of Chaplaincy referral.  Chaplaincy Referral: [ ] yes [ ] refused [ ] following [ ] Deferred     Caregiver [place]? : [ ] yes [ ] no [ ] Deferred [ ] Declined             Social work referral [ ] Patient & Family Centered Care Referral [ ]     Anticipatory Grief present?:  [ ] yes [ ] no  [ ] Deferred                  Social work referral [ ] Chaplaincy Referral[ ]      Other Symptoms:  [ ]All other review of systems negative     Palliative Performance Status Version 2:     50    %    http://npcrc.org/files/news/palliative_performance_scale_ppsv2.pdf  PHYSICAL EXAM:  Vital Signs Last 24 Hrs  T(C): 36.5 (21 Nov 2022 13:50), Max: 36.7 (20 Nov 2022 17:36)  T(F): 97.7 (21 Nov 2022 13:50), Max: 98.1 (20 Nov 2022 17:36)  HR: 77 (21 Nov 2022 13:50) (71 - 84)  BP: 137/78 (21 Nov 2022 13:50) (129/78 - 145/81)  BP(mean): --  RR: 18 (21 Nov 2022 13:50) (16 - 18)  SpO2: 99% (21 Nov 2022 13:50) (97% - 99%)    Parameters below as of 21 Nov 2022 13:50  Patient On (Oxygen Delivery Method): nasal cannula     I&O's Summary    20 Nov 2022 07:01  -  21 Nov 2022 07:00  --------------------------------------------------------  IN: 840 mL / OUT: 2900 mL / NET: -2060 mL    21 Nov 2022 07:01  -  21 Nov 2022 14:29  --------------------------------------------------------  IN: 611 mL / OUT: 1300 mL / NET: -689 mL      GENERAL: [ ]Cachexia    [x ]Alert  [ ]Oriented x   [ ]Lethargic  [ ]Unarousable  [ ]Verbal  [ ]Non-Verbal  Behavioral:   [ ] Anxiety  [ ] Delirium [ ] Agitation [ x] Other Calm  HEENT:  [x ]Normal   [ ]Dry mouth   [ ]ET Tube/Trach  [ ]Oral lesions  PULMONARY:   [x ]Clear [ ]Tachypnea  [ ]Audible excessive secretions   [ ]Rhonchi        [ ]Right [ ]Left [ ]Bilateral  [ ]Crackles        [ ]Right [ ]Left [ ]Bilateral  [ ]Wheezing     [ ]Right [ ]Left [ ]Bilateral  [ ]Diminished breath sounds [ ]right [ ]left [ ]bilateral  CARDIOVASCULAR:    [x ]Regular [ ]Irregular [ ]Tachy  [ ]Christian [ ]Murmur [ ]Other  GASTROINTESTINAL:  [ x]Soft  [ ]Distended   [ ]+BS  [ ]Non tender [ ]Tender  [ ]Other [ ]PEG [ ]OGT/ NGT  Last BM:  GENITOURINARY:  [x ]Normal [ ] Incontinent   [ ]Oliguria/Anuria   [ ][place]  MUSCULOSKELETAL:   [x ]Normal   [ ]Weakness  [ ]Bed/Wheelchair bound [ ]Edema  NEUROLOGIC:   [ x]No focal deficits  [ ]Cognitive impairment  [ ]Dysphagia [ ]Dysarthria [ ]Paresis [ ]Other   SKIN:   [ ]Normal  [ ]Rash  [ ]Other  [ ]Pressure ulcer(s)       Present on admission [ ]y [ ]n    CRITICAL CARE:  [ ] Shock Present  [ ]Septic [ ]Cardiogenic [ ]Neurologic [ ]Hypovolemic  [ ]  Vasopressors [ ]  Inotropes   [ ]Respiratory failure present [ ]Mechanical ventilation [ ]Non-invasive ventilatory support [ ]High flow    [ ]Acute  [ ]Chronic [ ]Hypoxic  [ ]Hypercarbic [ ]Other  [ ]Other organ failure           PTT - ( 21 Nov 2022 08:47 )  PTT:25.1 sec      RADIOLOGY & ADDITIONAL STUDIES:    PROTEIN CALORIE MALNUTRITION PRESENT: [ ]mild [ ]moderate [ ]severe [ ]underweight [ ]morbid obesity  https://www.andeal.org/vault/2440/web/files/ONC/Table_Clinical%20Characteristics%20to%20Document%20Malnutrition-White%20JV%20et%20al%202012.pdf    Height (cm): 163 (11-10-22 @ 12:15), 162 (07-18-22 @ 13:42), 162.6 (12-22-21 @ 14:17)  Weight (kg): 59.5 (11-10-22 @ 12:15), 58.0009 (09-12-22 @ 12:00), 59.9 (12-22-21 @ 14:17)  BMI (kg/m2): 22.4 (11-10-22 @ 12:15), 22.1 (09-12-22 @ 12:00), 22.8 (07-18-22 @ 13:42)    [ ]PPSV2 < or = to 30% [ ]significant weight loss  [ ]poor nutritional intake  [ ]anasarca[ ]Artificial Nutrition      Other REFERRALS:  [ ]Hospice  [ ]Child Life  [ ]Social Work  [ ]Case management [ ]Holistic Therapy     Goals of Care Document:

## 2022-12-01 NOTE — PROGRESS NOTE ADULT - PROBLEM SELECTOR PLAN 1
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

## 2022-12-01 NOTE — PROGRESS NOTE ADULT - SUBJECTIVE AND OBJECTIVE BOX
HPI:  This patient is an 83yo lady with PMH of AML, breast cancer s/p treatment with tamoxifen, RT and R lumpectomy, who presents with AML relapse.                    12-01-22 @ 11:39  [facility] PCU1 04    Overnight events:  Staff reports:  Patient:  PRN use:        RECENT VITALS/LABS/MEDICATIONS/ASSAYS     12-01-22 @ 11:39    T(C): 36.8 (12-01-22 @ 08:05), Max: 36.8 (12-01-22 @ 08:05)  HR: 71 (12-01-22 @ 08:05) (71 - 71)  BP: 150/89 (12-01-22 @ 08:05) (150/89 - 150/89)  RR: 18 (12-01-22 @ 08:05) (18 - 18)  SpO2: 98% (12-01-22 @ 08:05) (98% - 98%)  Wt(kg): --      30 Nov 2022 07:01  -  01 Dec 2022 07:00  --------------------------------------------------------  IN:  Total IN: 0 mL    OUT:    Voided (mL): 400 mL  Total OUT: 400 mL    Total NET: -400 mL          11-30 @ 07:01  -  12-01 @ 07:00  --------------------------------------------------------  IN: 0 mL / OUT: 400 mL / NET: -400 mL      CAPILLARY BLOOD GLUCOSE            acetaminophen     Tablet .. 650 milliGRAM(s) Oral every 6 hours PRN  albuterol/ipratropium for Nebulization 3 milliLiter(s) Nebulizer every 6 hours  aluminum hydroxide/magnesium hydroxide/simethicone Suspension 30 milliLiter(s) Oral every 4 hours PRN  benzonatate 100 milliGRAM(s) Oral every 8 hours PRN  Biotene Dry Mouth Oral Rinse 15 milliLiter(s) Swish and Spit three times a day  budesonide  80 MICROgram(s)/formoterol 4.5 MICROgram(s) Inhaler 2 Puff(s) Inhalation two times a day  buPROPion XL (24-Hour) . 150 milliGRAM(s) Oral daily  chlorhexidine 2% Cloths 1 Application(s) Topical daily  dronabinol 5 milliGRAM(s) Oral two times a day  furosemide   Injectable 40 milliGRAM(s) IV Push daily  gabapentin 100 milliGRAM(s) Oral daily  haloperidol    Injectable 0.5 milliGRAM(s) IV Push every 6 hours PRN  HYDROmorphone  Injectable 0.2 milliGRAM(s) IV Push every 1 hour PRN  lisinopril 5 milliGRAM(s) Oral daily  LORazepam   Injectable 0.25 milliGRAM(s) IV Push every 6 hours PRN  melatonin 3 milliGRAM(s) Oral at bedtime PRN  metoclopramide 5 milliGRAM(s) Oral every 6 hours PRN  metoprolol succinate ER 25 milliGRAM(s) Oral daily  oxybutynin 5 milliGRAM(s) Oral daily  polyethylene glycol 3350 17 Gram(s) Oral two times a day  potassium chloride    Tablet ER 20 milliEquivalent(s) Oral daily  senna 2 Tablet(s) Oral at bedtime  sodium chloride 0.65% Nasal 1 Spray(s) Both Nostrils four times a day  sodium chloride 0.9%. 1000 milliLiter(s) IV Continuous <Continuous>  traZODone 150 milliGRAM(s) Oral at bedtime                  Procalc  BNPSerum Pro-Brain Natriuretic Peptide: 2620 pg/mL (11-29-22 @ 07:13)  Serum Pro-Brain Natriuretic Peptide: 2748 pg/mL (11-28-22 @ 07:05)    ABG              blood and urine cultures                                     Family Health Care Decision Act Surrogate Decision Maker Hierarchy  [facility] Article 81 Guardian-->Spouse or domestic partner--> Adult child-->Parent-->Sibling--> Close friend      Contacts listed in the paper or electronic chart  Name Marshall Tobar  Relationship   Number(s) in EMR  Translation Required: none  Spouse or Partner: Above  Family unit:  and son  Family history of hematologic malignancy: none but personal HX of breast cancer s/p XRT and tamoxifen  Residence: [ ] Apartment   [x] House  [ ] Other  Degree of functionality in the home: moderate   Performance Status (PPSv2): 50   BMI: 22.4  Engagement in the home:  Employment: [ ] Currently employed [ ] Exited workforce due to illness  [ ] Retired prior to illness  [ ] No/distant history of employment TBD  Support network (degree):  ---Family:        [ ] Low  [ ] Moderate  [ x] High  ---Neighbors: [ ] Low  [ ] Moderate  [ ] High  ---Social:         [ ] Low  [ ] Moderate  [ ] High  ---Spiritual:    [ ] Low  [ ] Moderate  [ ] High  Financial concerns: TBD  Coping Strategies: TBD  Caregiver burden/fatigue/needs: Significant,  is being treated for pancreatic cancer and has debilitating CIPN  Grief identified: [] Yes [x] No  Medical communication and decision making preferences: See below          PERTINENT PM/SXH:   Breast cancer    Hypertension    AML (acute myelogenous leukemia)      S/P hysterectomy      FAMILY HISTORY:  No pertinent family history in first degree relatives      Family Hx substance abuse [ ]yes [ ]no  ITEMS NOT CHECKED ARE NOT PRESENT    SOCIAL HISTORY:   Significant other/partner[ ]  Children[ ]  Baptism/Spirituality:  Substance hx:  [ ]   Tobacco hx:  [ ]   Alcohol hx: [ ]   Home Opioid hx:  [ ] I-Stop Reference No:  Living Situation: [ ]Home  [ ]Long term care  [ ]Rehab [ ]Other    ADVANCE DIRECTIVES:    DNR/MOLST  [ ]  Living Will  [ ]   DECISION MAKER(s):  [ ] Health Care Proxy(s)  [ ] Surrogate(s)  [ ] Guardian           Name(s): Phone Number(s):    BASELINE (I)ADL(s) (prior to admission):  [place]: [ ]Total  [ ] Moderate [ ]Dependent    Allergies    No Known Allergies    Intolerances      PRESENT SYMPTOMS: [ ]Unable to self-report  [ ] CPOT [ ] PAINADs [ ] RDOS  Source if other than patient:  [ ]Family   [ ]Team     Pain: [ ]yes [xx]no  QOL impact -   Location -                    Aggravating factors -  Quality -  Radiation -  Timing-  Severity (0-10 scale):  Minimal acceptable level (0-10 scale):     CPOT:    https://www.sccm.org/getattachment/cep82h17-0i8j-1l0q-8o3w-1976l9729d7h/Critical-Care-Pain-Observation-Tool-(CPOT)    PAIN AD Score:   http://geriatrictoolkit.[email or web address]/cog/painad.pdf (press ctrl +  left click to view)    Dyspnea:                           [ ]Mild [ ]Moderate [ ]Severe      RDOS:  0 to 2  minimal or no respiratory distress   3  mild distress  4 to 6 moderate distress  >7 severe distress  https://homecareinformation.net/handouts/hen/Respiratory_Distress_Observation_Scale.pdf (Ctrl +  left click to view)     Anxiety:                             [ ]Mild [ ]Moderate [ ]Severe  Fatigue:                             [ ]Mild [ ]Moderate [ ]Severe  Nausea:                             [ ]Mild [ ]Moderate [ ]Severe  Loss of appetite:              [ ]Mild [ ]Moderate [ ]Severe  Constipation:                    [ ]Mild [ ]Moderate [ ]Severe    PCSSQ[Palliative Care Spiritual Screening Question]   Severity (0-10):  Score of 4 or > indicate consideration of Chaplaincy referral.  Chaplaincy Referral: [ ] yes [ ] refused [ ] following [ ] Deferred     Caregiver [place]? : [ ] yes [ ] no [ ] Deferred [ ] Declined             Social work referral [ ] Patient & Family Centered Care Referral [ ]     Anticipatory Grief present?:  [ ] yes [ ] no  [ ] Deferred                  Social work referral [ ] Chaplaincy Referral[ ]      Other Symptoms:  [ ]All other review of systems negative     Palliative Performance Status Version 2:     50    %    http://npcrc.org/files/news/palliative_performance_scale_ppsv2.pdf  PHYSICAL EXAM:  Vital Signs Last 24 Hrs  T(C): 36.5 (21 Nov 2022 13:50), Max: 36.7 (20 Nov 2022 17:36)  T(F): 97.7 (21 Nov 2022 13:50), Max: 98.1 (20 Nov 2022 17:36)  HR: 77 (21 Nov 2022 13:50) (71 - 84)  BP: 137/78 (21 Nov 2022 13:50) (129/78 - 145/81)  BP(mean): --  RR: 18 (21 Nov 2022 13:50) (16 - 18)  SpO2: 99% (21 Nov 2022 13:50) (97% - 99%)    Parameters below as of 21 Nov 2022 13:50  Patient On (Oxygen Delivery Method): nasal cannula     I&O's Summary    20 Nov 2022 07:01  -  21 Nov 2022 07:00  --------------------------------------------------------  IN: 840 mL / OUT: 2900 mL / NET: -2060 mL    21 Nov 2022 07:01  -  21 Nov 2022 14:29  --------------------------------------------------------  IN: 611 mL / OUT: 1300 mL / NET: -689 mL      GENERAL: [ ]Cachexia    [x ]Alert  [ ]Oriented x   [ ]Lethargic  [ ]Unarousable  [ ]Verbal  [ ]Non-Verbal  Behavioral:   [ ] Anxiety  [ ] Delirium [ ] Agitation [ x] Other Calm  HEENT:  [x ]Normal   [ ]Dry mouth   [ ]ET Tube/Trach  [ ]Oral lesions  PULMONARY:   [x ]Clear [ ]Tachypnea  [ ]Audible excessive secretions   [ ]Rhonchi        [ ]Right [ ]Left [ ]Bilateral  [ ]Crackles        [ ]Right [ ]Left [ ]Bilateral  [ ]Wheezing     [ ]Right [ ]Left [ ]Bilateral  [ ]Diminished breath sounds [ ]right [ ]left [ ]bilateral  CARDIOVASCULAR:    [x ]Regular [ ]Irregular [ ]Tachy  [ ]Christian [ ]Murmur [ ]Other  GASTROINTESTINAL:  [ x]Soft  [ ]Distended   [ ]+BS  [ ]Non tender [ ]Tender  [ ]Other [ ]PEG [ ]OGT/ NGT  Last BM:  GENITOURINARY:  [x ]Normal [ ] Incontinent   [ ]Oliguria/Anuria   [ ]Lemos  MUSCULOSKELETAL:   [x ]Normal   [ ]Weakness  [ ]Bed/Wheelchair bound [ ]Edema  NEUROLOGIC:   [ x]No focal deficits  [ ]Cognitive impairment  [ ]Dysphagia [ ]Dysarthria [ ]Paresis [ ]Other   SKIN:   [ ]Normal  [ ]Rash  [ ]Other  [ ]Pressure ulcer(s)       Present on admission [ ]y [ ]n    CRITICAL CARE:  [ ] Shock Present  [ ]Septic [ ]Cardiogenic [ ]Neurologic [ ]Hypovolemic  [ ]  Vasopressors [ ]  Inotropes   [ ]Respiratory failure present [ ]Mechanical ventilation [ ]Non-invasive ventilatory support [ ]High flow    [ ]Acute  [ ]Chronic [ ]Hypoxic  [ ]Hypercarbic [ ]Other  [ ]Other organ failure           PTT - ( 21 Nov 2022 08:47 )  PTT:25.1 sec      RADIOLOGY & ADDITIONAL STUDIES:    PROTEIN CALORIE MALNUTRITION PRESENT: [ ]mild [ ]moderate [ ]severe [ ]underweight [ ]morbid obesity  https://www.andeal.org/vault/2440/web/files/ONC/Table_Clinical%20Characteristics%20to%20Document%20Malnutrition-White%20JV%20et%20al%202012.pdf    Height (cm): 163 (11-10-22 @ 12:15), 162 (07-18-22 @ 13:42), 162.6 (12-22-21 @ 14:17)  Weight (kg): 59.5 (11-10-22 @ 12:15), 58.0009 (09-12-22 @ 12:00), 59.9 (12-22-21 @ 14:17)  BMI (kg/m2): 22.4 (11-10-22 @ 12:15), 22.1 (09-12-22 @ 12:00), 22.8 (07-18-22 @ 13:42)    [ ]PPSV2 < or = to 30% [ ]significant weight loss  [ ]poor nutritional intake  [ ]anasarca[ ]Artificial Nutrition      Other REFERRALS:  [ ]Hospice  [ ]Child Life  [ ]Social Work  [ ]Case management [ ]Holistic Therapy     Goals of Care Document:  HPI:  This patient is an 83yo lady with PMH of AML, breast cancer s/p treatment with tamoxifen, RT and R lumpectomy, who presents with AML relapse.           12-01-22 @ 11:39  [facility] PCU1 04    Overnight events: No major events  Staff reports: d/w nurse  Patient: Pt reports low energy and some challenges with word finding that have been consistent with this admission  PRN use: n/a        RECENT VITALS/LABS/MEDICATIONS/ASSAYS     12-01-22 @ 11:39    T(C): 36.8 (12-01-22 @ 08:05), Max: 36.8 (12-01-22 @ 08:05)  HR: 71 (12-01-22 @ 08:05) (71 - 71)  BP: 150/89 (12-01-22 @ 08:05) (150/89 - 150/89)  RR: 18 (12-01-22 @ 08:05) (18 - 18)  SpO2: 98% (12-01-22 @ 08:05) (98% - 98%)  Wt(kg): --      30 Nov 2022 07:01  -  01 Dec 2022 07:00  --------------------------------------------------------  IN:  Total IN: 0 mL    OUT:    Voided (mL): 400 mL  Total OUT: 400 mL    Total NET: -400 mL          11-30 @ 07:01  -  12-01 @ 07:00  --------------------------------------------------------  IN: 0 mL / OUT: 400 mL / NET: -400 mL      CAPILLARY BLOOD GLUCOSE            acetaminophen     Tablet .. 650 milliGRAM(s) Oral every 6 hours PRN  albuterol/ipratropium for Nebulization 3 milliLiter(s) Nebulizer every 6 hours  aluminum hydroxide/magnesium hydroxide/simethicone Suspension 30 milliLiter(s) Oral every 4 hours PRN  benzonatate 100 milliGRAM(s) Oral every 8 hours PRN  Biotene Dry Mouth Oral Rinse 15 milliLiter(s) Swish and Spit three times a day  budesonide  80 MICROgram(s)/formoterol 4.5 MICROgram(s) Inhaler 2 Puff(s) Inhalation two times a day  buPROPion XL (24-Hour) . 150 milliGRAM(s) Oral daily  chlorhexidine 2% Cloths 1 Application(s) Topical daily  dronabinol 5 milliGRAM(s) Oral two times a day  furosemide   Injectable 40 milliGRAM(s) IV Push daily  gabapentin 100 milliGRAM(s) Oral daily  haloperidol    Injectable 0.5 milliGRAM(s) IV Push every 6 hours PRN  HYDROmorphone  Injectable 0.2 milliGRAM(s) IV Push every 1 hour PRN  lisinopril 5 milliGRAM(s) Oral daily  LORazepam   Injectable 0.25 milliGRAM(s) IV Push every 6 hours PRN  melatonin 3 milliGRAM(s) Oral at bedtime PRN  metoclopramide 5 milliGRAM(s) Oral every 6 hours PRN  metoprolol succinate ER 25 milliGRAM(s) Oral daily  oxybutynin 5 milliGRAM(s) Oral daily  polyethylene glycol 3350 17 Gram(s) Oral two times a day  potassium chloride    Tablet ER 20 milliEquivalent(s) Oral daily  senna 2 Tablet(s) Oral at bedtime  sodium chloride 0.65% Nasal 1 Spray(s) Both Nostrils four times a day  sodium chloride 0.9%. 1000 milliLiter(s) IV Continuous <Continuous>  traZODone 150 milliGRAM(s) Oral at bedtime                  Procalc  BNPSerum Pro-Brain Natriuretic Peptide: 2620 pg/mL (11-29-22 @ 07:13)  Serum Pro-Brain Natriuretic Peptide: 2748 pg/mL (11-28-22 @ 07:05)    ABG              blood and urine cultures                                     Family Health Care Decision Act Surrogate Decision Maker Hierarchy  [facility] Article [address] Guardian-->Spouse or domestic partner--> Adult child-->Parent-->Sibling--> Close friend      Contacts listed in the paper or electronic chart  Name Marshall Tobar  Relationship   Number(s) in EMR  Translation Required: none  Spouse or Partner: Above  Family unit:  and son  Family history of hematologic malignancy: none but personal HX of breast cancer s/p XRT and tamoxifen  Residence: [ ] Apartment   [x] House  [ ] Other  Degree of functionality in the home: moderate   Performance Status (PPSv2): 50   BMI: 22.4  Engagement in the home:  Employment: [ ] Currently employed [ ] Exited workforce due to illness  [ ] Retired prior to illness  [ ] No/distant history of employment TBD  Support network (degree):  ---Family:        [ ] Low  [ ] Moderate  [ x] High  ---Neighbors: [ ] Low  [ ] Moderate  [ ] High  ---Social:         [ ] Low  [ ] Moderate  [ ] High  ---Spiritual:    [ ] Low  [ ] Moderate  [ ] High  Financial concerns: TBD  Coping Strategies: TBD  Caregiver burden/fatigue/needs: Significant,  is being treated for pancreatic cancer and has debilitating CIPN  Grief identified: [] Yes [x] No  Medical communication and decision making preferences: See below          PERTINENT PM/SXH:   Breast cancer    Hypertension    AML (acute myelogenous leukemia)      S/P hysterectomy      FAMILY HISTORY:  No pertinent family history in first degree relatives      Family Hx substance abuse [ ]yes [ ]no  ITEMS NOT CHECKED ARE NOT PRESENT    SOCIAL HISTORY:   Significant other/partner[ ]  Children[ ]  Taoist/Spirituality:  Substance hx:  [ ]   Tobacco hx:  [ ]   Alcohol hx: [ ]   Home Opioid hx:  [ ] I-Stop Reference No:  Living Situation: [ ]Home  [ ]Long term care  [ ]Rehab [ ]Other    ADVANCE DIRECTIVES:    DNR/MOLST  [ ]  Living Will  [ ]   DECISION MAKER(s):  [ ] Health Care Proxy(s)  [ ] Surrogate(s)  [ ] Guardian           Name(s): Phone Number(s):    BASELINE (I)ADL(s) (prior to admission):  [place]: [ ]Total  [ ] Moderate [ ]Dependent    Allergies    No Known Allergies    Intolerances      PRESENT SYMPTOMS: [ ]Unable to self-report  [ ] CPOT [ ] PAINADs [ ] RDOS  Source if other than patient:  [ ]Family   [ ]Team     Pain: [ ]yes [xx]no  QOL impact -   Location -                    Aggravating factors -  Quality -  Radiation -  Timing-  Severity (0-10 scale):  Minimal acceptable level (0-10 scale):     CPOT:    https://www.[facility].org/getattachment/[email or web address]/Critical-Care-Pain-Observation-Tool-(CPOT)    PAIN AD Score:   http://geriatrictoolkit.[facility]/cog/painad.pdf (press ctrl +  left click to view)    Dyspnea:                           [ ]Mild [ ]Moderate [ ]Severe      RDOS:  0 to 2  minimal or no respiratory distress   3  mild distress  4 to 6 moderate distress  >7 severe distress  https://homecareinformation.net/handouts/hen/Respiratory_Distress_Observation_Scale.pdf (Ctrl +  left click to view)     Anxiety:                             [ ]Mild [ ]Moderate [ ]Severe  Fatigue:                             [ ]Mild [ ]Moderate [ ]Severe  Nausea:                             [ ]Mild [ ]Moderate [ ]Severe  Loss of appetite:              [ ]Mild [ ]Moderate [ ]Severe  Constipation:                    [ ]Mild [ ]Moderate [ ]Severe    PCSSQ[Palliative Care Spiritual Screening Question]   Severity (0-10):  Score of 4 or > indicate consideration of Chaplaincy referral.  Chaplaincy Referral: [ ] yes [ ] refused [ ] following [ ] Deferred     Caregiver [place]? : [ ] yes [ ] no [ ] Deferred [ ] Declined             Social work referral [ ] Patient & Family Centered Care Referral [ ]     Anticipatory Grief present?:  [ ] yes [ ] no  [ ] Deferred                  Social work referral [ ] Chaplaincy Referral[ ]      Other Symptoms:  [ ]All other review of systems negative     Palliative Performance Status Version 2:     50    %    http://npcrc.org/files/news/palliative_performance_scale_ppsv2.pdf  PHYSICAL EXAM:       GENERAL: [ ]Cachexia    [x ]Alert  [ ]Oriented x   [ ]Lethargic  [ ]Unarousable  [ ]Verbal  [ ]Non-Verbal  Behavioral:   [ ] Anxiety  [ ] Delirium [ ] Agitation [ x] Other Calm  HEENT:  [x ]Normal   [ ]Dry mouth   [ ]ET Tube/Trach  [ ]Oral lesions  PULMONARY:   [x ]Clear [ ]Tachypnea  [ ]Audible excessive secretions   [ ]Rhonchi        [ ]Right [ ]Left [ ]Bilateral  [ ]Crackles        [ ]Right [ ]Left [ ]Bilateral  [ ]Wheezing     [ ]Right [ ]Left [ ]Bilateral  [ ]Diminished breath sounds [ ]right [ ]left [ ]bilateral  CARDIOVASCULAR:    [x ]Regular [ ]Irregular [ ]Tachy  [ ]Christian [ ]Murmur [ ]Other  GASTROINTESTINAL:  [ x]Soft  [ ]Distended   [ ]+BS  [ ]Non tender [ ]Tender  [ ]Other [ ]PEG [ ]OGT/ NGT  Last BM:  GENITOURINARY:  [x ]Normal [ ] Incontinent   [ ]Oliguria/Anuria   [ ]Lemos  MUSCULOSKELETAL:   [x ]Normal   [ ]Weakness  [ ]Bed/Wheelchair bound [ ]Edema  NEUROLOGIC:   [ x]No focal deficits  [ ]Cognitive impairment  [ ]Dysphagia [ ]Dysarthria [ ]Paresis [ ]Other   SKIN:   [ ]Normal  [ ]Rash  [ ]Other  [ ]Pressure ulcer(s)       Present on admission [ ]y [ ]n    CRITICAL CARE:  [ ] Shock Present  [ ]Septic [ ]Cardiogenic [ ]Neurologic [ ]Hypovolemic  [ ]  Vasopressors [ ]  Inotropes   [ ]Respiratory failure present [ ]Mechanical ventilation [ ]Non-invasive ventilatory support [ ]High flow    [ ]Acute  [ ]Chronic [ ]Hypoxic  [ ]Hypercarbic [ ]Other  [ ]Other organ failure             RADIOLOGY & ADDITIONAL STUDIES:    PROTEIN CALORIE MALNUTRITION PRESENT: [ ]mild [ ]moderate [ ]severe [ ]underweight [ ]morbid obesity  https://www.andeal.org/vault/2440/web/files/ONC/Table_Clinical%20Characteristics%20to%20Document%20Malnutrition-White%20JV%20et%20al%626036.pdf    Height (cm): 163 (11-10-22 @ 12:15), 162 (07-18-22 @ 13:42), 162.6 (12-22-21 @ 14:17)  Weight (kg): 59.5 (11-10-22 @ 12:15), 58.0009 (09-12-22 @ 12:00), 59.9 (12-22-21 @ 14:17)  BMI (kg/m2): 22.4 (11-10-22 @ 12:15), 22.1 (09-12-22 @ 12:00), 22.8 (07-18-22 @ 13:42)    [ ]PPSV2 < or = to 30% [ ]significant weight loss  [ ]poor nutritional intake  [ ]anasarca[ ]Artificial Nutrition      Other REFERRALS:  [ ]Hospice  [ ]Child Life  [ ]Social Work  [ ]Case management [ ]Holistic Therapy     Goals of Care Document:

## 2022-12-01 NOTE — PROGRESS NOTE ADULT - PROBLEM SELECTOR PLAN 5
reports challenge with caregiving due to current cancer treatment (met prostate) Caregiver burden issues

## 2022-12-02 NOTE — PROGRESS NOTE ADULT - ATTENDING COMMENTS
HPI:  This patient is an 83yo lady with PMH of AML, breast cancer s/p treatment with tamoxifen, RT and R lumpectomy, who presents with AML relapse.    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 On 2/4/2020, she started C1 dacogen and venetoclax, which she tolerated, other than some dypsnea which was attributed to pulmonary edema and L pleural effusion, and treated with diuretics. Patient was discharged home on 2/11/2020.  BMBx on 3/2/2020 found no blasts. Patient thereafter was continued on maintenance treatment with dacogen and venetoclax (10 days) every 5 weeks, with onpro as needed.    6 month interval BMBx in 10/2020 found hypocellular marrow with focal erythropoiesis, markedly diminished myelopoiesis, rare megakaryocyte and aspicular aspirate, and no increase in blast population.      In August 2022, she was noted to not have count recovery between cycles and was pancytopenic.   Repeat BMBx performed on 9/7/2022. Limited bone marrow tissue shows erythroid predominance, decreased myeloid maturation, increased blasts (5% by flow and in peripheral blood), decreased megakaryocytes. 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   Flow cytometry found 5% myeloblasts, positive for HLA-DR, CD34, , CD33, CD13. Negative for CD2, CD7, CD15,CD19, CD38 and CD56.   Karyotype normal.  Results were compatible with relapse of known AML.   Last dose of pegfilgrastim was on 12/10/2021.   C27D1 of decitabine and venetoclax was on 10/5-10/9.  Patient was sent to [facility] with plan to initiate therapy with single agent gilteritinib given presence of FLT3 mutation, and loss of response to dacogen and venetoclax.     Patient reports worsening fatigue over the last few weeks. She denies dizziness or lightheadedness, headaches, vision changes. She denies dyspnea, coughing, chest pain or palpitations. She reports decreased appetite. She has maintained her weight at 123lbs.  She endorses constipation. She has had intermittent drenching night sweats for over 1 month. She denies fevers or chills.     Today, CBC today found WBC of 101.6, Hgb of 8.5, MCV of 102, plt count of 38k. She has 90% of blasts in peripheral blood.   CMP notable for elevated K of 3.2.   LDH is high at 859. Serum creatinine, Uric acid, Ca, phos, magnesium are all WNL.  Lactate also WNL.     INR is 1.28, PT 14.9, PTT 27.4. Fibrinogen elevated 542. D-dimer 1076 elevated.  (08 Nov 2022 13:07)    The patient was seen and examined  Annotations are embedded above  Predominant symptom(s):  #AML  No more DMT being sought  Fatigue is the predominant symptom  Relevant factors:  # Emotional distress-Existentila concerns  Chaplaincy referral            In the event of newly developing, evolving, or worsening symptoms, please contact the Palliative Medicine team via pager (if the patient is at [facility] #[phone] or if the patient is at [facility] #[phone]) The Geriatric and Palliative Medicine service has coverage 24 hours a day/ 7 days a week to provide medical recommendations regarding symptom management needs via telephone      Samuel Spangler MD   of Geriatric and Palliative Medicine  [facility]    Between the hours of 9 am and 5 pm from Monday through Friday, please contact me on [employer] Teams    After 5pm and on weekends, please see the contact information below:    In the event of newly developing, evolving, or worsening symptoms, please contact the Palliative Medicine team via pager (if the patient is at [facility] #[phone] or if the patient is at [facility] #[phone]) The Geriatric and Palliative Medicine service has coverage 24 hours a day/ 7 days a week to provide medical recommendations regarding symptom management needs via telephone

## 2022-12-02 NOTE — PROGRESS NOTE ADULT - SUBJECTIVE AND OBJECTIVE BOX
HPI:  This patient is an 81yo lady with PMH of AML, breast cancer s/p treatment with tamoxifen, RT and R lumpectomy, who presents with AML relapse.      [facility] PCU1 04    Overnight events: No major events  Staff reports: d/w nurse  Patient: Patient seen and examined at bedside this AM.   PRN use: n/a        RECENT VITALS/LABS/MEDICATIONS/ASSAYS     12-01-22 @ 11:39    T(C): 36.8 (12-01-22 @ 08:05), Max: 36.8 (12-01-22 @ 08:05)  HR: 71 (12-01-22 @ 08:05) (71 - 71)  BP: 150/89 (12-01-22 @ 08:05) (150/89 - 150/89)  RR: 18 (12-01-22 @ 08:05) (18 - 18)  SpO2: 98% (12-01-22 @ 08:05) (98% - 98%)  Wt(kg): --      30 Nov 2022 07:01  -  01 Dec 2022 07:00  --------------------------------------------------------  IN:  Total IN: 0 mL    OUT:    Voided (mL): 400 mL  Total OUT: 400 mL    Total NET: -400 mL          11-30 @ 07:01  -  12-01 @ 07:00  --------------------------------------------------------  IN: 0 mL / OUT: 400 mL / NET: -400 mL      CAPILLARY BLOOD GLUCOSE            acetaminophen     Tablet .. 650 milliGRAM(s) Oral every 6 hours PRN  albuterol/ipratropium for Nebulization 3 milliLiter(s) Nebulizer every 6 hours  aluminum hydroxide/magnesium hydroxide/simethicone Suspension 30 milliLiter(s) Oral every 4 hours PRN  benzonatate 100 milliGRAM(s) Oral every 8 hours PRN  Biotene Dry Mouth Oral Rinse 15 milliLiter(s) Swish and Spit three times a day  budesonide  80 MICROgram(s)/formoterol 4.5 MICROgram(s) Inhaler 2 Puff(s) Inhalation two times a day  buPROPion XL (24-Hour) . 150 milliGRAM(s) Oral daily  chlorhexidine 2% Cloths 1 Application(s) Topical daily  dronabinol 5 milliGRAM(s) Oral two times a day  furosemide   Injectable 40 milliGRAM(s) IV Push daily  gabapentin 100 milliGRAM(s) Oral daily  haloperidol    Injectable 0.5 milliGRAM(s) IV Push every 6 hours PRN  HYDROmorphone  Injectable 0.2 milliGRAM(s) IV Push every 1 hour PRN  lisinopril 5 milliGRAM(s) Oral daily  LORazepam   Injectable 0.25 milliGRAM(s) IV Push every 6 hours PRN  melatonin 3 milliGRAM(s) Oral at bedtime PRN  metoclopramide 5 milliGRAM(s) Oral every 6 hours PRN  metoprolol succinate ER 25 milliGRAM(s) Oral daily  oxybutynin 5 milliGRAM(s) Oral daily  polyethylene glycol 3350 17 Gram(s) Oral two times a day  potassium chloride    Tablet ER 20 milliEquivalent(s) Oral daily  senna 2 Tablet(s) Oral at bedtime  sodium chloride 0.65% Nasal 1 Spray(s) Both Nostrils four times a day  sodium chloride 0.9%. 1000 milliLiter(s) IV Continuous <Continuous>  traZODone 150 milliGRAM(s) Oral at bedtime                  Procalc  BNPSerum Pro-Brain Natriuretic Peptide: 2620 pg/mL (11-29-22 @ 07:13)  Serum Pro-Brain Natriuretic Peptide: 2748 pg/mL (11-28-22 @ 07:05)    ABG              blood and urine cultures                                     Family Health Care Decision Act Surrogate Decision Maker Hierarchy  [facility] Article 81 Guardian-->Spouse or domestic partner--> Adult child-->Parent-->Sibling--> Close friend      Contacts listed in the paper or electronic chart  Name Marshall Tobar  Relationship   Number(s) in EMR  Translation Required: none  Spouse or Partner: Above  Family unit:  and son  Family history of hematologic malignancy: none but personal HX of breast cancer s/p XRT and tamoxifen  Residence: [ ] Apartment   [x] House  [ ] Other  Degree of functionality in the home: moderate   Performance Status (PPSv2): 50   BMI: 22.4  Engagement in the home:  Employment: [ ] Currently employed [ ] Exited workforce due to illness  [ ] Retired prior to illness  [ ] No/distant history of employment TBD  Support network (degree):  ---Family:        [ ] Low  [ ] Moderate  [ x] High  ---Neighbors: [ ] Low  [ ] Moderate  [ ] High  ---Social:         [ ] Low  [ ] Moderate  [ ] High  ---Spiritual:    [ ] Low  [ ] Moderate  [ ] High  Financial concerns: TBD  Coping Strategies: TBD  Caregiver burden/fatigue/needs: Significant,  is being treated for pancreatic cancer and has debilitating CIPN  Grief identified: [] Yes [x] No  Medical communication and decision making preferences: See below          PERTINENT PM/SXH:   Breast cancer    Hypertension    AML (acute myelogenous leukemia)      S/P hysterectomy      FAMILY HISTORY:  No pertinent family history in first degree relatives      Family Hx substance abuse [ ]yes [ ]no  ITEMS NOT CHECKED ARE NOT PRESENT    SOCIAL HISTORY:   Significant other/partner[ ]  Children[ ]  Taoist/Spirituality:  Substance hx:  [ ]   Tobacco hx:  [ ]   Alcohol hx: [ ]   Home Opioid hx:  [ ] I-Stop Reference No:  Living Situation: [ ]Home  [ ]Long term care  [ ]Rehab [ ]Other    ADVANCE DIRECTIVES:    DNR/MOLST  [ ]  Living Will  [ ]   DECISION MAKER(s):  [ ] Health Care Proxy(s)  [ ] Surrogate(s)  [ ] Guardian           Name(s): Phone Number(s):    BASELINE (I)ADL(s) (prior to admission):  [place]: [ ]Total  [ ] Moderate [ ]Dependent    Allergies    No Known Allergies    Intolerances      PRESENT SYMPTOMS: [ ]Unable to self-report  [ ] CPOT [ ] PAINADs [ ] RDOS  Source if other than patient:  [ ]Family   [ ]Team     Pain: [ ]yes [xx]no  QOL impact -   Location -                    Aggravating factors -  Quality -  Radiation -  Timing-  Severity (0-10 scale):  Minimal acceptable level (0-10 scale):     CPOT:    https://www.sccm.org/getattachment/[email or web address]/Critical-Care-Pain-Observation-Tool-(CPOT)    PAIN AD Score:   http://geriatrictoolkit.[facility]/cog/painad.pdf (press ctrl +  left click to view)    Dyspnea:                           [ ]Mild [ ]Moderate [ ]Severe      RDOS:  0 to 2  minimal or no respiratory distress   3  mild distress  4 to 6 moderate distress  >7 severe distress  https://[email or web address]/handouts/hen/Respiratory_Distress_Observation_Scale.pdf (Ctrl +  left click to view)     Anxiety:                             [ ]Mild [ ]Moderate [ ]Severe  Fatigue:                             [ ]Mild [ ]Moderate [ ]Severe  Nausea:                             [ ]Mild [ ]Moderate [ ]Severe  Loss of appetite:              [ ]Mild [ ]Moderate [ ]Severe  Constipation:                    [ ]Mild [ ]Moderate [ ]Severe    PCSSQ[Palliative Care Spiritual Screening Question]   Severity (0-10):  Score of 4 or > indicate consideration of Chaplaincy referral.  Chaplaincy Referral: [ ] yes [ ] refused [ ] following [ ] Deferred     Caregiver [place]? : [ ] yes [ ] no [ ] Deferred [ ] Declined             Social work referral [ ] Patient & Family Centered Care Referral [ ]     Anticipatory Grief present?:  [ ] yes [ ] no  [ ] Deferred                  Social work referral [ ] Chaplaincy Referral[ ]      Other Symptoms:  [ ]All other review of systems negative     Palliative Performance Status Version 2:     50    %    http://npcrc.org/files/news/palliative_performance_scale_ppsv2.pdf  PHYSICAL EXAM:       GENERAL: [ ]Cachexia    [x ]Alert  [ ]Oriented x   [ ]Lethargic  [ ]Unarousable  [ ]Verbal  [ ]Non-Verbal  Behavioral:   [ ] Anxiety  [ ] Delirium [ ] Agitation [ x] Other Calm  HEENT:  [x ]Normal   [ ]Dry mouth   [ ]ET Tube/Trach  [ ]Oral lesions  PULMONARY:   [x ]Clear [ ]Tachypnea  [ ]Audible excessive secretions   [ ]Rhonchi        [ ]Right [ ]Left [ ]Bilateral  [ ]Crackles        [ ]Right [ ]Left [ ]Bilateral  [ ]Wheezing     [ ]Right [ ]Left [ ]Bilateral  [ ]Diminished breath sounds [ ]right [ ]left [ ]bilateral  CARDIOVASCULAR:    [x ]Regular [ ]Irregular [ ]Tachy  [ ]Christian [ ]Murmur [ ]Other  GASTROINTESTINAL:  [ x]Soft  [ ]Distended   [ ]+BS  [ ]Non tender [ ]Tender  [ ]Other [ ]PEG [ ]OGT/ NGT  Last BM:  GENITOURINARY:  [x ]Normal [ ] Incontinent   [ ]Oliguria/Anuria   [ ][place]  MUSCULOSKELETAL:   [x ]Normal   [ ]Weakness  [ ]Bed/Wheelchair bound [ ]Edema  NEUROLOGIC:   [ x]No focal deficits  [ ]Cognitive impairment  [ ]Dysphagia [ ]Dysarthria [ ]Paresis [ ]Other   SKIN:   [ ]Normal  [ ]Rash  [ ]Other  [ ]Pressure ulcer(s)       Present on admission [ ]y [ ]n    CRITICAL CARE:  [ ] Shock Present  [ ]Septic [ ]Cardiogenic [ ]Neurologic [ ]Hypovolemic  [ ]  Vasopressors [ ]  Inotropes   [ ]Respiratory failure present [ ]Mechanical ventilation [ ]Non-invasive ventilatory support [ ]High flow    [ ]Acute  [ ]Chronic [ ]Hypoxic  [ ]Hypercarbic [ ]Other  [ ]Other organ failure             RADIOLOGY & ADDITIONAL STUDIES:    PROTEIN CALORIE MALNUTRITION PRESENT: [ ]mild [ ]moderate [ ]severe [ ]underweight [ ]morbid obesity  https://www.andeal.org/vault/2440/web/files/ONC/Table_Clinical%20Characteristics%20to%20Document%20Malnutrition-White%20JV%20et%20al%842172.pdf    Height (cm): 163 (11-10-22 @ 12:15), 162 (07-18-22 @ 13:42), 162.6 (12-22-21 @ 14:17)  Weight (kg): 59.5 (11-10-22 @ 12:15), 58.0009 (09-12-22 @ 12:00), 59.9 (12-22-21 @ 14:17)  BMI (kg/m2): 22.4 (11-10-22 @ 12:15), 22.1 (09-12-22 @ 12:00), 22.8 (07-18-22 @ 13:42)    [ ]PPSV2 < or = to 30% [ ]significant weight loss  [ ]poor nutritional intake  [ ]anasarca[ ]Artificial Nutrition      Other REFERRALS:  [ ]Hospice  [ ]Child Life  [ ]Social Work  [ ]Case management [ ]Holistic Therapy     Goals of Care Document:  HPI:  This patient is an 81yo lady with PMH of AML, breast cancer s/p treatment with tamoxifen, RT and R lumpectomy, who presents with AML relapse.      [facility] PCU1 04    Overnight events: No major events  Staff reports: d/w nurse  Patient: Patient seen and examined at bedside this AM. She reports no pain, dyspnea, constipation. Last BM was 12/1/22. She states her mood is fine with "no dark thoughts." She still feels tired and has difficulty word-finding, unchanged throughout admission. She is occupying her time with using her phone and tablet and with sleep.   PRN use: n/a        RECENT VITALS/LABS/MEDICATIONS/ASSAYS     12-01-22 @ 11:39    T(C): 36.8 (02 Dec 2022 09:05), Max: 38 (01 Dec 2022 16:11)  T(F): 98.2 (02 Dec 2022 09:05), Max: 100.4 (01 Dec 2022 16:11)  HR: 64 (02 Dec 2022 09:05) (64 - 68)  BP: 114/67 (02 Dec 2022 09:05) (107/66 - 114/67)  RR: 18 (02 Dec 2022 09:05) (18 - 18)  SpO2: 99% (02 Dec 2022 09:05) (97% - 99%)  O2 Parameters below as of 02 Dec 2022 09:05  Patient On (Oxygen Delivery Method): nasal cannula      acetaminophen     Tablet .. 650 milliGRAM(s) Oral every 6 hours PRN  albuterol/ipratropium for Nebulization 3 milliLiter(s) Nebulizer every 6 hours  aluminum hydroxide/magnesium hydroxide/simethicone Suspension 30 milliLiter(s) Oral every 4 hours PRN  benzonatate 100 milliGRAM(s) Oral every 8 hours PRN  Biotene Dry Mouth Oral Rinse 15 milliLiter(s) Swish and Spit three times a day  budesonide  80 MICROgram(s)/formoterol 4.5 MICROgram(s) Inhaler 2 Puff(s) Inhalation two times a day  buPROPion XL (24-Hour) . 150 milliGRAM(s) Oral daily  chlorhexidine 2% Cloths 1 Application(s) Topical daily  dronabinol 5 milliGRAM(s) Oral two times a day  furosemide   Injectable 40 milliGRAM(s) IV Push daily  gabapentin 100 milliGRAM(s) Oral daily  haloperidol    Injectable 0.5 milliGRAM(s) IV Push every 6 hours PRN  HYDROmorphone  Injectable 0.2 milliGRAM(s) IV Push every 1 hour PRN  lisinopril 5 milliGRAM(s) Oral daily  LORazepam   Injectable 0.25 milliGRAM(s) IV Push every 6 hours PRN  melatonin 3 milliGRAM(s) Oral at bedtime PRN  metoclopramide 5 milliGRAM(s) Oral every 6 hours PRN  metoprolol succinate ER 25 milliGRAM(s) Oral daily  oxybutynin 5 milliGRAM(s) Oral daily  polyethylene glycol 3350 17 Gram(s) Oral two times a day  potassium chloride    Tablet ER 20 milliEquivalent(s) Oral daily  senna 2 Tablet(s) Oral at bedtime  sodium chloride 0.65% Nasal 1 Spray(s) Both Nostrils four times a day  sodium chloride 0.9%. 1000 milliLiter(s) IV Continuous <Continuous>  traZODone 150 milliGRAM(s) Oral at bedtime    Family Health Care Decision Act Surrogate Decision Maker Hierarchy  [facility] Article 81 Guardian-->Spouse or domestic partner--> Adult child-->Parent-->Sibling--> Close friend      Contacts listed in the paper or electronic chart  Name Marshall Tobar  Relationship   Number(s) in EMR  Translation Required: none  Spouse or Partner: Above  Family unit:  and son  Family history of hematologic malignancy: none but personal HX of breast cancer s/p XRT and tamoxifen  Residence: [ ] Apartment   [x] House  [ ] Other  Degree of functionality in the home: moderate   Performance Status (PPSv2): 50   BMI: 22.4  Engagement in the home:  Employment: [ ] Currently employed [ ] Exited workforce due to illness  [ ] Retired prior to illness  [ ] No/distant history of employment TBD  Support network (degree):  ---Family:        [ ] Low  [ ] Moderate  [x] High  ---Neighbors: [ ] Low  [ ] Moderate  [ ] High  ---Social:         [ ] Low  [ ] Moderate  [ ] High  ---Spiritual:    [ ] Low  [ ] Moderate  [ ] High  Financial concerns: TBD  Coping Strategies: TBD  Caregiver burden/fatigue/needs: Significant,  is being treated for pancreatic cancer and has debilitating CIPN  Grief identified: [] Yes [x] No  Medical communication and decision making preferences: See below          PERTINENT PM/SXH:   Breast cancer    Hypertension    AML (acute myelogenous leukemia)      S/P hysterectomy      FAMILY HISTORY:  No pertinent family history in first degree relatives      Family Hx substance abuse [ ]yes [ ]no  ITEMS NOT CHECKED ARE NOT PRESENT    SOCIAL HISTORY:   Significant other/partner[x ]  Children[x ]  Buddhism/Spirituality:  Substance hx:  [ ]   Tobacco hx:  [ ]   Alcohol hx: [ ]   Home Opioid hx:  [ ] I-Stop Reference No:  Living Situation: [ ]Home  [ ]Long term care  [ ]Rehab [ ]Other    ADVANCE DIRECTIVES:    DNR/MOLST  [X]  Living Will  [ ]   DECISION MAKER(s): Patient  [ ] Health Care Proxy(s)  [ ] Surrogate(s)  [ ] Guardian           Name(s): Phone Number(s):    BASELINE (I)ADL(s) (prior to admission):  [place]: [ ]Total  [ ] Moderate [ ]Dependent    Allergies    No Known Allergies    Intolerances      PRESENT SYMPTOMS: [ ]Unable to self-report  [ ] CPOT [ ] PAINADs [ ] RDOS  Source if other than patient:  [ ]Family   [ ]Team     Pain: [ ]yes [x]no  QOL impact -   Location -                    Aggravating factors -  Quality -  Radiation -  Timing-  Severity (0-10 scale):  Minimal acceptable level (0-10 scale):     CPOT:    https://www.sccm.org/getattachment/kko87n74-5z8m-8s0l-9t2x-0695f8754j0d/Critical-Care-Pain-Observation-Tool-(CPOT)    PAIN AD Score:   http://geriatrictoolkit.[place].[facility]/cog/painad.pdf (press ctrl +  left click to view)    Dyspnea:                           [ ]Mild [ ]Moderate [ ]Severe      RDOS:  0 to 2  minimal or no respiratory distress   3  mild distress  4 to 6 moderate distress  >7 severe distress  https://[email or web address]/handouts/hen/Respiratory_Distress_Observation_Scale.pdf (Ctrl +  left click to view)     Anxiety:                             [ ]Mild [ ]Moderate [ ]Severe  Fatigue:                             [ ]Mild [ ]Moderate [ ]Severe  Nausea:                             [ ]Mild [ ]Moderate [ ]Severe  Loss of appetite:              [ ]Mild [ ]Moderate [ ]Severe  Constipation:                    [ ]Mild [ ]Moderate [ ]Severe    PCSSQ[Palliative Care Spiritual Screening Question]   Severity (0-10):  Score of 4 or > indicate consideration of Chaplaincy referral.  Chaplaincy Referral: [ ] yes [ ] refused [ ] following [ ] Deferred     Caregiver [place]? : [X] yes [ ] no [ ] Deferred [ ] Declined             Social work referral [X] Patient & Family Centered Care Referral [ ]     Anticipatory Grief present?:  [ ] yes [ ] no  [ ] Deferred                  Social work referral [ ] Chaplaincy Referral[ ]      Other Symptoms:  [x ]All other review of systems negative     Palliative Performance Status Version 2:     50    %    [email or web address]_performance_scale_[email or web address].pdf  PHYSICAL EXAM:       GENERAL: [ ]Cachexia    [x]Alert  [x]Oriented  [ ]Lethargic  [ ]Unarousable  [ ]Verbal  [ ]Non-Verbal  Behavioral:   [ ] Anxiety  [ ] Delirium [ ] Agitation [ x] Other Calm  HEENT:  [x ]Normal   [ ]Dry mouth   [ ]ET Tube/Trach  [ ]Oral lesions  PULMONARY:   [x ]Clear [ ]Tachypnea  [ ]Audible excessive secretions   [ ]Rhonchi        [ ]Right [ ]Left [ ]Bilateral  [ ]Crackles        [ ]Right [ ]Left [ ]Bilateral  [ ]Wheezing     [ ]Right [ ]Left [ ]Bilateral  [ ]Diminished breath sounds [ ]right [ ]left [ ]bilateral  CARDIOVASCULAR:    [x ]Regular [ ]Irregular [ ]Tachy  [ ]Christian [ ]Murmur [ ]Other  GASTROINTESTINAL:  [X]Soft  [ ]Distended   [X]+BS  [X]Non tender [ ]Tender  [ ]Other [ ]PEG [ ]OGT/ NGT  Last BM:  GENITOURINARY:  [x ]Normal [ ] Incontinent   [ ]Oliguria/Anuria   [ ][place]emos  MUSCULOSKELETAL:   [x ]Normal   [ ]Weakness  [ ]Bed/Wheelchair bound [ ]Edema  NEUROLOGIC:   [x]No focal deficits  [ ]Cognitive impairment  [ ]Dysphagia [ ]Dysarthria [ ]Paresis [ ]Other   SKIN:   [ ]Normal  [ ]Rash  [ ]Other  [ ]Pressure ulcer(s)       Present on admission [ ]y [ ]n    CRITICAL CARE:  [ ] Shock Present  [ ]Septic [ ]Cardiogenic [ ]Neurologic [ ]Hypovolemic  [ ]  Vasopressors [ ]  Inotropes   [ ]Respiratory failure present [ ]Mechanical ventilation [ ]Non-invasive ventilatory support [ ]High flow    [ ]Acute  [ ]Chronic [ ]Hypoxic  [ ]Hypercarbic [ ]Other  [ ]Other organ failure             RADIOLOGY & ADDITIONAL STUDIES:    PROTEIN CALORIE MALNUTRITION PRESENT: [ ]mild [ ]moderate [ ]severe [ ]underweight [ ]morbid obesity  https://www.andeal.org/vault/2440/web/files/ONC/Table_Clinical%20Characteristics%20to%20Document%20Malnutrition-White%20JV%20et%20al%202012.pdf    Height (cm): 163 (11-10-22 @ 12:15), 162 (07-18-22 @ 13:42), 162.6 (12-22-21 @ 14:17)  Weight (kg): 59.5 (11-10-22 @ 12:15), 58.0009 (09-12-22 @ 12:00), 59.9 (12-22-21 @ 14:17)  BMI (kg/m2): 22.4 (11-10-22 @ 12:15), 22.1 (09-12-22 @ 12:00), 22.8 (07-18-22 @ 13:42)    [ ]PPSV2 < or = to 30% [ ]significant weight loss  [ ]poor nutritional intake  [ ]anasarca[ ]Artificial Nutrition      Other REFERRALS:  [ ]Hospice  [ ]Child Life  [X]Social Work  [ ]Case management [X]Holistic Therapy    HPI:  This patient is an 83yo lady with PMH of AML, breast cancer s/p treatment with tamoxifen, RT and R lumpectomy, who presents with AML relapse.      [facility] PCU1 04    Overnight events: No major events  Staff reports: d/w nurse  Patient: Patient seen and examined at bedside this AM. She reports no pain, dyspnea, constipation. Last BM was 12/1/22. She states her mood is fine with "no dark thoughts." She still feels tired and has difficulty word-finding, unchanged throughout admission. She is occupying her time with using her phone and tablet and with sleep.   PRN use: n/a        RECENT VITALS/LABS/MEDICATIONS/ASSAYS     12-02-22 @ 11:39    T(C): 36.8 (02 Dec 2022 09:05), Max: 38 (01 Dec 2022 16:11)  T(F): 98.2 (02 Dec 2022 09:05), Max: 100.4 (01 Dec 2022 16:11)  HR: 64 (02 Dec 2022 09:05) (64 - 68)  BP: 114/67 (02 Dec 2022 09:05) (107/66 - 114/67)  RR: 18 (02 Dec 2022 09:05) (18 - 18)  SpO2: 99% (02 Dec 2022 09:05) (97% - 99%)  O2 Parameters below as of 02 Dec 2022 09:05  Patient On (Oxygen Delivery Method): nasal cannula      acetaminophen     Tablet .. 650 milliGRAM(s) Oral every 6 hours PRN  albuterol/ipratropium for Nebulization 3 milliLiter(s) Nebulizer every 6 hours  aluminum hydroxide/magnesium hydroxide/simethicone Suspension 30 milliLiter(s) Oral every 4 hours PRN  benzonatate 100 milliGRAM(s) Oral every 8 hours PRN  Biotene Dry Mouth Oral Rinse 15 milliLiter(s) Swish and Spit three times a day  budesonide  80 MICROgram(s)/formoterol 4.5 MICROgram(s) Inhaler 2 Puff(s) Inhalation two times a day  buPROPion XL (24-Hour) . 150 milliGRAM(s) Oral daily  chlorhexidine 2% Cloths 1 Application(s) Topical daily  dronabinol 5 milliGRAM(s) Oral two times a day  furosemide   Injectable 40 milliGRAM(s) IV Push daily  gabapentin 100 milliGRAM(s) Oral daily  haloperidol    Injectable 0.5 milliGRAM(s) IV Push every 6 hours PRN  HYDROmorphone  Injectable 0.2 milliGRAM(s) IV Push every 1 hour PRN  lisinopril 5 milliGRAM(s) Oral daily  LORazepam   Injectable 0.25 milliGRAM(s) IV Push every 6 hours PRN  melatonin 3 milliGRAM(s) Oral at bedtime PRN  metoclopramide 5 milliGRAM(s) Oral every 6 hours PRN  metoprolol succinate ER 25 milliGRAM(s) Oral daily  oxybutynin 5 milliGRAM(s) Oral daily  polyethylene glycol 3350 17 Gram(s) Oral two times a day  potassium chloride    Tablet ER 20 milliEquivalent(s) Oral daily  senna 2 Tablet(s) Oral at bedtime  sodium chloride 0.65% Nasal 1 Spray(s) Both Nostrils four times a day  sodium chloride 0.9%. 1000 milliLiter(s) IV Continuous <Continuous>  traZODone 150 milliGRAM(s) Oral at bedtime    Family Health Care Decision Act Surrogate Decision Maker Hierarchy  [facility] Article 81 Guardian-->Spouse or domestic partner--> Adult child-->Parent-->Sibling--> Close friend      Contacts listed in the paper or electronic chart  Name Marshall Tobar  Relationship   Number(s) in EMR  Translation Required: none  Spouse or Partner: Above  Family unit:  and son  Family history of hematologic malignancy: none but personal HX of breast cancer s/p XRT and tamoxifen  Residence: [ ] Apartment   [x] House  [ ] Other  Degree of functionality in the home: moderate   Performance Status (PPSv2): 50   BMI: 22.4  Engagement in the home:  Employment: [ ] Currently employed [ ] Exited workforce due to illness  [ ] Retired prior to illness  [ ] No/distant history of employment TBD  Support network (degree):  ---Family:        [ ] Low  [ ] Moderate  [x] High  ---Neighbors: [ ] Low  [ ] Moderate  [ ] High  ---Social:         [ ] Low  [ ] Moderate  [ ] High  ---Spiritual:    [ ] Low  [ ] Moderate  [ ] High  Financial concerns: TBD  Coping Strategies: TBD  Caregiver burden/fatigue/needs: Significant,  is being treated for pancreatic cancer and has debilitating CIPN  Grief identified: [] Yes [x] No  Medical communication and decision making preferences: See below          PERTINENT PM/SXH:   Breast cancer    Hypertension    AML (acute myelogenous leukemia)      S/P hysterectomy      FAMILY HISTORY:  No pertinent family history in first degree relatives      Family Hx substance abuse [ ]yes [ ]no  ITEMS NOT CHECKED ARE NOT PRESENT    SOCIAL HISTORY:   Significant other/partner[x ]  Children[x ]  Worship/Spirituality:  Substance hx:  [ ]   Tobacco hx:  [ ]   Alcohol hx: [ ]   Home Opioid hx:  [ ] I-Stop Reference No:  Living Situation: [ ]Home  [ ]Long term care  [ ]Rehab [ ]Other    ADVANCE DIRECTIVES:    DNR/MOLST  [X]  Living Will  [ ]   DECISION MAKER(s): Patient  [ ] Health Care Proxy(s)  [ ] Surrogate(s)  [ ] Guardian           Name(s): Phone Number(s):    BASELINE (I)ADL(s) (prior to admission):  [place]: [ ]Total  [ ] Moderate [ ]Dependent    Allergies    No Known Allergies    Intolerances      PRESENT SYMPTOMS: [ ]Unable to self-report  [ ] CPOT [ ] PAINADs [ ] RDOS  Source if other than patient:  [ ]Family   [ ]Team     Pain: [ ]yes [x]no  QOL impact -   Location -                    Aggravating factors -  Quality -  Radiation -  Timing-  Severity (0-10 scale):  Minimal acceptable level (0-10 scale):     CPOT:    https://www.sccm.org/getattachment/[email or web address]-8y2k-5e7i-4877y8057d7v/Critical-Care-Pain-Observation-Tool-(CPOT)    PAIN AD Score:   http://geriatrictoolkit.[place].[facility]/cog/painad.pdf (press ctrl +  left click to view)    Dyspnea:                           [ ]Mild [ ]Moderate [ ]Severe      RDOS:  0 to 2  minimal or no respiratory distress   3  mild distress  4 to 6 moderate distress  >7 severe distress  https://[email or web address]/handouts/hen/Respiratory_Distress_Observation_Scale.pdf (Ctrl +  left click to view)     Anxiety:                             [ ]Mild [ ]Moderate [ ]Severe  Fatigue:                             [ ]Mild [ ]Moderate [ ]Severe  Nausea:                             [ ]Mild [ ]Moderate [ ]Severe  Loss of appetite:              [ ]Mild [ ]Moderate [ ]Severe  Constipation:                    [ ]Mild [ ]Moderate [ ]Severe    PCSSQ[Palliative Care Spiritual Screening Question]   Severity (0-10):  Score of 4 or > indicate consideration of Chaplaincy referral.  Chaplaincy Referral: [ ] yes [ ] refused [ ] following [ ] Deferred     Caregiver [place]? : [X] yes [ ] no [ ] Deferred [ ] Declined             Social work referral [X] Patient & Family Centered Care Referral [ ]     Anticipatory Grief present?:  [ ] yes [ ] no  [ ] Deferred                  Social work referral [ ] Chaplaincy Referral[ ]      Other Symptoms:  [x ]All other review of systems negative     Palliative Performance Status Version 2:     50    %    [email or web address]  PHYSICAL EXAM:       GENERAL: [ ]Cachexia    [x]Alert  [x]Oriented  [ ]Lethargic  [ ]Unarousable  [ ]Verbal  [ ]Non-Verbal  Behavioral:   [ ] Anxiety  [ ] Delirium [ ] Agitation [ x] Other Calm  HEENT:  [x ]Normal   [ ]Dry mouth   [ ]ET Tube/Trach  [ ]Oral lesions  PULMONARY:   [x ]Clear [ ]Tachypnea  [ ]Audible excessive secretions   [ ]Rhonchi        [ ]Right [ ]Left [ ]Bilateral  [ ]Crackles        [ ]Right [ ]Left [ ]Bilateral  [ ]Wheezing     [ ]Right [ ]Left [ ]Bilateral  [ ]Diminished breath sounds [ ]right [ ]left [ ]bilateral  CARDIOVASCULAR:    [x ]Regular [ ]Irregular [ ]Tachy  [ ]Christian [ ]Murmur [ ]Other  GASTROINTESTINAL:  [X]Soft  [ ]Distended   [X]+BS  [X]Non tender [ ]Tender  [ ]Other [ ]PEG [ ]OGT/ NGT  Last BM:  GENITOURINARY:  [x ]Normal [ ] Incontinent   [ ]Oliguria/Anuria   [ ][place]  MUSCULOSKELETAL:   [x ]Normal   [ ]Weakness  [ ]Bed/Wheelchair bound [ ]Edema  NEUROLOGIC:   [x]No focal deficits  [ ]Cognitive impairment  [ ]Dysphagia [ ]Dysarthria [ ]Paresis [ ]Other   SKIN:   [ ]Normal  [ ]Rash  [ ]Other  [ ]Pressure ulcer(s)       Present on admission [ ]y [ ]n    CRITICAL CARE:  [ ] Shock Present  [ ]Septic [ ]Cardiogenic [ ]Neurologic [ ]Hypovolemic  [ ]  Vasopressors [ ]  Inotropes   [ ]Respiratory failure present [ ]Mechanical ventilation [ ]Non-invasive ventilatory support [ ]High flow    [ ]Acute  [ ]Chronic [ ]Hypoxic  [ ]Hypercarbic [ ]Other  [ ]Other organ failure             RADIOLOGY & ADDITIONAL STUDIES:    PROTEIN CALORIE MALNUTRITION PRESENT: [ ]mild [ ]moderate [ ]severe [ ]underweight [ ]morbid obesity  https://www.andeal.org/vault/2440/web/files/ONC/Table_Clinical%20Characteristics%20to%20Document%20Malnutrition-White%20JV%20et%20al%202012.pdf    Height (cm): 163 (11-10-22 @ 12:15), 162 (07-18-22 @ 13:42), 162.6 (12-22-21 @ 14:17)  Weight (kg): 59.5 (11-10-22 @ 12:15), 58.0009 (09-12-22 @ 12:00), 59.9 (12-22-21 @ 14:17)  BMI (kg/m2): 22.4 (11-10-22 @ 12:15), 22.1 (09-12-22 @ 12:00), 22.8 (07-18-22 @ 13:42)    [ ]PPSV2 < or = to 30% [ ]significant weight loss  [ ]poor nutritional intake  [ ]anasarca[ ]Artificial Nutrition      Other REFERRALS:  [ ]Hospice  [ ]Child Life  [X]Social Work  [ ]Case management [X]Holistic Therapy

## 2022-12-02 NOTE — PROGRESS NOTE ADULT - PROBLEM SELECTOR PLAN 2
No signs of overload on exam  No dyspnea No signs of overload on exam  No dyspnea  Supplemental oxygen as needed - currently on 1L NC with oxygen saturation >90%. Wean as tolerated.

## 2022-12-02 NOTE — PROGRESS NOTE ADULT - ASSESSMENT
This patient is an 81yo lady with PMH of AML, breast cancer s/p treatment with tamoxifen, RT and R lumpectomy, who presents with AML relapse. Palliative following for symptom management, goals of care discussion and psychosocial support.

## 2022-12-02 NOTE — PROGRESS NOTE ADULT - PROBLEM SELECTOR PROBLEM 1
Goal Outcome Evaluation:       Pt admitted for ICD generator change. Pt on O2 6L/NC. Pt prepped and ready for procedure. Wife Neela at bedside.    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

## 2022-12-02 NOTE — PROGRESS NOTE ADULT - PROBLEM SELECTOR PLAN 4
Persistent and disheartening  PT? Chronic in nature.  Emotional support provided.   Holistic nursing following.

## 2022-12-02 NOTE — CHART NOTE - NSCHARTNOTEFT_GEN_A_CORE
Nutrition Follow Up Note  Patient seen for: Malnutrition follow up    Chart reviewed, events noted: "This patient is an 83yo lady with PMH of AML, breast cancer s/p treatment with tamoxifen, RT and R lumpectomy, who presents with AML relapse. Palliative following for symptom management, goals of care discussion and psychosocial support."    Source: [x] Patient       [x] EMR        [] RN        [] Family at bedside       [] Other:    -If unable to interview patient: [] Trach/Vent/BiPAP  [] Disoriented/confused/inappropriate to interview    Diet Order: Diet, Soft and Bite Sized:   Low Sodium  Supplement Feeding Modality:  Oral  Glucerna Shake Cans or Servings Per Day:  2       Frequency:  Daily (11-15-22 @ 15:56) [Active]    - Is current order appropriate/adequate? [] Yes  [X]  No: recommend liberalizing diet order to optimize PO intake     - PO intake :   [] >75%  Adequate    [x] 50-75%  Fair       [] <50%  Poor    - Past Nutrition-related events:       - S/p swallow evaluation  recommending soft and bite sized foods, thin liquids per Pt preference     - Nutrition-related concerns:      - Pt endorses variable appetite/PO intake, 50-75% intake of meals and supplements x > 7 days.       - Requesting preferred foods not permitted on current diet order, will discuss with team       - Drinking Glucerna 1-2x/day, would like to change flavor to chocolate and strawberry      - K+ Phos and Mg WNL today . Of note K+ low yesterday, s/p repletion.       - Appetite stimulant regimen noted: Marinol       - Micronutrient/Other supplementation: KCl       - IVF: NS @ 20 ml/hr     GI:  - No GI Distress reported at time of visit    - Last BM  , fecal incontinence.   Bowel Regimen? [x] Yes: Miralax, Senna     Weights:   Dosing weight in k.5 (11-10)  Daily Weight in k.7 (11-27), Weight in k.7 (11-26)  - Weight stable at this time. RD will continue to monitor/trend weight status as available/able.     Nutritionally Pertinent MEDICATIONS  (STANDING):  furosemide   Injectable  lisinopril  metoprolol succinate ER  polyethylene glycol 3350  senna  sodium chloride 0.9%.    Pertinent labs: none noted    Skin per nursing documentation: No pressure injuries per flow sheets  Edema: 1+ generalized    Estimated Needs:   [x] no change since previous assessment based on dosing wt 130.7lb/59.2kg :  25-30 kcal/ [id number] kcal  1-1.3 Gm pro/ k..54 Gm pro   Defer fluid needs to medical team    Previous Nutrition Diagnosis: Moderate acute on chronic malnutrition  Nutrition Diagnosis is: [x] ongoing- encouraged protein intake and small frequent meals with patient, liberalize diet to regular soft and bite size    New Nutrition Diagnosis: [x] Not applicable    Nutrition Care Plan:  [x] In Progress- Patient currently meeting 50-75% of estimated nutrient needs  [] Achieved  [] Not applicable    Nutrition Interventions:     Education Provided:       [x] Yes:  Discussed importance of protein intake, small frequent meals - in setting of fluctuating appetite      Recommendations:      1) Liberalize diet to optimize PO intake: Regular. Consider repeat swallow evaluation for upgraded texture/consistency recommendation per Pt request.   2) Continue oral nutritional supplement: Glucerna 2x/day (flavor preferences obtained)  3) Encourage PO intake, obtain food preferences, provide feeding assistance as needed.   4) Continue to monitor nutritional intake, tolerance to diet prescription, weights, labs, skin integrity, bowel movements   5) Continue appetite stimulant regimen unless contraindicate.     Monitoring and Evaluation:   Continue to monitor nutritional intake, tolerance to diet prescription, weights, labs, skin integrity    RD remains available upon request and will follow up per protocol  MAIK Arenas [facility] #[phone] or TEAMS

## 2022-12-03 NOTE — PROGRESS NOTE ADULT - PROBLEM SELECTOR PLAN 5
PPSV 50%  - Needs assistance with most ADLs  - PT following  - c/w supportive care  - Encourage movement and OOB to chair as tolerated

## 2022-12-03 NOTE — PROGRESS NOTE ADULT - PROBLEM SELECTOR PLAN 2
Stable.  - No dyspnea  - c/w Lasix 40mg IV qd  - c/w albuterol/ipratropium inhaler q 6hr  - Supplemental oxygen as needed

## 2022-12-03 NOTE — PROGRESS NOTE ADULT - PROBLEM SELECTOR PLAN 3
Stable.  - c/w Wellbutrin 150mg PO qd  - c/w Ativan 0.25mg IV q 1hr prn  - SW following for psychosocial support

## 2022-12-03 NOTE — PROGRESS NOTE ADULT - PROBLEM SELECTOR PLAN 6
Complex medical symptom management in the setting of AML relapse.  - DNR/I  - MOLST completed and in chart  - Will continue to titrate palliative regimen.   - Will continue to provide emotional support and answer questions.  - Social Work/Case management following  - Discharge plan is for home hospice

## 2022-12-03 NOTE — PROGRESS NOTE ADULT - SUBJECTIVE AND OBJECTIVE BOX
INDICATION FOR PALLIATIVE CARE UNIT SERVICES/Interval HPI:  83yo lady with PMH of AML, breast cancer s/p treatment with tamoxifen, RT and R lumpectomy, who presents with AML relapse.  In PCU for symptom management.    OVERNIGHT EVENTS:  Patient seen and examined at bedside this AM.  No acute events overnight.  She reports no pain, dyspnea, constipation. Last BM was 12/3/22.  No PRN usage over the past 24 hrs (8a-8a).    DNR on chart: Yes  Yes      Allergies    No Known Allergies    Intolerances    MEDICATIONS  (STANDING):  albuterol/ipratropium for Nebulization 3 milliLiter(s) Nebulizer every 6 hours  Biotene Dry Mouth Oral Rinse 15 milliLiter(s) Swish and Spit three times a day  budesonide  80 MICROgram(s)/formoterol 4.5 MICROgram(s) Inhaler 2 Puff(s) Inhalation two times a day  buPROPion XL (24-Hour) . 150 milliGRAM(s) Oral daily  chlorhexidine 2% Cloths 1 Application(s) Topical daily  dronabinol 5 milliGRAM(s) Oral two times a day  furosemide   Injectable 40 milliGRAM(s) IV Push daily  gabapentin 100 milliGRAM(s) Oral daily  lisinopril 5 milliGRAM(s) Oral daily  metoprolol succinate ER 25 milliGRAM(s) Oral daily  oxybutynin 5 milliGRAM(s) Oral daily  polyethylene glycol 3350 17 Gram(s) Oral two times a day  senna 2 Tablet(s) Oral at bedtime  sodium chloride 0.65% Nasal 1 Spray(s) Both Nostrils four times a day  sodium chloride 0.9%. 1000 milliLiter(s) (20 mL/Hr) IV Continuous <Continuous>  traZODone 150 milliGRAM(s) Oral at bedtime    MEDICATIONS  (PRN):  acetaminophen     Tablet .. 650 milliGRAM(s) Oral every 6 hours PRN Temp greater or equal to 38C (100.4F), Mild Pain (1 - 3)  aluminum hydroxide/magnesium hydroxide/simethicone Suspension 30 milliLiter(s) Oral every 4 hours PRN Dyspepsia  benzonatate 100 milliGRAM(s) Oral every 8 hours PRN Cough  haloperidol    Injectable 0.5 milliGRAM(s) IV Push every 6 hours PRN agitation  HYDROmorphone  Injectable 0.2 milliGRAM(s) IV Push every 1 hour PRN Severe Pain (7 - 10)  HYDROmorphone  Injectable 0.2 milliGRAM(s) IV Push every 1 hour PRN Dyspnea  LORazepam   Injectable 0.25 milliGRAM(s) IV Push every 1 hour PRN Anxiety  melatonin 3 milliGRAM(s) Oral at bedtime PRN Insomnia  metoclopramide 5 milliGRAM(s) Oral every 6 hours PRN nausea  zinc oxide 40% Paste 1 Application(s) Topical two times a day PRN Rash    ITEMS UNCHECKED ARE NOT PRESENT    PRESENT SYMPTOMS: [ ]Unable to self-report  [ ]PAINADs [ ]RDOS  Source if other than patient:  [ ]Family   [ ]Team     Pain: [ ] yes [ x] no  QOL impact -   Location -                    Aggravating factors -  Quality -Te  Radiation -  Timing-  Severity (0-10 scale):G  Minimal acceptable level (0-10 scale):     PAINAD Score:  [email or web address]/[email or web address] (Ctrl +  left click to view)    Dyspnea:                           [ ]Mild [ ]Moderate [ ]Severe    RDOS:  1  0 to 2  minimal or no respiratory distress   3  mild distress  4 to 6 moderate distress  >7 severe distress  [email or web address]/[email or web address]/[email or web address]_Distress_Observation_Scale.pdf (Ctrl +  left click to view)     Anxiety:                             [ ]Mild [ ]Moderate [ ]Severe  Fatigue:                             [ ]Mild [ ]Moderate [ ]Severe  Nausea:                             [ ]Mild [ ]Moderate [ ]Severe  Loss of appetite:              [ ]Mild [ ]Moderate [ ]Severe  Constipation:                    [ ]Mild [ ]Moderate [ ]Severe    PCSSQ[Palliative Care Spiritual Screening Question]   Severity (0-10):  Score of 4 or > indicate consideration of Chaplaincy referral.  Chaplaincy Referral: [ ] yes [ ] refused [ ] following [ x] Deferred     Caregiver [place]? : [ ] yes [ ] no [x ] Deferred [ ] Declined             Social work referral [ ] Patient & Family Centered Care Referral [ ]     Anticipatory Grief present?:  [ ] yes [x ] no  [ ] Deferred                  Social work referral [ ] Patient & Family Centered Care Referral [ ]    		  Other Symptoms:  [x ]All other review of systems negative     Palliative Performance Status Version 2:      50   %         http://npcrc.org/files/news/palliative_performance_scale_[email or web address].pdf  PHYSICAL EXAM:   Vital Signs Last 24 Hrs  T(C): 37.2 (03 Dec 2022 08:10), Max: 37.3 (02 Dec 2022 15:37)  T(F): 98.9 (03 Dec 2022 08:10), Max: 99.2 (02 Dec 2022 15:37)  HR: 97 (03 Dec 2022 08:10) (75 - 97)  BP: 102/48 (03 Dec 2022 08:10) (102/48 - 130/70)  BP(mean): --  RR: 18 (03 Dec 2022 08:10) (18 - 18)  SpO2: 97% (03 Dec 2022 08:10) (97% - 99%)    Parameters below as of 03 Dec 2022 08:10  Patient On (Oxygen Delivery Method): nasal cannula     I&O's Summary    02 Dec 2022 07:01  -  03 Dec 2022 07:00  --------------------------------------------------------  IN: 0 mL / OUT: 2450 mL / NET: -2450 mL      GENERAL: [ ] Cachexia  [x ]Alert  [x ]Oriented  [ ]Lethargic  [ ]Unarousable  [ x]Verbal  [ ]Non-Verbal  Behavioral:   [x ] Anxiety  [ ] Delirium [ ] Agitation [ ] Other  HEENT:  [x ]Normal   [ ]Dry mouth   [ ]ET Tube/Trach  [ ]Oral lesions  PULMONARY:   [ x]Clear [ ]Tachypnea  [ ]Audible excessive secretions   [ ]Rhonchi        [ ]Right [ ]Left [ ]Bilateral  [ ]Crackles        [ ]Right [ ]Left [ ]Bilateral  [ ]Wheezing     [ ]Right [ ]Left [ ]Bilateral  [ ]Diminished BS [ ]Right [ ]Left [ ]Bilateral    CARDIOVASCULAR:    [ ]Regular [ ]Irregular [ x]Tachy  [ ]Christian [ ]Murmur [ ]Other  GASTROINTESTINAL:  [ x]Soft  [ ]Distended   [ x]+BS  [x ]Non tender [ ]Tender  [ ]Other [ ]PEG [ ]OGT/ NGT   Last BM:  12/3/22  GENITOURINARY:  [x ]Normal [ ] Incontinent   [ ]Oliguria/Anuria   [ ][place]  MUSCULOSKELETAL:   [ ]Normal   [x ]Weakness  [ ]Bed/Wheelchair bound [ ]Edema  NEUROLOGIC:   [ x]No focal deficits  [ ] Cognitive impairment  [ ] Dysphagia [ ]Dysarthria [ ] Paresis [ ]Other   SKIN:   [x ]Normal  [ ]Rash  [ ]Other  [ ]Pressure ulcer(s)  [ ]y [ ]n  Present on admission      CRITICAL CARE:  [ ] Shock Present  [ ]Septic [ ]Cardiogenic [ ]Neurologic [ ]Hypovolemic  [ ]  Vasopressors [ ]  Inotropes   [ ] Respiratory failure present [ ] Mechanical Ventilation [ ] Non-invasive ventilatory support [ ] High-Flow  [ ] Acute  [ ] Chronic [ ] Hypoxic  [ ] Hypercarbic [ ] Other  [ ] Other organ failure     LABS:  None new.    RADIOLOGY & ADDITIONAL STUDIES:  None new.    PROTEIN CALORIE MALNUTRITION: [ ] mild [ ] moderate [ ] severe  [ ] underweight [ ] morbid obesity    https://www.andeal.org/vault/2440/web/files/ONC/Table_Clinical%20Characteristics%20to%20Document%20Malnutrition-White%20JV%20et%20al%519676.pdf    Height (cm): 163 (11-10-22 @ 12:15), 162 (07-18-22 @ 13:42), 162.6 (12-22-21 @ 14:17)  Weight (kg): 59.5 (11-10-22 @ 12:15), 58.0009 (09-12-22 @ 12:00), 59.9 (12-22-21 @ 14:17)  BMI (kg/m2): 22.4 (11-10-22 @ 12:15), 22.1 (09-12-22 @ 12:00), 22.8 (07-18-22 @ 13:42)    [ ] PPSV2 < or = 30% [ ] significant weight loss [ ] poor nutritional intake [ ] anasarca   Artificial Nutrition [ ]     Other REFERRALS:    [ ] Hospice  [ ]Child Life  [x ]Social Work  [ x]Case management [ ]Holistic Therapy [ ] Physical Therapy [ ] Dietary   Goals of Care Document:  INDICATION FOR PALLIATIVE CARE UNIT SERVICES/Interval HPI:  81yo lady with PMH of AML, breast cancer s/p treatment with tamoxifen, RT and R lumpectomy, who presents with AML relapse.  In PCU for symptom management.    OVERNIGHT EVENTS:  Patient seen and examined at bedside this AM.  No acute events overnight.  She reports no pain, dyspnea, constipation. Last BM was 12/3/22.  No PRN usage over the past 24 hrs (8a-8a).    DNR on chart: Yes  Yes      Allergies    No Known Allergies    Intolerances    MEDICATIONS  (STANDING):  albuterol/ipratropium for Nebulization 3 milliLiter(s) Nebulizer every 6 hours  Biotene Dry Mouth Oral Rinse 15 milliLiter(s) Swish and Spit three times a day  budesonide  80 MICROgram(s)/formoterol 4.5 MICROgram(s) Inhaler 2 Puff(s) Inhalation two times a day  buPROPion XL (24-Hour) . 150 milliGRAM(s) Oral daily  chlorhexidine 2% Cloths 1 Application(s) Topical daily  dronabinol 5 milliGRAM(s) Oral two times a day  furosemide   Injectable 40 milliGRAM(s) IV Push daily  gabapentin 100 milliGRAM(s) Oral daily  lisinopril 5 milliGRAM(s) Oral daily  metoprolol succinate ER 25 milliGRAM(s) Oral daily  oxybutynin 5 milliGRAM(s) Oral daily  polyethylene glycol 3350 17 Gram(s) Oral two times a day  senna 2 Tablet(s) Oral at bedtime  sodium chloride 0.65% Nasal 1 Spray(s) Both Nostrils four times a day  sodium chloride 0.9%. 1000 milliLiter(s) (20 mL/Hr) IV Continuous <Continuous>  traZODone 150 milliGRAM(s) Oral at bedtime    MEDICATIONS  (PRN):  acetaminophen     Tablet .. 650 milliGRAM(s) Oral every 6 hours PRN Temp greater or equal to 38C (100.4F), Mild Pain (1 - 3)  aluminum hydroxide/magnesium hydroxide/simethicone Suspension 30 milliLiter(s) Oral every 4 hours PRN Dyspepsia  benzonatate 100 milliGRAM(s) Oral every 8 hours PRN Cough  haloperidol    Injectable 0.5 milliGRAM(s) IV Push every 6 hours PRN agitation  HYDROmorphone  Injectable 0.2 milliGRAM(s) IV Push every 1 hour PRN Severe Pain (7 - 10)  HYDROmorphone  Injectable 0.2 milliGRAM(s) IV Push every 1 hour PRN Dyspnea  LORazepam   Injectable 0.25 milliGRAM(s) IV Push every 1 hour PRN Anxiety  melatonin 3 milliGRAM(s) Oral at bedtime PRN Insomnia  metoclopramide 5 milliGRAM(s) Oral every 6 hours PRN nausea  zinc oxide 40% Paste 1 Application(s) Topical two times a day PRN Rash    ITEMS UNCHECKED ARE NOT PRESENT    PRESENT SYMPTOMS: [ ]Unable to self-report  [ ]PAINADs [ ]RDOS  Source if other than patient:  [ ]Family   [ ]Team     Pain: [ ] yes [ x] no  QOL impact -   Location -                    Aggravating factors -  Quality -Te  Radiation -  Timing-  Severity (0-10 scale):G  Minimal acceptable level (0-10 scale):     PAINAD Score:  http://[email or web address]/cog/painad.pdf (Ctrl +  left click to view)    Dyspnea:                           [ ]Mild [ ]Moderate [ ]Severe    RDOS:  1  0 to 2  minimal or no respiratory distress   3  mild distress  4 to 6 moderate distress  >7 severe distress  https://[email or web address]/handouts/hen/Respiratory_Distress_Observation_Scale.pdf (Ctrl +  left click to view)     Anxiety:                             [ ]Mild [ ]Moderate [ ]Severe  Fatigue:                             [ ]Mild [ ]Moderate [ ]Severe  Nausea:                             [ ]Mild [ ]Moderate [ ]Severe  Loss of appetite:              [ ]Mild [ ]Moderate [ ]Severe  Constipation:                    [ ]Mild [ ]Moderate [ ]Severe    PCSSQ[Palliative Care Spiritual Screening Question]   Severity (0-10):  Score of 4 or > indicate consideration of Chaplaincy referral.  Chaplaincy Referral: [ ] yes [ ] refused [ ] following [ x] Deferred     Caregiver [place]? : [ ] yes [ ] no [x ] Deferred [ ] Declined             Social work referral [ ] Patient & Family Centered Care Referral [ ]     Anticipatory Grief present?:  [ ] yes [x ] no  [ ] Deferred                  Social work referral [ ] Patient & Family Centered Care Referral [ ]    		  Other Symptoms:  [x ]All other review of systems negative     Palliative Performance Status Version 2:      50   %         http://npcrc.org/files/news/palliative_performance_scale_[email or web address].pdf    PHYSICAL EXAM:   Vital Signs Last 24 Hrs  T(C): 37.2 (03 Dec 2022 08:10), Max: 37.3 (02 Dec 2022 15:37)  T(F): 98.9 (03 Dec 2022 08:10), Max: 99.2 (02 Dec 2022 15:37)  HR: 97 (03 Dec 2022 08:10) (75 - 97)  BP: 102/48 (03 Dec 2022 08:10) (102/48 - 130/70)  BP(mean): --  RR: 18 (03 Dec 2022 08:10) (18 - 18)  SpO2: 97% (03 Dec 2022 08:10) (97% - 99%)    Parameters below as of 03 Dec 2022 08:10  Patient On (Oxygen Delivery Method): nasal cannula     I&O's Summary    02 Dec 2022 07:01  -  03 Dec 2022 07:00  --------------------------------------------------------  IN: 0 mL / OUT: 2450 mL / NET: -2450 mL    GENERAL: [ ] Cachexia  [x ]Alert  [x ]Oriented  [ ]Lethargic  [ ]Unarousable  [ x]Verbal  [ ]Non-Verbal  Behavioral:   [x ] Anxiety  [ ] Delirium [ ] Agitation [ ] Other  HEENT:  [x ]Normal   [ ]Dry mouth   [ ]ET Tube/Trach  [ ]Oral lesions  PULMONARY:   [ x]Clear [ ]Tachypnea  [ ]Audible excessive secretions   [ ]Rhonchi        [ ]Right [ ]Left [ ]Bilateral  [ ]Crackles        [ ]Right [ ]Left [ ]Bilateral  [ ]Wheezing     [ ]Right [ ]Left [ ]Bilateral  [ ]Diminished BS [ ]Right [ ]Left [ ]Bilateral    CARDIOVASCULAR:    [ ]Regular [ ]Irregular [ x]Tachy  [ ]Christian [ ]Murmur [ ]Other  GASTROINTESTINAL:  [ x]Soft  [ ]Distended   [ x]+BS  [x ]Non tender [ ]Tender  [ ]Other [ ]PEG [ ]OGT/ NGT   Last BM:  12/3/22  GENITOURINARY:  [x ]Normal [ ] Incontinent   [ ]Oliguria/Anuria   [ ][place]  MUSCULOSKELETAL:   [ ]Normal   [x ]Weakness  [ ]Bed/Wheelchair bound [ ]Edema  NEUROLOGIC:   [ x]No focal deficits  [ ] Cognitive impairment  [ ] Dysphagia [ ]Dysarthria [ ] Paresis [ ]Other   SKIN:   [x ]Normal  [ ]Rash  [ ]Other  [ ]Pressure ulcer(s)  [ ]y [ ]n  Present on admission      CRITICAL CARE:  [ ] Shock Present  [ ]Septic [ ]Cardiogenic [ ]Neurologic [ ]Hypovolemic  [ ]  Vasopressors [ ]  Inotropes   [ ] Respiratory failure present [ ] Mechanical Ventilation [ ] Non-invasive ventilatory support [ ] High-Flow  [ ] Acute  [ ] Chronic [ ] Hypoxic  [ ] Hypercarbic [ ] Other  [ ] Other organ failure     LABS:  None new.    RADIOLOGY & ADDITIONAL STUDIES:  None new.    PROTEIN CALORIE MALNUTRITION: [ ] mild [ ] moderate [ ] severe  [ ] underweight [ ] morbid obesity    https://www.andeal.org/vault/2440/web/files/ONC/Table_Clinical%20Characteristics%20to%20Document%20Malnutrition-White%20JV%20et%20al%451792.pdf    Height (cm): 163 (11-10-22 @ 12:15), 162 (07-18-22 @ 13:42), 162.6 (12-22-21 @ 14:17)  Weight (kg): 59.5 (11-10-22 @ 12:15), 58.0009 (09-12-22 @ 12:00), 59.9 (12-22-21 @ 14:17)  BMI (kg/m2): 22.4 (11-10-22 @ 12:15), 22.1 (09-12-22 @ 12:00), 22.8 (07-18-22 @ 13:42)    [ ] PPSV2 < or = 30% [ ] significant weight loss [ ] poor nutritional intake [ ] anasarca   Artificial Nutrition [ ]     Other REFERRALS:    [ ] Hospice  [ ]Child Life  [x ]Social Work  [ x]Case management [ ]Holistic Therapy [ ] Physical Therapy [ ] Dietary   Goals of Care Document:

## 2022-12-03 NOTE — PROGRESS NOTE ADULT - ASSESSMENT
83yo lady with PMH of AML, breast cancer s/p treatment with tamoxifen, RT and R lumpectomy, who presents with AML relapse. Pt in PCU for symptom management.

## 2022-12-03 NOTE — PROGRESS NOTE ADULT - ATTENDING COMMENTS
83yo lady with PMH of AML, breast cancer s/p treatment with tamoxifen, RT and R lumpectomy, who presents with AML relapse.  Symptom management as above  No further DMT being offered  PCU management of symptoms

## 2022-12-04 NOTE — PROGRESS NOTE ADULT - ATTENDING COMMENTS
81yo lady with PMH of AML, breast cancer s/p treatment with tamoxifen, RT and R lumpectomy, who presents with AML relapse.  Symptom management as above  No further DMT being offered  PCU management of symptoms  Per GOC documentation goal is comfort   D/c plan would be for home hospice

## 2022-12-04 NOTE — PROGRESS NOTE ADULT - PROBLEM SELECTOR PLAN 2
Stable.  - No dyspnea  - c/w Lasix 40mg IV qd  - c/w albuterol/ipratropium inhaler q6hr  - Supplemental oxygen as needed

## 2022-12-04 NOTE — PROGRESS NOTE ADULT - SUBJECTIVE AND OBJECTIVE BOX
INDICATION FOR PALLIATIVE CARE UNIT SERVICES/Interval HPI:  81yo lady with PMH of AML, breast cancer s/p treatment with tamoxifen, RT and R lumpectomy, who presents with AML relapse.  In PCU for symptom management.    OVERNIGHT EVENTS:  Patient seen and examined at bedside this AM.  No acute events overnight.  At present, she reports no pain, dyspnea, constipation. Last BM was 12/3/22.    According to nursing report, the patient becomes dyspneic with removal of supplemental oxygen.   dilaudid 0.2mg IV x2 PRN usage over the past 24 hrs (8a-8a).    DNR on chart: Yes  Yes      Allergies    No Known Allergies    Intolerances    MEDICATIONS  (STANDING):  albuterol/ipratropium for Nebulization 3 milliLiter(s) Nebulizer every 6 hours  Biotene Dry Mouth Oral Rinse 15 milliLiter(s) Swish and Spit three times a day  budesonide  80 MICROgram(s)/formoterol 4.5 MICROgram(s) Inhaler 2 Puff(s) Inhalation two times a day  buPROPion XL (24-Hour) . 150 milliGRAM(s) Oral daily  chlorhexidine 2% Cloths 1 Application(s) Topical daily  dronabinol 5 milliGRAM(s) Oral two times a day  furosemide   Injectable 40 milliGRAM(s) IV Push daily  gabapentin 100 milliGRAM(s) Oral daily  lisinopril 5 milliGRAM(s) Oral daily  metoprolol succinate ER 25 milliGRAM(s) Oral daily  oxybutynin 5 milliGRAM(s) Oral daily  polyethylene glycol 3350 17 Gram(s) Oral two times a day  senna 2 Tablet(s) Oral at bedtime  sodium chloride 0.65% Nasal 1 Spray(s) Both Nostrils four times a day  sodium chloride 0.9%. 1000 milliLiter(s) (20 mL/Hr) IV Continuous <Continuous>  traZODone 150 milliGRAM(s) Oral at bedtime    MEDICATIONS  (PRN):  acetaminophen     Tablet .. 650 milliGRAM(s) Oral every 6 hours PRN Temp greater or equal to 38C (100.4F), Mild Pain (1 - 3)  aluminum hydroxide/magnesium hydroxide/simethicone Suspension 30 milliLiter(s) Oral every 4 hours PRN Dyspepsia  benzonatate 100 milliGRAM(s) Oral every 8 hours PRN Cough  haloperidol    Injectable 0.5 milliGRAM(s) IV Push every 6 hours PRN agitation  HYDROmorphone  Injectable 0.2 milliGRAM(s) IV Push every 1 hour PRN Severe Pain (7 - 10)  HYDROmorphone  Injectable 0.2 milliGRAM(s) IV Push every 1 hour PRN Dyspnea  LORazepam   Injectable 0.25 milliGRAM(s) IV Push every 1 hour PRN Anxiety  melatonin 3 milliGRAM(s) Oral at bedtime PRN Insomnia  metoclopramide 5 milliGRAM(s) Oral every 6 hours PRN nausea  zinc oxide 40% Paste 1 Application(s) Topical two times a day PRN Rash    ITEMS UNCHECKED ARE NOT PRESENT    PRESENT SYMPTOMS: [ ]Unable to self-report  [ ]PAINADs [ ]RDOS  Source if other than patient:  [ ]Family   [ ]Team     Pain: [ ] yes [x] no  QOL impact -   Location -                    Aggravating factors -  Quality -Te  Radiation -  Timing-  Severity (0-10 scale):G  Minimal acceptable level (0-10 scale):     PAINAD Score:  [email or web address].pdf (Ctrl +  left click to view)    Dyspnea:                           [ ]Mild [ ]Moderate [ ]Severe    RDOS:  1  0 to 2  minimal or no respiratory distress   3  mild distress  4 to 6 moderate distress  >7 severe distress  [email or web address]_Distress_Observation_Scale.pdf (Ctrl +  left click to view)     Anxiety:                             [ ]Mild [ ]Moderate [ ]Severe  Fatigue:                             [ ]Mild [ ]Moderate [ ]Severe  Nausea:                             [ ]Mild [ ]Moderate [ ]Severe  Loss of appetite:              [ ]Mild [ ]Moderate [ ]Severe  Constipation:                    [ ]Mild [ ]Moderate [ ]Severe    PCSSQ[Palliative Care Spiritual Screening Question]   Severity (0-10):  Score of 4 or > indicate consideration of Chaplaincy referral.  Chaplaincy Referral: [ ] yes [ ] refused [ ] following [x] Deferred     Caregiver [place]? : [ ] yes [ ] no [x ] Deferred [ ] Declined             Social work referral [ ] Patient & Family Centered Care Referral [ ]     Anticipatory Grief present?:  [ ] yes [x] no  [ ] Deferred                  Social work referral [ ] Patient & Family Centered Care Referral [ ]    		  Other Symptoms:  [x]All other review of systems negative     Palliative Performance Status Version 2:      50   %         [email or web address]/[email or web address]_[email or web address]    PHYSICAL EXAM:     Vital Signs Last 24 Hrs  T(C): 38 (04 Dec 2022 07:29), Max: 38 (04 Dec 2022 07:29)  T(F): 100.4 (04 Dec 2022 07:29), Max: 100.4 (04 Dec 2022 07:29)  HR: 75 (04 Dec 2022 07:29) (75 - 82)  BP: 130/54 (04 Dec 2022 07:29) (130/54 - 142/59)  BP(mean): --  RR: 18 (04 Dec 2022 07:29) (18 - 18)  SpO2: 99% (04 Dec 2022 07:29) (96% - 99%)    Parameters below as of 04 Dec 2022 07:29  Patient On (Oxygen Delivery Method): nasal cannula      GENERAL: [ ] Cachexia  [x]Alert  [x]Oriented  [ ]Lethargic  [ ]Unarousable  [x]Verbal  [ ]Non-Verbal  Behavioral:   [x] Anxiety  [ ] Delirium [ ] Agitation [ ] Other  HEENT:  [x]Normal   [ ]Dry mouth   [ ]ET Tube/Trach  [ ]Oral lesions  PULMONARY:   [x]Clear [ ]Tachypnea  [ ]Audible excessive secretions   [ ]Rhonchi        [ ]Right [ ]Left [ ]Bilateral  [ ]Crackles        [ ]Right [ ]Left [ ]Bilateral  [ ]Wheezing     [ ]Right [ ]Left [ ]Bilateral  [ ]Diminished BS [ ]Right [ ]Left [ ]Bilateral    CARDIOVASCULAR:    [ ]Regular [ ]Irregular [x]Tachy  [ ]Christian [ ]Murmur [ ]Other  GASTROINTESTINAL:  [x]Soft  [ ]Distended   [x]+BS  [x ]Non tender [ ]Tender  [ ]Other [ ]PEG [ ]OGT/ NGT   Last BM:  12/3/22  GENITOURINARY:  [x]Normal [ ] Incontinent   [ ]Oliguria/Anuria   [ ][place]  MUSCULOSKELETAL:   [ ]Normal   [x]Weakness  [ ]Bed/Wheelchair bound [ ]Edema  NEUROLOGIC:   [x]No focal deficits  [ ] Cognitive impairment  [ ] Dysphagia [ ]Dysarthria [ ] Paresis [ ]Other   SKIN:   [x]Normal  [ ]Rash  [ ]Other  [ ]Pressure ulcer(s)  [ ]y [ ]n  Present on admission      CRITICAL CARE:  [ ] Shock Present  [ ]Septic [ ]Cardiogenic [ ]Neurologic [ ]Hypovolemic  [ ]  Vasopressors [ ]  Inotropes   [ ] Respiratory failure present [ ] Mechanical Ventilation [ ] Non-invasive ventilatory support [ ] High-Flow  [ ] Acute  [ ] Chronic [ ] Hypoxic  [ ] Hypercarbic [ ] Other  [ ] Other organ failure     LABS:  None new.    RADIOLOGY & ADDITIONAL STUDIES:  None new.    PROTEIN CALORIE MALNUTRITION: [ ] mild [ ] moderate [ ] severe  [ ] underweight [ ] morbid obesity    https://www.andeal.org/vault/2440/web/files/ONC/Table_Clinical%20Characteristics%20to%20Document%20Malnutrition-White%20JV%20et%20al%616705.pdf    Height (cm): 163 (11-10-22 @ 12:15), 162 (07-18-22 @ 13:42), 162.6 (12-22-21 @ 14:17)  Weight (kg): 59.5 (11-10-22 @ 12:15), 58.0009 (09-12-22 @ 12:00), 59.9 (12-22-21 @ 14:17)  BMI (kg/m2): 22.4 (11-10-22 @ 12:15), 22.1 (09-12-22 @ 12:00), 22.8 (07-18-22 @ 13:42)    [ ] PPSV2 < or = 30% [ ] significant weight loss [ ] poor nutritional intake [ ] anasarca   Artificial Nutrition [ ]     Other REFERRALS:    [ ] Hospice  [ ]Child Life  [x]Social Work  [ x]Case management [ ]Holistic Therapy [ ] Physical Therapy [ ] Dietary   Goals of Care Document:

## 2022-12-05 NOTE — PROGRESS NOTE ADULT - PROBLEM SELECTOR PLAN 2
Stable.  - No dyspnea  - c/w Lasix 40mg IV qd  - c/w dilaudid 0.2mg q1H PRN dyspnea  - Supplemental oxygen as needed

## 2022-12-05 NOTE — PROGRESS NOTE ADULT - ATTENDING COMMENTS
81yo f with PMH of AML, breast cancer s/p treatment with tamoxifen, RT and R lumpectomy, who presents with AML relapse. Pt in PCU for symptom management.  Patient with pancytopenia and oozing from nares.  High risk for significant hemorrhage from susceptible sites.   Placement will be difficulty given this risk with grim prognosis overall.  In the setting of parenteral controlled substance administration, clinical monitoring required for side effects such as respiratory depression, constipation and opioid induced neurotoxicity.  This patient is at [x ]high [ ] medium [ ] low risk due to organ failure, potential hemorrhage.    [ ] The patient is receiving IV and has required  escalation for uncontrolled symptoms.  [ ] To taper and monitor for emergence of symptoms.  [ x] Symptoms controlled with present regimen, monitoring required.    Hospice transition to be addressed if clinical condition permits.   Patient assessment and plan discussed on interdisciplinary team rounds today.

## 2022-12-05 NOTE — PROGRESS NOTE ADULT - PROBLEM SELECTOR PLAN 6
Complex medical symptom management in the setting of AML relapse.  - DNR/I  - MOLST completed and in chart  - Will continue to monitor for signs of pain, dyspnea, constipation and titrate palliative regimen accordingly  - Will continue to provide emotional support and answer questions.  - Social Work/Case management following  - Discharge plan is for inpatient hospice

## 2022-12-05 NOTE — PROGRESS NOTE ADULT - PROBLEM SELECTOR PLAN 3
Stable.  - c/w Wellbutrin 150mg PO qd  - c/w Ativan 0.25mg IV q 1hr prn anxiety  - SW following for psychosocial support

## 2022-12-06 NOTE — PROGRESS NOTE ADULT - SUBJECTIVE AND OBJECTIVE BOX
GAP TEAM PALLIATIVE CARE UNIT PROGRESS NOTE:      [  ] Patient on hospice program.    INDICATION FOR PALLIATIVE CARE UNIT SERVICES/Interval HPI: Patient in PCU for symptom directed care in setting of AML relapse. Over last 24 hours, received x1 dose of IV dilaudid. States she is comfortable, denies pain or shortness of breath today on evaluation, but had uncontrolled episode overnight which improved with the dose of IV dilaudid. Patient sitting up, states she ate eggs for breakfast and trying to keep herself occupied with crossword puzzle book. No additional complaints    OVERNIGHT EVENTS: received x1 dose of IV dilaudid for uncontrolled dyspnea overnight    DNR on chart: Yes  Yes      Allergies    No Known Allergies    Intolerances    MEDICATIONS  (STANDING):  Biotene Dry Mouth Oral Rinse 15 milliLiter(s) Swish and Spit three times a day  buPROPion XL (24-Hour) . 150 milliGRAM(s) Oral daily  chlorhexidine 2% Cloths 1 Application(s) Topical daily  furosemide   Injectable 40 milliGRAM(s) IV Push daily  gabapentin 100 milliGRAM(s) Oral daily  lisinopril 5 milliGRAM(s) Oral daily  metoprolol succinate ER 25 milliGRAM(s) Oral daily  senna 1 Tablet(s) Oral at bedtime  sodium chloride 0.65% Nasal 1 Spray(s) Both Nostrils four times a day  sodium chloride 0.9%. 1000 milliLiter(s) (10 mL/Hr) IV Continuous <Continuous>  traZODone 150 milliGRAM(s) Oral at bedtime    MEDICATIONS  (PRN):  acetaminophen     Tablet .. 650 milliGRAM(s) Oral every 6 hours PRN Temp greater or equal to 38C (100.4F), Mild Pain (1 - 3)  aluminum hydroxide/magnesium hydroxide/simethicone Suspension 30 milliLiter(s) Oral every 4 hours PRN Dyspepsia  benzonatate 100 milliGRAM(s) Oral every 8 hours PRN Cough  haloperidol    Injectable 0.5 milliGRAM(s) IV Push every 6 hours PRN agitation  HYDROmorphone  Injectable 0.1 milliGRAM(s) IV Push every 1 hour PRN Moderate Pain (4 - 6)  HYDROmorphone  Injectable 0.2 milliGRAM(s) IV Push every 1 hour PRN Severe Pain (7 - 10)  HYDROmorphone  Injectable 0.2 milliGRAM(s) IV Push every 1 hour PRN Dyspnea  LORazepam   Injectable 0.25 milliGRAM(s) IV Push every 1 hour PRN Anxiety  melatonin 3 milliGRAM(s) Oral at bedtime PRN Insomnia  metoclopramide 5 milliGRAM(s) Oral every 6 hours PRN nausea  polyethylene glycol 3350 17 Gram(s) Oral daily PRN Constipation  zinc oxide 40% Paste 1 Application(s) Topical two times a day PRN Rash    ITEMS UNCHECKED ARE NOT PRESENT    PRESENT SYMPTOMS: [ ]Unable to self-report  [ ]PAINADs [ ]RDOS  Source if other than patient:  [ ]Family   [ ]Team     Pain: [ ] yes [ x] no  QOL impact -   Location -                    Aggravating factors -  Quality -Te  Radiation -  Timing-  Severity (0-10 scale):G  Minimal acceptable level (0-10 scale):     PAINAD Score:  [email or web address] (Ctrl +  left click to view)    Dyspnea:                           [x ]Mild [ ]Moderate [ ]Severe    RDOS:  0 to 2  minimal or no respiratory distress   3  mild distress  4 to 6 moderate distress  >7 severe distress  [email or web address]_Distress_Observation_[email or web address] (Ctrl +  left click to view)     Anxiety:                             [ ]Mild [ ]Moderate [ ]Severe  Fatigue:                             [ ]Mild [ ]Moderate [ ]Severe  Nausea:                             [ ]Mild [ ]Moderate [ ]Severe  Loss of appetite:              [ ]Mild [ ]Moderate [ ]Severe  Constipation:                    [ ]Mild [ ]Moderate [ ]Severe    PCSSQ[Palliative Care Spiritual Screening Question]   Severity (0-10):  Score of 4 or > indicate consideration of Chaplaincy referral.  Chaplaincy Referral: [ ] yes [ ] refused [ ] following [x ] Deferred     Caregiver [place]? : [ ] yes [x ] no [ ] Deferred [ ] Declined             Social work referral [ ] Patient & Family Centered Care Referral [ ]     Anticipatory Grief present?:  [ ] yes [ x] no  [ ] Deferred                  Social work referral [ ] Chaplaincy Referral [ ]    		  Other Symptoms:  [x ]All other review of systems negative     Palliative Performance Status Version 2:      50   %         http://npcrc.org/files/news/palliative_performance_scale_[email or web address].pdf  PHYSICAL EXAM:   Vital Signs Last 24 Hrs  T(C): 37.7 (06 Dec 2022 09:07), Max: 37.7 (06 Dec 2022 09:07)  T(F): 99.8 (06 Dec 2022 09:07), Max: 99.8 (06 Dec 2022 09:07)  HR: 67 (06 Dec 2022 09:07) (67 - 73)  BP: 114/69 (06 Dec 2022 09:07) (114/69 - 115/69)  BP(mean): --  RR: 18 (06 Dec 2022 09:07) (18 - 18)  SpO2: 97% (06 Dec 2022 09:07) (97% - 97%)    Parameters below as of 06 Dec 2022 09:07  Patient On (Oxygen Delivery Method): nasal cannula     I&O's Summary    05 Dec 2022 07:01  -  06 Dec 2022 07:00  --------------------------------------------------------  IN: 0 mL / OUT: 1550 mL / NET: -1550 mL      GENERAL: [ ] Cachexia  [x ]Alert  [x ]Oriented x2   [ ]Lethargic  [ ]Unarousable  [ ]Verbal  [ ]Non-Verbal  Behavioral:   [ ] Anxiety  [ ] Delirium [ ] Agitation [ ] Other  HEENT:  [ ]Normal   [ ]Dry mouth   [ ]ET Tube/Trach  [ ]Oral lesions  PULMONARY:   [ ]Clear [ ]Tachypnea  [ ]Audible excessive secretions   [ ]Rhonchi        [ ]Right [ ]Left [ ]Bilateral  [ ]Crackles        [ ]Right [ ]Left [ ]Bilateral  [ ]Wheezing     [ ]Right [ ]Left [ ]Bilateral  [ ]Diminished BS [ ]Right [ ]Left [ ]Bilateral    CARDIOVASCULAR:    [ ]Regular [ ]Irregular [ ]Tachy  [ ]Christian [ ]Murmur [ ]Other  GASTROINTESTINAL:  [ ]Soft  [ ]Distended   [ ]+BS  [ ]Non tender [ ]Tender  [ ]Other [ ]PEG [ ]OGT/ NGT   Last BM:    GENITOURINARY:  [ ]Normal [ ] Incontinent   [ ]Oliguria/Anuria   [ ][place]  MUSCULOSKELETAL:   [ ]Normal   [ ]Weakness  [ ]Bed/Wheelchair bound [ ]Edema  NEUROLOGIC:   [ ]No focal deficits  [ ] Cognitive impairment  [ ] Dysphagia [ ]Dysarthria [ ] Paresis [ ]Other   SKIN:   [ ]Normal  [ ]Rash  [ ]Other  [ ]Pressure ulcer(s)  [ ]y [ ]n  Present on admission      CRITICAL CARE:  [ ] Shock Present  [ ]Septic [ ]Cardiogenic [ ]Neurologic [ ]Hypovolemic  [ ]  Vasopressors [ ]  Inotropes   [ ] Respiratory failure present [ ] Mechanical Ventilation [ ] Non-invasive ventilatory support [ ] High-Flow  [ ] Acute  [ ] Chronic [ ] Hypoxic  [ ] Hypercarbic [ ] Other  [ ] Other organ failure     LABS:            RADIOLOGY & ADDITIONAL STUDIES:    PROTEIN CALORIE MALNUTRITION: [ ] mild [ ] moderate [ ] severe  [ ] underweight [ ] morbid obesity    https://www.andeal.org/vault/2440/web/files/ONC/Table_Clinical%20Characteristics%20to%20Document%20Malnutrition-White%20JV%20et%20al%352487.pdf    Height (cm): 163 (11-10-22 @ 12:15), 162 (07-18-22 @ 13:42), 162.6 (12-22-21 @ 14:17)  Weight (kg): 59.5 (11-10-22 @ 12:15), 58.0009 (09-12-22 @ 12:00), 59.9 (12-22-21 @ 14:17)  BMI (kg/m2): 22.4 (11-10-22 @ 12:15), 22.1 (09-12-22 @ 12:00), 22.8 (07-18-22 @ 13:42)    [ ] PPSV2 < or = 30% [ ] significant weight loss [ ] poor nutritional intake [ ] anasarca   Artificial Nutrition [ ]     Other REFERRALS:    [ ] Hospice  [ ]Child Life  [ ]Social Work  [ ]Case management [ ]Holistic Therapy [ ] Physical Therapy [ ] Dietary   Goals of Care Document:  GAP TEAM PALLIATIVE CARE UNIT PROGRESS NOTE:      [  ] Patient on hospice program.    INDICATION FOR PALLIATIVE CARE UNIT SERVICES/Interval HPI: Patient in PCU for symptom directed care in setting of AML relapse. Over last 24 hours, received x1 dose of IV dilaudid. States she is comfortable, denies pain or shortness of breath today on evaluation, but had uncontrolled episode overnight which improved with the dose of IV dilaudid. Patient sitting up, states she ate eggs for breakfast and trying to keep herself occupied with crossword puzzle book. No additional complaints    OVERNIGHT EVENTS: received x1 dose of IV dilaudid for uncontrolled dyspnea overnight    DNR on chart: Yes  Yes      Allergies    No Known Allergies    Intolerances    MEDICATIONS  (STANDING):  Biotene Dry Mouth Oral Rinse 15 milliLiter(s) Swish and Spit three times a day  buPROPion XL (24-Hour) . 150 milliGRAM(s) Oral daily  chlorhexidine 2% Cloths 1 Application(s) Topical daily  furosemide   Injectable 40 milliGRAM(s) IV Push daily  gabapentin 100 milliGRAM(s) Oral daily  lisinopril 5 milliGRAM(s) Oral daily  metoprolol succinate ER 25 milliGRAM(s) Oral daily  senna 1 Tablet(s) Oral at bedtime  sodium chloride 0.65% Nasal 1 Spray(s) Both Nostrils four times a day  sodium chloride 0.9%. 1000 milliLiter(s) (10 mL/Hr) IV Continuous <Continuous>  traZODone 150 milliGRAM(s) Oral at bedtime    MEDICATIONS  (PRN):  acetaminophen     Tablet .. 650 milliGRAM(s) Oral every 6 hours PRN Temp greater or equal to 38C (100.4F), Mild Pain (1 - 3)  aluminum hydroxide/magnesium hydroxide/simethicone Suspension 30 milliLiter(s) Oral every 4 hours PRN Dyspepsia  benzonatate 100 milliGRAM(s) Oral every 8 hours PRN Cough  haloperidol    Injectable 0.5 milliGRAM(s) IV Push every 6 hours PRN agitation  HYDROmorphone  Injectable 0.1 milliGRAM(s) IV Push every 1 hour PRN Moderate Pain (4 - 6)  HYDROmorphone  Injectable 0.2 milliGRAM(s) IV Push every 1 hour PRN Severe Pain (7 - 10)  HYDROmorphone  Injectable 0.2 milliGRAM(s) IV Push every 1 hour PRN Dyspnea  LORazepam   Injectable 0.25 milliGRAM(s) IV Push every 1 hour PRN Anxiety  melatonin 3 milliGRAM(s) Oral at bedtime PRN Insomnia  metoclopramide 5 milliGRAM(s) Oral every 6 hours PRN nausea  polyethylene glycol 3350 17 Gram(s) Oral daily PRN Constipation  zinc oxide 40% Paste 1 Application(s) Topical two times a day PRN Rash    ITEMS UNCHECKED ARE NOT PRESENT    PRESENT SYMPTOMS: [ ]Unable to self-report  [ ]PAINADs [ ]RDOS  Source if other than patient:  [ ]Family   [ ]Team     Pain: [ ] yes [ x] no  QOL impact -   Location -                    Aggravating factors -  Quality -  Radiation -  Timing-  Severity (0-10 scale):G  Minimal acceptable level (0-10 scale):     PAINAD Score:  [email or web address]/painad.pdf (Ctrl +  left click to view)    Dyspnea:                           [x ]Mild [ ]Moderate [ ]Severe    RDOS:  0 to 2  minimal or no respiratory distress   3  mild distress  4 to 6 moderate distress  >7 severe distress  [email or web address]/handouts/[email or web address]_Distress_Observation_Scale.pdf (Ctrl +  left click to view)     Anxiety:                             [ ]Mild [ ]Moderate [ ]Severe  Fatigue:                             [ ]Mild [ ]Moderate [ ]Severe  Nausea:                             [ ]Mild [ ]Moderate [ ]Severe  Loss of appetite:              [ ]Mild [ ]Moderate [ ]Severe  Constipation:                    [ ]Mild [ ]Moderate [ ]Severe    PCSSQ[Palliative Care Spiritual Screening Question]   Severity (0-10):  Score of 4 or > indicate consideration of Chaplaincy referral.  Chaplaincy Referral: [ ] yes [ ] refused [ ] following [x ] Deferred     Caregiver [place]? : [ ] yes [x ] no [ ] Deferred [ ] Declined             Social work referral [ ] Patient & Family Centered Care Referral [ ]     Anticipatory Grief present?:  [ ] yes [ x] no  [ ] Deferred                  Social work referral [ ] Chaplaincy Referral [ ]    		  Other Symptoms:  [x ]All other review of systems negative     Palliative Performance Status Version 2:      50   %         http://npcrc.org/files/news/palliative_performance_scale_[email or web address].pdf  PHYSICAL EXAM:   Vital Signs Last 24 Hrs  T(C): 37.7 (06 Dec 2022 09:07), Max: 37.7 (06 Dec 2022 09:07)  T(F): 99.8 (06 Dec 2022 09:07), Max: 99.8 (06 Dec 2022 09:07)  HR: 67 (06 Dec 2022 09:07) (67 - 73)  BP: 114/69 (06 Dec 2022 09:07) (114/69 - 115/69)  BP(mean): --  RR: 18 (06 Dec 2022 09:07) (18 - 18)  SpO2: 97% (06 Dec 2022 09:07) (97% - 97%)    Parameters below as of 06 Dec 2022 09:07  Patient On (Oxygen Delivery Method): nasal cannula     I&O's Summary    05 Dec 2022 07:01  -  06 Dec 2022 07:00  --------------------------------------------------------  IN: 0 mL / OUT: 1550 mL / NET: -1550 mL      GENERAL: [ ] Cachexia  [x ]Alert  [x ]Oriented x2   [ ]Lethargic  [ ]Unarousable  [ ]Verbal  [ ]Non-Verbal  Behavioral:   [ ] Anxiety  [ ] Delirium [ ] Agitation [ ] Other  HEENT:  [x ]Normal   [ ]Dry mouth   [ ]ET Tube/Trach  [ ]Oral lesions  PULMONARY:   [ ]Clear [x ]Tachypnea  [ ]Audible excessive secretions   [ ]Rhonchi        [ ]Right [ ]Left [ ]Bilateral  [ ]Crackles        [ ]Right [ ]Left [ ]Bilateral  [ ]Wheezing     [ ]Right [ ]Left [ ]Bilateral  [ ]Diminished BS [ ]Right [ ]Left [ ]Bilateral    CARDIOVASCULAR:    [ ]Regular [ x]Irregular [ ]Tachy  [ ]Christian [ x]Murmur [ ]Other  GASTROINTESTINAL:  [ x]Soft  [ ]Distended   [ x]+BS  [x ]Non tender [ ]Tender  [ ]Other [ ]PEG [ ]OGT/ NGT   Last BM:  12/6  GENITOURINARY:  [ ]Normal [ ] Incontinent   [ ]Oliguria/Anuria   [ x][place]  MUSCULOSKELETAL:   [ ]Normal   [ ]Weakness  [ ]Bed/Wheelchair bound [ ]Edema  NEUROLOGIC:   [ x]No focal deficits  [ ] Cognitive impairment  [ ] Dysphagia [ ]Dysarthria [ ] Paresis [ ]Other   SKIN:   [ ]Normal  [ ]Rash  [ ]Other  [ ]Pressure ulcer(s)  [ ]y [ ]n  Present on admission      CRITICAL CARE:  [ ] Shock Present  [ ]Septic [ ]Cardiogenic [ ]Neurologic [ ]Hypovolemic  [ ]  Vasopressors [ ]  Inotropes   [ ] Respiratory failure present [ ] Mechanical Ventilation [ ] Non-invasive ventilatory support [ ] High-Flow  [ ] Acute  [ ] Chronic [ ] Hypoxic  [ ] Hypercarbic [ ] Other  [ ] Other organ failure     LABS: no new labs      RADIOLOGY & ADDITIONAL STUDIES: no new imaging    PROTEIN CALORIE MALNUTRITION: [ ] mild [ ] moderate [ ] severe  [ ] underweight [ ] morbid obesity    https://www.andeal.org/vault/2440/web/files/ONC/Table_Clinical%20Characteristics%20to%20Document%20Malnutrition-White%20JV%20et%20al%590316.pdf    Height (cm): 163 (11-10-22 @ 12:15), 162 (07-18-22 @ 13:42), 162.6 (12-22-21 @ 14:17)  Weight (kg): 59.5 (11-10-22 @ 12:15), 58.0009 (09-12-22 @ 12:00), 59.9 (12-22-21 @ 14:17)  BMI (kg/m2): 22.4 (11-10-22 @ 12:15), 22.1 (09-12-22 @ 12:00), 22.8 (07-18-22 @ 13:42)    [ ] PPSV2 < or = 30% [ ] significant weight loss [ ] poor nutritional intake [ ] anasarca   Artificial Nutrition [ ]     Other REFERRALS:    [ ] Hospice  [ ]Child Life  [ x]Social Work  [ ]Case management [ ]Holistic Therapy [ ] Physical Therapy [ ] Dietary   Goals of Care Document:

## 2022-12-06 NOTE — PROGRESS NOTE ADULT - PROBLEM SELECTOR PLAN 2
Stable.  - dyspnea overnight, improved with x1 dose IV dilaudid  - c/w Lasix 40mg IV qd  - c/w dilaudid 0.2mg q1H PRN dyspnea  - Supplemental oxygen as needed

## 2022-12-06 NOTE — PROGRESS NOTE ADULT - PROBLEM SELECTOR PLAN 6
Complex medical symptom management in the setting of AML relapse.  - DNR/I  - MOLST completed and in chart  - Will continue to monitor for signs of pain, dyspnea, constipation and titrate palliative regimen accordingly  - Will continue to provide emotional support and answer questions.  - Social Work/Case management following  - Discharge plan is for inpatient hospice, but currently does not meet criteria

## 2022-12-06 NOTE — PROGRESS NOTE ADULT - ASSESSMENT
81yo lady with PMH of AML, breast cancer s/p treatment with tamoxifen, RT and R lumpectomy, who presents with AML relapse. Pt in PCU for symptom management.

## 2022-12-07 NOTE — PROGRESS NOTE ADULT - ATTENDING COMMENTS
83yo f with PMH of AML, breast cancer s/p treatment with tamoxifen, RT and R lumpectomy, who presents with AML relapse. Pt in PCU for symptom management.  Patient with pancytopenia and oozing from nares.  High risk for significant hemorrhage from susceptible sites.   Placement will be difficulty given this risk with grim prognosis overall.  In the setting of parenteral controlled substance administration, clinical monitoring required for side effects such as respiratory depression, constipation and opioid induced neurotoxicity.  This patient is at [ ]high [x ] medium [ ] low risk due to organ failure, potential hemorrhage.    [ ] The patient is receiving IV and has required  escalation for uncontrolled symptoms.  [ ] To taper and monitor for emergence of symptoms.  [ x] Symptoms controlled with present regimen, monitoring required.    Hospice transition to be addressed if clinical condition permits.   Patient assessment and plan discussed on interdisciplinary team rounds today.

## 2022-12-07 NOTE — PROGRESS NOTE ADULT - SUBJECTIVE AND OBJECTIVE BOX
GAP TEAM PALLIATIVE CARE UNIT PROGRESS NOTE:      [  ] Patient on hospice program.    INDICATION FOR PALLIATIVE CARE UNIT SERVICES/Interval HPI: Patient in PCU for symptom directed care in setting of AML relapse. At present, the patient denies pain, dyspnea. She reports occasional cough for which she is receiving PRN benzonatate. According to nursing report, she is having daily BM. No recurrent episodes of nosebleed.       OVERNIGHT EVENTS: Received x2 dose of IV dilaudid over 24-hours (8am-8am)    DNR on chart: Yes  Yes      Allergies    No Known Allergies    Intolerances    MEDICATIONS  (STANDING):  Biotene Dry Mouth Oral Rinse 15 milliLiter(s) Swish and Spit three times a day  buPROPion XL (24-Hour) . 150 milliGRAM(s) Oral daily  chlorhexidine 2% Cloths 1 Application(s) Topical daily  furosemide   Injectable 40 milliGRAM(s) IV Push daily  gabapentin 100 milliGRAM(s) Oral daily  lisinopril 5 milliGRAM(s) Oral daily  metoprolol succinate ER 25 milliGRAM(s) Oral daily  senna 1 Tablet(s) Oral at bedtime  sodium chloride 0.65% Nasal 1 Spray(s) Both Nostrils four times a day  sodium chloride 0.9%. 1000 milliLiter(s) (10 mL/Hr) IV Continuous <Continuous>  traZODone 150 milliGRAM(s) Oral at bedtime    MEDICATIONS  (PRN):  acetaminophen     Tablet .. 650 milliGRAM(s) Oral every 6 hours PRN Temp greater or equal to 38C (100.4F), Mild Pain (1 - 3)  aluminum hydroxide/magnesium hydroxide/simethicone Suspension 30 milliLiter(s) Oral every 4 hours PRN Dyspepsia  benzonatate 100 milliGRAM(s) Oral every 8 hours PRN Cough  haloperidol    Injectable 0.5 milliGRAM(s) IV Push every 6 hours PRN agitation  HYDROmorphone  Injectable 0.2 milliGRAM(s) IV Push every 1 hour PRN Severe Pain (7 - 10)  HYDROmorphone  Injectable 0.2 milliGRAM(s) IV Push every 1 hour PRN Dyspnea  HYDROmorphone  Injectable 0.1 milliGRAM(s) IV Push every 1 hour PRN Moderate Pain (4 - 6)  LORazepam   Injectable 0.25 milliGRAM(s) IV Push every 1 hour PRN Anxiety  melatonin 3 milliGRAM(s) Oral at bedtime PRN Insomnia  metoclopramide 5 milliGRAM(s) Oral every 6 hours PRN nausea  polyethylene glycol 3350 17 Gram(s) Oral daily PRN Constipation  zinc oxide 40% Paste 1 Application(s) Topical two times a day PRN Rash      ITEMS UNCHECKED ARE NOT PRESENT    PRESENT SYMPTOMS: [ ]Unable to self-report  [ ]PAINADs [ ]RDOS  Source if other than patient:  [ ]Family   [ ]Team     Pain: [ ] yes [ x] no  QOL impact -   Location -                    Aggravating factors -  Quality -  Radiation -  Timing-  Severity (0-10 scale):G  Minimal acceptable level (0-10 scale):     PAINAD Score:  [email or web address] (Ctrl +  left click to view)    Dyspnea:                           [x ]Mild [ ]Moderate [ ]Severe    RDOS: 1   0 to 2  minimal or no respiratory distress   3  mild distress  4 to 6 moderate distress  >7 severe distress  https://homecareinformation.net/handouts/hen/Respiratory_Distress_Observation_Scale.pdf (Ctrl +  left click to view)     Anxiety:                             [ ]Mild [ ]Moderate [ ]Severe  Fatigue:                             [ ]Mild [ ]Moderate [ ]Severe  Nausea:                             [ ]Mild [ ]Moderate [ ]Severe  Loss of appetite:              [ ]Mild [ ]Moderate [ ]Severe  Constipation:                    [ ]Mild [ ]Moderate [ ]Severe    PCSSQ[Palliative Care Spiritual Screening Question]   Severity (0-10):  Score of 4 or > indicate consideration of Chaplaincy referral.  Chaplaincy Referral: [ ] yes [ ] refused [ ] following [x ] Deferred     Caregiver [place]? : [ ] yes [x ] no [ ] Deferred [ ] Declined             Social work referral [ ] Patient & Family Centered Care Referral [ ]     Anticipatory Grief present?:  [ ] yes [ x] no  [ ] Deferred                  Social work referral [ ] Chaplaincy Referral [ ]    		  Other Symptoms:  [x ]All other review of systems negative     Palliative Performance Status Version 2:      50   %         http://npcrc.org/files/news/palliative_performance_scale_ppsv2.pdf  PHYSICAL EXAM:   Vital Signs Last 24 Hrs  T(C): 37.8 (07 Dec 2022 08:06), Max: 37.8 (07 Dec 2022 08:06)  T(F): 100 (07 Dec 2022 08:06), Max: 100 (07 Dec 2022 08:06)  HR: 63 (07 Dec 2022 08:06) (63 - 66)  BP: 102/63 (07 Dec 2022 08:06) (102/63 - 110/46)  BP(mean): --  RR: 18 (07 Dec 2022 08:06) (18 - 18)  SpO2: 99% (07 Dec 2022 08:06) (99% - 99%)    Parameters below as of 07 Dec 2022 08:06  Patient On (Oxygen Delivery Method): nasal cannula      GENERAL: [ ] Cachexia  [x ]Alert  [x ]Oriented x2   [ ]Lethargic  [ ]Unarousable  [ ]Verbal  [ ]Non-Verbal  Behavioral:   [ ] Anxiety  [ ] Delirium [ ] Agitation [ ] Other  HEENT:  [x ]Normal   [ ]Dry mouth   [ ]ET Tube/Trach  [ ]Oral lesions  PULMONARY:   [ ]Clear [x ]Tachypnea  [ ]Audible excessive secretions   [ ]Rhonchi        [ ]Right [ ]Left [ ]Bilateral  [ ]Crackles        [ ]Right [ ]Left [ ]Bilateral  [ ]Wheezing     [ ]Right [ ]Left [ ]Bilateral  [ ]Diminished BS [ ]Right [ ]Left [ ]Bilateral    CARDIOVASCULAR:    [ ]Regular [ x]Irregular [ ]Tachy  [ ]Christian [ x]Murmur [ ]Other  GASTROINTESTINAL:  [ x]Soft  [ ]Distended   [ x]+BS  [x ]Non tender [ ]Tender  [ ]Other [ ]PEG [ ]OGT/ NGT   Last BM:  12/6  GENITOURINARY:  [ ]Normal [ ] Incontinent   [ ]Oliguria/Anuria   [ x][place]  MUSCULOSKELETAL:   [ ]Normal   [x ]Weakness  [ ]Bed/Wheelchair bound [ ]Edema  NEUROLOGIC:   [ x]No focal deficits  [ ] Cognitive impairment  [ ] Dysphagia [ ]Dysarthria [ ] Paresis [ ]Other   SKIN:   [ ]Normal  [ ]Rash  [ ]Other  [ ]Pressure ulcer(s)  [ ]y [ ]n  Present on admission      CRITICAL CARE:  [ ] Shock Present  [ ]Septic [ ]Cardiogenic [ ]Neurologic [ ]Hypovolemic  [ ]  Vasopressors [ ]  Inotropes   [ ] Respiratory failure present [ ] Mechanical Ventilation [ ] Non-invasive ventilatory support [ ] High-Flow  [ ] Acute  [ ] Chronic [ ] Hypoxic  [ ] Hypercarbic [ ] Other  [ ] Other organ failure     LABS: no new labs      RADIOLOGY & ADDITIONAL STUDIES: no new imaging    PROTEIN CALORIE MALNUTRITION: [ ] mild [ ] moderate [ ] severe  [ ] underweight [ ] morbid obesity    https://www.andeal.org/vault/2440/web/files/ONC/Table_Clinical%20Characteristics%20to%20Document%20Malnutrition-White%20JV%20et%20al%202012.pdf    Height (cm): 163 (11-10-22 @ 12:15), 162 (07-18-22 @ 13:42), 162.6 (12-22-21 @ 14:17)  Weight (kg): 59.5 (11-10-22 @ 12:15), 58.0009 (09-12-22 @ 12:00), 59.9 (12-22-21 @ 14:17)  BMI (kg/m2): 22.4 (11-10-22 @ 12:15), 22.1 (09-12-22 @ 12:00), 22.8 (07-18-22 @ 13:42)    [ ] PPSV2 < or = 30% [ ] significant weight loss [ ] poor nutritional intake [ ] anasarca   Artificial Nutrition [ ]     Other REFERRALS:    [ ] Hospice  [ ]Child Life  [ x]Social Work  [ ]Case management [ ]Holistic Therapy [ ] Physical Therapy [ ] Dietary   Goals of Care Document:

## 2022-12-08 NOTE — CHART NOTE - NSCHARTNOTEFT_GEN_A_CORE
Nutrition Follow Up Note  Patient seen for: length of stay/malnutrition follow up. Chart reviewed, events noted.    Per Chart: This patient is an 83yo lady with PMH of AML, breast cancer s/p treatment with tamoxifen, RT and R lumpectomy, who presents with AML relapse. Pt in PCU for symptom management.    Source: [x] Patient       [x] Medical Record      [] RN        [] Family at bedside       [] Other:    -If unable to interview patient: [] Trach/Vent/BiPAP  [] Disoriented/confused/inappropriate to interview    Diet, Regular:   Supplement Feeding Modality:  Oral  Glucerna Shake Cans or Servings Per Day:  2       Frequency:  Daily (22 @ 13:01)    - Is current order appropriate/adequate? [x] Yes  []  No:     - PO intake :   [] >75%  Adequate    [] 50-75%  Fair       [x] <50%  Poor  Pt continues with suboptimal intake, not taking Glucerna, asking to be discontinued (stockpile at bedside). RD obtained food preferences, provided pt with menu, and reviewed meal ordering process to optimize protein-energy intake. Pt denies any intolerances to current diet.    Nutrition-related concerns:      - S/p swallow evaluation  recommending soft and bite sized foods, thin liquids per Pt preference     GI: Pt denies recent N/V or diarrhea. Pt reports constipation. Last BM .   Bowel Regimen? [x] Yes    Weights:   Daily Weight in k.7 (-27), 59.3 (-09)  No new wts to address.    Drug Dosing Weight  Height (cm): 163 (10 Nov 2022 12:15)  Weight (kg): 59.5 (10 Nov 2022 12:15)  BMI (kg/m2): 22.4 (10 Nov 2022 12:15)    MEDICATIONS  (STANDING):  buPROPion XL (24-Hour) . 150 milliGRAM(s) Oral daily  furosemide   Injectable 40 milliGRAM(s) IV Push daily  gabapentin 100 milliGRAM(s) Oral daily  lisinopril 5 milliGRAM(s) Oral daily  metoprolol succinate ER 25 milliGRAM(s) Oral daily  senna 1 Tablet(s) Oral at bedtime  sodium chloride 0.9%. 1000 milliLiter(s) (10 mL/Hr) IV Continuous <Continuous>  traZODone 150 milliGRAM(s) Oral at bedtime    Pertinent labs: Last labs drawn     Skin per nursing documentation: No pressure injuries noted.  Edema per nursing documentation: 1+ left flank; right flank; left leg; right leg    Estimated Needs:   Based on dosing wt 59.5 kG  Estimated Energy Needs: (25-30 kcals/kG) 1487.5-1785 kcal  Estimated Protein Needs: (1.1-1.3 Gm pro/kG) 65.5-77.35 Gm pro   Defer fluid needs to medical team    Previous Nutrition Diagnosis: Moderate acute on chronic malnutrition  Nutrition Diagnosis is: [x] ongoing  [] resolved [] not applicable     Nutrition Care Plan:  [x] In Progress  [] Achieved  [] Not applicable    New Nutrition Diagnosis: [x] Not applicable    Nutrition Interventions:     Education Provided   [x] Yes:  [] No: RD encouraged adequate protein-energy intake, obtained food preferences.     Recommendations:      1) Continue diet free of therapeutic restrictions. Consistency deferred to SLP and provider.   -> RD to honor food preferences as able.  2) Discontinue Glucerna per pt request  3) Reinforce nutrition education as able.   4) If medically feasible, consider multivitamin     Monitoring and Evaluation:   Continue to monitor nutritional intake, tolerance to diet prescription, weights, labs, skin integrity    RD remains available upon request and will follow up per protocol  Samantha Lee, MS, RD, CDN, [facility] Pager #[phone]

## 2022-12-08 NOTE — CHART NOTE - NSCHARTNOTESELECT_GEN_ALL_CORE
Hematology
Nutrition Services
Nutrition Services
Fever/Event Note
Nutrition Services
Speech and Swallow

## 2022-12-08 NOTE — PROGRESS NOTE ADULT - ATTENDING COMMENTS
81yo f with PMH of AML, breast cancer s/p treatment with tamoxifen, RT and R lumpectomy, who presents with AML relapse. Pt in PCU for symptom management.  Patient with pancytopenia and oozing from nares.  High risk for significant hemorrhage from susceptible sites.   Placement will be difficulty given this risk with grim prognosis overall.  In the setting of parenteral controlled substance administration, clinical monitoring required for side effects such as respiratory depression, constipation and opioid induced neurotoxicity.  This patient is at [ ]high [x ] medium [ ] low risk due to organ failure, potential hemorrhage.    [ ] The patient is receiving IV and has required  escalation for uncontrolled symptoms.  [ ] To taper and monitor for emergence of symptoms.  [ x] Symptoms controlled with present regimen, monitoring required.    Hospice transition to be addressed if clinical condition permits.   Patient assessment and plan discussed on interdisciplinary team rounds today.

## 2022-12-08 NOTE — PROGRESS NOTE ADULT - SUBJECTIVE AND OBJECTIVE BOX
GAP TEAM PALLIATIVE CARE UNIT PROGRESS NOTE:      [  ] Patient on hospice program.    INDICATION FOR PALLIATIVE CARE UNIT SERVICES/Interval HPI: Patient in PCU for symptom directed care in setting of AML relapse. At present, the patient denies pain, dyspnea. She reports occasional cough and dyspnea for which she received PRN dilaudid with good response. She appears more confused this AM - did not recall any prior episodes of nosebleed. Last BM 12/8/22    OVERNIGHT EVENTS: Overnight, the patient spiked a fever (Tmax 101.2F) - received Tylenol. No fever work-up in accordance with previous [facility] discussion. Received x1 dose of IV dilaudid over 24-hours (8am-8am)    DNR on chart: Yes  Yes      Allergies    No Known Allergies    Intolerances    MEDICATIONS  (STANDING):  Biotene Dry Mouth Oral Rinse 15 milliLiter(s) Swish and Spit three times a day  buPROPion XL (24-Hour) . 150 milliGRAM(s) Oral daily  chlorhexidine 2% Cloths 1 Application(s) Topical daily  furosemide   Injectable 40 milliGRAM(s) IV Push daily  gabapentin 100 milliGRAM(s) Oral daily  lisinopril 5 milliGRAM(s) Oral daily  metoprolol succinate ER 25 milliGRAM(s) Oral daily  senna 1 Tablet(s) Oral at bedtime  sodium chloride 0.65% Nasal 1 Spray(s) Both Nostrils four times a day  sodium chloride 0.9%. 1000 milliLiter(s) (10 mL/Hr) IV Continuous <Continuous>  traZODone 150 milliGRAM(s) Oral at bedtime    MEDICATIONS  (PRN):  acetaminophen     Tablet .. 650 milliGRAM(s) Oral every 6 hours PRN Temp greater or equal to 38C (100.4F), Mild Pain (1 - 3)  aluminum hydroxide/magnesium hydroxide/simethicone Suspension 30 milliLiter(s) Oral every 4 hours PRN Dyspepsia  benzonatate 100 milliGRAM(s) Oral every 8 hours PRN Cough  haloperidol    Injectable 0.5 milliGRAM(s) IV Push every 6 hours PRN agitation  HYDROmorphone  Injectable 0.2 milliGRAM(s) IV Push every 1 hour PRN Severe Pain (7 - 10)  HYDROmorphone  Injectable 0.2 milliGRAM(s) IV Push every 1 hour PRN Dyspnea  HYDROmorphone  Injectable 0.1 milliGRAM(s) IV Push every 1 hour PRN Moderate Pain (4 - 6)  LORazepam   Injectable 0.25 milliGRAM(s) IV Push every 1 hour PRN Anxiety  melatonin 3 milliGRAM(s) Oral at bedtime PRN Insomnia  metoclopramide 5 milliGRAM(s) Oral every 6 hours PRN nausea  polyethylene glycol 3350 17 Gram(s) Oral daily PRN Constipation  zinc oxide 40% Paste 1 Application(s) Topical two times a day PRN Rash      ITEMS UNCHECKED ARE NOT PRESENT    PRESENT SYMPTOMS: [ ]Unable to self-report  [ ]PAINADs [ ]RDOS  Source if other than patient:  [ ]Family   [ ]Team     Pain: [ ] yes [ x] no  QOL impact -   Location -                    Aggravating factors -  Quality -  Radiation -  Timing-  Severity (0-10 scale):G  Minimal acceptable level (0-10 scale):     PAINAD Score:  [email or web address] (Ctrl +  left click to view)    Dyspnea:                           [x ]Mild [ ]Moderate [ ]Severe    RDOS: 1   0 to 2  minimal or no respiratory distress   3  mild distress  4 to 6 moderate distress  >7 severe distress  [email or web address]_Distress_Observation_Scale.pdf (Ctrl +  left click to view)     Anxiety:                             [ ]Mild [ ]Moderate [ ]Severe  Fatigue:                             [ ]Mild [ ]Moderate [ ]Severe  Nausea:                             [ ]Mild [ ]Moderate [ ]Severe  Loss of appetite:              [ ]Mild [ ]Moderate [ ]Severe  Constipation:                    [ ]Mild [ ]Moderate [ ]Severe    PCSSQ[Palliative Care Spiritual Screening Question]   Severity (0-10):  Score of 4 or > indicate consideration of Chaplaincy referral.  Chaplaincy Referral: [ ] yes [ ] refused [ ] following [x ] Deferred     Caregiver [place]? : [ ] yes [x ] no [ ] Deferred [ ] Declined             Social work referral [ ] Patient & Family Centered Care Referral [ ]     Anticipatory Grief present?:  [ ] yes [ x] no  [ ] Deferred                  Social work referral [ ] Chaplaincy Referral [ ]    		  Other Symptoms:  [x ]All other review of systems negative     Palliative Performance Status Version 2:      50   %         http://npcrc.org/files/news/palliative_performance_scale_ppsv2.pdf  PHYSICAL EXAM:   Vital Signs Last 24 Hrs  T(C): 37.8 (07 Dec 2022 08:06), Max: 37.8 (07 Dec 2022 08:06)  T(F): 100 (07 Dec 2022 08:06), Max: 100 (07 Dec 2022 08:06)  HR: 63 (07 Dec 2022 08:06) (63 - 66)  BP: 102/63 (07 Dec 2022 08:06) (102/63 - 110/46)  BP(mean): --  RR: 18 (07 Dec 2022 08:06) (18 - 18)  SpO2: 99% (07 Dec 2022 08:06) (99% - 99%)    Parameters below as of 07 Dec 2022 08:06  Patient On (Oxygen Delivery Method): nasal cannula      GENERAL: [ ] Cachexia  [x ]Alert  [x ]Oriented x2   [ ]Lethargic  [ ]Unarousable  [ ]Verbal  [ ]Non-Verbal  Behavioral:   [ ] Anxiety  [ ] Delirium [ ] Agitation [ ] Other  HEENT:  [ ]Normal   [x ]Dry mouth   [ ]ET Tube/Trach  [ ]Oral lesions  PULMONARY:   [ ]Clear [x ]Tachypnea  [ ]Audible excessive secretions   [ ]Rhonchi        [ ]Right [ ]Left [ ]Bilateral  [ ]Crackles        [ ]Right [ ]Left [ ]Bilateral  [ ]Wheezing     [ ]Right [ ]Left [ ]Bilateral  [ ]Diminished BS [ ]Right [ ]Left [ ]Bilateral    CARDIOVASCULAR:    [ ]Regular [ x]Irregular [ ]Tachy  [ ]Christian [ x]Murmur [ ]Other  GASTROINTESTINAL:  [ x]Soft  [ ]Distended   [ x]+BS  [x ]Non tender [ ]Tender  [ ]Other [ ]PEG [ ]OGT/ NGT   Last BM:  12/8  GENITOURINARY:  [ ]Normal [ ] Incontinent   [ ]Oliguria/Anuria   [ x]Lemos  MUSCULOSKELETAL:   [ ]Normal   [x ]Weakness  [ ]Bed/Wheelchair bound [ ]Edema  NEUROLOGIC:   [ x]No focal deficits  [ ] Cognitive impairment  [ ] Dysphagia [ ]Dysarthria [ ] Paresis [ ]Other   SKIN:   [ ]Normal  [ ]Rash  [ ]Other  [ ]Pressure ulcer(s)  [ ]y [ ]n  Present on admission      CRITICAL CARE:  [ ] Shock Present  [ ]Septic [ ]Cardiogenic [ ]Neurologic [ ]Hypovolemic  [ ]  Vasopressors [ ]  Inotropes   [ ] Respiratory failure present [ ] Mechanical Ventilation [ ] Non-invasive ventilatory support [ ] High-Flow  [ ] Acute  [ ] Chronic [ ] Hypoxic  [ ] Hypercarbic [ ] Other  [ ] Other organ failure     LABS: no new labs      RADIOLOGY & ADDITIONAL STUDIES: no new imaging    PROTEIN CALORIE MALNUTRITION: [ ] mild [ ] moderate [ ] severe  [ ] underweight [ ] morbid obesity    https://www.andeal.org/vault/2440/web/files/ONC/Table_Clinical%20Characteristics%20to%20Document%20Malnutrition-White%20JV%20et%20al%202012.pdf    Height (cm): 163 (11-10-22 @ 12:15), 162 (07-18-22 @ 13:42), 162.6 (12-22-21 @ 14:17)  Weight (kg): 59.5 (11-10-22 @ 12:15), 58.0009 (09-12-22 @ 12:00), 59.9 (12-22-21 @ 14:17)  BMI (kg/m2): 22.4 (11-10-22 @ 12:15), 22.1 (09-12-22 @ 12:00), 22.8 (07-18-22 @ 13:42)    [ ] PPSV2 < or = 30% [ ] significant weight loss [ ] poor nutritional intake [ ] anasarca   Artificial Nutrition [ ]     Other REFERRALS:    [ ] Hospice  [ ]Child Life  [ x]Social Work  [ ]Case management [ ]Holistic Therapy [ ] Physical Therapy [ ] Dietary   Goals of Care Document:  GAP TEAM PALLIATIVE CARE UNIT PROGRESS NOTE:      [  ] Patient on hospice program.    INDICATION FOR PALLIATIVE CARE UNIT SERVICES/Interval HPI: Patient in PCU for symptom directed care in setting of AML relapse. At present, the patient denies pain, dyspnea. She reports occasional cough and dyspnea for which she received PRN dilaudid with good response. She appears more confused this AM - did not recall any prior episodes of nosebleed. Last BM 12/8/22    OVERNIGHT EVENTS: Overnight, the patient spiked a fever (Tmax 101.2F) - received Tylenol. No fever work-up in accordance with previous [facility] discussion. Received x1 dose of IV dilaudid over 24-hours (8am-8am)    DNR on chart: Yes  Yes      Allergies    No Known Allergies    Intolerances    MEDICATIONS  (STANDING):  Biotene Dry Mouth Oral Rinse 15 milliLiter(s) Swish and Spit three times a day  buPROPion XL (24-Hour) . 150 milliGRAM(s) Oral daily  chlorhexidine 2% Cloths 1 Application(s) Topical daily  furosemide   Injectable 40 milliGRAM(s) IV Push daily  gabapentin 100 milliGRAM(s) Oral daily  lisinopril 5 milliGRAM(s) Oral daily  metoprolol succinate ER 25 milliGRAM(s) Oral daily  senna 1 Tablet(s) Oral at bedtime  sodium chloride 0.65% Nasal 1 Spray(s) Both Nostrils four times a day  sodium chloride 0.9%. 1000 milliLiter(s) (10 mL/Hr) IV Continuous <Continuous>  traZODone 150 milliGRAM(s) Oral at bedtime    MEDICATIONS  (PRN):  acetaminophen     Tablet .. 650 milliGRAM(s) Oral every 6 hours PRN Temp greater or equal to 38C (100.4F), Mild Pain (1 - 3)  aluminum hydroxide/magnesium hydroxide/simethicone Suspension 30 milliLiter(s) Oral every 4 hours PRN Dyspepsia  benzonatate 100 milliGRAM(s) Oral every 8 hours PRN Cough  haloperidol    Injectable 0.5 milliGRAM(s) IV Push every 6 hours PRN agitation  HYDROmorphone  Injectable 0.2 milliGRAM(s) IV Push every 1 hour PRN Severe Pain (7 - 10)  HYDROmorphone  Injectable 0.2 milliGRAM(s) IV Push every 1 hour PRN Dyspnea  HYDROmorphone  Injectable 0.1 milliGRAM(s) IV Push every 1 hour PRN Moderate Pain (4 - 6)  LORazepam   Injectable 0.25 milliGRAM(s) IV Push every 1 hour PRN Anxiety  melatonin 3 milliGRAM(s) Oral at bedtime PRN Insomnia  metoclopramide 5 milliGRAM(s) Oral every 6 hours PRN nausea  polyethylene glycol 3350 17 Gram(s) Oral daily PRN Constipation  zinc oxide 40% Paste 1 Application(s) Topical two times a day PRN Rash      ITEMS UNCHECKED ARE NOT PRESENT    PRESENT SYMPTOMS: [ ]Unable to self-report  [ ]PAINADs [ ]RDOS  Source if other than patient:  [ ]Family   [ ]Team     Pain: [ ] yes [ x] no  QOL impact -   Location -                    Aggravating factors -  Quality -  Radiation -  Timing-  Severity (0-10 scale):G  Minimal acceptable level (0-10 scale):     PAINAD Score:  [email or web address] (Ctrl +  left click to view)    Dyspnea:                           [x ]Mild [ ]Moderate [ ]Severe    RDOS: 1   0 to 2  minimal or no respiratory distress   3  mild distress  4 to 6 moderate distress  >7 severe distress  [email or web address]_Distress_Observation_Scale.[email or web address] (Ctrl +  left click to view)     Anxiety:                             [ ]Mild [ ]Moderate [ ]Severe  Fatigue:                             [ ]Mild [ ]Moderate [ ]Severe  Nausea:                             [ ]Mild [ ]Moderate [ ]Severe  Loss of appetite:              [ ]Mild [ ]Moderate [ ]Severe  Constipation:                    [ ]Mild [ ]Moderate [ ]Severe    PCSSQ[Palliative Care Spiritual Screening Question]   Severity (0-10):  Score of 4 or > indicate consideration of Chaplaincy referral.  Chaplaincy Referral: [ ] yes [ ] refused [ ] following [x ] Deferred     Caregiver [place]? : [ ] yes [x ] no [ ] Deferred [ ] Declined             Social work referral [ ] Patient & Family Centered Care Referral [ ]     Anticipatory Grief present?:  [ ] yes [ x] no  [ ] Deferred                  Social work referral [ ] Chaplaincy Referral [ ]    		  Other Symptoms:  [x ]All other review of systems negative     Palliative Performance Status Version 2:      50   %         http://npcrc.org/files/news/palliative_performance_scale_[email or web address].pdf  PHYSICAL EXAM:     Vital Signs Last 24 Hrs  T(C): 38.4 (08 Dec 2022 05:31), Max: 38.4 (08 Dec 2022 05:31)  T(F): 101.2 (08 Dec 2022 05:31), Max: 101.2 (08 Dec 2022 05:31)  HR: 71 (08 Dec 2022 05:31) (71 - 71)  BP: 133/95 (08 Dec 2022 05:31) (133/95 - 133/95)  BP(mean): --  RR: 18 (08 Dec 2022 05:31) (18 - 18)  SpO2: 98% (08 Dec 2022 05:31) (98% - 98%)    Parameters below as of 08 Dec 2022 05:31  Patient On (Oxygen Delivery Method): nasal cannula      GENERAL: [ ] Cachexia  [x ]Alert  [x ]Oriented x2   [ ]Lethargic  [ ]Unarousable  [ ]Verbal  [ ]Non-Verbal  Behavioral:   [ ] Anxiety  [ ] Delirium [ ] Agitation [ ] Other  HEENT:  [ ]Normal   [x ]Dry mouth   [ ]ET Tube/Trach  [ ]Oral lesions  PULMONARY:   [ ]Clear [x ]Tachypnea  [ ]Audible excessive secretions   [ ]Rhonchi        [ ]Right [ ]Left [ ]Bilateral  [ ]Crackles        [ ]Right [ ]Left [ ]Bilateral  [ ]Wheezing     [ ]Right [ ]Left [ ]Bilateral  [ ]Diminished BS [ ]Right [ ]Left [ ]Bilateral    CARDIOVASCULAR:    [ ]Regular [ x]Irregular [ ]Tachy  [ ]Christian [ x]Murmur [ ]Other  GASTROINTESTINAL:  [ x]Soft  [ ]Distended   [ x]+BS  [x ]Non tender [ ]Tender  [ ]Other [ ]PEG [ ]OGT/ NGT   Last BM:  12/8  GENITOURINARY:  [ ]Normal [ ] Incontinent   [ ]Oliguria/Anuria   [ x]Lemos  MUSCULOSKELETAL:   [ ]Normal   [x ]Weakness  [ ]Bed/Wheelchair bound [ ]Edema  NEUROLOGIC:   [ x]No focal deficits  [ ] Cognitive impairment  [ ] Dysphagia [ ]Dysarthria [ ] Paresis [ ]Other   SKIN:   [ ]Normal  [ ]Rash  [ ]Other  [ ]Pressure ulcer(s)  [ ]y [ ]n  Present on admission      CRITICAL CARE:  [ ] Shock Present  [ ]Septic [ ]Cardiogenic [ ]Neurologic [ ]Hypovolemic  [ ]  Vasopressors [ ]  Inotropes   [ ] Respiratory failure present [ ] Mechanical Ventilation [ ] Non-invasive ventilatory support [ ] High-Flow  [ ] Acute  [ ] Chronic [ ] Hypoxic  [ ] Hypercarbic [ ] Other  [ ] Other organ failure     LABS: no new labs      RADIOLOGY & ADDITIONAL STUDIES: no new imaging    PROTEIN CALORIE MALNUTRITION: [ ] mild [ ] moderate [ ] severe  [ ] underweight [ ] morbid obesity    https://www.andeal.org/vault/5090/web/files/ONC/Table_Clinical%20Characteristics%20to%20Document%20Malnutrition-White%20JV%20et%20al%158640.pdf    Height (cm): 163 (11-10-22 @ 12:15), 162 (07-18-22 @ 13:42), 162.6 (12-22-21 @ 14:17)  Weight (kg): 59.5 (11-10-22 @ 12:15), 58.0009 (09-12-22 @ 12:00), 59.9 (12-22-21 @ 14:17)  BMI (kg/m2): 22.4 (11-10-22 @ 12:15), 22.1 (09-12-22 @ 12:00), 22.8 (07-18-22 @ 13:42)    [ ] PPSV2 < or = 30% [ ] significant weight loss [ ] poor nutritional intake [ ] anasarca   Artificial Nutrition [ ]     Other REFERRALS:    [ ] Hospice  [ ]Child Life  [ x]Social Work  [ ]Case management [ ]Holistic Therapy [ ] Physical Therapy [ ] Dietary   Goals of Care Document:

## 2022-12-09 NOTE — PROGRESS NOTE ADULT - PROBLEM SELECTOR PLAN 1
Spoke with son, Artis, and , Marshall on 12/9/22 - both received phone calls from patient this morning. She asked them both to pick her up as "she is supposed to be leaving the hospital." Explained that patient with periods of confusion, waxing and waning mentation- likely multifactorial in setting of benzodiazepine and opioid use, prolonged hospital stay, recent fever/high infection risk given leukopenia, high risk of spontaneous hemorrhage given severe thrombocytopenia.   Son and  appreciative of call.   Emotional support provided.

## 2022-12-09 NOTE — PROGRESS NOTE ADULT - SUBJECTIVE AND OBJECTIVE BOX
GAP TEAM PALLIATIVE CARE UNIT PROGRESS NOTE:      [  ] Patient on hospice program.    INDICATION FOR PALLIATIVE CARE UNIT SERVICES/Interval HPI: Patient in PCU for symptom directed care in setting of AML relapse.  She is on room air this morning with no complaints of dyspnea. She denies pain. She appears more withdrawn- answering questions with flat affect.     Last BM 12/9/22    OVERNIGHT EVENTS: Received x2 dose of IV dilaudid and x1 Ativan over 24-hours (8am-8am)    DNR on chart: Yes  Yes      Allergies    No Known Allergies    Intolerances    MEDICATIONS  (STANDING):  Biotene Dry Mouth Oral Rinse 15 milliLiter(s) Swish and Spit three times a day  buPROPion XL (24-Hour) . 150 milliGRAM(s) Oral daily  chlorhexidine 2% Cloths 1 Application(s) Topical daily  furosemide   Injectable 40 milliGRAM(s) IV Push daily  gabapentin 100 milliGRAM(s) Oral daily  lisinopril 5 milliGRAM(s) Oral daily  metoprolol succinate ER 25 milliGRAM(s) Oral daily  senna 1 Tablet(s) Oral at bedtime  sodium chloride 0.65% Nasal 1 Spray(s) Both Nostrils four times a day  sodium chloride 0.9%. 1000 milliLiter(s) (10 mL/Hr) IV Continuous <Continuous>  traZODone 150 milliGRAM(s) Oral at bedtime    MEDICATIONS  (PRN):  acetaminophen     Tablet .. 650 milliGRAM(s) Oral every 6 hours PRN Temp greater or equal to 38C (100.4F), Mild Pain (1 - 3)  aluminum hydroxide/magnesium hydroxide/simethicone Suspension 30 milliLiter(s) Oral every 4 hours PRN Dyspepsia  benzonatate 100 milliGRAM(s) Oral every 8 hours PRN Cough  haloperidol    Injectable 0.5 milliGRAM(s) IV Push every 6 hours PRN agitation  HYDROmorphone  Injectable 0.2 milliGRAM(s) IV Push every 1 hour PRN Severe Pain (7 - 10)  HYDROmorphone  Injectable 0.2 milliGRAM(s) IV Push every 1 hour PRN Dyspnea  HYDROmorphone  Injectable 0.1 milliGRAM(s) IV Push every 1 hour PRN Moderate Pain (4 - 6)  LORazepam   Injectable 0.25 milliGRAM(s) IV Push every 1 hour PRN Anxiety  melatonin 3 milliGRAM(s) Oral at bedtime PRN Insomnia  metoclopramide 5 milliGRAM(s) Oral every 6 hours PRN nausea  polyethylene glycol 3350 17 Gram(s) Oral daily PRN Constipation  zinc oxide 40% Paste 1 Application(s) Topical two times a day PRN Rash      ITEMS UNCHECKED ARE NOT PRESENT    PRESENT SYMPTOMS: [ ]Unable to self-report  [ ]PAINADs [ ]RDOS  Source if other than patient:  [ ]Family   [ ]Team     Pain: [ ] yes [ x] no  QOL impact -   Location -                    Aggravating factors -  Quality -  Radiation -  Timing-  Severity (0-10 scale):G  Minimal acceptable level (0-10 scale):     PAINAD Score:  [email or web address] (Ctrl +  left click to view)    Dyspnea:                           [x ]Mild [ ]Moderate [ ]Severe    RDOS: 1   0 to 2  minimal or no respiratory distress   3  mild distress  4 to 6 moderate distress  >7 severe distress  [email or web address]_Distress_Observation_Scale.pdf (Ctrl +  left click to view)     Anxiety:                             [ ]Mild [ ]Moderate [ ]Severe  Fatigue:                             [ ]Mild [ ]Moderate [ ]Severe  Nausea:                             [ ]Mild [ ]Moderate [ ]Severe  Loss of appetite:              [ ]Mild [ ]Moderate [ ]Severe  Constipation:                    [ ]Mild [ ]Moderate [ ]Severe    PCSSQ[Palliative Care Spiritual Screening Question]   Severity (0-10):  Score of 4 or > indicate consideration of Chaplaincy referral.  Chaplaincy Referral: [ ] yes [ ] refused [ ] following [x ] Deferred     Caregiver [place]? : [ ] yes [x ] no [ ] Deferred [ ] Declined             Social work referral [ ] Patient & Family Centered Care Referral [ ]     Anticipatory Grief present?:  [ ] yes [ x] no  [ ] Deferred                  Social work referral [ ] Chaplaincy Referral [ ]    		  Other Symptoms:  [x ]All other review of systems negative     Palliative Performance Status Version 2:      50   %         http://npcrc.org/files/news/palliative_performance_scale_ppsv2.pdf  PHYSICAL EXAM:     Vital Signs Last 24 Hrs  T(C): 37.7 (09 Dec 2022 08:24), Max: 37.7 (09 Dec 2022 08:24)  T(F): 99.8 (09 Dec 2022 08:24), Max: 99.8 (09 Dec 2022 08:24)  HR: 64 (09 Dec 2022 08:24) (64 - 64)  BP: 106/62 (09 Dec 2022 08:24) (106/62 - 110/46)  BP(mean): --  RR: 18 (09 Dec 2022 08:24) (18 - 18)  SpO2: 93% (09 Dec 2022 08:24) (93% - 96%)    Parameters below as of 09 Dec 2022 08:24  Patient On (Oxygen Delivery Method): room air        GENERAL: [ ] Cachexia  [x ]Alert  [x ]Oriented x2   [ ]Lethargic  [ ]Unarousable  [ ]Verbal  [ ]Non-Verbal  Behavioral:   [ ] Anxiety  [ ] Delirium [ ] Agitation [ ] Other  HEENT:  [ ]Normal   [x ]Dry mouth   [ ]ET Tube/Trach  [ ]Oral lesions  PULMONARY:   [ ]Clear [x ]Tachypnea  [ ]Audible excessive secretions   [ ]Rhonchi        [ ]Right [ ]Left [ ]Bilateral  [ ]Crackles        [ ]Right [ ]Left [ ]Bilateral  [ ]Wheezing     [ ]Right [ ]Left [ ]Bilateral  [ ]Diminished BS [ ]Right [ ]Left [ ]Bilateral    CARDIOVASCULAR:    [ ]Regular [ x]Irregular [ ]Tachy  [ ]Christian [ x]Murmur [ ]Other  GASTROINTESTINAL:  [ x]Soft  [ ]Distended   [ x]+BS  [x ]Non tender [ ]Tender  [ ]Other [ ]PEG [ ]OGT/ NGT   Last BM:  12/8  GENITOURINARY:  [ ]Normal [ ] Incontinent   [ ]Oliguria/Anuria   [ x]Lemos  MUSCULOSKELETAL:   [ ]Normal   [x ]Weakness  [ ]Bed/Wheelchair bound [ ]Edema  NEUROLOGIC:   [ x]No focal deficits  [ ] Cognitive impairment  [ ] Dysphagia [ ]Dysarthria [ ] Paresis [ ]Other   SKIN:   [ ]Normal  [ ]Rash  [ ]Other  [ ]Pressure ulcer(s)  [ ]y [ ]n  Present on admission      CRITICAL CARE:  [ ] Shock Present  [ ]Septic [ ]Cardiogenic [ ]Neurologic [ ]Hypovolemic  [ ]  Vasopressors [ ]  Inotropes   [ ] Respiratory failure present [ ] Mechanical Ventilation [ ] Non-invasive ventilatory support [ ] High-Flow  [ ] Acute  [ ] Chronic [ ] Hypoxic  [ ] Hypercarbic [ ] Other  [ ] Other organ failure     LABS: no new labs      RADIOLOGY & ADDITIONAL STUDIES: no new imaging    PROTEIN CALORIE MALNUTRITION: [ ] mild [ ] moderate [ ] severe  [ ] underweight [ ] morbid obesity    https://www.andeal.org/vault/2570/web/files/ONC/Table_Clinical%20Characteristics%20to%20Document%20Malnutrition-White%20JV%20et%20al%875015.pdf    Height (cm): 163 (11-10-22 @ 12:15), 162 (07-18-22 @ 13:42), 162.6 (12-22-21 @ 14:17)  Weight (kg): 59.5 (11-10-22 @ 12:15), 58.0009 (09-12-22 @ 12:00), 59.9 (12-22-21 @ 14:17)  BMI (kg/m2): 22.4 (11-10-22 @ 12:15), 22.1 (09-12-22 @ 12:00), 22.8 (07-18-22 @ 13:42)    [ ] PPSV2 < or = 30% [ ] significant weight loss [ ] poor nutritional intake [ ] anasarca   Artificial Nutrition [ ]     Other REFERRALS:    [ ] Hospice  [ ]Child Life  [ x]Social Work  [ ]Case management [ ]Holistic Therapy [ ] Physical Therapy [ ] Dietary   Goals of Care Document:

## 2022-12-10 NOTE — PROGRESS NOTE ADULT - PROBLEM SELECTOR PLAN 1
In the past 24 hrs, the patient did not need any medications for pain.     - c/w Dilaudid 0.2 mg IV q1h PRN for severe pain  - c/w Dilaudid 0.1 mg IV q1h PRN for moderate pain   - c/w Gabapentin 100 mg oral daily   - Bowel regimen while on opioids. Last BM 12/9.

## 2022-12-10 NOTE — PROGRESS NOTE ADULT - PROBLEM SELECTOR PLAN 6
Stable.  - c/w Wellbutrin 150mg PO qd  - c/w Ativan 0.25mg IV q 1hr prn anxiety  - SW following for psychosocial support  - Requested for  for support

## 2022-12-10 NOTE — PROGRESS NOTE ADULT - PROBLEM SELECTOR PLAN 9
Complex medical symptom management in the setting of AML relapse.  - DNR/I  - MOLST completed and in chart  - Will continue to monitor for signs of pain, dyspnea, constipation and titrate palliative regimen accordingly  - Will continue to provide emotional support and answer questions.  - Social Work/Case management following  - Discharge plan is for inpatient hospice, but currently does not meet criteria  - Updated son

## 2022-12-10 NOTE — PROGRESS NOTE ADULT - SUBJECTIVE AND OBJECTIVE BOX
GAP TEAM PALLIATIVE CARE UNIT PROGRESS NOTE:      [  ] Patient on hospice program.    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     OVERNIGHT EVENTS: Chart reviewed and patient seen at bedside. Patient is awake and conversant. She appears tearful and sad and shared that she knows she is dying. She also mentioned to "put a bullet in my head". I spent time with the patient and listened to her speak about her worries and how she is not trying to think about death as a coping mechanism. I provided comfort at that moment. She verbalized wanting to speak to a rabbi which was relayed to the team. She reports no physical pain. In the past 24 hrs, she needed 1x 0.2 mg Dilaudid IV for dyspnea.     DNR on chart: Yes  Yes      Allergies    No Known Allergies    Intolerances    MEDICATIONS  (STANDING):  Biotene Dry Mouth Oral Rinse 15 milliLiter(s) Swish and Spit three times a day  buPROPion XL (24-Hour) . 150 milliGRAM(s) Oral daily  chlorhexidine 2% Cloths 1 Application(s) Topical daily  furosemide   Injectable 40 milliGRAM(s) IV Push daily  gabapentin 100 milliGRAM(s) Oral daily  lisinopril 5 milliGRAM(s) Oral daily  metoprolol succinate ER 25 milliGRAM(s) Oral daily  senna 1 Tablet(s) Oral at bedtime  sodium chloride 0.65% Nasal 1 Spray(s) Both Nostrils four times a day  sodium chloride 0.9%. 1000 milliLiter(s) (10 mL/Hr) IV Continuous <Continuous>  traZODone 150 milliGRAM(s) Oral at bedtime    MEDICATIONS  (PRN):  acetaminophen     Tablet .. 650 milliGRAM(s) Oral every 6 hours PRN Temp greater or equal to 38C (100.4F), Mild Pain (1 - 3)  aluminum hydroxide/magnesium hydroxide/simethicone Suspension 30 milliLiter(s) Oral every 4 hours PRN Dyspepsia  benzonatate 100 milliGRAM(s) Oral every 8 hours PRN Cough  haloperidol    Injectable 0.5 milliGRAM(s) IV Push every 6 hours PRN agitation  HYDROmorphone  Injectable 0.2 milliGRAM(s) IV Push every 1 hour PRN Severe Pain (7 - 10)  HYDROmorphone  Injectable 0.2 milliGRAM(s) IV Push every 1 hour PRN Dyspnea  HYDROmorphone  Injectable 0.1 milliGRAM(s) IV Push every 1 hour PRN Moderate Pain (4 - 6)  LORazepam   Injectable 0.25 milliGRAM(s) IV Push every 1 hour PRN Anxiety  melatonin 3 milliGRAM(s) Oral at bedtime PRN Insomnia  metoclopramide 5 milliGRAM(s) Oral every 6 hours PRN nausea  polyethylene glycol 3350 17 Gram(s) Oral daily PRN Constipation  zinc oxide 40% Paste 1 Application(s) Topical two times a day PRN Rash    ITEMS UNCHECKED ARE NOT PRESENT    PRESENT SYMPTOMS: [ ]Unable to self-report  [ ]PAINADs [ ]RDOS  Source if other than patient:  [ ]Family   [ ]Team     Pain: [ ] yes [x ] no  QOL impact -   Location -                    Aggravating factors -  Quality -Te  Radiation -  Timing-  Severity (0-10 scale):G  Minimal acceptable level (0-10 scale):     PAINAD Score: 0  [email or web address] (Ctrl +  left click to view)    Dyspnea:                           [ ]Mild [ ]Moderate [ ]Severe    RDOS: 0  0 to 2  minimal or no respiratory distress   3  mild distress  4 to 6 moderate distress  >7 severe distress  [email or web address] (Ctrl +  left click to view)     Anxiety:                             [ ]Mild [x ]Moderate [ ]Severe  Fatigue:                             [ ]Mild [ ]Moderate [ ]Severe  Nausea:                             [ ]Mild [ ]Moderate [ ]Severe  Loss of appetite:              [ ]Mild [ ]Moderate [ ]Severe  Constipation:                    [ ]Mild [ ]Moderate [ ]Severe    PCSSQ[Palliative Care Spiritual Screening Question]   Severity (0-10):  Score of 4 or > indicate consideration of Chaplaincy referral.  Chaplaincy Referral: [x ] yes [ ] refused [ ] following [ ] Deferred     Caregiver [place]? : [ ] yes [x] no [ ] Deferred [ ] Declined             Social work referral [ ] Patient & Family Centered Care Referral [ ]     Anticipatory Grief present?:  [x] yes [ ] no  [ ] Deferred                  Social work referral [x] Chaplaincy Referral [x]    		  Other Symptoms:  [x]All other review of systems negative     Palliative Performance Status Version 2:  50       %         [email or web address]  PHYSICAL EXAM:   Vital Signs Last 24 Hrs  T(C): 37.2 (10 Dec 2022 10:35), Max: 37.9 (10 Dec 2022 09:35)  T(F): 98.9 (10 Dec 2022 10:35), Max: 100.3 (10 Dec 2022 09:35)  HR: 67 (10 Dec 2022 09:35) (67 - 69)  BP: 121/42 (10 Dec 2022 09:35) (119/55 - 121/42)  BP(mean): --  RR: 18 (10 Dec 2022 09:35) (18 - 18)  SpO2: 95% (10 Dec 2022 09:35) (95% - 95%)    Parameters below as of 10 Dec 2022 09:35  Patient On (Oxygen Delivery Method): room air     I&O's Summary    09 Dec 2022 07:01  -  10 Dec 2022 07:00  --------------------------------------------------------  IN: 270 mL / OUT: 1025 mL / NET: -755 mL    10 Dec 2022 07:01  -  10 Dec 2022 14:36  --------------------------------------------------------  IN: 0 mL / OUT: 600 mL / NET: -600 mL      GENERAL: [ ] Cachexia  [x ]Alert  [x ]Oriented x  3 [ ]Lethargic  [ ]Unarousable  [x]Verbal  [ ]Non-Verbal  Behavioral:   [x ] Anxiety  [ ] Delirium [ ] Agitation [x ] Other: sadness   HEENT:  [x ]Normal   [ ]Dry mouth   [ ]ET Tube/Trach  [ ]Oral lesions  PULMONARY:   [x ]Clear [ ]Tachypnea  [ ]Audible excessive secretions   [ ]Rhonchi        [ ]Right [ ]Left [ ]Bilateral  [ ]Crackles        [ ]Right [ ]Left [ ]Bilateral  [ ]Wheezing     [ ]Right [ ]Left [ ]Bilateral  [ ]Diminished BS [ ]Right [ ]Left [ ]Bilateral    CARDIOVASCULAR:    [x ]Regular [ ]Irregular [ ]Tachy  [ ]Christian [ ]Murmur [ ]Other  GASTROINTESTINAL:  [x ]Soft  [ ]Distended   [x ]+BS  [x ]Non tender [ ]Tender  [ ]Other [ ]PEG [ ]OGT/ NGT   Last BM:    GENITOURINARY:  [ ]Normal [ ] Incontinent   [ ]Oliguria/Anuria   [x ]Lemos: clear urine  MUSCULOSKELETAL:   [ ]Normal   [x ]Weakness  [ ]Bed/Wheelchair bound [ ]Edema  NEUROLOGIC:   [x ]No focal deficits  [ ] Cognitive impairment  [ ] Dysphagia [ ]Dysarthria [ ] Paresis [ ]Other   SKIN:   [x ]Normal  [ ]Rash  [ ]Other  [ ]Pressure ulcer(s)  [ ]y [ ]n  Present on admission      CRITICAL CARE:  [ ] Shock Present  [ ]Septic [ ]Cardiogenic [ ]Neurologic [ ]Hypovolemic  [ ]  Vasopressors [ ]  Inotropes   [ ] Respiratory failure present [ ] Mechanical Ventilation [ ] Non-invasive ventilatory support [ ] High-Flow  [ ] Acute  [ ] Chronic [ ] Hypoxic  [ ] Hypercarbic [ ] Other  [ ] Other organ failure     LABS: reviewed    RADIOLOGY & ADDITIONAL STUDIES: reviewed    PROTEIN CALORIE MALNUTRITION: [ ] mild [x] moderate [ ] severe  [ ] underweight [ ] morbid obesity    https://www.andeal.org/vault/2440/web/files/ONC/Table_Clinical%20Characteristics%20to%20Document%20Malnutrition-White%20JV%20et%20al%642172.pdf    Height (cm): 163 (11-10-22 @ 12:15), 162 (07-18-22 @ 13:42), 162.6 (12-22-21 @ 14:17)  Weight (kg): 59.5 (11-10-22 @ 12:15), 58.0009 (09-12-22 @ 12:00), 59.9 (12-22-21 @ 14:17)  BMI (kg/m2): 22.4 (11-10-22 @ 12:15), 22.1 (09-12-22 @ 12:00), 22.8 (07-18-22 @ 13:42)    [ ] PPSV2 < or = 30% [ ] significant weight loss [ ] poor nutritional intake [ ] anasarca   Artificial Nutrition [ ]     Other REFERRALS:    [ ] Hospice  [ ]Child Life  [x ]Social Work  [x ]Case management [ ]Holistic Therapy [ ] Physical Therapy [ ] Dietary   Goals of Care Document:

## 2022-12-10 NOTE — PROGRESS NOTE ADULT - PROBLEM SELECTOR PLAN 3
Patient with periods of confusion, waxing and waning mentation- likely multifactorial in setting of benzodiazepine and opioid use, prolonged hospital stay, recent fever/high infection risk given leukopenia, high risk of spontaneous hemorrhage given severe thrombocytopenia.   12/9- had episode when patient said she was "leaving the hospital", reoriented after talk with  and son     -Continue with haloperidol 0.5 mg IV q6h PRN for agitation   -Monitor for constipation, urinary retention, pain, hunger, thirst, etc.  Promote sleep wake cycle and reorientation as indicated.

## 2022-12-10 NOTE — PROGRESS NOTE ADULT - PROBLEM SELECTOR PLAN 2
In the past 24 hrs, the patient needed 1 dose of 0.2 mg IV Dilaudid for dyspnea     - c/w with 0.2 mg IV dilaudid q1h PRN for dyspnea

## 2022-12-11 NOTE — PROGRESS NOTE ADULT - PROBLEM SELECTOR PLAN 5
Stable.  - Discontinued Lasix   - Supplemental oxygen as needed Stable.  - Lasix 40 mg IV as needed for symptoms of congestion    - Supplemental oxygen as needed

## 2022-12-11 NOTE — PROGRESS NOTE ADULT - ATTENDING COMMENTS
83yo f with PMH of AML, breast cancer s/p treatment with tamoxifen, RT and R lumpectomy, who presents with AML relapse. Pt in PCU for symptom management.  Patient with pancytopenia and oozing from nares.  High risk for significant hemorrhage from susceptible sites.   Placement will be difficulty given this risk with grim prognosis overall.  In the setting of parenteral controlled substance administration, clinical monitoring required for side effects such as respiratory depression, constipation and opioid induced neurotoxicity.  This patient is at [ ]high [x ] medium [ ] low risk due to organ failure, potential hemorrhage.    Patient hypotensive this AM. Antihypertensives discontinued for optimization of comfort care.     [ ] The patient is receiving IV and has required  escalation for uncontrolled symptoms.  [ ] To taper and monitor for emergence of symptoms.  [ x] Symptoms controlled with present regimen, monitoring required.    Hospice transition to be addressed if clinical condition permits.   Patient assessment and plan discussed on interdisciplinary team rounds today.

## 2022-12-11 NOTE — PROGRESS NOTE ADULT - SUBJECTIVE AND OBJECTIVE BOX
GAP TEAM PALLIATIVE CARE UNIT PROGRESS NOTE:      [  ] Patient on hospice program.    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     OVERNIGHT EVENTS: Chart reviewed and patient seen at bedside. Patient is in better spirits today because  and son are visiting. However, still vocalizes her wishes to die. Denies any pain. In the past 24 hours, needed 1 dose of 0.2 mg Dilaudid for severe pain.     DNR on chart: Yes  Yes      Allergies    No Known Allergies    Intolerances    MEDICATIONS  (STANDING):  buPROPion XL (24-Hour) . 150 milliGRAM(s) Oral daily  chlorhexidine 2% Cloths 1 Application(s) Topical daily  gabapentin 100 milliGRAM(s) Oral daily  senna 1 Tablet(s) Oral at bedtime  sodium chloride 0.9%. 1000 milliLiter(s) (10 mL/Hr) IV Continuous <Continuous>  traZODone 150 milliGRAM(s) Oral at bedtime    MEDICATIONS  (PRN):  acetaminophen     Tablet .. 650 milliGRAM(s) Oral every 6 hours PRN Temp greater or equal to 38C (100.4F), Mild Pain (1 - 3)  haloperidol    Injectable 0.5 milliGRAM(s) IV Push every 6 hours PRN agitation  HYDROmorphone  Injectable 0.1 milliGRAM(s) IV Push every 1 hour PRN Moderate Pain (4 - 6)  HYDROmorphone  Injectable 0.2 milliGRAM(s) IV Push every 1 hour PRN Severe Pain (7 - 10)  HYDROmorphone  Injectable 0.2 milliGRAM(s) IV Push every 1 hour PRN Dyspnea  LORazepam   Injectable 0.25 milliGRAM(s) IV Push every 1 hour PRN Anxiety  melatonin 3 milliGRAM(s) Oral at bedtime PRN Insomnia  polyethylene glycol 3350 17 Gram(s) Oral daily PRN Constipation  zinc oxide 40% Paste 1 Application(s) Topical two times a day PRN Rash    ITEMS UNCHECKED ARE NOT PRESENT    PRESENT SYMPTOMS: [ ]Unable to self-report  [ ]PAINADs [ ]RDOS  Source if other than patient:  [ ]Family   [ ]Team     Pain: [ ] yes [x ] no  QOL impact -   Location -                    Aggravating factors -  Quality -Te  Radiation -  Timing-  Severity (0-10 scale):G  Minimal acceptable level (0-10 scale):       Anxiety:                             [ ]Mild [ ]Moderate [x ]Severe  Fatigue:                             [ ]Mild [ ]Moderate [ ]Severe  Nausea:                             [ ]Mild [ ]Moderate [ ]Severe  Loss of appetite:              [ ]Mild [ ]Moderate [ ]Severe  Constipation:                    [ ]Mild [ ]Moderate [ ]Severe    PCSSQ[Palliative Care Spiritual Screening Question]   Severity (0-10):  Score of 4 or > indicate consideration of Chaplaincy referral.  Chaplaincy Referral: [x ] yes [ ] refused [ ] following [ ] Deferred     Caregiver [place]? : [ ] yes [x ] no [ ] Deferred [ ] Declined             Social work referral [ ] Patient & Family Centered Care Referral [ ]     Anticipatory Grief present?:  [x ] yes [ ] no  [ ] Deferred                  Social work referral [x] Chaplaincy Referral [x ]    		  Other Symptoms:  [x ]All other review of systems negative     Palliative Performance Status Version 2:  50        %         [email or web address]  PHYSICAL EXAM:   Vital Signs Last 24 Hrs  T(C): 37.1 (11 Dec 2022 08:18), Max: 37.1 (11 Dec 2022 08:18)  T(F): 98.7 (11 Dec 2022 08:18), Max: 98.7 (11 Dec 2022 08:18)  HR: 61 (11 Dec 2022 08:18) (61 - 67)  BP: 89/55 (11 Dec 2022 08:18) (89/55 - 108/39)  BP(mean): --  RR: 18 (11 Dec 2022 08:18) (18 - 18)  SpO2: 90% (11 Dec 2022 08:18) (90% - 90%)    Parameters below as of 11 Dec 2022 08:18  Patient On (Oxygen Delivery Method): nasal cannula     I&O's Summary    10 Dec 2022 07:01  -  11 Dec 2022 07:00  --------------------------------------------------------  IN: 120 mL / OUT: 1025 mL / NET: -905 mL      GENERAL: [ ] Cachexia  [x ]Alert  [x ]Oriented x  3 [ ]Lethargic  [ ]Unarousable  [x ]Verbal  [ ]Non-Verbal  Behavioral:   [x ] Anxiety  [ ] Delirium [ ] Agitation [ ] Other  HEENT:  [x ]Normal   [ ]Dry mouth   [ ]ET Tube/Trach  [ ]Oral lesions  PULMONARY:   [x ]Clear [ ]Tachypnea  [ ]Audible excessive secretions   [ ]Rhonchi        [ ]Right [ ]Left [ ]Bilateral  [ ]Crackles        [ ]Right [ ]Left [ ]Bilateral  [ ]Wheezing     [ ]Right [ ]Left [ ]Bilateral  [ ]Diminished BS [ ]Right [ ]Left [ ]Bilateral    CARDIOVASCULAR:    [x ]Regular [ ]Irregular [ ]Tachy  [ ]Christian [ ]Murmur [ ]Other  GASTROINTESTINAL:  [x ]Soft  [ ]Distended   [ ]+BS  [x ]Non tender [ ]Tender  [ ]Other [ ]PEG [ ]OGT/ NGT   Last BM:    GENITOURINARY:  [ ]Normal [ ] Incontinent   [ ]Oliguria/Anuria   [ x]Lemos  MUSCULOSKELETAL:   [ ]Normal   [ x]Weakness  [ ]Bed/Wheelchair bound [ ]Edema  NEUROLOGIC:   [x ]No focal deficits  [ ] Cognitive impairment  [ ] Dysphagia [ ]Dysarthria [ ] Paresis [ ]Other   SKIN:   [x ]Normal  [ ]Rash  [ ]Other  [ ]Pressure ulcer(s)  [ ]y [ ]n  Present on admission      CRITICAL CARE:  [ ] Shock Present  [ ]Septic [ ]Cardiogenic [ ]Neurologic [ ]Hypovolemic  [ ]  Vasopressors [ ]  Inotropes   [ ] Respiratory failure present [ ] Mechanical Ventilation [ ] Non-invasive ventilatory support [ ] High-Flow  [ ] Acute  [ ] Chronic [ ] Hypoxic  [ ] Hypercarbic [ ] Other  [ ] Other organ failure     LABS: reviewed   RADIOLOGY & ADDITIONAL STUDIES: reviewed     PROTEIN CALORIE MALNUTRITION: [ ] mild [ ] moderate [ ] severe  [ ] underweight [ ] morbid obesity    https://www.andeal.org/vault/6560/web/files/ONC/Table_Clinical%20Characteristics%20to%20Document%20Malnutrition-White%20JV%20et%20al%202012.pdf    Height (cm): 163 (11-10-22 @ 12:15), 162 (07-18-22 @ 13:42), 162.6 (12-22-21 @ 14:17)  Weight (kg): 59.5 (11-10-22 @ 12:15), 58.0009 (09-12-22 @ 12:00), 59.9 (12-22-21 @ 14:17)  BMI (kg/m2): 22.4 (11-10-22 @ 12:15), 22.1 (09-12-22 @ 12:00), 22.8 (07-18-22 @ 13:42)    [ ] PPSV2 < or = 30% [ ] significant weight loss [ ] poor nutritional intake [ ] anasarca   Artificial Nutrition [ ]     Other REFERRALS:    [x ] Hospice  [ ]Child Life  [x ]Social Work  [ ]Case management [x ]Holistic Therapy [ ] Physical Therapy [ ] Dietary   Goals of Care Document:

## 2022-12-11 NOTE — PROGRESS NOTE ADULT - PROBLEM SELECTOR PLAN 9
Complex medical symptom management in the setting of AML relapse.  - DNR/I  - MOLST completed and in chart  - Will continue to monitor for signs of pain, dyspnea, constipation and titrate palliative regimen accordingly  - Will continue to provide emotional support and answer questions.  - Social Work/Case management following  - Discharge plan is for inpatient hospice, but currently does not meet criteria  -  and son visited today. Patient was very vocal about wanting to die. Son concerned about medications prolonging mother's life. Educated by RN on how the PCU does not hasten death and allows natural passing by keeping the patient comfortable.  who is HCP disagrees with son and said to keep any medications that will keep patient comfortable.

## 2022-12-11 NOTE — PROGRESS NOTE ADULT - PROBLEM SELECTOR PLAN 1
In the past 24 hrs, the patient did needed 1 dilaudid dose for severe pain     - c/w Dilaudid 0.2 mg IV q1h PRN for severe pain  - c/w Dilaudid 0.1 mg IV q1h PRN for moderate pain   - c/w Gabapentin 100 mg oral daily   - Bowel regimen while on opioids. Last BM 12/11.

## 2022-12-11 NOTE — PROGRESS NOTE ADULT - PROBLEM SELECTOR PLAN 2
In the past 24 hrs, the patient did not need any prns for dyspnea     - c/w with 0.2 mg IV dilaudid q1h PRN for dyspnea

## 2022-12-12 NOTE — PROGRESS NOTE ADULT - PROBLEM SELECTOR PLAN 3
Patient with periods of confusion, waxing and waning mentation- likely multifactorial in setting of benzodiazepine and opioid use, prolonged hospital stay, recent fever/high infection risk given leukopenia, high risk of spontaneous hemorrhage given severe thrombocytopenia.   12/9- had episode when patient said she was "leaving the hospital", reoriented after talk with  and son     -Continue with haloperidol 0.5 mg IV q6h PRN for agitation   -Monitor for constipation, urinary retention, pain, hunger, thirst, etc.  Promote sleep wake cycle and reorientation as indicated. Patient with periods of confusion, waxing and waning mentation- likely multifactorial in setting of benzodiazepine and opioid use, prolonged hospital stay, recent fever/high infection risk given leukopenia, high risk of spontaneous hemorrhage given severe thrombocytopenia.     -Continue with haloperidol 0.5 mg IV q6h PRN for agitation   - Received x1 dose of IV ativan for "terminal delirium"  -Monitor for constipation, urinary retention, pain, hunger, thirst, etc.  Promote sleep wake cycle and reorientation as indicated.

## 2022-12-12 NOTE — PROGRESS NOTE ADULT - ATTENDING COMMENTS
Patient remains in PCU for symptom directed care in setting of relapsed AML and significant risk for catastrophic bleed secondary to low platelets. Patient with multiple requests to die-  visited today for spiritual and existential suffering. Over last 24 hours, received x1 dose of ativan and x1 dose of dilaudid. Periods of confusion felt to be consistent with terminal delirium. C/w care in PCU as above, currently does not meet inpatient criteria.  updated on plan of care via phone today by fellow.

## 2022-12-12 NOTE — PROGRESS NOTE ADULT - PROBLEM SELECTOR PLAN 1
- c/w Dilaudid 0.2 mg IV q1h PRN for severe pain  - c/w Dilaudid 0.1 mg IV q1h PRN for moderate pain   - c/w Gabapentin 100 mg oral daily   - Bowel regimen while on opioids. Last BM 12/11.

## 2022-12-12 NOTE — PROGRESS NOTE ADULT - PROBLEM SELECTOR PLAN 6
Stable.  - c/w Wellbutrin 150mg PO qd  - c/w Ativan 0.25mg IV q 1hr prn anxiety  - SW following for psychosocial support  - Requested for  for support Stable.  - c/w Wellbutrin 150mg PO qd  - c/w Ativan 0.25mg IV q 1hr prn anxiety  - SW following for psychosocial support  - Requested for  for support who visited today, 12/12

## 2022-12-12 NOTE — PROGRESS NOTE ADULT - PROBLEM SELECTOR PLAN 8
PPSV 50%  - Needs assistance with most ADLs  - PT following  - c/w supportive care  - Encourage movement and OOB to chair as tolerated - PPSV 50%  - Needs assistance with most ADLs  - PT following  - c/w supportive care  - Encourage movement and OOB to chair as tolerated

## 2022-12-12 NOTE — PROGRESS NOTE ADULT - SUBJECTIVE AND OBJECTIVE BOX
GAP TEAM PALLIATIVE CARE UNIT PROGRESS NOTE:      [  ] Patient on hospice program.    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     OVERNIGHT EVENTS: Chart reviewed and patient seen at bedside. Patient is w/o complaints. Still somewhat anxious but is able to focus. Expressed that she does not know her dx and wanted to discuss it further. Wants to speak with a Rabbi. Denies any pain. In the past 24 hours, needed Dilaudid 0.2 mg x 1 for severe pain and Ativan 0.25 mg IV x 1 for anxiety     DNR on chart: Yes  Yes      Allergies    No Known Allergies    Intolerances    MEDICATIONS  (STANDING):  buPROPion XL (24-Hour) . 150 milliGRAM(s) Oral daily  chlorhexidine 2% Cloths 1 Application(s) Topical daily  gabapentin 100 milliGRAM(s) Oral daily  senna 1 Tablet(s) Oral at bedtime  sodium chloride 0.9%. 1000 milliLiter(s) (10 mL/Hr) IV Continuous <Continuous>  traZODone 150 milliGRAM(s) Oral at bedtime    MEDICATIONS  (PRN):  acetaminophen     Tablet .. 650 milliGRAM(s) Oral every 6 hours PRN Temp greater or equal to 38C (100.4F), Mild Pain (1 - 3)  haloperidol    Injectable 0.5 milliGRAM(s) IV Push every 6 hours PRN agitation  HYDROmorphone  Injectable 0.1 milliGRAM(s) IV Push every 1 hour PRN Moderate Pain (4 - 6)  HYDROmorphone  Injectable 0.2 milliGRAM(s) IV Push every 1 hour PRN Severe Pain (7 - 10)  HYDROmorphone  Injectable 0.2 milliGRAM(s) IV Push every 1 hour PRN Dyspnea  LORazepam   Injectable 0.25 milliGRAM(s) IV Push every 1 hour PRN Anxiety  melatonin 3 milliGRAM(s) Oral at bedtime PRN Insomnia  polyethylene glycol 3350 17 Gram(s) Oral daily PRN Constipation  zinc oxide 40% Paste 1 Application(s) Topical two times a day PRN Rash    ITEMS UNCHECKED ARE NOT PRESENT    PRESENT SYMPTOMS: [ ]Unable to self-report  [ ]PAINADs [ ]RDOS  Source if other than patient:  [ ]Family   [ ]Team     Pain: [ ] yes [x ] no  QOL impact -   Location -                    Aggravating factors -  Quality -Te  Radiation -  Timing-  Severity (0-10 scale):G  Minimal acceptable level (0-10 scale):       Anxiety:                             [ ]Mild [ ]Moderate [x ]Severe  Fatigue:                             [ ]Mild [ ]Moderate [ ]Severe  Nausea:                             [ ]Mild [ ]Moderate [ ]Severe  Loss of appetite:              [ ]Mild [ ]Moderate [ ]Severe  Constipation:                    [ ]Mild [ ]Moderate [ ]Severe    PCSSQ[Palliative Care Spiritual Screening Question]   Severity (0-10):  Score of 4 or > indicate consideration of Chaplaincy referral.  Chaplaincy Referral: [x ] yes [ ] refused [ ] following [ ] Deferred     Caregiver [place]? : [ ] yes [x ] no [ ] Deferred [ ] Declined             Social work referral [ ] Patient & Family Centered Care Referral [ ]     Anticipatory Grief present?:  [x ] yes [ ] no  [ ] Deferred                  Social work referral [x] Chaplaincy Referral [x ]    		  Other Symptoms:  [x ]All other review of systems negative     Palliative Performance Status Version 2:  50        %         http://npcrc.org/files/news/palliative_performance_scale_ppsv2.pdf  PHYSICAL EXAM:   Vital Signs Last 24 Hrs  T(C): 37.1 (11 Dec 2022 08:18), Max: 37.1 (11 Dec 2022 08:18)  T(F): 98.7 (11 Dec 2022 08:18), Max: 98.7 (11 Dec 2022 08:18)  HR: 61 (11 Dec 2022 08:18) (61 - 67)  BP: 89/55 (11 Dec 2022 08:18) (89/55 - 108/39)  BP(mean): --  RR: 18 (11 Dec 2022 08:18) (18 - 18)  SpO2: 90% (11 Dec 2022 08:18) (90% - 90%)    Parameters below as of 11 Dec 2022 08:18  Patient On (Oxygen Delivery Method): nasal cannula     I&O's Summary    10 Dec 2022 07:01  -  11 Dec 2022 07:00  --------------------------------------------------------  IN: 120 mL / OUT: 1025 mL / NET: -905 mL      GENERAL: [ ] Cachexia  [x ]Alert  [x ]Oriented x  3 [ ]Lethargic  [ ]Unarousable  [x ]Verbal  [ ]Non-Verbal  Behavioral:   [x ] Anxiety  [ ] Delirium [ ] Agitation [ ] Other  HEENT:  [x ]Normal   [ ]Dry mouth   [ ]ET Tube/Trach  [ ]Oral lesions  PULMONARY:   [x ]Clear [ ]Tachypnea  [ ]Audible excessive secretions   [ ]Rhonchi        [ ]Right [ ]Left [ ]Bilateral  [ ]Crackles        [ ]Right [ ]Left [ ]Bilateral  [ ]Wheezing     [ ]Right [ ]Left [ ]Bilateral  [ ]Diminished BS [ ]Right [ ]Left [ ]Bilateral    CARDIOVASCULAR:    [x ]Regular [ ]Irregular [ ]Tachy  [ ]Christian [ ]Murmur [ ]Other  GASTROINTESTINAL:  [x ]Soft  [ ]Distended   [ ]+BS  [x ]Non tender [ ]Tender  [ ]Other [ ]PEG [ ]OGT/ NGT   Last BM:    GENITOURINARY:  [ ]Normal [ ] Incontinent   [ ]Oliguria/Anuria   [ x][place]  MUSCULOSKELETAL:   [ ]Normal   [ x]Weakness  [ ]Bed/Wheelchair bound [ ]Edema  NEUROLOGIC:   [x ]No focal deficits  [ ] Cognitive impairment  [ ] Dysphagia [ ]Dysarthria [ ] Paresis [ ]Other   SKIN:   [x ]Normal  [ ]Rash  [ ]Other  [ ]Pressure ulcer(s)  [ ]y [ ]n  Present on admission      CRITICAL CARE:  [ ] Shock Present  [ ]Septic [ ]Cardiogenic [ ]Neurologic [ ]Hypovolemic  [ ]  Vasopressors [ ]  Inotropes   [ ] Respiratory failure present [ ] Mechanical Ventilation [ ] Non-invasive ventilatory support [ ] High-Flow  [ ] Acute  [ ] Chronic [ ] Hypoxic  [ ] Hypercarbic [ ] Other  [ ] Other organ failure     LABS: reviewed   RADIOLOGY & ADDITIONAL STUDIES: reviewed     PROTEIN CALORIE MALNUTRITION: [ ] mild [ ] moderate [ ] severe  [ ] underweight [ ] morbid obesity    https://www.andeal.org/vault/4133/web/files/ONC/Table_Clinical%20Characteristics%20to%20Document%20Malnutrition-White%20JV%20et%20al%202012.pdf    Height (cm): 163 (11-10-22 @ 12:15), 162 (07-18-22 @ 13:42), 162.6 (12-22-21 @ 14:17)  Weight (kg): 59.5 (11-10-22 @ 12:15), 58.0009 (09-12-22 @ 12:00), 59.9 (12-22-21 @ 14:17)  BMI (kg/m2): 22.4 (11-10-22 @ 12:15), 22.1 (09-12-22 @ 12:00), 22.8 (07-18-22 @ 13:42)    [ ] PPSV2 < or = 30% [ ] significant weight loss [ ] poor nutritional intake [ ] anasarca   Artificial Nutrition [ ]     Other REFERRALS:    [x ] Hospice  [ ]Child Life  [x ]Social Work  [ ]Case management [x ]Holistic Therapy [ ] Physical Therapy [ ] Dietary   Goals of Care Document:  GAP TEAM PALLIATIVE CARE UNIT PROGRESS NOTE:      [  ] Patient on hospice program.    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     OVERNIGHT EVENTS: Chart reviewed and patient seen at bedside. Patient is w/o complaints. Still somewhat anxious but is able to focus. Expressed that she does not know her dx and wanted to discuss it further. Wants to speak with a Rabbi. Denies any pain. In the past 24 hours, needed Dilaudid 0.2 mg x 1 for severe pain and Ativan 0.25 mg IV x 1 for anxiety     DNR on chart: Yes  Yes      Allergies    No Known Allergies    Intolerances    MEDICATIONS  (STANDING):  buPROPion XL (24-Hour) . 150 milliGRAM(s) Oral daily  chlorhexidine 2% Cloths 1 Application(s) Topical daily  gabapentin 100 milliGRAM(s) Oral daily  senna 1 Tablet(s) Oral at bedtime  sodium chloride 0.9%. 1000 milliLiter(s) (10 mL/Hr) IV Continuous <Continuous>  traZODone 150 milliGRAM(s) Oral at bedtime    MEDICATIONS  (PRN):  acetaminophen     Tablet .. 650 milliGRAM(s) Oral every 6 hours PRN Temp greater or equal to 38C (100.4F), Mild Pain (1 - 3)  haloperidol    Injectable 0.5 milliGRAM(s) IV Push every 6 hours PRN agitation  HYDROmorphone  Injectable 0.1 milliGRAM(s) IV Push every 1 hour PRN Moderate Pain (4 - 6)  HYDROmorphone  Injectable 0.2 milliGRAM(s) IV Push every 1 hour PRN Severe Pain (7 - 10)  HYDROmorphone  Injectable 0.2 milliGRAM(s) IV Push every 1 hour PRN Dyspnea  LORazepam   Injectable 0.25 milliGRAM(s) IV Push every 1 hour PRN Anxiety  melatonin 3 milliGRAM(s) Oral at bedtime PRN Insomnia  polyethylene glycol 3350 17 Gram(s) Oral daily PRN Constipation  zinc oxide 40% Paste 1 Application(s) Topical two times a day PRN Rash    ITEMS UNCHECKED ARE NOT PRESENT    PRESENT SYMPTOMS: [ ]Unable to self-report  [ ]PAINADs [ ]RDOS  Source if other than patient:  [ ]Family   [ ]Team     Pain: [ ] yes [x ] no  QOL impact -   Location -                    Aggravating factors -  Quality -Te  Radiation -  Timing-  Severity (0-10 scale):G  Minimal acceptable level (0-10 scale):       Anxiety:                             [ ]Mild [ ]Moderate [x ]Severe  Fatigue:                             [ ]Mild [ ]Moderate [ ]Severe  Nausea:                             [ ]Mild [ ]Moderate [ ]Severe  Loss of appetite:              [ ]Mild [ ]Moderate [ ]Severe  Constipation:                    [ ]Mild [ ]Moderate [ ]Severe    PCSSQ[Palliative Care Spiritual Screening Question]   Severity (0-10):  Score of 4 or > indicate consideration of Chaplaincy referral.  Chaplaincy Referral: [x ] yes [ ] refused [ ] following [ ] Deferred     Caregiver [place]? : [ ] yes [x ] no [ ] Deferred [ ] Declined             Social work referral [ ] Patient & Family Centered Care Referral [ ]     Anticipatory Grief present?:  [x ] yes [ ] no  [ ] Deferred                  Social work referral [x] Chaplaincy Referral [x ]    		  Other Symptoms:  [x ]All other review of systems negative     Palliative Performance Status Version 2:  50        %         http://npcrc.org/files/news/palliative_performance_scale_ppsv2.pdf    PHYSICAL EXAM:   Vital Signs Last 24 Hrs  T(C): 36.8 (12 Dec 2022 07:47), Max: 36.8 (12 Dec 2022 07:47)  T(F): 98.2 (12 Dec 2022 07:47), Max: 98.2 (12 Dec 2022 07:47)  HR: 82 (12 Dec 2022 07:47) (82 - 82)  BP: 112/70 (12 Dec 2022 07:47) (112/70 - 112/70)  RR: 18 (12 Dec 2022 07:47) (18 - 18)  SpO2: 94% (12 Dec 2022 07:47) (94% - 94%)    Parameters below as of 11 Dec 2022 08:18  Patient On (Oxygen Delivery Method): nasal cannula    I&O's Detail    11 Dec 2022 07:01  -  12 Dec 2022 07:00  --------------------------------------------------------  IN:  Total IN: 0 mL    OUT:    Indwelling Catheter - Urethral (mL): 400 mL  Total OUT: 400 mL    Total NET: -400 mL      GENERAL: [ ] Cachexia  [x ]Alert  [x ]Oriented x  3 [ ]Lethargic  [ ]Unarousable  [x ]Verbal  [ ]Non-Verbal  Behavioral:   [x ] Anxiety  [ ] Delirium [ ] Agitation [ ] Other  HEENT:  [x ]Normal   [ ]Dry mouth   [ ]ET Tube/Trach  [ ]Oral lesions  PULMONARY:   [x ]Clear [ ]Tachypnea  [ ]Audible excessive secretions   [ ]Rhonchi        [ ]Right [ ]Left [ ]Bilateral  [ ]Crackles        [ ]Right [ ]Left [ ]Bilateral  [ ]Wheezing     [ ]Right [ ]Left [ ]Bilateral  [ ]Diminished BS [ ]Right [ ]Left [ ]Bilateral    CARDIOVASCULAR:    [x ]Regular [ ]Irregular [ ]Tachy  [ ]Christian [ ]Murmur [ ]Other  GASTROINTESTINAL:  [x ]Soft  [ ]Distended   [ ]+BS  [x ]Non tender [ ]Tender  [ ]Other [ ]PEG [ ]OGT/ NGT   Last BM:    GENITOURINARY:  [ ]Normal [ ] Incontinent   [ ]Oliguria/Anuria   [ x][place]  MUSCULOSKELETAL:   [ ]Normal   [ x]Weakness  [ ]Bed/Wheelchair bound [ ]Edema  NEUROLOGIC:   [x ]No focal deficits  [ ] Cognitive impairment  [ ] Dysphagia [ ]Dysarthria [ ] Paresis [ ]Other   SKIN:   [x ]Normal  [ ]Rash  [ ]Other  [ ]Pressure ulcer(s)  [ ]y [ ]n  Present on admission      CRITICAL CARE:  [ ] Shock Present  [ ]Septic [ ]Cardiogenic [ ]Neurologic [ ]Hypovolemic  [ ]  Vasopressors [ ]  Inotropes   [ ] Respiratory failure present [ ] Mechanical Ventilation [ ] Non-invasive ventilatory support [ ] High-Flow  [ ] Acute  [ ] Chronic [ ] Hypoxic  [ ] Hypercarbic [ ] Other  [ ] Other organ failure     LABS: reviewed   RADIOLOGY & ADDITIONAL STUDIES: reviewed     PROTEIN CALORIE MALNUTRITION: [ ] mild [ ] moderate [ ] severe  [ ] underweight [ ] morbid obesity    https://www.andeal.org/vault/0801/web/files/ONC/Table_Clinical%20Characteristics%20to%20Document%20Malnutrition-White%20JV%20et%20al%202012.pdf    Height (cm): 163 (11-10-22 @ 12:15), 162 (07-18-22 @ 13:42), 162.6 (12-22-21 @ 14:17)  Weight (kg): 59.5 (11-10-22 @ 12:15), 58.0009 (09-12-22 @ 12:00), 59.9 (12-22-21 @ 14:17)  BMI (kg/m2): 22.4 (11-10-22 @ 12:15), 22.1 (09-12-22 @ 12:00), 22.8 (07-18-22 @ 13:42)    [ ] PPSV2 < or = 30% [ ] significant weight loss [ ] poor nutritional intake [ ] anasarca   Artificial Nutrition [ ]     Other REFERRALS:    [x ] Hospice  [ ]Child Life  [x ]Social Work  [ ]Case management [x ]Holistic Therapy [ ] Physical Therapy [ ] Dietary   Goals of Care Document:  GAP TEAM PALLIATIVE CARE UNIT PROGRESS NOTE:      [  ] Patient on hospice program.    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     OVERNIGHT EVENTS: Chart reviewed and patient seen at bedside. Patient is w/o complaints. Still somewhat anxious but is able to focus. Expressed that she does not know her dx and wanted to discuss it further. Wants to speak with a Rabbi. Denies any pain. In the past 24 hours, needed Dilaudid 0.2 mg x 1 for severe pain and Ativan 0.25 mg IV x 1 for anxiety     DNR on chart: Yes  Yes      Allergies    No Known Allergies    Intolerances    MEDICATIONS  (STANDING):  buPROPion XL (24-Hour) . 150 milliGRAM(s) Oral daily  chlorhexidine 2% Cloths 1 Application(s) Topical daily  gabapentin 100 milliGRAM(s) Oral daily  senna 1 Tablet(s) Oral at bedtime  sodium chloride 0.9%. 1000 milliLiter(s) (10 mL/Hr) IV Continuous <Continuous>  traZODone 150 milliGRAM(s) Oral at bedtime    MEDICATIONS  (PRN):  acetaminophen     Tablet .. 650 milliGRAM(s) Oral every 6 hours PRN Temp greater or equal to 38C (100.4F), Mild Pain (1 - 3)  haloperidol    Injectable 0.5 milliGRAM(s) IV Push every 6 hours PRN agitation  HYDROmorphone  Injectable 0.1 milliGRAM(s) IV Push every 1 hour PRN Moderate Pain (4 - 6)  HYDROmorphone  Injectable 0.2 milliGRAM(s) IV Push every 1 hour PRN Severe Pain (7 - 10)  HYDROmorphone  Injectable 0.2 milliGRAM(s) IV Push every 1 hour PRN Dyspnea  LORazepam   Injectable 0.25 milliGRAM(s) IV Push every 1 hour PRN Anxiety  melatonin 3 milliGRAM(s) Oral at bedtime PRN Insomnia  polyethylene glycol 3350 17 Gram(s) Oral daily PRN Constipation  zinc oxide 40% Paste 1 Application(s) Topical two times a day PRN Rash    ITEMS UNCHECKED ARE NOT PRESENT    PRESENT SYMPTOMS: [ ]Unable to self-report  [ ]PAINADs [ ]RDOS  Source if other than patient:  [ ]Family   [ ]Team     Pain: [ ] yes [x ] no  QOL impact -   Location -                    Aggravating factors -  Quality -  Radiation -  Timing-  Severity (0-10 scale):G  Minimal acceptable level (0-10 scale):       Anxiety:                             [ ]Mild [ ]Moderate [x ]Severe  Fatigue:                             [ ]Mild [ ]Moderate [ ]Severe  Nausea:                             [ ]Mild [ ]Moderate [ ]Severe  Loss of appetite:              [ ]Mild [ ]Moderate [ ]Severe  Constipation:                    [ ]Mild [ ]Moderate [ ]Severe    PCSSQ[Palliative Care Spiritual Screening Question]   Severity (0-10):  Score of 4 or > indicate consideration of Chaplaincy referral.  Chaplaincy Referral: [x ] yes [ ] refused [ ] following [ ] Deferred     Caregiver [place]? : [ ] yes [x ] no [ ] Deferred [ ] Declined             Social work referral [ ] Patient & Family Centered Care Referral [ ]     Anticipatory Grief present?:  [x ] yes [ ] no  [ ] Deferred                  Social work referral [x] Chaplaincy Referral [x ]    		  Other Symptoms:  [x ]All other review of systems negative     Palliative Performance Status Version 2:  50        %         http://npcrc.org/files/news/palliative_performance_scale_[email or web address].pdf    PHYSICAL EXAM:   Vital Signs Last 24 Hrs  T(C): 36.8 (12 Dec 2022 07:47), Max: 36.8 (12 Dec 2022 07:47)  T(F): 98.2 (12 Dec 2022 07:47), Max: 98.2 (12 Dec 2022 07:47)  HR: 82 (12 Dec 2022 07:47) (82 - 82)  BP: 112/70 (12 Dec 2022 07:47) (112/70 - 112/70)  RR: 18 (12 Dec 2022 07:47) (18 - 18)  SpO2: 94% (12 Dec 2022 07:47) (94% - 94%)    Parameters below as of 11 Dec 2022 08:18  Patient On (Oxygen Delivery Method): nasal cannula    I&O's Detail    11 Dec 2022 07:01  -  12 Dec 2022 07:00  --------------------------------------------------------  IN:  Total IN: 0 mL    OUT:    Indwelling Catheter - Urethral (mL): 400 mL  Total OUT: 400 mL    Total NET: -400 mL      GENERAL: [ ] Cachexia  [x ]Alert  [x ]Oriented x  3 [ ]Lethargic  [ ]Unarousable  [x ]Verbal  [ ]Non-Verbal  Behavioral:   [x ] Anxiety  [ ] Delirium [ ] Agitation [ ] Other  HEENT:dried blood in oropharynx  []Normal   [ ]Dry mouth   [ ]ET Tube/Trach  [ ]Oral lesions  PULMONARY:   [x ]Clear [ ]Tachypnea  [ ]Audible excessive secretions   [ ]Rhonchi        [ ]Right [ ]Left [ ]Bilateral  [ ]Crackles        [ ]Right [ ]Left [ ]Bilateral  [ ]Wheezing     [ ]Right [ ]Left [ ]Bilateral  [ ]Diminished BS [ ]Right [ ]Left [ ]Bilateral    CARDIOVASCULAR:    [x ]Regular [ ]Irregular [ ]Tachy  [ ]Christian [ ]Murmur [ ]Other  GASTROINTESTINAL:  [x ]Soft  [ ]Distended   [ ]+BS  [x ]Non tender [ ]Tender  [ ]Other [ ]PEG [ ]OGT/ NGT   Last BM:  12/11  GENITOURINARY:  [ ]Normal [ ] Incontinent   [ ]Oliguria/Anuria   [ x][place]emos  MUSCULOSKELETAL:   [ ]Normal   [ x]Weakness  [ ]Bed/Wheelchair bound [ ]Edema  NEUROLOGIC:   [x ]No focal deficits  [ ] Cognitive impairment  [ ] Dysphagia [ ]Dysarthria [ ] Paresis [ ]Other   SKIN:   [x ]Normal  [ ]Rash  [ ]Other  [ ]Pressure ulcer(s)  [ ]y [ ]n  Present on admission      CRITICAL CARE:  [ ] Shock Present  [ ]Septic [ ]Cardiogenic [ ]Neurologic [ ]Hypovolemic  [ ]  Vasopressors [ ]  Inotropes   [ ] Respiratory failure present [ ] Mechanical Ventilation [ ] Non-invasive ventilatory support [ ] High-Flow  [ ] Acute  [ ] Chronic [ ] Hypoxic  [ ] Hypercarbic [ ] Other  [ ] Other organ failure     LABS: reviewed   RADIOLOGY & ADDITIONAL STUDIES: reviewed     PROTEIN CALORIE MALNUTRITION: [ ] mild [ ] moderate [ ] severe  [ ] underweight [ ] morbid obesity    https://www.andeal.org/vault/3680/web/files/ONC/Table_Clinical%20Characteristics%20to%20Document%20Malnutrition-White%20JV%20et%20al%202012.pdf    Height (cm): 163 (11-10-22 @ 12:15), 162 (07-18-22 @ 13:42), 162.6 (12-22-21 @ 14:17)  Weight (kg): 59.5 (11-10-22 @ 12:15), 58.0009 (09-12-22 @ 12:00), 59.9 (12-22-21 @ 14:17)  BMI (kg/m2): 22.4 (11-10-22 @ 12:15), 22.1 (09-12-22 @ 12:00), 22.8 (07-18-22 @ 13:42)    [ ] PPSV2 < or = 30% [ ] significant weight loss [ ] poor nutritional intake [ ] anasarca   Artificial Nutrition [ ]     Other REFERRALS:    [x ] Hospice  [ ]Child Life  [x ]Social Work  [ ]Case management [x ]Holistic Therapy [ ] Physical Therapy [ ] Dietary   Goals of Care Document:

## 2022-12-12 NOTE — PROGRESS NOTE ADULT - PROBLEM SELECTOR PLAN 9
Complex medical symptom management in the setting of AML relapse.  - DNR/I  - MOLST completed and in chart  - Will continue to monitor for signs of pain, dyspnea, constipation and titrate palliative regimen accordingly  - Will continue to provide emotional support and answer questions.  - Social Work/Case management following  - Discharge plan is for inpatient hospice, but currently does not meet criteria  -  and son visited today. Patient was very vocal about wanting to die. Son concerned about medications prolonging mother's life. Educated by RN on how the PCU does not hasten death and allows natural passing by keeping the patient comfortable.  who is HCP disagrees with son and said to keep any medications that will keep patient comfortable. Complex medical symptom management in the setting of AML relapse.  - DNR/I  - MOLST completed and in chart  - Will continue to monitor for signs of pain, dyspnea, constipation and titrate palliative regimen accordingly  - Will continue to provide emotional support and answer questions.  - Social Work/Case management following  - Discharge plan is for inpatient hospice, but currently does not meet criteria  - Patient was very vocal about wanting to die. Son concerned about medications prolonging mother's life. Educated by RN on how the PCU does not hasten death and allows natural passing by keeping the patient comfortable.  who is HCP disagrees with son and said to keep any medications that will keep patient comfortable. Complex medical symptom management in the setting of AML relapse.  - DNR/I  - MOLST completed and in chart  - Will continue to monitor for signs of pain, dyspnea, constipation and titrate palliative regimen accordingly  - Will continue to provide emotional support and answer questions.  - Social Work/Case management following  - Discharge plan is for inpatient hospice, but currently does not meet criteria

## 2022-12-13 NOTE — PROGRESS NOTE ADULT - PROBLEM SELECTOR PLAN 8
- PPSV 40%  - Needs assistance with most ADLs  - PT following  - c/w supportive care  - Encourage movement and OOB to chair as tolerated

## 2022-12-13 NOTE — PROGRESS NOTE ADULT - PROBLEM SELECTOR PLAN 3
- Patient with periods of confusion, waxing and waning mentation- likely multifactorial in setting of benzodiazepine and opioid use, prolonged hospital stay, recent fever/high infection risk given leukopenia, high risk of spontaneous hemorrhage given severe thrombocytopenia.   -Monitor for constipation, urinary retention, pain, hunger, thirst, etc.  Promote sleep wake cycle and reorientation as indicated.  - Continue with haloperidol 0.5 mg IV q6h PRN for agitation   - Ativan 0.25mg IV q1hr PRN

## 2022-12-13 NOTE — PROGRESS NOTE ADULT - SUBJECTIVE AND OBJECTIVE BOX
GAP TEAM PALLIATIVE CARE UNIT PROGRESS NOTE:      [  ] Patient on hospice program.    INDICATION FOR PALLIATIVE CARE UNIT SERVICES/Interval HPI: Symptom management in the setting of a 81y/o F  who presents with AML relapse, pancytopenia with nasal oozing.     OVERNIGHT EVENTS: Chart reviewed. The patient is seen and examined at the bedside. Patient is anxious this morning. Expressed wanting to go home and wanting to die. Holistic nurse on board. Eliei to follow up today. Active listening and emotional support provided to the patient. She did not require any PRN medications within the last 24hr period 8am-8am.       DNR on chart: Yes  Yes      Allergies    No Known Allergies    Intolerances    MEDICATIONS  (STANDING):  buPROPion XL (24-Hour) . 150 milliGRAM(s) Oral daily  chlorhexidine 2% Cloths 1 Application(s) Topical daily  gabapentin 100 milliGRAM(s) Oral daily  senna 1 Tablet(s) Oral at bedtime  sodium chloride 0.9%. 1000 milliLiter(s) (10 mL/Hr) IV Continuous <Continuous>  traZODone 150 milliGRAM(s) Oral at bedtime    MEDICATIONS  (PRN):  acetaminophen     Tablet .. 650 milliGRAM(s) Oral every 6 hours PRN Temp greater or equal to 38C (100.4F), Mild Pain (1 - 3)  furosemide   Injectable 40 milliGRAM(s) IV Push daily PRN symptoms of fluid overload  haloperidol    Injectable 0.5 milliGRAM(s) IV Push every 6 hours PRN agitation  LORazepam   Injectable 0.25 milliGRAM(s) IV Push every 1 hour PRN Anxiety  melatonin 3 milliGRAM(s) Oral at bedtime PRN Insomnia  polyethylene glycol 3350 17 Gram(s) Oral daily PRN Constipation  zinc oxide 40% Paste 1 Application(s) Topical two times a day PRN Rash    ITEMS UNCHECKED ARE NOT PRESENT    PRESENT SYMPTOMS: [ ]Unable to self-report  [ ]PAINADs [ ]RDOS  Source if other than patient:  [ ]Family   [ ]Team     Pain: [ ] yes [X ] no  QOL impact -   Location -                    Aggravating factors -  Quality -  Radiation -  Timing-  Severity (0-10 scale):  Minimal acceptable level (0-10 scale):     PAINAD Score:  [email or web address] (Ctrl +  left click to view)    Dyspnea:                           [ ]Mild [ ]Moderate [ ]Severe    RDOS:  0 to 2  minimal or no respiratory distress   3  mild distress  4 to 6 moderate distress  >7 severe distress  [email or web address]_Distress_Observation_Scale.pdf (Ctrl +  left click to view)     Anxiety:                             [X ]Mild [ ]Moderate [ ]Severe  Fatigue:                             [ ]Mild [ ]Moderate [ ]Severe  Nausea:                             [ ]Mild [ ]Moderate [ ]Severe  Loss of appetite:              [ ]Mild [ ]Moderate [ ]Severe  Constipation:                    [ ]Mild [ ]Moderate [ ]Severe  		  Other Symptoms:  [ X]All other review of systems negative     Palliative Performance Status Version 2: 40%         [email or web address]  PHYSICAL EXAM:   Vital Signs Last 24 Hrs  T(C): 36.8 (13 Dec 2022 08:42), Max: 36.8 (13 Dec 2022 08:42)  T(F): 98.3 (13 Dec 2022 08:42), Max: 98.3 (13 Dec 2022 08:42)  HR: 79 (13 Dec 2022 08:42) (79 - 79)  BP: 114/67 (13 Dec 2022 08:42) (114/67 - 114/67)  BP(mean): --  RR: 18 (13 Dec 2022 08:42) (18 - 18)  SpO2: 95% (13 Dec 2022 08:42) (95% - 95%)    Parameters below as of 13 Dec 2022 08:42  Patient On (Oxygen Delivery Method): nasal cannula     I&O's Summary    12 Dec 2022 07:01  -  13 Dec 2022 07:00  --------------------------------------------------------  IN: 0 mL / OUT: 1200 mL / NET: -1200 mL      GENERAL: [ ] Cachexia  [ X]Alert  [ ]Oriented x   [ ]Lethargic  [ ]Unarousable  [ X]Verbal  [ ]Non-Verbal  Behavioral:   [ X] Anxiety  [ ] Delirium [ ] Agitation [ ] Other  HEENT:  [X]Normal   [ ]Dry mouth   [ ]ET Tube/Trach  [ ]Oral lesions  PULMONARY:   [ X]Clear [ ]Tachypnea  [ ]Audible excessive secretions   [ ]Rhonchi        [ ]Right [ ]Left [ ]Bilateral  [ ]Crackles        [ ]Right [ ]Left [ ]Bilateral  [ ]Wheezing     [ ]Right [ ]Left [ ]Bilateral  [ ]Diminished BS [ ]Right [ ]Left [ ]Bilateral    CARDIOVASCULAR:    [X ]Regular [ ]Irregular [ ]Tachy  [ ]Christian [ ]Murmur [ ]Other  GASTROINTESTINAL:  [X ]Soft  [ ]Distended   [X ]+BS  [ X]Non tender [ ]Tender  [ ]Other [ ]PEG [ ]OGT/ NGT   Last BM: 12/11/22  GENITOURINARY:  [ ]Normal [X ] Incontinent   [ ]Oliguria/Anuria   [X ][place]  MUSCULOSKELETAL:   [ ]Normal   [X ]Weakness  [ ]Bed/Wheelchair bound [ ]Edema  NEUROLOGIC:   [ ]No focal deficits  [ ] Cognitive impairment  [ ] Dysphagia [ ]Dysarthria [ ] Paresis [ ]Other   SKIN: Incontinence associated dermatitis   [ ]Normal  [ ]Rash  [ ]Other  [ ]Pressure ulcer(s)  [ ]y [ ]n  Present on admission      CRITICAL CARE:  [ ] Shock Present  [ ]Septic [ ]Cardiogenic [ ]Neurologic [ ]Hypovolemic  [ ]  Vasopressors [ ]  Inotropes   [ ] Respiratory failure present [ ] Mechanical Ventilation [ ] Non-invasive ventilatory support [ ] High-Flow  [ ] Acute  [ ] Chronic [ ] Hypoxic  [ ] Hypercarbic [ ] Other  [ ] Other organ failure     LABS: Reviewed    RADIOLOGY & ADDITIONAL STUDIES: Reviewed    PROTEIN CALORIE MALNUTRITION: [ ] mild [ X] moderate- please see the nutritionist note [ ] severe  [ ] underweight [ ] morbid obesity    https://www.andeal.org/vault/2440/web/files/ONC/Table_Clinical%20Characteristics%20to%20Document%20Malnutrition-White%20JV%20et%20al%202012.pdf    Height (cm): 163 (11-10-22 @ 12:15), 162 (07-18-22 @ 13:42), 162.6 (12-22-21 @ 14:17)  Weight (kg): 59.5 (11-10-22 @ 12:15), 58.0009 (09-12-22 @ 12:00), 59.9 (12-22-21 @ 14:17)  BMI (kg/m2): 22.4 (11-10-22 @ 12:15), 22.1 (09-12-22 @ 12:00), 22.8 (07-18-22 @ 13:42)    [X ] PPSV2 < or = 30% [ ] significant weight loss [ ] poor nutritional intake [ ] anasarca   Artificial Nutrition [ ]     REFERRALS:   [X ]Chaplaincy  [ ] Hospice  [ ]Child Life  [ X]Social Work  [ ]Case management [ ]Holistic Therapy [ ] Physical Therapy [ ] Dietary   Goals of Care Document:   GAP TEAM PALLIATIVE CARE UNIT PROGRESS NOTE:      [  ] Patient on hospice program.    INDICATION FOR PALLIATIVE CARE UNIT SERVICES/Interval HPI: Symptom management in the setting of a 83y/o F  who presents with AML relapse, pancytopenia with nasal oozing.     OVERNIGHT EVENTS: Chart reviewed. The patient is seen and examined at the bedside. Patient is anxious this morning. Expressed wanting to go home and wanting to die. Holistic nurse on board. Rabbi to follow up today. Active listening and emotional support provided to the patient. She did not require any PRN medications within the last 24hr period 8am-8am.       DNR on chart: Yes  Yes      Allergies    No Known Allergies    Intolerances    MEDICATIONS  (STANDING):  buPROPion XL (24-Hour) . 150 milliGRAM(s) Oral daily  chlorhexidine 2% Cloths 1 Application(s) Topical daily  gabapentin 100 milliGRAM(s) Oral daily  senna 1 Tablet(s) Oral at bedtime  sodium chloride 0.9%. 1000 milliLiter(s) (10 mL/Hr) IV Continuous <Continuous>  traZODone 150 milliGRAM(s) Oral at bedtime    MEDICATIONS  (PRN):  acetaminophen     Tablet .. 650 milliGRAM(s) Oral every 6 hours PRN Temp greater or equal to 38C (100.4F), Mild Pain (1 - 3)  furosemide   Injectable 40 milliGRAM(s) IV Push daily PRN symptoms of fluid overload  haloperidol    Injectable 0.5 milliGRAM(s) IV Push every 6 hours PRN agitation  LORazepam   Injectable 0.25 milliGRAM(s) IV Push every 1 hour PRN Anxiety  melatonin 3 milliGRAM(s) Oral at bedtime PRN Insomnia  polyethylene glycol 3350 17 Gram(s) Oral daily PRN Constipation  zinc oxide 40% Paste 1 Application(s) Topical two times a day PRN Rash    ITEMS UNCHECKED ARE NOT PRESENT    PRESENT SYMPTOMS: [ ]Unable to self-report  [ ]PAINADs [ ]RDOS  Source if other than patient:  [ ]Family   [ ]Team     Pain: [ ] yes [X ] no  QOL impact -   Location -                    Aggravating factors -  Quality -  Radiation -  Timing-  Severity (0-10 scale):  Minimal acceptable level (0-10 scale):     PAINAD Score:  [email or web address]/[email or web address] (Ctrl +  left click to view)    Dyspnea:                           [ ]Mild [ ]Moderate [ ]Severe    RDOS:  0 to 2  minimal or no respiratory distress   3  mild distress  4 to 6 moderate distress  >7 severe distress  [email or web address]_Distress_Observation_Scale.pdf (Ctrl +  left click to view)     Anxiety:                             [X ]Mild [ ]Moderate [ ]Severe  Fatigue:                             [ ]Mild [ ]Moderate [ ]Severe  Nausea:                             [ ]Mild [ ]Moderate [ ]Severe  Loss of appetite:              [ ]Mild [ ]Moderate [ ]Severe  Constipation:                    [ ]Mild [ ]Moderate [ ]Severe  		  Other Symptoms:  [ X]All other review of systems negative     Palliative Performance Status Version 2: 40%         [email or web address]  PHYSICAL EXAM:   Vital Signs Last 24 Hrs  T(C): 36.8 (13 Dec 2022 08:42), Max: 36.8 (13 Dec 2022 08:42)  T(F): 98.3 (13 Dec 2022 08:42), Max: 98.3 (13 Dec 2022 08:42)  HR: 79 (13 Dec 2022 08:42) (79 - 79)  BP: 114/67 (13 Dec 2022 08:42) (114/67 - 114/67)  BP(mean): --  RR: 18 (13 Dec 2022 08:42) (18 - 18)  SpO2: 95% (13 Dec 2022 08:42) (95% - 95%)    Parameters below as of 13 Dec 2022 08:42  Patient On (Oxygen Delivery Method): nasal cannula     I&O's Summary    12 Dec 2022 07:01  -  13 Dec 2022 07:00  --------------------------------------------------------  IN: 0 mL / OUT: 1200 mL / NET: -1200 mL      GENERAL: [ ] Cachexia  [ X]Alert  [ ]Oriented x   [ ]Lethargic  [ ]Unarousable  [ X]Verbal  [ ]Non-Verbal  Behavioral:   [ X] Anxiety  [ ] Delirium [ ] Agitation [ ] Other  HEENT:  [X]Normal   [ ]Dry mouth   [ ]ET Tube/Trach  [ ]Oral lesions  PULMONARY:   [ X]Clear [ ]Tachypnea  [ ]Audible excessive secretions   [ ]Rhonchi        [ ]Right [ ]Left [ ]Bilateral  [ ]Crackles        [ ]Right [ ]Left [ ]Bilateral  [ ]Wheezing     [ ]Right [ ]Left [ ]Bilateral  [ ]Diminished BS [ ]Right [ ]Left [ ]Bilateral    CARDIOVASCULAR:    [X ]Regular [ ]Irregular [ ]Tachy  [ ]Christian [ ]Murmur [ ]Other  GASTROINTESTINAL:  [X ]Soft  [ ]Distended   [X ]+BS  [ X]Non tender [ ]Tender  [ ]Other [ ]PEG [ ]OGT/ NGT   Last BM: 12/11/22  GENITOURINARY:  [ ]Normal [X ] Incontinent   [ ]Oliguria/Anuria   [X ][place]  MUSCULOSKELETAL:   [ ]Normal   [X ]Weakness  [ ]Bed/Wheelchair bound [ ]Edema  NEUROLOGIC:   [ ]No focal deficits  [ ] Cognitive impairment  [ ] Dysphagia [ ]Dysarthria [ ] Paresis [ ]Other   SKIN: Incontinence associated dermatitis   [ ]Normal  [ ]Rash  [ ]Other  [ ]Pressure ulcer(s)  [ ]y [ ]n  Present on admission      CRITICAL CARE:  [ ] Shock Present  [ ]Septic [ ]Cardiogenic [ ]Neurologic [ ]Hypovolemic  [ ]  Vasopressors [ ]  Inotropes   [ ] Respiratory failure present [ ] Mechanical Ventilation [ ] Non-invasive ventilatory support [ ] High-Flow  [ ] Acute  [ ] Chronic [ ] Hypoxic  [ ] Hypercarbic [ ] Other  [ ] Other organ failure     LABS: Reviewed    RADIOLOGY & ADDITIONAL STUDIES: Reviewed    PROTEIN CALORIE MALNUTRITION: [ ] mild [ X] moderate- please see the nutritionist note [ ] severe  [ ] underweight [ ] morbid obesity    https://www.andeal.org/vault/2440/web/files/ONC/Table_Clinical%20Characteristics%20to%20Document%20Malnutrition-White%20JV%20et%20al%202012.pdf    Height (cm): 163 (11-10-22 @ 12:15), 162 (07-18-22 @ 13:42), 162.6 (12-22-21 @ 14:17)  Weight (kg): 59.5 (11-10-22 @ 12:15), 58.0009 (09-12-22 @ 12:00), 59.9 (12-22-21 @ 14:17)  BMI (kg/m2): 22.4 (11-10-22 @ 12:15), 22.1 (09-12-22 @ 12:00), 22.8 (07-18-22 @ 13:42)    [X ] PPSV2 < or = 30% [ ] significant weight loss [ ] poor nutritional intake [ ] anasarca   Artificial Nutrition [ ]     REFERRALS:   [X ]Chaplaincy  [ ] Hospice  [ ]Child Life  [ X]Social Work  [ ]Case management [ ]Holistic Therapy [ ] Physical Therapy [ ] Dietary   Goals of Care Document:

## 2022-12-13 NOTE — PROGRESS NOTE ADULT - PROBLEM SELECTOR PLAN 9
- Called and spoke to the patient's spouse.  Educated as to what to expect.  Questions answered. Emotional support provided.   - The patient's spouse is sick and is unable to care for the patient at home. Ongoing dispo planning with the .  The patient currently does not meet the criteria for inpatient hospice.

## 2022-12-13 NOTE — PROGRESS NOTE ADULT - PROBLEM SELECTOR PLAN 6
Stable.  - c/w Wellbutrin 150mg PO qd  - c/w Ativan 0.25mg IV q 1hr prn anxiety  - SW following for psychosocial support  -  following

## 2022-12-13 NOTE — PROGRESS NOTE ADULT - NS ATTEND OPT1 GEN_ALL_CORE
I independently performed the documented:
I attest my time as attending is greater than 50% of the total combined time spent on qualifying patient care activities by the PA/NP and attending.

## 2022-12-13 NOTE — PROGRESS NOTE ADULT - TIME BILLING
Non face to face  Total time spent including the following  [x]chart review and documentation  []review of medical literature related to pathogenesis, disease/illness progress, treatment and/or prognosis  [x]care coordination:  [x]discussion with the primary team:  []discussion with floor staff:  []discussion with consultant(s):  [x]discussion with the patient, surrogate decision maker, or family:  Time spent: > 35 min
care coordination, symptom assessment and management, counselling about existential suffering and consultation wit 
care coordination, symptom assessment and management and counselling family regarding plan of care

## 2022-12-13 NOTE — PROGRESS NOTE ADULT - PROBLEM SELECTOR PLAN 1
- Dilaudid 0.2 mg IV q1h PRN for moderate pain  - Dilaudid 0.3mg IV q1h PRN for moderate pain   - Continue with Gabapentin 100 mg oral daily   - Bowel regimen while on opioids. Last BM 12/11.

## 2022-12-13 NOTE — PROGRESS NOTE ADULT - NS ATTEND AMEND GEN_ALL_CORE FT
Patient in PCU for symptom directed care 2/2 relapsed AML. No PRNs required over last 24 hours but becoming more confused throughout the day per staff and anxiety surrounding death, even though multiple requests for hastened death. c/w management of depression and support with holistic nurse and Rabbi.  updated via phone. To continue care in PCU. plan discussed with IDT.

## 2022-12-14 NOTE — PROGRESS NOTE ADULT - PROBLEM SELECTOR PLAN 5
Stable.  - Lasix 40 mg IV as needed for symptoms of congestion    - Supplemental oxygen as needed - Stable.  - Lasix 40 mg IV as needed for symptoms of congestion    - Supplemental oxygen as needed - Stable.  - C/w Lasix 40 mg IV as needed for symptoms of congestion    - Supplemental oxygen as needed

## 2022-12-14 NOTE — PROGRESS NOTE ADULT - PROBLEM SELECTOR PLAN 2
- Dilaudid 0.2 mg IV Dilaudid q1h PRN for dyspnea  - Bowel regimen while on opioids. Last BM 12/11 - Dilaudid 0.2 mg IV Dilaudid q1h PRN for dyspnea  - Bowel regimen while on opioids. Last BM 12/13 - No acute respiratory distress noted  - On 2L NC  - C/w Dilaudid 0.2 mg IV Q1h PRN for dyspnea  - Bowel regimen while on opioids. Last BM 12/13

## 2022-12-14 NOTE — PROGRESS NOTE ADULT - PROBLEM SELECTOR PLAN 4
FLT3 mutation.  - No DMT or transfusions being sought.  - Overall, poor prognosis. - FLT3 mutation.  - No DMT or transfusions being sought.  - Overall, poor prognosis.

## 2022-12-14 NOTE — PROGRESS NOTE ADULT - PROBLEM SELECTOR PLAN 6
Stable.  - c/w Wellbutrin 150mg PO qd  - c/w Ativan 0.25mg IV q 1hr prn anxiety  - SW following for psychosocial support  -  following - Stable.  - c/w Wellbutrin 150mg PO qd  - c/w Ativan 0.25mg IV q 1hr prn anxiety  - SW following for psychosocial support  -  following - Stable.  - C/w Wellbutrin 150mg PO qd  - C/w Ativan 0.25mg IV q 1hr prn anxiety  - SW following for psychosocial support  -  following

## 2022-12-14 NOTE — PROGRESS NOTE ADULT - PROBLEM SELECTOR PLAN 7
Chronic in nature.  - Continue to provide emotional support.   - Holistic nursing following. - Chronic in nature.  - Continue to provide emotional support.   - Holistic nursing following.

## 2022-12-14 NOTE — PROGRESS NOTE ADULT - PROBLEM SELECTOR PLAN 3
- Patient with periods of confusion, waxing and waning mentation- likely multifactorial in setting of benzodiazepine and opioid use, prolonged hospital stay, recent fever/high infection risk given leukopenia, high risk of spontaneous hemorrhage given severe thrombocytopenia.   -Monitor for constipation, urinary retention, pain, hunger, thirst, etc.  Promote sleep wake cycle and reorientation as indicated.  - Continue with haloperidol 0.5 mg IV q6h PRN for agitation   - Ativan 0.25mg IV q1hr PRN - Patient with periods of confusion, waxing and waning mentation- likely multifactorial in setting of benzodiazepine and opioid use, prolonged hospital stay, recent fever/high infection risk given leukopenia, high risk of spontaneous hemorrhage given severe thrombocytopenia.   -Monitor for constipation, urinary retention, pain, hunger, thirst, etc.  Promote sleep wake cycle and reorientation as indicated.  - Continue with haloperidol 0.5 mg IV q6h PRN agitation  - Ativan 0.25mg IV q6h ATC  - Ativan 0.25 mg IV Q1h PRN anxiety - Patient with periods of confusion, waxing and waning mentation- likely multifactorial in setting of benzodiazepine and opioid use, prolonged hospital stay, recent fever/high infection risk given leukopenia, high risk of spontaneous hemorrhage given severe thrombocytopenia.   - Monitor for constipation, urinary retention, pain, hunger, thirst, etc.    - Promote sleep wake cycle and reorientation as indicated.  - C/w with haloperidol 0.5 mg IV q6h PRN agitation  - C/w Ativan 0.25mg IV q6h ATC  - C/w Ativan 0.25 mg IV Q1h PRN anxiety - Patient with periods of confusion, waxing and waning mentation- likely multifactorial in setting of benzodiazepine and opioid use, prolonged hospital stay, recent fever/high infection risk given leukopenia, high risk of spontaneous hemorrhage given severe thrombocytopenia.   - Monitor for constipation, urinary retention, pain, hunger, thirst, etc.    - Promote sleep wake cycle and reorientation as indicated.  - C/w with haloperidol 0.5 mg IV q6h PRN agitation  - start Ativan 0.25mg IV q6h ATC  - C/w Ativan 0.25 mg IV Q1h PRN anxiety

## 2022-12-14 NOTE — PROGRESS NOTE ADULT - PROBLEM SELECTOR PLAN 1
- Dilaudid 0.2 mg IV q1h PRN for moderate pain  - Dilaudid 0.3mg IV q1h PRN for moderate pain   - Continue with Gabapentin 100 mg oral daily   - Bowel regimen while on opioids. Last BM 12/11. - Dilaudid 0.2 mg IV q1h PRN for moderate pain  - Dilaudid 0.3mg IV q1h PRN for moderate pain   - Continue with Gabapentin 100 mg oral daily   - Bowel regimen while on opioids. Last BM 12/13. - Not in acute distress at this time  - Required Dilaudid 0.2 mg x 1 over the past 24 hours  - C/w Dilaudid 0.2 mg IV q1h PRN for moderate pain  - C/w Dilaudid 0.3mg IV q1h PRN for moderate pain   - C/w Gabapentin 100 mg oral daily   - Bowel regimen while on opioids. Last BM 12/13.

## 2022-12-14 NOTE — PROGRESS NOTE ADULT - ATTENDING COMMENTS
Patient in PCU for symptom directed care in setting of relapsed AML. She is becoming more delirious, which I think likely represents a terminal delirium. She was started on ATC ativan given uncontrolled anxiety over last 24 hours. C/w support by social work and Rabbi who have been visiting. Update provided to  via phone today by NP. She did spike a fever overnight to 101.4 which was treated with tylenol and not pursuing further work up. continue care in PCU, may need to explore inpatient hospice if losing oral route. plan discussed with IDT.

## 2022-12-14 NOTE — PROGRESS NOTE ADULT - PROBLEM SELECTOR PLAN 8
- PPSV 40%  - Needs assistance with most ADLs  - PT following  - c/w supportive care  - Encourage movement and OOB to chair as tolerated - PPSV 20%  - Needs assistance with most ADLs  - PT following  - c/w supportive care  - Encourage movement and OOB to chair as tolerated - PPSV 20%  - Needs assistance with most ADLs  - PT following  - C/w supportive care  - Encourage movement and OOB to chair as tolerated

## 2022-12-14 NOTE — PROGRESS NOTE ADULT - SUBJECTIVE AND OBJECTIVE BOX
GAP TEAM PALLIATIVE CARE UNIT PROGRESS NOTE:      [  ] Patient on hospice program.    INDICATION FOR PALLIATIVE CARE UNIT SERVICES/Interval HPI: Symptom management in the setting of a 83y/o F  who presents with AML relapse, pancytopenia with nasal oozing.     OVERNIGHT EVENTS:   Elevated temp greater than 100.4F requiring Tylenol x 1, temp resolved. Required Dilaudid 0.2 mg x 1 in past 24 hours. Last BM on 12/13. NP spoke with  at length via video phone call at bedside. Patient was switched to ATC Ativan for persistent anxiety.    DNR on chart: Yes  Yes      Allergies    No Known Allergies    Intolerances    MEDICATIONS  (STANDING):  buPROPion XL (24-Hour) . 150 milliGRAM(s) Oral daily  chlorhexidine 2% Cloths 1 Application(s) Topical daily  gabapentin 100 milliGRAM(s) Oral daily  senna 1 Tablet(s) Oral at bedtime  sodium chloride 0.9%. 1000 milliLiter(s) (10 mL/Hr) IV Continuous <Continuous>  traZODone 150 milliGRAM(s) Oral at bedtime    MEDICATIONS  (PRN):  acetaminophen     Tablet .. 650 milliGRAM(s) Oral every 6 hours PRN Temp greater or equal to 38C (100.4F), Mild Pain (1 - 3)  furosemide   Injectable 40 milliGRAM(s) IV Push daily PRN symptoms of fluid overload  haloperidol    Injectable 0.5 milliGRAM(s) IV Push every 6 hours PRN agitation  LORazepam   Injectable 0.25 milliGRAM(s) IV Push every 1 hour PRN Anxiety  melatonin 3 milliGRAM(s) Oral at bedtime PRN Insomnia  polyethylene glycol 3350 17 Gram(s) Oral daily PRN Constipation  zinc oxide 40% Paste 1 Application(s) Topical two times a day PRN Rash    ITEMS UNCHECKED ARE NOT PRESENT    PRESENT SYMPTOMS: [ ]Unable to self-report  [ ]PAINADs [ ]RDOS  Source if other than patient:  [ ]Family   [ ]Team     Pain: [ ] yes [X ] no  QOL impact -   Location -                    Aggravating factors -  Quality -  Radiation -  Timing-  Severity (0-10 scale):  Minimal acceptable level (0-10 scale):     PAINAD Score:  [email or web address]/cog/painad.pdf (Ctrl +  left click to view)    Dyspnea:                           [ ]Mild [ ]Moderate [ ]Severe    RDOS:  0 to 2  minimal or no respiratory distress   3  mild distress  4 to 6 moderate distress  >7 severe distress  [email or web address]_Distress_Observation_Scale.pdf (Ctrl +  left click to view)     Anxiety:                             [X ]Mild [ ]Moderate [ ]Severe  Fatigue:                             [ ]Mild [ ]Moderate [ ]Severe  Nausea:                             [ ]Mild [ ]Moderate [ ]Severe  Loss of appetite:              [ ]Mild [ ]Moderate [ ]Severe  Constipation:                    [ ]Mild [ ]Moderate [ ]Severe  		  Other Symptoms:  [ X]All other review of systems negative     Palliative Performance Status Version 2: 40%         [email or web address]    PHYSICAL EXAM:   Vital Signs Last 24 Hrs  T(C): 36.9 (14 Dec 2022 08:50), Max: 38.6 (14 Dec 2022 07:55)  T(F): 98.4 (14 Dec 2022 08:50), Max: 101.4 (14 Dec 2022 07:55)  HR: 92 (14 Dec 2022 07:55) (92 - 92)  BP: 115/56 (14 Dec 2022 07:55) (115/56 - 115/56)  RR: 18 (14 Dec 2022 07:55) (18 - 18)  SpO2: 92% (14 Dec 2022 07:55) (92% - 92%)    Parameters below as of 13 Dec 2022 08:42  Patient On (Oxygen Delivery Method): nasal cannula    I&O's Summary    13 Dec 2022 07:01  -  14 Dec 2022 07:00  --------------------------------------------------------  IN: 0 mL / OUT: 600 mL / NET: -600 mL      GENERAL: [ ] Cachexia  [ X]Alert  [ ]Oriented x   [ ]Lethargic  [ ]Unarousable  [ X]Verbal  [ ]Non-Verbal  Behavioral:   [ X] Anxiety  [ ] Delirium [ ] Agitation [ ] Other  HEENT:  [X]Normal   [ ]Dry mouth   [ ]ET Tube/Trach  [ ]Oral lesions  PULMONARY:   [ X]Clear [ ]Tachypnea  [ ]Audible excessive secretions   [ ]Rhonchi        [ ]Right [ ]Left [ ]Bilateral  [ ]Crackles        [ ]Right [ ]Left [ ]Bilateral  [ ]Wheezing     [ ]Right [ ]Left [ ]Bilateral  [ ]Diminished BS [ ]Right [ ]Left [ ]Bilateral    CARDIOVASCULAR:    [X ]Regular [ ]Irregular [ ]Tachy  [ ]Christian [ ]Murmur [ ]Other  GASTROINTESTINAL:  [X ]Soft  [ ]Distended   [X ]+BS  [ X]Non tender [ ]Tender  [ ]Other [ ]PEG [ ]OGT/ NGT   Last BM: 12/11/22  GENITOURINARY:  [ ]Normal [X ] Incontinent   [ ]Oliguria/Anuria   [X ][place]  MUSCULOSKELETAL:   [ ]Normal   [X ]Weakness  [ ]Bed/Wheelchair bound [ ]Edema  NEUROLOGIC:   [ ]No focal deficits  [ ] Cognitive impairment  [ ] Dysphagia [ ]Dysarthria [ ] Paresis [ ]Other   SKIN: Incontinence associated dermatitis   [ ]Normal  [ ]Rash  [ ]Other  [ ]Pressure ulcer(s)  [ ]y [ ]n  Present on admission      CRITICAL CARE:  [ ] Shock Present  [ ]Septic [ ]Cardiogenic [ ]Neurologic [ ]Hypovolemic  [ ]  Vasopressors [ ]  Inotropes   [ ] Respiratory failure present [ ] Mechanical Ventilation [ ] Non-invasive ventilatory support [ ] High-Flow  [ ] Acute  [ ] Chronic [ ] Hypoxic  [ ] Hypercarbic [ ] Other  [ ] Other organ failure     LABS: Reviewed    RADIOLOGY & ADDITIONAL STUDIES: Reviewed    PROTEIN CALORIE MALNUTRITION: [ ] mild [ X] moderate- please see the nutritionist note [ ] severe  [ ] underweight [ ] morbid obesity    https://www.andeal.org/vault/8786/web/files/ONC/Table_Clinical%20Characteristics%20to%20Document%20Malnutrition-White%20JV%20et%20al%279268.pdf    Height (cm): 163 (11-10-22 @ 12:15), 162 (07-18-22 @ 13:42), 162.6 (12-22-21 @ 14:17)  Weight (kg): 59.5 (11-10-22 @ 12:15), 58.0009 (09-12-22 @ 12:00), 59.9 (12-22-21 @ 14:17)  BMI (kg/m2): 22.4 (11-10-22 @ 12:15), 22.1 (09-12-22 @ 12:00), 22.8 (07-18-22 @ 13:42)    [X ] PPSV2 < or = 30% [ ] significant weight loss [ ] poor nutritional intake [ ] anasarca   Artificial Nutrition [ ]     REFERRALS:   [X ]Chaplaincy  [ ] Hospice  [ ]Child Life  [ X]Social Work  [ ]Case management [ ]Holistic Therapy [ ] Physical Therapy [ ] Dietary   Goals of Care Document:   GAP TEAM PALLIATIVE CARE UNIT PROGRESS NOTE:      [  ] Patient on hospice program.    INDICATION FOR PALLIATIVE CARE UNIT SERVICES/Interval HPI: Symptom management in the setting of a 83y/o F  who presents with AML relapse, pancytopenia with nasal oozing.     OVERNIGHT EVENTS:   Temp 101.4F requiring Tylenol x 1, temp resolved. Required Dilaudid 0.2 mg x 1 in past 24 hours. Last BM on 12/13. NP spoke with  at length via video phone call at bedside. Patient was switched to ATC Ativan for persistent anxiety.    DNR on chart: Yes  Yes      Allergies    No Known Allergies    Intolerances    MEDICATIONS  (STANDING):  buPROPion XL (24-Hour) . 150 milliGRAM(s) Oral daily  chlorhexidine 2% Cloths 1 Application(s) Topical daily  gabapentin 100 milliGRAM(s) Oral daily  senna 1 Tablet(s) Oral at bedtime  sodium chloride 0.9%. 1000 milliLiter(s) (10 mL/Hr) IV Continuous <Continuous>  traZODone 150 milliGRAM(s) Oral at bedtime    MEDICATIONS  (PRN):  acetaminophen     Tablet .. 650 milliGRAM(s) Oral every 6 hours PRN Temp greater or equal to 38C (100.4F), Mild Pain (1 - 3)  furosemide   Injectable 40 milliGRAM(s) IV Push daily PRN symptoms of fluid overload  haloperidol    Injectable 0.5 milliGRAM(s) IV Push every 6 hours PRN agitation  LORazepam   Injectable 0.25 milliGRAM(s) IV Push every 1 hour PRN Anxiety  melatonin 3 milliGRAM(s) Oral at bedtime PRN Insomnia  polyethylene glycol 3350 17 Gram(s) Oral daily PRN Constipation  zinc oxide 40% Paste 1 Application(s) Topical two times a day PRN Rash    ITEMS UNCHECKED ARE NOT PRESENT    PRESENT SYMPTOMS: [ ]Unable to self-report  [ ]PAINADs [ ]RDOS  Source if other than patient:  [ ]Family   [ ]Team     Pain: [ ] yes [X ] no  QOL impact -   Location -                    Aggravating factors -  Quality -  Radiation -  Timing-  Severity (0-10 scale):  Minimal acceptable level (0-10 scale):     PAINAD Score:  http://geriatrictoolkit.[facility]/cog/painad.pdf (Ctrl +  left click to view)    Dyspnea:                           [ ]Mild [ ]Moderate [ ]Severe    RDOS:  0 to 2  minimal or no respiratory distress   3  mild distress  4 to 6 moderate distress  >7 severe distress  [email or web address]/[email or web address]_Distress_Observation_Scale.pdf (Ctrl +  left click to view)     Anxiety:                             [X ]Mild [ ]Moderate [ ]Severe  Fatigue:                             [ ]Mild [ ]Moderate [ ]Severe  Nausea:                             [ ]Mild [ ]Moderate [ ]Severe  Loss of appetite:              [ ]Mild [ ]Moderate [ ]Severe  Constipation:                    [ ]Mild [ ]Moderate [ ]Severe  		  Other Symptoms:  [ X]All other review of systems negative     Palliative Performance Status Version 2: 40%         [email or web address]    PHYSICAL EXAM:   Vital Signs Last 24 Hrs  T(C): 36.9 (14 Dec 2022 08:50), Max: 38.6 (14 Dec 2022 07:55)  T(F): 98.4 (14 Dec 2022 08:50), Max: 101.4 (14 Dec 2022 07:55)  HR: 92 (14 Dec 2022 07:55) (92 - 92)  BP: 115/56 (14 Dec 2022 07:55) (115/56 - 115/56)  RR: 18 (14 Dec 2022 07:55) (18 - 18)  SpO2: 92% (14 Dec 2022 07:55) (92% - 92%)    Parameters below as of 13 Dec 2022 08:42  Patient On (Oxygen Delivery Method): nasal cannula    I&O's Summary    13 Dec 2022 07:01  -  14 Dec 2022 07:00  --------------------------------------------------------  IN: 0 mL / OUT: 600 mL / NET: -600 mL      GENERAL: [ ] Cachexia  [ X]Alert  [ ]Oriented x   [ ]Lethargic  [ ]Unarousable  [ X]Verbal  [ ]Non-Verbal  Behavioral:   [ ] Anxiety  [ ] Delirium [ ] Agitation [ ] Other  HEENT:  [X]Normal   [ ]Dry mouth   [ ]ET Tube/Trach  [ ]Oral lesions  PULMONARY:   [ X]Clear [ ]Tachypnea  [ ]Audible excessive secretions   [ ]Rhonchi        [ ]Right [ ]Left [ ]Bilateral  [ ]Crackles        [ ]Right [ ]Left [ ]Bilateral  [ ]Wheezing     [ ]Right [ ]Left [ ]Bilateral  [ ]Diminished BS [ ]Right [ ]Left [ ]Bilateral    CARDIOVASCULAR:    [X ]Regular [ ]Irregular [ ]Tachy  [ ]Christian [ ]Murmur [ ]Other  GASTROINTESTINAL:  [X ]Soft  [ ]Distended   [X ]+BS  [ X]Non tender [ ]Tender  [ ]Other [ ]PEG [ ]OGT/ NGT   Last BM: 12/11/22  GENITOURINARY:  [ ]Normal [X ] Incontinent   [ ]Oliguria/Anuria   [X ][place]  MUSCULOSKELETAL:   [ ]Normal   [X ]Weakness  [ ]Bed/Wheelchair bound [ ]Edema  NEUROLOGIC:   [ ]No focal deficits  [ ] Cognitive impairment  [ ] Dysphagia [ ]Dysarthria [ ] Paresis [ ]Other   SKIN: Incontinence associated dermatitis   [ ]Normal  [ ]Rash  [ ]Other  [ ]Pressure ulcer(s)  [ ]y [ ]n  Present on admission      CRITICAL CARE:  [ ] Shock Present  [ ]Septic [ ]Cardiogenic [ ]Neurologic [ ]Hypovolemic  [ ]  Vasopressors [ ]  Inotropes   [ ] Respiratory failure present [ ] Mechanical Ventilation [ ] Non-invasive ventilatory support [ ] High-Flow  [ ] Acute  [ ] Chronic [ ] Hypoxic  [ ] Hypercarbic [ ] Other  [ ] Other organ failure     LABS: Reviewed    RADIOLOGY & ADDITIONAL STUDIES: Reviewed    PROTEIN CALORIE MALNUTRITION: [ ] mild [ X] moderate- please see the nutritionist note [ ] severe  [ ] underweight [ ] morbid obesity    https://www.andeal.org/vault/4218/web/files/ONC/Table_Clinical%20Characteristics%20to%20Document%20Malnutrition-White%20JV%20et%20al%444322.pdf    Height (cm): 163 (11-10-22 @ 12:15), 162 (07-18-22 @ 13:42), 162.6 (12-22-21 @ 14:17)  Weight (kg): 59.5 (11-10-22 @ 12:15), 58.0009 (09-12-22 @ 12:00), 59.9 (12-22-21 @ 14:17)  BMI (kg/m2): 22.4 (11-10-22 @ 12:15), 22.1 (09-12-22 @ 12:00), 22.8 (07-18-22 @ 13:42)    [X ] PPSV2 < or = 30% [ ] significant weight loss [ ] poor nutritional intake [ ] anasarca   Artificial Nutrition [ ]     REFERRALS:   [X ]Chaplaincy  [ ] Hospice  [ ]Child Life  [ X]Social Work  [ ]Case management [ ]Holistic Therapy [ ] Physical Therapy [ ] Dietary   Goals of Care Document:   GAP TEAM PALLIATIVE CARE UNIT PROGRESS NOTE:      [  ] Patient on hospice program.    INDICATION FOR PALLIATIVE CARE UNIT SERVICES/Interval HPI: Symptom management in the setting of a 83y/o F  who presents with AML relapse, pancytopenia with nasal oozing.     OVERNIGHT EVENTS:   Temp 101.4F requiring Tylenol x 1, temp resolved. Required Dilaudid 0.2 mg x 1 in past 24 hours. Last BM on 12/13. NP spoke with  at length via video phone call at bedside and medical update provided. Patient was switched to ATC Ativan for persistent anxiety.    DNR on chart: Yes  Yes      Allergies    No Known Allergies    Intolerances    MEDICATIONS  (STANDING):  buPROPion XL (24-Hour) . 150 milliGRAM(s) Oral daily  chlorhexidine 2% Cloths 1 Application(s) Topical daily  gabapentin 100 milliGRAM(s) Oral daily  senna 1 Tablet(s) Oral at bedtime  sodium chloride 0.9%. 1000 milliLiter(s) (10 mL/Hr) IV Continuous <Continuous>  traZODone 150 milliGRAM(s) Oral at bedtime    MEDICATIONS  (PRN):  acetaminophen     Tablet .. 650 milliGRAM(s) Oral every 6 hours PRN Temp greater or equal to 38C (100.4F), Mild Pain (1 - 3)  furosemide   Injectable 40 milliGRAM(s) IV Push daily PRN symptoms of fluid overload  haloperidol    Injectable 0.5 milliGRAM(s) IV Push every 6 hours PRN agitation  LORazepam   Injectable 0.25 milliGRAM(s) IV Push every 1 hour PRN Anxiety  melatonin 3 milliGRAM(s) Oral at bedtime PRN Insomnia  polyethylene glycol 3350 17 Gram(s) Oral daily PRN Constipation  zinc oxide 40% Paste 1 Application(s) Topical two times a day PRN Rash    ITEMS UNCHECKED ARE NOT PRESENT    PRESENT SYMPTOMS: [ ]Unable to self-report  [ ]PAINADs [ ]RDOS  Source if other than patient:  [ ]Family   [ ]Team     Pain: [ ] yes [X ] no  QOL impact -   Location -                    Aggravating factors -  Quality -  Radiation -  Timing-  Severity (0-10 scale):  Minimal acceptable level (0-10 scale):     PAINAD Score:  http://geriatrictoolkit.[email or web address]/cog/painad.pdf (Ctrl +  left click to view)    Dyspnea:                           [ ]Mild [ ]Moderate [ ]Severe    RDOS:  0 to 2  minimal or no respiratory distress   3  mild distress  4 to 6 moderate distress  >7 severe distress  [email or web address]_Distress_Observation_Scale.pdf (Ctrl +  left click to view)     Anxiety:                             [X ]Mild [ ]Moderate [ ]Severe  Fatigue:                             [ ]Mild [ ]Moderate [ ]Severe  Nausea:                             [ ]Mild [ ]Moderate [ ]Severe  Loss of appetite:              [ ]Mild [ ]Moderate [ ]Severe  Constipation:                    [ ]Mild [ ]Moderate [ ]Severe  		  Other Symptoms:  [ X]All other review of systems negative     Palliative Performance Status Version 2: 40%         [email or web address]    PHYSICAL EXAM:   Vital Signs Last 24 Hrs  T(C): 36.9 (14 Dec 2022 08:50), Max: 38.6 (14 Dec 2022 07:55)  T(F): 98.4 (14 Dec 2022 08:50), Max: 101.4 (14 Dec 2022 07:55)  HR: 92 (14 Dec 2022 07:55) (92 - 92)  BP: 115/56 (14 Dec 2022 07:55) (115/56 - 115/56)  RR: 18 (14 Dec 2022 07:55) (18 - 18)  SpO2: 92% (14 Dec 2022 07:55) (92% - 92%)    Parameters below as of 13 Dec 2022 08:42  Patient On (Oxygen Delivery Method): nasal cannula    I&O's Summary    13 Dec 2022 07:01  -  14 Dec 2022 07:00  --------------------------------------------------------  IN: 0 mL / OUT: 600 mL / NET: -600 mL      GENERAL: [ ] Cachexia  [ X]Alert  [ ]Oriented x   [ ]Lethargic  [ ]Unarousable  [ X]Verbal  [ ]Non-Verbal  Behavioral:   [ ] Anxiety  [ ] Delirium [ ] Agitation [ ] Other  HEENT: dried blood in oropharynx  [X]Normal   [ ]Dry mouth   [ ]ET Tube/Trach  [ ]Oral lesions  PULMONARY:   [ X]Clear [ ]Tachypnea  [ ]Audible excessive secretions   [ ]Rhonchi        [ ]Right [ ]Left [ ]Bilateral  [ ]Crackles        [ ]Right [ ]Left [ ]Bilateral  [ ]Wheezing     [ ]Right [ ]Left [ ]Bilateral  [ ]Diminished BS [ ]Right [ ]Left [ ]Bilateral    CARDIOVASCULAR:    [X ]Regular [ ]Irregular [ ]Tachy  [ ]Christian [ ]Murmur [ ]Other  GASTROINTESTINAL:  [X ]Soft  [ ]Distended   [X ]+BS  [ X]Non tender [ ]Tender  [ ]Other [ ]PEG [ ]OGT/ NGT   Last BM: 12/11/22  GENITOURINARY:  [ ]Normal [X ] Incontinent   [ ]Oliguria/Anuria   [X ][place]  MUSCULOSKELETAL:   [ ]Normal   [X ]Weakness  [ ]Bed/Wheelchair bound [ ]Edema  NEUROLOGIC:   [x ]No focal deficits  [ ] Cognitive impairment  [ ] Dysphagia [ ]Dysarthria [ ] Paresis [ ]Other   SKIN: Incontinence associated dermatitis   [ ]Normal  [ ]Rash  [ ]Other  [ ]Pressure ulcer(s)  [ ]y [ ]n  Present on admission      CRITICAL CARE:  [ ] Shock Present  [ ]Septic [ ]Cardiogenic [ ]Neurologic [ ]Hypovolemic  [ ]  Vasopressors [ ]  Inotropes   [ ] Respiratory failure present [ ] Mechanical Ventilation [ ] Non-invasive ventilatory support [ ] High-Flow  [ ] Acute  [ ] Chronic [ ] Hypoxic  [ ] Hypercarbic [ ] Other  [ ] Other organ failure     LABS: Reviewed    RADIOLOGY & ADDITIONAL STUDIES: Reviewed    PROTEIN CALORIE MALNUTRITION: [ ] mild [ X] moderate- please see the nutritionist note [ ] severe  [ ] underweight [ ] morbid obesity    https://www.andeal.org/vault/9220/web/files/ONC/Table_Clinical%20Characteristics%20to%20Document%20Malnutrition-White%20JV%20et%20al%119760.pdf    Height (cm): 163 (11-10-22 @ 12:15), 162 (07-18-22 @ 13:42), 162.6 (12-22-21 @ 14:17)  Weight (kg): 59.5 (11-10-22 @ 12:15), 58.0009 (09-12-22 @ 12:00), 59.9 (12-22-21 @ 14:17)  BMI (kg/m2): 22.4 (11-10-22 @ 12:15), 22.1 (09-12-22 @ 12:00), 22.8 (07-18-22 @ 13:42)    [X ] PPSV2 < or = 30% [ ] significant weight loss [ ] poor nutritional intake [ ] anasarca   Artificial Nutrition [ ]     REFERRALS:   [X ]Chaplaincy  [ ] Hospice  [ ]Child Life  [ X]Social Work  [ ]Case management [ ]Holistic Therapy [ ] Physical Therapy [ ] Dietary   Goals of Care Document:

## 2022-12-14 NOTE — PROGRESS NOTE ADULT - PROBLEM SELECTOR PLAN 9
- Called and spoke to the patient's spouse.  Educated as to what to expect.  Questions answered. Emotional support provided.   - The patient's spouse is sick and is unable to care for the patient at home. Ongoing dispo planning with the .  The patient currently does not meet the criteria for inpatient hospice. - Called and spoke to the patient's spouse.  Educated as to what to expect.  Questions answered. Emotional support provided.   - The patient's spouse is sick and is unable to care for the patient at home. Ongoing dispo planning with the .    - The patient currently does not meet the criteria for inpatient hospice. - Patient in PCU for symptom directed care  - Not in acute distress at this time  - Called and spoke to the patient's spouse. Educated as to what to expect. Questions answered. Emotional support provided.   - The patient's spouse is sick and is unable to care for the patient at home. Ongoing dispo planning with the .    - The patient currently does not meet the criteria for inpatient hospice. - Patient in PCU for symptom directed care  - Not in acute distress at this time  - Called and spoke to the patient's spouse. Educated as to what to expect. Questions answered. Emotional support provided.   - The patient's spouse is sick and is unable to care for the patient at home. Ongoing dispo planning with the .  we may need to explore inpatient hospice given ongoing terminal delirium and need for ATC ativan

## 2022-12-15 NOTE — PROGRESS NOTE ADULT - SUBJECTIVE AND OBJECTIVE BOX
GAP TEAM PALLIATIVE CARE UNIT PROGRESS NOTE:      [  ] Patient on hospice program.    INDICATION FOR PALLIATIVE CARE UNIT SERVICES/Interval HPI: Symptom management in the setting of a 83y/o F  who presents with AML relapse, pancytopenia with nasal oozing.     OVERNIGHT EVENTS:   Required Dilaudid 0.2 mg IV x 1 in past 24 hours. Tolerating Ativan 0.25 mg IV Q6h ATC. Last BM 12/11, given Dulcolax suppository. Has fleeting periods of intermittent confusion and anxiety abated with reassurance. BP 89/42 this AM. Increasing difficulty tolerating PO meds. Spoke with  on video call with patient, answered all questions regarding the dying process and patient's hospital course, offered emotional support.    DNR on chart: Yes  Yes      Allergies    No Known Allergies    Intolerances    MEDICATIONS  (STANDING):  buPROPion XL (24-Hour) . 150 milliGRAM(s) Oral daily  chlorhexidine 2% Cloths 1 Application(s) Topical daily  gabapentin 100 milliGRAM(s) Oral daily  senna 1 Tablet(s) Oral at bedtime  sodium chloride 0.9%. 1000 milliLiter(s) (10 mL/Hr) IV Continuous <Continuous>  traZODone 150 milliGRAM(s) Oral at bedtime    MEDICATIONS  (PRN):  acetaminophen     Tablet .. 650 milliGRAM(s) Oral every 6 hours PRN Temp greater or equal to 38C (100.4F), Mild Pain (1 - 3)  furosemide   Injectable 40 milliGRAM(s) IV Push daily PRN symptoms of fluid overload  haloperidol    Injectable 0.5 milliGRAM(s) IV Push every 6 hours PRN agitation  LORazepam   Injectable 0.25 milliGRAM(s) IV Push every 1 hour PRN Anxiety  melatonin 3 milliGRAM(s) Oral at bedtime PRN Insomnia  polyethylene glycol 3350 17 Gram(s) Oral daily PRN Constipation  zinc oxide 40% Paste 1 Application(s) Topical two times a day PRN Rash    ITEMS UNCHECKED ARE NOT PRESENT    PRESENT SYMPTOMS: [ ]Unable to self-report  [ ]PAINADs [ ]RDOS  Source if other than patient:  [ ]Family   [ ]Team     Pain: [ ] yes [X ] no  QOL impact -   Location -                    Aggravating factors -  Quality -  Radiation -  Timing-  Severity (0-10 scale):  Minimal acceptable level (0-10 scale):     PAINAD Score:  http://geriatrictoolkit.[facility]/cog/painad.pdf (Ctrl +  left click to view)    Dyspnea:                           [ ]Mild [ ]Moderate [ ]Severe    RDOS:  0 to 2  minimal or no respiratory distress   3  mild distress  4 to 6 moderate distress  >7 severe distress  [email or web address]://[email or web address]/handouts/hen/Respiratory_Distress_Observation_Scale.pdf (Ctrl +  left click to view)     Anxiety:                             [X ]Mild [ ]Moderate [ ]Severe  Fatigue:                             [ ]Mild [ ]Moderate [ ]Severe  Nausea:                             [ ]Mild [ ]Moderate [ ]Severe  Loss of appetite:              [ ]Mild [ ]Moderate [ ]Severe  Constipation:                    [ ]Mild [ ]Moderate [ ]Severe  		  Other Symptoms:  [ X]All other review of systems negative     Palliative Performance Status Version 2: 40%         [email or web address]/files/[email or web address]    PHYSICAL EXAM:   Vital Signs Last 24 Hrs  T(C): 37.3 (15 Dec 2022 07:49), Max: 37.3 (15 Dec 2022 07:49)  T(F): 99.1 (15 Dec 2022 07:49), Max: 99.1 (15 Dec 2022 07:49)  HR: 91 (15 Dec 2022 07:49) (91 - 91)  BP: 89/42 (15 Dec 2022 07:49) (89/42 - 89/42)  RR: 18 (15 Dec 2022 07:49) (18 - 18)  SpO2: 94% (15 Dec 2022 07:49) (94% - 94%)    O2 Parameters below as of 15 Dec 2022 07:49  Patient On (Oxygen Delivery Method): room air    I&O's Summary    14 Dec 2022 07:01  -  15 Dec 2022 07:00  --------------------------------------------------------  IN: 0 mL / OUT: 600 mL / NET: -600 mL      GENERAL: [ ] Cachexia  [ X]Alert  [ ]Oriented x   [ ]Lethargic  [ ]Unarousable  [ X]Verbal  [ ]Non-Verbal  Behavioral:   [x] Anxiety  [ ] Delirium [ ] Agitation [ ] Other  HEENT: dried blood in oropharynx  [X]Normal   [ ]Dry mouth   [ ]ET Tube/Trach  [ ]Oral lesions  PULMONARY:   [ X]Clear [ ]Tachypnea  [ ]Audible excessive secretions   [ ]Rhonchi        [ ]Right [ ]Left [ ]Bilateral  [ ]Crackles        [ ]Right [ ]Left [ ]Bilateral  [ ]Wheezing     [ ]Right [ ]Left [ ]Bilateral  [ ]Diminished BS [ ]Right [ ]Left [ ]Bilateral    CARDIOVASCULAR:    [X ]Regular [ ]Irregular [ ]Tachy  [ ]Christian [ ]Murmur [ ]Other  GASTROINTESTINAL:  [X ]Soft  [ ]Distended   [X ]+BS  [ X]Non tender [ ]Tender  [ ]Other [ ]PEG [ ]OGT/ NGT   Last BM: 12/11/22  GENITOURINARY:  [ ]Normal [X ] Incontinent   [ ]Oliguria/Anuria   [X ][place]  MUSCULOSKELETAL:   [ ]Normal   [X ]Weakness  [ ]Bed/Wheelchair bound [ ]Edema  NEUROLOGIC:   [x ]No focal deficits  [ ] Cognitive impairment  [ ] Dysphagia [ ]Dysarthria [ ] Paresis [ ]Other   SKIN: Incontinence associated dermatitis   [ ]Normal  [ ]Rash  [ ]Other  [ ]Pressure ulcer(s)  [ ]y [ ]n  Present on admission      CRITICAL CARE:  [ ] Shock Present  [ ]Septic [ ]Cardiogenic [ ]Neurologic [ ]Hypovolemic  [ ]  Vasopressors [ ]  Inotropes   [ ] Respiratory failure present [ ] Mechanical Ventilation [ ] Non-invasive ventilatory support [ ] High-Flow  [ ] Acute  [ ] Chronic [ ] Hypoxic  [ ] Hypercarbic [ ] Other  [ ] Other organ failure     LABS: Reviewed    RADIOLOGY & ADDITIONAL STUDIES: Reviewed    PROTEIN CALORIE MALNUTRITION: [ ] mild [ X] moderate- please see the nutritionist note [ ] severe  [ ] underweight [ ] morbid obesity    https://www.andeal.org/vault/2440/web/files/ONC/Table_Clinical%20Characteristics%20to%20Document%20Malnutrition-White%20JV%20et%20al%202012.pdf    Height (cm): 163 (11-10-22 @ 12:15), 162 (07-18-22 @ 13:42), 162.6 (12-22-21 @ 14:17)  Weight (kg): 59.5 (11-10-22 @ 12:15), 58.0009 (09-12-22 @ 12:00), 59.9 (12-22-21 @ 14:17)  BMI (kg/m2): 22.4 (11-10-22 @ 12:15), 22.1 (09-12-22 @ 12:00), 22.8 (07-18-22 @ 13:42)    [X ] PPSV2 < or = 30% [ ] significant weight loss [ ] poor nutritional intake [ ] anasarca   Artificial Nutrition [ ]     REFERRALS:   [X ]Chaplaincy  [ ] Hospice  [ ]Child Life  [ X]Social Work  [ ]Case management [ ]Holistic Therapy [ ] Physical Therapy [ ] Dietary   Goals of Care Document:

## 2022-12-15 NOTE — PROGRESS NOTE ADULT - PROBLEM SELECTOR PLAN 5
- Stable.  - C/w Lasix 40 mg IV as needed for symptoms of congestion    - Supplemental oxygen as needed

## 2022-12-15 NOTE — PROGRESS NOTE ADULT - PROBLEM SELECTOR PLAN 2
- No acute respiratory distress noted  - On 2L NC  - C/w Dilaudid 0.2 mg IV Q1h PRN for dyspnea  - Bowel regimen while on opioids. Last BM 12/11  - S/p Bisacodyl suppository

## 2022-12-15 NOTE — PROGRESS NOTE ADULT - PROBLEM SELECTOR PLAN 1
- Not in acute distress at this time  - Required Dilaudid 0.2 mg x 1 over the past 24 hours  - C/w Dilaudid 0.2 mg IV q1h PRN for moderate pain  - C/w Dilaudid 0.3mg IV q1h PRN for moderate pain   - Stop Gabapentin 100 mg PO daily due to decreased tolerability of PO intake   - Bowel regimen while on opioids. Last BM 12/11  - S/p Bisacodyl suppository

## 2022-12-15 NOTE — PROGRESS NOTE ADULT - PROBLEM SELECTOR PLAN 9
- Patient in PCU for symptom directed care  - Not in acute distress at this time  - Has more frequent instances of confusion/anxiety and labile BP  - Called and spoke to the patient's spouse. Educated as to what to expect. Questions answered. Emotional support provided.   - The patient's spouse is sick and is unable to care for the patient at home.   - Ongoing dispo planning with the .   - We may need to explore inpatient hospice given ongoing terminal delirium and need for ATC ativan - Patient in PCU for symptom directed care  - Not in acute distress at this time  - Has more frequent instances of confusion/anxiety and labile BP  - Called and spoke to the patient's spouse. Educated as to what to expect regarding the dying process. Questions answered. Emotional support provided.   - The patient's spouse is sick and is unable to care for the patient at home.   - Ongoing dispo planning with the .   - We may need to explore inpatient hospice given ongoing terminal delirium and need for ATC ativan - Patient in PCU for symptom directed care  - Has more frequent instances of confusion/anxiety and labile BP  - Called and spoke to the patient's spouse. Educated as to what to expect regarding the dying process. Questions answered. Emotional support provided.   - The patient's spouse is sick and is unable to care for the patient at home.   - Can explore inpatient hospice given ongoing terminal delirium and need for ATC ativan, as long as hemodynamically stable. today was hypotensive.

## 2022-12-15 NOTE — PROGRESS NOTE ADULT - PROBLEM SELECTOR PLAN 3
- Patient with periods of confusion, waxing and waning mentation- likely multifactorial in setting of benzodiazepine and opioid use, prolonged hospital stay, recent fever/high infection risk given leukopenia, high risk of spontaneous hemorrhage given severe thrombocytopenia.   - Monitor for constipation, urinary retention, pain, hunger, thirst, etc.    - Promote sleep wake cycle and reorientation as indicated.  - C/w with haloperidol 0.5 mg IV Q6h PRN agitation  - C/w Ativan 0.25 mg IV Q6h ATC  - C/w Ativan 0.25 mg IV Q1h PRN anxiety  - Stop non-essential PO medications - Patient with periods of confusion, waxing and waning mentation- likely multifactorial in setting of benzodiazepine and opioid use, prolonged hospital stay, recent fever/high infection risk given leukopenia, high risk of spontaneous hemorrhage given severe thrombocytopenia.   - Monitor for constipation, urinary retention, pain, hunger, thirst, etc.    - Promote sleep wake cycle and reorientation as indicated.  - C/w with haloperidol 0.5 mg IV Q6h PRN agitation  - C/w Ativan 0.25 mg IV Q6h ATC  - C/w Ativan 0.25 mg IV Q1h PRN anxiety  - Stop non-essential PO medications  - Start NPO

## 2022-12-15 NOTE — PROGRESS NOTE ADULT - PROBLEM SELECTOR PLAN 8
- PPSV 20%  - Needs assistance with most ADLs  - PT following  - C/w supportive care  - Encourage movement and OOB to chair as tolerated

## 2022-12-15 NOTE — PROGRESS NOTE ADULT - PROBLEM SELECTOR PLAN 6
- Stable.  - Stop Wellbutrin 150 mg PO daily  - C/w Ativan 0.25 mg IV Q6h ATC  - C/w Ativan 0.25 mg IV Q1hr PRN anxiety  - SW following for psychosocial support  - Rabbi mattson - Stable.  - Stop Wellbutrin 150 mg PO daily as patient unable to tolerate PO  - C/w Ativan 0.25 mg IV Q6h ATC  - C/w Ativan 0.25 mg IV Q1hr PRN anxiety  -  following for psychosocial support  - Rabbi mattson

## 2022-12-15 NOTE — PROGRESS NOTE ADULT - ATTENDING COMMENTS
Patient in PCU for management of symptoms at end of life in patient with relapsed AML- she suffers from uncontrolled anxiety requiring ATC ativan intravenously and she is clinically declining such that she has lost her oral route and is hypotensive. Patients spouse was updated by several providers today in terms of what to expect. Patient meets inpatient criteria for hospice at this time, but given hemodynamic instability, will hold off on evaluation for transfer for now. The above plan was discussed with IDT, and agree with the rest of the plan as documented above.

## 2022-12-16 NOTE — PROGRESS NOTE ADULT - PROBLEM SELECTOR PLAN 7
- Chronic in nature.  - Continue to provide emotional support.   - Holistic nursing following. - Patient in PCU for symptom directed care  - More lethargic today  - More hypotensive as compared to yesterday  - Called and spoke to the patient's spouse. Educated as to what to expect regarding the dying process. Questions answered. Emotional support provided.   - The patient's spouse is sick and is unable to care for the patient at home.   - Can explore inpatient hospice given ongoing terminal delirium and need for ATC ativan, as long as hemodynamically stable. today was hypotensive.

## 2022-12-16 NOTE — PROGRESS NOTE ADULT - PROBLEM SELECTOR PLAN 5
- Stable.  - C/w Lasix 40 mg IV as needed for symptoms of congestion    - Supplemental oxygen as needed Mental status continues to decline likely multifactorial due to dying process, medications, unable to tolerate PO intake  - Today more lethargic and non-verbal  - Monitor for constipation, urinary retention, pain, hunger, thirst, etc.    - C/w with haloperidol 0.5 mg IV Q6h PRN agitation  - C/w Ativan 0.25 mg IV Q6h ATC  - C/w Ativan 0.25 mg IV Q1h PRN anxiety  - C/w NPO due to inability to tolerate PO intake

## 2022-12-16 NOTE — PROGRESS NOTE ADULT - PROBLEM SELECTOR PLAN 6
- Stable.  - C/w Ativan 0.25 mg IV Q6h ATC  - C/w Ativan 0.25 mg IV Q1hr PRN anxiety  - SW following for psychosocial support  - Rabbi mattson - FLT3 mutation.  - No DMT or transfusions being sought.  - Overall, poor prognosis.

## 2022-12-16 NOTE — PROGRESS NOTE ADULT - PROBLEM SELECTOR PLAN 2
- No acute respiratory distress noted  - On 2L NC  - C/w Dilaudid 1 mg IV Q1h PRN for dyspnea  - Bowel regimen while on opioids. Last BM 12/11  - S/p Bisacodyl suppository

## 2022-12-16 NOTE — PROGRESS NOTE ADULT - PROBLEM SELECTOR PLAN 3
- Mental status continues to decline likely multifactorial due to dying process, medications, unable to tolerate PO intake  - Today more lethargic and non-verbal  - Monitor for constipation, urinary retention, pain, hunger, thirst, etc.    - C/w with haloperidol 0.5 mg IV Q6h PRN agitation  - C/w Ativan 0.25 mg IV Q6h ATC  - C/w Ativan 0.25 mg IV Q1h PRN anxiety  - C/w NPO due to inability to tolerate PO intake Last BM 12/11/2022  Abdomen soft.  Bowel regimen in place.  Continue to monitor.

## 2022-12-16 NOTE — PROGRESS NOTE ADULT - PROBLEM SELECTOR PLAN 9
- Patient in PCU for symptom directed care  - More lethargic today  - More hypotensive as compared to yesterday  - Called and spoke to the patient's spouse. Educated as to what to expect regarding the dying process. Questions answered. Emotional support provided.   - The patient's spouse is sick and is unable to care for the patient at home.   - Can explore inpatient hospice given ongoing terminal delirium and need for ATC ativan, as long as hemodynamically stable. today was hypotensive.

## 2022-12-16 NOTE — PROGRESS NOTE ADULT - PROBLEM SELECTOR PROBLEM 9
Palliative care encounter
Prophylactic measure
Palliative care encounter
Prophylactic measure
Palliative care encounter
Palliative care encounter
Prophylactic measure
Prophylactic measure
Palliative care encounter
Prophylactic measure
Palliative care encounter
Palliative care encounter
Prophylactic measure

## 2022-12-16 NOTE — PROGRESS NOTE ADULT - SUBJECTIVE AND OBJECTIVE BOX
GAP TEAM PALLIATIVE CARE UNIT PROGRESS NOTE:      [  ] Patient on hospice program.    INDICATION FOR PALLIATIVE CARE UNIT SERVICES/Interval HPI: Symptom management in the setting of a 81y/o F  who presents with AML relapse, pancytopenia with nasal oozing.     Patient seen and examined this morning. Tolerating Ativan 0.25 mg IV Q6h ATC. Last BM 12/11. Off of all PO meds due to inability to tolerate. BP 76/44. Spoke with  on video call with patient, answered all questions regarding the dying process and patient's hospital course, offered emotional support. Patient was unable to participate in conversation due to lethargy.    OVERNIGHT EVENTS:   Required Dilaudid 0.3 mg IV x 2, 0.5 mg x 1, Ativan 0.25 mg x 1 in past 24 hours. Overnight, patient was confused and was trying to get out of bed and taking off her clothes.    DNR on chart: Yes  Yes      Allergies    No Known Allergies    Intolerances      MEDICATIONS  (STANDING):  chlorhexidine 2% Cloths 1 Application(s) Topical daily  LORazepam   Injectable 0.25 milliGRAM(s) IV Push every 6 hours  sodium chloride 0.9%. 1000 milliLiter(s) (10 mL/Hr) IV Continuous <Continuous>    MEDICATIONS  (PRN):  acetaminophen  Suppository .. 650 milliGRAM(s) Rectal every 6 hours PRN Temp greater or equal to 38C (100.4F), Mild Pain (1 - 3)  bisacodyl Suppository 10 milliGRAM(s) Rectal daily PRN Constipation  furosemide   Injectable 40 milliGRAM(s) IV Push daily PRN symptoms of fluid overload  haloperidol    Injectable 0.5 milliGRAM(s) IV Push every 6 hours PRN agitation  HYDROmorphone  Injectable 1 milliGRAM(s) IV Push every 1 hour PRN Severe Pain (7 - 10)  HYDROmorphone  Injectable 1 milliGRAM(s) IV Push every 1 hour PRN dyspnea  HYDROmorphone  Injectable 0.5 milliGRAM(s) IV Push every 1 hour PRN Moderate Pain (4 - 6)  LORazepam   Injectable 0.25 milliGRAM(s) IV Push every 1 hour PRN Anxiety  zinc oxide 40% Paste 1 Application(s) Topical two times a day PRN Rash      ITEMS UNCHECKED ARE NOT PRESENT    PRESENT SYMPTOMS: [ x]Unable to self-report  [ x]PAINADs [ x]RDOS  Source if other than patient:  [ ]Family   [ x]Team     Pain: [ ] yes [ ] no  QOL impact -   Location -                    Aggravating factors -  Quality -  Radiation -  Timing-  Severity (0-10 scale):  Minimal acceptable level (0-10 scale):     PAINAD Score:  http://geriatrictoolkit.[facility]/cog/painad.pdf (Ctrl +  left click to view)    Dyspnea:                           [ ]Mild [ ]Moderate [ ]Severe    RDOS:  0 to 2  minimal or no respiratory distress   3  mild distress  4 to 6 moderate distress  >7 severe distress  [email or web address]://[email or web address]/hen/Respiratory_Distress_Observation_Scale.pdf (Ctrl +  left click to view)     Anxiety:                             [ ]Mild [ ]Moderate [ ]Severe  Fatigue:                             [ ]Mild [ ]Moderate [ ]Severe  Nausea:                             [ ]Mild [ ]Moderate [ ]Severe  Loss of appetite:              [ ]Mild [ ]Moderate [ ]Severe  Constipation:                    [ ]Mild [ ]Moderate [ ]Severe  		  Other Symptoms:  [ ]All other review of systems negative     Palliative Performance Status Version 2: 40%         http://npcrc.org/files/news/palliative_performance_scale_[email or web address].pdf    PHYSICAL EXAM:   Vital Signs Last 24 Hrs  T(C): 36.8 (16 Dec 2022 08:21), Max: 36.8 (16 Dec 2022 08:21)  T(F): 98.2 (16 Dec 2022 08:21), Max: 98.2 (16 Dec 2022 08:21)  HR: 93 (16 Dec 2022 08:21) (93 - 93)  BP: 76/44 (16 Dec 2022 08:21) (76/44 - 76/44)  RR: 20 (16 Dec 2022 08:21) (20 - 20)  SpO2: 88% (16 Dec 2022 08:21) (88% - 88%)    O2 Parameters below as of 16 Dec 2022 08:21  Patient On (Oxygen Delivery Method): room air    I&O's Summary    15 Dec 2022 07:01  -  16 Dec 2022 07:00  --------------------------------------------------------  IN: 0 mL / OUT: 300 mL / NET: -300 mL    GENERAL: [ ] Cachexia  [ ]Alert  [ ]Oriented x   [ ]Lethargic  [ x]Unarousable  [ ]Verbal  [ x]Non-Verbal  Behavioral:   [ ] Anxiety  [ ] Delirium [ ] Agitation [ ] Other  HEENT: dried blood in oropharynx  [X]Normal   [ x]Dry mouth   [ ]ET Tube/Trach  [ ]Oral lesions  PULMONARY:   [ X]Clear [ ]Tachypnea  [ ]Audible excessive secretions   [ ]Rhonchi        [ ]Right [ ]Left [ ]Bilateral  [ ]Crackles        [ ]Right [ ]Left [ ]Bilateral  [ ]Wheezing     [ ]Right [ ]Left [ ]Bilateral  [ ]Diminished BS [ ]Right [ ]Left [ ]Bilateral    CARDIOVASCULAR:    [X ]Regular [ ]Irregular [ ]Tachy  [ ]Christian [ ]Murmur [ ]Other  GASTROINTESTINAL:  [X ]Soft  [ ]Distended   [X ]+BS  [ X]Non tender [ ]Tender  [ ]Other [ ]PEG [ ]OGT/ NGT   Last BM: 12/11/22  GENITOURINARY:  [ ]Normal [X ] Incontinent   [ ]Oliguria/Anuria   [X ][place]emos  MUSCULOSKELETAL:   [ ]Normal   [X ]Weakness  [ ]Bed/Wheelchair bound [ ]Edema  NEUROLOGIC:   [x ]No focal deficits  [ ] Cognitive impairment  [ ] Dysphagia [ ]Dysarthria [ ] Paresis [ ]Other   SKIN: Incontinence associated dermatitis   [ ]Normal  [ ]Rash  [ ]Other  [ ]Pressure ulcer(s)  [ ]y [ ]n  Present on admission      CRITICAL CARE:  [ ] Shock Present  [ ]Septic [ ]Cardiogenic [ ]Neurologic [ ]Hypovolemic  [ ]  Vasopressors [ ]  Inotropes   [ ] Respiratory failure present [ ] Mechanical Ventilation [ ] Non-invasive ventilatory support [ ] High-Flow  [ ] Acute  [ ] Chronic [ ] Hypoxic  [ ] Hypercarbic [ ] Other  [ ] Other organ failure     LABS: Reviewed    RADIOLOGY & ADDITIONAL STUDIES: Reviewed    PROTEIN CALORIE MALNUTRITION: [ ] mild [ X] moderate- please see the nutritionist note [ ] severe  [ ] underweight [ ] morbid obesity    https://www.andeal.org/vault/9030/web/files/ONC/Table_Clinical%20Characteristics%20to%20Document%20Malnutrition-White%20JV%20et%20al%268510.pdf    Height (cm): 163 (11-10-22 @ 12:15), 162 (07-18-22 @ 13:42), 162.6 (12-22-21 @ 14:17)  Weight (kg): 59.5 (11-10-22 @ 12:15), 58.0009 (09-12-22 @ 12:00), 59.9 (12-22-21 @ 14:17)  BMI (kg/m2): 22.4 (11-10-22 @ 12:15), 22.1 (09-12-22 @ 12:00), 22.8 (07-18-22 @ 13:42)    [X ] PPSV2 < or = 30% [ ] significant weight loss [ ] poor nutritional intake [ ] anasarca   Artificial Nutrition [ ]     REFERRALS:   [X ]Chaplaincy  [ ] Hospice  [ ]Child Life  [ X]Social Work  [ ]Case management [ ]Holistic Therapy [ ] Physical Therapy [ ] Dietary   Goals of Care Document:

## 2022-12-16 NOTE — PROGRESS NOTE ADULT - NS ATTEST RISK PROBLEM GEN_ALL_CORE FT
safety/toxicity monitoring of intravenous opiates and/or  benzodiazepines in end stage disease process   patient with uncontrolled dyspnea overnight requiring intravenous opiates. Patient with increased risk of catastrophic hemorrhage given low platelet count. continue care in PCU
see attestation above.
IV opioids
see attestation above.
safety/toxicity monitoring of intravenous opiates and/or  benzodiazepines in end stage disease process
safety/toxicity monitoring of intravenous opiates and/or  benzodiazepines in end stage disease process
see attestation above.

## 2022-12-16 NOTE — PROGRESS NOTE ADULT - ATTENDING COMMENTS
81yo f with PMH of AML, breast cancer s/p treatment with tamoxifen, RT and R lumpectomy, who presented with AML relapse. Pt in PCU for symptom management.  Patient with pancytopenia and oozing from nares.  High risk for significant hemorrhage from susceptible sites.  Since I last care for her there has been a significant decline and patient with imminent death diagnosis of hours to days.    In the setting of parenteral controlled substance administration, clinical monitoring required for side effects such as respiratory depression, constipation and opioid induced neurotoxicity.  This patient is at [x ]high [ ] medium [ ] low risk due to organ failure, potential hemorrhage.    [ ] The patient is receiving IV and has required  escalation for uncontrolled symptoms.  [ ] To taper and monitor for emergence of symptoms.  [ x] Symptoms controlled with present regimen, monitoring required.    Hospice transition to be addressed if clinical condition permits.   Patient assessment and plan discussed on interdisciplinary team rounds today.  Family updated.

## 2022-12-16 NOTE — PROGRESS NOTE ADULT - PROBLEM SELECTOR PLAN 4
- FLT3 mutation.  - No DMT or transfusions being sought.  - Overall, poor prognosis. Patient requires total support for all ADL's.  PPSV2 10   %.

## 2022-12-16 NOTE — PROGRESS NOTE ADULT - PROBLEM SELECTOR PLAN 1
- Required Dilaudid 0.3 mg x 2, 0.5 mg x 1 over the past 24 hours  - C/w Dilaudid 0.5 mg IV Q1h PRN for moderate pain  - C/w Dilaudid 1 mg IV Q1h PRN for severe pain   - Bowel regimen while on opioids. Last BM 12/11  - S/p Bisacodyl suppository

## 2022-12-17 NOTE — PROGRESS NOTE ADULT - RESPIRATORY DISTRESS OBSERVATION: RESPIRATORY RATE
Less than or equal to 18 breaths

## 2022-12-17 NOTE — PROGRESS NOTE ADULT - ATTENDING COMMENTS
Patient was seen and evaluated with Dr. Mueller, Geriatric Medicine Fellow, during bedside rounds.   Agree with above findings and recommendations, edited documentation as appropriate to reflect the plan of care discussed with patient and myself with the following additions:    81yo f with PMH of AML, breast cancer s/p treatment with tamoxifen, RT and R lumpectomy, who presented with AML relapse. Pt in PCU for symptom management.     In past 24 hours, received PRN Dilaudid 0.5 mg x 3, 1 mg x 1, Ativan 0.25 mg x 1.  Per nursing report, patient with terminal delirium and agitation overnight, with inadequate relief with current dose of IV Ativan.   Patient lethargic this AM; appeared comfortable    Increase to IV Ativan 0.5mg q6 ATC  Increase to IV Ativan 0.5mg q1 PRN anxiety/agitation  IV Haldol 0.5mg q6 PRN refractory agitation     Son and  updated on care plan   Rest of management as above   Requires PCU care for ongoing titration of palliative regimen.   Disposition: Hospice transition to be addressed if clinical condition permits.   Discussed with bedside RN

## 2022-12-17 NOTE — PROGRESS NOTE ADULT - PROBLEM SELECTOR PLAN 2
- No acute respiratory distress noted  - On 2L NC  - C/w Dilaudid 1 mg IV Q1h PRN for dyspnea  - Bowel regimen while on opioids. Last BM 12/11

## 2022-12-17 NOTE — PROGRESS NOTE ADULT - PROBLEM SELECTOR PLAN 7
- Patient in PCU for symptom directed care  - More lethargic today  - Called and spoke to the patient's spouse. Educated as to what to expect regarding the dying process. Questions answered. Emotional support provided  - The patient's spouse is sick and is unable to care for the patient at home.   - Can explore inpatient hospice given ongoing terminal delirium and need for ATC ativan, as long as hemodynamically stable. today was hypotensive - Patient in PCU for symptom directed care  - More lethargic today  - Called and spoke to the patient's spouse and with son at bedside. Educated as to what to expect regarding the dying process. Questions answered. Emotional support provided  - The patient's spouse is sick and is unable to care for the patient at home.   - Can explore inpatient hospice given ongoing terminal delirium and need for ATC ativan, as long as hemodynamically stable. today was hypotensive

## 2022-12-17 NOTE — PROGRESS NOTE ADULT - PROBLEM SELECTOR PLAN 5
- Mental status continues to decline likely multifactorial due to dying process, medications, unable to tolerate PO intake  - Today more lethargic and non-verbal  - Monitor for constipation, urinary retention, pain, hunger, thirst, etc.    - C/w with haloperidol 0.5 mg IV Q6h PRN agitation  - C/w Ativan 0.25 mg IV Q6h ATC  - C/w Ativan 0.25 mg IV Q1h PRN anxiety  - C/w NPO due to inability to tolerate PO intake - Mental status continues to decline likely multifactorial due to dying process, medications, unable to tolerate PO intake  - More fleeting episodes of agitation and anxiety as per nursing  - Today more lethargic and non-verbal  - Monitor for constipation, urinary retention, pain, hunger, thirst, etc.    - C/w with haloperidol 0.5 mg IV Q6h PRN agitation  - Increase Ativan 0.25 mg IV to 0.5 mg IV Q6h ATC  - C/w Ativan 0.25 mg IV to 0.5 mg IV Q1h PRN anxiety  - C/w NPO due to inability to tolerate PO intake

## 2022-12-17 NOTE — PROGRESS NOTE ADULT - SUBJECTIVE AND OBJECTIVE BOX
GAP TEAM PALLIATIVE CARE UNIT PROGRESS NOTE:      [  ] Patient on hospice program.    INDICATION FOR PALLIATIVE CARE UNIT SERVICES/Interval HPI: Symptom management in the setting of a 83y/o F  who presents with AML relapse, pancytopenia with nasal oozing.     OVERNIGHT EVENTS:   Patient seen and examined at bedside. Able to open eyes in response to voice but unable to keep her eyes open. Hypotensive 82/49. Unable to tolerate PO intake. Last BM 12/11. Required PRN Dilaudid 0.5 mg x 3, 1 mg x 1, Ativan 0.25 mg x 1 in past 24 hours.    DNR on chart: Yes  Yes      Allergies    No Known Allergies    Intolerances      MEDICATIONS  (STANDING):  chlorhexidine 2% Cloths 1 Application(s) Topical daily  LORazepam   Injectable 0.25 milliGRAM(s) IV Push every 6 hours  sodium chloride 0.9%. 1000 milliLiter(s) (10 mL/Hr) IV Continuous <Continuous>    MEDICATIONS  (PRN):  acetaminophen  Suppository .. 650 milliGRAM(s) Rectal every 6 hours PRN Temp greater or equal to 38C (100.4F), Mild Pain (1 - 3)  bisacodyl Suppository 10 milliGRAM(s) Rectal daily PRN Constipation  furosemide   Injectable 40 milliGRAM(s) IV Push daily PRN symptoms of fluid overload  haloperidol    Injectable 0.5 milliGRAM(s) IV Push every 6 hours PRN agitation  HYDROmorphone  Injectable 1 milliGRAM(s) IV Push every 1 hour PRN Severe Pain (7 - 10)  HYDROmorphone  Injectable 1 milliGRAM(s) IV Push every 1 hour PRN dyspnea  HYDROmorphone  Injectable 0.5 milliGRAM(s) IV Push every 1 hour PRN Moderate Pain (4 - 6)  LORazepam   Injectable 0.25 milliGRAM(s) IV Push every 1 hour PRN Anxiety  zinc oxide 40% Paste 1 Application(s) Topical two times a day PRN Rash        ITEMS UNCHECKED ARE NOT PRESENT    PRESENT SYMPTOMS: [ x]Unable to self-report  [ x]PAINADs [ x]RDOS  Source if other than patient:  [ ]Family   [ x]Team     Pain: [ ] yes [ ] no  QOL impact -   Location -                    Aggravating factors -  Quality -  Radiation -  Timing-  Severity (0-10 scale):  Minimal acceptable level (0-10 scale):     PAINAD Score:  [email or web address]/painad.pdf (Ctrl +  left click to view)    Dyspnea:                           [ ]Mild [ ]Moderate [ ]Severe    RDOS:  0 to 2  minimal or no respiratory distress   3  mild distress  4 to 6 moderate distress  >7 severe distress  [email or web address]_Distress_Observation_Scale.pdf (Ctrl +  left click to view)     Anxiety:                             [ ]Mild [ ]Moderate [ ]Severe  Fatigue:                             [ ]Mild [ ]Moderate [ ]Severe  Nausea:                             [ ]Mild [ ]Moderate [ ]Severe  Loss of appetite:              [ ]Mild [ ]Moderate [ ]Severe  Constipation:                    [ ]Mild [ ]Moderate [ ]Severe  		  Other Symptoms:  [ ]All other review of systems negative     Palliative Performance Status Version 2: 10%  http://npcrc.org/files/news/palliative_performance_scale_[email or web address].pdf    PHYSICAL EXAM:   Vital Signs Last 24 Hrs  T(C): 36.9 (17 Dec 2022 08:12), Max: 36.9 (17 Dec 2022 08:12)  T(F): 98.5 (17 Dec 2022 08:12), Max: 98.5 (17 Dec 2022 08:12)  HR: 96 (17 Dec 2022 08:12) (96 - 96)  BP: 82/49 (17 Dec 2022 08:12) (82/49 - 82/49)  RR: 20 (17 Dec 2022 08:12) (20 - 20)  SpO2: 86% (17 Dec 2022 08:12) (86% - 86%)    O2 Parameters below as of 17 Dec 2022 08:12  Patient On (Oxygen Delivery Method): nasal cannula    I&O's Summary    16 Dec 2022 07:01  -  17 Dec 2022 07:00  --------------------------------------------------------  IN: 0 mL / OUT: 250 mL / NET: -250 mL      GENERAL: [ ] Cachexia  [ ]Alert  [ ]Oriented x   [ ]Lethargic  [ x]Unarousable  [ ]Verbal  [ x]Non-Verbal  Behavioral:   [ ] Anxiety  [ ] Delirium [ ] Agitation [ ] Other  HEENT: dried blood in oropharynx  [X]Normal   [ x]Dry mouth   [ ]ET Tube/Trach  [ ]Oral lesions  PULMONARY:   [ X]Clear [ ]Tachypnea  [ ]Audible excessive secretions   [ ]Rhonchi        [ ]Right [ ]Left [ ]Bilateral  [ ]Crackles        [ ]Right [ ]Left [ ]Bilateral  [ ]Wheezing     [ ]Right [ ]Left [ ]Bilateral  [ ]Diminished BS [ ]Right [ ]Left [ ]Bilateral    CARDIOVASCULAR:    [X ]Regular [ ]Irregular [ ]Tachy  [ ]Christian [ ]Murmur [ ]Other  GASTROINTESTINAL:  [X ]Soft  [ ]Distended   [X ]+BS  [ X]Non tender [ ]Tender  [ ]Other [ ]PEG [ ]OGT/ NGT   Last BM: 12/11/22  GENITOURINARY:  [ ]Normal [X ] Incontinent   [ ]Oliguria/Anuria   [X ]Lemos  MUSCULOSKELETAL:   [ ]Normal   [X ]Weakness  [ ]Bed/Wheelchair bound [ ]Edema  NEUROLOGIC:   [x ]No focal deficits  [ ] Cognitive impairment  [ ] Dysphagia [ ]Dysarthria [ ] Paresis [ ]Other   SKIN: Incontinence associated dermatitis   [ ]Normal  [ ]Rash  [ ]Other  [ ]Pressure ulcer(s)  [ ]y [ ]n  Present on admission      CRITICAL CARE:  [ ] Shock Present  [ ]Septic [ ]Cardiogenic [ ]Neurologic [ ]Hypovolemic  [ ]  Vasopressors [ ]  Inotropes   [ ] Respiratory failure present [ ] Mechanical Ventilation [ ] Non-invasive ventilatory support [ ] High-Flow  [ ] Acute  [ ] Chronic [ ] Hypoxic  [ ] Hypercarbic [ ] Other  [ ] Other organ failure     LABS: Reviewed    RADIOLOGY & ADDITIONAL STUDIES: Reviewed    PROTEIN CALORIE MALNUTRITION: [ ] mild [ X] moderate- please see the nutritionist note [ ] severe  [ ] underweight [ ] morbid obesity    https://www.andeal.org/vault/2440/web/files/ONC/Table_Clinical%20Characteristics%20to%20Document%20Malnutrition-White%20JV%20et%20al%202012.pdf    Height (cm): 163 (11-10-22 @ 12:15), 162 (07-18-22 @ 13:42), 162.6 (12-22-21 @ 14:17)  Weight (kg): 59.5 (11-10-22 @ 12:15), 58.0009 (09-12-22 @ 12:00), 59.9 (12-22-21 @ 14:17)  BMI (kg/m2): 22.4 (11-10-22 @ 12:15), 22.1 (09-12-22 @ 12:00), 22.8 (07-18-22 @ 13:42)    [X ] PPSV2 < or = 30% [ ] significant weight loss [ ] poor nutritional intake [ ] anasarca   Artificial Nutrition [ ]     REFERRALS:   [X ]Chaplaincy  [ ] Hospice  [ ]Child Life  [ X]Social Work  [ ]Case management [ ]Holistic Therapy [ ] Physical Therapy [ ] Dietary   Goals of Care Document:   GAP TEAM PALLIATIVE CARE UNIT PROGRESS NOTE:      [  ] Patient on hospice program.    INDICATION FOR PALLIATIVE CARE UNIT SERVICES/Interval HPI: Symptom management in the setting of a 83y/o F  who presents with AML relapse, pancytopenia with nasal oozing.     OVERNIGHT EVENTS:   Patient seen and examined at bedside. Able to open eyes in response to voice but unable to keep her eyes open. Hypotensive 82/49. Unable to tolerate PO intake. Last BM 12/11. Nursing notes that patient is experiencing terminal agitation which requires PRN Dilaudid and Ativan together in addition to ATC Ativan. Required PRN Dilaudid 0.5 mg x 3, 1 mg x 1, Ativan 0.25 mg x 1 in past 24 hours.     DNR on chart: Yes  Yes      Allergies    No Known Allergies    Intolerances      MEDICATIONS  (STANDING):  chlorhexidine 2% Cloths 1 Application(s) Topical daily  LORazepam   Injectable 0.25 milliGRAM(s) IV Push every 6 hours  sodium chloride 0.9%. 1000 milliLiter(s) (10 mL/Hr) IV Continuous <Continuous>    MEDICATIONS  (PRN):  acetaminophen  Suppository .. 650 milliGRAM(s) Rectal every 6 hours PRN Temp greater or equal to 38C (100.4F), Mild Pain (1 - 3)  bisacodyl Suppository 10 milliGRAM(s) Rectal daily PRN Constipation  furosemide   Injectable 40 milliGRAM(s) IV Push daily PRN symptoms of fluid overload  haloperidol    Injectable 0.5 milliGRAM(s) IV Push every 6 hours PRN agitation  HYDROmorphone  Injectable 1 milliGRAM(s) IV Push every 1 hour PRN Severe Pain (7 - 10)  HYDROmorphone  Injectable 1 milliGRAM(s) IV Push every 1 hour PRN dyspnea  HYDROmorphone  Injectable 0.5 milliGRAM(s) IV Push every 1 hour PRN Moderate Pain (4 - 6)  LORazepam   Injectable 0.25 milliGRAM(s) IV Push every 1 hour PRN Anxiety  zinc oxide 40% Paste 1 Application(s) Topical two times a day PRN Rash        ITEMS UNCHECKED ARE NOT PRESENT    PRESENT SYMPTOMS: [ x]Unable to self-report  [ x]PAINADs [ x]RDOS  Source if other than patient:  [ ]Family   [ x]Team     Pain: [ ] yes [ ] no  QOL impact -   Location -                    Aggravating factors -  Quality -  Radiation -  Timing-  Severity (0-10 scale):  Minimal acceptable level (0-10 scale):     PAINAD Score:  [email or web address]/cog/painad.pdf (Ctrl +  left click to view)    Dyspnea:                           [ ]Mild [ ]Moderate [ ]Severe    RDOS:  0 to 2  minimal or no respiratory distress   3  mild distress  4 to 6 moderate distress  >7 severe distress  [email or web address]_Distress_Observation_Scale.pdf (Ctrl +  left click to view)     Anxiety:                             [ ]Mild [ ]Moderate [ ]Severe  Fatigue:                             [ ]Mild [ ]Moderate [ ]Severe  Nausea:                             [ ]Mild [ ]Moderate [ ]Severe  Loss of appetite:              [ ]Mild [ ]Moderate [ ]Severe  Constipation:                    [ ]Mild [ ]Moderate [ ]Severe  		  Other Symptoms:  [ ]All other review of systems negative     Palliative Performance Status Version 2: 10%  http://[email or web address].org/files/news/palliative_performance_scale_[email or web address].pdf    PHYSICAL EXAM:   Vital Signs Last 24 Hrs  T(C): 36.9 (17 Dec 2022 08:12), Max: 36.9 (17 Dec 2022 08:12)  T(F): 98.5 (17 Dec 2022 08:12), Max: 98.5 (17 Dec 2022 08:12)  HR: 96 (17 Dec 2022 08:12) (96 - 96)  BP: 82/49 (17 Dec 2022 08:12) (82/49 - 82/49)  RR: 20 (17 Dec 2022 08:12) (20 - 20)  SpO2: 86% (17 Dec 2022 08:12) (86% - 86%)    O2 Parameters below as of 17 Dec 2022 08:12  Patient On (Oxygen Delivery Method): nasal cannula    I&O's Summary    16 Dec 2022 07:01  -  17 Dec 2022 07:00  --------------------------------------------------------  IN: 0 mL / OUT: 250 mL / NET: -250 mL      GENERAL: [ ] Cachexia  [ ]Alert  [ ]Oriented x   [ ]Lethargic  [ x]Unarousable  [ ]Verbal  [ x]Non-Verbal  Behavioral:   [ ] Anxiety  [ ] Delirium [ ] Agitation [ ] Other  HEENT: dried blood in oropharynx  [X]Normal   [ x]Dry mouth   [ ]ET Tube/Trach  [ ]Oral lesions  PULMONARY:   [ X]Clear [ ]Tachypnea  [ ]Audible excessive secretions   [ ]Rhonchi        [ ]Right [ ]Left [ ]Bilateral  [ ]Crackles        [ ]Right [ ]Left [ ]Bilateral  [ ]Wheezing     [ ]Right [ ]Left [ ]Bilateral  [ ]Diminished BS [ ]Right [ ]Left [ ]Bilateral    CARDIOVASCULAR:    [X ]Regular [ ]Irregular [ ]Tachy  [ ]Christian [ ]Murmur [ ]Other  GASTROINTESTINAL:  [X ]Soft  [ ]Distended   [X ]+BS  [ X]Non tender [ ]Tender  [ ]Other [ ]PEG [ ]OGT/ NGT   Last BM: 12/11/22  GENITOURINARY:  [ ]Normal [X ] Incontinent   [ ]Oliguria/Anuria   [X ]Lemos  MUSCULOSKELETAL:   [ ]Normal   [X ]Weakness  [ ]Bed/Wheelchair bound [ ]Edema  NEUROLOGIC:   [x ]No focal deficits  [ ] Cognitive impairment  [ ] Dysphagia [ ]Dysarthria [ ] Paresis [ ]Other   SKIN: Incontinence associated dermatitis   [ ]Normal  [ ]Rash  [ ]Other  [ ]Pressure ulcer(s)  [ ]y [ ]n  Present on admission      CRITICAL CARE:  [ ] Shock Present  [ ]Septic [ ]Cardiogenic [ ]Neurologic [ ]Hypovolemic  [ ]  Vasopressors [ ]  Inotropes   [ ] Respiratory failure present [ ] Mechanical Ventilation [ ] Non-invasive ventilatory support [ ] High-Flow  [ ] Acute  [ ] Chronic [ ] Hypoxic  [ ] Hypercarbic [ ] Other  [ ] Other organ failure     LABS: Reviewed    RADIOLOGY & ADDITIONAL STUDIES: Reviewed    PROTEIN CALORIE MALNUTRITION: [ ] mild [ X] moderate- please see the nutritionist note [ ] severe  [ ] underweight [ ] morbid obesity    https://www.andeal.org/vault/1212/web/files/ONC/Table_Clinical%20Characteristics%20to%20Document%20Malnutrition-White%20JV%20et%20al%202012.pdf    Height (cm): 163 (11-10-22 @ 12:15), 162 (07-18-22 @ 13:42), 162.6 (12-22-21 @ 14:17)  Weight (kg): 59.5 (11-10-22 @ 12:15), 58.0009 (09-12-22 @ 12:00), 59.9 (12-22-21 @ 14:17)  BMI (kg/m2): 22.4 (11-10-22 @ 12:15), 22.1 (09-12-22 @ 12:00), 22.8 (07-18-22 @ 13:42)    [X ] PPSV2 < or = 30% [ ] significant weight loss [ ] poor nutritional intake [ ] anasarca   Artificial Nutrition [ ]     REFERRALS:   [X ]Chaplaincy  [ ] Hospice  [ ]Child Life  [ X]Social Work  [ ]Case management [ ]Holistic Therapy [ ] Physical Therapy [ ] Dietary   Goals of Care Document:   GAP TEAM PALLIATIVE CARE UNIT PROGRESS NOTE:      [  ] Patient on hospice program.    INDICATION FOR PALLIATIVE CARE UNIT SERVICES/Interval HPI: Symptom management in the setting of a 83y/o F  who presents with AML relapse, pancytopenia with nasal oozing.     OVERNIGHT EVENTS:   Patient seen and examined at bedside. Able to open eyes in response to voice but unable to keep her eyes open. Hypotensive 82/49. Unable to tolerate PO intake. Last BM 12/11. Nursing notes that patient is experiencing terminal agitation which requires PRN Dilaudid and Ativan together in addition to ATC Ativan. Required PRN Dilaudid 0.5 mg x 3, 1 mg x 1, Ativan 0.25 mg x 1 in past 24 hours. Spoke with son at bedside and  via video call. Explained medical course/changes, provided emotional support, answered all questions.     DNR on chart: Yes  Yes      Allergies    No Known Allergies    Intolerances      MEDICATIONS  (STANDING):  chlorhexidine 2% Cloths 1 Application(s) Topical daily  LORazepam   Injectable 0.25 milliGRAM(s) IV Push every 6 hours  sodium chloride 0.9%. 1000 milliLiter(s) (10 mL/Hr) IV Continuous <Continuous>    MEDICATIONS  (PRN):  acetaminophen  Suppository .. 650 milliGRAM(s) Rectal every 6 hours PRN Temp greater or equal to 38C (100.4F), Mild Pain (1 - 3)  bisacodyl Suppository 10 milliGRAM(s) Rectal daily PRN Constipation  furosemide   Injectable 40 milliGRAM(s) IV Push daily PRN symptoms of fluid overload  haloperidol    Injectable 0.5 milliGRAM(s) IV Push every 6 hours PRN agitation  HYDROmorphone  Injectable 1 milliGRAM(s) IV Push every 1 hour PRN Severe Pain (7 - 10)  HYDROmorphone  Injectable 1 milliGRAM(s) IV Push every 1 hour PRN dyspnea  HYDROmorphone  Injectable 0.5 milliGRAM(s) IV Push every 1 hour PRN Moderate Pain (4 - 6)  LORazepam   Injectable 0.25 milliGRAM(s) IV Push every 1 hour PRN Anxiety  zinc oxide 40% Paste 1 Application(s) Topical two times a day PRN Rash        ITEMS UNCHECKED ARE NOT PRESENT    PRESENT SYMPTOMS: [ x]Unable to self-report  [ x]PAINADs [ x]RDOS  Source if other than patient:  [ ]Family   [ x]Team     Pain: [ ] yes [ ] no  QOL impact -   Location -                    Aggravating factors -  Quality -  Radiation -  Timing-  Severity (0-10 scale):  Minimal acceptable level (0-10 scale):     PAINAD Score:  [email or web address].[email or web address]/cog/painad.pdf (Ctrl +  left click to view)    Dyspnea:                           [ ]Mild [ ]Moderate [ ]Severe    RDOS:  0 to 2  minimal or no respiratory distress   3  mild distress  4 to 6 moderate distress  >7 severe distress  [email or web address]_Distress_Observation_Scale.pdf (Ctrl +  left click to view)     Anxiety:                             [ ]Mild [ ]Moderate [ ]Severe  Fatigue:                             [ ]Mild [ ]Moderate [ ]Severe  Nausea:                             [ ]Mild [ ]Moderate [ ]Severe  Loss of appetite:              [ ]Mild [ ]Moderate [ ]Severe  Constipation:                    [ ]Mild [ ]Moderate [ ]Severe  		  Other Symptoms:  [ ]All other review of systems negative     Palliative Performance Status Version 2: 10%  http://npcrc.org/files/news/palliative_performance_scale_[email or web address].pdf    PHYSICAL EXAM:   Vital Signs Last 24 Hrs  T(C): 36.9 (17 Dec 2022 08:12), Max: 36.9 (17 Dec 2022 08:12)  T(F): 98.5 (17 Dec 2022 08:12), Max: 98.5 (17 Dec 2022 08:12)  HR: 96 (17 Dec 2022 08:12) (96 - 96)  BP: 82/49 (17 Dec 2022 08:12) (82/49 - 82/49)  RR: 20 (17 Dec 2022 08:12) (20 - 20)  SpO2: 86% (17 Dec 2022 08:12) (86% - 86%)    O2 Parameters below as of 17 Dec 2022 08:12  Patient On (Oxygen Delivery Method): nasal cannula    I&O's Summary    16 Dec 2022 07:01  -  17 Dec 2022 07:00  --------------------------------------------------------  IN: 0 mL / OUT: 250 mL / NET: -250 mL      GENERAL: [ ] Cachexia  [ ]Alert  [ ]Oriented x   [ ]Lethargic  [ x]Unarousable  [ ]Verbal  [ x]Non-Verbal  Behavioral:   [ ] Anxiety  [ ] Delirium [ ] Agitation [ ] Other  HEENT: dried blood in oropharynx  [X]Normal   [ x]Dry mouth   [ ]ET Tube/Trach  [ ]Oral lesions  PULMONARY:   [ X]Clear [ ]Tachypnea  [ ]Audible excessive secretions   [ ]Rhonchi        [ ]Right [ ]Left [ ]Bilateral  [ ]Crackles        [ ]Right [ ]Left [ ]Bilateral  [ ]Wheezing     [ ]Right [ ]Left [ ]Bilateral  [ ]Diminished BS [ ]Right [ ]Left [ ]Bilateral    CARDIOVASCULAR:    [X ]Regular [ ]Irregular [ ]Tachy  [ ]Christian [ ]Murmur [ ]Other  GASTROINTESTINAL:  [X ]Soft  [ ]Distended   [X ]+BS  [ X]Non tender [ ]Tender  [ ]Other [ ]PEG [ ]OGT/ NGT   Last BM: 12/11/22  GENITOURINARY:  [ ]Normal [X ] Incontinent   [ ]Oliguria/Anuria   [X ]Lemos  MUSCULOSKELETAL:   [ ]Normal   [X ]Weakness  [ ]Bed/Wheelchair bound [ ]Edema  NEUROLOGIC:   [x ]No focal deficits  [ ] Cognitive impairment  [ ] Dysphagia [ ]Dysarthria [ ] Paresis [ ]Other   SKIN: Incontinence associated dermatitis   [ ]Normal  [ ]Rash  [ ]Other  [ ]Pressure ulcer(s)  [ ]y [ ]n  Present on admission      CRITICAL CARE:  [ ] Shock Present  [ ]Septic [ ]Cardiogenic [ ]Neurologic [ ]Hypovolemic  [ ]  Vasopressors [ ]  Inotropes   [ ] Respiratory failure present [ ] Mechanical Ventilation [ ] Non-invasive ventilatory support [ ] High-Flow  [ ] Acute  [ ] Chronic [ ] Hypoxic  [ ] Hypercarbic [ ] Other  [ ] Other organ failure     LABS: Reviewed    RADIOLOGY & ADDITIONAL STUDIES: Reviewed    PROTEIN CALORIE MALNUTRITION: [ ] mild [ X] moderate- please see the nutritionist note [ ] severe  [ ] underweight [ ] morbid obesity    https://www.andeal.org/vault/4950/web/files/ONC/Table_Clinical%20Characteristics%20to%20Document%20Malnutrition-White%20JV%20et%20al%202012.pdf    Height (cm): 163 (11-10-22 @ 12:15), 162 (07-18-22 @ 13:42), 162.6 (12-22-21 @ 14:17)  Weight (kg): 59.5 (11-10-22 @ 12:15), 58.0009 (09-12-22 @ 12:00), 59.9 (12-22-21 @ 14:17)  BMI (kg/m2): 22.4 (11-10-22 @ 12:15), 22.1 (09-12-22 @ 12:00), 22.8 (07-18-22 @ 13:42)    [X ] PPSV2 < or = 30% [ ] significant weight loss [ ] poor nutritional intake [ ] anasarca   Artificial Nutrition [ ]     REFERRALS:   [X ]Chaplaincy  [ ] Hospice  [ ]Child Life  [ X]Social Work  [ ]Case management [ ]Holistic Therapy [ ] Physical Therapy [ ] Dietary   Goals of Care Document:   GAP TEAM PALLIATIVE CARE UNIT PROGRESS NOTE:      [  ] Patient on hospice program.    INDICATION FOR PALLIATIVE CARE UNIT SERVICES/Interval HPI: Symptom management in the setting of a 81y/o F  who presents with AML relapse, pancytopenia with nasal oozing.     OVERNIGHT EVENTS:   Patient seen and examined at bedside. Able to open eyes in response to voice but unable to keep her eyes open. Hypotensive 82/49. Unable to tolerate PO intake. Last BM 12/11. Nursing notes that patient is experiencing terminal agitation which requires PRN Dilaudid and Ativan together in addition to ATC Ativan. Required PRN Dilaudid 0.5 mg x 3, 1 mg x 1, Ativan 0.25 mg x 1 in past 24 hours. Spoke with son at bedside and  via video call. Explained medical course/changes, provided emotional support, answered all questions.     DNR on chart: Yes  Yes      Allergies    No Known Allergies    Intolerances      MEDICATIONS  (STANDING):  chlorhexidine 2% Cloths 1 Application(s) Topical daily  LORazepam   Injectable 0.25 milliGRAM(s) IV Push every 6 hours  sodium chloride 0.9%. 1000 milliLiter(s) (10 mL/Hr) IV Continuous <Continuous>    MEDICATIONS  (PRN):  acetaminophen  Suppository .. 650 milliGRAM(s) Rectal every 6 hours PRN Temp greater or equal to 38C (100.4F), Mild Pain (1 - 3)  bisacodyl Suppository 10 milliGRAM(s) Rectal daily PRN Constipation  furosemide   Injectable 40 milliGRAM(s) IV Push daily PRN symptoms of fluid overload  haloperidol    Injectable 0.5 milliGRAM(s) IV Push every 6 hours PRN agitation  HYDROmorphone  Injectable 1 milliGRAM(s) IV Push every 1 hour PRN Severe Pain (7 - 10)  HYDROmorphone  Injectable 1 milliGRAM(s) IV Push every 1 hour PRN dyspnea  HYDROmorphone  Injectable 0.5 milliGRAM(s) IV Push every 1 hour PRN Moderate Pain (4 - 6)  LORazepam   Injectable 0.25 milliGRAM(s) IV Push every 1 hour PRN Anxiety  zinc oxide 40% Paste 1 Application(s) Topical two times a day PRN Rash        ITEMS UNCHECKED ARE NOT PRESENT    PRESENT SYMPTOMS: [ x]Unable to self-report  [ x]PAINADs [ x]RDOS  Source if other than patient:  [ ]Family   [ x]Team     Pain: [ ] yes [ ] no  QOL impact -   Location -                    Aggravating factors -  Quality -  Radiation -  Timing-  Severity (0-10 scale):  Minimal acceptable level (0-10 scale):     PAINAD Score:0  [email or web address]/cog/painad.pdf (Ctrl +  left click to view)    Dyspnea:                           [ ]Mild [ ]Moderate [ ]Severe    RDOS: 0  0 to 2  minimal or no respiratory distress   3  mild distress  4 to 6 moderate distress  >7 severe distress  [email or web address]_Distress_Observation_Scale.pdf (Ctrl +  left click to view)     Anxiety:                             [ ]Mild [ ]Moderate [ ]Severe  Fatigue:                             [ ]Mild [ ]Moderate [ ]Severe  Nausea:                             [ ]Mild [ ]Moderate [ ]Severe  Loss of appetite:              [ ]Mild [ ]Moderate [ ]Severe  Constipation:                    [ ]Mild [ ]Moderate [ ]Severe  		  Other Symptoms:  [ ]All other review of systems negative - unable to obtain due to encephalopathy     Palliative Performance Status Version 2: 10%  http://npcrc.org/files/news/palliative_performance_scale_[email or web address].pdf    PHYSICAL EXAM:   Vital Signs Last 24 Hrs  T(C): 36.9 (17 Dec 2022 08:12), Max: 36.9 (17 Dec 2022 08:12)  T(F): 98.5 (17 Dec 2022 08:12), Max: 98.5 (17 Dec 2022 08:12)  HR: 96 (17 Dec 2022 08:12) (96 - 96)  BP: 82/49 (17 Dec 2022 08:12) (82/49 - 82/49)  RR: 20 (17 Dec 2022 08:12) (20 - 20)  SpO2: 86% (17 Dec 2022 08:12) (86% - 86%)    O2 Parameters below as of 17 Dec 2022 08:12  Patient On (Oxygen Delivery Method): nasal cannula    I&O's Summary    16 Dec 2022 07:01  -  17 Dec 2022 07:00  --------------------------------------------------------  IN: 0 mL / OUT: 250 mL / NET: -250 mL      GENERAL: [ ] Cachexia  [ ]Alert  [ ]Oriented x   [ ]Lethargic  [ x]Unarousable  [ ]Verbal  [ x]Non-Verbal  Behavioral:   [ ] Anxiety  [ ] Delirium [ ] Agitation [ x] Other - encephalopathy  HEENT: dried blood in oropharynx  [X]Normal   [ x]Dry mouth   [ ]ET Tube/Trach  [ ]Oral lesions  PULMONARY:   [ X]Clear [ ]Tachypnea  [ ]Audible excessive secretions   [ ]Rhonchi        [ ]Right [ ]Left [ ]Bilateral  [ ]Crackles        [ ]Right [ ]Left [ ]Bilateral  [ ]Wheezing     [ ]Right [ ]Left [ ]Bilateral  [ ]Diminished BS [ ]Right [ ]Left [ ]Bilateral    CARDIOVASCULAR:    [X ]Regular [ ]Irregular [ ]Tachy  [ ]Christian [ ]Murmur [ ]Other  GASTROINTESTINAL:  [X ]Soft  [ ]Distended   [X ]+BS  [ X]Non tender [ ]Tender  [ ]Other [ ]PEG [ ]OGT/ NGT   Last BM: 12/11/22  GENITOURINARY:  [ ]Normal [X ] Incontinent   [ ]Oliguria/Anuria   [X ]Lemos  MUSCULOSKELETAL:   [ ]Normal   [X ]Weakness  [ x]Bed/Wheelchair bound [ ]Edema  NEUROLOGIC:   [x ]No focal deficits  [ ] Cognitive impairment  [ ] Dysphagia [ ]Dysarthria [ ] Paresis [ ]Other   SKIN: Incontinence associated dermatitis . See  nursing skin assessment for further details   [ ]Normal  [ ]Rash  [ ]Other  [ ]Pressure ulcer(s)  [ ]y [ ]n  Present on admission      CRITICAL CARE:  [ ] Shock Present  [ ]Septic [ ]Cardiogenic [ ]Neurologic [ ]Hypovolemic  [ ]  Vasopressors [ ]  Inotropes   [ ] Respiratory failure present [ ] Mechanical Ventilation [ ] Non-invasive ventilatory support [ ] High-Flow  [ ] Acute  [ ] Chronic [ ] Hypoxic  [ ] Hypercarbic [ ] Other  [ ] Other organ failure     LABS: Reviewed    RADIOLOGY & ADDITIONAL STUDIES: Reviewed    PROTEIN CALORIE MALNUTRITION: [ ] mild [ X] moderate- please see the nutritionist note [ ] severe  [ ] underweight [ ] morbid obesity    https://www.andeal.org/vault/0235/web/files/ONC/Table_Clinical%20Characteristics%20to%20Document%20Malnutrition-White%20JV%20et%20al%202012.pdf    Height (cm): 163 (11-10-22 @ 12:15), 162 (07-18-22 @ 13:42), 162.6 (12-22-21 @ 14:17)  Weight (kg): 59.5 (11-10-22 @ 12:15), 58.0009 (09-12-22 @ 12:00), 59.9 (12-22-21 @ 14:17)  BMI (kg/m2): 22.4 (11-10-22 @ 12:15), 22.1 (09-12-22 @ 12:00), 22.8 (07-18-22 @ 13:42)    [X ] PPSV2 < or = 30% [ ] significant weight loss [ ] poor nutritional intake [ ] anasarca   Artificial Nutrition [ ]     REFERRALS:   [X ]Chaplaincy  [ ] Hospice  [ ]Child Life  [ X]Social Work  [ ]Case management [ ]Holistic Therapy [ ] Physical Therapy [ ] Dietary   Goals of Care Document:

## 2022-12-17 NOTE — PROGRESS NOTE ADULT - RESPIRATORY DISTRESS OBSERVATION: HEART RATE
Less than 90 beats
Less than 90 beats
90 – 109 beats
Less than 90 beats

## 2022-12-17 NOTE — PROGRESS NOTE ADULT - PROBLEM SELECTOR PLAN 1
- Required Dilaudid 0.5 mg x 3, 1 mg x 1 over the past 24 hours  - C/w Dilaudid 0.5 mg IV Q1h PRN for moderate pain  - C/w Dilaudid 1 mg IV Q1h PRN for severe pain   - Bowel regimen while on opioids. Last BM 12/11 - Required Dilaudid 0.5 mg x 3, 1 mg x 1 over the past 24 hours  - May be acute pain vs terminal agitation as per nursing  - C/w Dilaudid 0.5 mg IV Q1h PRN for moderate pain  - C/w Dilaudid 1 mg IV Q1h PRN for severe pain   - Bowel regimen while on opioids. Last BM 12/11

## 2022-12-18 NOTE — PROGRESS NOTE ADULT - PROBLEM SELECTOR PLAN 5
- Mental status continues to decline likely multifactorial due to dying process, medications, unable to tolerate PO intake  - More fleeting episodes of agitation and anxiety as per nursing  - Monitor for constipation, urinary retention, pain, hunger, thirst, etc.    - C/w with haloperidol 0.5 mg IV Q6h PRN agitation  - C/w Ativan 0.5 mg IV Q6h ATC  - C/w Ativan 0.25 mg IV to 0.5 mg IV Q1h PRN anxiety  - C/w NPO due to inability to tolerate PO intake

## 2022-12-18 NOTE — PROGRESS NOTE ADULT - ATTENDING COMMENTS
Patient was seen and evaluated with Dr. Mueller, Geriatric Medicine Fellow, during bedside rounds.   Agree with above findings and recommendations, edited documentation as appropriate to reflect the plan of care discussed with patient and myself with the following additions:    81yo f with PMH of AML, breast cancer s/p treatment with tamoxifen, RT and R lumpectomy, who presented with AML relapse. Pt in PCU for symptom management.     In past 24 hours, received Dilaudid 0.5mg x1 PRN moderate pain and Dilaudid 1mg x3 PRN dyspnea   Patient hypotensive to 76/38 with HR-100 this AM. Patient unarousable and with dyspnea this AM.     Patient actively dying at this time  Start IV Dilaudid 0.5mg q6 ATC   Rest of management as above     Requires PCU care for ongoing titration of palliative regimen.   Discussed care plan with bedside RN Patient was seen and evaluated with Dr. Diaz, Palliative Medicine Fellow, during bedside rounds.   Agree with above findings and recommendations, edited documentation as appropriate to reflect the plan of care discussed with patient and myself with the following additions:    83yo f with PMH of AML, breast cancer s/p treatment with tamoxifen, RT and R lumpectomy, who presented with AML relapse. Pt in PCU for symptom management.     In past 24 hours, received Dilaudid 0.5mg x1 PRN moderate pain and Dilaudid 1mg x3 PRN dyspnea   Patient hypotensive to 76/38 with HR-100 this AM. Patient unarousable and with dyspnea this AM.     Patient actively dying at this time  Start IV Dilaudid 0.5mg q6 ATC   Rest of management as above     Requires PCU care for ongoing titration of palliative regimen.   Discussed care plan with bedside RN

## 2022-12-18 NOTE — PROVIDER CONTACT NOTE (OTHER) - SITUATION
Pt temp of 100.6 and O2 Sat of 90%
bp = 146/80
pt temp 100.5
constipation
Pt refuses admission H/W
Temp=39.4
pt refusing peripheral blood cultures
BP 82/50, HR 68, see vital signs for 18:17. Pt lethargic.
Patient without spontaneous respirations or pulse x1 minute
Pt temp =101.4, vomiting coughing and Difficulty breathing
BP 81/49 and HR 96
Pt has temp of 101.5, wheezing, c/o SOB
pt had soft bm prior to 11 pm lactulose and senna

## 2022-12-18 NOTE — PROGRESS NOTE ADULT - PROBLEM SELECTOR PLAN 7
- Patient in PCU for symptom directed care  - The patient's spouse is sick and is unable to care for the patient at home.   - Can explore inpatient hospice given ongoing terminal delirium and need for ATC ativan, as long as hemodynamically stable. - Patient in PCU for symptom directed care.

## 2022-12-18 NOTE — PROGRESS NOTE ADULT - SUBJECTIVE AND OBJECTIVE BOX
GAP TEAM PALLIATIVE CARE UNIT PROGRESS NOTE:      [  ] Patient on hospice program.    INDICATION FOR PALLIATIVE CARE UNIT SERVICES/Interval HPI: Symptom management in the setting of a 81y/o F  who presents with AML relapse, pancytopenia with nasal oozing.     OVERNIGHT EVENTS: Pt seen and examined at bedside this morning. She appeared slightly dyspneic in bed; pt noted to be more hypotensive this AM. Chart reviewed. Pt required Dilaudid 0.5mg x1 PRN moderate pain and Dilaudid 1mg x3 PRN dyspnea in the last 24 hours.     DNR on chart: Yes  Yes      Allergies    No Known Allergies    Intolerances      MEDICATIONS  (STANDING):  chlorhexidine 2% Cloths 1 Application(s) Topical daily  LORazepam   Injectable 0.25 milliGRAM(s) IV Push every 6 hours  sodium chloride 0.9%. 1000 milliLiter(s) (10 mL/Hr) IV Continuous <Continuous>    MEDICATIONS  (PRN):  acetaminophen  Suppository .. 650 milliGRAM(s) Rectal every 6 hours PRN Temp greater or equal to 38C (100.4F), Mild Pain (1 - 3)  bisacodyl Suppository 10 milliGRAM(s) Rectal daily PRN Constipation  furosemide   Injectable 40 milliGRAM(s) IV Push daily PRN symptoms of fluid overload  haloperidol    Injectable 0.5 milliGRAM(s) IV Push every 6 hours PRN agitation  HYDROmorphone  Injectable 1 milliGRAM(s) IV Push every 1 hour PRN Severe Pain (7 - 10)  HYDROmorphone  Injectable 1 milliGRAM(s) IV Push every 1 hour PRN dyspnea  HYDROmorphone  Injectable 0.5 milliGRAM(s) IV Push every 1 hour PRN Moderate Pain (4 - 6)  LORazepam   Injectable 0.25 milliGRAM(s) IV Push every 1 hour PRN Anxiety  zinc oxide 40% Paste 1 Application(s) Topical two times a day PRN Rash        ITEMS UNCHECKED ARE NOT PRESENT    PRESENT SYMPTOMS: [ x]Unable to self-report  [ x]PAINADs [ x]RDOS  Source if other than patient:  [ ]Family   [ x]Team     Pain: [ ] yes [ ] no  QOL impact -   Location -                    Aggravating factors -  Quality -  Radiation -  Timing-  Severity (0-10 scale):  Minimal acceptable level (0-10 scale):     PAINAD Score: 1  [email or web address]/painad.[email or web address] (Ctrl +  left click to view)    Dyspnea:                           [ ]Mild [ ]Moderate [ ]Severe    RDOS: 2  0 to 2  minimal or no respiratory distress   3  mild distress  4 to 6 moderate distress  >7 severe distress  [email or web address]://[email or web address]/handouts/[email or web address]_Distress_Observation_Scale.pdf (Ctrl +  left click to view)     Anxiety:                             [ ]Mild [ ]Moderate [ ]Severe  Fatigue:                             [ ]Mild [ ]Moderate [ ]Severe  Nausea:                             [ ]Mild [ ]Moderate [ ]Severe  Loss of appetite:              [ ]Mild [ ]Moderate [ ]Severe  Constipation:                    [ ]Mild [ ]Moderate [ ]Severe  		  Other Symptoms:  [ ]All other review of systems negative - pt nonverbal/unable to report     Palliative Performance Status Version 2: 10%  http://npcrc.org/files/news/palliative_performance_scale_ppsv2.pdf    PHYSICAL EXAM:   Vital Signs Last 24 Hrs  T(C): 36.9 (17 Dec 2022 08:12), Max: 36.9 (17 Dec 2022 08:12)  T(F): 98.5 (17 Dec 2022 08:12), Max: 98.5 (17 Dec 2022 08:12)  HR: 96 (17 Dec 2022 08:12) (96 - 96)  BP: 82/49 (17 Dec 2022 08:12) (82/49 - 82/49)  RR: 20 (17 Dec 2022 08:12) (20 - 20)  SpO2: 86% (17 Dec 2022 08:12) (86% - 86%)    O2 Parameters below as of 17 Dec 2022 08:12  Patient On (Oxygen Delivery Method): nasal cannula    I&O's Summary    16 Dec 2022 07:01  -  17 Dec 2022 07:00  --------------------------------------------------------  IN: 0 mL / OUT: 250 mL / NET: -250 mL      GENERAL: [ ] Cachexia  [ ]Alert  [ ]Oriented x   [ ]Lethargic  [ x]Unarousable  [ ]Verbal  [ x]Non-Verbal  Behavioral:   [ ] Anxiety  [ ] Delirium [ ] Agitation [ x] Other - encephalopathy  HEENT: dried blood in oropharynx  [ ]Normal   [ x]Dry mouth   [ ]ET Tube/Trach  [ ]Oral lesions  PULMONARY:   [ X]Clear [ ]Tachypnea  [ ]Audible excessive secretions   [ ]Rhonchi        [ ]Right [ ]Left [ ]Bilateral  [ ]Crackles        [ ]Right [ ]Left [ ]Bilateral  [ ]Wheezing     [ ]Right [ ]Left [ ]Bilateral  [ ]Diminished BS [ ]Right [ ]Left [ ]Bilateral    CARDIOVASCULAR:    [X ]Regular [ ]Irregular [ ]Tachy  [ ]Christian [ ]Murmur [ ]Other  GASTROINTESTINAL:  [X ]Soft  [ ]Distended   [X ]+BS  [ X]Non tender [ ]Tender  [ ]Other [ ]PEG [ ]OGT/ NGT   Last BM: 12/17  GENITOURINARY:  [ ]Normal [X ] Incontinent   [ ]Oliguria/Anuria   [X ]Lemos  MUSCULOSKELETAL:   [ ]Normal   [X ]Weakness  [ x]Bed/Wheelchair bound [ ]Edema  NEUROLOGIC:   [ ]No focal deficits  [X ] Cognitive impairment  [ ] Dysphagia [ ]Dysarthria [ ] Paresis [ ]Other   SKIN: Incontinence associated dermatitis . See  nursing skin assessment for further details   [ ]Normal  [ ]Rash  [ ]Other  [ ]Pressure ulcer(s)  [ ]y [ ]n  Present on admission      CRITICAL CARE:  [ ] Shock Present  [ ]Septic [ ]Cardiogenic [ ]Neurologic [ ]Hypovolemic  [ ]  Vasopressors [ ]  Inotropes   [ ] Respiratory failure present [ ] Mechanical Ventilation [ ] Non-invasive ventilatory support [ ] High-Flow  [ ] Acute  [ ] Chronic [ ] Hypoxic  [ ] Hypercarbic [ ] Other  [ ] Other organ failure     LABS: none new    RADIOLOGY & ADDITIONAL STUDIES: none new    PROTEIN CALORIE MALNUTRITION: [ ] mild [ X] moderate- please see the nutritionist note [ ] severe  [ ] underweight [ ] morbid obesity    https://www.andeal.org/vault/2440/web/files/ONC/Table_Clinical%20Characteristics%20to%20Document%20Malnutrition-White%20JV%20et%20al%202012.pdf    Height (cm): 163 (11-10-22 @ 12:15), 162 (07-18-22 @ 13:42), 162.6 (12-22-21 @ 14:17)  Weight (kg): 59.5 (11-10-22 @ 12:15), 58.0009 (09-12-22 @ 12:00), 59.9 (12-22-21 @ 14:17)  BMI (kg/m2): 22.4 (11-10-22 @ 12:15), 22.1 (09-12-22 @ 12:00), 22.8 (07-18-22 @ 13:42)    [X ] PPSV2 < or = 30% [ ] significant weight loss [ ] poor nutritional intake [ ] anasarca   Artificial Nutrition [ ]     REFERRALS:   [X ]Chaplaincy  [ ] Hospice  [ ]Child Life  [ X]Social Work  [ ]Case management [ ]Holistic Therapy [ ] Physical Therapy [ ] Dietary   Goals of Care Document:   GAP TEAM PALLIATIVE CARE UNIT PROGRESS NOTE:      [  ] Patient on hospice program.    INDICATION FOR PALLIATIVE CARE UNIT SERVICES/Interval HPI: Symptom management in the setting of a 81y/o F  who presents with AML relapse, pancytopenia with nasal oozing.     OVERNIGHT EVENTS: Pt seen and examined at bedside this morning. She appeared slightly dyspneic in bed; pt noted to be more hypotensive this AM. Chart reviewed. Pt required Dilaudid 0.5mg x1 PRN moderate pain and Dilaudid 1mg x3 PRN dyspnea in the last 24 hours.     DNR on chart: Yes  Yes      Allergies    No Known Allergies    Intolerances      MEDICATIONS  (STANDING):  chlorhexidine 2% Cloths 1 Application(s) Topical daily  LORazepam   Injectable 0.25 milliGRAM(s) IV Push every 6 hours  sodium chloride 0.9%. 1000 milliLiter(s) (10 mL/Hr) IV Continuous <Continuous>    MEDICATIONS  (PRN):  acetaminophen  Suppository .. 650 milliGRAM(s) Rectal every 6 hours PRN Temp greater or equal to 38C (100.4F), Mild Pain (1 - 3)  bisacodyl Suppository 10 milliGRAM(s) Rectal daily PRN Constipation  furosemide   Injectable 40 milliGRAM(s) IV Push daily PRN symptoms of fluid overload  haloperidol    Injectable 0.5 milliGRAM(s) IV Push every 6 hours PRN agitation  HYDROmorphone  Injectable 1 milliGRAM(s) IV Push every 1 hour PRN Severe Pain (7 - 10)  HYDROmorphone  Injectable 1 milliGRAM(s) IV Push every 1 hour PRN dyspnea  HYDROmorphone  Injectable 0.5 milliGRAM(s) IV Push every 1 hour PRN Moderate Pain (4 - 6)  LORazepam   Injectable 0.25 milliGRAM(s) IV Push every 1 hour PRN Anxiety  zinc oxide 40% Paste 1 Application(s) Topical two times a day PRN Rash        ITEMS UNCHECKED ARE NOT PRESENT    PRESENT SYMPTOMS: [ x]Unable to self-report  [ x]PAINADs [ x]RDOS  Source if other than patient:  [ ]Family   [ x]Team     Pain: [ ] yes [ ] no  QOL impact -   Location -                    Aggravating factors -  Quality -  Radiation -  Timing-  Severity (0-10 scale):  Minimal acceptable level (0-10 scale):     PAINAD Score: 1  [email or web address].[email or web address]/cog/painad.pdf (Ctrl +  left click to view)    Dyspnea:                           [ ]Mild [ ]Moderate [ ]Severe    RDOS: 2  0 to 2  minimal or no respiratory distress   3  mild distress  4 to 6 moderate distress  >7 severe distress  [email or web address]/handouts/hen/Respiratory_Distress_Observation_Scale.pdf (Ctrl +  left click to view)     Anxiety:                             [ ]Mild [ ]Moderate [ ]Severe  Fatigue:                             [ ]Mild [ ]Moderate [ ]Severe  Nausea:                             [ ]Mild [ ]Moderate [ ]Severe  Loss of appetite:              [ ]Mild [ ]Moderate [ ]Severe  Constipation:                    [ ]Mild [ ]Moderate [ ]Severe  		  Other Symptoms:  [ ]All other review of systems negative - pt nonverbal/unable to report     Palliative Performance Status Version 2: 10%  http://npcrc.org/files/news/palliative_performance_scale_ppsv2.pdf    PHYSICAL EXAM:   Vital Signs Last 24 Hrs  T(C): 36.9 (17 Dec 2022 08:12), Max: 36.9 (17 Dec 2022 08:12)  T(F): 98.5 (17 Dec 2022 08:12), Max: 98.5 (17 Dec 2022 08:12)  HR: 96 (17 Dec 2022 08:12) (96 - 96)  BP: 82/49 (17 Dec 2022 08:12) (82/49 - 82/49)  RR: 20 (17 Dec 2022 08:12) (20 - 20)  SpO2: 86% (17 Dec 2022 08:12) (86% - 86%)    O2 Parameters below as of 17 Dec 2022 08:12  Patient On (Oxygen Delivery Method): nasal cannula    I&O's Summary    16 Dec 2022 07:01  -  17 Dec 2022 07:00  --------------------------------------------------------  IN: 0 mL / OUT: 250 mL / NET: -250 mL      GENERAL: [ ] Cachexia  [ ]Alert  [ ]Oriented x   [ ]Lethargic  [ x]Unarousable  [ ]Verbal  [ x]Non-Verbal  Behavioral:   [ ] Anxiety  [ ] Delirium [ ] Agitation [ x] Other - encephalopathy  HEENT: dried blood in oropharynx  [ ]Normal   [ x]Dry mouth   [ ]ET Tube/Trach  [ ]Oral lesions  PULMONARY:   [ X]Clear [x ]Tachypnea  [ ]Audible excessive secretions   [ ]Rhonchi        [ ]Right [ ]Left [ ]Bilateral  [ ]Crackles        [ ]Right [ ]Left [ ]Bilateral  [ ]Wheezing     [ ]Right [ ]Left [ ]Bilateral  [ ]Diminished BS [ ]Right [ ]Left [ ]Bilateral    CARDIOVASCULAR:    [X ]Regular [ ]Irregular [ ]Tachy  [ ]Christian [ ]Murmur [ ]Other  GASTROINTESTINAL:  [X ]Soft  [ ]Distended   [X ]+BS  [ X]Non tender [ ]Tender  [ ]Other [ ]PEG [ ]OGT/ NGT   Last BM: 12/17  GENITOURINARY:  [ ]Normal [X ] Incontinent   [ ]Oliguria/Anuria   [X ]Lemos  MUSCULOSKELETAL:   [ ]Normal   [X ]Weakness  [ x]Bed/Wheelchair bound [ ]Edema  NEUROLOGIC:   [ ]No focal deficits  [X ] Cognitive impairment  [ ] Dysphagia [ ]Dysarthria [ ] Paresis [ ]Other   SKIN: Incontinence associated dermatitis . See  nursing skin assessment for further details   [ ]Normal  [ ]Rash  [ ]Other  [ ]Pressure ulcer(s)  [ ]y [ ]n  Present on admission      CRITICAL CARE:  [ ] Shock Present  [ ]Septic [ ]Cardiogenic [ ]Neurologic [ ]Hypovolemic  [ ]  Vasopressors [ ]  Inotropes   [ ] Respiratory failure present [ ] Mechanical Ventilation [ ] Non-invasive ventilatory support [ ] High-Flow  [ ] Acute  [ ] Chronic [ ] Hypoxic  [ ] Hypercarbic [ ] Other  [ ] Other organ failure     LABS: none new    RADIOLOGY & ADDITIONAL STUDIES: none new    PROTEIN CALORIE MALNUTRITION: [ ] mild [ X] moderate- please see the nutritionist note [ ] severe  [ ] underweight [ ] morbid obesity    https://www.andeal.org/vault/1610/web/files/ONC/Table_Clinical%20Characteristics%20to%20Document%20Malnutrition-White%20JV%20et%20al%202012.pdf    Height (cm): 163 (11-10-22 @ 12:15), 162 (07-18-22 @ 13:42), 162.6 (12-22-21 @ 14:17)  Weight (kg): 59.5 (11-10-22 @ 12:15), 58.0009 (09-12-22 @ 12:00), 59.9 (12-22-21 @ 14:17)  BMI (kg/m2): 22.4 (11-10-22 @ 12:15), 22.1 (09-12-22 @ 12:00), 22.8 (07-18-22 @ 13:42)    [X ] PPSV2 < or = 30% [ ] significant weight loss [ ] poor nutritional intake [ ] anasarca   Artificial Nutrition [ ]     REFERRALS:   [X ]Chaplaincy  [ ] Hospice  [ ]Child Life  [ X]Social Work  [ ]Case management [ ]Holistic Therapy [ ] Physical Therapy [ ] Dietary   Goals of Care Document:

## 2022-12-18 NOTE — DISCHARGE NOTE FOR THE EXPIRED PATIENT - HOSPITAL COURSE
81yo lady with PMH of AML, breast cancer s/p treatment with tamoxifen, RT and R lumpectomy, presented to [facility] with AML relapse. It was decided on comfort measures during her prolonged hospitalization and she was transferred to the PCU for symptom management. She  on 22 at 6:45pm.

## 2022-12-18 NOTE — PROGRESS NOTE ADULT - PROBLEM SELECTOR PLAN 1
- C/w Dilaudid 0.5 mg IV Q1h PRN for moderate pain (received x1 dose in last 24 hours)  - C/w Dilaudid 1 mg IV Q1h PRN for severe pain   - Bowel regimen while on opioids. Last BM 12/17 - C/w Dilaudid 1 mg IV Q1h PRN for dyspnea  (received Dilaudid 1mg x3 in last 24 hours)  - START Dilaudid 0.5mg IV q6h ATC  - Bowel regimen while on opioids. Last BM 12/17

## 2022-12-18 NOTE — PROGRESS NOTE ADULT - PROVIDER SPECIALTY LIST ADULT
Cardiology-Oncology
Infectious Disease
Infectious Disease
Cardiology-Oncology
Heme/Onc
Infectious Disease
Palliative Care
Heme/Onc
Heme/Onc
Infectious Disease
Heme/Onc
Palliative Care
Heme/Onc
Palliative Care
Heme/Onc
Heme/Onc
Palliative Care
Palliative Care
Heme/Onc
Palliative Care
Heme/Onc
Palliative Care
Palliative Care
Heme/Onc
Heme/Onc
Palliative Care
Palliative Care
Heme/Onc
Palliative Care
Palliative Care
Heme/Onc
Palliative Care
Heme/Onc
Palliative Care
Heme/Onc
Palliative Care
Palliative Care
Heme/Onc
Heme/Onc
Palliative Care

## 2022-12-18 NOTE — PROVIDER CONTACT NOTE (OTHER) - DATE AND TIME:
09-Nov-2022 17:00
13-Nov-2022 15:35
11-Nov-2022 06:00
11-Nov-2022 21:05
12-Nov-2022 06:26
12-Nov-2022 14:00
12-Nov-2022 18:17
16-Nov-2022 21:45
17-Nov-2022 22:45
13-Nov-2022 07:30
08-Nov-2022 17:10
18-Dec-2022 17:04
18-Nov-2022 00:10

## 2022-12-18 NOTE — PROVIDER CONTACT NOTE (OTHER) - RECOMMENDATIONS
NA
Patient pronounced dead @ [id number], spouse Marshall made  aware.
n/a
Give Tylenol PO 650mg
n/a
n/a
NA

## 2022-12-18 NOTE — PROVIDER CONTACT NOTE (OTHER) - BACKGROUND
AML
AML
Patient has DNR
Pt has AML
AML
AML
Pt has relapsed AML
relapse AML
AML
AML
Pt has AML
Pt has AML
Relapsed AML

## 2022-12-18 NOTE — PROGRESS NOTE ADULT - PROBLEM SELECTOR PLAN 2
- C/w Dilaudid 1 mg IV Q1h PRN for dyspnea  (received Dilaudid 1mg x3 in last 24 hours)  - START Dilaudid 0.5mg IV q4h ATC  - Bowel regimen while on opioids. Last BM 12/17 - C/w Dilaudid 1 mg IV Q1h PRN for dyspnea  (received Dilaudid 1mg x3 in last 24 hours)  - START Dilaudid 0.5mg IV q6h ATC  - Bowel regimen while on opioids. Last BM 12/17 - C/w Dilaudid 0.5 mg IV Q1h PRN for moderate pain (received x1 dose in last 24 hours)  - C/w Dilaudid 1 mg IV Q1h PRN for severe pain   - Bowel regimen while on opioids. Last BM 12/17

## 2022-12-18 NOTE — PROVIDER CONTACT NOTE (OTHER) - NAME OF MD/NP/PA/DO NOTIFIED:
Penny Moreira MD
SHIMA Ayala
SHIMA Tovar
NICOLAS Julian
GERA RONQUILLO
PCU team
SHIMA Ayala
JA Tovar
SHIMA Dinero
SHIMA Pereyra
enmanuel RONQUILLO
GERA RONQUILLO
NICOLAS Julian

## 2022-12-18 NOTE — PROGRESS NOTE ADULT - PROBLEM SELECTOR PLAN 4
- Patient requires total support for all ADL's.  PPSV2 10   %.  - Supportive care  - Turn and position  - Continue with good skin care

## 2022-12-18 NOTE — PROGRESS NOTE ADULT - NUTRITIONAL ASSESSMENT
This patient has been assessed with a concern for Malnutrition and has been determined to have a diagnosis/diagnoses of Moderate protein-calorie malnutrition.    This patient is being managed with:   Diet Regular-  Supplement Feeding Modality:  Oral  Glucerna Shake Cans or Servings Per Day:  2       Frequency:  Daily  Entered: Dec  6 2022  1:01PM    
This patient has been assessed with a concern for Malnutrition and has been determined to have a diagnosis/diagnoses of Moderate protein-calorie malnutrition.    This patient is being managed with:   Diet Soft and Bite Sized-  Low Sodium  Supplement Feeding Modality:  Oral  Glucerna Shake Cans or Servings Per Day:  2       Frequency:  Daily  Entered: Nov 15 2022  3:55PM    
This patient has been assessed with a concern for Malnutrition and has been determined to have a diagnosis/diagnoses of Moderate protein-calorie malnutrition.    This patient is being managed with:   Diet Regular-  Supplement Feeding Modality:  Oral  Glucerna Shake Cans or Servings Per Day:  2       Frequency:  Daily  Entered: Dec  6 2022  1:01PM    
This patient has been assessed with a concern for Malnutrition and has been determined to have a diagnosis/diagnoses of Moderate protein-calorie malnutrition.    This patient is being managed with:   Diet Regular-  Supplement Feeding Modality:  Oral  Glucerna Shake Cans or Servings Per Day:  2       Frequency:  Daily  Entered: Dec  6 2022  1:01PM    
This patient has been assessed with a concern for Malnutrition and has been determined to have a diagnosis/diagnoses of Moderate protein-calorie malnutrition.    This patient is being managed with:   Diet Soft and Bite Sized-  Low Sodium  Supplement Feeding Modality:  Oral  Glucerna Shake Cans or Servings Per Day:  2       Frequency:  Daily  Entered: Nov 15 2022  3:55PM    
This patient has been assessed with a concern for Malnutrition and has been determined to have a diagnosis/diagnoses of Moderate protein-calorie malnutrition.    This patient is being managed with:   Diet Soft and Bite Sized-  Low Sodium  Supplement Feeding Modality:  Oral  Glucerna Shake Cans or Servings Per Day:  2       Frequency:  Daily  Entered: Nov 15 2022  3:55PM    
This patient has been assessed with a concern for Malnutrition and has been determined to have a diagnosis/diagnoses of Moderate protein-calorie malnutrition.    This patient is being managed with:   Diet Regular-  Supplement Feeding Modality:  Oral  Glucerna Shake Cans or Servings Per Day:  2       Frequency:  Daily  Entered: Dec  6 2022  1:01PM    
This patient has been assessed with a concern for Malnutrition and has been determined to have a diagnosis/diagnoses of Moderate protein-calorie malnutrition.    This patient is being managed with:   Diet Soft and Bite Sized-  Low Sodium  Supplement Feeding Modality:  Oral  Glucerna Shake Cans or Servings Per Day:  2       Frequency:  Daily  Entered: Nov 15 2022  3:55PM    
This patient has been assessed with a concern for Malnutrition and has been determined to have a diagnosis/diagnoses of Moderate protein-calorie malnutrition.    This patient is being managed with:   Diet Soft and Bite Sized-  Low Sodium  Supplement Feeding Modality:  Oral  Glucerna Shake Cans or Servings Per Day:  2       Frequency:  Daily  Entered: Nov 15 2022  3:55PM    
This patient has been assessed with a concern for Malnutrition and has been determined to have a diagnosis/diagnoses of Moderate protein-calorie malnutrition.    This patient is being managed with:   Diet Regular-  Supplement Feeding Modality:  Oral  Glucerna Shake Cans or Servings Per Day:  2       Frequency:  Daily  Entered: Dec  6 2022  1:01PM    
This patient has been assessed with a concern for Malnutrition and has been determined to have a diagnosis/diagnoses of Moderate protein-calorie malnutrition.    This patient is being managed with:   Diet Soft and Bite Sized-  Low Sodium  Supplement Feeding Modality:  Oral  Glucerna Shake Cans or Servings Per Day:  2       Frequency:  Daily  Entered: Nov 15 2022  3:55PM    
This patient has been assessed with a concern for Malnutrition and has been determined to have a diagnosis/diagnoses of Moderate protein-calorie malnutrition.    This patient is being managed with:   Diet Regular-  Supplement Feeding Modality:  Oral  Glucerna Shake Cans or Servings Per Day:  2       Frequency:  Daily  Entered: Dec  6 2022  1:01PM    
This patient has been assessed with a concern for Malnutrition and has been determined to have a diagnosis/diagnoses of Moderate protein-calorie malnutrition.    This patient is being managed with:   Diet NPO-  Entered: Dec 15 2022  2:34PM    
This patient has been assessed with a concern for Malnutrition and has been determined to have a diagnosis/diagnoses of Moderate protein-calorie malnutrition.    This patient is being managed with:   Diet Soft and Bite Sized-  Low Sodium  Supplement Feeding Modality:  Oral  Glucerna Shake Cans or Servings Per Day:  2       Frequency:  Daily  Entered: Nov 15 2022  3:55PM    
This patient has been assessed with a concern for Malnutrition and has been determined to have a diagnosis/diagnoses of Moderate protein-calorie malnutrition.    This patient is being managed with:   Diet NPO-  Entered: Dec 15 2022  2:34PM    
This patient has been assessed with a concern for Malnutrition and has been determined to have a diagnosis/diagnoses of Moderate protein-calorie malnutrition.    This patient is being managed with:   Diet Soft and Bite Sized-  Low Sodium  Supplement Feeding Modality:  Oral  Glucerna Shake Cans or Servings Per Day:  2       Frequency:  Daily  Entered: Nov 15 2022  3:55PM    
This patient has been assessed with a concern for Malnutrition and has been determined to have a diagnosis/diagnoses of Moderate protein-calorie malnutrition.    This patient is being managed with:   Diet Regular-  Supplement Feeding Modality:  Oral  Glucerna Shake Cans or Servings Per Day:  2       Frequency:  Daily  Entered: Dec  6 2022  1:01PM    
This patient has been assessed with a concern for Malnutrition and has been determined to have a diagnosis/diagnoses of Moderate protein-calorie malnutrition.    This patient is being managed with:   Diet Soft and Bite Sized-  Low Sodium  Supplement Feeding Modality:  Oral  Glucerna Shake Cans or Servings Per Day:  2       Frequency:  Daily  Entered: Nov 15 2022  3:55PM    
This patient has been assessed with a concern for Malnutrition and has been determined to have a diagnosis/diagnoses of Moderate protein-calorie malnutrition.    This patient is being managed with:   Diet Regular-  Supplement Feeding Modality:  Oral  Glucerna Shake Cans or Servings Per Day:  2       Frequency:  Daily  Entered: Dec  6 2022  1:01PM    
This patient has been assessed with a concern for Malnutrition and has been determined to have a diagnosis/diagnoses of Moderate protein-calorie malnutrition.    This patient is being managed with:   Diet NPO-  Entered: Dec 15 2022  2:34PM    
This patient has been assessed with a concern for Malnutrition and has been determined to have a diagnosis/diagnoses of Moderate protein-calorie malnutrition.    This patient is being managed with:   Diet Soft and Bite Sized-  Low Sodium  Supplement Feeding Modality:  Oral  Glucerna Shake Cans or Servings Per Day:  2       Frequency:  Daily  Entered: Nov 15 2022  3:55PM    
This patient has been assessed with a concern for Malnutrition and has been determined to have a diagnosis/diagnoses of Moderate protein-calorie malnutrition.    This patient is being managed with:   Diet Regular-  Supplement Feeding Modality:  Oral  Glucerna Shake Cans or Servings Per Day:  2       Frequency:  Daily  Entered: Dec  6 2022  1:01PM

## 2022-12-18 NOTE — PROVIDER CONTACT NOTE (OTHER) - REASON
constipation
BP 82/50, see vital signs for 18:17
BP 81/49 and HR 96
Pt temp 100.5
Pt temp =101.4, vomiting coughing and Difficulty breathing
pt had soft bm
pt refusing peripheral blood cultures
Pt temp of 100.6 and O2 Sat of 90%
Pt has temp of 101.5, wheezing, c/o SOB
Pt refuses admission H/W
clarify midodrine parameters
Temp=39.4
Patient expiration

## 2022-12-18 NOTE — PROVIDER CONTACT NOTE (OTHER) - ACTION/TREATMENT ORDERED:
No new orders at this time
lactulose to be d/c, senna to be started following night. continue to monitor for bm and give AM Miralax if appropriate. X-ray abdomen pending to be performed.
hold 10 pm dose of midodrine, f/u am bp prior to am dose.
Give 1u Plts as ordered and 500mL bolus.
Give IV Tylenol, duoneb nebulizer. Do EKG. Draw 5pm labs early, stat labs, and 1 set of BCx from mediport. UA,EDENILSON.
1g Tylenol IVP x1 dose
See patient expiration note
650mg tylenol given, blood cultures x2, UA ordered and done
Give 500mL bolus over 60min and 10mg midodrine PO
no new orders   pt education   encourage blood draw in AM
975 mg Tylenol x1 dose,  Tessalon jaspreete, 2L O2
d/w provider, senna at bedtime to be ordered, X-ray of Abdomen, give senna. 11PM and 12 AM dose of lactulose  and resume in  AM, pending order for Patient Mg citrate if appropriate.

## 2022-12-18 NOTE — PROGRESS NOTE ADULT - REASON FOR ADMISSION
AML relapse

## 2023-01-04 PROCEDURE — 87640 STAPH A DNA AMP PROBE: CPT

## 2023-01-04 PROCEDURE — 83735 ASSAY OF MAGNESIUM: CPT

## 2023-01-04 PROCEDURE — 80202 ASSAY OF VANCOMYCIN: CPT

## 2023-01-04 PROCEDURE — P9040: CPT

## 2023-01-04 PROCEDURE — 80053 COMPREHEN METABOLIC PANEL: CPT

## 2023-01-04 PROCEDURE — 93321 DOPPLER ECHO F-UP/LMTD STD: CPT

## 2023-01-04 PROCEDURE — 71045 X-RAY EXAM CHEST 1 VIEW: CPT

## 2023-01-04 PROCEDURE — 86965 POOLING BLOOD PLATELETS: CPT

## 2023-01-04 PROCEDURE — 85027 COMPLETE CBC AUTOMATED: CPT

## 2023-01-04 PROCEDURE — 94640 AIRWAY INHALATION TREATMENT: CPT

## 2023-01-04 PROCEDURE — 86985 SPLIT BLOOD OR PRODUCTS: CPT

## 2023-01-04 PROCEDURE — 85610 PROTHROMBIN TIME: CPT

## 2023-01-04 PROCEDURE — 87449 NOS EACH ORGANISM AG IA: CPT

## 2023-01-04 PROCEDURE — 87086 URINE CULTURE/COLONY COUNT: CPT

## 2023-01-04 PROCEDURE — 85730 THROMBOPLASTIN TIME PARTIAL: CPT

## 2023-01-04 PROCEDURE — 84484 ASSAY OF TROPONIN QUANT: CPT

## 2023-01-04 PROCEDURE — 87641 MR-STAPH DNA AMP PROBE: CPT

## 2023-01-04 PROCEDURE — 93005 ELECTROCARDIOGRAM TRACING: CPT

## 2023-01-04 PROCEDURE — 0225U NFCT DS DNA&RNA 21 SARSCOV2: CPT

## 2023-01-04 PROCEDURE — 87389 HIV-1 AG W/HIV-1&-2 AB AG IA: CPT

## 2023-01-04 PROCEDURE — 97110 THERAPEUTIC EXERCISES: CPT

## 2023-01-04 PROCEDURE — 93306 TTE W/DOPPLER COMPLETE: CPT

## 2023-01-04 PROCEDURE — P9037: CPT

## 2023-01-04 PROCEDURE — 82553 CREATINE MB FRACTION: CPT

## 2023-01-04 PROCEDURE — 86901 BLOOD TYPING SEROLOGIC RH(D): CPT

## 2023-01-04 PROCEDURE — 83880 ASSAY OF NATRIURETIC PEPTIDE: CPT

## 2023-01-04 PROCEDURE — 36415 COLL VENOUS BLD VENIPUNCTURE: CPT

## 2023-01-04 PROCEDURE — 87040 BLOOD CULTURE FOR BACTERIA: CPT

## 2023-01-04 PROCEDURE — 84132 ASSAY OF SERUM POTASSIUM: CPT

## 2023-01-04 PROCEDURE — P9100: CPT

## 2023-01-04 PROCEDURE — 71250 CT THORAX DX C-: CPT

## 2023-01-04 PROCEDURE — 97116 GAIT TRAINING THERAPY: CPT

## 2023-01-04 PROCEDURE — 86900 BLOOD TYPING SEROLOGIC ABO: CPT

## 2023-01-04 PROCEDURE — 81003 URINALYSIS AUTO W/O SCOPE: CPT

## 2023-01-04 PROCEDURE — 84295 ASSAY OF SERUM SODIUM: CPT

## 2023-01-04 PROCEDURE — 97530 THERAPEUTIC ACTIVITIES: CPT

## 2023-01-04 PROCEDURE — P9012: CPT

## 2023-01-04 PROCEDURE — 85379 FIBRIN DEGRADATION QUANT: CPT

## 2023-01-04 PROCEDURE — 92526 ORAL FUNCTION THERAPY: CPT

## 2023-01-04 PROCEDURE — 85018 HEMOGLOBIN: CPT

## 2023-01-04 PROCEDURE — 86850 RBC ANTIBODY SCREEN: CPT

## 2023-01-04 PROCEDURE — 87305 ASPERGILLUS AG IA: CPT

## 2023-01-04 PROCEDURE — 97161 PT EVAL LOW COMPLEX 20 MIN: CPT

## 2023-01-04 PROCEDURE — 83615 LACTATE (LD) (LDH) ENZYME: CPT

## 2023-01-04 PROCEDURE — 86923 COMPATIBILITY TEST ELECTRIC: CPT

## 2023-01-04 PROCEDURE — 74018 RADEX ABDOMEN 1 VIEW: CPT

## 2023-01-04 PROCEDURE — 84550 ASSAY OF BLOOD/URIC ACID: CPT

## 2023-01-04 PROCEDURE — 84100 ASSAY OF PHOSPHORUS: CPT

## 2023-01-04 PROCEDURE — 86706 HEP B SURFACE ANTIBODY: CPT

## 2023-01-04 PROCEDURE — 85014 HEMATOCRIT: CPT

## 2023-01-04 PROCEDURE — 82435 ASSAY OF BLOOD CHLORIDE: CPT

## 2023-01-04 PROCEDURE — 82947 ASSAY GLUCOSE BLOOD QUANT: CPT

## 2023-01-04 PROCEDURE — 93356 MYOCRD STRAIN IMG SPCKL TRCK: CPT

## 2023-01-04 PROCEDURE — 82803 BLOOD GASES ANY COMBINATION: CPT

## 2023-01-04 PROCEDURE — 92610 EVALUATE SWALLOWING FUNCTION: CPT

## 2023-01-04 PROCEDURE — 82550 ASSAY OF CK (CPK): CPT

## 2023-01-04 PROCEDURE — U0003: CPT

## 2023-01-04 PROCEDURE — 81001 URINALYSIS AUTO W/SCOPE: CPT

## 2023-01-04 PROCEDURE — 86704 HEP B CORE ANTIBODY TOTAL: CPT

## 2023-01-04 PROCEDURE — 82955 ASSAY OF G6PD ENZYME: CPT

## 2023-01-04 PROCEDURE — U0005: CPT

## 2023-01-04 PROCEDURE — 82330 ASSAY OF CALCIUM: CPT

## 2023-01-04 PROCEDURE — 87077 CULTURE AEROBIC IDENTIFY: CPT

## 2023-01-04 PROCEDURE — 36430 TRANSFUSION BLD/BLD COMPNT: CPT

## 2023-01-04 PROCEDURE — 93308 TTE F-UP OR LMTD: CPT

## 2023-01-04 PROCEDURE — 83605 ASSAY OF LACTIC ACID: CPT

## 2023-01-04 PROCEDURE — 80285 DRUG ASSAY VORICONAZOLE: CPT

## 2023-01-04 PROCEDURE — 85384 FIBRINOGEN ACTIVITY: CPT

## 2023-01-04 PROCEDURE — 85025 COMPLETE CBC W/AUTO DIFF WBC: CPT

## 2023-01-04 PROCEDURE — 86803 HEPATITIS C AB TEST: CPT

## 2023-01-04 PROCEDURE — 99285 EMERGENCY DEPT VISIT HI MDM: CPT

## 2023-01-04 PROCEDURE — 87150 DNA/RNA AMPLIFIED PROBE: CPT

## 2023-01-04 PROCEDURE — P9011: CPT

## 2023-01-09 NOTE — ASSESSMENT
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 Now s/p 8 total cycles complicated by pancytopenia persistently. \par -Patient's CBC improving s/p holding Venclexta and bone marrow biopsy after cycle 8 showing evidence of persistent complete remission. \par \par -Will need to do dacogen/venetoclax q5wks given troubles with cytopenias, along with OncPro (day 5). C9 to start on 11/9/20. Pt is on Levaquin/Fluconazole, told her to hold for now as she is no longer neutropenic. \par \par -Venetoclax daily, dosed at 200mg  for 10 days to start with C9 on 11/9.   \par \par -BM bx done on 2/3/20 showed normal cytogenetics. In house assay for FLT-3 ITD positive, which confers an adverse prognosis in AML - previously had been d/w patient and family about BMT transplant -pt active prior to admission, good PS status; however, she was not interested. \par \par -patient has Port -looks good\par \par -patient has severe lower back pain related to DDD and apparent pinched nerve- advised her against taking NSAIDs due to platelet dysfunction (even though platelets are normal now as they are likely to decrease with dacogen/venetoclax). Started low dose gabapentin 100 mg QHS, may have anxiolytic effect as well. \par -Offered patient psychological referral given reported depression, but she is refusing. Emotional support provided. \par \par -follow up monthly\par \par \par  99

## 2023-01-20 NOTE — PHYSICAL EXAM
[Cessation strategies including cessation program discussed] : Cessation strategies including cessation program discussed [Restricted in physically strenuous activity but ambulatory and able to carry out work of a light or sedentary nature] : Status 1- Restricted in physically strenuous activity but ambulatory and able to carry out work of a light or sedentary nature, e.g., light house work, office work [Use of nicotine replacement therapies and other medications discussed] : Use of nicotine replacement therapies and other medications discussed [Encouraged to pick a quit date and identify support needed to quit] : Encouraged to pick a quit date and identify support needed to quit [Normal] : affect appropriate [Smoking Cessation Program Referral] : Smoking Cessation Program Referral  [Ulcers] : no ulcers [FreeTextEntry1] : 4 [Mucositis] : no mucositis [Thrush] : no thrush [de-identified] : L sided Port -no inflammation. Minimal LE edema b/l, R>L

## 2023-02-26 NOTE — DISCHARGE NOTE PROVIDER - NSDCCONDITION_GEN_ALL_CORE
Stable This was a shared visit with the ZENOBIA. I reviewed and verified the documentation and independently performed the documented:

## 2023-05-05 NOTE — ED PROVIDER NOTE - ATTENDING CONTRIBUTION TO CARE
Patient with AML presents due to outpatient labs showing severe leukocytosis with a white blood cell count of 80 and close to 90% blast, concerning for blast crisis.  Patient is completely asymptomatic here, well-appearing, unremarkable vital signs, breathing comfortably.  Oncology team evaluated patient while in the waiting room and left recommendations including medications and labs, but did not recommend emergent intervention beyond this. ED team sent labs and gave medications as recommended by oncology. Patient will be admitted for further oncology evaluation and further oncology management. Fluconazole Counseling:  Patient counseled regarding adverse effects of fluconazole including but not limited to headache, diarrhea, nausea, upset stomach, liver function test abnormalities, taste disturbance, and stomach pain.  There is a rare possibility of liver failure that can occur when taking fluconazole.  The patient understands that monitoring of LFTs and kidney function test may be required, especially at baseline. The patient verbalized understanding of the proper use and possible adverse effects of fluconazole.  All of the patient's questions and concerns were addressed.

## 2023-05-17 NOTE — SBIRT NOTE ADULT - NSSBIRTALCACTION/INTER_GEN_A_CORE
"History  Chief Complaint   Patient presents with   • Detox Evaluation     Pt states, \"My doctor sent me here for rehab and detox  \" Pt seen in department twice in the last week for same concern  Patient was reevaluated numerous times today  Signout for the patient was at he is here for placement to host for alcohol withdrawal   While talking to host representative, patient states he is suicidal   They note on chart review that patient endorsed having homicidal ideation  Patient was evaluated by psychiatry in the emergency department and was determined to not be suicidal not be homicidal   Patient was evaluated by this provider he did not have any suicidal homicidal ideation  Prior to Admission Medications   Prescriptions Last Dose Informant Patient Reported? Taking? albuterol (PROVENTIL HFA,VENTOLIN HFA) 90 mcg/act inhaler   No No   Sig: Inhale 2 puffs every 4 (four) hours as needed for wheezing or shortness of breath   fluticasone (FLONASE) 50 mcg/act nasal spray   No No   Si spray into each nostril daily   paliperidone palmitate (INVEGA) 234 MG/1 5ML im injection   Yes No   Sig: Inject 234 mg into a muscle every 28 days   risperiDONE (RisperDAL) 2 mg tablet   Yes No   Sig: Take 2 mg by mouth daily      Facility-Administered Medications: None       Past Medical History:   Diagnosis Date   • Asthma    • Hearing impaired    • Legally blind    • Schizophrenia ([facility] [address] )        Past Surgical History:   Procedure Laterality Date   • HERNIA REPAIR     • TEAR DUCT SURGERY Bilateral    • TONSILLECTOMY         Family History   Problem Relation Age of Onset   • Schizophrenia Mother    • Mental illness Mother    • Abnormal EKG Sister    • Mental illness Sister      I have reviewed and agree with the history as documented      E-Cigarette/Vaping   • E-Cigarette Use Never User      E-Cigarette/Vaping Substances     Social History     Tobacco Use   • Smoking status: Every Day     Packs/day: 1 00     Years: 3 00     " Pack years: 3 00     Types: Cigarettes   • Smokeless tobacco: Never   Vaping Use   • Vaping Use: Never used   Substance Use Topics   • Alcohol use: Yes     Comment: (3) 40 oz  beers per day   • Drug use: Yes     Types: Cocaine, Marijuana, Methamphetamines     Comment: patient denies drug use today        Review of Systems   Psychiatric/Behavioral: Positive for agitation  Physical Exam  ED Triage Vitals [05/16/23 1402]   Temperature Pulse Respirations Blood Pressure SpO2   98 2 °F (36 8 °C) 104 18 122/58 96 %      Temp Source Heart Rate Source Patient Position - Orthostatic VS BP Location FiO2 (%)   Temporal Monitor Sitting Left arm --      Pain Score       No Pain             Orthostatic Vital Signs  Vitals:    05/16/23 1402 05/17/23 0851 05/17/23 1307   BP: 122/58 139/58 129/85   Pulse: 104 66 73   Patient Position - Orthostatic VS: Sitting  Sitting       Physical Exam  Vitals and nursing note reviewed  Constitutional:       General: He is not in acute distress  Appearance: He is well-developed  HENT:      Head: Normocephalic and atraumatic  Eyes:      Conjunctiva/sclera: Conjunctivae normal    Cardiovascular:      Rate and Rhythm: Normal rate and regular rhythm  Heart sounds: No murmur heard  Pulmonary:      Effort: Pulmonary effort is normal  No respiratory distress  Breath sounds: Normal breath sounds  Abdominal:      Palpations: Abdomen is soft  Tenderness: There is no abdominal tenderness  Musculoskeletal:         General: No swelling  Cervical back: Neck supple  Skin:     General: Skin is warm and dry  Capillary Refill: Capillary refill takes less than 2 seconds  Neurological:      Mental Status: He is alert  Psychiatric:         Behavior: Behavior is agitated           ED Medications  Medications   risperiDONE (RisperDAL) tablet 2 mg (2 mg Oral Given 5/17/23 1308)       Diagnostic Studies  Results Reviewed     Procedure Component Value Units Date/Time Rapid drug screen, urine [[id number]]  (Normal) Collected: 05/16/23 1524    Lab Status: Final result Specimen: Urine, Clean Catch Updated: 05/16/23 1917     Amph/Meth UR Negative     Barbiturate Ur Negative     Benzodiazepine Urine Negative     Cocaine Urine Negative     Methadone Urine Negative     Opiate Urine Negative     PCP Ur Negative     THC Urine Negative     Oxycodone Urine Negative    Narrative:      FOR MEDICAL PURPOSES ONLY  IF CONFIRMATION NEEDED PLEASE CONTACT THE LAB WITHIN 5 DAYS  Drug Screen Cutoff Levels:  AMPHETAMINE/METHAMPHETAMINES  1000 ng/mL  BARBITURATES     200 ng/mL  BENZODIAZEPINES     200 ng/mL  COCAINE      300 ng/mL  METHADONE      300 ng/mL  OPIATES      300 ng/mL  PHENCYCLIDINE     25 ng/mL  THC       50 ng/mL  OXYCODONE      100 ng/mL    POCT alcohol breath test [[id number]]  (Normal) Resulted: 05/16/23 1524    Lab Status: Final result Updated: 05/16/23 1524     EXTBreath Alcohol 0 000                 No orders to display         Procedures  Procedures      ED Course  ED Course as of 05/17/23 1616   Wed May 17, 2023   0713 SO: pending host eval, can leave whenever   1025 Per nurse, pt stating SI to HOST  Will need re-eval     1057 Conversation with house  Because it is documented tried the patient had HI a few days ago they need clearance with psychiatry, not crisis  1200 Conversation after psychiatric eval   Patient is cleared from their standpoint  We will remove one-to-one  Patient will be placed on 2 mg Risperdal nightly 30-day prescription sent to his pharmacy  [phone] Patient cleared by psychiatry  Patient requesting to be discharged as she does not want to go to host   Patient is stable at this time for discharge home  SBIRT 20yo+    Flowsheet Row Most Recent Value   Initial Alcohol Screen: US AUDIT-C     1  How often do you have a drink containing alcohol? 6 Filed at: 05/16/2023 1403   2   How many drinks containing alcohol do you have on a typical day you are drinking? 6 Filed at: 05/16/2023 1407   3a  Male UNDER 65: How often do you have five or more drinks on one occasion? 6 Filed at: 05/16/2023 1404   Audit-C Score 18 Filed at: 05/16/2023 1408                Medical Decision Making  Patient was evaluated by psychiatry today for stated suicidal ideation having conversation with post   Per chart review patient had homicidal ideation  Psychiatry cleared the patient of all SI and HI  States he does not want to be admitted to host anymore and would like to go home  Due to patient being cleared by psychiatry, we feel comfortable letting the patient go home  Patient will be discharged and provided with resources for alcohol withdrawal and psychiatric services  Amount and/or Complexity of Data Reviewed  Labs: ordered  Risk  Prescription drug management  Disposition  Final diagnoses:   Alcohol abuse   HI   Other schizophrenia ([facility] )     Time reflects when diagnosis was documented in both MDM as applicable and the Disposition within this note     Time User Action Codes Description Comment    5/16/2023  4:06 PM Madkelvin Mauricio Add [F10 10] Alcohol abuse     5/17/2023 11:00 AM Katelyn Sanchez Homicidal ideation     5/17/2023 12:00 PM Sheran Loose Add [F20 89] Other schizophrenia ([facility] )     5/17/2023  3:14 PM Sheran Loose Modify [R45 850] schizo     5/17/2023  3:14 PM Sheran Loose Modify [R45 850] schizo HI    5/17/2023  3:14 PM Sheran Loose Modify [R45 850] HI HI    5/17/2023  3:15 PM Loy, [address] HI       ED Disposition     ED Disposition   Discharge    Condition   Stable    Date/Time   Wed May 17, 2023  3:13 PM    Comment   Gokul Mohamud discharge to home/self care                 Follow-up Information    None         Discharge Medication List as of 5/17/2023  3:16 PM      START taking these medications    Details   risperiDONE (RisperDAL M-TAB) 2 mg disintegrating tablet Take 1 tablet (2 mg total) by mouth daily at bedtime, Starting Wed 5/17/2023, Until Fri 6/16/2023, Normal         CONTINUE these medications which have NOT CHANGED    Details   albuterol (PROVENTIL HFA,VENTOLIN HFA) 90 mcg/act inhaler Inhale 2 puffs every 4 (four) hours as needed for wheezing or shortness of breath, Starting Tue 3/22/2022, Normal      fluticasone (FLONASE) 50 mcg/act nasal spray 1 spray into each nostril daily, Starting Wed 3/23/2022, No Print      paliperidone palmitate (INVEGA) 234 MG/1 5ML im injection Inject 234 mg into a muscle every 28 days, Historical Med      risperiDONE (RisperDAL) 2 mg tablet Take 2 mg by mouth daily, Historical Med           No discharge procedures on file  PDMP Review     None           ED Provider  Attending physically available and evaluated Brinda Lyman  MARCOS managed the patient along with the ED Attending      Electronically Signed by         aMi Posadas DO  05/17/23 3221 None

## 2023-09-18 NOTE — PROGRESS NOTE ADULT - SUBJECTIVE AND OBJECTIVE BOX
Diagnosis: AML 	FLT 3 (+)     Protocol/Chemo Regimen: Status post Cycle 1 of Dacogen x 5 days with continuation of daily Venetoclax (dose capped at 200mg 2/2 Azole)    Day: 6    Pt endorsed: poor appetite    Review of Systems: Patient denies headache, dizziness, visual changes, chest pain, palpitations, abdominal pain, nausea, vomiting, diarrhea or dysuria.    Pain scale: 0    Diet: Regular    Allergies: No Known Allergies    ANTIMICROBIALS  levoFLOXacin  Tablet 500 milliGRAM(s) Oral every 24 hours      HEME/ONC MEDICATIONS  decitabine IVPB (eMAR) 31 milliGRAM(s) IV Intermittent every 24 hours  enoxaparin Injectable 40 milliGRAM(s) SubCutaneous at bedtime  venetoclax 400 milliGRAM(s) Oral <User Schedule>      STANDING MEDICATIONS  allopurinol 300 milliGRAM(s) Oral at bedtime  amLODIPine   Tablet 5 milliGRAM(s) Oral daily  benzonatate 100 milliGRAM(s) Oral three times a day  Biotene Dry Mouth Oral Rinse 5 milliLiter(s) Swish and Spit four times a day  chlorhexidine 2% Cloths 1 Application(s) Topical <User Schedule>  clobetasol 0.05% Ointment 1 Application(s) Topical two times a day  fluticasone propionate 50 MICROgram(s)/spray Nasal Spray 1 Spray(s) Both Nostrils two times a day  hydrochlorothiazide 12.5 milliGRAM(s) Oral daily  ondansetron Injectable 8 milliGRAM(s) IV Push every 24 hours  phytonadione   Solution 10 milliGRAM(s) Oral daily  polyethylene glycol 3350 17 Gram(s) Oral daily  psyllium Powder 1 Packet(s) Oral daily  senna 2 Tablet(s) Oral at bedtime  sodium chloride 0.9%. 1000 milliLiter(s) IV Continuous <Continuous>  traZODone 150 milliGRAM(s) Oral at bedtime      PRN MEDICATIONS  acetaminophen   Tablet .. 650 milliGRAM(s) Oral every 6 hours PRN  loratadine 10 milliGRAM(s) Oral daily PRN  sodium chloride 0.9% lock flush 10 milliLiter(s) IV Push every 1 hour PRN      Vital Signs Last 24 Hrs  T(C): 37.3 (08 Feb 2020 09:28), Max: 37.6 (08 Feb 2020 01:42)  T(F): 99.1 (08 Feb 2020 09:28), Max: 99.6 (08 Feb 2020 01:42)  HR: 85 (08 Feb 2020 11:22) (73 - 85)  BP: 120/66 (08 Feb 2020 09:28) (120/66 - 143/80)  BP(mean): --  RR: 18 (08 Feb 2020 09:28) (18 - 18)  SpO2: 94% (08 Feb 2020 11:22) (94% - 98%)      PHYSICAL EXAM  General: NAD  HEENT: PERRLA, EOMI, clear oropharynx  Neck: supple  CV: (+) S1/S2 RRR  Lungs: clear to auscultation, no wheezes or rales  Abdomen: soft, non-tender, non-distended (+) BS  Ext: no edema  Neuro: alert and oriented X 3, no focal deficits  Central Line: C/D/I        LABS:                                 8.5    0.99  )-----------( 44       ( 08 Feb 2020 06:36 )             25.8         Mean Cell Volume : 92.1 fl  Mean Cell Hemoglobin : 30.4 pg  Mean Cell Hemoglobin Concentration : 32.9 gm/dL  Auto Neutrophil # : 0.35 K/uL  Auto Lymphocyte # : 0.48 K/uL  Auto Monocyte # : 0.10 K/uL  Auto Eosinophil # : 0.00 K/uL  Auto Basophil # : 0.00 K/uL  Auto Neutrophil % : 35.3 %  Auto Lymphocyte % : 48.5 %  Auto Monocyte % : 10.1 %  Auto Eosinophil % : 0.0 %  Auto Basophil % : 0.0 %      02-08    143  |  107  |  13  ----------------------------<  98  3.6   |  28  |  0.55    Ca    8.0<L>      08 Feb 2020 06:36  Phos  3.4     02-08  Mg     2.2     02-08    TPro  4.9<L>  /  Alb  2.7<L>  /  TBili  0.6  /  DBili  x   /  AST  22  /  ALT  36  /  AlkPhos  32<L>  02-08      Mg 2.2  Phos 3.4      Uric Acid 2.2      PT/INR - ( 07 Feb 2020 06:57 )   PT: 14.5 sec;   INR: 1.25 ratio         PTT - ( 07 Feb 2020 06:57 )  PTT:31.2 sec      RADIOLOGY & ADDITIONAL STUDIES:    EXAM:  XR CHEST PORTABLE URGENT 1V                        PROCEDURE DATE:  02/06/2020    Impression: Unchanged trace left effusion and mild pulmonary edema Simple / Intermediate / Complex Repair - Final Wound Length In Cm: 0 Diagnosis: AML 	FLT 3 (+)     Protocol/Chemo Regimen: Status post Cycle 1 of Dacogen x 5 days with continuation of daily Venetoclax (dose capped at 200mg 2/2 Azole)    Day: 6    Pt endorsed: poor appetite    Review of Systems: Patient denies headache, dizziness, visual changes, chest pain, palpitations, abdominal pain, nausea, vomiting, diarrhea or dysuria.    Pain scale: 0    Diet: Regular    Allergies: No Known Allergies      ANTIMICROBIALS  levoFLOXacin  Tablet 500 milliGRAM(s) Oral every 24 hours    HEME/ONC MEDICATIONS  enoxaparin Injectable 40 milliGRAM(s) SubCutaneous at bedtime  venetoclax 400 milliGRAM(s) Oral <User Schedule>      STANDING MEDICATIONS  allopurinol 300 milliGRAM(s) Oral at bedtime  amLODIPine   Tablet 5 milliGRAM(s) Oral daily  benzonatate 100 milliGRAM(s) Oral three times a day  Biotene Dry Mouth Oral Rinse 5 milliLiter(s) Swish and Spit four times a day  chlorhexidine 2% Cloths 1 Application(s) Topical <User Schedule>  clobetasol 0.05% Ointment 1 Application(s) Topical two times a day  fluticasone propionate 50 MICROgram(s)/spray Nasal Spray 1 Spray(s) Both Nostrils two times a day  hydrochlorothiazide 12.5 milliGRAM(s) Oral daily  ondansetron Injectable 8 milliGRAM(s) IV Push every 24 hours  phytonadione   Solution 10 milliGRAM(s) Oral daily  polyethylene glycol 3350 17 Gram(s) Oral daily  psyllium Powder 1 Packet(s) Oral daily  senna 2 Tablet(s) Oral at bedtime  sodium chloride 0.9%. 1000 milliLiter(s) IV Continuous <Continuous>  traZODone 150 milliGRAM(s) Oral at bedtime      PRN MEDICATIONS  acetaminophen   Tablet .. 650 milliGRAM(s) Oral every 6 hours PRN  loratadine 10 milliGRAM(s) Oral daily PRN  sodium chloride 0.9% lock flush 10 milliLiter(s) IV Push every 1 hour PRN      Vital Signs Last 24 Hrs  T(C): 37.4 (09 Feb 2020 09:20), Max: 37.9 (09 Feb 2020 00:36)  T(F): 99.3 (09 Feb 2020 09:20), Max: 100.3 (09 Feb 2020 00:36)  HR: 76 (09 Feb 2020 09:20) (76 - 85)  BP: 117/75 (09 Feb 2020 09:20) (115/60 - 133/81)  BP(mean): --  RR: 18 (09 Feb 2020 09:20) (18 - 18)  SpO2: 97% (09 Feb 2020 09:20) (93% - 97%)    PHYSICAL EXAM  General: NAD  HEENT: PERRLA, EOMI, clear oropharynx  Neck: supple  CV: (+) S1/S2 RRR  Lungs: clear to auscultation, no wheezes or rales  Abdomen: soft, non-tender, non-distended (+) BS  Ext: no edema  Neuro: alert and oriented X 3, no focal deficits  Central Line: C/D/I        LABS:                        8.5    0.73  )-----------( 36       ( 09 Feb 2020 06:36 )             25.3         Mean Cell Volume : 92.3 fl  Mean Cell Hemoglobin : 31.0 pg  Mean Cell Hemoglobin Concentration : 33.6 gm/dL  Auto Neutrophil # : 0.19 K/uL  Auto Lymphocyte # : 0.26 K/uL  Auto Monocyte # : 0.05 K/uL  Auto Eosinophil # : 0.20 K/uL  Auto Basophil # : 0.00 K/uL  Auto Neutrophil % : 26.1 %  Auto Lymphocyte % : 35.6 %  Auto Monocyte % : 6.8 %  Auto Eosinophil % : 27.4 %  Auto Basophil % : 0.0 %      02-09    141  |  106  |  13  ----------------------------<  119<H>  3.6   |  26  |  0.58    Ca    8.3<L>      09 Feb 2020 06:36  Phos  3.0     02-09  Mg     2.3     02-09    TPro  4.9<L>  /  Alb  2.8<L>  /  TBili  0.5  /  DBili  x   /  AST  35  /  ALT  51<H>  /  AlkPhos  34<L>  02-09      Mg 2.3  Phos 3.0        Uric Acid 2.1      RADIOLOGY & ADDITIONAL STUDIES:    EXAM:  XR CHEST PORTABLE URGENT 1V                        PROCEDURE DATE:  02/06/2020    Impression: Unchanged trace left effusion and mild pulmonary edema

## 2023-10-11 NOTE — PATIENT PROFILE ADULT - ANY SIGNIFICANT CHANGE IN ABILITY TO PERFORM THE FOLLOWING ADL SINCE THE ONSET OF PRESENT ILLNESS?
How Severe Is Your Skin Lesion?: mild Is This A New Presentation, Or A Follow-Up?: Skin Lesions Additional History: States started around honeymoon in [facility], painful and having purulent drainage the last month. Tried to get sa otc, but couldn’t find. no

## 2023-12-31 NOTE — H&P ADULT - NSHPSOCIALHISTORY_GEN_ALL_CORE
She lives with her . Her  has prostate cancer and neuropathy. She is concerned about his health too.   She has an aide who assists 4 hrs x 3 days a week.   She ambulates with a cane or walker. She has not had any recent falls.   She wears a Life Alert pendant.   She denies tobacco or drug use. She has one serving of alcohol a day. Patient

## 2024-02-22 NOTE — ADVANCED PRACTICE NURSE CONSULT - ASSESSMENT
Pt lying in bed, A/Ox4. Pt with SOB,pulse Ox at 89%,Oxygen cannula changed to non rebreather as per SHIMA Plummer,pulse Ox up to 98%. HOB kept elevated. Left SL power port patent,  easily flushed with brisk blood return, dressing C/D/I. Site without redness, swelling, pain. Labs reviewed by Dr Osman on rounds. Pt  on  8mg zofran IVP for antinausea. 2 RN verification completed. Education reinforced with patient, not able to listen.. At 1739 (as per Dr. Osman to resume chemo) , Pt started  Cytarabine 100mg/m2 = 164 mg /500ml NSS infusing  via alaris pump @ a rate of 23 ml/h as a continuous IV infusion to lowest port of NS line via  Left SL powerport.  Safety maintained. Report given to primary RN  
Pt lying in bed, A/Ox4. Pt with no complaints. Left SL power port patent,  easily flushed with brisk blood return, dressing C/D/I. Site without redness, swelling, pain. Labs reviewed by Dr Flores on rounds. Pt  on  8mg zofran IVP for antinausea. 2 RN verification completed. Education reinforced with patient, able to verbalize understanding. At 16:48 , Pt started  Cytarabine 100mg/m2 = 164 mg infusing  via alaris pump @ a rate of 23 ml/h as a continuous IV infusion to lowest port of NS line via  Left SL powerport.  Safety maintained. Report given to primary RN  
Pt lying in bed, A/Ox4. Pt with no complaints. Left SL power port patent,  easily flushed with brisk blood return, dressing C/D/I. Site without redness, swelling, pain. Labs reviewed by Dr Flores on rounds. Pt started on  8mg zofran IVP for antinausea. 2 RN verification completed. Education reinforced with patient, able to verbalize understanding. At 16:52 , Pt started  Cytarabine 100mg/m2 = 164 mg infusing  via alaris pump @ a rate of 23 ml/h as a continuous IV infusion to lowest port of NS line via  Left SL powerport.  Safety maintained. Report given to primary RN  
Statement Selected

## 2024-05-02 NOTE — ED PROVIDER NOTE - CPE EDP GASTRO NORM
As per provider, worker was informed to obtain collateral information. Worker met with patient who provided her mother’s number ([phone]).    Worker called patient’s mother Galina with  ([phone]) id # [id number] [place] for collateral information. Mother passed the phone to pt’s adult sister Mer ([phone]) who then states to call back because she is at work.     Worker called back pt’s mother Galina ID # [id number] for collateral information. Galina states that the patient has been doing ok. She states that the patient has no hx of mental health issues. She states that the patient lives her and older sister. She states that today the patient went to a clinic in [facility] for therapy. She states that the patient has past trauma with her father and was having issues with her father when she was in [place]. She states that the patient did say suicidal statements at that time but never had no plan. No past SA. She states that when living with the father in their country pt would say “It’s better to die”. No drugs or alcohol. Patient has been eating and showering. Patient has issues with sleeping at times. No family hx of mental illness. Mother is unsure about pt’s current stressors but believes that it is work. Mother also states that patient believes that she does not give her any freedom but mother states that patient is able to do what she wants. No violence or aggression noted. Worker informed both sister who then got back on phone line and mother of patient psychiatric admission and information for [facility]. Worker provided support around patient admission and explained that patient is being psychiatrically admitted. Case discussed with psychiatrist. normal...

## 2024-07-19 NOTE — PROGRESS NOTE ADULT - PROBLEM SELECTOR PLAN 2
Dry, sterile, transparent dressing applied.  Patient is neutropenic, febrile 102.9 Tmax  11/11 Switched Levaquin to Cefepime  11/11 Started Vancomycin  11/10  Blood Cx Staph epidermis/hominis - ? contaminant  Follow up repeat cultures 11/11- if cleared, CT neg, and fevers resolve. stop vanco Patient is neutropenic, febrile 102.9 Tmax  CT chest shows scattered nodules/opacities in lung, + adrenal nodule, and liver lesion.  changed fluconazole to voriconazole  check fungitell, galactomanan  11/11 Switched Levaquin to Cefepime  11/11 Started Vancomycin  11/10  Blood Cx Staph epidermis/hominis - ? contaminant  Follow up repeat cultures 11/11, 11/12

## 2024-08-04 NOTE — PROGRESS NOTE ADULT - PROBLEM SELECTOR PLAN 5
An extensive discussion surrounding a transfer to the Palliative Care Unit was held.    Palliative Care Unit candidacy at this time:  [] No, the patient is not a candidate  [x] Yes, the patient is a candidate    If the patient is a candidate for the Palliative Care Unit:  [x] See document for symptom management recommendations and kindly add orders  [x] Transfer to the Palliative Care Unit when a bed is available based upon their position in the queue  [] Do not transfer to the Palliative Care Unit at the request of the decision maker (patient, healthcare agent, or surrogate) since they are not interested in transfer      In the event of newly developing, evolving, or worsening symptoms, please contact the Palliative Medicine team via pager (if the patient is at [facility] #[phone] or if the patient is at [facility] #[phone]) The Geriatric and Palliative Medicine service has coverage 24 hours a day/ 7 days a week to provide medical recommendations regarding symptom management needs via telephone [facility]OM

## 2024-08-29 NOTE — DIETITIAN INITIAL EVALUATION ADULT - NAME AND PHONE
ANTICOAGULATION MANAGEMENT     Aditi Lei 29 year old female is on warfarin with therapeutic INR result. (Goal INR 2.0-3.0)    Recent labs: (last 7 days)     08/29/24  1326   INR 2.2*       ASSESSMENT     Warfarin Lab Questionnaire    Warfarin Doses Last 7 Days      8/29/2024     1:14 PM   Dose in Tablet or Mg   TAB or MG? milligram (mg)     Pt Rptd Dose SUNDAY MONDAY TUESDAY WED THURS FRIDAY SATURDAY 8/29/2024   1:14 PM 5 7.5 5 5 5 5 5         8/29/2024   Warfarin Lab Questionnaire   Missed doses within past 14 days? No   Changes in diet or alcohol within past 14 days? No   Medication changes since last result? Yes   Please list: stopped stool softner   Injuries or illness since last result? No   New shortness of breath, severe headaches or sudden changes in vision since last result? No   Abnormal bleeding since last result? Yes   If yes, please explain: heavey period   Upcoming surgery, procedure? No   Best number to call with results? [phone]        Previous result: Therapeutic last visit; previously outside of goal range  Additional findings: Recent heart valve replacement in last 10 weeks Per protocol warfarin increased 1-10% for inr 2.0-2.4  Aditi will discuss heavy period with her PCP. She does have an iud for contraception       PLAN     Recommended plan for no diet, medication or health factor changes affecting INR     Dosing Instructions: Increase your warfarin dose (6.7% change) with next INR in 5 days       Summary  As of 8/29/2024      Full warfarin instructions:  7.5 mg every Mon, Thu; 5 mg all other days   Next INR check:  9/3/2024               Telephone call with Aditi who verbalizes understanding and agrees to plan    Lab visit scheduled    Education provided: Please call back if any changes to your diet, medications or how you've been taking warfarin    Plan made per [facility] anticoagulation protocol    Nilson Murillo RN  Anticoagulation  Bailey Mason, Dietetic Intern, Pager #: [phone], also available on TEAMS.  Clinic  8/29/2024    _______________________________________________________________________     Anticoagulation Episode Summary       Current INR goal:  2.0-3.0   TTR:  --   Target end date:  Indefinite   Send INR reminders to:  [facility]    Indications    S/P tricuspid valve replacement [Z95.4]             Comments:  33 mm Epic Plus             Anticoagulation Care Providers       Provider Role Specialty Phone number    Kandi Mccallum APRN CNP Referring Nurse Practitioner Primary Care [phone]

## 2024-11-18 NOTE — DIETITIAN INITIAL EVALUATION ADULT. - OTHER INFO
Progress Note - Hospitalist   Name: Dorcas Schofield Diet PTA: Pt reports eating well with good appetite PTA consuming 3 meals per day with no recent changes in appetite or intake, pt consumes a variety of healthful foods at home in line with [employer] program which pt has been following for 12 years.     Weight: Pt reports stable UBW for the past 12 year of 117-118 lbs which is consistent with current admission weight 117.9 lbs (standing 1/31), weight slightly elevated in house to recent weight 123 lbs (2/5), likely attributed to fluid shifts.     Pt with no food allergies, takes a daily multivitamin at home-RD informed pt to check with medical team if this is ok to continue on discharge, No N+V, last BM per pt yesterday-pt interested in stewed prunes, Pt with no difficulty chewing/swallowing.     Diet education: RD reviewed importance of adequate po intake with overall goal for weight maintenance during treatment process.

## 2025-03-14 NOTE — PROGRESS NOTE ADULT - PROBLEM SELECTOR PLAN 3
Patient's SBP is in range of 140s-160s.  Start home dose of amlodipine 5mg PO qd with hold parameters. English